# Patient Record
Sex: MALE | Race: WHITE | Employment: OTHER | URBAN - NONMETROPOLITAN AREA
[De-identification: names, ages, dates, MRNs, and addresses within clinical notes are randomized per-mention and may not be internally consistent; named-entity substitution may affect disease eponyms.]

---

## 2017-01-04 ENCOUNTER — OFFICE VISIT (OUTPATIENT)
Dept: PHYSICAL MEDICINE AND REHAB | Age: 82
End: 2017-01-04

## 2017-01-04 VITALS
SYSTOLIC BLOOD PRESSURE: 126 MMHG | DIASTOLIC BLOOD PRESSURE: 67 MMHG | WEIGHT: 158.8 LBS | HEIGHT: 63 IN | HEART RATE: 72 BPM | BODY MASS INDEX: 28.14 KG/M2

## 2017-01-04 DIAGNOSIS — M51.26 LUMBAR DISC HERNIATION: ICD-10-CM

## 2017-01-04 DIAGNOSIS — M15.9 PRIMARY OSTEOARTHRITIS INVOLVING MULTIPLE JOINTS: ICD-10-CM

## 2017-01-04 DIAGNOSIS — M54.16 LUMBAR RADICULITIS: Primary | ICD-10-CM

## 2017-01-04 PROCEDURE — 99213 OFFICE O/P EST LOW 20 MIN: CPT | Performed by: NURSE PRACTITIONER

## 2017-01-04 RX ORDER — OXYCODONE HYDROCHLORIDE AND ACETAMINOPHEN 5; 325 MG/1; MG/1
1 TABLET ORAL 2 TIMES DAILY PRN
Qty: 60 TABLET | Refills: 0 | Status: SHIPPED | OUTPATIENT
Start: 2017-01-04 | End: 2017-02-03

## 2017-01-24 ENCOUNTER — OFFICE VISIT (OUTPATIENT)
Dept: OTOLARYNGOLOGY | Age: 82
End: 2017-01-24

## 2017-01-24 VITALS
HEART RATE: 60 BPM | RESPIRATION RATE: 16 BRPM | TEMPERATURE: 97.6 F | DIASTOLIC BLOOD PRESSURE: 86 MMHG | WEIGHT: 161.1 LBS | SYSTOLIC BLOOD PRESSURE: 132 MMHG | HEIGHT: 63 IN | BODY MASS INDEX: 28.54 KG/M2

## 2017-01-24 DIAGNOSIS — Z45.89 TYMPANOSTOMY TUBE CHECK: ICD-10-CM

## 2017-01-24 DIAGNOSIS — H90.0 HEARING LOSS, CONDUCTIVE, BILATERAL: Primary | ICD-10-CM

## 2017-01-24 DIAGNOSIS — H90.3 BILATERAL SENSORINEURAL HEARING LOSS: ICD-10-CM

## 2017-01-24 DIAGNOSIS — H65.22 CHRONIC SEROUS OTITIS MEDIA OF LEFT EAR: ICD-10-CM

## 2017-01-24 PROCEDURE — 99024 POSTOP FOLLOW-UP VISIT: CPT | Performed by: OTOLARYNGOLOGY

## 2017-01-24 ASSESSMENT — ENCOUNTER SYMPTOMS
SINUS PRESSURE: 0
VOMITING: 0
CHEST TIGHTNESS: 0
WHEEZING: 0
SHORTNESS OF BREATH: 0
COLOR CHANGE: 0
VOICE CHANGE: 0
ABDOMINAL PAIN: 0
CHOKING: 0
TROUBLE SWALLOWING: 0
SORE THROAT: 0
STRIDOR: 0
DIARRHEA: 0
RHINORRHEA: 0
NAUSEA: 0
FACIAL SWELLING: 0
APNEA: 0
COUGH: 0

## 2017-02-16 DIAGNOSIS — K59.03 DRUG-INDUCED CONSTIPATION: ICD-10-CM

## 2017-02-16 DIAGNOSIS — F41.3 OTHER MIXED ANXIETY DISORDERS: ICD-10-CM

## 2017-02-17 RX ORDER — AMOXICILLIN 250 MG
1 CAPSULE ORAL NIGHTLY
Status: CANCELLED | OUTPATIENT
Start: 2017-02-17

## 2017-02-20 RX ORDER — DOCUSATE SODIUM AND SENNOSIDES 8.6; 5 MG/1; MG/1
TABLET, FILM COATED ORAL
Qty: 60 TABLET | Refills: 0 | Status: SHIPPED | OUTPATIENT
Start: 2017-02-20 | End: 2017-03-08 | Stop reason: ALTCHOICE

## 2017-03-08 ENCOUNTER — OFFICE VISIT (OUTPATIENT)
Dept: PHYSICAL MEDICINE AND REHAB | Age: 82
End: 2017-03-08

## 2017-03-08 VITALS
SYSTOLIC BLOOD PRESSURE: 148 MMHG | BODY MASS INDEX: 26.92 KG/M2 | DIASTOLIC BLOOD PRESSURE: 74 MMHG | HEART RATE: 64 BPM | WEIGHT: 161.6 LBS | HEIGHT: 65 IN

## 2017-03-08 DIAGNOSIS — M54.16 LUMBAR RADICULITIS: Primary | ICD-10-CM

## 2017-03-08 DIAGNOSIS — M51.26 LUMBAR DISC HERNIATION: ICD-10-CM

## 2017-03-08 DIAGNOSIS — M15.9 PRIMARY OSTEOARTHRITIS INVOLVING MULTIPLE JOINTS: ICD-10-CM

## 2017-03-08 PROCEDURE — 1036F TOBACCO NON-USER: CPT | Performed by: NURSE PRACTITIONER

## 2017-03-08 PROCEDURE — G8598 ASA/ANTIPLAT THER USED: HCPCS | Performed by: NURSE PRACTITIONER

## 2017-03-08 PROCEDURE — 4040F PNEUMOC VAC/ADMIN/RCVD: CPT | Performed by: NURSE PRACTITIONER

## 2017-03-08 PROCEDURE — 99213 OFFICE O/P EST LOW 20 MIN: CPT | Performed by: NURSE PRACTITIONER

## 2017-03-08 PROCEDURE — G8427 DOCREV CUR MEDS BY ELIG CLIN: HCPCS | Performed by: NURSE PRACTITIONER

## 2017-03-08 PROCEDURE — 1123F ACP DISCUSS/DSCN MKR DOCD: CPT | Performed by: NURSE PRACTITIONER

## 2017-03-08 PROCEDURE — G8420 CALC BMI NORM PARAMETERS: HCPCS | Performed by: NURSE PRACTITIONER

## 2017-03-08 PROCEDURE — G8484 FLU IMMUNIZE NO ADMIN: HCPCS | Performed by: NURSE PRACTITIONER

## 2017-03-08 RX ORDER — OXYCODONE HYDROCHLORIDE AND ACETAMINOPHEN 5; 325 MG/1; MG/1
1 TABLET ORAL 2 TIMES DAILY PRN
COMMUNITY
End: 2017-03-08 | Stop reason: SDUPTHER

## 2017-03-08 RX ORDER — ESZOPICLONE 2 MG/1
2 TABLET, FILM COATED ORAL NIGHTLY
COMMUNITY
End: 2017-03-29

## 2017-03-08 RX ORDER — TRAZODONE HYDROCHLORIDE 150 MG/1
150 TABLET ORAL NIGHTLY
COMMUNITY
End: 2017-03-29

## 2017-03-08 RX ORDER — OXYCODONE HYDROCHLORIDE AND ACETAMINOPHEN 5; 325 MG/1; MG/1
1 TABLET ORAL 2 TIMES DAILY PRN
Qty: 60 TABLET | Refills: 0 | Status: SHIPPED | OUTPATIENT
Start: 2017-03-08 | End: 2017-04-07

## 2017-03-08 ASSESSMENT — ENCOUNTER SYMPTOMS
GASTROINTESTINAL NEGATIVE: 1
RESPIRATORY NEGATIVE: 1

## 2017-03-09 ENCOUNTER — OFFICE VISIT (OUTPATIENT)
Dept: FAMILY MEDICINE CLINIC | Age: 82
End: 2017-03-09

## 2017-03-09 VITALS
BODY MASS INDEX: 27.01 KG/M2 | HEIGHT: 64 IN | HEART RATE: 59 BPM | WEIGHT: 158.2 LBS | SYSTOLIC BLOOD PRESSURE: 142 MMHG | DIASTOLIC BLOOD PRESSURE: 73 MMHG | RESPIRATION RATE: 14 BRPM | TEMPERATURE: 97.4 F

## 2017-03-09 DIAGNOSIS — D50.9 IRON DEFICIENCY ANEMIA, UNSPECIFIED IRON DEFICIENCY ANEMIA TYPE: ICD-10-CM

## 2017-03-09 DIAGNOSIS — M51.9 DISC DISORDER OF LUMBAR REGION: ICD-10-CM

## 2017-03-09 DIAGNOSIS — N18.30 CKD (CHRONIC KIDNEY DISEASE) STAGE 3, GFR 30-59 ML/MIN (HCC): Primary | ICD-10-CM

## 2017-03-09 DIAGNOSIS — I10 ESSENTIAL HYPERTENSION: ICD-10-CM

## 2017-03-09 DIAGNOSIS — M25.50 ARTHRALGIA OF MULTIPLE JOINTS: ICD-10-CM

## 2017-03-09 DIAGNOSIS — F41.3 OTHER MIXED ANXIETY DISORDERS: ICD-10-CM

## 2017-03-09 LAB
ANION GAP SERPL CALCULATED.3IONS-SCNC: 13 MEQ/L (ref 8–16)
BASOPHILS # BLD: 1.2 %
BASOPHILS ABSOLUTE: 0.1 THOU/MM3 (ref 0–0.1)
BUN BLDV-MCNC: 27 MG/DL (ref 7–22)
CALCIUM SERPL-MCNC: 9.8 MG/DL (ref 8.5–10.5)
CHLORIDE BLD-SCNC: 97 MEQ/L (ref 98–111)
CO2: 25 MEQ/L (ref 23–33)
CREAT SERPL-MCNC: 1.6 MG/DL (ref 0.4–1.2)
EOSINOPHIL # BLD: 2.8 %
EOSINOPHILS ABSOLUTE: 0.2 THOU/MM3 (ref 0–0.4)
GFR SERPL CREATININE-BSD FRML MDRD: 41 ML/MIN/1.73M2
GLUCOSE BLD-MCNC: 109 MG/DL (ref 70–108)
HCT VFR BLD CALC: 34.9 % (ref 42–52)
HEMOGLOBIN: 11.6 GM/DL (ref 14–18)
LYMPHOCYTES # BLD: 17 %
LYMPHOCYTES ABSOLUTE: 1.2 THOU/MM3 (ref 1–4.8)
MCH RBC QN AUTO: 29.6 PG (ref 27–31)
MCHC RBC AUTO-ENTMCNC: 33.3 GM/DL (ref 33–37)
MCV RBC AUTO: 88.8 FL (ref 80–94)
MONOCYTES # BLD: 9.3 %
MONOCYTES ABSOLUTE: 0.6 THOU/MM3 (ref 0.4–1.3)
NUCLEATED RED BLOOD CELLS: 0 /100 WBC
PDW BLD-RTO: 14.1 % (ref 11.5–14.5)
PLATELET # BLD: 256 THOU/MM3 (ref 130–400)
PMV BLD AUTO: 7.9 MCM (ref 7.4–10.4)
POTASSIUM SERPL-SCNC: 4.7 MEQ/L (ref 3.5–5.2)
RBC # BLD: 3.93 MILL/MM3 (ref 4.7–6.1)
RBC # BLD: NORMAL 10*6/UL
RHEUMATOID FACTOR: 41 IU/ML (ref 0–13)
SEDIMENTATION RATE, ERYTHROCYTE: 50 MM/HR (ref 0–10)
SEG NEUTROPHILS: 69.7 %
SEGMENTED NEUTROPHILS ABSOLUTE COUNT: 4.8 THOU/MM3 (ref 1.8–7.7)
SODIUM BLD-SCNC: 135 MEQ/L (ref 135–145)
TSH SERPL DL<=0.05 MIU/L-ACNC: 1.53 MCIU/ML (ref 0.4–4.2)
WBC # BLD: 6.9 THOU/MM3 (ref 4.8–10.8)

## 2017-03-09 PROCEDURE — 99213 OFFICE O/P EST LOW 20 MIN: CPT | Performed by: FAMILY MEDICINE

## 2017-03-09 PROCEDURE — G8598 ASA/ANTIPLAT THER USED: HCPCS | Performed by: FAMILY MEDICINE

## 2017-03-09 PROCEDURE — 1123F ACP DISCUSS/DSCN MKR DOCD: CPT | Performed by: FAMILY MEDICINE

## 2017-03-09 PROCEDURE — 36415 COLL VENOUS BLD VENIPUNCTURE: CPT | Performed by: FAMILY MEDICINE

## 2017-03-09 PROCEDURE — G8484 FLU IMMUNIZE NO ADMIN: HCPCS | Performed by: FAMILY MEDICINE

## 2017-03-09 PROCEDURE — G8427 DOCREV CUR MEDS BY ELIG CLIN: HCPCS | Performed by: FAMILY MEDICINE

## 2017-03-09 PROCEDURE — 1036F TOBACCO NON-USER: CPT | Performed by: FAMILY MEDICINE

## 2017-03-09 PROCEDURE — 4040F PNEUMOC VAC/ADMIN/RCVD: CPT | Performed by: FAMILY MEDICINE

## 2017-03-09 PROCEDURE — G8420 CALC BMI NORM PARAMETERS: HCPCS | Performed by: FAMILY MEDICINE

## 2017-03-09 RX ORDER — BUSPIRONE HYDROCHLORIDE 5 MG/1
5 TABLET ORAL 3 TIMES DAILY PRN
Qty: 90 TABLET | Refills: 1 | Status: SHIPPED | OUTPATIENT
Start: 2017-03-09 | End: 2017-06-08

## 2017-03-09 RX ORDER — OMEGA-3S/DHA/EPA/FISH OIL/D3 300MG-1000
2 CAPSULE ORAL
Qty: 180 CAPSULE | Refills: 5 | Status: ON HOLD | OUTPATIENT
Start: 2017-03-09 | End: 2017-09-15

## 2017-03-10 LAB — THYROXINE (T4): 6.8 UG/DL (ref 4.5–12)

## 2017-03-12 LAB — ANA SCREEN: NORMAL

## 2017-03-13 ENCOUNTER — TELEPHONE (OUTPATIENT)
Dept: FAMILY MEDICINE CLINIC | Age: 82
End: 2017-03-13

## 2017-03-13 DIAGNOSIS — M19.90 ARTHRITIS: Primary | ICD-10-CM

## 2017-03-13 DIAGNOSIS — R76.8 RHEUMATOID FACTOR POSITIVE: ICD-10-CM

## 2017-03-28 PROBLEM — K42.9 UMBILICAL HERNIA WITHOUT OBSTRUCTION AND WITHOUT GANGRENE: Status: ACTIVE | Noted: 2017-03-28

## 2017-03-29 ENCOUNTER — OFFICE VISIT (OUTPATIENT)
Age: 82
End: 2017-03-29

## 2017-03-29 ENCOUNTER — TELEPHONE (OUTPATIENT)
Age: 82
End: 2017-03-29

## 2017-03-29 ENCOUNTER — TELEPHONE (OUTPATIENT)
Dept: FAMILY MEDICINE CLINIC | Age: 82
End: 2017-03-29

## 2017-03-29 VITALS
RESPIRATION RATE: 18 BRPM | OXYGEN SATURATION: 94 % | TEMPERATURE: 97.4 F | SYSTOLIC BLOOD PRESSURE: 124 MMHG | DIASTOLIC BLOOD PRESSURE: 70 MMHG | HEART RATE: 66 BPM | BODY MASS INDEX: 26.66 KG/M2 | WEIGHT: 160 LBS | HEIGHT: 65 IN

## 2017-03-29 DIAGNOSIS — I25.10 CORONARY ARTERY DISEASE INVOLVING NATIVE CORONARY ARTERY OF NATIVE HEART WITHOUT ANGINA PECTORIS: ICD-10-CM

## 2017-03-29 DIAGNOSIS — N18.30 CKD (CHRONIC KIDNEY DISEASE) STAGE 3, GFR 30-59 ML/MIN (HCC): Primary | ICD-10-CM

## 2017-03-29 DIAGNOSIS — Z95.0 CARDIAC PACEMAKER IN SITU: ICD-10-CM

## 2017-03-29 DIAGNOSIS — K43.2 INCISIONAL HERNIA, WITHOUT OBSTRUCTION OR GANGRENE: ICD-10-CM

## 2017-03-29 PROCEDURE — G8598 ASA/ANTIPLAT THER USED: HCPCS | Performed by: SURGERY

## 2017-03-29 PROCEDURE — 1123F ACP DISCUSS/DSCN MKR DOCD: CPT | Performed by: SURGERY

## 2017-03-29 PROCEDURE — G8427 DOCREV CUR MEDS BY ELIG CLIN: HCPCS | Performed by: SURGERY

## 2017-03-29 PROCEDURE — G8484 FLU IMMUNIZE NO ADMIN: HCPCS | Performed by: SURGERY

## 2017-03-29 PROCEDURE — 1036F TOBACCO NON-USER: CPT | Performed by: SURGERY

## 2017-03-29 PROCEDURE — 99203 OFFICE O/P NEW LOW 30 MIN: CPT | Performed by: SURGERY

## 2017-03-29 PROCEDURE — 4040F PNEUMOC VAC/ADMIN/RCVD: CPT | Performed by: SURGERY

## 2017-03-29 PROCEDURE — G8420 CALC BMI NORM PARAMETERS: HCPCS | Performed by: SURGERY

## 2017-03-29 ASSESSMENT — ENCOUNTER SYMPTOMS
RECTAL PAIN: 0
VOICE CHANGE: 0
EYE ITCHING: 0
RHINORRHEA: 0
EYE DISCHARGE: 0
PHOTOPHOBIA: 0
STRIDOR: 0
FACIAL SWELLING: 0
EYE PAIN: 0
BLOOD IN STOOL: 0
SHORTNESS OF BREATH: 0
EYE REDNESS: 0
NAUSEA: 0
ABDOMINAL DISTENTION: 0
VOMITING: 0
APNEA: 0
SINUS PRESSURE: 0
WHEEZING: 0
CONSTIPATION: 0
COLOR CHANGE: 0
ABDOMINAL PAIN: 0
SORE THROAT: 0
DIARRHEA: 0
BACK PAIN: 0
CHOKING: 0
CHEST TIGHTNESS: 0
ANAL BLEEDING: 0
TROUBLE SWALLOWING: 0
COUGH: 0

## 2017-03-30 ENCOUNTER — TELEPHONE (OUTPATIENT)
Age: 82
End: 2017-03-30

## 2017-04-10 ENCOUNTER — TELEPHONE (OUTPATIENT)
Age: 82
End: 2017-04-10

## 2017-04-17 ENCOUNTER — TELEPHONE (OUTPATIENT)
Age: 82
End: 2017-04-17

## 2017-04-21 ENCOUNTER — TELEPHONE (OUTPATIENT)
Dept: OTOLARYNGOLOGY | Age: 82
End: 2017-04-21

## 2017-04-28 ENCOUNTER — TELEPHONE (OUTPATIENT)
Age: 82
End: 2017-04-28

## 2017-05-03 DIAGNOSIS — K59.03 DRUG-INDUCED CONSTIPATION: ICD-10-CM

## 2017-05-04 RX ORDER — DOCUSATE SODIUM AND SENNOSIDES 8.6; 5 MG/1; MG/1
TABLET, FILM COATED ORAL
Qty: 60 TABLET | Refills: 0 | Status: SHIPPED | OUTPATIENT
Start: 2017-05-04 | End: 2017-06-28 | Stop reason: SDUPTHER

## 2017-05-09 PROBLEM — Z98.890 S/P VENTRAL HERNIORRHAPHY: Status: ACTIVE | Noted: 2017-05-09

## 2017-05-09 PROBLEM — Z87.19 S/P VENTRAL HERNIORRHAPHY: Status: ACTIVE | Noted: 2017-05-09

## 2017-05-10 ENCOUNTER — OFFICE VISIT (OUTPATIENT)
Age: 82
End: 2017-05-10

## 2017-05-10 VITALS
BODY MASS INDEX: 26.13 KG/M2 | HEART RATE: 68 BPM | WEIGHT: 157 LBS | RESPIRATION RATE: 18 BRPM | TEMPERATURE: 98.6 F | SYSTOLIC BLOOD PRESSURE: 140 MMHG | DIASTOLIC BLOOD PRESSURE: 68 MMHG

## 2017-05-10 DIAGNOSIS — Z98.890 S/P VENTRAL HERNIORRHAPHY: ICD-10-CM

## 2017-05-10 DIAGNOSIS — Z87.19 S/P VENTRAL HERNIORRHAPHY: ICD-10-CM

## 2017-05-10 PROCEDURE — 99024 POSTOP FOLLOW-UP VISIT: CPT | Performed by: SURGERY

## 2017-06-08 ENCOUNTER — OFFICE VISIT (OUTPATIENT)
Dept: FAMILY MEDICINE CLINIC | Age: 82
End: 2017-06-08

## 2017-06-08 VITALS
WEIGHT: 159.6 LBS | RESPIRATION RATE: 16 BRPM | HEIGHT: 63 IN | TEMPERATURE: 97.5 F | HEART RATE: 60 BPM | DIASTOLIC BLOOD PRESSURE: 72 MMHG | BODY MASS INDEX: 28.28 KG/M2 | SYSTOLIC BLOOD PRESSURE: 126 MMHG

## 2017-06-08 DIAGNOSIS — F41.3 OTHER MIXED ANXIETY DISORDERS: ICD-10-CM

## 2017-06-08 DIAGNOSIS — R06.09 DYSPNEA ON EXERTION: ICD-10-CM

## 2017-06-08 DIAGNOSIS — S60.459A FOREIGN BODY FINGER: ICD-10-CM

## 2017-06-08 DIAGNOSIS — R94.4 DECREASED GFR: ICD-10-CM

## 2017-06-08 DIAGNOSIS — N18.30 CKD (CHRONIC KIDNEY DISEASE) STAGE 3, GFR 30-59 ML/MIN (HCC): ICD-10-CM

## 2017-06-08 DIAGNOSIS — D50.8 OTHER IRON DEFICIENCY ANEMIA: Primary | ICD-10-CM

## 2017-06-08 PROCEDURE — 4040F PNEUMOC VAC/ADMIN/RCVD: CPT | Performed by: FAMILY MEDICINE

## 2017-06-08 PROCEDURE — G8598 ASA/ANTIPLAT THER USED: HCPCS | Performed by: FAMILY MEDICINE

## 2017-06-08 PROCEDURE — 1036F TOBACCO NON-USER: CPT | Performed by: FAMILY MEDICINE

## 2017-06-08 PROCEDURE — 99214 OFFICE O/P EST MOD 30 MIN: CPT | Performed by: FAMILY MEDICINE

## 2017-06-08 PROCEDURE — G8420 CALC BMI NORM PARAMETERS: HCPCS | Performed by: FAMILY MEDICINE

## 2017-06-08 PROCEDURE — G8427 DOCREV CUR MEDS BY ELIG CLIN: HCPCS | Performed by: FAMILY MEDICINE

## 2017-06-08 PROCEDURE — 1123F ACP DISCUSS/DSCN MKR DOCD: CPT | Performed by: FAMILY MEDICINE

## 2017-06-08 RX ORDER — BUSPIRONE HYDROCHLORIDE 5 MG/1
5 TABLET ORAL 3 TIMES DAILY PRN
Qty: 90 TABLET | Refills: 1 | Status: CANCELLED | OUTPATIENT
Start: 2017-06-08

## 2017-06-08 ASSESSMENT — ENCOUNTER SYMPTOMS
CONSTIPATION: 0
EYE PAIN: 0
BLOOD IN STOOL: 0
COUGH: 0
NAUSEA: 0
DIARRHEA: 0
SHORTNESS OF BREATH: 0
VOMITING: 0
ABDOMINAL PAIN: 0
TROUBLE SWALLOWING: 0

## 2017-06-13 ENCOUNTER — TELEPHONE (OUTPATIENT)
Dept: FAMILY MEDICINE CLINIC | Age: 82
End: 2017-06-13

## 2017-06-15 ENCOUNTER — TELEPHONE (OUTPATIENT)
Dept: FAMILY MEDICINE CLINIC | Age: 82
End: 2017-06-15

## 2017-06-16 ENCOUNTER — TELEPHONE (OUTPATIENT)
Dept: FAMILY MEDICINE CLINIC | Age: 82
End: 2017-06-16

## 2017-06-16 ENCOUNTER — OFFICE VISIT (OUTPATIENT)
Dept: FAMILY MEDICINE CLINIC | Age: 82
End: 2017-06-16

## 2017-06-16 VITALS
HEIGHT: 63 IN | BODY MASS INDEX: 28.07 KG/M2 | SYSTOLIC BLOOD PRESSURE: 138 MMHG | TEMPERATURE: 97.7 F | WEIGHT: 158.4 LBS | DIASTOLIC BLOOD PRESSURE: 64 MMHG | OXYGEN SATURATION: 97 % | HEART RATE: 55 BPM

## 2017-06-16 DIAGNOSIS — S60.459A FOREIGN BODY FINGER: ICD-10-CM

## 2017-06-16 DIAGNOSIS — M79.89 FINGER SWELLING: Primary | ICD-10-CM

## 2017-06-16 PROCEDURE — G8427 DOCREV CUR MEDS BY ELIG CLIN: HCPCS | Performed by: NURSE PRACTITIONER

## 2017-06-16 PROCEDURE — 1123F ACP DISCUSS/DSCN MKR DOCD: CPT | Performed by: NURSE PRACTITIONER

## 2017-06-16 PROCEDURE — 1036F TOBACCO NON-USER: CPT | Performed by: NURSE PRACTITIONER

## 2017-06-16 PROCEDURE — G8598 ASA/ANTIPLAT THER USED: HCPCS | Performed by: NURSE PRACTITIONER

## 2017-06-16 PROCEDURE — G8419 CALC BMI OUT NRM PARAM NOF/U: HCPCS | Performed by: NURSE PRACTITIONER

## 2017-06-16 PROCEDURE — 99214 OFFICE O/P EST MOD 30 MIN: CPT | Performed by: NURSE PRACTITIONER

## 2017-06-16 PROCEDURE — 90715 TDAP VACCINE 7 YRS/> IM: CPT | Performed by: NURSE PRACTITIONER

## 2017-06-16 PROCEDURE — 4040F PNEUMOC VAC/ADMIN/RCVD: CPT | Performed by: NURSE PRACTITIONER

## 2017-06-16 RX ORDER — ATORVASTATIN CALCIUM 20 MG/1
20 TABLET, FILM COATED ORAL DAILY
COMMUNITY
End: 2018-02-20 | Stop reason: SDUPTHER

## 2017-06-16 RX ORDER — TRAZODONE HYDROCHLORIDE 150 MG/1
150 TABLET ORAL NIGHTLY
COMMUNITY
End: 2017-07-20

## 2017-06-16 RX ORDER — CEPHALEXIN 250 MG/1
250 CAPSULE ORAL 4 TIMES DAILY
Qty: 28 CAPSULE | Refills: 0 | Status: SHIPPED | OUTPATIENT
Start: 2017-06-16 | End: 2017-06-23

## 2017-06-16 RX ORDER — ESZOPICLONE 2 MG/1
2 TABLET, FILM COATED ORAL NIGHTLY
COMMUNITY
End: 2017-07-20

## 2017-06-16 ASSESSMENT — ENCOUNTER SYMPTOMS
COLOR CHANGE: 0
SHORTNESS OF BREATH: 0
EYE REDNESS: 0
RHINORRHEA: 0
BLOOD IN STOOL: 0
COUGH: 0
ABDOMINAL PAIN: 0
DIARRHEA: 0
ANAL BLEEDING: 0
CONSTIPATION: 0
ABDOMINAL DISTENTION: 0
NAUSEA: 0
SORE THROAT: 0
EYE DISCHARGE: 0

## 2017-06-28 DIAGNOSIS — K59.03 DRUG-INDUCED CONSTIPATION: ICD-10-CM

## 2017-06-29 RX ORDER — DOCUSATE SODIUM AND SENNOSIDES 8.6; 5 MG/1; MG/1
TABLET, FILM COATED ORAL
Qty: 60 TABLET | Refills: 0 | Status: ON HOLD | OUTPATIENT
Start: 2017-06-29 | End: 2017-09-15

## 2017-07-13 ENCOUNTER — TELEPHONE (OUTPATIENT)
Dept: FAMILY MEDICINE CLINIC | Age: 82
End: 2017-07-13

## 2017-07-20 ENCOUNTER — OFFICE VISIT (OUTPATIENT)
Dept: FAMILY MEDICINE CLINIC | Age: 82
End: 2017-07-20
Payer: MEDICARE

## 2017-07-20 VITALS
DIASTOLIC BLOOD PRESSURE: 54 MMHG | WEIGHT: 156.2 LBS | TEMPERATURE: 97.5 F | SYSTOLIC BLOOD PRESSURE: 122 MMHG | HEART RATE: 61 BPM | BODY MASS INDEX: 27.68 KG/M2 | HEIGHT: 63 IN | RESPIRATION RATE: 10 BRPM

## 2017-07-20 DIAGNOSIS — N18.30 CKD (CHRONIC KIDNEY DISEASE) STAGE 3, GFR 30-59 ML/MIN (HCC): ICD-10-CM

## 2017-07-20 DIAGNOSIS — I10 ESSENTIAL HYPERTENSION: ICD-10-CM

## 2017-07-20 DIAGNOSIS — F51.01 PRIMARY INSOMNIA: ICD-10-CM

## 2017-07-20 DIAGNOSIS — D50.9 IRON DEFICIENCY ANEMIA, UNSPECIFIED IRON DEFICIENCY ANEMIA TYPE: Primary | ICD-10-CM

## 2017-07-20 LAB
BILIRUBIN, POC: NORMAL
BLOOD URINE, POC: NORMAL
CLARITY, POC: NORMAL
COLOR, POC: NORMAL
GLUCOSE URINE, POC: NORMAL
KETONES, POC: NORMAL
LEUKOCYTE EST, POC: NORMAL
NITRITE, POC: NORMAL
PH, POC: NORMAL
PROTEIN, POC: NORMAL
SPECIFIC GRAVITY, POC: NORMAL
UROBILINOGEN, POC: NORMAL

## 2017-07-20 PROCEDURE — 4040F PNEUMOC VAC/ADMIN/RCVD: CPT | Performed by: FAMILY MEDICINE

## 2017-07-20 PROCEDURE — 99214 OFFICE O/P EST MOD 30 MIN: CPT | Performed by: FAMILY MEDICINE

## 2017-07-20 PROCEDURE — G8598 ASA/ANTIPLAT THER USED: HCPCS | Performed by: FAMILY MEDICINE

## 2017-07-20 PROCEDURE — 81003 URINALYSIS AUTO W/O SCOPE: CPT | Performed by: FAMILY MEDICINE

## 2017-07-20 PROCEDURE — G8419 CALC BMI OUT NRM PARAM NOF/U: HCPCS | Performed by: FAMILY MEDICINE

## 2017-07-20 PROCEDURE — 1036F TOBACCO NON-USER: CPT | Performed by: FAMILY MEDICINE

## 2017-07-20 PROCEDURE — G8427 DOCREV CUR MEDS BY ELIG CLIN: HCPCS | Performed by: FAMILY MEDICINE

## 2017-07-20 PROCEDURE — 1123F ACP DISCUSS/DSCN MKR DOCD: CPT | Performed by: FAMILY MEDICINE

## 2017-07-20 RX ORDER — LOSARTAN POTASSIUM 50 MG/1
50 TABLET ORAL DAILY
Qty: 30 TABLET | Refills: 3 | Status: ON HOLD | OUTPATIENT
Start: 2017-07-20 | End: 2017-09-15

## 2017-07-20 ASSESSMENT — ENCOUNTER SYMPTOMS
DIARRHEA: 0
TROUBLE SWALLOWING: 0
COUGH: 0
CONSTIPATION: 0
VOMITING: 0
NAUSEA: 0
SHORTNESS OF BREATH: 0
EYE PAIN: 0
BLOOD IN STOOL: 0
ABDOMINAL PAIN: 0

## 2017-07-25 ENCOUNTER — OFFICE VISIT (OUTPATIENT)
Dept: ENT CLINIC | Age: 82
End: 2017-07-25
Payer: MEDICARE

## 2017-07-25 VITALS
TEMPERATURE: 97.7 F | WEIGHT: 156.6 LBS | SYSTOLIC BLOOD PRESSURE: 120 MMHG | HEART RATE: 68 BPM | BODY MASS INDEX: 27.75 KG/M2 | RESPIRATION RATE: 16 BRPM | DIASTOLIC BLOOD PRESSURE: 70 MMHG | HEIGHT: 63 IN

## 2017-07-25 DIAGNOSIS — H90.3 BILATERAL SENSORINEURAL HEARING LOSS: ICD-10-CM

## 2017-07-25 DIAGNOSIS — Z45.89 TYMPANOSTOMY TUBE CHECK: ICD-10-CM

## 2017-07-25 DIAGNOSIS — H90.0 CONDUCTIVE HEARING LOSS, BILATERAL: Primary | ICD-10-CM

## 2017-07-25 DIAGNOSIS — H74.02: ICD-10-CM

## 2017-07-25 PROCEDURE — 1123F ACP DISCUSS/DSCN MKR DOCD: CPT | Performed by: OTOLARYNGOLOGY

## 2017-07-25 PROCEDURE — 4040F PNEUMOC VAC/ADMIN/RCVD: CPT | Performed by: OTOLARYNGOLOGY

## 2017-07-25 PROCEDURE — G8419 CALC BMI OUT NRM PARAM NOF/U: HCPCS | Performed by: OTOLARYNGOLOGY

## 2017-07-25 PROCEDURE — G8598 ASA/ANTIPLAT THER USED: HCPCS | Performed by: OTOLARYNGOLOGY

## 2017-07-25 PROCEDURE — 1036F TOBACCO NON-USER: CPT | Performed by: OTOLARYNGOLOGY

## 2017-07-25 PROCEDURE — 99213 OFFICE O/P EST LOW 20 MIN: CPT | Performed by: OTOLARYNGOLOGY

## 2017-07-25 PROCEDURE — G8427 DOCREV CUR MEDS BY ELIG CLIN: HCPCS | Performed by: OTOLARYNGOLOGY

## 2017-07-25 ASSESSMENT — ENCOUNTER SYMPTOMS
SHORTNESS OF BREATH: 0
VOMITING: 0
DIARRHEA: 0
SORE THROAT: 0
FACIAL SWELLING: 0
VOICE CHANGE: 0
COLOR CHANGE: 0
RHINORRHEA: 0
NAUSEA: 0
CHOKING: 0
ABDOMINAL PAIN: 0
SINUS PRESSURE: 0
WHEEZING: 0
TROUBLE SWALLOWING: 0
STRIDOR: 0
APNEA: 0
COUGH: 0
CHEST TIGHTNESS: 0

## 2017-08-30 ENCOUNTER — APPOINTMENT (OUTPATIENT)
Dept: GENERAL RADIOLOGY | Age: 82
DRG: 481 | End: 2017-08-30
Payer: MEDICARE

## 2017-08-30 ENCOUNTER — APPOINTMENT (OUTPATIENT)
Dept: CT IMAGING | Age: 82
DRG: 481 | End: 2017-08-30
Payer: MEDICARE

## 2017-08-30 ENCOUNTER — HOSPITAL ENCOUNTER (INPATIENT)
Age: 82
LOS: 5 days | Discharge: NURSING, REHAB OR LONG-TERM CARE | DRG: 481 | End: 2017-09-04
Attending: FAMILY MEDICINE | Admitting: ORTHOPAEDIC SURGERY
Payer: MEDICARE

## 2017-08-30 DIAGNOSIS — S72.302A CLOSED FRACTURE OF SHAFT OF LEFT FEMUR, UNSPECIFIED FRACTURE MORPHOLOGY, INITIAL ENCOUNTER (HCC): ICD-10-CM

## 2017-08-30 DIAGNOSIS — S09.90XA HEAD INJURY, CLOSED, WITHOUT LOC, INITIAL ENCOUNTER: Primary | ICD-10-CM

## 2017-08-30 DIAGNOSIS — R55 SYNCOPE AND COLLAPSE: ICD-10-CM

## 2017-08-30 DIAGNOSIS — M25.552 LEFT HIP PAIN: ICD-10-CM

## 2017-08-30 LAB
ALBUMIN SERPL-MCNC: 3.8 G/DL (ref 3.5–5.1)
ALP BLD-CCNC: 89 U/L (ref 38–126)
ALT SERPL-CCNC: 9 U/L (ref 11–66)
ANION GAP SERPL CALCULATED.3IONS-SCNC: 17 MEQ/L (ref 8–16)
ANISOCYTOSIS: ABNORMAL
AST SERPL-CCNC: 19 U/L (ref 5–40)
BASOPHILS # BLD: 0.7 %
BASOPHILS ABSOLUTE: 0.1 THOU/MM3 (ref 0–0.1)
BILIRUB SERPL-MCNC: 0.3 MG/DL (ref 0.3–1.2)
BUN BLDV-MCNC: 26 MG/DL (ref 7–22)
CALCIUM SERPL-MCNC: 9.6 MG/DL (ref 8.5–10.5)
CHLORIDE BLD-SCNC: 101 MEQ/L (ref 98–111)
CO2: 21 MEQ/L (ref 23–33)
CREAT SERPL-MCNC: 1.4 MG/DL (ref 0.4–1.2)
EKG ATRIAL RATE: 83 BPM
EKG P AXIS: 43 DEGREES
EKG P-R INTERVAL: 126 MS
EKG Q-T INTERVAL: 376 MS
EKG QRS DURATION: 90 MS
EKG QTC CALCULATION (BAZETT): 441 MS
EKG R AXIS: 52 DEGREES
EKG T AXIS: 1 DEGREES
EKG VENTRICULAR RATE: 83 BPM
EOSINOPHIL # BLD: 0.7 %
EOSINOPHILS ABSOLUTE: 0.1 THOU/MM3 (ref 0–0.4)
GFR SERPL CREATININE-BSD FRML MDRD: 48 ML/MIN/1.73M2
GLUCOSE BLD-MCNC: 113 MG/DL (ref 70–108)
GLUCOSE BLD-MCNC: 115 MG/DL (ref 70–108)
HCT VFR BLD CALC: 29.5 % (ref 42–52)
HEMOGLOBIN: 10 GM/DL (ref 14–18)
LYMPHOCYTES # BLD: 12.8 %
LYMPHOCYTES ABSOLUTE: 1 THOU/MM3 (ref 1–4.8)
MCH RBC QN AUTO: 29 PG (ref 27–31)
MCHC RBC AUTO-ENTMCNC: 33.9 GM/DL (ref 33–37)
MCV RBC AUTO: 85.5 FL (ref 80–94)
MONOCYTES # BLD: 5.9 %
MONOCYTES ABSOLUTE: 0.5 THOU/MM3 (ref 0.4–1.3)
NUCLEATED RED BLOOD CELLS: 0 /100 WBC
OSMOLALITY CALCULATION: 283.1 MOSMOL/KG (ref 275–300)
PDW BLD-RTO: 15.4 % (ref 11.5–14.5)
PLATELET # BLD: 286 THOU/MM3 (ref 130–400)
PMV BLD AUTO: 7.1 MCM (ref 7.4–10.4)
POTASSIUM SERPL-SCNC: 4.5 MEQ/L (ref 3.5–5.2)
RBC # BLD: 3.45 MILL/MM3 (ref 4.7–6.1)
RBC # BLD: NORMAL 10*6/UL
SEG NEUTROPHILS: 79.9 %
SEGMENTED NEUTROPHILS ABSOLUTE COUNT: 6.4 THOU/MM3 (ref 1.8–7.7)
SODIUM BLD-SCNC: 139 MEQ/L (ref 135–145)
TOTAL CK: 94 U/L (ref 55–170)
TOTAL PROTEIN: 7.5 G/DL (ref 6.1–8)
TROPONIN T: < 0.01 NG/ML
WBC # BLD: 8 THOU/MM3 (ref 4.8–10.8)

## 2017-08-30 PROCEDURE — 93005 ELECTROCARDIOGRAM TRACING: CPT

## 2017-08-30 PROCEDURE — 84484 ASSAY OF TROPONIN QUANT: CPT

## 2017-08-30 PROCEDURE — 6360000002 HC RX W HCPCS

## 2017-08-30 PROCEDURE — 1200000000 HC SEMI PRIVATE

## 2017-08-30 PROCEDURE — 6370000000 HC RX 637 (ALT 250 FOR IP): Performed by: PHYSICIAN ASSISTANT

## 2017-08-30 PROCEDURE — 2580000003 HC RX 258: Performed by: PHYSICIAN ASSISTANT

## 2017-08-30 PROCEDURE — 36415 COLL VENOUS BLD VENIPUNCTURE: CPT

## 2017-08-30 PROCEDURE — 96375 TX/PRO/DX INJ NEW DRUG ADDON: CPT

## 2017-08-30 PROCEDURE — 99285 EMERGENCY DEPT VISIT HI MDM: CPT

## 2017-08-30 PROCEDURE — 85025 COMPLETE CBC W/AUTO DIFF WBC: CPT

## 2017-08-30 PROCEDURE — 96374 THER/PROPH/DIAG INJ IV PUSH: CPT

## 2017-08-30 PROCEDURE — 72125 CT NECK SPINE W/O DYE: CPT

## 2017-08-30 PROCEDURE — 82550 ASSAY OF CK (CPK): CPT

## 2017-08-30 PROCEDURE — 70450 CT HEAD/BRAIN W/O DYE: CPT

## 2017-08-30 PROCEDURE — 80053 COMPREHEN METABOLIC PANEL: CPT

## 2017-08-30 PROCEDURE — 73502 X-RAY EXAM HIP UNI 2-3 VIEWS: CPT

## 2017-08-30 PROCEDURE — 82948 REAGENT STRIP/BLOOD GLUCOSE: CPT

## 2017-08-30 PROCEDURE — 6360000002 HC RX W HCPCS: Performed by: FAMILY MEDICINE

## 2017-08-30 RX ORDER — SODIUM CHLORIDE 9 MG/ML
INJECTION, SOLUTION INTRAVENOUS CONTINUOUS
Status: DISCONTINUED | OUTPATIENT
Start: 2017-08-30 | End: 2017-09-04 | Stop reason: HOSPADM

## 2017-08-30 RX ORDER — HYDROCODONE BITARTRATE AND ACETAMINOPHEN 5; 325 MG/1; MG/1
1 TABLET ORAL EVERY 4 HOURS PRN
Status: DISCONTINUED | OUTPATIENT
Start: 2017-08-30 | End: 2017-09-04 | Stop reason: HOSPADM

## 2017-08-30 RX ORDER — FENTANYL CITRATE 50 UG/ML
INJECTION, SOLUTION INTRAMUSCULAR; INTRAVENOUS
Status: COMPLETED
Start: 2017-08-30 | End: 2017-08-30

## 2017-08-30 RX ORDER — HYDROCODONE BITARTRATE AND ACETAMINOPHEN 5; 325 MG/1; MG/1
2 TABLET ORAL EVERY 4 HOURS PRN
Status: DISCONTINUED | OUTPATIENT
Start: 2017-08-30 | End: 2017-09-04 | Stop reason: HOSPADM

## 2017-08-30 RX ORDER — FENTANYL CITRATE 50 UG/ML
100 INJECTION, SOLUTION INTRAMUSCULAR; INTRAVENOUS ONCE
Status: COMPLETED | OUTPATIENT
Start: 2017-08-30 | End: 2017-08-30

## 2017-08-30 RX ORDER — CYCLOBENZAPRINE HCL 10 MG
10 TABLET ORAL 3 TIMES DAILY PRN
Status: DISCONTINUED | OUTPATIENT
Start: 2017-08-30 | End: 2017-09-04 | Stop reason: HOSPADM

## 2017-08-30 RX ORDER — MORPHINE SULFATE 2 MG/ML
2 INJECTION, SOLUTION INTRAMUSCULAR; INTRAVENOUS ONCE
Status: COMPLETED | OUTPATIENT
Start: 2017-08-30 | End: 2017-08-30

## 2017-08-30 RX ORDER — ONDANSETRON 2 MG/ML
4 INJECTION INTRAMUSCULAR; INTRAVENOUS EVERY 6 HOURS PRN
Status: DISCONTINUED | OUTPATIENT
Start: 2017-08-30 | End: 2017-09-04 | Stop reason: HOSPADM

## 2017-08-30 RX ADMIN — SODIUM CHLORIDE: 9 INJECTION, SOLUTION INTRAVENOUS at 21:23

## 2017-08-30 RX ADMIN — HYDROCODONE BITARTRATE AND ACETAMINOPHEN 2 TABLET: 5; 325 TABLET ORAL at 21:23

## 2017-08-30 RX ADMIN — FENTANYL CITRATE 100 MCG: 50 INJECTION INTRAMUSCULAR; INTRAVENOUS at 15:05

## 2017-08-30 RX ADMIN — FENTANYL CITRATE 100 MCG: 50 INJECTION, SOLUTION INTRAMUSCULAR; INTRAVENOUS at 15:05

## 2017-08-30 RX ADMIN — HYDROCODONE BITARTRATE AND ACETAMINOPHEN 2 TABLET: 5; 325 TABLET ORAL at 17:04

## 2017-08-30 RX ADMIN — MORPHINE SULFATE 2 MG: 2 INJECTION, SOLUTION INTRAMUSCULAR; INTRAVENOUS at 13:24

## 2017-08-30 ASSESSMENT — PAIN SCALES - GENERAL
PAINLEVEL_OUTOF10: 10
PAINLEVEL_OUTOF10: 7

## 2017-08-30 ASSESSMENT — ENCOUNTER SYMPTOMS
SHORTNESS OF BREATH: 0
DIARRHEA: 0
ABDOMINAL PAIN: 0
CONSTIPATION: 0
VOMITING: 0
COLOR CHANGE: 0
NAUSEA: 0

## 2017-08-30 ASSESSMENT — PAIN DESCRIPTION - FREQUENCY
FREQUENCY: INTERMITTENT
FREQUENCY: CONTINUOUS

## 2017-08-30 ASSESSMENT — PAIN DESCRIPTION - DESCRIPTORS
DESCRIPTORS: SHARP
DESCRIPTORS: ACHING

## 2017-08-30 ASSESSMENT — PAIN DESCRIPTION - PAIN TYPE
TYPE: ACUTE PAIN
TYPE: ACUTE PAIN

## 2017-08-30 ASSESSMENT — PAIN DESCRIPTION - LOCATION
LOCATION: HIP
LOCATION: HIP

## 2017-08-30 ASSESSMENT — PAIN DESCRIPTION - ORIENTATION
ORIENTATION: LEFT
ORIENTATION: LEFT

## 2017-08-31 ENCOUNTER — APPOINTMENT (OUTPATIENT)
Dept: GENERAL RADIOLOGY | Age: 82
DRG: 481 | End: 2017-08-31
Payer: MEDICARE

## 2017-08-31 ENCOUNTER — ANESTHESIA EVENT (OUTPATIENT)
Dept: OPERATING ROOM | Age: 82
DRG: 481 | End: 2017-08-31
Payer: MEDICARE

## 2017-08-31 ENCOUNTER — ANESTHESIA (OUTPATIENT)
Dept: OPERATING ROOM | Age: 82
DRG: 481 | End: 2017-08-31
Payer: MEDICARE

## 2017-08-31 VITALS
SYSTOLIC BLOOD PRESSURE: 213 MMHG | TEMPERATURE: 98.6 F | RESPIRATION RATE: 1 BRPM | DIASTOLIC BLOOD PRESSURE: 106 MMHG | OXYGEN SATURATION: 100 %

## 2017-08-31 PROBLEM — Z91.89 AT HIGH RISK FOR PAIN FROM PROCEDURE: Status: ACTIVE | Noted: 2017-08-31

## 2017-08-31 PROBLEM — S72.142A CLOSED INTERTROCHANTERIC FRACTURE OF LEFT FEMUR (HCC): Status: ACTIVE | Noted: 2017-08-31

## 2017-08-31 LAB
ANION GAP SERPL CALCULATED.3IONS-SCNC: 13 MEQ/L (ref 8–16)
ANISOCYTOSIS: ABNORMAL
BASOPHILS # BLD: 0.8 %
BASOPHILS ABSOLUTE: 0.1 THOU/MM3 (ref 0–0.1)
BUN BLDV-MCNC: 25 MG/DL (ref 7–22)
CALCIUM SERPL-MCNC: 9 MG/DL (ref 8.5–10.5)
CHLORIDE BLD-SCNC: 100 MEQ/L (ref 98–111)
CO2: 25 MEQ/L (ref 23–33)
CREAT SERPL-MCNC: 1.2 MG/DL (ref 0.4–1.2)
EOSINOPHIL # BLD: 1.5 %
EOSINOPHILS ABSOLUTE: 0.1 THOU/MM3 (ref 0–0.4)
GFR SERPL CREATININE-BSD FRML MDRD: 57 ML/MIN/1.73M2
GLUCOSE BLD-MCNC: 96 MG/DL (ref 70–108)
HCT VFR BLD CALC: 27.6 % (ref 42–52)
HEMOGLOBIN: 9.2 GM/DL (ref 14–18)
LYMPHOCYTES # BLD: 14.7 %
LYMPHOCYTES ABSOLUTE: 1.2 THOU/MM3 (ref 1–4.8)
MCH RBC QN AUTO: 28.6 PG (ref 27–31)
MCHC RBC AUTO-ENTMCNC: 33.3 GM/DL (ref 33–37)
MCV RBC AUTO: 86 FL (ref 80–94)
MONOCYTES # BLD: 13.3 %
MONOCYTES ABSOLUTE: 1.1 THOU/MM3 (ref 0.4–1.3)
NUCLEATED RED BLOOD CELLS: 0 /100 WBC
PDW BLD-RTO: 15 % (ref 11.5–14.5)
PLATELET # BLD: 249 THOU/MM3 (ref 130–400)
PMV BLD AUTO: 7.5 MCM (ref 7.4–10.4)
POTASSIUM SERPL-SCNC: 3.9 MEQ/L (ref 3.5–5.2)
RBC # BLD: 3.21 MILL/MM3 (ref 4.7–6.1)
RBC # BLD: NORMAL 10*6/UL
SEG NEUTROPHILS: 69.7 %
SEGMENTED NEUTROPHILS ABSOLUTE COUNT: 5.5 THOU/MM3 (ref 1.8–7.7)
SODIUM BLD-SCNC: 138 MEQ/L (ref 135–145)
WBC # BLD: 7.9 THOU/MM3 (ref 4.8–10.8)

## 2017-08-31 PROCEDURE — A6257 TRANSPARENT FILM <= 16 SQ IN: HCPCS | Performed by: ORTHOPAEDIC SURGERY

## 2017-08-31 PROCEDURE — 6360000002 HC RX W HCPCS: Performed by: NURSE PRACTITIONER

## 2017-08-31 PROCEDURE — 0QS704Z REPOSITION LEFT UPPER FEMUR WITH INTERNAL FIXATION DEVICE, OPEN APPROACH: ICD-10-PCS | Performed by: ORTHOPAEDIC SURGERY

## 2017-08-31 PROCEDURE — 3600000014 HC SURGERY LEVEL 4 ADDTL 15MIN: Performed by: ORTHOPAEDIC SURGERY

## 2017-08-31 PROCEDURE — 3700000000 HC ANESTHESIA ATTENDED CARE: Performed by: ORTHOPAEDIC SURGERY

## 2017-08-31 PROCEDURE — 2720000010 HC SURG SUPPLY STERILE: Performed by: ORTHOPAEDIC SURGERY

## 2017-08-31 PROCEDURE — 36415 COLL VENOUS BLD VENIPUNCTURE: CPT

## 2017-08-31 PROCEDURE — 6370000000 HC RX 637 (ALT 250 FOR IP): Performed by: PHYSICIAN ASSISTANT

## 2017-08-31 PROCEDURE — 6370000000 HC RX 637 (ALT 250 FOR IP): Performed by: NURSE PRACTITIONER

## 2017-08-31 PROCEDURE — 2500000003 HC RX 250 WO HCPCS: Performed by: NURSE ANESTHETIST, CERTIFIED REGISTERED

## 2017-08-31 PROCEDURE — 3600000004 HC SURGERY LEVEL 4 BASE: Performed by: ORTHOPAEDIC SURGERY

## 2017-08-31 PROCEDURE — 73030 X-RAY EXAM OF SHOULDER: CPT

## 2017-08-31 PROCEDURE — 85025 COMPLETE CBC W/AUTO DIFF WBC: CPT

## 2017-08-31 PROCEDURE — 7100000001 HC PACU RECOVERY - ADDTL 15 MIN: Performed by: ORTHOPAEDIC SURGERY

## 2017-08-31 PROCEDURE — 80048 BASIC METABOLIC PNL TOTAL CA: CPT

## 2017-08-31 PROCEDURE — 6360000002 HC RX W HCPCS: Performed by: ANESTHESIOLOGY

## 2017-08-31 PROCEDURE — 7100000000 HC PACU RECOVERY - FIRST 15 MIN: Performed by: ORTHOPAEDIC SURGERY

## 2017-08-31 PROCEDURE — 73552 X-RAY EXAM OF FEMUR 2/>: CPT

## 2017-08-31 PROCEDURE — 6360000002 HC RX W HCPCS: Performed by: NURSE ANESTHETIST, CERTIFIED REGISTERED

## 2017-08-31 PROCEDURE — 2580000003 HC RX 258: Performed by: NURSE ANESTHETIST, CERTIFIED REGISTERED

## 2017-08-31 PROCEDURE — 71010 XR CHEST PORTABLE: CPT

## 2017-08-31 PROCEDURE — A6258 TRANSPARENT FILM >16<=48 IN: HCPCS | Performed by: ORTHOPAEDIC SURGERY

## 2017-08-31 PROCEDURE — 1200000000 HC SEMI PRIVATE

## 2017-08-31 PROCEDURE — A6454 SELF-ADHER BAND W>=3" <5"/YD: HCPCS | Performed by: ORTHOPAEDIC SURGERY

## 2017-08-31 PROCEDURE — 99222 1ST HOSP IP/OBS MODERATE 55: CPT | Performed by: NURSE PRACTITIONER

## 2017-08-31 PROCEDURE — 2580000003 HC RX 258: Performed by: PHYSICIAN ASSISTANT

## 2017-08-31 PROCEDURE — 3209999900 FLUORO FOR SURGICAL PROCEDURES

## 2017-08-31 PROCEDURE — 3700000001 HC ADD 15 MINUTES (ANESTHESIA): Performed by: ORTHOPAEDIC SURGERY

## 2017-08-31 PROCEDURE — C1713 ANCHOR/SCREW BN/BN,TIS/BN: HCPCS | Performed by: ORTHOPAEDIC SURGERY

## 2017-08-31 DEVICE — TRIGEN INTERTAN 1.5 13MM X 38CM                                    125DEGREE LEFT
Type: IMPLANTABLE DEVICE | Site: HIP | Status: FUNCTIONAL
Brand: TRIGEN

## 2017-08-31 DEVICE — INTERTAN LAG/COMPRESSION SCREW KIT                                    90MM / 85MM
Type: IMPLANTABLE DEVICE | Site: HIP | Status: FUNCTIONAL
Brand: TRIGEN

## 2017-08-31 DEVICE — TRIGEN LOW PROFILE SCREW 5.0MM X 42.5MM
Type: IMPLANTABLE DEVICE | Site: HIP | Status: FUNCTIONAL
Brand: TRIGEN

## 2017-08-31 RX ORDER — FENTANYL CITRATE 50 UG/ML
50 INJECTION, SOLUTION INTRAMUSCULAR; INTRAVENOUS EVERY 5 MIN PRN
Status: DISCONTINUED | OUTPATIENT
Start: 2017-08-31 | End: 2017-08-31 | Stop reason: HOSPADM

## 2017-08-31 RX ORDER — LIDOCAINE HYDROCHLORIDE 20 MG/ML
INJECTION, SOLUTION EPIDURAL; INFILTRATION; INTRACAUDAL; PERINEURAL PRN
Status: DISCONTINUED | OUTPATIENT
Start: 2017-08-31 | End: 2017-08-31 | Stop reason: SDUPTHER

## 2017-08-31 RX ORDER — MORPHINE SULFATE 2 MG/ML
2 INJECTION, SOLUTION INTRAMUSCULAR; INTRAVENOUS EVERY 5 MIN PRN
Status: DISCONTINUED | OUTPATIENT
Start: 2017-08-31 | End: 2017-08-31 | Stop reason: HOSPADM

## 2017-08-31 RX ORDER — CLOPIDOGREL BISULFATE 75 MG/1
75 TABLET ORAL EVERY OTHER DAY
Status: DISCONTINUED | OUTPATIENT
Start: 2017-08-31 | End: 2017-09-04 | Stop reason: HOSPADM

## 2017-08-31 RX ORDER — ROCURONIUM BROMIDE 10 MG/ML
INJECTION, SOLUTION INTRAVENOUS PRN
Status: DISCONTINUED | OUTPATIENT
Start: 2017-08-31 | End: 2017-08-31 | Stop reason: SDUPTHER

## 2017-08-31 RX ORDER — ONDANSETRON 2 MG/ML
INJECTION INTRAMUSCULAR; INTRAVENOUS PRN
Status: DISCONTINUED | OUTPATIENT
Start: 2017-08-31 | End: 2017-08-31 | Stop reason: SDUPTHER

## 2017-08-31 RX ORDER — LABETALOL HYDROCHLORIDE 5 MG/ML
10 INJECTION, SOLUTION INTRAVENOUS EVERY 10 MIN PRN
Status: DISCONTINUED | OUTPATIENT
Start: 2017-08-31 | End: 2017-08-31 | Stop reason: HOSPADM

## 2017-08-31 RX ORDER — MORPHINE SULFATE 2 MG/ML
2 INJECTION, SOLUTION INTRAMUSCULAR; INTRAVENOUS
Status: DISCONTINUED | OUTPATIENT
Start: 2017-08-31 | End: 2017-09-04 | Stop reason: HOSPADM

## 2017-08-31 RX ORDER — PROPOFOL 10 MG/ML
INJECTION, EMULSION INTRAVENOUS PRN
Status: DISCONTINUED | OUTPATIENT
Start: 2017-08-31 | End: 2017-08-31 | Stop reason: SDUPTHER

## 2017-08-31 RX ORDER — HYDROCODONE BITARTRATE AND ACETAMINOPHEN 5; 325 MG/1; MG/1
1-2 TABLET ORAL EVERY 6 HOURS PRN
Qty: 42 TABLET | Refills: 0 | Status: ON HOLD | OUTPATIENT
Start: 2017-08-31 | End: 2017-09-15

## 2017-08-31 RX ORDER — FENTANYL CITRATE 50 UG/ML
INJECTION, SOLUTION INTRAMUSCULAR; INTRAVENOUS PRN
Status: DISCONTINUED | OUTPATIENT
Start: 2017-08-31 | End: 2017-08-31 | Stop reason: SDUPTHER

## 2017-08-31 RX ORDER — SODIUM CHLORIDE 9 MG/ML
INJECTION, SOLUTION INTRAVENOUS CONTINUOUS PRN
Status: DISCONTINUED | OUTPATIENT
Start: 2017-08-31 | End: 2017-08-31 | Stop reason: SDUPTHER

## 2017-08-31 RX ADMIN — ROCURONIUM BROMIDE 30 MG: 10 INJECTION INTRAVENOUS at 09:59

## 2017-08-31 RX ADMIN — FENTANYL CITRATE 25 MCG: 50 INJECTION INTRAMUSCULAR; INTRAVENOUS at 09:59

## 2017-08-31 RX ADMIN — FENTANYL CITRATE 50 MCG: 50 INJECTION INTRAMUSCULAR; INTRAVENOUS at 11:00

## 2017-08-31 RX ADMIN — MORPHINE SULFATE 2 MG: 2 INJECTION, SOLUTION INTRAMUSCULAR; INTRAVENOUS at 17:56

## 2017-08-31 RX ADMIN — SODIUM CHLORIDE: 9 INJECTION, SOLUTION INTRAVENOUS at 09:55

## 2017-08-31 RX ADMIN — HYDROCODONE BITARTRATE AND ACETAMINOPHEN 1 TABLET: 5; 325 TABLET ORAL at 19:40

## 2017-08-31 RX ADMIN — HYDROCODONE BITARTRATE AND ACETAMINOPHEN 1 TABLET: 5; 325 TABLET ORAL at 23:48

## 2017-08-31 RX ADMIN — PROPOFOL 80 MG: 10 INJECTION, EMULSION INTRAVENOUS at 09:59

## 2017-08-31 RX ADMIN — HYDROCODONE BITARTRATE AND ACETAMINOPHEN 2 TABLET: 5; 325 TABLET ORAL at 01:26

## 2017-08-31 RX ADMIN — FENTANYL CITRATE 50 MCG: 50 INJECTION INTRAMUSCULAR; INTRAVENOUS at 11:15

## 2017-08-31 RX ADMIN — SODIUM CHLORIDE: 9 INJECTION, SOLUTION INTRAVENOUS at 13:00

## 2017-08-31 RX ADMIN — CLOPIDOGREL 75 MG: 75 TABLET, FILM COATED ORAL at 18:00

## 2017-08-31 RX ADMIN — SUGAMMADEX 200 MG: 100 INJECTION, SOLUTION INTRAVENOUS at 10:32

## 2017-08-31 RX ADMIN — CEFAZOLIN SODIUM 1 G: 1 INJECTION, SOLUTION INTRAVENOUS at 10:15

## 2017-08-31 RX ADMIN — FENTANYL CITRATE 50 MCG: 50 INJECTION INTRAMUSCULAR; INTRAVENOUS at 10:46

## 2017-08-31 RX ADMIN — ONDANSETRON 4 MG: 2 INJECTION INTRAMUSCULAR; INTRAVENOUS at 10:31

## 2017-08-31 RX ADMIN — HYDROCODONE BITARTRATE AND ACETAMINOPHEN 2 TABLET: 5; 325 TABLET ORAL at 15:12

## 2017-08-31 RX ADMIN — SODIUM CHLORIDE: 9 INJECTION, SOLUTION INTRAVENOUS at 08:03

## 2017-08-31 RX ADMIN — CEFAZOLIN SODIUM 2 G: 2 SOLUTION INTRAVENOUS at 17:57

## 2017-08-31 RX ADMIN — LIDOCAINE HYDROCHLORIDE 100 MG: 20 INJECTION, SOLUTION EPIDURAL; INFILTRATION; INTRACAUDAL; PERINEURAL at 09:59

## 2017-08-31 ASSESSMENT — PULMONARY FUNCTION TESTS
PIF_VALUE: 5
PIF_VALUE: 21
PIF_VALUE: 0
PIF_VALUE: 22
PIF_VALUE: 19
PIF_VALUE: 1
PIF_VALUE: 21
PIF_VALUE: 0
PIF_VALUE: 18
PIF_VALUE: 1
PIF_VALUE: 1
PIF_VALUE: 35
PIF_VALUE: 2
PIF_VALUE: 1
PIF_VALUE: 19
PIF_VALUE: 19
PIF_VALUE: 20
PIF_VALUE: 18
PIF_VALUE: 19
PIF_VALUE: 20
PIF_VALUE: 0
PIF_VALUE: 20
PIF_VALUE: 19
PIF_VALUE: 1
PIF_VALUE: 0
PIF_VALUE: 19
PIF_VALUE: 34
PIF_VALUE: 18
PIF_VALUE: 4
PIF_VALUE: 19
PIF_VALUE: 19
PIF_VALUE: 25
PIF_VALUE: 19
PIF_VALUE: 0
PIF_VALUE: 19
PIF_VALUE: 1
PIF_VALUE: 19
PIF_VALUE: 19
PIF_VALUE: 1
PIF_VALUE: 19
PIF_VALUE: 0
PIF_VALUE: 20
PIF_VALUE: 21
PIF_VALUE: 1
PIF_VALUE: 19
PIF_VALUE: 19
PIF_VALUE: 20
PIF_VALUE: 19
PIF_VALUE: 12
PIF_VALUE: 1
PIF_VALUE: 19
PIF_VALUE: 27
PIF_VALUE: 0

## 2017-08-31 ASSESSMENT — PAIN DESCRIPTION - PAIN TYPE
TYPE: SURGICAL PAIN

## 2017-08-31 ASSESSMENT — PAIN SCALES - GENERAL
PAINLEVEL_OUTOF10: 4
PAINLEVEL_OUTOF10: 0
PAINLEVEL_OUTOF10: 5
PAINLEVEL_OUTOF10: 4
PAINLEVEL_OUTOF10: 0
PAINLEVEL_OUTOF10: 0
PAINLEVEL_OUTOF10: 4
PAINLEVEL_OUTOF10: 7

## 2017-08-31 ASSESSMENT — PAIN DESCRIPTION - ONSET: ONSET: ON-GOING

## 2017-08-31 ASSESSMENT — PAIN DESCRIPTION - LOCATION
LOCATION: HIP
LOCATION: HIP
LOCATION: LEG

## 2017-08-31 ASSESSMENT — PAIN DESCRIPTION - DESCRIPTORS
DESCRIPTORS: ACHING
DESCRIPTORS: ACHING

## 2017-08-31 ASSESSMENT — PAIN DESCRIPTION - PROGRESSION
CLINICAL_PROGRESSION: GRADUALLY WORSENING
CLINICAL_PROGRESSION: GRADUALLY WORSENING

## 2017-08-31 ASSESSMENT — PAIN DESCRIPTION - ORIENTATION
ORIENTATION: LEFT

## 2017-09-01 LAB
ANION GAP SERPL CALCULATED.3IONS-SCNC: 14 MEQ/L (ref 8–16)
ANISOCYTOSIS: ABNORMAL
BASOPHILS # BLD: 0.2 %
BASOPHILS ABSOLUTE: 0 THOU/MM3 (ref 0–0.1)
BUN BLDV-MCNC: 22 MG/DL (ref 7–22)
CALCIUM SERPL-MCNC: 8.3 MG/DL (ref 8.5–10.5)
CHLORIDE BLD-SCNC: 101 MEQ/L (ref 98–111)
CO2: 23 MEQ/L (ref 23–33)
CREAT SERPL-MCNC: 1.3 MG/DL (ref 0.4–1.2)
EOSINOPHIL # BLD: 0.4 %
EOSINOPHILS ABSOLUTE: 0 THOU/MM3 (ref 0–0.4)
GFR SERPL CREATININE-BSD FRML MDRD: 52 ML/MIN/1.73M2
GLUCOSE BLD-MCNC: 138 MG/DL (ref 70–108)
HCT VFR BLD CALC: 25.7 % (ref 42–52)
HEMOGLOBIN: 8.4 GM/DL (ref 14–18)
LYMPHOCYTES # BLD: 6.9 %
LYMPHOCYTES ABSOLUTE: 0.6 THOU/MM3 (ref 1–4.8)
MCH RBC QN AUTO: 28.5 PG (ref 27–31)
MCHC RBC AUTO-ENTMCNC: 32.8 GM/DL (ref 33–37)
MCV RBC AUTO: 86.8 FL (ref 80–94)
MONOCYTES # BLD: 11.2 %
MONOCYTES ABSOLUTE: 1 THOU/MM3 (ref 0.4–1.3)
NUCLEATED RED BLOOD CELLS: 0 /100 WBC
PDW BLD-RTO: 15 % (ref 11.5–14.5)
PLATELET # BLD: 227 THOU/MM3 (ref 130–400)
PMV BLD AUTO: 7.4 MCM (ref 7.4–10.4)
POTASSIUM SERPL-SCNC: 4.3 MEQ/L (ref 3.5–5.2)
RBC # BLD: 2.95 MILL/MM3 (ref 4.7–6.1)
RBC # BLD: NORMAL 10*6/UL
SEG NEUTROPHILS: 81.3 %
SEGMENTED NEUTROPHILS ABSOLUTE COUNT: 7.4 THOU/MM3 (ref 1.8–7.7)
SODIUM BLD-SCNC: 138 MEQ/L (ref 135–145)
WBC # BLD: 9.1 THOU/MM3 (ref 4.8–10.8)

## 2017-09-01 PROCEDURE — 36415 COLL VENOUS BLD VENIPUNCTURE: CPT

## 2017-09-01 PROCEDURE — 97110 THERAPEUTIC EXERCISES: CPT

## 2017-09-01 PROCEDURE — 6370000000 HC RX 637 (ALT 250 FOR IP): Performed by: NURSE PRACTITIONER

## 2017-09-01 PROCEDURE — 97530 THERAPEUTIC ACTIVITIES: CPT

## 2017-09-01 PROCEDURE — 6370000000 HC RX 637 (ALT 250 FOR IP): Performed by: PHYSICIAN ASSISTANT

## 2017-09-01 PROCEDURE — 80048 BASIC METABOLIC PNL TOTAL CA: CPT

## 2017-09-01 PROCEDURE — G8988 SELF CARE GOAL STATUS: HCPCS

## 2017-09-01 PROCEDURE — 1200000000 HC SEMI PRIVATE

## 2017-09-01 PROCEDURE — G8987 SELF CARE CURRENT STATUS: HCPCS

## 2017-09-01 PROCEDURE — 2580000003 HC RX 258: Performed by: PHYSICIAN ASSISTANT

## 2017-09-01 PROCEDURE — 97166 OT EVAL MOD COMPLEX 45 MIN: CPT

## 2017-09-01 PROCEDURE — 97535 SELF CARE MNGMENT TRAINING: CPT

## 2017-09-01 PROCEDURE — 99232 SBSQ HOSP IP/OBS MODERATE 35: CPT | Performed by: NURSE PRACTITIONER

## 2017-09-01 PROCEDURE — 6360000002 HC RX W HCPCS: Performed by: NURSE PRACTITIONER

## 2017-09-01 PROCEDURE — 85025 COMPLETE CBC W/AUTO DIFF WBC: CPT

## 2017-09-01 PROCEDURE — 6360000002 HC RX W HCPCS: Performed by: PHYSICIAN ASSISTANT

## 2017-09-01 PROCEDURE — 51798 US URINE CAPACITY MEASURE: CPT

## 2017-09-01 PROCEDURE — 97162 PT EVAL MOD COMPLEX 30 MIN: CPT

## 2017-09-01 RX ORDER — M-VIT,TX,IRON,MINS/CALC/FOLIC 27MG-0.4MG
1 TABLET ORAL DAILY
Status: DISCONTINUED | OUTPATIENT
Start: 2017-09-01 | End: 2017-09-04 | Stop reason: HOSPADM

## 2017-09-01 RX ORDER — ATORVASTATIN CALCIUM 20 MG/1
20 TABLET, FILM COATED ORAL DAILY
Status: DISCONTINUED | OUTPATIENT
Start: 2017-09-01 | End: 2017-09-04 | Stop reason: HOSPADM

## 2017-09-01 RX ORDER — ISOSORBIDE MONONITRATE 30 MG/1
30 TABLET, EXTENDED RELEASE ORAL DAILY
Status: DISCONTINUED | OUTPATIENT
Start: 2017-09-01 | End: 2017-09-04 | Stop reason: HOSPADM

## 2017-09-01 RX ORDER — ATENOLOL 25 MG/1
12.5 TABLET ORAL DAILY
Status: DISCONTINUED | OUTPATIENT
Start: 2017-09-01 | End: 2017-09-04 | Stop reason: HOSPADM

## 2017-09-01 RX ORDER — TAMSULOSIN HYDROCHLORIDE 0.4 MG/1
0.4 CAPSULE ORAL NIGHTLY
Status: DISCONTINUED | OUTPATIENT
Start: 2017-09-01 | End: 2017-09-04 | Stop reason: HOSPADM

## 2017-09-01 RX ORDER — KRILL/OM-3/DHA/EPA/PHOSPHO/AST 500MG-86MG
1000 CAPSULE ORAL DAILY
Status: DISCONTINUED | OUTPATIENT
Start: 2017-09-01 | End: 2017-09-01 | Stop reason: RX

## 2017-09-01 RX ORDER — LOSARTAN POTASSIUM 50 MG/1
50 TABLET ORAL DAILY
Status: DISCONTINUED | OUTPATIENT
Start: 2017-09-01 | End: 2017-09-04 | Stop reason: HOSPADM

## 2017-09-01 RX ORDER — SENNA AND DOCUSATE SODIUM 50; 8.6 MG/1; MG/1
1 TABLET, FILM COATED ORAL NIGHTLY
Status: DISCONTINUED | OUTPATIENT
Start: 2017-09-01 | End: 2017-09-04 | Stop reason: HOSPADM

## 2017-09-01 RX ORDER — ASPIRIN 81 MG/1
81 TABLET, CHEWABLE ORAL DAILY
Status: DISCONTINUED | OUTPATIENT
Start: 2017-09-01 | End: 2017-09-04 | Stop reason: HOSPADM

## 2017-09-01 RX ADMIN — DOCUSATE SODIUM AND SENNOSIDES 1 TABLET: 8.6; 5 TABLET, FILM COATED ORAL at 21:17

## 2017-09-01 RX ADMIN — ISOSORBIDE MONONITRATE 30 MG: 30 TABLET ORAL at 21:17

## 2017-09-01 RX ADMIN — VITAMIN D, TAB 1000IU (100/BT) 2000 UNITS: 25 TAB at 21:17

## 2017-09-01 RX ADMIN — CEFAZOLIN SODIUM 2 G: 2 SOLUTION INTRAVENOUS at 01:32

## 2017-09-01 RX ADMIN — SERTRALINE 50 MG: 50 TABLET, FILM COATED ORAL at 21:17

## 2017-09-01 RX ADMIN — HYDROCODONE BITARTRATE AND ACETAMINOPHEN 2 TABLET: 5; 325 TABLET ORAL at 09:18

## 2017-09-01 RX ADMIN — HYDROCODONE BITARTRATE AND ACETAMINOPHEN 1 TABLET: 5; 325 TABLET ORAL at 21:18

## 2017-09-01 RX ADMIN — TAMSULOSIN HYDROCHLORIDE 0.4 MG: 0.4 CAPSULE ORAL at 21:17

## 2017-09-01 RX ADMIN — ASPIRIN 81 MG 81 MG: 81 TABLET ORAL at 21:19

## 2017-09-01 RX ADMIN — ATORVASTATIN CALCIUM 20 MG: 20 TABLET, FILM COATED ORAL at 21:18

## 2017-09-01 RX ADMIN — HYDROCODONE BITARTRATE AND ACETAMINOPHEN 2 TABLET: 5; 325 TABLET ORAL at 04:56

## 2017-09-01 RX ADMIN — LOSARTAN POTASSIUM 50 MG: 50 TABLET, FILM COATED ORAL at 21:17

## 2017-09-01 RX ADMIN — MULTIPLE VITAMINS W/ MINERALS TAB 1 TABLET: TAB at 21:17

## 2017-09-01 RX ADMIN — ONDANSETRON 4 MG: 2 INJECTION INTRAMUSCULAR; INTRAVENOUS at 00:08

## 2017-09-01 RX ADMIN — HYDROCODONE BITARTRATE AND ACETAMINOPHEN 2 TABLET: 5; 325 TABLET ORAL at 17:07

## 2017-09-01 RX ADMIN — ATENOLOL 12.5 MG: 25 TABLET ORAL at 21:18

## 2017-09-01 RX ADMIN — SODIUM CHLORIDE: 9 INJECTION, SOLUTION INTRAVENOUS at 01:34

## 2017-09-01 RX ADMIN — ENOXAPARIN SODIUM 30 MG: 30 INJECTION SUBCUTANEOUS at 04:53

## 2017-09-01 ASSESSMENT — PAIN DESCRIPTION - LOCATION
LOCATION: HIP

## 2017-09-01 ASSESSMENT — PAIN DESCRIPTION - PAIN TYPE
TYPE: SURGICAL PAIN
TYPE: SURGICAL PAIN

## 2017-09-01 ASSESSMENT — PAIN DESCRIPTION - PROGRESSION
CLINICAL_PROGRESSION: GRADUALLY WORSENING
CLINICAL_PROGRESSION: GRADUALLY WORSENING

## 2017-09-01 ASSESSMENT — PAIN SCALES - GENERAL
PAINLEVEL_OUTOF10: 7
PAINLEVEL_OUTOF10: 7
PAINLEVEL_OUTOF10: 4
PAINLEVEL_OUTOF10: 6
PAINLEVEL_OUTOF10: 2
PAINLEVEL_OUTOF10: 7

## 2017-09-01 ASSESSMENT — PAIN DESCRIPTION - DESCRIPTORS
DESCRIPTORS: ACHING
DESCRIPTORS: ACHING

## 2017-09-01 ASSESSMENT — PAIN DESCRIPTION - FREQUENCY: FREQUENCY: INTERMITTENT

## 2017-09-01 ASSESSMENT — PAIN DESCRIPTION - ORIENTATION
ORIENTATION: LEFT

## 2017-09-02 PROCEDURE — 6360000002 HC RX W HCPCS: Performed by: NURSE PRACTITIONER

## 2017-09-02 PROCEDURE — 6370000000 HC RX 637 (ALT 250 FOR IP): Performed by: NURSE PRACTITIONER

## 2017-09-02 PROCEDURE — 6370000000 HC RX 637 (ALT 250 FOR IP): Performed by: PHYSICIAN ASSISTANT

## 2017-09-02 PROCEDURE — 97110 THERAPEUTIC EXERCISES: CPT

## 2017-09-02 PROCEDURE — 51702 INSERT TEMP BLADDER CATH: CPT

## 2017-09-02 PROCEDURE — 6360000002 HC RX W HCPCS: Performed by: PHYSICIAN ASSISTANT

## 2017-09-02 PROCEDURE — 1200000000 HC SEMI PRIVATE

## 2017-09-02 RX ADMIN — DOCUSATE SODIUM AND SENNOSIDES 1 TABLET: 8.6; 5 TABLET, FILM COATED ORAL at 21:18

## 2017-09-02 RX ADMIN — ATENOLOL 12.5 MG: 25 TABLET ORAL at 07:43

## 2017-09-02 RX ADMIN — HYDROCODONE BITARTRATE AND ACETAMINOPHEN 2 TABLET: 5; 325 TABLET ORAL at 05:27

## 2017-09-02 RX ADMIN — ENOXAPARIN SODIUM 30 MG: 30 INJECTION SUBCUTANEOUS at 05:27

## 2017-09-02 RX ADMIN — LOSARTAN POTASSIUM 50 MG: 50 TABLET, FILM COATED ORAL at 07:43

## 2017-09-02 RX ADMIN — CLOPIDOGREL 75 MG: 75 TABLET, FILM COATED ORAL at 07:43

## 2017-09-02 RX ADMIN — HYDROCODONE BITARTRATE AND ACETAMINOPHEN 2 TABLET: 5; 325 TABLET ORAL at 01:16

## 2017-09-02 RX ADMIN — HYDROCODONE BITARTRATE AND ACETAMINOPHEN 1 TABLET: 5; 325 TABLET ORAL at 15:46

## 2017-09-02 RX ADMIN — MULTIPLE VITAMINS W/ MINERALS TAB 1 TABLET: TAB at 07:43

## 2017-09-02 RX ADMIN — ASPIRIN 81 MG 81 MG: 81 TABLET ORAL at 07:43

## 2017-09-02 RX ADMIN — ATORVASTATIN CALCIUM 20 MG: 20 TABLET, FILM COATED ORAL at 07:43

## 2017-09-02 RX ADMIN — VITAMIN D, TAB 1000IU (100/BT) 2000 UNITS: 25 TAB at 07:43

## 2017-09-02 RX ADMIN — TAMSULOSIN HYDROCHLORIDE 0.4 MG: 0.4 CAPSULE ORAL at 21:18

## 2017-09-02 RX ADMIN — ONDANSETRON 4 MG: 2 INJECTION INTRAMUSCULAR; INTRAVENOUS at 08:04

## 2017-09-02 RX ADMIN — ISOSORBIDE MONONITRATE 30 MG: 30 TABLET ORAL at 07:43

## 2017-09-02 RX ADMIN — SERTRALINE 50 MG: 50 TABLET, FILM COATED ORAL at 07:43

## 2017-09-02 ASSESSMENT — PAIN SCALES - GENERAL
PAINLEVEL_OUTOF10: 0
PAINLEVEL_OUTOF10: 6
PAINLEVEL_OUTOF10: 0
PAINLEVEL_OUTOF10: 5
PAINLEVEL_OUTOF10: 0
PAINLEVEL_OUTOF10: 7

## 2017-09-03 PROCEDURE — 97110 THERAPEUTIC EXERCISES: CPT

## 2017-09-03 PROCEDURE — 6370000000 HC RX 637 (ALT 250 FOR IP): Performed by: PHYSICIAN ASSISTANT

## 2017-09-03 PROCEDURE — 6370000000 HC RX 637 (ALT 250 FOR IP): Performed by: NURSE PRACTITIONER

## 2017-09-03 PROCEDURE — 97116 GAIT TRAINING THERAPY: CPT

## 2017-09-03 PROCEDURE — 6360000002 HC RX W HCPCS: Performed by: NURSE PRACTITIONER

## 2017-09-03 PROCEDURE — 97535 SELF CARE MNGMENT TRAINING: CPT

## 2017-09-03 PROCEDURE — 1200000000 HC SEMI PRIVATE

## 2017-09-03 RX ADMIN — ATORVASTATIN CALCIUM 20 MG: 20 TABLET, FILM COATED ORAL at 09:29

## 2017-09-03 RX ADMIN — LOSARTAN POTASSIUM 50 MG: 50 TABLET, FILM COATED ORAL at 09:29

## 2017-09-03 RX ADMIN — ENOXAPARIN SODIUM 30 MG: 30 INJECTION SUBCUTANEOUS at 04:38

## 2017-09-03 RX ADMIN — ATENOLOL 12.5 MG: 25 TABLET ORAL at 09:29

## 2017-09-03 RX ADMIN — ISOSORBIDE MONONITRATE 30 MG: 30 TABLET ORAL at 09:29

## 2017-09-03 RX ADMIN — HYDROCODONE BITARTRATE AND ACETAMINOPHEN 1 TABLET: 5; 325 TABLET ORAL at 23:42

## 2017-09-03 RX ADMIN — ASPIRIN 81 MG 81 MG: 81 TABLET ORAL at 09:29

## 2017-09-03 RX ADMIN — SERTRALINE 50 MG: 50 TABLET, FILM COATED ORAL at 09:29

## 2017-09-03 RX ADMIN — HYDROCODONE BITARTRATE AND ACETAMINOPHEN 1 TABLET: 5; 325 TABLET ORAL at 00:30

## 2017-09-03 RX ADMIN — DOCUSATE SODIUM AND SENNOSIDES 1 TABLET: 8.6; 5 TABLET, FILM COATED ORAL at 20:19

## 2017-09-03 RX ADMIN — TAMSULOSIN HYDROCHLORIDE 0.4 MG: 0.4 CAPSULE ORAL at 20:19

## 2017-09-03 RX ADMIN — HYDROCODONE BITARTRATE AND ACETAMINOPHEN 1 TABLET: 5; 325 TABLET ORAL at 09:29

## 2017-09-03 RX ADMIN — MULTIPLE VITAMINS W/ MINERALS TAB 1 TABLET: TAB at 09:29

## 2017-09-03 RX ADMIN — VITAMIN D, TAB 1000IU (100/BT) 2000 UNITS: 25 TAB at 09:28

## 2017-09-03 RX ADMIN — HYDROCODONE BITARTRATE AND ACETAMINOPHEN 1 TABLET: 5; 325 TABLET ORAL at 14:41

## 2017-09-03 ASSESSMENT — PAIN DESCRIPTION - PAIN TYPE
TYPE: SURGICAL PAIN

## 2017-09-03 ASSESSMENT — PAIN SCALES - GENERAL
PAINLEVEL_OUTOF10: 3
PAINLEVEL_OUTOF10: 2
PAINLEVEL_OUTOF10: 3
PAINLEVEL_OUTOF10: 6
PAINLEVEL_OUTOF10: 3
PAINLEVEL_OUTOF10: 6
PAINLEVEL_OUTOF10: 6

## 2017-09-03 ASSESSMENT — PAIN DESCRIPTION - ORIENTATION
ORIENTATION: LEFT

## 2017-09-03 ASSESSMENT — PAIN DESCRIPTION - ONSET: ONSET: ON-GOING

## 2017-09-03 ASSESSMENT — PAIN DESCRIPTION - LOCATION
LOCATION: HIP

## 2017-09-03 ASSESSMENT — PAIN DESCRIPTION - DESCRIPTORS: DESCRIPTORS: ACHING

## 2017-09-03 ASSESSMENT — PAIN DESCRIPTION - FREQUENCY: FREQUENCY: INTERMITTENT

## 2017-09-04 ENCOUNTER — HOSPITAL ENCOUNTER (INPATIENT)
Age: 82
LOS: 11 days | Discharge: HOME HEALTH | DRG: 560 | End: 2017-09-15
Attending: PHYSICAL MEDICINE & REHABILITATION | Admitting: PHYSICAL MEDICINE & REHABILITATION
Payer: MEDICARE

## 2017-09-04 VITALS
SYSTOLIC BLOOD PRESSURE: 125 MMHG | BODY MASS INDEX: 24.17 KG/M2 | DIASTOLIC BLOOD PRESSURE: 63 MMHG | TEMPERATURE: 97.9 F | WEIGHT: 141.6 LBS | RESPIRATION RATE: 16 BRPM | OXYGEN SATURATION: 95 % | HEIGHT: 64 IN | HEART RATE: 71 BPM

## 2017-09-04 DIAGNOSIS — I10 ESSENTIAL HYPERTENSION: ICD-10-CM

## 2017-09-04 DIAGNOSIS — N40.0 BPH (BENIGN PROSTATIC HYPERTROPHY): ICD-10-CM

## 2017-09-04 DIAGNOSIS — M25.50 ARTHRALGIA OF MULTIPLE JOINTS: ICD-10-CM

## 2017-09-04 DIAGNOSIS — K59.03 DRUG-INDUCED CONSTIPATION: ICD-10-CM

## 2017-09-04 DIAGNOSIS — S72.142D FRACTURE, INTERTROCHANTERIC, LEFT FEMUR, CLOSED, WITH ROUTINE HEALING, SUBSEQUENT ENCOUNTER: Primary | ICD-10-CM

## 2017-09-04 PROCEDURE — 6370000000 HC RX 637 (ALT 250 FOR IP): Performed by: NURSE PRACTITIONER

## 2017-09-04 PROCEDURE — 1180000000 HC REHAB R&B

## 2017-09-04 PROCEDURE — 6370000000 HC RX 637 (ALT 250 FOR IP): Performed by: PHYSICAL MEDICINE & REHABILITATION

## 2017-09-04 PROCEDURE — 6370000000 HC RX 637 (ALT 250 FOR IP): Performed by: PHYSICIAN ASSISTANT

## 2017-09-04 PROCEDURE — 6370000000 HC RX 637 (ALT 250 FOR IP): Performed by: ORTHOPAEDIC SURGERY

## 2017-09-04 PROCEDURE — 6360000002 HC RX W HCPCS: Performed by: NURSE PRACTITIONER

## 2017-09-04 RX ORDER — ASPIRIN 81 MG/1
81 TABLET, CHEWABLE ORAL DAILY
Status: DISCONTINUED | OUTPATIENT
Start: 2017-09-05 | End: 2017-09-15 | Stop reason: HOSPADM

## 2017-09-04 RX ORDER — ASPIRIN 81 MG/1
81 TABLET, CHEWABLE ORAL DAILY
Status: CANCELLED | OUTPATIENT
Start: 2017-09-05

## 2017-09-04 RX ORDER — M-VIT,TX,IRON,MINS/CALC/FOLIC 27MG-0.4MG
1 TABLET ORAL DAILY
Status: CANCELLED | OUTPATIENT
Start: 2017-09-05

## 2017-09-04 RX ORDER — DOCUSATE SODIUM 100 MG/1
100 CAPSULE, LIQUID FILLED ORAL 2 TIMES DAILY
Status: DISCONTINUED | OUTPATIENT
Start: 2017-09-04 | End: 2017-09-12

## 2017-09-04 RX ORDER — ATORVASTATIN CALCIUM 20 MG/1
20 TABLET, FILM COATED ORAL DAILY
Status: DISCONTINUED | OUTPATIENT
Start: 2017-09-05 | End: 2017-09-15 | Stop reason: HOSPADM

## 2017-09-04 RX ORDER — SENNA AND DOCUSATE SODIUM 50; 8.6 MG/1; MG/1
1 TABLET, FILM COATED ORAL NIGHTLY
Status: CANCELLED | OUTPATIENT
Start: 2017-09-04

## 2017-09-04 RX ORDER — PANTOPRAZOLE SODIUM 40 MG/1
40 TABLET, DELAYED RELEASE ORAL
Status: DISCONTINUED | OUTPATIENT
Start: 2017-09-05 | End: 2017-09-15 | Stop reason: HOSPADM

## 2017-09-04 RX ORDER — M-VIT,TX,IRON,MINS/CALC/FOLIC 27MG-0.4MG
1 TABLET ORAL DAILY
Status: DISCONTINUED | OUTPATIENT
Start: 2017-09-05 | End: 2017-09-15 | Stop reason: HOSPADM

## 2017-09-04 RX ORDER — PANTOPRAZOLE SODIUM 40 MG/1
40 TABLET, DELAYED RELEASE ORAL
Status: DISCONTINUED | OUTPATIENT
Start: 2017-09-05 | End: 2017-09-04 | Stop reason: HOSPADM

## 2017-09-04 RX ORDER — ISOSORBIDE MONONITRATE 30 MG/1
30 TABLET, EXTENDED RELEASE ORAL DAILY
Status: DISCONTINUED | OUTPATIENT
Start: 2017-09-05 | End: 2017-09-15 | Stop reason: HOSPADM

## 2017-09-04 RX ORDER — TAMSULOSIN HYDROCHLORIDE 0.4 MG/1
0.4 CAPSULE ORAL NIGHTLY
Status: CANCELLED | OUTPATIENT
Start: 2017-09-04

## 2017-09-04 RX ORDER — HYDROCODONE BITARTRATE AND ACETAMINOPHEN 5; 325 MG/1; MG/1
1 TABLET ORAL EVERY 4 HOURS PRN
Status: DISCONTINUED | OUTPATIENT
Start: 2017-09-04 | End: 2017-09-08

## 2017-09-04 RX ORDER — HYDROCODONE BITARTRATE AND ACETAMINOPHEN 5; 325 MG/1; MG/1
2 TABLET ORAL EVERY 4 HOURS PRN
Status: CANCELLED | OUTPATIENT
Start: 2017-09-04

## 2017-09-04 RX ORDER — HYDROCODONE BITARTRATE AND ACETAMINOPHEN 5; 325 MG/1; MG/1
2 TABLET ORAL EVERY 4 HOURS PRN
Status: DISCONTINUED | OUTPATIENT
Start: 2017-09-04 | End: 2017-09-08

## 2017-09-04 RX ORDER — ATENOLOL 25 MG/1
12.5 TABLET ORAL DAILY
Status: DISCONTINUED | OUTPATIENT
Start: 2017-09-05 | End: 2017-09-15 | Stop reason: HOSPADM

## 2017-09-04 RX ORDER — ATENOLOL 25 MG/1
12.5 TABLET ORAL DAILY
Status: CANCELLED | OUTPATIENT
Start: 2017-09-05

## 2017-09-04 RX ORDER — HYDROCODONE BITARTRATE AND ACETAMINOPHEN 5; 325 MG/1; MG/1
1 TABLET ORAL EVERY 4 HOURS PRN
Status: CANCELLED | OUTPATIENT
Start: 2017-09-04

## 2017-09-04 RX ORDER — CLOPIDOGREL BISULFATE 75 MG/1
75 TABLET ORAL EVERY OTHER DAY
Status: CANCELLED | OUTPATIENT
Start: 2017-09-06

## 2017-09-04 RX ORDER — ATORVASTATIN CALCIUM 20 MG/1
20 TABLET, FILM COATED ORAL DAILY
Status: CANCELLED | OUTPATIENT
Start: 2017-09-05

## 2017-09-04 RX ORDER — ONDANSETRON 2 MG/ML
4 INJECTION INTRAMUSCULAR; INTRAVENOUS EVERY 6 HOURS PRN
Status: CANCELLED | OUTPATIENT
Start: 2017-09-04

## 2017-09-04 RX ORDER — LOSARTAN POTASSIUM 50 MG/1
50 TABLET ORAL DAILY
Status: CANCELLED | OUTPATIENT
Start: 2017-09-05

## 2017-09-04 RX ORDER — ONDANSETRON 2 MG/ML
4 INJECTION INTRAMUSCULAR; INTRAVENOUS EVERY 6 HOURS PRN
Status: DISCONTINUED | OUTPATIENT
Start: 2017-09-04 | End: 2017-09-05

## 2017-09-04 RX ORDER — LOSARTAN POTASSIUM 50 MG/1
50 TABLET ORAL DAILY
Status: DISCONTINUED | OUTPATIENT
Start: 2017-09-05 | End: 2017-09-15 | Stop reason: HOSPADM

## 2017-09-04 RX ORDER — PANTOPRAZOLE SODIUM 40 MG/1
40 TABLET, DELAYED RELEASE ORAL
Status: CANCELLED | OUTPATIENT
Start: 2017-09-05

## 2017-09-04 RX ORDER — TAMSULOSIN HYDROCHLORIDE 0.4 MG/1
0.4 CAPSULE ORAL NIGHTLY
Status: DISCONTINUED | OUTPATIENT
Start: 2017-09-04 | End: 2017-09-14

## 2017-09-04 RX ORDER — CLOPIDOGREL BISULFATE 75 MG/1
75 TABLET ORAL EVERY OTHER DAY
Status: DISCONTINUED | OUTPATIENT
Start: 2017-09-06 | End: 2017-09-15 | Stop reason: HOSPADM

## 2017-09-04 RX ORDER — CYCLOBENZAPRINE HCL 10 MG
10 TABLET ORAL 3 TIMES DAILY PRN
Status: DISCONTINUED | OUTPATIENT
Start: 2017-09-04 | End: 2017-09-15 | Stop reason: HOSPADM

## 2017-09-04 RX ORDER — CYCLOBENZAPRINE HCL 10 MG
10 TABLET ORAL 3 TIMES DAILY PRN
Status: CANCELLED | OUTPATIENT
Start: 2017-09-04

## 2017-09-04 RX ORDER — ISOSORBIDE MONONITRATE 30 MG/1
30 TABLET, EXTENDED RELEASE ORAL DAILY
Status: CANCELLED | OUTPATIENT
Start: 2017-09-05

## 2017-09-04 RX ORDER — SENNA PLUS 8.6 MG/1
2 TABLET ORAL NIGHTLY
Status: DISCONTINUED | OUTPATIENT
Start: 2017-09-04 | End: 2017-09-12

## 2017-09-04 RX ORDER — SENNA AND DOCUSATE SODIUM 50; 8.6 MG/1; MG/1
1 TABLET, FILM COATED ORAL NIGHTLY
Status: DISCONTINUED | OUTPATIENT
Start: 2017-09-04 | End: 2017-09-04

## 2017-09-04 RX ADMIN — SENNA 17.2 MG: 8.6 TABLET, COATED ORAL at 19:53

## 2017-09-04 RX ADMIN — HYDROCODONE BITARTRATE AND ACETAMINOPHEN 1 TABLET: 5; 325 TABLET ORAL at 14:18

## 2017-09-04 RX ADMIN — MULTIPLE VITAMINS W/ MINERALS TAB 1 TABLET: TAB at 08:45

## 2017-09-04 RX ADMIN — DOCUSATE SODIUM 100 MG: 100 CAPSULE, LIQUID FILLED ORAL at 19:53

## 2017-09-04 RX ADMIN — ATORVASTATIN CALCIUM 20 MG: 20 TABLET, FILM COATED ORAL at 08:46

## 2017-09-04 RX ADMIN — LOSARTAN POTASSIUM 50 MG: 50 TABLET, FILM COATED ORAL at 08:46

## 2017-09-04 RX ADMIN — ATENOLOL 12.5 MG: 25 TABLET ORAL at 08:46

## 2017-09-04 RX ADMIN — SERTRALINE 50 MG: 50 TABLET, FILM COATED ORAL at 08:46

## 2017-09-04 RX ADMIN — HYDROCODONE BITARTRATE AND ACETAMINOPHEN 1 TABLET: 5; 325 TABLET ORAL at 03:54

## 2017-09-04 RX ADMIN — ENOXAPARIN SODIUM 30 MG: 30 INJECTION SUBCUTANEOUS at 05:40

## 2017-09-04 RX ADMIN — VITAMIN D, TAB 1000IU (100/BT) 2000 UNITS: 25 TAB at 08:45

## 2017-09-04 RX ADMIN — TAMSULOSIN HYDROCHLORIDE 0.4 MG: 0.4 CAPSULE ORAL at 19:53

## 2017-09-04 RX ADMIN — HYDROCODONE BITARTRATE AND ACETAMINOPHEN 1 TABLET: 5; 325 TABLET ORAL at 18:15

## 2017-09-04 RX ADMIN — ISOSORBIDE MONONITRATE 30 MG: 30 TABLET ORAL at 08:45

## 2017-09-04 RX ADMIN — ASPIRIN 81 MG 81 MG: 81 TABLET ORAL at 08:45

## 2017-09-04 RX ADMIN — CLOPIDOGREL 75 MG: 75 TABLET, FILM COATED ORAL at 08:45

## 2017-09-04 ASSESSMENT — PAIN DESCRIPTION - ONSET
ONSET: ON-GOING
ONSET: ON-GOING

## 2017-09-04 ASSESSMENT — PAIN DESCRIPTION - ORIENTATION
ORIENTATION: LEFT

## 2017-09-04 ASSESSMENT — PAIN SCALES - GENERAL
PAINLEVEL_OUTOF10: 0
PAINLEVEL_OUTOF10: 3
PAINLEVEL_OUTOF10: 6
PAINLEVEL_OUTOF10: 3
PAINLEVEL_OUTOF10: 4
PAINLEVEL_OUTOF10: 3
PAINLEVEL_OUTOF10: 5
PAINLEVEL_OUTOF10: 0

## 2017-09-04 ASSESSMENT — PAIN DESCRIPTION - LOCATION
LOCATION: HIP

## 2017-09-04 ASSESSMENT — PAIN DESCRIPTION - FREQUENCY
FREQUENCY: INTERMITTENT
FREQUENCY: INTERMITTENT

## 2017-09-04 ASSESSMENT — PAIN DESCRIPTION - PAIN TYPE
TYPE: SURGICAL PAIN

## 2017-09-04 ASSESSMENT — PAIN DESCRIPTION - DESCRIPTORS
DESCRIPTORS: ACHING
DESCRIPTORS: ACHING

## 2017-09-05 LAB
ABO: NORMAL
ALBUMIN SERPL-MCNC: 3 G/DL (ref 3.5–5.1)
ALP BLD-CCNC: 66 U/L (ref 38–126)
ALT SERPL-CCNC: 38 U/L (ref 11–66)
ANION GAP SERPL CALCULATED.3IONS-SCNC: 12 MEQ/L (ref 8–16)
ANTIBODY SCREEN: NORMAL
AST SERPL-CCNC: 60 U/L (ref 5–40)
BILIRUB SERPL-MCNC: 0.6 MG/DL (ref 0.3–1.2)
BUN BLDV-MCNC: 26 MG/DL (ref 7–22)
CALCIUM SERPL-MCNC: 9.1 MG/DL (ref 8.5–10.5)
CHLORIDE BLD-SCNC: 98 MEQ/L (ref 98–111)
CO2: 29 MEQ/L (ref 23–33)
CREAT SERPL-MCNC: 1.2 MG/DL (ref 0.4–1.2)
GFR SERPL CREATININE-BSD FRML MDRD: 57 ML/MIN/1.73M2
GLUCOSE BLD-MCNC: 109 MG/DL (ref 70–108)
HCT VFR BLD CALC: 21.4 % (ref 42–52)
HCT VFR BLD CALC: 22.2 % (ref 42–52)
HCT VFR BLD CALC: 24.1 % (ref 42–52)
HEMOGLOBIN: 7.3 GM/DL (ref 14–18)
HEMOGLOBIN: 7.3 GM/DL (ref 14–18)
HEMOGLOBIN: 8.2 GM/DL (ref 14–18)
POTASSIUM SERPL-SCNC: 4.2 MEQ/L (ref 3.5–5.2)
RH FACTOR: NORMAL
SODIUM BLD-SCNC: 139 MEQ/L (ref 135–145)
TOTAL PROTEIN: 6.3 G/DL (ref 6.1–8)
VITAMIN D 25-HYDROXY: 43 NG/ML (ref 30–100)

## 2017-09-05 PROCEDURE — 36415 COLL VENOUS BLD VENIPUNCTURE: CPT

## 2017-09-05 PROCEDURE — 1180000000 HC REHAB R&B

## 2017-09-05 PROCEDURE — 86901 BLOOD TYPING SEROLOGIC RH(D): CPT

## 2017-09-05 PROCEDURE — 97163 PT EVAL HIGH COMPLEX 45 MIN: CPT

## 2017-09-05 PROCEDURE — 6360000002 HC RX W HCPCS: Performed by: PHYSICAL MEDICINE & REHABILITATION

## 2017-09-05 PROCEDURE — 86900 BLOOD TYPING SEROLOGIC ABO: CPT

## 2017-09-05 PROCEDURE — 86923 COMPATIBILITY TEST ELECTRIC: CPT

## 2017-09-05 PROCEDURE — 82306 VITAMIN D 25 HYDROXY: CPT

## 2017-09-05 PROCEDURE — 2580000003 HC RX 258: Performed by: PHYSICAL MEDICINE & REHABILITATION

## 2017-09-05 PROCEDURE — 97535 SELF CARE MNGMENT TRAINING: CPT

## 2017-09-05 PROCEDURE — 97110 THERAPEUTIC EXERCISES: CPT

## 2017-09-05 PROCEDURE — 97530 THERAPEUTIC ACTIVITIES: CPT

## 2017-09-05 PROCEDURE — 6370000000 HC RX 637 (ALT 250 FOR IP): Performed by: PHYSICAL MEDICINE & REHABILITATION

## 2017-09-05 PROCEDURE — 80053 COMPREHEN METABOLIC PANEL: CPT

## 2017-09-05 PROCEDURE — 85014 HEMATOCRIT: CPT

## 2017-09-05 PROCEDURE — 6370000000 HC RX 637 (ALT 250 FOR IP): Performed by: ORTHOPAEDIC SURGERY

## 2017-09-05 PROCEDURE — 97166 OT EVAL MOD COMPLEX 45 MIN: CPT

## 2017-09-05 PROCEDURE — 36430 TRANSFUSION BLD/BLD COMPNT: CPT

## 2017-09-05 PROCEDURE — P9016 RBC LEUKOCYTES REDUCED: HCPCS

## 2017-09-05 PROCEDURE — 97116 GAIT TRAINING THERAPY: CPT

## 2017-09-05 PROCEDURE — 86850 RBC ANTIBODY SCREEN: CPT

## 2017-09-05 PROCEDURE — 85018 HEMOGLOBIN: CPT

## 2017-09-05 RX ORDER — 0.9 % SODIUM CHLORIDE 0.9 %
250 INTRAVENOUS SOLUTION INTRAVENOUS ONCE
Status: COMPLETED | OUTPATIENT
Start: 2017-09-05 | End: 2017-09-06

## 2017-09-05 RX ORDER — SODIUM CHLORIDE 0.9 % (FLUSH) 0.9 %
10 SYRINGE (ML) INJECTION EVERY 12 HOURS
Status: DISCONTINUED | OUTPATIENT
Start: 2017-09-05 | End: 2017-09-15 | Stop reason: HOSPADM

## 2017-09-05 RX ORDER — ONDANSETRON 4 MG/1
4 TABLET, ORALLY DISINTEGRATING ORAL EVERY 6 HOURS PRN
Status: DISCONTINUED | OUTPATIENT
Start: 2017-09-05 | End: 2017-09-15 | Stop reason: HOSPADM

## 2017-09-05 RX ORDER — SODIUM CHLORIDE 0.9 % (FLUSH) 0.9 %
10 SYRINGE (ML) INJECTION PRN
Status: DISCONTINUED | OUTPATIENT
Start: 2017-09-05 | End: 2017-09-15 | Stop reason: HOSPADM

## 2017-09-05 RX ADMIN — TAMSULOSIN HYDROCHLORIDE 0.4 MG: 0.4 CAPSULE ORAL at 19:46

## 2017-09-05 RX ADMIN — ENOXAPARIN SODIUM 30 MG: 30 INJECTION SUBCUTANEOUS at 05:01

## 2017-09-05 RX ADMIN — HYDROCODONE BITARTRATE AND ACETAMINOPHEN 2 TABLET: 5; 325 TABLET ORAL at 00:12

## 2017-09-05 RX ADMIN — VITAMIN D, TAB 1000IU (100/BT) 2000 UNITS: 25 TAB at 08:24

## 2017-09-05 RX ADMIN — PANTOPRAZOLE SODIUM 40 MG: 40 TABLET, DELAYED RELEASE ORAL at 05:01

## 2017-09-05 RX ADMIN — ATENOLOL 12.5 MG: 25 TABLET ORAL at 08:25

## 2017-09-05 RX ADMIN — SODIUM CHLORIDE 250 ML: 9 INJECTION, SOLUTION INTRAVENOUS at 16:20

## 2017-09-05 RX ADMIN — SENNA 17.2 MG: 8.6 TABLET, COATED ORAL at 19:46

## 2017-09-05 RX ADMIN — MULTIPLE VITAMINS W/ MINERALS TAB 1 TABLET: TAB at 08:24

## 2017-09-05 RX ADMIN — HYDROCODONE BITARTRATE AND ACETAMINOPHEN 1 TABLET: 5; 325 TABLET ORAL at 08:30

## 2017-09-05 RX ADMIN — ISOSORBIDE MONONITRATE 30 MG: 30 TABLET ORAL at 08:24

## 2017-09-05 RX ADMIN — HYDROCODONE BITARTRATE AND ACETAMINOPHEN 2 TABLET: 5; 325 TABLET ORAL at 21:16

## 2017-09-05 RX ADMIN — DOCUSATE SODIUM 100 MG: 100 CAPSULE, LIQUID FILLED ORAL at 19:47

## 2017-09-05 RX ADMIN — Medication 10 ML: at 22:00

## 2017-09-05 RX ADMIN — SERTRALINE 50 MG: 50 TABLET, FILM COATED ORAL at 08:24

## 2017-09-05 RX ADMIN — LOSARTAN POTASSIUM 50 MG: 50 TABLET, FILM COATED ORAL at 08:24

## 2017-09-05 RX ADMIN — DOCUSATE SODIUM 100 MG: 100 CAPSULE, LIQUID FILLED ORAL at 08:25

## 2017-09-05 RX ADMIN — ATORVASTATIN CALCIUM 20 MG: 20 TABLET, FILM COATED ORAL at 08:25

## 2017-09-05 RX ADMIN — ASPIRIN 81 MG: 81 TABLET, CHEWABLE ORAL at 08:25

## 2017-09-05 ASSESSMENT — PAIN SCALES - GENERAL
PAINLEVEL_OUTOF10: 7
PAINLEVEL_OUTOF10: 0
PAINLEVEL_OUTOF10: 2
PAINLEVEL_OUTOF10: 0
PAINLEVEL_OUTOF10: 0
PAINLEVEL_OUTOF10: 2
PAINLEVEL_OUTOF10: 7

## 2017-09-05 ASSESSMENT — PAIN DESCRIPTION - ORIENTATION
ORIENTATION: LEFT
ORIENTATION: LEFT

## 2017-09-05 ASSESSMENT — PAIN DESCRIPTION - LOCATION
LOCATION: HIP
LOCATION: HIP

## 2017-09-05 ASSESSMENT — PAIN DESCRIPTION - DESCRIPTORS: DESCRIPTORS: ACHING

## 2017-09-05 ASSESSMENT — PAIN DESCRIPTION - PAIN TYPE: TYPE: SURGICAL PAIN

## 2017-09-06 PROCEDURE — 2580000003 HC RX 258: Performed by: PHYSICAL MEDICINE & REHABILITATION

## 2017-09-06 PROCEDURE — 97530 THERAPEUTIC ACTIVITIES: CPT

## 2017-09-06 PROCEDURE — 6370000000 HC RX 637 (ALT 250 FOR IP): Performed by: PHYSICAL MEDICINE & REHABILITATION

## 2017-09-06 PROCEDURE — 97110 THERAPEUTIC EXERCISES: CPT

## 2017-09-06 PROCEDURE — 97535 SELF CARE MNGMENT TRAINING: CPT

## 2017-09-06 PROCEDURE — 6360000002 HC RX W HCPCS: Performed by: PHYSICAL MEDICINE & REHABILITATION

## 2017-09-06 PROCEDURE — 51798 US URINE CAPACITY MEASURE: CPT

## 2017-09-06 PROCEDURE — 6370000000 HC RX 637 (ALT 250 FOR IP): Performed by: ORTHOPAEDIC SURGERY

## 2017-09-06 PROCEDURE — 97116 GAIT TRAINING THERAPY: CPT

## 2017-09-06 PROCEDURE — 1180000000 HC REHAB R&B

## 2017-09-06 PROCEDURE — A6198 ALGINATE DRESSING > 48 SQ IN: HCPCS

## 2017-09-06 PROCEDURE — 51701 INSERT BLADDER CATHETER: CPT

## 2017-09-06 RX ADMIN — VITAMIN D, TAB 1000IU (100/BT) 2000 UNITS: 25 TAB at 09:14

## 2017-09-06 RX ADMIN — ENOXAPARIN SODIUM 30 MG: 30 INJECTION SUBCUTANEOUS at 05:34

## 2017-09-06 RX ADMIN — ATENOLOL 12.5 MG: 25 TABLET ORAL at 09:13

## 2017-09-06 RX ADMIN — SERTRALINE 50 MG: 50 TABLET, FILM COATED ORAL at 09:14

## 2017-09-06 RX ADMIN — HYDROCODONE BITARTRATE AND ACETAMINOPHEN 2 TABLET: 5; 325 TABLET ORAL at 23:09

## 2017-09-06 RX ADMIN — ATORVASTATIN CALCIUM 20 MG: 20 TABLET, FILM COATED ORAL at 09:18

## 2017-09-06 RX ADMIN — ASPIRIN 81 MG: 81 TABLET, CHEWABLE ORAL at 09:13

## 2017-09-06 RX ADMIN — HYDROCODONE BITARTRATE AND ACETAMINOPHEN 1 TABLET: 5; 325 TABLET ORAL at 16:36

## 2017-09-06 RX ADMIN — DOCUSATE SODIUM 100 MG: 100 CAPSULE, LIQUID FILLED ORAL at 09:14

## 2017-09-06 RX ADMIN — HYDROCODONE BITARTRATE AND ACETAMINOPHEN 1 TABLET: 5; 325 TABLET ORAL at 05:33

## 2017-09-06 RX ADMIN — LOSARTAN POTASSIUM 50 MG: 50 TABLET, FILM COATED ORAL at 09:14

## 2017-09-06 RX ADMIN — HYDROCODONE BITARTRATE AND ACETAMINOPHEN 1 TABLET: 5; 325 TABLET ORAL at 01:40

## 2017-09-06 RX ADMIN — Medication 10 ML: at 20:05

## 2017-09-06 RX ADMIN — TAMSULOSIN HYDROCHLORIDE 0.4 MG: 0.4 CAPSULE ORAL at 20:05

## 2017-09-06 RX ADMIN — ISOSORBIDE MONONITRATE 30 MG: 30 TABLET ORAL at 09:14

## 2017-09-06 RX ADMIN — SENNA 17.2 MG: 8.6 TABLET, COATED ORAL at 20:05

## 2017-09-06 RX ADMIN — DOCUSATE SODIUM 100 MG: 100 CAPSULE, LIQUID FILLED ORAL at 20:05

## 2017-09-06 RX ADMIN — CLOPIDOGREL BISULFATE 75 MG: 75 TABLET, FILM COATED ORAL at 09:14

## 2017-09-06 RX ADMIN — PANTOPRAZOLE SODIUM 40 MG: 40 TABLET, DELAYED RELEASE ORAL at 05:33

## 2017-09-06 RX ADMIN — Medication 10 ML: at 09:18

## 2017-09-06 RX ADMIN — MULTIPLE VITAMINS W/ MINERALS TAB 1 TABLET: TAB at 09:14

## 2017-09-06 ASSESSMENT — PAIN SCALES - GENERAL
PAINLEVEL_OUTOF10: 0
PAINLEVEL_OUTOF10: 0
PAINLEVEL_OUTOF10: 7
PAINLEVEL_OUTOF10: 4
PAINLEVEL_OUTOF10: 0
PAINLEVEL_OUTOF10: 2
PAINLEVEL_OUTOF10: 0
PAINLEVEL_OUTOF10: 1
PAINLEVEL_OUTOF10: 0
PAINLEVEL_OUTOF10: 0
PAINLEVEL_OUTOF10: 4
PAINLEVEL_OUTOF10: 5

## 2017-09-06 ASSESSMENT — PAIN DESCRIPTION - LOCATION
LOCATION: HIP
LOCATION: HIP

## 2017-09-06 ASSESSMENT — PAIN DESCRIPTION - PAIN TYPE
TYPE: SURGICAL PAIN
TYPE: SURGICAL PAIN

## 2017-09-06 ASSESSMENT — PAIN DESCRIPTION - ORIENTATION
ORIENTATION: LEFT
ORIENTATION: LEFT

## 2017-09-06 ASSESSMENT — PAIN DESCRIPTION - DESCRIPTORS
DESCRIPTORS: ACHING
DESCRIPTORS: ACHING

## 2017-09-07 PROCEDURE — 51701 INSERT BLADDER CATHETER: CPT

## 2017-09-07 PROCEDURE — 97535 SELF CARE MNGMENT TRAINING: CPT

## 2017-09-07 PROCEDURE — 97530 THERAPEUTIC ACTIVITIES: CPT

## 2017-09-07 PROCEDURE — 97110 THERAPEUTIC EXERCISES: CPT

## 2017-09-07 PROCEDURE — 6370000000 HC RX 637 (ALT 250 FOR IP): Performed by: PHYSICAL MEDICINE & REHABILITATION

## 2017-09-07 PROCEDURE — 51798 US URINE CAPACITY MEASURE: CPT

## 2017-09-07 PROCEDURE — 2580000003 HC RX 258: Performed by: PHYSICAL MEDICINE & REHABILITATION

## 2017-09-07 PROCEDURE — 6370000000 HC RX 637 (ALT 250 FOR IP): Performed by: ORTHOPAEDIC SURGERY

## 2017-09-07 PROCEDURE — 6360000002 HC RX W HCPCS: Performed by: PHYSICAL MEDICINE & REHABILITATION

## 2017-09-07 PROCEDURE — 97116 GAIT TRAINING THERAPY: CPT

## 2017-09-07 PROCEDURE — 1180000000 HC REHAB R&B

## 2017-09-07 RX ADMIN — ASPIRIN 81 MG: 81 TABLET, CHEWABLE ORAL at 08:58

## 2017-09-07 RX ADMIN — Medication 10 ML: at 19:54

## 2017-09-07 RX ADMIN — VITAMIN D, TAB 1000IU (100/BT) 2000 UNITS: 25 TAB at 08:58

## 2017-09-07 RX ADMIN — PANTOPRAZOLE SODIUM 40 MG: 40 TABLET, DELAYED RELEASE ORAL at 05:26

## 2017-09-07 RX ADMIN — SERTRALINE 50 MG: 50 TABLET, FILM COATED ORAL at 08:58

## 2017-09-07 RX ADMIN — DOCUSATE SODIUM 100 MG: 100 CAPSULE, LIQUID FILLED ORAL at 19:54

## 2017-09-07 RX ADMIN — ENOXAPARIN SODIUM 30 MG: 30 INJECTION SUBCUTANEOUS at 08:57

## 2017-09-07 RX ADMIN — HYDROCODONE BITARTRATE AND ACETAMINOPHEN 2 TABLET: 5; 325 TABLET ORAL at 19:35

## 2017-09-07 RX ADMIN — HYDROCODONE BITARTRATE AND ACETAMINOPHEN 2 TABLET: 5; 325 TABLET ORAL at 06:56

## 2017-09-07 RX ADMIN — Medication 10 ML: at 09:09

## 2017-09-07 RX ADMIN — ATORVASTATIN CALCIUM 20 MG: 20 TABLET, FILM COATED ORAL at 08:58

## 2017-09-07 RX ADMIN — DOCUSATE SODIUM 100 MG: 100 CAPSULE, LIQUID FILLED ORAL at 08:58

## 2017-09-07 RX ADMIN — ATENOLOL 12.5 MG: 25 TABLET ORAL at 08:58

## 2017-09-07 RX ADMIN — TAMSULOSIN HYDROCHLORIDE 0.4 MG: 0.4 CAPSULE ORAL at 19:54

## 2017-09-07 RX ADMIN — ISOSORBIDE MONONITRATE 30 MG: 30 TABLET ORAL at 08:58

## 2017-09-07 RX ADMIN — LOSARTAN POTASSIUM 50 MG: 50 TABLET, FILM COATED ORAL at 08:58

## 2017-09-07 RX ADMIN — SENNA 17.2 MG: 8.6 TABLET, COATED ORAL at 19:54

## 2017-09-07 RX ADMIN — HYDROCODONE BITARTRATE AND ACETAMINOPHEN 1 TABLET: 5; 325 TABLET ORAL at 13:46

## 2017-09-07 RX ADMIN — MULTIPLE VITAMINS W/ MINERALS TAB 1 TABLET: TAB at 08:58

## 2017-09-07 ASSESSMENT — PAIN SCALES - GENERAL
PAINLEVEL_OUTOF10: 5
PAINLEVEL_OUTOF10: 5
PAINLEVEL_OUTOF10: 0
PAINLEVEL_OUTOF10: 0
PAINLEVEL_OUTOF10: 5
PAINLEVEL_OUTOF10: 8
PAINLEVEL_OUTOF10: 0
PAINLEVEL_OUTOF10: 0

## 2017-09-07 ASSESSMENT — PAIN DESCRIPTION - ORIENTATION
ORIENTATION: LEFT
ORIENTATION: LEFT

## 2017-09-07 ASSESSMENT — PAIN DESCRIPTION - DESCRIPTORS: DESCRIPTORS: ACHING

## 2017-09-07 ASSESSMENT — PAIN DESCRIPTION - LOCATION
LOCATION: HIP
LOCATION: HIP

## 2017-09-07 ASSESSMENT — PAIN DESCRIPTION - FREQUENCY: FREQUENCY: CONTINUOUS

## 2017-09-08 LAB
ALBUMIN SERPL-MCNC: 3.4 G/DL (ref 3.5–5.1)
ALP BLD-CCNC: 89 U/L (ref 38–126)
ALT SERPL-CCNC: 30 U/L (ref 11–66)
ANION GAP SERPL CALCULATED.3IONS-SCNC: 9 MEQ/L (ref 8–16)
ANISOCYTOSIS: ABNORMAL
AST SERPL-CCNC: 36 U/L (ref 5–40)
BACTERIA: ABNORMAL /HPF
BASOPHILS # BLD: 0 %
BASOPHILS ABSOLUTE: 0 THOU/MM3 (ref 0–0.1)
BILIRUB SERPL-MCNC: 0.6 MG/DL (ref 0.3–1.2)
BILIRUBIN URINE: NEGATIVE
BLOOD, URINE: ABNORMAL
BUN BLDV-MCNC: 34 MG/DL (ref 7–22)
CALCIUM SERPL-MCNC: 9.3 MG/DL (ref 8.5–10.5)
CASTS 2: ABNORMAL /LPF
CASTS UA: ABNORMAL /LPF
CHARACTER, URINE: ABNORMAL
CHLORIDE BLD-SCNC: 96 MEQ/L (ref 98–111)
CO2: 29 MEQ/L (ref 23–33)
COLOR: YELLOW
CREAT SERPL-MCNC: 1.2 MG/DL (ref 0.4–1.2)
CRYSTALS, UA: ABNORMAL
EOSINOPHIL # BLD: 0.2 %
EOSINOPHILS ABSOLUTE: 0 THOU/MM3 (ref 0–0.4)
EPITHELIAL CELLS, UA: ABNORMAL /HPF
GFR SERPL CREATININE-BSD FRML MDRD: 57 ML/MIN/1.73M2
GLUCOSE BLD-MCNC: 94 MG/DL (ref 70–108)
GLUCOSE URINE: NEGATIVE MG/DL
HCT VFR BLD CALC: 25.8 % (ref 42–52)
HCT VFR BLD CALC: 26.7 % (ref 42–52)
HEMOGLOBIN: 8.7 GM/DL (ref 14–18)
HEMOGLOBIN: 8.9 GM/DL (ref 14–18)
KETONES, URINE: NEGATIVE
LEUKOCYTE ESTERASE, URINE: ABNORMAL
LYMPHOCYTES # BLD: 3.9 %
LYMPHOCYTES ABSOLUTE: 0.7 THOU/MM3 (ref 1–4.8)
MCH RBC QN AUTO: 29 PG (ref 27–31)
MCHC RBC AUTO-ENTMCNC: 33.5 GM/DL (ref 33–37)
MCV RBC AUTO: 86.7 FL (ref 80–94)
MISCELLANEOUS 2: ABNORMAL
MONOCYTES # BLD: 7.4 %
MONOCYTES ABSOLUTE: 1.3 THOU/MM3 (ref 0.4–1.3)
NITRITE, URINE: POSITIVE
NUCLEATED RED BLOOD CELLS: 0 /100 WBC
PDW BLD-RTO: 15.7 % (ref 11.5–14.5)
PH UA: 7.5
PLATELET # BLD: 428 THOU/MM3 (ref 130–400)
PMV BLD AUTO: 7.1 MCM (ref 7.4–10.4)
POTASSIUM SERPL-SCNC: 4.4 MEQ/L (ref 3.5–5.2)
PROTEIN UA: 30
RBC # BLD: 3.08 MILL/MM3 (ref 4.7–6.1)
RBC # BLD: NORMAL 10*6/UL
RBC URINE: ABNORMAL /HPF
RENAL EPITHELIAL, UA: ABNORMAL
SEG NEUTROPHILS: 88.5 %
SEGMENTED NEUTROPHILS ABSOLUTE COUNT: 15 THOU/MM3 (ref 1.8–7.7)
SODIUM BLD-SCNC: 134 MEQ/L (ref 135–145)
SPECIFIC GRAVITY, URINE: 1.02 (ref 1–1.03)
TOTAL PROTEIN: 6.4 G/DL (ref 6.1–8)
UROBILINOGEN, URINE: 2 EU/DL
WBC # BLD: 17 THOU/MM3 (ref 4.8–10.8)
WBC UA: > 100 /HPF
YEAST: ABNORMAL

## 2017-09-08 PROCEDURE — 85018 HEMOGLOBIN: CPT

## 2017-09-08 PROCEDURE — 97530 THERAPEUTIC ACTIVITIES: CPT

## 2017-09-08 PROCEDURE — 87184 SC STD DISK METHOD PER PLATE: CPT

## 2017-09-08 PROCEDURE — 51702 INSERT TEMP BLADDER CATH: CPT

## 2017-09-08 PROCEDURE — 97535 SELF CARE MNGMENT TRAINING: CPT

## 2017-09-08 PROCEDURE — 80053 COMPREHEN METABOLIC PANEL: CPT

## 2017-09-08 PROCEDURE — 2580000003 HC RX 258: Performed by: PHYSICAL MEDICINE & REHABILITATION

## 2017-09-08 PROCEDURE — 97116 GAIT TRAINING THERAPY: CPT

## 2017-09-08 PROCEDURE — 6360000002 HC RX W HCPCS: Performed by: PHYSICAL MEDICINE & REHABILITATION

## 2017-09-08 PROCEDURE — 95992 CANALITH REPOSITIONING PROC: CPT

## 2017-09-08 PROCEDURE — 97110 THERAPEUTIC EXERCISES: CPT

## 2017-09-08 PROCEDURE — 51798 US URINE CAPACITY MEASURE: CPT

## 2017-09-08 PROCEDURE — 87186 SC STD MICRODIL/AGAR DIL: CPT

## 2017-09-08 PROCEDURE — 81001 URINALYSIS AUTO W/SCOPE: CPT

## 2017-09-08 PROCEDURE — 6370000000 HC RX 637 (ALT 250 FOR IP): Performed by: PHYSICAL MEDICINE & REHABILITATION

## 2017-09-08 PROCEDURE — 6370000000 HC RX 637 (ALT 250 FOR IP): Performed by: ORTHOPAEDIC SURGERY

## 2017-09-08 PROCEDURE — 36415 COLL VENOUS BLD VENIPUNCTURE: CPT

## 2017-09-08 PROCEDURE — 1180000000 HC REHAB R&B

## 2017-09-08 PROCEDURE — 87077 CULTURE AEROBIC IDENTIFY: CPT

## 2017-09-08 PROCEDURE — 51701 INSERT BLADDER CATHETER: CPT

## 2017-09-08 PROCEDURE — 85014 HEMATOCRIT: CPT

## 2017-09-08 PROCEDURE — 87086 URINE CULTURE/COLONY COUNT: CPT

## 2017-09-08 PROCEDURE — 85025 COMPLETE CBC W/AUTO DIFF WBC: CPT

## 2017-09-08 RX ORDER — GRANULES FOR ORAL 3 G/1
3 POWDER ORAL
Status: DISCONTINUED | OUTPATIENT
Start: 2017-09-08 | End: 2017-09-08

## 2017-09-08 RX ORDER — HYDROCODONE BITARTRATE AND ACETAMINOPHEN 7.5; 325 MG/1; MG/1
2 TABLET ORAL EVERY 6 HOURS PRN
Status: DISCONTINUED | OUTPATIENT
Start: 2017-09-08 | End: 2017-09-15 | Stop reason: HOSPADM

## 2017-09-08 RX ORDER — HYDROCODONE BITARTRATE AND ACETAMINOPHEN 7.5; 325 MG/1; MG/1
1 TABLET ORAL EVERY 6 HOURS PRN
Status: DISCONTINUED | OUTPATIENT
Start: 2017-09-08 | End: 2017-09-15 | Stop reason: HOSPADM

## 2017-09-08 RX ADMIN — CLOPIDOGREL BISULFATE 75 MG: 75 TABLET, FILM COATED ORAL at 10:00

## 2017-09-08 RX ADMIN — HYDROCODONE BITARTRATE AND ACETAMINOPHEN 1 TABLET: 5; 325 TABLET ORAL at 01:21

## 2017-09-08 RX ADMIN — ISOSORBIDE MONONITRATE 30 MG: 30 TABLET ORAL at 10:00

## 2017-09-08 RX ADMIN — DOCUSATE SODIUM 100 MG: 100 CAPSULE, LIQUID FILLED ORAL at 21:58

## 2017-09-08 RX ADMIN — SERTRALINE 50 MG: 50 TABLET, FILM COATED ORAL at 10:00

## 2017-09-08 RX ADMIN — VITAMIN D, TAB 1000IU (100/BT) 2000 UNITS: 25 TAB at 10:00

## 2017-09-08 RX ADMIN — SENNA 17.2 MG: 8.6 TABLET, COATED ORAL at 21:58

## 2017-09-08 RX ADMIN — HYDROCODONE BITARTRATE AND ACETAMINOPHEN 2 TABLET: 7.5; 325 TABLET ORAL at 16:36

## 2017-09-08 RX ADMIN — MULTIPLE VITAMINS W/ MINERALS TAB 1 TABLET: TAB at 10:00

## 2017-09-08 RX ADMIN — ENOXAPARIN SODIUM 30 MG: 30 INJECTION SUBCUTANEOUS at 11:20

## 2017-09-08 RX ADMIN — Medication 10 ML: at 21:58

## 2017-09-08 RX ADMIN — FOSFOMYCIN TROMETHAMINE 1 PACKET: 3 POWDER ORAL at 17:54

## 2017-09-08 RX ADMIN — CEFTRIAXONE 1 G: 1 INJECTION, POWDER, FOR SOLUTION INTRAMUSCULAR; INTRAVENOUS at 21:57

## 2017-09-08 RX ADMIN — ATENOLOL 12.5 MG: 25 TABLET ORAL at 10:00

## 2017-09-08 RX ADMIN — ATORVASTATIN CALCIUM 20 MG: 20 TABLET, FILM COATED ORAL at 10:00

## 2017-09-08 RX ADMIN — PANTOPRAZOLE SODIUM 40 MG: 40 TABLET, DELAYED RELEASE ORAL at 05:46

## 2017-09-08 RX ADMIN — CYCLOBENZAPRINE 10 MG: 10 TABLET, FILM COATED ORAL at 13:31

## 2017-09-08 RX ADMIN — DOCUSATE SODIUM 100 MG: 100 CAPSULE, LIQUID FILLED ORAL at 10:00

## 2017-09-08 RX ADMIN — ASPIRIN 81 MG: 81 TABLET, CHEWABLE ORAL at 10:00

## 2017-09-08 RX ADMIN — TAMSULOSIN HYDROCHLORIDE 0.4 MG: 0.4 CAPSULE ORAL at 21:58

## 2017-09-08 RX ADMIN — HYDROCODONE BITARTRATE AND ACETAMINOPHEN 2 TABLET: 5; 325 TABLET ORAL at 11:20

## 2017-09-08 RX ADMIN — LOSARTAN POTASSIUM 50 MG: 50 TABLET, FILM COATED ORAL at 10:00

## 2017-09-08 RX ADMIN — HYDROCODONE BITARTRATE AND ACETAMINOPHEN 2 TABLET: 5; 325 TABLET ORAL at 05:46

## 2017-09-08 ASSESSMENT — PAIN SCALES - GENERAL
PAINLEVEL_OUTOF10: 7
PAINLEVEL_OUTOF10: 7
PAINLEVEL_OUTOF10: 0
PAINLEVEL_OUTOF10: 5
PAINLEVEL_OUTOF10: 8
PAINLEVEL_OUTOF10: 0
PAINLEVEL_OUTOF10: 0
PAINLEVEL_OUTOF10: 3
PAINLEVEL_OUTOF10: 5

## 2017-09-08 ASSESSMENT — PAIN DESCRIPTION - ORIENTATION
ORIENTATION: LEFT
ORIENTATION: LEFT

## 2017-09-08 ASSESSMENT — PAIN DESCRIPTION - PAIN TYPE
TYPE: SURGICAL PAIN
TYPE: SURGICAL PAIN

## 2017-09-08 ASSESSMENT — PAIN DESCRIPTION - LOCATION
LOCATION: HIP
LOCATION: HIP

## 2017-09-09 PROCEDURE — 2580000003 HC RX 258: Performed by: PHYSICAL MEDICINE & REHABILITATION

## 2017-09-09 PROCEDURE — 6370000000 HC RX 637 (ALT 250 FOR IP): Performed by: PHYSICAL MEDICINE & REHABILITATION

## 2017-09-09 PROCEDURE — 6360000002 HC RX W HCPCS: Performed by: PHYSICAL MEDICINE & REHABILITATION

## 2017-09-09 PROCEDURE — 97110 THERAPEUTIC EXERCISES: CPT

## 2017-09-09 PROCEDURE — 6370000000 HC RX 637 (ALT 250 FOR IP): Performed by: ORTHOPAEDIC SURGERY

## 2017-09-09 PROCEDURE — 97116 GAIT TRAINING THERAPY: CPT

## 2017-09-09 PROCEDURE — 97535 SELF CARE MNGMENT TRAINING: CPT

## 2017-09-09 PROCEDURE — 97530 THERAPEUTIC ACTIVITIES: CPT

## 2017-09-09 PROCEDURE — 1180000000 HC REHAB R&B

## 2017-09-09 RX ADMIN — CEFTRIAXONE 1 G: 1 INJECTION, POWDER, FOR SOLUTION INTRAMUSCULAR; INTRAVENOUS at 21:05

## 2017-09-09 RX ADMIN — Medication 10 ML: at 21:09

## 2017-09-09 RX ADMIN — HYDROCODONE BITARTRATE AND ACETAMINOPHEN 1 TABLET: 7.5; 325 TABLET ORAL at 05:59

## 2017-09-09 RX ADMIN — ATORVASTATIN CALCIUM 20 MG: 20 TABLET, FILM COATED ORAL at 09:03

## 2017-09-09 RX ADMIN — SERTRALINE 50 MG: 50 TABLET, FILM COATED ORAL at 09:03

## 2017-09-09 RX ADMIN — ISOSORBIDE MONONITRATE 30 MG: 30 TABLET ORAL at 09:03

## 2017-09-09 RX ADMIN — VITAMIN D, TAB 1000IU (100/BT) 2000 UNITS: 25 TAB at 09:03

## 2017-09-09 RX ADMIN — HYDROCODONE BITARTRATE AND ACETAMINOPHEN 1 TABLET: 7.5; 325 TABLET ORAL at 21:01

## 2017-09-09 RX ADMIN — ASPIRIN 81 MG: 81 TABLET, CHEWABLE ORAL at 09:04

## 2017-09-09 RX ADMIN — MULTIPLE VITAMINS W/ MINERALS TAB 1 TABLET: TAB at 09:03

## 2017-09-09 RX ADMIN — Medication 10 ML: at 09:05

## 2017-09-09 RX ADMIN — PANTOPRAZOLE SODIUM 40 MG: 40 TABLET, DELAYED RELEASE ORAL at 05:59

## 2017-09-09 RX ADMIN — ENOXAPARIN SODIUM 30 MG: 30 INJECTION SUBCUTANEOUS at 09:07

## 2017-09-09 RX ADMIN — HYDROCODONE BITARTRATE AND ACETAMINOPHEN 2 TABLET: 7.5; 325 TABLET ORAL at 14:31

## 2017-09-09 RX ADMIN — DOCUSATE SODIUM 100 MG: 100 CAPSULE, LIQUID FILLED ORAL at 09:03

## 2017-09-09 RX ADMIN — TAMSULOSIN HYDROCHLORIDE 0.4 MG: 0.4 CAPSULE ORAL at 20:15

## 2017-09-09 ASSESSMENT — PAIN DESCRIPTION - PROGRESSION: CLINICAL_PROGRESSION: GRADUALLY IMPROVING

## 2017-09-09 ASSESSMENT — PAIN SCALES - GENERAL
PAINLEVEL_OUTOF10: 0
PAINLEVEL_OUTOF10: 4
PAINLEVEL_OUTOF10: 3
PAINLEVEL_OUTOF10: 0
PAINLEVEL_OUTOF10: 8
PAINLEVEL_OUTOF10: 10
PAINLEVEL_OUTOF10: 4
PAINLEVEL_OUTOF10: 5

## 2017-09-09 ASSESSMENT — PAIN DESCRIPTION - PAIN TYPE: TYPE: SURGICAL PAIN

## 2017-09-09 ASSESSMENT — PAIN DESCRIPTION - ONSET: ONSET: ON-GOING

## 2017-09-09 ASSESSMENT — PAIN DESCRIPTION - LOCATION: LOCATION: HIP

## 2017-09-09 ASSESSMENT — PAIN DESCRIPTION - ORIENTATION: ORIENTATION: LEFT

## 2017-09-09 ASSESSMENT — PAIN DESCRIPTION - FREQUENCY: FREQUENCY: CONTINUOUS

## 2017-09-09 ASSESSMENT — PAIN DESCRIPTION - DESCRIPTORS: DESCRIPTORS: ACHING

## 2017-09-10 LAB
ANISOCYTOSIS: ABNORMAL
BASOPHILS # BLD: 0.4 %
BASOPHILS ABSOLUTE: 0.1 THOU/MM3 (ref 0–0.1)
EOSINOPHIL # BLD: 0.7 %
EOSINOPHILS ABSOLUTE: 0.1 THOU/MM3 (ref 0–0.4)
HCT VFR BLD CALC: 28.1 % (ref 42–52)
HEMOGLOBIN: 9.3 GM/DL (ref 14–18)
LYMPHOCYTES # BLD: 6.2 %
LYMPHOCYTES ABSOLUTE: 0.9 THOU/MM3 (ref 1–4.8)
MCH RBC QN AUTO: 28.9 PG (ref 27–31)
MCHC RBC AUTO-ENTMCNC: 33 GM/DL (ref 33–37)
MCV RBC AUTO: 87.5 FL (ref 80–94)
MONOCYTES # BLD: 6.2 %
MONOCYTES ABSOLUTE: 0.9 THOU/MM3 (ref 0.4–1.3)
NUCLEATED RED BLOOD CELLS: 0 /100 WBC
ORGANISM: ABNORMAL
PDW BLD-RTO: 15.8 % (ref 11.5–14.5)
PLATELET # BLD: 412 THOU/MM3 (ref 130–400)
PMV BLD AUTO: 7.1 MCM (ref 7.4–10.4)
RBC # BLD: 3.22 MILL/MM3 (ref 4.7–6.1)
RBC # BLD: NORMAL 10*6/UL
SEG NEUTROPHILS: 86.5 %
SEGMENTED NEUTROPHILS ABSOLUTE COUNT: 12.3 THOU/MM3 (ref 1.8–7.7)
URINE CULTURE REFLEX: ABNORMAL
WBC # BLD: 14.2 THOU/MM3 (ref 4.8–10.8)

## 2017-09-10 PROCEDURE — 6360000002 HC RX W HCPCS: Performed by: PHYSICAL MEDICINE & REHABILITATION

## 2017-09-10 PROCEDURE — 6370000000 HC RX 637 (ALT 250 FOR IP): Performed by: ORTHOPAEDIC SURGERY

## 2017-09-10 PROCEDURE — A6210 FOAM DRG >16<=48 SQ IN W/O B: HCPCS

## 2017-09-10 PROCEDURE — 85025 COMPLETE CBC W/AUTO DIFF WBC: CPT

## 2017-09-10 PROCEDURE — 1180000000 HC REHAB R&B

## 2017-09-10 PROCEDURE — 2580000003 HC RX 258: Performed by: PHYSICAL MEDICINE & REHABILITATION

## 2017-09-10 PROCEDURE — 36415 COLL VENOUS BLD VENIPUNCTURE: CPT

## 2017-09-10 PROCEDURE — 6370000000 HC RX 637 (ALT 250 FOR IP): Performed by: PHYSICAL MEDICINE & REHABILITATION

## 2017-09-10 RX ORDER — LACTOBACILLUS RHAMNOSUS GG 10B CELL
1 CAPSULE ORAL
Status: DISCONTINUED | OUTPATIENT
Start: 2017-09-11 | End: 2017-09-15 | Stop reason: HOSPADM

## 2017-09-10 RX ORDER — LACTOBACILLUS RHAMNOSUS GG 10B CELL
1 CAPSULE ORAL
Status: DISCONTINUED | OUTPATIENT
Start: 2017-09-18 | End: 2017-09-10

## 2017-09-10 RX ADMIN — CLOPIDOGREL BISULFATE 75 MG: 75 TABLET, FILM COATED ORAL at 08:33

## 2017-09-10 RX ADMIN — HYDROCODONE BITARTRATE AND ACETAMINOPHEN 2 TABLET: 7.5; 325 TABLET ORAL at 12:10

## 2017-09-10 RX ADMIN — CYCLOBENZAPRINE 10 MG: 10 TABLET, FILM COATED ORAL at 16:17

## 2017-09-10 RX ADMIN — ATENOLOL 12.5 MG: 25 TABLET ORAL at 08:33

## 2017-09-10 RX ADMIN — ISOSORBIDE MONONITRATE 30 MG: 30 TABLET ORAL at 08:33

## 2017-09-10 RX ADMIN — SENNA 17.2 MG: 8.6 TABLET, COATED ORAL at 20:00

## 2017-09-10 RX ADMIN — ASPIRIN 81 MG: 81 TABLET, CHEWABLE ORAL at 08:33

## 2017-09-10 RX ADMIN — VITAMIN D, TAB 1000IU (100/BT) 2000 UNITS: 25 TAB at 08:33

## 2017-09-10 RX ADMIN — PANTOPRAZOLE SODIUM 40 MG: 40 TABLET, DELAYED RELEASE ORAL at 05:14

## 2017-09-10 RX ADMIN — LOSARTAN POTASSIUM 50 MG: 50 TABLET, FILM COATED ORAL at 08:33

## 2017-09-10 RX ADMIN — SERTRALINE 50 MG: 50 TABLET, FILM COATED ORAL at 08:33

## 2017-09-10 RX ADMIN — Medication 10 ML: at 08:32

## 2017-09-10 RX ADMIN — CEFEPIME HYDROCHLORIDE 2 G: 2 INJECTION, POWDER, FOR SOLUTION INTRAVENOUS at 21:30

## 2017-09-10 RX ADMIN — HYDROCODONE BITARTRATE AND ACETAMINOPHEN 1 TABLET: 7.5; 325 TABLET ORAL at 20:00

## 2017-09-10 RX ADMIN — ATORVASTATIN CALCIUM 20 MG: 20 TABLET, FILM COATED ORAL at 08:33

## 2017-09-10 RX ADMIN — DOCUSATE SODIUM 100 MG: 100 CAPSULE, LIQUID FILLED ORAL at 20:00

## 2017-09-10 RX ADMIN — ENOXAPARIN SODIUM 30 MG: 30 INJECTION SUBCUTANEOUS at 08:32

## 2017-09-10 RX ADMIN — HYDROCODONE BITARTRATE AND ACETAMINOPHEN 1 TABLET: 7.5; 325 TABLET ORAL at 05:14

## 2017-09-10 RX ADMIN — MULTIPLE VITAMINS W/ MINERALS TAB 1 TABLET: TAB at 08:33

## 2017-09-10 RX ADMIN — Medication 10 ML: at 20:00

## 2017-09-10 RX ADMIN — TAMSULOSIN HYDROCHLORIDE 0.4 MG: 0.4 CAPSULE ORAL at 20:00

## 2017-09-10 ASSESSMENT — PAIN SCALES - GENERAL
PAINLEVEL_OUTOF10: 6
PAINLEVEL_OUTOF10: 5
PAINLEVEL_OUTOF10: 4
PAINLEVEL_OUTOF10: 3
PAINLEVEL_OUTOF10: 0

## 2017-09-11 ENCOUNTER — APPOINTMENT (OUTPATIENT)
Dept: CT IMAGING | Age: 82
DRG: 560 | End: 2017-09-11
Attending: PHYSICAL MEDICINE & REHABILITATION
Payer: MEDICARE

## 2017-09-11 PROCEDURE — 97530 THERAPEUTIC ACTIVITIES: CPT

## 2017-09-11 PROCEDURE — 2580000003 HC RX 258: Performed by: PHYSICAL MEDICINE & REHABILITATION

## 2017-09-11 PROCEDURE — 97110 THERAPEUTIC EXERCISES: CPT

## 2017-09-11 PROCEDURE — 73700 CT LOWER EXTREMITY W/O DYE: CPT

## 2017-09-11 PROCEDURE — 6370000000 HC RX 637 (ALT 250 FOR IP): Performed by: PHYSICAL MEDICINE & REHABILITATION

## 2017-09-11 PROCEDURE — 97535 SELF CARE MNGMENT TRAINING: CPT

## 2017-09-11 PROCEDURE — 6360000002 HC RX W HCPCS: Performed by: PHYSICAL MEDICINE & REHABILITATION

## 2017-09-11 PROCEDURE — 1180000000 HC REHAB R&B

## 2017-09-11 PROCEDURE — 6370000000 HC RX 637 (ALT 250 FOR IP): Performed by: ORTHOPAEDIC SURGERY

## 2017-09-11 PROCEDURE — 97116 GAIT TRAINING THERAPY: CPT

## 2017-09-11 RX ADMIN — MULTIPLE VITAMINS W/ MINERALS TAB 1 TABLET: TAB at 08:15

## 2017-09-11 RX ADMIN — HYDROCODONE BITARTRATE AND ACETAMINOPHEN 2 TABLET: 7.5; 325 TABLET ORAL at 10:50

## 2017-09-11 RX ADMIN — Medication 1 CAPSULE: at 17:17

## 2017-09-11 RX ADMIN — Medication 10 ML: at 10:08

## 2017-09-11 RX ADMIN — ASPIRIN 81 MG: 81 TABLET, CHEWABLE ORAL at 08:15

## 2017-09-11 RX ADMIN — LOSARTAN POTASSIUM 50 MG: 50 TABLET, FILM COATED ORAL at 08:15

## 2017-09-11 RX ADMIN — ATORVASTATIN CALCIUM 20 MG: 20 TABLET, FILM COATED ORAL at 08:15

## 2017-09-11 RX ADMIN — TAMSULOSIN HYDROCHLORIDE 0.4 MG: 0.4 CAPSULE ORAL at 20:06

## 2017-09-11 RX ADMIN — SERTRALINE 50 MG: 50 TABLET, FILM COATED ORAL at 08:15

## 2017-09-11 RX ADMIN — Medication 10 ML: at 20:37

## 2017-09-11 RX ADMIN — ENOXAPARIN SODIUM 30 MG: 30 INJECTION SUBCUTANEOUS at 10:08

## 2017-09-11 RX ADMIN — ATENOLOL 12.5 MG: 25 TABLET ORAL at 08:15

## 2017-09-11 RX ADMIN — CEFEPIME HYDROCHLORIDE 2 G: 2 INJECTION, POWDER, FOR SOLUTION INTRAVENOUS at 20:37

## 2017-09-11 RX ADMIN — ISOSORBIDE MONONITRATE 30 MG: 30 TABLET ORAL at 08:15

## 2017-09-11 RX ADMIN — HYDROCODONE BITARTRATE AND ACETAMINOPHEN 1 TABLET: 7.5; 325 TABLET ORAL at 04:39

## 2017-09-11 RX ADMIN — DOCUSATE SODIUM 100 MG: 100 CAPSULE, LIQUID FILLED ORAL at 08:15

## 2017-09-11 RX ADMIN — HYDROCODONE BITARTRATE AND ACETAMINOPHEN 1 TABLET: 7.5; 325 TABLET ORAL at 17:18

## 2017-09-11 RX ADMIN — HYDROCODONE BITARTRATE AND ACETAMINOPHEN 1 TABLET: 7.5; 325 TABLET ORAL at 23:38

## 2017-09-11 RX ADMIN — VITAMIN D, TAB 1000IU (100/BT) 2000 UNITS: 25 TAB at 08:15

## 2017-09-11 RX ADMIN — Medication 1 CAPSULE: at 08:15

## 2017-09-11 RX ADMIN — PANTOPRAZOLE SODIUM 40 MG: 40 TABLET, DELAYED RELEASE ORAL at 04:39

## 2017-09-11 RX ADMIN — Medication 1 CAPSULE: at 13:22

## 2017-09-11 ASSESSMENT — PAIN DESCRIPTION - LOCATION
LOCATION: HIP

## 2017-09-11 ASSESSMENT — PAIN DESCRIPTION - ORIENTATION
ORIENTATION: LEFT
ORIENTATION: LEFT

## 2017-09-11 ASSESSMENT — PAIN SCALES - GENERAL
PAINLEVEL_OUTOF10: 0
PAINLEVEL_OUTOF10: 4
PAINLEVEL_OUTOF10: 0
PAINLEVEL_OUTOF10: 0
PAINLEVEL_OUTOF10: 8
PAINLEVEL_OUTOF10: 5
PAINLEVEL_OUTOF10: 2
PAINLEVEL_OUTOF10: 6
PAINLEVEL_OUTOF10: 0
PAINLEVEL_OUTOF10: 4
PAINLEVEL_OUTOF10: 2

## 2017-09-11 ASSESSMENT — PAIN DESCRIPTION - PAIN TYPE
TYPE: SURGICAL PAIN
TYPE: SURGICAL PAIN

## 2017-09-11 ASSESSMENT — PAIN DESCRIPTION - DESCRIPTORS: DESCRIPTORS: ACHING

## 2017-09-11 ASSESSMENT — PAIN DESCRIPTION - PROGRESSION: CLINICAL_PROGRESSION: GRADUALLY IMPROVING

## 2017-09-11 ASSESSMENT — PAIN DESCRIPTION - ONSET: ONSET: ON-GOING

## 2017-09-11 ASSESSMENT — PAIN DESCRIPTION - FREQUENCY: FREQUENCY: CONTINUOUS

## 2017-09-12 LAB
HCT VFR BLD CALC: 26.3 % (ref 42–52)
HEMOGLOBIN: 8.8 GM/DL (ref 14–18)
MCH RBC QN AUTO: 28.9 PG (ref 27–31)
MCHC RBC AUTO-ENTMCNC: 33.3 GM/DL (ref 33–37)
MCV RBC AUTO: 86.7 FL (ref 80–94)
PDW BLD-RTO: 16.3 % (ref 11.5–14.5)
PLATELET # BLD: 423 THOU/MM3 (ref 130–400)
PMV BLD AUTO: 7 MCM (ref 7.4–10.4)
RBC # BLD: 3.03 MILL/MM3 (ref 4.7–6.1)
WBC # BLD: 5.7 THOU/MM3 (ref 4.8–10.8)

## 2017-09-12 PROCEDURE — 97110 THERAPEUTIC EXERCISES: CPT

## 2017-09-12 PROCEDURE — 97530 THERAPEUTIC ACTIVITIES: CPT

## 2017-09-12 PROCEDURE — 36415 COLL VENOUS BLD VENIPUNCTURE: CPT

## 2017-09-12 PROCEDURE — 97535 SELF CARE MNGMENT TRAINING: CPT

## 2017-09-12 PROCEDURE — 6370000000 HC RX 637 (ALT 250 FOR IP): Performed by: ORTHOPAEDIC SURGERY

## 2017-09-12 PROCEDURE — 6370000000 HC RX 637 (ALT 250 FOR IP): Performed by: PHYSICAL MEDICINE & REHABILITATION

## 2017-09-12 PROCEDURE — 85027 COMPLETE CBC AUTOMATED: CPT

## 2017-09-12 PROCEDURE — 1180000000 HC REHAB R&B

## 2017-09-12 PROCEDURE — 2580000003 HC RX 258: Performed by: PHYSICAL MEDICINE & REHABILITATION

## 2017-09-12 PROCEDURE — 97116 GAIT TRAINING THERAPY: CPT

## 2017-09-12 PROCEDURE — 6360000002 HC RX W HCPCS: Performed by: PHYSICAL MEDICINE & REHABILITATION

## 2017-09-12 RX ORDER — SENNA PLUS 8.6 MG/1
2 TABLET ORAL NIGHTLY PRN
Status: DISCONTINUED | OUTPATIENT
Start: 2017-09-13 | End: 2017-09-15 | Stop reason: HOSPADM

## 2017-09-12 RX ORDER — DOCUSATE SODIUM 100 MG/1
100 CAPSULE, LIQUID FILLED ORAL 2 TIMES DAILY PRN
Status: DISCONTINUED | OUTPATIENT
Start: 2017-09-12 | End: 2017-09-15 | Stop reason: HOSPADM

## 2017-09-12 RX ADMIN — ISOSORBIDE MONONITRATE 30 MG: 30 TABLET ORAL at 09:36

## 2017-09-12 RX ADMIN — Medication 1 CAPSULE: at 12:41

## 2017-09-12 RX ADMIN — ENOXAPARIN SODIUM 30 MG: 30 INJECTION SUBCUTANEOUS at 09:38

## 2017-09-12 RX ADMIN — Medication 1 CAPSULE: at 17:40

## 2017-09-12 RX ADMIN — MULTIPLE VITAMINS W/ MINERALS TAB 1 TABLET: TAB at 09:36

## 2017-09-12 RX ADMIN — PANTOPRAZOLE SODIUM 40 MG: 40 TABLET, DELAYED RELEASE ORAL at 06:30

## 2017-09-12 RX ADMIN — HYDROCODONE BITARTRATE AND ACETAMINOPHEN 2 TABLET: 7.5; 325 TABLET ORAL at 13:14

## 2017-09-12 RX ADMIN — ASPIRIN 81 MG: 81 TABLET, CHEWABLE ORAL at 10:42

## 2017-09-12 RX ADMIN — TAMSULOSIN HYDROCHLORIDE 0.4 MG: 0.4 CAPSULE ORAL at 20:55

## 2017-09-12 RX ADMIN — Medication 10 ML: at 20:55

## 2017-09-12 RX ADMIN — HYDROCODONE BITARTRATE AND ACETAMINOPHEN 2 TABLET: 7.5; 325 TABLET ORAL at 23:13

## 2017-09-12 RX ADMIN — Medication 1 CAPSULE: at 09:35

## 2017-09-12 RX ADMIN — ATENOLOL 12.5 MG: 25 TABLET ORAL at 09:35

## 2017-09-12 RX ADMIN — HYDROCODONE BITARTRATE AND ACETAMINOPHEN 1 TABLET: 7.5; 325 TABLET ORAL at 06:32

## 2017-09-12 RX ADMIN — DOCUSATE SODIUM 100 MG: 100 CAPSULE, LIQUID FILLED ORAL at 09:36

## 2017-09-12 RX ADMIN — ATORVASTATIN CALCIUM 20 MG: 20 TABLET, FILM COATED ORAL at 09:36

## 2017-09-12 RX ADMIN — CEFEPIME HYDROCHLORIDE 2 G: 2 INJECTION, POWDER, FOR SOLUTION INTRAVENOUS at 20:54

## 2017-09-12 RX ADMIN — SERTRALINE 50 MG: 50 TABLET, FILM COATED ORAL at 09:36

## 2017-09-12 RX ADMIN — CLOPIDOGREL BISULFATE 75 MG: 75 TABLET, FILM COATED ORAL at 09:36

## 2017-09-12 RX ADMIN — LOSARTAN POTASSIUM 50 MG: 50 TABLET, FILM COATED ORAL at 09:36

## 2017-09-12 RX ADMIN — Medication 10 ML: at 10:08

## 2017-09-12 RX ADMIN — VITAMIN D, TAB 1000IU (100/BT) 2000 UNITS: 25 TAB at 09:36

## 2017-09-12 ASSESSMENT — PAIN DESCRIPTION - LOCATION
LOCATION: HIP

## 2017-09-12 ASSESSMENT — PAIN SCALES - GENERAL
PAINLEVEL_OUTOF10: 4
PAINLEVEL_OUTOF10: 0
PAINLEVEL_OUTOF10: 4
PAINLEVEL_OUTOF10: 4
PAINLEVEL_OUTOF10: 0
PAINLEVEL_OUTOF10: 0
PAINLEVEL_OUTOF10: 7
PAINLEVEL_OUTOF10: 5
PAINLEVEL_OUTOF10: 3
PAINLEVEL_OUTOF10: 3

## 2017-09-12 ASSESSMENT — PAIN DESCRIPTION - PAIN TYPE
TYPE: SURGICAL PAIN

## 2017-09-12 ASSESSMENT — PAIN DESCRIPTION - DESCRIPTORS
DESCRIPTORS: ACHING
DESCRIPTORS: ACHING

## 2017-09-12 ASSESSMENT — PAIN DESCRIPTION - ORIENTATION
ORIENTATION: LEFT

## 2017-09-13 PROCEDURE — 6370000000 HC RX 637 (ALT 250 FOR IP): Performed by: ORTHOPAEDIC SURGERY

## 2017-09-13 PROCEDURE — 51798 US URINE CAPACITY MEASURE: CPT

## 2017-09-13 PROCEDURE — 2580000003 HC RX 258: Performed by: PHYSICAL MEDICINE & REHABILITATION

## 2017-09-13 PROCEDURE — 97535 SELF CARE MNGMENT TRAINING: CPT

## 2017-09-13 PROCEDURE — 6370000000 HC RX 637 (ALT 250 FOR IP): Performed by: PHYSICAL MEDICINE & REHABILITATION

## 2017-09-13 PROCEDURE — 97530 THERAPEUTIC ACTIVITIES: CPT

## 2017-09-13 PROCEDURE — 6360000002 HC RX W HCPCS: Performed by: PHYSICAL MEDICINE & REHABILITATION

## 2017-09-13 PROCEDURE — 51701 INSERT BLADDER CATHETER: CPT

## 2017-09-13 PROCEDURE — 1180000000 HC REHAB R&B

## 2017-09-13 PROCEDURE — 97110 THERAPEUTIC EXERCISES: CPT

## 2017-09-13 PROCEDURE — 97116 GAIT TRAINING THERAPY: CPT

## 2017-09-13 PROCEDURE — A6198 ALGINATE DRESSING > 48 SQ IN: HCPCS

## 2017-09-13 RX ORDER — LIDOCAINE 50 MG/G
2 PATCH TOPICAL DAILY
Status: DISCONTINUED | OUTPATIENT
Start: 2017-09-13 | End: 2017-09-15 | Stop reason: HOSPADM

## 2017-09-13 RX ORDER — TRAZODONE HYDROCHLORIDE 50 MG/1
50 TABLET ORAL NIGHTLY
Status: DISCONTINUED | OUTPATIENT
Start: 2017-09-13 | End: 2017-09-15 | Stop reason: HOSPADM

## 2017-09-13 RX ADMIN — LOSARTAN POTASSIUM 50 MG: 50 TABLET, FILM COATED ORAL at 07:50

## 2017-09-13 RX ADMIN — ENOXAPARIN SODIUM 30 MG: 30 INJECTION SUBCUTANEOUS at 07:50

## 2017-09-13 RX ADMIN — ISOSORBIDE MONONITRATE 30 MG: 30 TABLET ORAL at 07:50

## 2017-09-13 RX ADMIN — VITAMIN D, TAB 1000IU (100/BT) 2000 UNITS: 25 TAB at 07:50

## 2017-09-13 RX ADMIN — CEFEPIME HYDROCHLORIDE 2 G: 2 INJECTION, POWDER, FOR SOLUTION INTRAVENOUS at 21:40

## 2017-09-13 RX ADMIN — Medication 10 ML: at 09:28

## 2017-09-13 RX ADMIN — PANTOPRAZOLE SODIUM 40 MG: 40 TABLET, DELAYED RELEASE ORAL at 05:52

## 2017-09-13 RX ADMIN — Medication 1 CAPSULE: at 13:10

## 2017-09-13 RX ADMIN — ASPIRIN 81 MG: 81 TABLET, CHEWABLE ORAL at 07:50

## 2017-09-13 RX ADMIN — Medication 1 CAPSULE: at 18:01

## 2017-09-13 RX ADMIN — Medication 1 CAPSULE: at 07:50

## 2017-09-13 RX ADMIN — TAMSULOSIN HYDROCHLORIDE 0.4 MG: 0.4 CAPSULE ORAL at 21:37

## 2017-09-13 RX ADMIN — ATENOLOL 12.5 MG: 25 TABLET ORAL at 07:50

## 2017-09-13 RX ADMIN — TRAZODONE HYDROCHLORIDE 50 MG: 50 TABLET ORAL at 23:48

## 2017-09-13 RX ADMIN — HYDROCODONE BITARTRATE AND ACETAMINOPHEN 2 TABLET: 7.5; 325 TABLET ORAL at 18:02

## 2017-09-13 RX ADMIN — Medication 10 ML: at 21:37

## 2017-09-13 RX ADMIN — SERTRALINE 50 MG: 50 TABLET, FILM COATED ORAL at 07:50

## 2017-09-13 RX ADMIN — HYDROCODONE BITARTRATE AND ACETAMINOPHEN 1 TABLET: 7.5; 325 TABLET ORAL at 05:53

## 2017-09-13 RX ADMIN — CYCLOBENZAPRINE 10 MG: 10 TABLET, FILM COATED ORAL at 10:19

## 2017-09-13 RX ADMIN — HYDROCODONE BITARTRATE AND ACETAMINOPHEN 2 TABLET: 7.5; 325 TABLET ORAL at 12:06

## 2017-09-13 RX ADMIN — ONDANSETRON 4 MG: 4 TABLET, ORALLY DISINTEGRATING ORAL at 09:27

## 2017-09-13 RX ADMIN — ATORVASTATIN CALCIUM 20 MG: 20 TABLET, FILM COATED ORAL at 07:50

## 2017-09-13 RX ADMIN — MULTIPLE VITAMINS W/ MINERALS TAB 1 TABLET: TAB at 07:50

## 2017-09-13 ASSESSMENT — PAIN SCALES - GENERAL
PAINLEVEL_OUTOF10: 9
PAINLEVEL_OUTOF10: 9
PAINLEVEL_OUTOF10: 5
PAINLEVEL_OUTOF10: 4
PAINLEVEL_OUTOF10: 5
PAINLEVEL_OUTOF10: 10
PAINLEVEL_OUTOF10: 0
PAINLEVEL_OUTOF10: 6
PAINLEVEL_OUTOF10: 0

## 2017-09-13 ASSESSMENT — PAIN DESCRIPTION - LOCATION
LOCATION: BACK
LOCATION: LEG
LOCATION: HIP
LOCATION: LEG

## 2017-09-13 ASSESSMENT — PAIN DESCRIPTION - FREQUENCY
FREQUENCY: CONTINUOUS
FREQUENCY: CONTINUOUS

## 2017-09-13 ASSESSMENT — PAIN DESCRIPTION - PAIN TYPE
TYPE: SURGICAL PAIN

## 2017-09-13 ASSESSMENT — PAIN DESCRIPTION - ORIENTATION
ORIENTATION: LEFT

## 2017-09-13 ASSESSMENT — PAIN DESCRIPTION - DESCRIPTORS
DESCRIPTORS: ACHING
DESCRIPTORS: ACHING

## 2017-09-14 ENCOUNTER — TELEPHONE (OUTPATIENT)
Dept: FAMILY MEDICINE CLINIC | Age: 82
End: 2017-09-14

## 2017-09-14 PROCEDURE — 2580000003 HC RX 258: Performed by: PHYSICAL MEDICINE & REHABILITATION

## 2017-09-14 PROCEDURE — 1180000000 HC REHAB R&B

## 2017-09-14 PROCEDURE — 97530 THERAPEUTIC ACTIVITIES: CPT

## 2017-09-14 PROCEDURE — 6370000000 HC RX 637 (ALT 250 FOR IP): Performed by: PHYSICAL MEDICINE & REHABILITATION

## 2017-09-14 PROCEDURE — 97110 THERAPEUTIC EXERCISES: CPT

## 2017-09-14 PROCEDURE — 97116 GAIT TRAINING THERAPY: CPT

## 2017-09-14 PROCEDURE — 51701 INSERT BLADDER CATHETER: CPT

## 2017-09-14 PROCEDURE — 97535 SELF CARE MNGMENT TRAINING: CPT

## 2017-09-14 PROCEDURE — 6360000002 HC RX W HCPCS: Performed by: PHYSICAL MEDICINE & REHABILITATION

## 2017-09-14 PROCEDURE — 51798 US URINE CAPACITY MEASURE: CPT

## 2017-09-14 PROCEDURE — 6370000000 HC RX 637 (ALT 250 FOR IP): Performed by: ORTHOPAEDIC SURGERY

## 2017-09-14 RX ORDER — TAMSULOSIN HYDROCHLORIDE 0.4 MG/1
0.4 CAPSULE ORAL DAILY
Status: DISCONTINUED | OUTPATIENT
Start: 2017-09-14 | End: 2017-09-14

## 2017-09-14 RX ORDER — TAMSULOSIN HYDROCHLORIDE 0.4 MG/1
0.8 CAPSULE ORAL NIGHTLY
Status: DISCONTINUED | OUTPATIENT
Start: 2017-09-14 | End: 2017-09-15 | Stop reason: HOSPADM

## 2017-09-14 RX ADMIN — LOSARTAN POTASSIUM 50 MG: 50 TABLET, FILM COATED ORAL at 08:58

## 2017-09-14 RX ADMIN — TAMSULOSIN HYDROCHLORIDE 0.8 MG: 0.4 CAPSULE ORAL at 19:53

## 2017-09-14 RX ADMIN — TAMSULOSIN HYDROCHLORIDE 0.4 MG: 0.4 CAPSULE ORAL at 08:56

## 2017-09-14 RX ADMIN — ENOXAPARIN SODIUM 30 MG: 30 INJECTION SUBCUTANEOUS at 08:59

## 2017-09-14 RX ADMIN — HYDROCODONE BITARTRATE AND ACETAMINOPHEN 1 TABLET: 7.5; 325 TABLET ORAL at 15:28

## 2017-09-14 RX ADMIN — MULTIPLE VITAMINS W/ MINERALS TAB 1 TABLET: TAB at 08:57

## 2017-09-14 RX ADMIN — Medication 1 CAPSULE: at 14:21

## 2017-09-14 RX ADMIN — VITAMIN D, TAB 1000IU (100/BT) 2000 UNITS: 25 TAB at 08:57

## 2017-09-14 RX ADMIN — ASPIRIN 81 MG: 81 TABLET, CHEWABLE ORAL at 08:58

## 2017-09-14 RX ADMIN — Medication 1 CAPSULE: at 19:54

## 2017-09-14 RX ADMIN — HYDROCODONE BITARTRATE AND ACETAMINOPHEN 2 TABLET: 7.5; 325 TABLET ORAL at 08:57

## 2017-09-14 RX ADMIN — TRAZODONE HYDROCHLORIDE 50 MG: 50 TABLET ORAL at 19:53

## 2017-09-14 RX ADMIN — Medication 1 CAPSULE: at 08:58

## 2017-09-14 RX ADMIN — ATENOLOL 12.5 MG: 25 TABLET ORAL at 08:58

## 2017-09-14 RX ADMIN — CLOPIDOGREL BISULFATE 75 MG: 75 TABLET, FILM COATED ORAL at 08:58

## 2017-09-14 RX ADMIN — Medication 10 ML: at 09:00

## 2017-09-14 RX ADMIN — CEFEPIME HYDROCHLORIDE 2 G: 2 INJECTION, POWDER, FOR SOLUTION INTRAVENOUS at 23:56

## 2017-09-14 RX ADMIN — PANTOPRAZOLE SODIUM 40 MG: 40 TABLET, DELAYED RELEASE ORAL at 05:32

## 2017-09-14 RX ADMIN — SERTRALINE 50 MG: 50 TABLET, FILM COATED ORAL at 08:57

## 2017-09-14 RX ADMIN — ATORVASTATIN CALCIUM 20 MG: 20 TABLET, FILM COATED ORAL at 08:58

## 2017-09-14 RX ADMIN — SENNA 17.2 MG: 8.6 TABLET, COATED ORAL at 19:53

## 2017-09-14 RX ADMIN — HYDROCODONE BITARTRATE AND ACETAMINOPHEN 2 TABLET: 7.5; 325 TABLET ORAL at 01:04

## 2017-09-14 RX ADMIN — Medication 10 ML: at 23:56

## 2017-09-14 RX ADMIN — ISOSORBIDE MONONITRATE 30 MG: 30 TABLET ORAL at 08:58

## 2017-09-14 ASSESSMENT — PAIN SCALES - GENERAL
PAINLEVEL_OUTOF10: 7
PAINLEVEL_OUTOF10: 8
PAINLEVEL_OUTOF10: 0
PAINLEVEL_OUTOF10: 5
PAINLEVEL_OUTOF10: 0
PAINLEVEL_OUTOF10: 6
PAINLEVEL_OUTOF10: 6
PAINLEVEL_OUTOF10: 0

## 2017-09-14 ASSESSMENT — PAIN DESCRIPTION - PAIN TYPE: TYPE: SURGICAL PAIN

## 2017-09-15 VITALS
DIASTOLIC BLOOD PRESSURE: 66 MMHG | TEMPERATURE: 97.4 F | OXYGEN SATURATION: 99 % | HEIGHT: 64 IN | BODY MASS INDEX: 25.64 KG/M2 | WEIGHT: 150.2 LBS | SYSTOLIC BLOOD PRESSURE: 144 MMHG | RESPIRATION RATE: 18 BRPM | HEART RATE: 76 BPM

## 2017-09-15 PROCEDURE — 6360000002 HC RX W HCPCS: Performed by: PHYSICAL MEDICINE & REHABILITATION

## 2017-09-15 PROCEDURE — 6370000000 HC RX 637 (ALT 250 FOR IP): Performed by: ORTHOPAEDIC SURGERY

## 2017-09-15 PROCEDURE — 97110 THERAPEUTIC EXERCISES: CPT

## 2017-09-15 PROCEDURE — 97535 SELF CARE MNGMENT TRAINING: CPT

## 2017-09-15 PROCEDURE — 6370000000 HC RX 637 (ALT 250 FOR IP): Performed by: PHYSICAL MEDICINE & REHABILITATION

## 2017-09-15 PROCEDURE — 51798 US URINE CAPACITY MEASURE: CPT

## 2017-09-15 PROCEDURE — 97116 GAIT TRAINING THERAPY: CPT

## 2017-09-15 PROCEDURE — 51701 INSERT BLADDER CATHETER: CPT

## 2017-09-15 RX ORDER — CYCLOBENZAPRINE HCL 10 MG
10 TABLET ORAL 3 TIMES DAILY PRN
Qty: 10 TABLET | Refills: 0 | Status: SHIPPED | OUTPATIENT
Start: 2017-09-15 | End: 2017-09-25

## 2017-09-15 RX ORDER — AMOXICILLIN 250 MG
CAPSULE ORAL
Qty: 60 TABLET | Refills: 0 | Status: CANCELLED | OUTPATIENT
Start: 2017-09-15

## 2017-09-15 RX ORDER — CIPROFLOXACIN 500 MG/1
500 TABLET, FILM COATED ORAL 2 TIMES DAILY
Qty: 6 TABLET | Refills: 0 | Status: SHIPPED | OUTPATIENT
Start: 2017-09-15 | End: 2017-09-18

## 2017-09-15 RX ORDER — LOSARTAN POTASSIUM 50 MG/1
50 TABLET ORAL DAILY
Qty: 30 TABLET | Refills: 3 | Status: SHIPPED | OUTPATIENT
Start: 2017-09-15 | End: 2018-02-20 | Stop reason: SDUPTHER

## 2017-09-15 RX ORDER — OMEGA-3S/DHA/EPA/FISH OIL/D3 300MG-1000
1 CAPSULE ORAL DAILY
Qty: 30 CAPSULE | Refills: 3 | Status: SHIPPED | OUTPATIENT
Start: 2017-09-15 | End: 2018-02-20 | Stop reason: SDUPTHER

## 2017-09-15 RX ORDER — ATENOLOL 25 MG/1
12.5 TABLET ORAL DAILY
Qty: 30 TABLET | Refills: 3 | Status: CANCELLED | OUTPATIENT
Start: 2017-09-15

## 2017-09-15 RX ORDER — LOSARTAN POTASSIUM 50 MG/1
50 TABLET ORAL DAILY
Qty: 30 TABLET | Refills: 3 | Status: CANCELLED | OUTPATIENT
Start: 2017-09-15

## 2017-09-15 RX ORDER — PANTOPRAZOLE SODIUM 40 MG/1
40 TABLET, DELAYED RELEASE ORAL
Qty: 30 TABLET | Refills: 3 | Status: ON HOLD | OUTPATIENT
Start: 2017-09-16 | End: 2017-12-29 | Stop reason: ALTCHOICE

## 2017-09-15 RX ORDER — TAMSULOSIN HYDROCHLORIDE 0.4 MG/1
0.8 CAPSULE ORAL NIGHTLY
Qty: 60 CAPSULE | Refills: 3 | Status: SHIPPED | OUTPATIENT
Start: 2017-09-15 | End: 2017-09-15 | Stop reason: HOSPADM

## 2017-09-15 RX ORDER — LACTOBACILLUS RHAMNOSUS GG 10B CELL
1 CAPSULE ORAL
Qty: 18 CAPSULE | Refills: 0 | Status: ON HOLD | OUTPATIENT
Start: 2017-09-15 | End: 2017-12-29 | Stop reason: ALTCHOICE

## 2017-09-15 RX ORDER — ATENOLOL 25 MG/1
12.5 TABLET ORAL DAILY
Qty: 30 TABLET | Refills: 3 | Status: SHIPPED | OUTPATIENT
Start: 2017-09-15 | End: 2017-09-15 | Stop reason: HOSPADM

## 2017-09-15 RX ORDER — HYDROCODONE BITARTRATE AND ACETAMINOPHEN 5; 325 MG/1; MG/1
1 TABLET ORAL EVERY 4 HOURS PRN
Qty: 42 TABLET | Refills: 0 | Status: CANCELLED | OUTPATIENT
Start: 2017-09-15 | End: 2017-09-22

## 2017-09-15 RX ORDER — AMOXICILLIN 250 MG
CAPSULE ORAL
Qty: 60 TABLET | Refills: 0 | Status: ON HOLD | OUTPATIENT
Start: 2017-09-15 | End: 2018-08-15

## 2017-09-15 RX ORDER — TAMSULOSIN HYDROCHLORIDE 0.4 MG/1
0.8 CAPSULE ORAL NIGHTLY
Qty: 60 CAPSULE | Refills: 3 | Status: CANCELLED | OUTPATIENT
Start: 2017-09-15

## 2017-09-15 RX ORDER — TAMSULOSIN HYDROCHLORIDE 0.4 MG/1
0.4 CAPSULE ORAL 2 TIMES DAILY
Qty: 180 CAPSULE | Refills: 3 | Status: SHIPPED | OUTPATIENT
Start: 2017-09-15 | End: 2018-02-20 | Stop reason: SDUPTHER

## 2017-09-15 RX ORDER — PANTOPRAZOLE SODIUM 40 MG/1
40 TABLET, DELAYED RELEASE ORAL
Qty: 6 TABLET | Refills: 0 | Status: CANCELLED | OUTPATIENT
Start: 2017-09-15

## 2017-09-15 RX ORDER — HYDROCODONE BITARTRATE AND ACETAMINOPHEN 5; 325 MG/1; MG/1
1 TABLET ORAL EVERY 4 HOURS PRN
Qty: 42 TABLET | Refills: 0 | Status: SHIPPED | OUTPATIENT
Start: 2017-09-15 | End: 2017-09-22

## 2017-09-15 RX ORDER — CYCLOBENZAPRINE HCL 10 MG
10 TABLET ORAL 3 TIMES DAILY PRN
Qty: 15 TABLET | Refills: 0 | Status: CANCELLED | OUTPATIENT
Start: 2017-09-15 | End: 2017-09-25

## 2017-09-15 RX ADMIN — VITAMIN D, TAB 1000IU (100/BT) 2000 UNITS: 25 TAB at 08:35

## 2017-09-15 RX ADMIN — HYDROCODONE BITARTRATE AND ACETAMINOPHEN 1 TABLET: 7.5; 325 TABLET ORAL at 08:34

## 2017-09-15 RX ADMIN — ATORVASTATIN CALCIUM 20 MG: 20 TABLET, FILM COATED ORAL at 08:32

## 2017-09-15 RX ADMIN — MULTIPLE VITAMINS W/ MINERALS TAB 1 TABLET: TAB at 08:33

## 2017-09-15 RX ADMIN — HYDROCODONE BITARTRATE AND ACETAMINOPHEN 2 TABLET: 7.5; 325 TABLET ORAL at 00:11

## 2017-09-15 RX ADMIN — ASPIRIN 81 MG: 81 TABLET, CHEWABLE ORAL at 08:35

## 2017-09-15 RX ADMIN — ISOSORBIDE MONONITRATE 30 MG: 30 TABLET ORAL at 08:32

## 2017-09-15 RX ADMIN — ENOXAPARIN SODIUM 30 MG: 30 INJECTION SUBCUTANEOUS at 08:34

## 2017-09-15 RX ADMIN — LOSARTAN POTASSIUM 50 MG: 50 TABLET, FILM COATED ORAL at 08:33

## 2017-09-15 RX ADMIN — PANTOPRAZOLE SODIUM 40 MG: 40 TABLET, DELAYED RELEASE ORAL at 05:40

## 2017-09-15 RX ADMIN — Medication 1 CAPSULE: at 08:33

## 2017-09-15 RX ADMIN — ATENOLOL 12.5 MG: 25 TABLET ORAL at 08:33

## 2017-09-15 RX ADMIN — SERTRALINE 50 MG: 50 TABLET, FILM COATED ORAL at 08:32

## 2017-09-15 ASSESSMENT — PAIN SCALES - GENERAL
PAINLEVEL_OUTOF10: 5
PAINLEVEL_OUTOF10: 5
PAINLEVEL_OUTOF10: 0
PAINLEVEL_OUTOF10: 7
PAINLEVEL_OUTOF10: 0

## 2017-09-15 ASSESSMENT — PAIN DESCRIPTION - FREQUENCY: FREQUENCY: CONTINUOUS

## 2017-09-15 ASSESSMENT — PAIN DESCRIPTION - ORIENTATION: ORIENTATION: LEFT

## 2017-09-15 ASSESSMENT — PAIN DESCRIPTION - LOCATION: LOCATION: HIP

## 2017-09-15 ASSESSMENT — PAIN DESCRIPTION - DESCRIPTORS: DESCRIPTORS: ACHING

## 2017-09-19 ENCOUNTER — OFFICE VISIT (OUTPATIENT)
Dept: NEPHROLOGY | Age: 82
End: 2017-09-19
Payer: MEDICARE

## 2017-09-19 VITALS
WEIGHT: 153 LBS | DIASTOLIC BLOOD PRESSURE: 80 MMHG | SYSTOLIC BLOOD PRESSURE: 144 MMHG | BODY MASS INDEX: 26.26 KG/M2 | OXYGEN SATURATION: 99 % | HEART RATE: 96 BPM

## 2017-09-19 DIAGNOSIS — N18.30 CKD (CHRONIC KIDNEY DISEASE) STAGE 3, GFR 30-59 ML/MIN (HCC): Primary | ICD-10-CM

## 2017-09-19 PROCEDURE — G8598 ASA/ANTIPLAT THER USED: HCPCS | Performed by: INTERNAL MEDICINE

## 2017-09-19 PROCEDURE — 1111F DSCHRG MED/CURRENT MED MERGE: CPT | Performed by: INTERNAL MEDICINE

## 2017-09-19 PROCEDURE — G8427 DOCREV CUR MEDS BY ELIG CLIN: HCPCS | Performed by: INTERNAL MEDICINE

## 2017-09-19 PROCEDURE — 1036F TOBACCO NON-USER: CPT | Performed by: INTERNAL MEDICINE

## 2017-09-19 PROCEDURE — G8417 CALC BMI ABV UP PARAM F/U: HCPCS | Performed by: INTERNAL MEDICINE

## 2017-09-19 PROCEDURE — 1123F ACP DISCUSS/DSCN MKR DOCD: CPT | Performed by: INTERNAL MEDICINE

## 2017-09-19 PROCEDURE — 99213 OFFICE O/P EST LOW 20 MIN: CPT | Performed by: INTERNAL MEDICINE

## 2017-09-19 PROCEDURE — 4040F PNEUMOC VAC/ADMIN/RCVD: CPT | Performed by: INTERNAL MEDICINE

## 2017-09-19 ASSESSMENT — ENCOUNTER SYMPTOMS
CHEST TIGHTNESS: 0
VOMITING: 0
RESPIRATORY NEGATIVE: 1
DIARRHEA: 0
COUGH: 0
BACK PAIN: 0
ABDOMINAL PAIN: 0
ABDOMINAL DISTENTION: 0
GASTROINTESTINAL NEGATIVE: 1
BLOOD IN STOOL: 0
SHORTNESS OF BREATH: 0
NAUSEA: 0
FACIAL SWELLING: 0
WHEEZING: 0
CONSTIPATION: 0

## 2017-09-26 ENCOUNTER — TELEPHONE (OUTPATIENT)
Dept: FAMILY MEDICINE CLINIC | Age: 82
End: 2017-09-26

## 2017-09-26 ENCOUNTER — OFFICE VISIT (OUTPATIENT)
Dept: FAMILY MEDICINE CLINIC | Age: 82
End: 2017-09-26
Payer: MEDICARE

## 2017-09-26 VITALS
DIASTOLIC BLOOD PRESSURE: 72 MMHG | HEIGHT: 63 IN | OXYGEN SATURATION: 96 % | WEIGHT: 152.4 LBS | HEART RATE: 65 BPM | SYSTOLIC BLOOD PRESSURE: 130 MMHG | BODY MASS INDEX: 27 KG/M2 | TEMPERATURE: 97.5 F

## 2017-09-26 DIAGNOSIS — I25.10 CORONARY ARTERY DISEASE INVOLVING NATIVE CORONARY ARTERY OF NATIVE HEART WITHOUT ANGINA PECTORIS: ICD-10-CM

## 2017-09-26 DIAGNOSIS — M51.9 DISC DISORDER OF LUMBAR REGION: ICD-10-CM

## 2017-09-26 DIAGNOSIS — D50.9 IRON DEFICIENCY ANEMIA, UNSPECIFIED IRON DEFICIENCY ANEMIA TYPE: Primary | ICD-10-CM

## 2017-09-26 DIAGNOSIS — N18.30 CKD (CHRONIC KIDNEY DISEASE) STAGE 3, GFR 30-59 ML/MIN (HCC): ICD-10-CM

## 2017-09-26 DIAGNOSIS — S72.142S CLOSED INTERTROCHANTERIC FRACTURE OF LEFT FEMUR, SEQUELA: ICD-10-CM

## 2017-09-26 DIAGNOSIS — Z23 NEED FOR INFLUENZA VACCINATION: ICD-10-CM

## 2017-09-26 PROCEDURE — 90662 IIV NO PRSV INCREASED AG IM: CPT | Performed by: FAMILY MEDICINE

## 2017-09-26 PROCEDURE — 99213 OFFICE O/P EST LOW 20 MIN: CPT | Performed by: FAMILY MEDICINE

## 2017-09-26 PROCEDURE — G0008 ADMIN INFLUENZA VIRUS VAC: HCPCS | Performed by: FAMILY MEDICINE

## 2017-09-26 ASSESSMENT — ENCOUNTER SYMPTOMS
EYE PAIN: 0
SHORTNESS OF BREATH: 0
NAUSEA: 0
BLOOD IN STOOL: 0
DIARRHEA: 0
CONSTIPATION: 0
ABDOMINAL PAIN: 0
TROUBLE SWALLOWING: 0
COUGH: 0
VOMITING: 0

## 2017-10-03 ENCOUNTER — TELEPHONE (OUTPATIENT)
Dept: FAMILY MEDICINE CLINIC | Age: 82
End: 2017-10-03
Payer: MEDICARE

## 2017-10-03 DIAGNOSIS — D50.9 IRON DEFICIENCY ANEMIA, UNSPECIFIED: Primary | ICD-10-CM

## 2017-10-03 LAB
CONTROL: NORMAL
HEMOCCULT STL QL: NORMAL

## 2017-10-03 PROCEDURE — 82274 ASSAY TEST FOR BLOOD FECAL: CPT | Performed by: FAMILY MEDICINE

## 2017-10-03 NOTE — TELEPHONE ENCOUNTER
Faxed over referral and positive FIT test result. Their office should contact patient for appt. Scanned and attached.

## 2017-10-03 NOTE — TELEPHONE ENCOUNTER
Called pt and spoke to wife, on hipaa. Advised of FIT test coming back positive. He would like to do a colonoscopy. Please advise.

## 2017-10-06 ENCOUNTER — HOSPITAL ENCOUNTER (EMERGENCY)
Age: 82
Discharge: HOME | End: 2017-10-06
Payer: MEDICARE

## 2017-10-06 VITALS
OXYGEN SATURATION: 97 % | TEMPERATURE: 98.5 F | SYSTOLIC BLOOD PRESSURE: 166 MMHG | BODY MASS INDEX: 25.61 KG/M2 | WEIGHT: 150 LBS | HEART RATE: 61 BPM | HEIGHT: 64 IN | DIASTOLIC BLOOD PRESSURE: 83 MMHG

## 2017-10-06 DIAGNOSIS — S05.01XA CORNEAL ABRASION, RIGHT, INITIAL ENCOUNTER: Primary | ICD-10-CM

## 2017-10-06 PROCEDURE — 99282 EMERGENCY DEPT VISIT SF MDM: CPT

## 2017-10-06 RX ORDER — PROPARACAINE HYDROCHLORIDE 5 MG/ML
2 SOLUTION/ DROPS OPHTHALMIC ONCE
Status: DISCONTINUED | OUTPATIENT
Start: 2017-10-06 | End: 2017-10-06 | Stop reason: HOSPADM

## 2017-10-06 ASSESSMENT — ENCOUNTER SYMPTOMS
COUGH: 0
EYE PAIN: 1
ABDOMINAL PAIN: 0
VOMITING: 0
EYE REDNESS: 0
SORE THROAT: 0
SHORTNESS OF BREATH: 0
BACK PAIN: 0
NAUSEA: 0
WHEEZING: 0
RHINORRHEA: 0
DIARRHEA: 0
EYE DISCHARGE: 0

## 2017-10-06 ASSESSMENT — PAIN DESCRIPTION - FREQUENCY: FREQUENCY: INTERMITTENT

## 2017-10-06 NOTE — ED PROVIDER NOTES
retained foreign bodies on visual inspection. Fluorescein stain exam was negative for retained foreign bodies but did show a corneal abrasions of the right eye without ulcerations. Neck: Normal range of motion. Neck supple. Cardiovascular: Normal rate, regular rhythm and normal heart sounds. Exam reveals no gallop and no friction rub. No murmur heard. Pulmonary/Chest: Effort normal and breath sounds normal. No respiratory distress. He has no wheezes. He has no rales. He exhibits no tenderness. Abdominal: Soft. He exhibits no distension. Musculoskeletal: Normal range of motion. He exhibits no edema. Lymphadenopathy:     He has no cervical adenopathy. Neurological: He is alert and oriented to person, place, and time. He exhibits normal muscle tone. Coordination normal. GCS eye subscore is 4. GCS verbal subscore is 5. GCS motor subscore is 6. Skin: Skin is warm and dry. No rash noted. He is not diaphoretic. No erythema. No pallor. Psychiatric: He has a normal mood and affect. His behavior is normal. Thought content normal.   Nursing note and vitals reviewed. DIFFERENTIAL DIAGNOSIS:   Includes but not limited to: Foreign body, corneal abrasion, corneal ulceration, bacterial vs viral conjuctivitis    DIAGNOSTIC RESULTS     EKG: All EKG's are interpreted by the Emergency Department Physician who either signs or Co-signs this chart in the absence of a cardiologist.  None    RADIOLOGY: non-plain film images(s) such as CT, Ultrasound and MRI are read by the radiologist.  None    LABS:     Labs Reviewed - No data to display    EMERGENCY DEPARTMENT COURSE:   Vitals:    Vitals:    10/06/17 1021   BP: (!) 166/83   Pulse: 61   Temp: 98.5 °F (36.9 °C)   TempSrc: Oral   SpO2: 97%   Weight: 150 lb (68 kg)   Height: 5' 4\" (1.626 m)       10:43 AM: The patient was seen and evaluated in a timely fashion.     The patient was seen and evaluated within the ED today with irritation and a possible foreign body in discharging home to follow-up with his primary doctor. We also discussed returning to the Emergency Department immediately if new or worsening symptoms occur. We have discussed the symptoms which are most concerning (e.g., loss or vision, severe eye pain, vomiting, fever) that necessitate immediate return. CRITICAL CARE:   None     CONSULTS:   None    PROCEDURES:  None    FINAL IMPRESSION      1. Corneal abrasion, right, initial encounter          DISPOSITION/PLAN    I have given the patient strict written and verbal instructions about care at home, follow-up, and signs and symptoms of worsening of condition, and the patient did verbalize understanding of these instructions. Patient was discharged in stable condition. Will return if symptoms change or worsen, or for any sign or symptom deemed emergent by the patient or family members. Follow up as an outpatient or sooner if symptoms warrant. PATIENT REFERRED TO:  Vernell Pagan, [address] [address] [address]   Aguilar Echevarria [address] [facility]  [phone]    Call in 2 days  As needed, If symptoms worsen, For re-check of right eye    Reg Davis MD  [address] [facility]    Call in 2 days  As needed, If symptoms worsen, For re-check of right eye      DISCHARGE MEDICATIONS:  Discharge Medication List as of 10/6/2017 11:36 AM      START taking these medications    Details   tobramycin-dexamethasone (TOBRADEX) 0.3-0.1 % ophthalmic ointment 3 times daily. , Disp-3.5 g, R-0, Print             (Please note that portions of this note were completed with a voice recognition program.  Efforts were made to edit the dictations but occasionally words are mis-transcribed.)    Scribe: This document serves as a record of the services and decisions personally performed and made by Kathi Roberson PA-C. It was created on their behalf by Adolfo Rodriguez, a trained medical scribe.   The creation of this document is based the provider's statements to the medical

## 2017-10-06 NOTE — ED AVS SNAPSHOT
After Visit Summary  (Discharge Instructions)    Medication List for Home    Based on the information you provided to us as well as any changes during this visit, the following is your updated medication list.  Compare this with your prescription bottles at home. If you have any questions or concerns, contact your primary care physician's office. Daily Medication List (This medication list can be shared with any Healthcare provider who is helping you manage your medications)      There are NEW medications for you. START taking them after you leave the hospital     tobramycin-dexamethasone 0.3-0.1 % ophthalmic ointment   Commonly known as:  TOBRADEX   3 times daily. ASK your doctor about these medications if you have questions     aspirin 81 MG tablet   Take 81 mg by mouth daily       atorvastatin 20 MG tablet   Commonly known as:  LIPITOR   Take 20 mg by mouth daily       clopidogrel 75 MG tablet   Commonly known as:  PLAVIX   Take 75 mg by mouth every other day       COQ-10 PO   Take 10 mg by mouth daily       FISH OIL BURP-LESS 1200 MG Caps   Take 1 tablet by mouth daily Krill oil       isosorbide mononitrate 30 MG extended release tablet   Commonly known as:  IMDUR   Take 30 mg by mouth daily. KRILL OIL PO   Take 1,000 mg by mouth daily       lactobacillus capsule   Take 1 capsule by mouth 3 times daily (with meals) Take while on the Ciprofloxacin. losartan 50 MG tablet   Commonly known as:  COZAAR   Take 1 tablet by mouth daily       metoprolol tartrate 25 MG tablet   Commonly known as:  LOPRESSOR   Take 0.5 tablets by mouth daily Substitute for atenolol. NONFORMULARY   Take 2 capsules by mouth daily ARthro -7 for joint pain       pantoprazole 40 MG tablet   Commonly known as:  PROTONIX   Take 1 tablet by mouth every morning (before breakfast) for 6 days Take while on Lovenox.        senna-docusate 8.6-50 MG per tablet   Commonly known as:  DOK PLUS TAKE ONE TABLET BY MOUTH ONCE DAILY       sertraline 50 MG tablet   Commonly known as:  ZOLOFT   TAKE ONE TABLET BY MOUTH ONCE DAILY       tamsulosin 0.4 MG capsule   Commonly known as:  FLOMAX   Take 1 capsule by mouth 2 times daily       therapeutic multivitamin-minerals tablet   Take 1 tablet by mouth daily       TURMERIC PO   Take 1,000 mg by mouth daily       VITAMIN D PO   Take 2,000 Units by mouth daily            Where to Get Your Medications      You can get these medications from any pharmacy     Bring a paper prescription for each of these medications     tobramycin-dexamethasone 0.3-0.1 % ophthalmic ointment               Allergies as of 10/6/2017        Reactions    Iodine Swelling, Rash      Immunizations as of 10/6/2017     Name Date Dose VIS Date Route    Influenza Virus Vaccine 9/1/2012 -- -- --    Influenza, High Dose 9/26/2017 0.5 mL 8/7/2015 Intramuscular    Influenza, High Dose 10/27/2016 0.5 mL 8/7/2015 Intramuscular    Pneumococcal Polysaccharide (Upylifyye11) 10/27/2016 0.5 mL 4/24/2015 Intramuscular    Tdap (Boostrix, Adacel) 6/16/2017 0.5 mL 2/24/2015 Intramuscular         After Visit Summary    This summary was created for you. Thank you for entrusting your care to us. The following information includes details about your hospital/visit stay along with steps you should take to help with your recovery once you leave the hospital.  In this packet, you will find information about the topics listed below:    · Instructions about your medications including a list of your home medications  · A summary of your hospital visit  · Follow-up appointments once you have left the hospital  · Your care plan at home      You may receive a survey regarding the care you received during your stay. Your input is valuable to us. We encourage you to complete and return your survey in the envelope provided. We hope you will choose us in the future for your healthcare needs.           Patient Information Patient Name CINTHYA Escobar 1931      Care Provided at:     Name Address Phone       [address] [phone]            Your Visit    Here you will find information about your visit, including the reason for your visit. Please take this sheet with you when you visit your doctor or other health care provider in the future. It will help determine the best possible medical care for you at that time. If you have any questions once you leave the hospital, please call the department phone number listed below. Diagnoses this visit     Your diagnosis was CORNEAL ABRASION, RIGHT, INITIAL ENCOUNTER. Visit Information     Date of Visit Department Dept Phone    10/6/2017 [address] [address] EMERGENCY DEPT [phone]      You were seen by     You were seen by Jl Samano PA-C. Follow-up Appointments    Below is a list of your follow-up and future appointments. This may not be a complete list as you may have made appointments directly with providers that we are not aware of or your providers may have made some for you. Please call your providers to confirm appointments. It is important to keep your appointments. Please bring your current insurance card, photo ID, co-pay, and all medication bottles to your appointment. If self-pay, payment is expected at the time of service. Follow-up Information     Follow up with Lázaro Martinez DO. Call in 2 days. Specialty:  Family Medicine    Why:  As needed, If symptoms worsen, For re-check of right eye    Contact information:    [address] [facility]  [address] [facility]  [phone]          Follow up with Hola Schroeder MD. Call in 2 days.     Specialty:  Ophthalmology    Why:  As needed, If symptoms worsen, For re-check of right eye    Contact information:    [address] [place]        Future Appointments     2017 10:30 AM Appointment with Aftab Campbell CNP at [address] [place] [phone] [address] [facility] [address]  [address] [place] [address]       1/30/2018 1:00 PM     Appointment with FARHANA White at [facility] ([phone])   Please arrive 15 minutes prior to appointment, bring photo ID and insurance card. Please arrive 15 minutes prior to appointment, bring photo ID and insurance card. [address]       9/18/2018 1:00 PM     Appointment with Jac Arroyo DO at [address] ([phone])   Please arrive 15 minutes prior to appointment time, bring insurance card and photo ID. Please arrive 15 minutes prior to appointment time, bring insurance card and photo ID.   [address] [place] [id number]         Preventive Care        Date Due    Zoster Vaccine 11/24/1991    Pneumococcal Vaccines (two) for all adults aged 72 and over (2 of 2 - PCV13) 10/27/2017    Tetanus Combination Vaccine (2 - Td) 6/16/2027                 Care Plan Once You Return Home    This section includes instructions you will need to follow once you leave the hospital.  Your care team will discuss these with you, so you and those caring for you know how to best care for your health needs at home. This section may also include educational information about certain health topics that may be of help to you. Important Information if you smoke or are exposed to smoking       SMOKING: QUIT SMOKING. THIS IS THE MOST IMPORTANT ACTION YOU CAN TAKE TO IMPROVE YOUR CURRENT AND FUTURE HEALTH. Call the [facility] NOW ([phone])    Smoking harms nonsmokers. When nonsmokers are around people who smoke, they absorb nicotine, carbon monoxide, and other ingredients of tobacco smoke.      DO NOT SMOKE AROUND CHILDREN     Children exposed to secondhand smoke are at an increased risk of:  Sudden problems. It's also a good idea to know your test results and keep a list of the medicines you take. How can you care for yourself at home? · The doctor probably used a medicine during your exam to numb your eye. When it wears off in 30 to 60 minutes, your eye pain may come back. Take pain medicines exactly as directed. ¨ If the doctor gave you a prescription medicine for pain, take it as prescribed. ¨ If you are not taking a prescription pain medicine, ask your doctor if you can take an over-the-counter medicine. ¨ Do not take two or more pain medicines at the same time unless the doctor told you to. Many pain medicines have acetaminophen, which is Tylenol. Too much acetaminophen (Tylenol) can be harmful. · Do not rub your injured eye. Rubbing can make it worse. · Use the prescribed eyedrops or ointment as directed. Be sure the dropper or bottle tip is clean. To put in eyedrops or ointment:  ¨ Tilt your head back, and pull your lower eyelid down with one finger. ¨ Drop or squirt the medicine inside the lower lid. ¨ Close your eye for 30 to 60 seconds to let the drops or ointment move around. ¨ Do not touch the ointment or dropper tip to your eyelashes or any other surface. · Do not use your contact lens in your hurt eye until your doctor says you can. Also, do not wear eye makeup until your eye has healed. · Do not drive if you have blurred vision. · Bright light may hurt. Sunglasses can help. · To prevent eye injuries in the future, wear safety glasses or goggles when you work with machines or tools, mow the lawn, or ride a bike or motorcycle. When should you call for help? Call your doctor now or seek immediate medical care if:  · You have signs of an eye infection, such as:  ¨ Pus or thick discharge coming from the eye. ¨ Redness or swelling around the eye. ¨ A fever. · You have new or worse eye pain. · You have vision changes. · It feels like there is something in your eye.

## 2017-10-08 ENCOUNTER — ANESTHESIA (OUTPATIENT)
Dept: OPERATING ROOM | Age: 82
DRG: 481 | End: 2017-10-08
Payer: MEDICARE

## 2017-10-08 ENCOUNTER — APPOINTMENT (OUTPATIENT)
Dept: CT IMAGING | Age: 82
DRG: 481 | End: 2017-10-08
Payer: MEDICARE

## 2017-10-08 ENCOUNTER — HOSPITAL ENCOUNTER (INPATIENT)
Age: 82
LOS: 3 days | Discharge: NURSING, REHAB OR LONG-TERM CARE | DRG: 481 | End: 2017-10-11
Attending: ORTHOPAEDIC SURGERY | Admitting: ORTHOPAEDIC SURGERY
Payer: MEDICARE

## 2017-10-08 ENCOUNTER — APPOINTMENT (OUTPATIENT)
Dept: GENERAL RADIOLOGY | Age: 82
DRG: 481 | End: 2017-10-08
Payer: MEDICARE

## 2017-10-08 ENCOUNTER — ANESTHESIA EVENT (OUTPATIENT)
Dept: OPERATING ROOM | Age: 82
DRG: 481 | End: 2017-10-08
Payer: MEDICARE

## 2017-10-08 VITALS — OXYGEN SATURATION: 96 % | SYSTOLIC BLOOD PRESSURE: 117 MMHG | DIASTOLIC BLOOD PRESSURE: 69 MMHG

## 2017-10-08 DIAGNOSIS — S72.001A CLOSED RIGHT HIP FRACTURE, INITIAL ENCOUNTER (HCC): Primary | ICD-10-CM

## 2017-10-08 DIAGNOSIS — D62 ACUTE BLOOD LOSS ANEMIA: ICD-10-CM

## 2017-10-08 PROBLEM — S72.141D CLOSED COMMINUTED INTERTROCHANTERIC FRACTURE OF RIGHT FEMUR WITH ROUTINE HEALING: Status: ACTIVE | Noted: 2017-10-08

## 2017-10-08 PROBLEM — G47.33 OSA (OBSTRUCTIVE SLEEP APNEA): Status: ACTIVE | Noted: 2017-10-08

## 2017-10-08 LAB
ABO: NORMAL
ANION GAP SERPL CALCULATED.3IONS-SCNC: 15 MEQ/L (ref 8–16)
ANISOCYTOSIS: ABNORMAL
ANTIBODY SCREEN: NORMAL
APTT: 27.6 SECONDS (ref 22–38)
BASOPHILS # BLD: 1 %
BASOPHILS ABSOLUTE: 0.1 THOU/MM3 (ref 0–0.1)
BUN BLDV-MCNC: 29 MG/DL (ref 7–22)
CALCIUM SERPL-MCNC: 9.2 MG/DL (ref 8.5–10.5)
CHLORIDE BLD-SCNC: 100 MEQ/L (ref 98–111)
CO2: 23 MEQ/L (ref 23–33)
CREAT SERPL-MCNC: 1.3 MG/DL (ref 0.4–1.2)
EKG ATRIAL RATE: 62 BPM
EKG ATRIAL RATE: 79 BPM
EKG P AXIS: 53 DEGREES
EKG P AXIS: 61 DEGREES
EKG P-R INTERVAL: 140 MS
EKG P-R INTERVAL: 140 MS
EKG Q-T INTERVAL: 380 MS
EKG Q-T INTERVAL: 418 MS
EKG QRS DURATION: 98 MS
EKG QRS DURATION: 98 MS
EKG QTC CALCULATION (BAZETT): 424 MS
EKG QTC CALCULATION (BAZETT): 435 MS
EKG R AXIS: 59 DEGREES
EKG R AXIS: 78 DEGREES
EKG T AXIS: 40 DEGREES
EKG T AXIS: 6 DEGREES
EKG VENTRICULAR RATE: 62 BPM
EKG VENTRICULAR RATE: 79 BPM
EOSINOPHIL # BLD: 5 %
EOSINOPHILS ABSOLUTE: 0.3 THOU/MM3 (ref 0–0.4)
GFR SERPL CREATININE-BSD FRML MDRD: 52 ML/MIN/1.73M2
GLUCOSE BLD-MCNC: 95 MG/DL (ref 70–108)
HCT VFR BLD CALC: 30.2 % (ref 42–52)
HEMOGLOBIN: 10.2 GM/DL (ref 14–18)
INR BLD: 1.03 (ref 0.85–1.13)
LYMPHOCYTES # BLD: 25 %
LYMPHOCYTES ABSOLUTE: 1.5 THOU/MM3 (ref 1–4.8)
MCH RBC QN AUTO: 30.2 PG (ref 27–31)
MCHC RBC AUTO-ENTMCNC: 33.8 GM/DL (ref 33–37)
MCV RBC AUTO: 89.2 FL (ref 80–94)
MONOCYTES # BLD: 10.7 %
MONOCYTES ABSOLUTE: 0.7 THOU/MM3 (ref 0.4–1.3)
NUCLEATED RED BLOOD CELLS: 0 /100 WBC
OSMOLALITY CALCULATION: 281.3 MOSMOL/KG (ref 275–300)
PDW BLD-RTO: 18 % (ref 11.5–14.5)
PLATELET # BLD: 248 THOU/MM3 (ref 130–400)
PMV BLD AUTO: 7.1 MCM (ref 7.4–10.4)
POTASSIUM SERPL-SCNC: 4.2 MEQ/L (ref 3.5–5.2)
RBC # BLD: 3.38 MILL/MM3 (ref 4.7–6.1)
RBC # BLD: NORMAL 10*6/UL
RH FACTOR: NORMAL
SEG NEUTROPHILS: 58.3 %
SEGMENTED NEUTROPHILS ABSOLUTE COUNT: 3.6 THOU/MM3 (ref 1.8–7.7)
SODIUM BLD-SCNC: 138 MEQ/L (ref 135–145)
TROPONIN T: < 0.01 NG/ML
WBC # BLD: 6.1 THOU/MM3 (ref 4.8–10.8)

## 2017-10-08 PROCEDURE — 6360000002 HC RX W HCPCS: Performed by: ANESTHESIOLOGY

## 2017-10-08 PROCEDURE — 7100000000 HC PACU RECOVERY - FIRST 15 MIN: Performed by: ORTHOPAEDIC SURGERY

## 2017-10-08 PROCEDURE — C1713 ANCHOR/SCREW BN/BN,TIS/BN: HCPCS | Performed by: ORTHOPAEDIC SURGERY

## 2017-10-08 PROCEDURE — 0QS604Z REPOSITION RIGHT UPPER FEMUR WITH INTERNAL FIXATION DEVICE, OPEN APPROACH: ICD-10-PCS | Performed by: ORTHOPAEDIC SURGERY

## 2017-10-08 PROCEDURE — 70450 CT HEAD/BRAIN W/O DYE: CPT

## 2017-10-08 PROCEDURE — 86850 RBC ANTIBODY SCREEN: CPT

## 2017-10-08 PROCEDURE — 6360000002 HC RX W HCPCS: Performed by: ORTHOPAEDIC SURGERY

## 2017-10-08 PROCEDURE — 2580000003 HC RX 258: Performed by: ORTHOPAEDIC SURGERY

## 2017-10-08 PROCEDURE — 94640 AIRWAY INHALATION TREATMENT: CPT

## 2017-10-08 PROCEDURE — 3700000000 HC ANESTHESIA ATTENDED CARE: Performed by: ORTHOPAEDIC SURGERY

## 2017-10-08 PROCEDURE — 6360000002 HC RX W HCPCS: Performed by: PHYSICIAN ASSISTANT

## 2017-10-08 PROCEDURE — 85025 COMPLETE CBC W/AUTO DIFF WBC: CPT

## 2017-10-08 PROCEDURE — 86900 BLOOD TYPING SEROLOGIC ABO: CPT

## 2017-10-08 PROCEDURE — 3700000001 HC ADD 15 MINUTES (ANESTHESIA): Performed by: ORTHOPAEDIC SURGERY

## 2017-10-08 PROCEDURE — 99285 EMERGENCY DEPT VISIT HI MDM: CPT

## 2017-10-08 PROCEDURE — 93005 ELECTROCARDIOGRAM TRACING: CPT | Performed by: PHYSICIAN ASSISTANT

## 2017-10-08 PROCEDURE — 6370000000 HC RX 637 (ALT 250 FOR IP): Performed by: ORTHOPAEDIC SURGERY

## 2017-10-08 PROCEDURE — 71010 XR CHEST PORTABLE: CPT

## 2017-10-08 PROCEDURE — A6223 GAUZE >16<=48 NO W/SAL W/O B: HCPCS | Performed by: ORTHOPAEDIC SURGERY

## 2017-10-08 PROCEDURE — 6370000000 HC RX 637 (ALT 250 FOR IP): Performed by: PHYSICIAN ASSISTANT

## 2017-10-08 PROCEDURE — 72125 CT NECK SPINE W/O DYE: CPT

## 2017-10-08 PROCEDURE — 85610 PROTHROMBIN TIME: CPT

## 2017-10-08 PROCEDURE — 51702 INSERT TEMP BLADDER CATH: CPT

## 2017-10-08 PROCEDURE — 3209999900 FLUORO FOR SURGICAL PROCEDURES

## 2017-10-08 PROCEDURE — 1200000000 HC SEMI PRIVATE

## 2017-10-08 PROCEDURE — 2500000003 HC RX 250 WO HCPCS: Performed by: ANESTHESIOLOGY

## 2017-10-08 PROCEDURE — 3600000014 HC SURGERY LEVEL 4 ADDTL 15MIN: Performed by: ORTHOPAEDIC SURGERY

## 2017-10-08 PROCEDURE — 99222 1ST HOSP IP/OBS MODERATE 55: CPT | Performed by: OPHTHALMOLOGY

## 2017-10-08 PROCEDURE — 7100000001 HC PACU RECOVERY - ADDTL 15 MIN: Performed by: ORTHOPAEDIC SURGERY

## 2017-10-08 PROCEDURE — 36415 COLL VENOUS BLD VENIPUNCTURE: CPT

## 2017-10-08 PROCEDURE — 80048 BASIC METABOLIC PNL TOTAL CA: CPT

## 2017-10-08 PROCEDURE — 85730 THROMBOPLASTIN TIME PARTIAL: CPT

## 2017-10-08 PROCEDURE — 73552 X-RAY EXAM OF FEMUR 2/>: CPT

## 2017-10-08 PROCEDURE — P9016 RBC LEUKOCYTES REDUCED: HCPCS

## 2017-10-08 PROCEDURE — 3600000004 HC SURGERY LEVEL 4 BASE: Performed by: ORTHOPAEDIC SURGERY

## 2017-10-08 PROCEDURE — 84484 ASSAY OF TROPONIN QUANT: CPT

## 2017-10-08 PROCEDURE — 86923 COMPATIBILITY TEST ELECTRIC: CPT

## 2017-10-08 PROCEDURE — A6454 SELF-ADHER BAND W>=3" <5"/YD: HCPCS | Performed by: ORTHOPAEDIC SURGERY

## 2017-10-08 PROCEDURE — 2580000003 HC RX 258: Performed by: PHYSICIAN ASSISTANT

## 2017-10-08 PROCEDURE — 73502 X-RAY EXAM HIP UNI 2-3 VIEWS: CPT

## 2017-10-08 PROCEDURE — 96375 TX/PRO/DX INJ NEW DRUG ADDON: CPT

## 2017-10-08 PROCEDURE — 2720000010 HC SURG SUPPLY STERILE: Performed by: ORTHOPAEDIC SURGERY

## 2017-10-08 PROCEDURE — 96374 THER/PROPH/DIAG INJ IV PUSH: CPT

## 2017-10-08 PROCEDURE — 86901 BLOOD TYPING SEROLOGIC RH(D): CPT

## 2017-10-08 DEVICE — TRIGEN LOW PROFILE SCREW 5.0MM X 42.5MM
Type: IMPLANTABLE DEVICE | Status: FUNCTIONAL
Brand: TRIGEN

## 2017-10-08 DEVICE — TRIGEN LOW PROFILE SCREW 5.0MM X 45MM
Type: IMPLANTABLE DEVICE | Status: FUNCTIONAL
Brand: TRIGEN

## 2017-10-08 DEVICE — INTERTAN LAG/COMPRESSION SCREW KIT                                    100MM / 95MM
Type: IMPLANTABLE DEVICE | Status: FUNCTIONAL
Brand: TRIGEN

## 2017-10-08 DEVICE — TRIGEN INTERTAN 1.5 13MM X 38CM                                    125DEGREE RIGHT
Type: IMPLANTABLE DEVICE | Status: FUNCTIONAL
Brand: TRIGEN

## 2017-10-08 RX ORDER — ONDANSETRON 2 MG/ML
4 INJECTION INTRAMUSCULAR; INTRAVENOUS EVERY 6 HOURS PRN
Status: DISCONTINUED | OUTPATIENT
Start: 2017-10-08 | End: 2017-10-08 | Stop reason: SDUPTHER

## 2017-10-08 RX ORDER — SUCCINYLCHOLINE CHLORIDE 20 MG/ML
INJECTION INTRAMUSCULAR; INTRAVENOUS PRN
Status: DISCONTINUED | OUTPATIENT
Start: 2017-10-08 | End: 2017-10-08 | Stop reason: SDUPTHER

## 2017-10-08 RX ORDER — ACETAMINOPHEN 325 MG/1
650 TABLET ORAL EVERY 4 HOURS PRN
Status: DISCONTINUED | OUTPATIENT
Start: 2017-10-08 | End: 2017-10-08 | Stop reason: SDUPTHER

## 2017-10-08 RX ORDER — SODIUM CHLORIDE 9 MG/ML
INJECTION, SOLUTION INTRAVENOUS CONTINUOUS
Status: DISCONTINUED | OUTPATIENT
Start: 2017-10-08 | End: 2017-10-09

## 2017-10-08 RX ORDER — SODIUM CHLORIDE 0.9 % (FLUSH) 0.9 %
10 SYRINGE (ML) INJECTION PRN
Status: DISCONTINUED | OUTPATIENT
Start: 2017-10-08 | End: 2017-10-08 | Stop reason: SDUPTHER

## 2017-10-08 RX ORDER — LIDOCAINE HYDROCHLORIDE 20 MG/ML
INJECTION, SOLUTION INFILTRATION; PERINEURAL PRN
Status: DISCONTINUED | OUTPATIENT
Start: 2017-10-08 | End: 2017-10-08

## 2017-10-08 RX ORDER — FENTANYL CITRATE 50 UG/ML
25 INJECTION, SOLUTION INTRAMUSCULAR; INTRAVENOUS EVERY 5 MIN PRN
Status: DISCONTINUED | OUTPATIENT
Start: 2017-10-08 | End: 2017-10-08

## 2017-10-08 RX ORDER — SENNA AND DOCUSATE SODIUM 50; 8.6 MG/1; MG/1
1 TABLET, FILM COATED ORAL DAILY
Status: DISCONTINUED | OUTPATIENT
Start: 2017-10-08 | End: 2017-10-11 | Stop reason: HOSPADM

## 2017-10-08 RX ORDER — SODIUM CHLORIDE 0.9 % (FLUSH) 0.9 %
10 SYRINGE (ML) INJECTION EVERY 12 HOURS SCHEDULED
Status: DISCONTINUED | OUTPATIENT
Start: 2017-10-08 | End: 2017-10-11 | Stop reason: HOSPADM

## 2017-10-08 RX ORDER — SUCCINYLCHOLINE CHLORIDE 20 MG/ML
INJECTION INTRAMUSCULAR; INTRAVENOUS PRN
Status: DISCONTINUED | OUTPATIENT
Start: 2017-10-08 | End: 2017-10-08

## 2017-10-08 RX ORDER — M-VIT,TX,IRON,MINS/CALC/FOLIC 27MG-0.4MG
1 TABLET ORAL DAILY
Status: DISCONTINUED | OUTPATIENT
Start: 2017-10-08 | End: 2017-10-11 | Stop reason: HOSPADM

## 2017-10-08 RX ORDER — MORPHINE SULFATE 4 MG/ML
4 INJECTION, SOLUTION INTRAMUSCULAR; INTRAVENOUS
Status: DISCONTINUED | OUTPATIENT
Start: 2017-10-08 | End: 2017-10-08 | Stop reason: SDUPTHER

## 2017-10-08 RX ORDER — DEXAMETHASONE SODIUM PHOSPHATE 4 MG/ML
INJECTION, SOLUTION INTRA-ARTICULAR; INTRALESIONAL; INTRAMUSCULAR; INTRAVENOUS; SOFT TISSUE PRN
Status: DISCONTINUED | OUTPATIENT
Start: 2017-10-08 | End: 2017-10-08 | Stop reason: SDUPTHER

## 2017-10-08 RX ORDER — DOCUSATE SODIUM 100 MG/1
100 CAPSULE, LIQUID FILLED ORAL 2 TIMES DAILY
Status: DISCONTINUED | OUTPATIENT
Start: 2017-10-08 | End: 2017-10-11 | Stop reason: HOSPADM

## 2017-10-08 RX ORDER — MORPHINE SULFATE 2 MG/ML
2 INJECTION, SOLUTION INTRAMUSCULAR; INTRAVENOUS
Status: DISCONTINUED | OUTPATIENT
Start: 2017-10-08 | End: 2017-10-08 | Stop reason: SDUPTHER

## 2017-10-08 RX ORDER — ONDANSETRON 2 MG/ML
4 INJECTION INTRAMUSCULAR; INTRAVENOUS ONCE
Status: COMPLETED | OUTPATIENT
Start: 2017-10-08 | End: 2017-10-08

## 2017-10-08 RX ORDER — HYDROCODONE BITARTRATE AND ACETAMINOPHEN 5; 325 MG/1; MG/1
2 TABLET ORAL EVERY 4 HOURS PRN
Status: DISCONTINUED | OUTPATIENT
Start: 2017-10-08 | End: 2017-10-08 | Stop reason: SDUPTHER

## 2017-10-08 RX ORDER — SODIUM CHLORIDE 0.9 % (FLUSH) 0.9 %
10 SYRINGE (ML) INJECTION PRN
Status: DISCONTINUED | OUTPATIENT
Start: 2017-10-08 | End: 2017-10-11 | Stop reason: HOSPADM

## 2017-10-08 RX ORDER — HYDROCODONE BITARTRATE AND ACETAMINOPHEN 5; 325 MG/1; MG/1
2 TABLET ORAL EVERY 4 HOURS PRN
Status: DISCONTINUED | OUTPATIENT
Start: 2017-10-08 | End: 2017-10-11 | Stop reason: HOSPADM

## 2017-10-08 RX ORDER — MORPHINE SULFATE 2 MG/ML
4 INJECTION, SOLUTION INTRAMUSCULAR; INTRAVENOUS
Status: DISCONTINUED | OUTPATIENT
Start: 2017-10-08 | End: 2017-10-11 | Stop reason: HOSPADM

## 2017-10-08 RX ORDER — PROPOFOL 10 MG/ML
INJECTION, EMULSION INTRAVENOUS PRN
Status: DISCONTINUED | OUTPATIENT
Start: 2017-10-08 | End: 2017-10-08

## 2017-10-08 RX ORDER — FENTANYL CITRATE 50 UG/ML
INJECTION, SOLUTION INTRAMUSCULAR; INTRAVENOUS PRN
Status: DISCONTINUED | OUTPATIENT
Start: 2017-10-08 | End: 2017-10-08 | Stop reason: SDUPTHER

## 2017-10-08 RX ORDER — OMEGA-3S/DHA/EPA/FISH OIL/D3 300MG-1000
1 CAPSULE ORAL DAILY
Status: DISCONTINUED | OUTPATIENT
Start: 2017-10-08 | End: 2017-10-08 | Stop reason: CLARIF

## 2017-10-08 RX ORDER — FENTANYL CITRATE 50 UG/ML
INJECTION, SOLUTION INTRAMUSCULAR; INTRAVENOUS PRN
Status: DISCONTINUED | OUTPATIENT
Start: 2017-10-08 | End: 2017-10-08

## 2017-10-08 RX ORDER — IPRATROPIUM BROMIDE AND ALBUTEROL SULFATE 2.5; .5 MG/3ML; MG/3ML
1 SOLUTION RESPIRATORY (INHALATION)
Status: DISCONTINUED | OUTPATIENT
Start: 2017-10-08 | End: 2017-10-11 | Stop reason: HOSPADM

## 2017-10-08 RX ORDER — ACETAMINOPHEN 325 MG/1
650 TABLET ORAL EVERY 4 HOURS PRN
Status: DISCONTINUED | OUTPATIENT
Start: 2017-10-08 | End: 2017-10-11 | Stop reason: HOSPADM

## 2017-10-08 RX ORDER — LOSARTAN POTASSIUM 50 MG/1
50 TABLET ORAL DAILY
Status: DISCONTINUED | OUTPATIENT
Start: 2017-10-08 | End: 2017-10-11 | Stop reason: HOSPADM

## 2017-10-08 RX ORDER — FENTANYL CITRATE 50 UG/ML
50 INJECTION, SOLUTION INTRAMUSCULAR; INTRAVENOUS EVERY 5 MIN PRN
Status: DISCONTINUED | OUTPATIENT
Start: 2017-10-08 | End: 2017-10-08

## 2017-10-08 RX ORDER — MORPHINE SULFATE 2 MG/ML
2 INJECTION, SOLUTION INTRAMUSCULAR; INTRAVENOUS
Status: DISCONTINUED | OUTPATIENT
Start: 2017-10-08 | End: 2017-10-11 | Stop reason: HOSPADM

## 2017-10-08 RX ORDER — PROMETHAZINE HYDROCHLORIDE 25 MG/ML
12.5 INJECTION, SOLUTION INTRAMUSCULAR; INTRAVENOUS
Status: DISCONTINUED | OUTPATIENT
Start: 2017-10-08 | End: 2017-10-08

## 2017-10-08 RX ORDER — ONDANSETRON 2 MG/ML
INJECTION INTRAMUSCULAR; INTRAVENOUS PRN
Status: DISCONTINUED | OUTPATIENT
Start: 2017-10-08 | End: 2017-10-08 | Stop reason: SDUPTHER

## 2017-10-08 RX ORDER — LABETALOL HYDROCHLORIDE 5 MG/ML
10 INJECTION, SOLUTION INTRAVENOUS EVERY 10 MIN PRN
Status: DISCONTINUED | OUTPATIENT
Start: 2017-10-08 | End: 2017-10-08

## 2017-10-08 RX ORDER — ONDANSETRON 2 MG/ML
4 INJECTION INTRAMUSCULAR; INTRAVENOUS EVERY 6 HOURS PRN
Status: DISCONTINUED | OUTPATIENT
Start: 2017-10-08 | End: 2017-10-11 | Stop reason: HOSPADM

## 2017-10-08 RX ORDER — ISOSORBIDE MONONITRATE 30 MG/1
30 TABLET, EXTENDED RELEASE ORAL DAILY
Status: DISCONTINUED | OUTPATIENT
Start: 2017-10-08 | End: 2017-10-11 | Stop reason: HOSPADM

## 2017-10-08 RX ORDER — HYDROCODONE BITARTRATE AND ACETAMINOPHEN 5; 325 MG/1; MG/1
1 TABLET ORAL EVERY 4 HOURS PRN
Status: DISCONTINUED | OUTPATIENT
Start: 2017-10-08 | End: 2017-10-11 | Stop reason: HOSPADM

## 2017-10-08 RX ORDER — SODIUM CHLORIDE 0.9 % (FLUSH) 0.9 %
10 SYRINGE (ML) INJECTION EVERY 12 HOURS SCHEDULED
Status: DISCONTINUED | OUTPATIENT
Start: 2017-10-08 | End: 2017-10-08 | Stop reason: SDUPTHER

## 2017-10-08 RX ORDER — POLYVINYL ALCOHOL 14 MG/ML
1 SOLUTION/ DROPS OPHTHALMIC EVERY 4 HOURS PRN
Status: DISCONTINUED | OUTPATIENT
Start: 2017-10-08 | End: 2017-10-11 | Stop reason: HOSPADM

## 2017-10-08 RX ORDER — LACTOBACILLUS RHAMNOSUS GG 10B CELL
1 CAPSULE ORAL
Status: DISCONTINUED | OUTPATIENT
Start: 2017-10-08 | End: 2017-10-11 | Stop reason: HOSPADM

## 2017-10-08 RX ORDER — TAMSULOSIN HYDROCHLORIDE 0.4 MG/1
0.4 CAPSULE ORAL 2 TIMES DAILY
Status: DISCONTINUED | OUTPATIENT
Start: 2017-10-08 | End: 2017-10-11 | Stop reason: HOSPADM

## 2017-10-08 RX ORDER — MORPHINE SULFATE 2 MG/ML
2 INJECTION, SOLUTION INTRAMUSCULAR; INTRAVENOUS ONCE
Status: COMPLETED | OUTPATIENT
Start: 2017-10-08 | End: 2017-10-08

## 2017-10-08 RX ORDER — PANTOPRAZOLE SODIUM 40 MG/1
40 TABLET, DELAYED RELEASE ORAL
Status: DISCONTINUED | OUTPATIENT
Start: 2017-10-08 | End: 2017-10-11 | Stop reason: HOSPADM

## 2017-10-08 RX ORDER — HYDROCODONE BITARTRATE AND ACETAMINOPHEN 5; 325 MG/1; MG/1
1 TABLET ORAL EVERY 4 HOURS PRN
Status: DISCONTINUED | OUTPATIENT
Start: 2017-10-08 | End: 2017-10-08 | Stop reason: SDUPTHER

## 2017-10-08 RX ORDER — PROPOFOL 10 MG/ML
INJECTION, EMULSION INTRAVENOUS PRN
Status: DISCONTINUED | OUTPATIENT
Start: 2017-10-08 | End: 2017-10-08 | Stop reason: SDUPTHER

## 2017-10-08 RX ORDER — ATORVASTATIN CALCIUM 20 MG/1
20 TABLET, FILM COATED ORAL DAILY
Status: DISCONTINUED | OUTPATIENT
Start: 2017-10-08 | End: 2017-10-11 | Stop reason: HOSPADM

## 2017-10-08 RX ORDER — LIDOCAINE HYDROCHLORIDE 20 MG/ML
INJECTION, SOLUTION INFILTRATION; PERINEURAL PRN
Status: DISCONTINUED | OUTPATIENT
Start: 2017-10-08 | End: 2017-10-08 | Stop reason: SDUPTHER

## 2017-10-08 RX ADMIN — DEXAMETHASONE SODIUM PHOSPHATE 8 MG: 4 INJECTION, SOLUTION INTRAMUSCULAR; INTRAVENOUS at 13:51

## 2017-10-08 RX ADMIN — MORPHINE SULFATE 2 MG: 2 INJECTION, SOLUTION INTRAMUSCULAR; INTRAVENOUS at 07:04

## 2017-10-08 RX ADMIN — LIDOCAINE HYDROCHLORIDE 40 MG: 20 INJECTION, SOLUTION INFILTRATION; PERINEURAL at 13:22

## 2017-10-08 RX ADMIN — PROPOFOL 120 MG: 10 INJECTION, EMULSION INTRAVENOUS at 13:22

## 2017-10-08 RX ADMIN — HYDROCODONE BITARTRATE AND ACETAMINOPHEN 2 TABLET: 5; 325 TABLET ORAL at 16:37

## 2017-10-08 RX ADMIN — CEFAZOLIN SODIUM 2 G: 2 SOLUTION INTRAVENOUS at 13:36

## 2017-10-08 RX ADMIN — CEFAZOLIN SODIUM 2 G: 2 SOLUTION INTRAVENOUS at 21:03

## 2017-10-08 RX ADMIN — SODIUM CHLORIDE: 9 INJECTION, SOLUTION INTRAVENOUS at 09:30

## 2017-10-08 RX ADMIN — ONDANSETRON 4 MG: 2 INJECTION INTRAMUSCULAR; INTRAVENOUS at 14:15

## 2017-10-08 RX ADMIN — ATORVASTATIN CALCIUM 20 MG: 20 TABLET, FILM COATED ORAL at 20:47

## 2017-10-08 RX ADMIN — TAMSULOSIN HYDROCHLORIDE 0.4 MG: 0.4 CAPSULE ORAL at 20:47

## 2017-10-08 RX ADMIN — FENTANYL CITRATE 50 MCG: 50 INJECTION INTRAMUSCULAR; INTRAVENOUS at 13:22

## 2017-10-08 RX ADMIN — MORPHINE SULFATE 4 MG: 4 INJECTION INTRAVENOUS at 09:11

## 2017-10-08 RX ADMIN — SUCCINYLCHOLINE CHLORIDE 100 MG: 20 INJECTION, SOLUTION INTRAMUSCULAR; INTRAVENOUS at 13:22

## 2017-10-08 RX ADMIN — SODIUM CHLORIDE: 9 INJECTION, SOLUTION INTRAVENOUS at 16:39

## 2017-10-08 RX ADMIN — DOCUSATE SODIUM 100 MG: 100 CAPSULE ORAL at 20:47

## 2017-10-08 RX ADMIN — HYDROCODONE BITARTRATE AND ACETAMINOPHEN 2 TABLET: 5; 325 TABLET ORAL at 10:15

## 2017-10-08 RX ADMIN — FENTANYL CITRATE 50 MCG: 50 INJECTION INTRAMUSCULAR; INTRAVENOUS at 13:38

## 2017-10-08 RX ADMIN — ONDANSETRON 4 MG: 2 INJECTION INTRAMUSCULAR; INTRAVENOUS at 07:04

## 2017-10-08 ASSESSMENT — PULMONARY FUNCTION TESTS
PIF_VALUE: 1
PIF_VALUE: 12
PIF_VALUE: 13
PIF_VALUE: 13
PIF_VALUE: 14
PIF_VALUE: 16
PIF_VALUE: 16
PIF_VALUE: 2
PIF_VALUE: 13
PIF_VALUE: 16
PIF_VALUE: 17
PIF_VALUE: 0
PIF_VALUE: 2
PIF_VALUE: 12
PIF_VALUE: 16
PIF_VALUE: 13
PIF_VALUE: 1
PIF_VALUE: 17
PIF_VALUE: 16
PIF_VALUE: 18
PIF_VALUE: 1
PIF_VALUE: 12
PIF_VALUE: 13
PIF_VALUE: 11
PIF_VALUE: 14
PIF_VALUE: 16
PIF_VALUE: 12
PIF_VALUE: 0
PIF_VALUE: 17
PIF_VALUE: 2
PIF_VALUE: 1
PIF_VALUE: 23
PIF_VALUE: 16
PIF_VALUE: 0
PIF_VALUE: 0
PIF_VALUE: 16
PIF_VALUE: 17
PIF_VALUE: 2
PIF_VALUE: 6
PIF_VALUE: 17
PIF_VALUE: 14
PIF_VALUE: 12
PIF_VALUE: 13
PIF_VALUE: 17
PIF_VALUE: 2
PIF_VALUE: 0
PIF_VALUE: 12
PIF_VALUE: 14
PIF_VALUE: 17
PIF_VALUE: 2
PIF_VALUE: 16
PIF_VALUE: 17
PIF_VALUE: 16
PIF_VALUE: 1
PIF_VALUE: 0
PIF_VALUE: 14
PIF_VALUE: 12
PIF_VALUE: 0
PIF_VALUE: 12
PIF_VALUE: 0
PIF_VALUE: 2
PIF_VALUE: 3
PIF_VALUE: 13
PIF_VALUE: 2
PIF_VALUE: 16
PIF_VALUE: 0
PIF_VALUE: 0
PIF_VALUE: 12
PIF_VALUE: 19
PIF_VALUE: 18

## 2017-10-08 ASSESSMENT — PAIN SCALES - GENERAL
PAINLEVEL_OUTOF10: 7
PAINLEVEL_OUTOF10: 3
PAINLEVEL_OUTOF10: 10
PAINLEVEL_OUTOF10: 7
PAINLEVEL_OUTOF10: 0
PAINLEVEL_OUTOF10: 4
PAINLEVEL_OUTOF10: 7
PAINLEVEL_OUTOF10: 10
PAINLEVEL_OUTOF10: 10
PAINLEVEL_OUTOF10: 6
PAINLEVEL_OUTOF10: 0
PAINLEVEL_OUTOF10: 6

## 2017-10-08 ASSESSMENT — ENCOUNTER SYMPTOMS
EYE DISCHARGE: 0
ABDOMINAL PAIN: 0
SORE THROAT: 0
SHORTNESS OF BREATH: 1
RHINORRHEA: 0
BACK PAIN: 0
WHEEZING: 0
COUGH: 0
EYE REDNESS: 0
NAUSEA: 0
DIARRHEA: 0
VOMITING: 0
SHORTNESS OF BREATH: 0

## 2017-10-08 ASSESSMENT — PAIN DESCRIPTION - LOCATION
LOCATION: HIP

## 2017-10-08 ASSESSMENT — PAIN DESCRIPTION - ORIENTATION
ORIENTATION: RIGHT

## 2017-10-08 ASSESSMENT — PAIN DESCRIPTION - DESCRIPTORS
DESCRIPTORS: ACHING
DESCRIPTORS: SHARP;ACHING

## 2017-10-08 ASSESSMENT — PAIN DESCRIPTION - FREQUENCY: FREQUENCY: CONTINUOUS

## 2017-10-08 ASSESSMENT — PAIN DESCRIPTION - PAIN TYPE
TYPE: SURGICAL PAIN
TYPE: SURGICAL PAIN

## 2017-10-08 NOTE — H&P
History and Physical        CHIEF COMPLAINT:  Right Hip Pain    HISTORY OF PRESENT ILLNESS:      The patient is a 80 y.o. male  who presents with acute onset of right hip pain secondary to mechanical type fall. Reported that the patient fell around 4am this morning in the kitchen of his residence. Patient unfortunately is still healing a left intertrochanteric hip fracture that was fixed approximately 6 weeks ago by Dr Kena Veras therefore he has been a bit unsteady but ambulating with a cane. After the fall he had pain in the right hip and was unable to weight-bear. Patient brought to [facility] ED where further workup and radiologic imaging revealed a minimally displaced right intertrochanteric hip fracture. Patient denies any head trauma, LOC, or other injuries at this time. Patient admitted for definitive care.     Past Medical History:    Past Medical History:   Diagnosis Date    Acute sinusitis     Angina pectoris ([address]     Anxiety disorder 10/27/2016    Arthritis     osteoporosis    BPH with urinary obstruction     CAD (coronary artery disease)     Chronic insomnia     CKD (chronic kidney disease) stage 3, GFR 30-59 ml/min     Gastroenteritis     HCAP (healthcare-associated pneumonia) 4/29/2015    Heart disease     HLD (hyperlipidemia)     [place] (hard of hearing)     wear hearing aids    Hypertension     Kidney disease     40% kdiney function    Kidney stone     years ago 15-20    NICOLAS on CPAP     Osteopenia determined by x-ray     Pacemaker 2011    Pinched nerve     slipped disc in back    Visual problems     Vitamin D deficiency        Past Surgical History:    Past Surgical History:   Procedure Laterality Date    ABDOMINAL HERNIA REPAIR  04/27/2017    incisional hernia repair--Dr. Lidia Mcgowan    CATARACT REMOVAL WITH IMPLANT      bilateral    COLONOSCOPY  2010    CORONARY ANGIOPLASTY WITH STENT PLACEMENT     Luis Bates DENTAL SURGERY  1960's    EKG 12-LEAD  4/29/2015         EYE SURGERY      cataracts  HERNIA REPAIR      several    HIP PINNING Left 8/31/2017    LEFT HIP INTERTAN performed by Domonique Campbell MD at [address]  12/3/12    left     MYRINGOTOMY AND TYMPANOSTOMY TUBE PLACEMENT  7/23/13    left     NASAL SINUS SURGERY      OTHER SURGICAL HISTORY  04/27/2015    transurethral Incision bladder neck    PACEMAKER INSERTION      PACEMAKER PLACEMENT  2011    TURP  4/17/2013    W/CYSTO WITH DIRECT VISION URETHROTOMY & RESECTION BLADDER NECK CONTRACTURE    TURP  4/27/15    re-do TURP    TYMPANOSTOMY TUBE PLACEMENT      multiple surgeries       Medications Prior to Admission:   Prior to Admission medications    Medication Sig Start Date End Date Taking? Authorizing Provider   tobramycin-dexamethasone (TOBRADEX) 0.3-0.1 % ophthalmic ointment 3 times daily. 10/6/17 10/13/17  Bessie Harris PA-C   sertraline (ZOLOFT) 50 MG tablet TAKE ONE TABLET BY MOUTH ONCE DAILY 9/15/17   Jhonny De Oliveira MD   lactobacillus (CULTURELLE) capsule Take 1 capsule by mouth 3 times daily (with meals) Take while on the Ciprofloxacin. 9/15/17   Jhonny De Oliveira MD   losartan (COZAAR) 50 MG tablet Take 1 tablet by mouth daily 9/15/17   Jhonny De Oliveira MD   senna-docusate (DOK PLUS) 8.6-50 MG per tablet TAKE ONE TABLET BY MOUTH ONCE DAILY 9/15/17   Jhonny De Oliveira MD   Omega-3 Fatty Acids (FISH OIL BURP-LESS) 1200 MG CAPS Take 1 tablet by mouth daily Krill oil 9/15/17   Jhonny De Oliveira MD   pantoprazole (PROTONIX) 40 MG tablet Take 1 tablet by mouth every morning (before breakfast) for 6 days Take while on Lovenox. 9/16/17 9/22/17  Jhonny De Oliveira MD   tamsulosin [facility]) 0.4 MG capsule Take 1 capsule by mouth 2 times daily 9/15/17 9/15/18  Jhonny De Oliveira MD   metoprolol tartrate (LOPRESSOR) 25 MG tablet Take 0.5 tablets by mouth daily Substitute for atenolol.  9/15/17   Jhonny De Oliveira MD   atorvastatin (LIPITOR) 20 MG tablet Take 20 mg by mouth daily    Historical Provider,

## 2017-10-08 NOTE — ED PROVIDER NOTES
[facility]  eMERGENCY dEPARTMENT eNCOUnter          [address] [facility]       Chief Complaint   Patient presents with    Fall    Hip Pain     right       Nurses Notes reviewed and I agree except as noted in the HPI. HISTORY OF PRESENT ILLNESS    Eric Correia is a 80 y.o. male who presents to the Emergency Department for the evaluation of right hip pain. The patient states that he was walking in his kitchen earlier this morning using his walker when he fell down. He denies tripping on any object and states that he \"just fell. \" He states he currently has right hip pain. He denies neck pain, loss of consciousness, headache, chest pain, shortness of breath, or any other symptom      The HPI was provided by the patient. REVIEW OF SYSTEMS     Review of Systems   Constitutional: Negative for appetite change, chills, fatigue and fever. HENT: Negative for congestion, ear pain, rhinorrhea and sore throat. Eyes: Negative for discharge, redness and visual disturbance. Respiratory: Negative for cough, shortness of breath and wheezing. Cardiovascular: Negative for chest pain, palpitations and leg swelling. Gastrointestinal: Negative for abdominal pain, diarrhea, nausea and vomiting. Genitourinary: Negative for decreased urine volume, difficulty urinating and dysuria. Musculoskeletal: Positive for arthralgias (right hip pain). Negative for back pain, joint swelling and neck pain. Allergic/Immunologic: Negative for environmental allergies. Neurological: Negative for dizziness, syncope, weakness, light-headedness and headaches. Hematological: Negative for adenopathy. Psychiatric/Behavioral: Negative for agitation, confusion, dysphoric mood and suicidal ideas. The patient is not nervous/anxious. PAST MEDICAL HISTORY    has a past medical history of Acute sinusitis; Angina pectoris ([address] Anxiety disorder;  Arthritis; BPH with urinary obstruction; CAD (coronary artery disease); Take 1 tablet by mouth every morning (before breakfast) for 6 days Take while on Lovenox. Qty: 30 tablet, Refills: 3      tamsulosin (FLOMAX) 0.4 MG capsule Take 1 capsule by mouth 2 times daily  Qty: 180 capsule, Refills: 3    Associated Diagnoses: BPH (benign prostatic hypertrophy)      metoprolol tartrate (LOPRESSOR) 25 MG tablet Take 0.5 tablets by mouth daily Substitute for atenolol. Qty: 30 tablet, Refills: 3      atorvastatin (LIPITOR) 20 MG tablet Take 20 mg by mouth daily      KRILL OIL PO Take 1,000 mg by mouth daily      Cholecalciferol (VITAMIN D PO) Take 2,000 Units by mouth daily      Coenzyme Q10 (COQ-10 PO) Take 10 mg by mouth daily      TURMERIC PO Take 1,000 mg by mouth daily      NONFORMULARY Take 2 capsules by mouth daily ARthro -7 for joint pain      aspirin 81 MG tablet Take 81 mg by mouth daily      clopidogrel (PLAVIX) 75 MG tablet Take 75 mg by mouth every other day      Multiple Vitamins-Minerals (THERAPEUTIC MULTIVITAMIN-MINERALS) tablet Take 1 tablet by mouth daily      isosorbide mononitrate (IMDUR) 30 MG CR tablet Take 30 mg by mouth daily. ALLERGIES     is allergic to iodine. FAMILY HISTORY     indicated that his mother is . He indicated that his father is . He indicated that the status of his sister is unknown. He indicated that two of his three brothers are . He indicated that both of his jenna are alive. family history includes Arthritis in his sister; Cancer (age of onset: 72) in his brother; Diabetes in his brother and brother; Early Death (age of onset: 72) in his brother; Heart Disease in his sister; High Blood Pressure in his sister; High Cholesterol in his sister; Kidney Disease in his child, child, and father; Stroke in his father. SOCIAL HISTORY      reports that he quit smoking about 51 years ago. His smoking use included Cigarettes. He has a 20.00 pack-year smoking history.  He has never used smokeless tobacco. He reports that he drinks alcohol. He reports that he does not use drugs. PHYSICAL EXAM     INITIAL VITALS:  oral temperature is 97.6 °F (36.4 °C) (pended). His blood pressure is 161/76 (abnormal, pended) and his pulse is 76 (pended). His respiration is 16 (pended) and oxygen saturation is 94%. Physical Exam   Constitutional: He is oriented to person, place, and time. He appears well-developed and well-nourished. No distress. HENT:   Head: Normocephalic and atraumatic. Right Ear: External ear normal.   Left Ear: External ear normal.   Nose: Nose normal.   Mouth/Throat: Oropharynx is clear and moist. No oropharyngeal exudate. Eyes: Conjunctivae and EOM are normal. Pupils are equal, round, and reactive to light. Right eye exhibits no discharge. Left eye exhibits no discharge. No scleral icterus. Neck: Normal range of motion. Neck supple. No JVD present. No tracheal deviation present. Cardiovascular: Normal rate, regular rhythm, normal heart sounds and intact distal pulses. Exam reveals no gallop and no friction rub. No murmur heard. Pulmonary/Chest: Effort normal and breath sounds normal. He has no wheezes. He has no rales. Abdominal: Soft. Bowel sounds are normal. He exhibits no mass. There is no tenderness. There is no rebound and no guarding. Musculoskeletal: He exhibits no edema or tenderness. Right hip: He exhibits decreased range of motion (secondary to pain). Left hip: Normal.        Right knee: Normal.        Left knee: Normal.   Patient had good sensation in his lower extremities    Lymphadenopathy:     He has no cervical adenopathy. Neurological: He is alert and oriented to person, place, and time. He has normal reflexes. No cranial nerve deficit. He exhibits normal muscle tone. Coordination normal.   Skin: Skin is warm and dry. No rash noted. He is not diaphoretic. Psychiatric: He has a normal mood and affect.  His behavior is normal. Judgment and thought content normal.   Nursing understanding that I was a Mid-Level Provider and was in agreement with being seen independently by myself. Scribe:  Tereza Abhishek Hickman10/8/17 6:39 AM Scribing for and in the presence of Brianna Tellez PA-C    Signed by: Romana Suggs, 10/08/17 9:25 AM    Provider:  I personally performed the services described in the documentation, reviewed and edited the documentation which was dictated to the scribe in my presence, and it accurately records my words and actions.     Brianna Tellez PA-C 10/8/17 9:25 AM        FARHANA Spencer  10/08/17 [id number]

## 2017-10-08 NOTE — OP NOTE
[address] [place] [address]                               OPERATIVE REPORT    PATIENT NAME: Eduin Kaur                                  :       1931  MED REC NO:   [id number]                                      ROOM:      0012  ACCOUNT NO:   [de-identified]                                      ADMISSION  DATE:  10/08/2017  PROVIDER:     Teresa Dess. Candance Blonder, M.D.    [facility]:  10/08/2017        PRINCIPAL DIAGNOSIS:  Displaced comminuted right intertrochanteric hip  fracture. POSTOPERATIVE DIAGNOSIS:  Displaced comminuted right intertrochanteric  hip fracture. PROCEDURE:  Right InterTan nailing with proximal and distal locking  screws. This is a 13 mm x 380 cm margot with proximal and distal locking  screws. SURGEON:  Teresa Dess. Candance Blonder, M.D. First Assistant:  Simona Bacon PA-C    COMPLICATIONS:  None. ANESTHESIA:  General.    CONDITION TO RECOVERY:  Good. SPONGE:  Correct. The patient was given 2 gm of Ancef prior to surgery. INDICATION FOR PROCEDURE:  The patient is an 51-year-old white male  who approximately 6 weeks ago was status post fall and had a left  intertrochanteric hip fracture treated with a left InterTan nailing  with proximal and distal locking screws. Unfortunately, last evening,  he went to the kitchen, fell. He was evaluated. In the emergency  room, noted to have a displaced right intertrochanteric hip fracture. Preoperative cleared. I recommended a right InterTan nailing with  proximal and distal locking screws. The patient agreed to have the  above-mentioned procedure and understood the risks, benefits, desired  to have the above-mentioned procedure, and understood the pre and  postop protocols. Patient consented for the above-mentioned procedure, risks and  benefits for the above-noted procedure.   Family agreed and the plan is  to proceed with a right InterTan nailing with

## 2017-10-08 NOTE — ANESTHESIA PRE PROCEDURE
URETHROTOMY & RESECTION BLADDER NECK CONTRACTURE    TURP  4/27/15    re-do TURP    TYMPANOSTOMY TUBE PLACEMENT      multiple surgeries       Social History:    Social History   Substance Use Topics    Smoking status: Former Smoker     Packs/day: 1.00     Years: 20.00     Types: Cigarettes     Quit date: 11/17/1965    Smokeless tobacco: Never Used      Comment: pt use to smoke cigars and pipe    Alcohol use 0.0 oz/week      Comment: 2-3 beers once a week                                Counseling given: Not Answered      Vital Signs (Current):   Vitals:    10/08/17 0654 10/08/17 0850 10/08/17 0945 10/08/17 1200   BP: (!) 149/82 (!) 161/76  (!) 165/74   Pulse: 71 76  80   Resp: 17 16  16   Temp: 98.2 °F (36.8 °C) 97.6 °F (36.4 °C)  98.2 °F (36.8 °C)   TempSrc: Oral Oral  Oral   SpO2: 94%   96%   Weight:   145 lb (65.8 kg)    Height:   5' 6\" (1.676 m)                                               BP Readings from Last 3 Encounters:   10/08/17 (!) 165/74   10/06/17 (!) 166/83   09/26/17 130/72       NPO Status:                                                                                 BMI:   Wt Readings from Last 3 Encounters:   10/08/17 145 lb (65.8 kg)   10/06/17 150 lb (68 kg)   09/26/17 152 lb 6.4 oz (69.1 kg)     Body mass index is 23.4 kg/m².     CBC:   Lab Results   Component Value Date    WBC 6.1 10/08/2017    RBC 3.38 10/08/2017    HGB 10.2 10/08/2017    HCT 30.2 10/08/2017    MCV 89.2 10/08/2017    RDW 18.0 10/08/2017     10/08/2017       CMP:   Lab Results   Component Value Date     10/08/2017    K 4.2 10/08/2017     10/08/2017    CO2 23 10/08/2017    BUN 29 10/08/2017    CREATININE 1.3 10/08/2017    LABGLOM 52 10/08/2017    GLUCOSE 95 10/08/2017    GLUCOSE 101 03/02/2016    PROT 6.4 09/08/2017    CALCIUM 9.2 10/08/2017    BILITOT 0.6 09/08/2017    ALKPHOS 89 09/08/2017    AST 36 09/08/2017    ALT 30 09/08/2017       POC Tests: No results for input(s): POCGLU, POCNA, POCK, POCCL,

## 2017-10-08 NOTE — BRIEF OP NOTE
Brief Postoperative Note  ______________________________________________________________    Patient: Dewayne Llamas  YOB: 1931  MRN: [id number]  Date of Procedure: 10/8/2017    Pre-Op Diagnosis: right hip fracture    Post-Op Diagnosis: Same       Procedure(s):  Right hip intertan    Anesthesia: General    Surgeon(s):  Sondra Grey MD    Staff:  Lorenzo Driscoll Person First: Chino Cruz  Physician Assistant: FARHANA Apple     Estimated Blood Loss: * No values recorded between 10/8/2017  1:16 PM and 10/8/2017  9:16 PM *    Complications: None    Specimens:   * No specimens in log *    Implants:    Implant Name Type Inv.  Item Serial No.  Lot No. LRB No. Used   SABINE INTER 1.5MM BOW [id number] 125D RT Leg/Ankle/Foot/Toe SABINE INTER 1.5MM BOW [id number] 125D RT  MIN  [id number] Right 1         Drains:   Urethral Catheter Double-lumen 18 fr (Active)   Catheter Indications Other (specify) 10/8/2017  7:52 AM   Urine Color Yellow 10/8/2017 10:20 AM   Urine Appearance Clear 10/8/2017 10:20 AM   Provider Notified to Remove Yes 10/8/2017  7:52 AM       [REMOVED] Urethral Catheter Latex (Removed)   Removed 09/06/17 1330   Catheter Indications Acute urinary retention/obstruction 9/6/2017  5:37 AM   Site Assessment Pink 9/6/2017  5:37 AM   Urine Color Yellow 9/6/2017  9:12 AM   Urine Appearance Clear 9/6/2017  9:12 AM   Output (mL) 300 mL 9/6/2017  2:14 PM       [REMOVED] Urethral Catheter Coude 14 fr (Removed)   Removed 09/13/17 1206   $ Urethral catheter insertion $ Not inserted for procedure 9/8/2017  2:12 PM   Catheter Indications Acute urinary retention/obstruction 9/11/2017 10:08 AM   Site Assessment Pink 9/13/2017 12:06 PM   Urine Color Yellow 9/13/2017 12:06 PM   Urine Appearance Clear 9/13/2017 12:06 PM   Urine Odor Malodorous 9/10/2017  5:11 AM   Output (mL) 1000 mL 9/13/2017 12:06 PM       Findings: right hip fx    Sondra Grey MD  Date: 10/8/2017  Time: 2:12 PM

## 2017-10-08 NOTE — CONSULTS
History & Physical        Patient:  Prince Bajwa  YOB: 1931    MRN: [id number]   Acct:  [de-identified]   Primary Care Physician: Shital Taylor DO       Chief Complaint:  fall    History of Present Illness:   History obtained from chart review, the patient and family. The patient is a 80 y.o. male who presented to [address] [place] [address] with fall. Patient with history of CAD S/P PTCA more than 10 years ago. No chest pain. No SOB. He had Left hip fx few weeks ago S/P repair with no problems. He was using his walker today at home when he fell. And landed on the floor. No LOC, no head trauma. He does not use his CPAP.  No home O2    Past Medical History:        Diagnosis Date    Acute sinusitis     Angina pectoris (HCC)     Anxiety disorder 10/27/2016    Arthritis     osteoporosis    BPH with urinary obstruction     CAD (coronary artery disease)     Chronic insomnia     CKD (chronic kidney disease) stage 3, GFR 30-59 ml/min     Gastroenteritis     HCAP (healthcare-associated pneumonia) 4/29/2015    Heart disease     HLD (hyperlipidemia)     [place] (hard of hearing)     wear hearing aids    Hypertension     Kidney disease     40% kdiney function    Kidney stone     years ago 15-20    NICOLAS on CPAP     Osteopenia determined by x-ray     Pacemaker 2011    Pinched nerve     slipped disc in back    Visual problems     Vitamin D deficiency        Past Surgical History:        Procedure Laterality Date    ABDOMINAL HERNIA REPAIR  04/27/2017    incisional hernia repair--Dr. Edgar Cedeno    CATARACT REMOVAL WITH IMPLANT      bilateral    COLONOSCOPY  2010    CORONARY ANGIOPLASTY WITH STENT PLACEMENT     Noralyn Oms DENTAL SURGERY  1960's    EKG 12-LEAD  4/29/2015         EYE SURGERY      cataracts    HERNIA REPAIR      several    HIP PINNING Left 8/31/2017    LEFT HIP INTERTAN performed by Vangie Guaman MD at [address]  12/3/12    left     MYRINGOTOMY AND TYMPANOSTOMY TUBE PLACEMENT  7/23/13    left     NASAL SINUS SURGERY      OTHER SURGICAL HISTORY  04/27/2015    transurethral Incision bladder neck    PACEMAKER INSERTION      PACEMAKER PLACEMENT  2011    TURP  4/17/2013    W/CYSTO WITH DIRECT VISION URETHROTOMY & RESECTION BLADDER NECK CONTRACTURE    TURP  4/27/15    re-do TURP    TYMPANOSTOMY TUBE PLACEMENT      multiple surgeries       Home Medications:    Prior to Admission medications    Medication Sig Start Date End Date Taking? Authorizing Provider   tobramycin-dexamethasone (TOBRADEX) 0.3-0.1 % ophthalmic ointment 3 times daily. 10/6/17 10/13/17  Dl Chapman PA-C   sertraline (ZOLOFT) 50 MG tablet TAKE ONE TABLET BY MOUTH ONCE DAILY 9/15/17   Milana Peguero MD   lactobacillus (CULTURELLE) capsule Take 1 capsule by mouth 3 times daily (with meals) Take while on the Ciprofloxacin. 9/15/17   Milana Peguero MD   losartan (COZAAR) 50 MG tablet Take 1 tablet by mouth daily 9/15/17   Milana Peguero MD   senna-docusate (DOK PLUS) 8.6-50 MG per tablet TAKE ONE TABLET BY MOUTH ONCE DAILY 9/15/17   Milana Peguero MD   Omega-3 Fatty Acids (FISH OIL BURP-LESS) 1200 MG CAPS Take 1 tablet by mouth daily Krill oil 9/15/17   Milana Peguero MD   pantoprazole (PROTONIX) 40 MG tablet Take 1 tablet by mouth every morning (before breakfast) for 6 days Take while on Lovenox. 9/16/17 9/22/17  Milana Peguero MD   tamsulosin [facility]) 0.4 MG capsule Take 1 capsule by mouth 2 times daily 9/15/17 9/15/18  Milana Peguero MD   metoprolol tartrate (LOPRESSOR) 25 MG tablet Take 0.5 tablets by mouth daily Substitute for atenolol.  9/15/17   Milana Peguero MD   atorvastatin (LIPITOR) 20 MG tablet Take 20 mg by mouth daily    Historical Provider, MD   KRILL OIL PO Take 1,000 mg by mouth daily    Historical Provider, MD   Cholecalciferol (VITAMIN D PO) Take 2,000 Units by mouth daily    Historical Provider, MD   Coenzyme Q10 (COQ-10 PO) Take 10 mg by

## 2017-10-08 NOTE — ANESTHESIA POSTPROCEDURE EVALUATION
Temp:98.4 °F (36.9 °C), Temp src:Temporal, Pulse:90, Resp:12, SpO2:99 %    Level of Consciousness:  Awake    Respiratory:  Stable    Oxygen Saturation:  Stable    Cardiovascular:  Stable    Hydration:  Adequate    PONV:  Stable    Post-op Pain:  Adequate analgesia    Post-op Assessment:  No apparent anesthetic complications    Additional Follow-Up / Treatment / Comment:  None    Tatum Willard DO  October 8, 2017   4:13 PM

## 2017-10-09 LAB
ANION GAP SERPL CALCULATED.3IONS-SCNC: 11 MEQ/L (ref 8–16)
ANISOCYTOSIS: ABNORMAL
BASOPHILS # BLD: 0.3 %
BASOPHILS ABSOLUTE: 0 THOU/MM3 (ref 0–0.1)
BUN BLDV-MCNC: 25 MG/DL (ref 7–22)
CALCIUM SERPL-MCNC: 8.6 MG/DL (ref 8.5–10.5)
CHLORIDE BLD-SCNC: 102 MEQ/L (ref 98–111)
CO2: 25 MEQ/L (ref 23–33)
CREAT SERPL-MCNC: 1.1 MG/DL (ref 0.4–1.2)
EOSINOPHIL # BLD: 0.1 %
EOSINOPHILS ABSOLUTE: 0 THOU/MM3 (ref 0–0.4)
GFR SERPL CREATININE-BSD FRML MDRD: 63 ML/MIN/1.73M2
GLUCOSE BLD-MCNC: 126 MG/DL (ref 70–108)
HCT VFR BLD CALC: 24.1 % (ref 42–52)
HEMOGLOBIN: 8.1 GM/DL (ref 14–18)
LYMPHOCYTES # BLD: 11.2 %
LYMPHOCYTES ABSOLUTE: 0.9 THOU/MM3 (ref 1–4.8)
MCH RBC QN AUTO: 29.9 PG (ref 27–31)
MCHC RBC AUTO-ENTMCNC: 33.6 GM/DL (ref 33–37)
MCV RBC AUTO: 89 FL (ref 80–94)
MONOCYTES # BLD: 13 %
MONOCYTES ABSOLUTE: 1 THOU/MM3 (ref 0.4–1.3)
NUCLEATED RED BLOOD CELLS: 0 /100 WBC
PDW BLD-RTO: 17.3 % (ref 11.5–14.5)
PLATELET # BLD: 202 THOU/MM3 (ref 130–400)
PMV BLD AUTO: 7.2 MCM (ref 7.4–10.4)
POTASSIUM SERPL-SCNC: 4.5 MEQ/L (ref 3.5–5.2)
RBC # BLD: 2.71 MILL/MM3 (ref 4.7–6.1)
RBC # BLD: NORMAL 10*6/UL
SEG NEUTROPHILS: 75.4 %
SEGMENTED NEUTROPHILS ABSOLUTE COUNT: 5.7 THOU/MM3 (ref 1.8–7.7)
SODIUM BLD-SCNC: 138 MEQ/L (ref 135–145)
WBC # BLD: 7.6 THOU/MM3 (ref 4.8–10.8)

## 2017-10-09 PROCEDURE — 97110 THERAPEUTIC EXERCISES: CPT

## 2017-10-09 PROCEDURE — 97162 PT EVAL MOD COMPLEX 30 MIN: CPT

## 2017-10-09 PROCEDURE — 94640 AIRWAY INHALATION TREATMENT: CPT

## 2017-10-09 PROCEDURE — 1200000000 HC SEMI PRIVATE

## 2017-10-09 PROCEDURE — 85025 COMPLETE CBC W/AUTO DIFF WBC: CPT

## 2017-10-09 PROCEDURE — 97530 THERAPEUTIC ACTIVITIES: CPT

## 2017-10-09 PROCEDURE — 6370000000 HC RX 637 (ALT 250 FOR IP): Performed by: OPHTHALMOLOGY

## 2017-10-09 PROCEDURE — 2700000000 HC OXYGEN THERAPY PER DAY

## 2017-10-09 PROCEDURE — G8978 MOBILITY CURRENT STATUS: HCPCS

## 2017-10-09 PROCEDURE — 6360000002 HC RX W HCPCS: Performed by: ORTHOPAEDIC SURGERY

## 2017-10-09 PROCEDURE — 36415 COLL VENOUS BLD VENIPUNCTURE: CPT

## 2017-10-09 PROCEDURE — 6370000000 HC RX 637 (ALT 250 FOR IP): Performed by: HOSPITALIST

## 2017-10-09 PROCEDURE — 97166 OT EVAL MOD COMPLEX 45 MIN: CPT

## 2017-10-09 PROCEDURE — 80048 BASIC METABOLIC PNL TOTAL CA: CPT

## 2017-10-09 PROCEDURE — 99233 SBSQ HOSP IP/OBS HIGH 50: CPT | Performed by: HOSPITALIST

## 2017-10-09 PROCEDURE — 2580000003 HC RX 258: Performed by: ORTHOPAEDIC SURGERY

## 2017-10-09 PROCEDURE — 6370000000 HC RX 637 (ALT 250 FOR IP): Performed by: ORTHOPAEDIC SURGERY

## 2017-10-09 PROCEDURE — 6370000000 HC RX 637 (ALT 250 FOR IP): Performed by: PHYSICIAN ASSISTANT

## 2017-10-09 PROCEDURE — G8979 MOBILITY GOAL STATUS: HCPCS

## 2017-10-09 RX ORDER — SULFACETAMIDE SODIUM 100 MG/ML
1 SOLUTION/ DROPS OPHTHALMIC
Status: DISCONTINUED | OUTPATIENT
Start: 2017-10-09 | End: 2017-10-11 | Stop reason: HOSPADM

## 2017-10-09 RX ORDER — ASPIRIN 81 MG/1
81 TABLET ORAL DAILY
Status: DISCONTINUED | OUTPATIENT
Start: 2017-10-09 | End: 2017-10-11 | Stop reason: HOSPADM

## 2017-10-09 RX ORDER — CLOPIDOGREL BISULFATE 75 MG/1
75 TABLET ORAL EVERY OTHER DAY
Status: DISCONTINUED | OUTPATIENT
Start: 2017-10-09 | End: 2017-10-11 | Stop reason: HOSPADM

## 2017-10-09 RX ADMIN — Medication 1 CAPSULE: at 08:46

## 2017-10-09 RX ADMIN — IPRATROPIUM BROMIDE AND ALBUTEROL SULFATE 1 AMPULE: .5; 3 SOLUTION RESPIRATORY (INHALATION) at 20:00

## 2017-10-09 RX ADMIN — METOPROLOL TARTRATE 12.5 MG: 25 TABLET ORAL at 08:47

## 2017-10-09 RX ADMIN — ATORVASTATIN CALCIUM 20 MG: 20 TABLET, FILM COATED ORAL at 20:39

## 2017-10-09 RX ADMIN — HYDROCODONE BITARTRATE AND ACETAMINOPHEN 2 TABLET: 5; 325 TABLET ORAL at 00:01

## 2017-10-09 RX ADMIN — SERTRALINE 50 MG: 50 TABLET, FILM COATED ORAL at 08:47

## 2017-10-09 RX ADMIN — IPRATROPIUM BROMIDE AND ALBUTEROL SULFATE 1 AMPULE: .5; 3 SOLUTION RESPIRATORY (INHALATION) at 17:56

## 2017-10-09 RX ADMIN — CEFAZOLIN SODIUM 2 G: 2 SOLUTION INTRAVENOUS at 05:49

## 2017-10-09 RX ADMIN — MULTIPLE VITAMINS W/ MINERALS TAB 1 TABLET: TAB at 08:46

## 2017-10-09 RX ADMIN — LOSARTAN POTASSIUM 50 MG: 50 TABLET, FILM COATED ORAL at 08:47

## 2017-10-09 RX ADMIN — Medication 10 ML: at 20:38

## 2017-10-09 RX ADMIN — TAMSULOSIN HYDROCHLORIDE 0.4 MG: 0.4 CAPSULE ORAL at 20:38

## 2017-10-09 RX ADMIN — HYDROCODONE BITARTRATE AND ACETAMINOPHEN 2 TABLET: 5; 325 TABLET ORAL at 11:36

## 2017-10-09 RX ADMIN — ISOSORBIDE MONONITRATE 30 MG: 30 TABLET ORAL at 20:39

## 2017-10-09 RX ADMIN — DOCUSATE SODIUM AND SENNOSIDES 1 TABLET: 8.6; 5 TABLET, FILM COATED ORAL at 08:47

## 2017-10-09 RX ADMIN — PANTOPRAZOLE SODIUM 40 MG: 40 TABLET, DELAYED RELEASE ORAL at 08:47

## 2017-10-09 RX ADMIN — HYDROCODONE BITARTRATE AND ACETAMINOPHEN 2 TABLET: 5; 325 TABLET ORAL at 20:37

## 2017-10-09 RX ADMIN — IPRATROPIUM BROMIDE AND ALBUTEROL SULFATE 1 AMPULE: .5; 3 SOLUTION RESPIRATORY (INHALATION) at 13:42

## 2017-10-09 RX ADMIN — CLOPIDOGREL 75 MG: 75 TABLET, FILM COATED ORAL at 20:37

## 2017-10-09 RX ADMIN — HYDROCODONE BITARTRATE AND ACETAMINOPHEN 2 TABLET: 5; 325 TABLET ORAL at 05:51

## 2017-10-09 RX ADMIN — ASPIRIN 81 MG: 81 TABLET ORAL at 20:37

## 2017-10-09 RX ADMIN — IPRATROPIUM BROMIDE AND ALBUTEROL SULFATE 1 AMPULE: .5; 3 SOLUTION RESPIRATORY (INHALATION) at 10:10

## 2017-10-09 RX ADMIN — ENOXAPARIN SODIUM 40 MG: 40 INJECTION SUBCUTANEOUS at 08:54

## 2017-10-09 RX ADMIN — DOCUSATE SODIUM 100 MG: 100 CAPSULE ORAL at 20:39

## 2017-10-09 RX ADMIN — HYDROCODONE BITARTRATE AND ACETAMINOPHEN 2 TABLET: 5; 325 TABLET ORAL at 15:41

## 2017-10-09 RX ADMIN — DOCUSATE SODIUM 100 MG: 100 CAPSULE ORAL at 08:46

## 2017-10-09 RX ADMIN — TAMSULOSIN HYDROCHLORIDE 0.4 MG: 0.4 CAPSULE ORAL at 08:47

## 2017-10-09 RX ADMIN — VITAMIN D, TAB 1000IU (100/BT) 2000 UNITS: 25 TAB at 08:46

## 2017-10-09 ASSESSMENT — PAIN DESCRIPTION - DESCRIPTORS
DESCRIPTORS: ACHING
DESCRIPTORS: ACHING

## 2017-10-09 ASSESSMENT — PAIN DESCRIPTION - PAIN TYPE
TYPE: SURGICAL PAIN

## 2017-10-09 ASSESSMENT — ENCOUNTER SYMPTOMS
PHOTOPHOBIA: 0
EYE ITCHING: 0
COLOR CHANGE: 0

## 2017-10-09 ASSESSMENT — PAIN SCALES - GENERAL
PAINLEVEL_OUTOF10: 7
PAINLEVEL_OUTOF10: 0
PAINLEVEL_OUTOF10: 7
PAINLEVEL_OUTOF10: 0
PAINLEVEL_OUTOF10: 7

## 2017-10-09 ASSESSMENT — PAIN DESCRIPTION - ORIENTATION
ORIENTATION: RIGHT

## 2017-10-09 ASSESSMENT — PAIN DESCRIPTION - ONSET: ONSET: ON-GOING

## 2017-10-09 ASSESSMENT — PAIN DESCRIPTION - FREQUENCY: FREQUENCY: CONTINUOUS

## 2017-10-09 ASSESSMENT — PAIN DESCRIPTION - LOCATION
LOCATION: HIP

## 2017-10-09 ASSESSMENT — PAIN DESCRIPTION - PROGRESSION: CLINICAL_PROGRESSION: GRADUALLY IMPROVING

## 2017-10-09 NOTE — PROGRESS NOTES
Orthopaedic Progress Note      SUBJECTIVE   Mr. Dianna Judd is post op day # 1     Patient s/p IM nailing right hip. Patient seen resting in bed. Pain improving and adequately controlled. No new problems. OBJECTIVE      Physical    VITALS:  /65   Pulse 81   Temp 97.7 °F (36.5 °C) (Oral)   Resp 16   Ht 5' 6\" (1.676 m)   Wt 145 lb (65.8 kg)   SpO2 93%   BMI 23.40 kg/m²   I/O last 3 completed shifts: In: 2994.6 [P.O.:720; I.V.:2274.6]  Out: 550 [Urine:550]      RLE:  Dressing dry and intact without drainage noted. ROM 0-30 degrees with no discomfort. Calf soft nontender. ROM ankle/toes intact without pain. NVI, no numbness or tingling. Intact distal pulses.       Data  CBC:   Lab Results   Component Value Date    WBC 7.6 10/09/2017    HGB 8.1 10/09/2017     10/09/2017     BMP:    Lab Results   Component Value Date     10/09/2017    K 4.5 10/09/2017     10/09/2017    CO2 25 10/09/2017    BUN 25 10/09/2017    CREATININE 1.1 10/09/2017    CALCIUM 8.6 10/09/2017    GLUCOSE 126 10/09/2017    GLUCOSE 101 03/02/2016     Uric Acid:  No components found for: URIC  PT/INR:    Lab Results   Component Value Date    INR 1.03 10/08/2017     Troponin:  No results found for: TROPONINI  Urine Culture:  No components found for: CURINE      Current Inpatient Medications    Current Facility-Administered Medications: sulfacetamide (BLEPH-10) 10 % ophthalmic solution 1 drop, 1 drop, Right Eye, 6 times per day  atorvastatin (LIPITOR) tablet 20 mg, 20 mg, Oral, Daily  vitamin D (CHOLECALCIFEROL) tablet 2,000 Units, 2,000 Units, Oral, Daily  isosorbide mononitrate (IMDUR) extended release tablet 30 mg, 30 mg, Oral, Daily  lactobacillus (CULTURELLE) capsule 1 capsule, 1 capsule, Oral, TID WC  losartan (COZAAR) tablet 50 mg, 50 mg, Oral, Daily  metoprolol tartrate (LOPRESSOR) tablet 12.5 mg, 12.5 mg, Oral, Daily  therapeutic multivitamin-minerals 1 tablet, 1 tablet, Oral, Daily  pantoprazole (PROTONIX) tablet 40 mg, 40 mg, Oral, QAM AC  sertraline (ZOLOFT) tablet 50 mg, 50 mg, Oral, Daily  sennosides-docusate sodium (SENOKOT-S) 8.6-50 MG tablet 1 tablet, 1 tablet, Oral, Daily  tamsulosin (FLOMAX) capsule 0.4 mg, 0.4 mg, Oral, BID  0.9 % sodium chloride infusion, , Intravenous, Continuous  polyvinyl alcohol (LIQUIFILM TEARS) 1.4 % ophthalmic solution 1 drop, 1 drop, Both Eyes, Q4H PRN  ipratropium-albuterol (DUONEB) nebulizer solution 1 ampule, 1 ampule, Inhalation, Q4H WA  0.9 % sodium chloride infusion, , Intravenous, Continuous  sodium chloride flush 0.9 % injection 10 mL, 10 mL, Intravenous, 2 times per day  sodium chloride flush 0.9 % injection 10 mL, 10 mL, Intravenous, PRN  acetaminophen (TYLENOL) tablet 650 mg, 650 mg, Oral, Q4H PRN  HYDROcodone-acetaminophen (NORCO) 5-325 MG per tablet 1 tablet, 1 tablet, Oral, Q4H PRN **OR** HYDROcodone-acetaminophen (NORCO) 5-325 MG per tablet 2 tablet, 2 tablet, Oral, Q4H PRN  morphine injection 2 mg, 2 mg, Intravenous, Q2H PRN **OR** morphine injection 4 mg, 4 mg, Intravenous, Q2H PRN  docusate sodium (COLACE) capsule 100 mg, 100 mg, Oral, BID  ondansetron (ZOFRAN) injection 4 mg, 4 mg, Intravenous, Q6H PRN  enoxaparin (LOVENOX) injection 40 mg, 40 mg, Subcutaneous, Q24H        PLAN    1)  Cont with current mangement  2)  Patient to need ECF placement for rehab  3)  Dry dressing changes PRN  4)  NWB RLE, activity as tolerated;   Therapy to eval and treat    Chip ALEXEI Albright

## 2017-10-09 NOTE — PROGRESS NOTES
maxA to move leg over edge of bed, ankle pump WFL    Strength LLE: WFL  Comment: >/=3/5      Sensation  Overall Sensation Status: WFL (Pt denies. )      Balance  Sitting - Static: Good  Sitting - Dynamic: Good  Standing - Static: Fair, -  Standing - Dynamic: Poor  Comments: pt difficulty NWB RLE for amb and transfers    Sit to Supine: Moderate assistance (x1, assist at legs, HOB flat and no BR)  Scooting: Minimal assistance (x1, in sitting fwd to edge of chair)    Transfers  Sit to Stand: Moderate Assistance (bedside chair, cues for hand placement and to maintain NWB RLE)  Stand to sit: Contact guard assistance (edge of bed, cues to maintain NWB LLE)    WB Status: NWB RLE  Ambulation 1  Surface: level tile  Device: Rolling Walker  Assistance: Minimal assistance  Quality of Gait: pt difficulty maintaining NWB RLE with max cues so limited amb, fatigued quickly  Distance: 3'x1        Exercises:  Comments: sup BLE ankle pumps, quad set, glut set x10 reps to cont throughout the day to increase strength for mobility          Activity Tolerance:  Activity Tolerance: Patient limited by pain; Patient limited by fatigue;Patient limited by endurance    Treatment Initiated: see therex    Assessment:   Body structures, Functions, Activity limitations: Decreased functional mobility , Decreased balance, Decreased ROM, Decreased strength, Decreased endurance  Assessment: tolerated fair, difficulty maintaining NWB RLE limiting amb, pain and generalized weakness following surgery with increase assist needed for mobility, recommend cont PT to improve pt functional mobility  Prognosis: Good    Clinical Presentation: Moderate - Evolving with Changing Characteristics: pt increased pain RLE, NWB RLE, increase need for AD and assist for safe mobility with cont PT    Decision Making: High Complexitybased on patient assessment and decision making process of determining plan of care and establishing reasonable expectations for measurable

## 2017-10-09 NOTE — PROGRESS NOTES
[facility]  INPATIENT OCCUPATIONAL THERAPY  [facility] ORTHOPEDICS 7K  EVALUATION    Time:  Time In: 4645  Time Out: 0825  Timed Code Treatment Minutes: 26 Minutes  Minutes: 41    Date: 10/9/2017  Patient Name: Stuart Morrow,   Gender: male      MRN: [id number]  : 1931  (80 y.o.)  Referring Practitioner: Eduin Martin MD     Additional Pertinent Hx: Per ED Note: Stuart Morrow is a 80 y.o. male who presents to the Emergency Department for the evaluation of right hip pain. The patient states that he was walking in his kitchen earlier this morning using his walker when he fell down. He denies tripping on any object and states that he \"just fell. \" He states he currently has right hip pain. He denies neck pain, loss of consciousness, headache, chest pain, shortness of breath, or any other symptom. Pt is s/p R Hip Intertan on 10/8/17.       Restrictions/Precautions:  Restrictions/Precautions: Weight Bearing, Fall Risk, General Precautions    Right Lower Extremity Weight Bearing: Non Weight Bearing      Past Medical History:   Diagnosis Date    Acute sinusitis     Angina pectoris ([address]     Anxiety disorder 10/27/2016    Arthritis     osteoporosis    BPH with urinary obstruction     CAD (coronary artery disease)     Chronic insomnia     CKD (chronic kidney disease) stage 3, GFR 30-59 ml/min     Gastroenteritis     HCAP (healthcare-associated pneumonia) 2015    Heart disease     HLD (hyperlipidemia)     [place] (hard of hearing)     wear hearing aids    Hypertension     Kidney disease     40% kdiney function    Kidney stone     years ago 15-20    NICOLAS on CPAP     Osteopenia determined by x-ray     Pacemaker     Pinched nerve     slipped disc in back    Visual problems     Vitamin D deficiency      Past Surgical History:   Procedure Laterality Date    ABDOMINAL HERNIA REPAIR  2017    incisional hernia repair--Dr. Gillian Escamilla    CATARACT REMOVAL WITH IMPLANT      bilateral    COLONOSCOPY  2010    CORONARY ANGIOPLASTY WITH STENT PLACEMENT     [place] DENT[facility] SURGERY  1960's    EKG 12-LEAD  4/29/2015         EYE SURGERY      cataracts    HERNIA REPAIR      several    HIP PINNING Left 8/31/2017    LEFT HIP INTERTAN performed by Jono Corley MD at [address]  12/3/12    left     MYRINGOTOMY AND TYMPANOSTOMY TUBE PLACEMENT  7/23/13    left     NASAL SINUS SURGERY      OTHER SURGICAL HISTORY  04/27/2015    transurethral Incision bladder neck    PACEMAKER INSERTION      PACEMAKER PLACEMENT  2011    TIBIA FRACTURE SURGERY Right 10/8/2017    Right hip intertan performed by Ray Patel MD at [address] [facility] TURP  4/17/2013    W/CYSTO WITH DIRECT VISION URETHROTOMY & RESECTION BLADDER NECK CONTRACTURE    TURP  4/27/15    re-do TURP    TYMPANOSTOMY TUBE PLACEMENT      multiple surgeries           Subjective  Chart Reviewed: Yes  Patient assessed for rehabilitation services?: Yes  Family / Caregiver Present: No    Subjective: RN okayed OT session. Upon arrival patient was sitting up in bed. Pt was agreeable to OT session. General:  Overall Orientation Status: Impaired  Orientation Level: Oriented to person, Disoriented to place, Oriented to situation, Disoriented to time    Vision: Impaired    Hearing: Exceptions to [facility]  Hearing Exceptions: Right hearing aid, Hard of hearing/hearing concerns         Pain: Pt reports 7/10 surgical pain in R Hip. RN Notified and pt agreeable to continue with therapy.      Social/Functional:  Lives With: Spouse  Type of Home: House  Home Layout: One level  Home Access: Stairs to enter without rails  Entrance Stairs - Number of Steps: 1-2 MORAGN  Home Equipment: Rolling walker, Standard walker, Cane     Bathroom Shower/Tub: Walk-in shower, Shower chair with back  [employer]: Handicap height  Bathroom Equipment: Grab bars in shower  Bathroom Accessibility: Accessible  IADL Comments: Daughter completes grocery shopping tasks. ADL Assistance: Independent  Homemaking Assistance: Needs assistance (Children bring meals. Pt reports he was indep with laundry tasks. )  Homemaking Responsibilities: Yes    Ambulation Assistance: Independent (With Leocadia Breech)  Transfer Assistance: Independent    Active : No  Occupation: Retired  Additional Comments: Pt reports spouse is unable to assist patient. Objective  Overall Cognitive Status: Exceptions  Arousal/Alertness: Inconsistent responses to stimuli  Following Commands: Follows one step commands with increased time, Follows one step commands with repetition  Attention Span: Attends with cues to redirect  Safety Judgement: Decreased awareness of need for safety, Decreased awareness of need for assistance  Problem Solving: Assistance required to identify errors made, Assistance required to correct errors made, Assistance required to implement solutions, Assistance required to generate solutions  Insights: Decreased awareness of deficits  Initiation: Requires cues for all  Sequencing: Requires cues for all    Sensation  Overall Sensation Status: WFL (Pt denies. )        LUE AROM (degrees)  LUE AROM : WFL  LUE General AROM: Slow movements. RUE AROM (degrees)  RUE AROM : WFL     LUE Strength  Gross LUE Strength: WFL  L Hand Grasp: 3+/5  L Hand Release: 3+/5    RUE Strength  Gross RUE Strength: WFL  R Hand Grasp: 3+/5  R Hand Release: 3+/5    ADL  LE Dressing: Maximum assistance (To don B socks while sitting EOB. )     Bed mobility  Supine to Sit: Moderate assistance (To Move B LE. )  Scooting: Minimal assistance (To scoot to EOB. )    Transfers  Stand Pivot Transfers: Maximum assistance (From EOB to bedside chair. mod vc for technique and to maintain R LE NWB. )  Sit to stand:  Moderate assistance (From EOB with min vc for hand placement. )  Stand to sit: Minimal assistance (Onto EOB and bedside chair. )    Balance  Sitting Balance: Stand by assistance (Sitting EOB with BUE supported on bed. )  Standing Balance: Moderate assistance (x 1 and CGA x 1 mod vc to maintain R LE NWB. )     Time: 45 seconds  Activity: prep for transfer     Functional Mobility  Functional Mobility Comments: BROOK at this time d/t decreased ability to maintain R LE. Activity Tolerance:  Activity Tolerance: Patient limited by fatigue, Patient limited by pain    Treatment Initiated:  OT Evaluation completed. See above for details. Assessment:  Assessment: Pt is s/p R Hip ORIF and is NWB to R LE. Pt requires skilled OT intervention to increase indep with all self cares, IADLs, transfers, and functional mobility while maintaing R LE NWB to decrease fall risk and return to PLOF. Pt reports he lives with his wife at home and his wife is unanle to assist him at discharge. Performance deficits / Impairments: Decreased functional mobility , Decreased ADL status, Decreased strength, Decreased endurance, Decreased high-level IADLs, Decreased safe awareness  Prognosis: Fair  Discharge Recommendations: ECF with OT    Clinical Decision Making: Clinical Decision making was of Moderate Complexity as the result of analysis of data from a detailed assessment, a consideration of several treatment options, the presence of comorbidities affecting the plan of care and the need for minimal to moderate modifications or assistance required to complete the evaluation. Patient Education:  Patient Education: OT POC, OT Role, Transfer safety, R LE NWB. Equipment Recommendations:  Equipment Needed: No  Other: Monitor needs      Safety:  Safety Devices in place: Yes  Type of devices:  All fall risk precautions in place, Call light within reach, Gait belt, Left in chair, Patient at risk for falls, Chair alarm in place, Nurse notified    Plan:  Times per week: 6x  Current Treatment Recommendations: Strengthening, Functional Mobility Training, Endurance Training, Patient/Caregiver Education & Training, Safety Education & Training, Self-Care / ADL    Goals:  Patient goals : Decrease pain and move better     Short term goals  Time Frame for Short term goals: 1 week   Short term goal 1: Pt will complete BUE light ressitive exercises with min vc for technique to increase indep with transfers and self cares. Short term goal 2: Pt will complete standing tolerance x 2 minutes with min A and min vc for safety to increase indep with grooming tasks. Short term goal 3: Pt will complete stand pivots to/from various surfaces with Mod A x 1 and min vc for safety to increase indep with toileting routine. Short term goal 4: Pt will complete LB dressing with LHAE and mod A to increase indep within home environment. Long term goals  Time Frame for Long term goals : No LTGs d/t short estimated length of stay. Evaluation Complexity: Based on the findings of patient history, examination, clinical presentation, and decision making during this evaluation, this patient is of medium complexity.

## 2017-10-09 NOTE — PLAN OF CARE
Problem: DISCHARGE BARRIERS  Goal: Patient's continuum of care needs are met  Outcome: Ongoing  Patient discharge to rehab. See SW notes.

## 2017-10-09 NOTE — PLAN OF CARE
Problem: Pain:  Goal: Control of acute pain  Control of acute pain   Outcome: Ongoing  Patient reports pain with activity only, satisfied with pain control, continue to assess pain level, medicate PRN    Problem: Risk for Impaired Skin Integrity  Goal: Tissue integrity - skin and mucous membranes  Structural intactness and normal physiological function of skin and  mucous membranes. Outcome: Ongoing  No new skin breakdown, continue to encourage frequent repositioning. Comments: Care plan reviewed with patient. Patient verbalizes understanding of the plan of care and contributes to goal setting.

## 2017-10-09 NOTE — PROGRESS NOTES
Hospitalist Progress Note    Patient:  Stuart Morrow      Unit/Bed:7K-12/012-A    YOB: 1931    MRN: [id number]       Acct: [de-identified]     PCP: Tello Roque DO    Date of Admission: 10/8/2017  --------------------------    Chief Complaint:     Medical management     Hospital Course:      59-year-old male patient was admitted with right hip pain after fall, found to have right hip fracture, he underwent nailing with proximal and distal locked screws 10/8/2017, hospitalist consulted for medical management    Assessment:       1.  right hip fracture status post repair 10/8/2017  2. 3.      comorbidities:    · Coronary artery disease status post CABG, on ASA, statin,   · Sleep apnea, no wearing CPAP  · CKD stage III  ·   Anemia, normocytic, multifactorial  · Ess HTN, on losartan  · Prostatic hyper plasia, non flomax  · GERD on PPI    Plan:  1. Pain control, DVT prophylaxis, diet advancement and other skip measures per primary team.  2. Resume asa, Plavix,  , asa should nevere been stopped  3.  discontinue IV fluid, Cr improved  4. Increase mobility per ortho , PT/OT  5. Monitor hb  6. Follow up with sleep medicine     Code Status: Full Code         DVT prophylaxis: lovenox          I discussed my though processes at length with patient/family and he understood. Question and concerns  Addressed      Discussed with RN     ----------------  Subjective:     Patient seen and examined  Overnight events noted  RN and ancillary staff note reviewed        Incisional pain with movement. No other complaint.            Diet: Dietary Nutrition Supplements: Standard High Calorie Oral Supplement  DIET GENERAL;      Medications:  Reviewed    Infusion Medications    Scheduled Medications    sulfacetamide  1 drop Right Eye 6 times per day    atorvastatin  20 mg Oral Daily    vitamin D  2,000 Units Oral Daily    isosorbide mononitrate  30 mg Oral Daily    lactobacillus  1 capsule Oral TID WC    losartan  50 mg Oral Daily    metoprolol tartrate  12.5 mg Oral Daily    therapeutic multivitamin-minerals  1 tablet Oral Daily    pantoprazole  40 mg Oral QAM AC    sertraline  50 mg Oral Daily    sennosides-docusate sodium  1 tablet Oral Daily    tamsulosin  0.4 mg Oral BID    ipratropium-albuterol  1 ampule Inhalation Q4H WA    sodium chloride flush  10 mL Intravenous 2 times per day    docusate sodium  100 mg Oral BID    enoxaparin  40 mg Subcutaneous Q24H     PRN Meds: polyvinyl alcohol, sodium chloride flush, acetaminophen, HYDROcodone 5 mg - acetaminophen **OR** HYDROcodone 5 mg - acetaminophen, morphine **OR** morphine, ondansetron      Intake/Output Summary (Last 24 hours) at 10/09/17 1538  Last data filed at 10/09/17 1412   Gross per 24 hour   Intake          4125.56 ml   Output              750 ml   Net          3375.56 ml       Diet:  Dietary Nutrition Supplements: Standard High Calorie Oral Supplement  DIET GENERAL;    Exam:  /70   Pulse 106   Temp 98.4 °F (36.9 °C) (Oral)   Resp 20   Ht 5' 6\" (1.676 m)   Wt 145 lb (65.8 kg)   SpO2 96%   BMI 23.40 kg/m²     Gen: Not in distress. Alert. Head: Normocephalic. Atraumatic. Eyes: Conjunctivae/corneas clear. ENT: Oral mucosa moist  Neck: No JVD. No obvious thyromegaly. CVS: Nml H0Y3, faint systolic murmur, RRR  Pulmomary: Clear bilaterally. No crackles. No wheezes. Gastrointestinal: Soft, non tender, non distend,  Positive bowel sounds. Musculoskeletal: right though dressing  Neuro: No focal deficit. Moves extremity spontaneously. Psychiatry: Appropriate affect. Not agitated.           Labs:   Recent Labs      10/08/17   0619  10/09/17   0630   WBC  6.1  7.6   HGB  10.2*  8.1*   HCT  30.2*  24.1*   PLT  248  202     Recent Labs      10/08/17   0619  10/09/17   0630   NA  138  138   K  4.2  4.5   CL  100  102   CO2  23  25   BUN  29*  25*   CREATININE  1.3*  1.1   CALCIUM  9.2  8.6     No results for input(s): AST, ALT, BILIDIR, BILITOT, ALKPHOS in the last 72 hours. Recent Labs      10/08/17   0858   INR  1.03     No results for input(s): Jessica Cao in the last 72 hours. Urinalysis:    Lab Results   Component Value Date    NITRU POSITIVE 09/08/2017    WBCUA > 100 09/08/2017    BACTERIA MANY 09/08/2017    RBCUA 0-2 09/08/2017    BLOODU SMALL 09/08/2017    GLUCOSEU NEGATIVE 09/08/2017       Radiology:  XR FEMUR RIGHT (MIN 2 VIEWS)   Final Result   1. Status post open reduction and internal fixation of the right femoral neck fracture. **This report has been created using voice recognition software. It may contain minor errors which are inherent in voice recognition technology. **      Final report electronically signed by Dr. Cynthia Ng on 10/8/2017 2:50 PM      FLUORO FOR SURGICAL PROCEDURES   Final Result      XR HIP 2-3 VW W PELVIS RIGHT   Final Result    Acute right intertrochanteric femur fracture. **This report has been created using voice recognition software. It may contain minor errors which are inherent in voice recognition technology. **      Final report electronically signed by Dr. Cynthia Ng on 10/8/2017 7:29 AM      CT CERVICAL SPINE WO CONTRAST   Final Result    No evidence of an acute fracture. **This report has been created using voice recognition software. It may contain minor errors which are inherent in voice recognition technology. **      Final report electronically signed by Dr. Cynthia Ng on 10/8/2017 7:26 AM      XR Chest Portable   Final Result   1. Stable background fibrosis. 2. Possible upper right lateral rib fracture. This is difficult to confirm on the prior film. Correlate with any rib tenderness. **This report has been created using voice recognition software. It may contain minor errors which are inherent in voice recognition technology. **      Final report electronically signed by Dr. Cynthia Ng on 10/8/2017 7:22 AM      CT HEAD WO CONTRAST   Final

## 2017-10-10 PROBLEM — S72.001A CLOSED RIGHT HIP FRACTURE (HCC): Status: ACTIVE | Noted: 2017-08-31

## 2017-10-10 LAB
ANION GAP SERPL CALCULATED.3IONS-SCNC: 14 MEQ/L (ref 8–16)
BUN BLDV-MCNC: 25 MG/DL (ref 7–22)
CALCIUM SERPL-MCNC: 8.3 MG/DL (ref 8.5–10.5)
CHLORIDE BLD-SCNC: 104 MEQ/L (ref 98–111)
CO2: 24 MEQ/L (ref 23–33)
CREAT SERPL-MCNC: 1.1 MG/DL (ref 0.4–1.2)
GFR SERPL CREATININE-BSD FRML MDRD: 63 ML/MIN/1.73M2
GLUCOSE BLD-MCNC: 113 MG/DL (ref 70–108)
HCT VFR BLD CALC: 20 % (ref 42–52)
HCT VFR BLD CALC: 21.2 % (ref 42–52)
HEMOGLOBIN: 6.7 GM/DL (ref 14–18)
HEMOGLOBIN: 7.1 GM/DL (ref 14–18)
MCH RBC QN AUTO: 30 PG (ref 27–31)
MCHC RBC AUTO-ENTMCNC: 33.7 GM/DL (ref 33–37)
MCV RBC AUTO: 89.2 FL (ref 80–94)
MRSA SCREEN RT-PCR: NEGATIVE
PDW BLD-RTO: 17.7 % (ref 11.5–14.5)
PLATELET # BLD: 195 THOU/MM3 (ref 130–400)
PMV BLD AUTO: 7.4 MCM (ref 7.4–10.4)
POTASSIUM SERPL-SCNC: 4.4 MEQ/L (ref 3.5–5.2)
RBC # BLD: 2.37 MILL/MM3 (ref 4.7–6.1)
SODIUM BLD-SCNC: 142 MEQ/L (ref 135–145)
WBC # BLD: 6.9 THOU/MM3 (ref 4.8–10.8)

## 2017-10-10 PROCEDURE — 6370000000 HC RX 637 (ALT 250 FOR IP): Performed by: OPHTHALMOLOGY

## 2017-10-10 PROCEDURE — 97110 THERAPEUTIC EXERCISES: CPT

## 2017-10-10 PROCEDURE — 85027 COMPLETE CBC AUTOMATED: CPT

## 2017-10-10 PROCEDURE — 97530 THERAPEUTIC ACTIVITIES: CPT

## 2017-10-10 PROCEDURE — 6370000000 HC RX 637 (ALT 250 FOR IP): Performed by: HOSPITALIST

## 2017-10-10 PROCEDURE — 36430 TRANSFUSION BLD/BLD COMPNT: CPT

## 2017-10-10 PROCEDURE — 99232 SBSQ HOSP IP/OBS MODERATE 35: CPT | Performed by: NURSE PRACTITIONER

## 2017-10-10 PROCEDURE — 87641 MR-STAPH DNA AMP PROBE: CPT

## 2017-10-10 PROCEDURE — P9016 RBC LEUKOCYTES REDUCED: HCPCS

## 2017-10-10 PROCEDURE — 85018 HEMOGLOBIN: CPT

## 2017-10-10 PROCEDURE — 36415 COLL VENOUS BLD VENIPUNCTURE: CPT

## 2017-10-10 PROCEDURE — 6370000000 HC RX 637 (ALT 250 FOR IP): Performed by: PHYSICIAN ASSISTANT

## 2017-10-10 PROCEDURE — 1200000000 HC SEMI PRIVATE

## 2017-10-10 PROCEDURE — 2580000003 HC RX 258: Performed by: NURSE PRACTITIONER

## 2017-10-10 PROCEDURE — 6370000000 HC RX 637 (ALT 250 FOR IP): Performed by: ORTHOPAEDIC SURGERY

## 2017-10-10 PROCEDURE — 94640 AIRWAY INHALATION TREATMENT: CPT

## 2017-10-10 PROCEDURE — 6360000002 HC RX W HCPCS: Performed by: ORTHOPAEDIC SURGERY

## 2017-10-10 PROCEDURE — 85014 HEMATOCRIT: CPT

## 2017-10-10 PROCEDURE — 80048 BASIC METABOLIC PNL TOTAL CA: CPT

## 2017-10-10 RX ORDER — CEPHALEXIN 500 MG/1
500 CAPSULE ORAL 2 TIMES DAILY
Qty: 14 CAPSULE | Refills: 0 | Status: SHIPPED | OUTPATIENT
Start: 2017-10-10 | End: 2018-03-12 | Stop reason: ALTCHOICE

## 2017-10-10 RX ORDER — 0.9 % SODIUM CHLORIDE 0.9 %
250 INTRAVENOUS SOLUTION INTRAVENOUS ONCE
Status: COMPLETED | OUTPATIENT
Start: 2017-10-10 | End: 2017-10-10

## 2017-10-10 RX ORDER — SODIUM PHOSPHATE,MONO-DIBASIC 19G-7G/118
1 ENEMA (ML) RECTAL ONCE
Status: COMPLETED | OUTPATIENT
Start: 2017-10-10 | End: 2017-10-11

## 2017-10-10 RX ORDER — CLOPIDOGREL BISULFATE 75 MG/1
75 TABLET ORAL EVERY OTHER DAY
Qty: 30 TABLET | Refills: 3 | Status: ON HOLD | OUTPATIENT
Start: 2017-10-23 | End: 2017-12-31

## 2017-10-10 RX ORDER — HYDROCODONE BITARTRATE AND ACETAMINOPHEN 5; 325 MG/1; MG/1
1 TABLET ORAL EVERY 6 HOURS PRN
Qty: 40 TABLET | Refills: 0 | Status: SHIPPED | OUTPATIENT
Start: 2017-10-10 | End: 2017-10-17

## 2017-10-10 RX ADMIN — Medication 1 CAPSULE: at 09:04

## 2017-10-10 RX ADMIN — DOCUSATE SODIUM 100 MG: 100 CAPSULE ORAL at 22:15

## 2017-10-10 RX ADMIN — HYDROCODONE BITARTRATE AND ACETAMINOPHEN 2 TABLET: 5; 325 TABLET ORAL at 17:55

## 2017-10-10 RX ADMIN — LOSARTAN POTASSIUM 50 MG: 50 TABLET, FILM COATED ORAL at 09:04

## 2017-10-10 RX ADMIN — TAMSULOSIN HYDROCHLORIDE 0.4 MG: 0.4 CAPSULE ORAL at 22:16

## 2017-10-10 RX ADMIN — METOPROLOL TARTRATE 12.5 MG: 25 TABLET ORAL at 09:05

## 2017-10-10 RX ADMIN — HYDROCODONE BITARTRATE AND ACETAMINOPHEN 2 TABLET: 5; 325 TABLET ORAL at 00:47

## 2017-10-10 RX ADMIN — HYDROCODONE BITARTRATE AND ACETAMINOPHEN 2 TABLET: 5; 325 TABLET ORAL at 22:23

## 2017-10-10 RX ADMIN — IPRATROPIUM BROMIDE AND ALBUTEROL SULFATE 1 AMPULE: .5; 3 SOLUTION RESPIRATORY (INHALATION) at 12:09

## 2017-10-10 RX ADMIN — HYDROCODONE BITARTRATE AND ACETAMINOPHEN 2 TABLET: 5; 325 TABLET ORAL at 04:57

## 2017-10-10 RX ADMIN — IPRATROPIUM BROMIDE AND ALBUTEROL SULFATE 1 AMPULE: .5; 3 SOLUTION RESPIRATORY (INHALATION) at 15:41

## 2017-10-10 RX ADMIN — Medication 1 CAPSULE: at 17:55

## 2017-10-10 RX ADMIN — TAMSULOSIN HYDROCHLORIDE 0.4 MG: 0.4 CAPSULE ORAL at 09:04

## 2017-10-10 RX ADMIN — IPRATROPIUM BROMIDE AND ALBUTEROL SULFATE 1 AMPULE: .5; 3 SOLUTION RESPIRATORY (INHALATION) at 19:33

## 2017-10-10 RX ADMIN — PANTOPRAZOLE SODIUM 40 MG: 40 TABLET, DELAYED RELEASE ORAL at 09:04

## 2017-10-10 RX ADMIN — ENOXAPARIN SODIUM 40 MG: 40 INJECTION SUBCUTANEOUS at 09:07

## 2017-10-10 RX ADMIN — ISOSORBIDE MONONITRATE 30 MG: 30 TABLET ORAL at 22:16

## 2017-10-10 RX ADMIN — ATORVASTATIN CALCIUM 20 MG: 20 TABLET, FILM COATED ORAL at 22:16

## 2017-10-10 RX ADMIN — VITAMIN D, TAB 1000IU (100/BT) 2000 UNITS: 25 TAB at 09:04

## 2017-10-10 RX ADMIN — ASPIRIN 81 MG: 81 TABLET ORAL at 09:04

## 2017-10-10 RX ADMIN — SERTRALINE 50 MG: 50 TABLET, FILM COATED ORAL at 09:04

## 2017-10-10 RX ADMIN — SODIUM CHLORIDE 250 ML: 9 INJECTION, SOLUTION INTRAVENOUS at 17:32

## 2017-10-10 RX ADMIN — SULFACETAMIDE SODIUM 1 DROP: 100 SOLUTION OPHTHALMIC at 22:16

## 2017-10-10 RX ADMIN — MULTIPLE VITAMINS W/ MINERALS TAB 1 TABLET: TAB at 09:04

## 2017-10-10 RX ADMIN — DOCUSATE SODIUM 100 MG: 100 CAPSULE ORAL at 09:04

## 2017-10-10 RX ADMIN — SULFACETAMIDE SODIUM 1 DROP: 100 SOLUTION OPHTHALMIC at 09:04

## 2017-10-10 RX ADMIN — IPRATROPIUM BROMIDE AND ALBUTEROL SULFATE 1 AMPULE: .5; 3 SOLUTION RESPIRATORY (INHALATION) at 08:29

## 2017-10-10 RX ADMIN — HYDROCODONE BITARTRATE AND ACETAMINOPHEN 2 TABLET: 5; 325 TABLET ORAL at 13:12

## 2017-10-10 RX ADMIN — DOCUSATE SODIUM AND SENNOSIDES 1 TABLET: 8.6; 5 TABLET, FILM COATED ORAL at 09:04

## 2017-10-10 ASSESSMENT — PAIN DESCRIPTION - DESCRIPTORS
DESCRIPTORS: ACHING
DESCRIPTORS: ACHING

## 2017-10-10 ASSESSMENT — PAIN DESCRIPTION - LOCATION
LOCATION: HIP

## 2017-10-10 ASSESSMENT — PAIN DESCRIPTION - FREQUENCY
FREQUENCY: CONTINUOUS
FREQUENCY: CONTINUOUS

## 2017-10-10 ASSESSMENT — PAIN DESCRIPTION - PAIN TYPE
TYPE: SURGICAL PAIN

## 2017-10-10 ASSESSMENT — PAIN DESCRIPTION - PROGRESSION
CLINICAL_PROGRESSION: GRADUALLY IMPROVING
CLINICAL_PROGRESSION: GRADUALLY IMPROVING

## 2017-10-10 ASSESSMENT — PAIN DESCRIPTION - ONSET
ONSET: ON-GOING
ONSET: ON-GOING

## 2017-10-10 ASSESSMENT — PAIN SCALES - GENERAL
PAINLEVEL_OUTOF10: 5
PAINLEVEL_OUTOF10: 10
PAINLEVEL_OUTOF10: 7
PAINLEVEL_OUTOF10: 5
PAINLEVEL_OUTOF10: 7
PAINLEVEL_OUTOF10: 7
PAINLEVEL_OUTOF10: 10
PAINLEVEL_OUTOF10: 7
PAINLEVEL_OUTOF10: 7

## 2017-10-10 ASSESSMENT — PAIN DESCRIPTION - ORIENTATION
ORIENTATION: RIGHT

## 2017-10-10 NOTE — PLAN OF CARE
Problem: Impaired respiratory status  Goal: Clear lung sounds  Outcome: Ongoing  Pt continues on aerosols to clear lung sounds/improve aeration.

## 2017-10-10 NOTE — PLAN OF CARE
Problem: Pain:  Goal: Control of acute pain  Control of acute pain   Outcome: Ongoing  Patient rating surgical hip pain 7/10. Controlled to 3/10 with norco and ice pack. Meeting pain goal of 3/10    Problem: Risk for Impaired Skin Integrity  Goal: Tissue integrity - skin and mucous membranes  Structural intactness and normal physiological function of skin and  mucous membranes. Outcome: Ongoing  Skin intact this shift. Pillow support to keep patient off coccyx    Problem: Respiratory  Goal: O2 Sat > 90%  Outcome: Ongoing  02 98% on 1.5 L/min nasal cannula, weaned to 0.5 L/min  Goal: Supplemental O2 requirements decreased  Outcome: Ongoing  02 decreased by 1 L/min nasal cannula    Problem: GI  Goal: No bowel complications  Outcome: Ongoing  Abdomen soft, bowel sounds active. Patient is passing gas, no bowel movement this shift. Patient denies nausea and vomiting    Problem:   Goal: Adequate urinary output  Outcome: Ongoing  Zamudio in place with at least 30 ml/hr outputs. Urine is clear/yellow. Zamudio care provided    Problem: Musculor/Skeletal Functional Status  Goal: Highest potential functional level  Outcome: Ongoing  Pedal push and pull/hand grasp moderate bilaterally. Pt/ot seeing patient. Problem: DISCHARGE BARRIERS  Goal: Patient's continuum of care needs are met  Outcome: Ongoing  Patient anticipates being discharged to an ecf for rehab before going home with wife. Comments: Care plan reviewed with patient. Patient verbalized understanding of the plan of care and contribute to goal setting.

## 2017-10-10 NOTE — PROGRESS NOTES
bilateral    COLONOSCOPY  2010    CORONARY ANGIOPLASTY WITH STENT PLACEMENT     [facility] DENTAL SURGERY  1960's    EKG 12-LEAD  4/29/2015         EYE SURGERY      cataracts    HERNIA REPAIR      several    HIP PINNING Left 8/31/2017    LEFT HIP INTERTAN performed by Zahida Drake MD at [address]  12/3/12    left     MYRINGOTOMY AND TYMPANOSTOMY TUBE PLACEMENT  7/23/13    left     NASAL SINUS SURGERY      OTHER SURGICAL HISTORY  04/27/2015    transurethral Incision bladder neck    PACEMAKER INSERTION      PACEMAKER PLACEMENT  2011    TIBIA FRACTURE SURGERY Right 10/8/2017    Right hip intertan performed by Rohit Mcleod MD at [address] TURP  4/17/2013    W/CYSTO WITH DIRECT VISION URETHROTOMY & RESECTION BLADDER NECK CONTRACTURE    TURP  4/27/15    re-do TURP    TYMPANOSTOMY TUBE PLACEMENT      multiple surgeries           Subjective  Subjective: RN ok'ed tx session. Pt seated in bedside chair, requesting to return to bed. Agreeable to OT. Pain:  Pain Assessment  Patient Currently in Pain: Yes  Pain Level: 10 (Pt reports 10/10 pain when ambulating/ preparing to t/f)  Pain Type: Surgical pain  Pain Location: Hip  Pain Orientation: Right       Objective  Overall Cognitive Status: WFL     ADL  LE Dressing:  (Pt supine in bed when MONROY demo'd use of sock aid to pt to increase independence with LB dressing.  Pt unable to practice d/t requesting to lay down upon arrival of MONROY today d/t fatigue and fear of sliding out of bedside chair)     Bed mobility  Sit to Supine: Minimal assistance (x2 assist with legs, trunk control )    Transfers  Sit to stand: Minimal assistance (Min Ax2 to complete stand pivot t/f from bedside chair )  Stand to sit: Contact guard assistance       Balance  Sitting Balance: Stand by assistance (while sitting EOB)  Standing Balance: Minimal assistance (Min Ax2 to complete stand pivot to return to bed while following WB'ing goals : No LTGs d/t short estimated length of stay.

## 2017-10-10 NOTE — PROGRESS NOTES
Hospitalist Progress Note    Patient:  Grace Olsen      Unit/Bed:7K-12/012-A    YOB: 1931    MRN: [id number]       Acct: [de-identified]     PCP: Jennifer Lawler DO    Date of Admission: 10/8/2017    Chief Complaint:  Consulted for CAD management post fractured hip repair    Hospital Course: admitted after fall and suffered right hip fracture. He was recently admitted and had repair of left hip fracture 8/3/17 and spent time on inpatient rehab unit prior to discharge. He had repair of right hip on 10/8/2017. Subjective:  Awake in bed. Reports right hip pain that increases with movement and ambulation. No CP, SOB, abd pain, N/V, or dizziness. Discussed current labs and possible need for transfusion. POC discussed and questions answered.       Medications:  Reviewed    Infusion Medications    Scheduled Medications    sodium phosphate  1 enema Rectal Once    sulfacetamide  1 drop Right Eye 6 times per day    aspirin  81 mg Oral Daily    clopidogrel  75 mg Oral Every Other Day    atorvastatin  20 mg Oral Daily    vitamin D  2,000 Units Oral Daily    isosorbide mononitrate  30 mg Oral Daily    lactobacillus  1 capsule Oral TID WC    losartan  50 mg Oral Daily    metoprolol tartrate  12.5 mg Oral Daily    therapeutic multivitamin-minerals  1 tablet Oral Daily    pantoprazole  40 mg Oral QAM AC    sertraline  50 mg Oral Daily    sennosides-docusate sodium  1 tablet Oral Daily    tamsulosin  0.4 mg Oral BID    ipratropium-albuterol  1 ampule Inhalation Q4H WA    sodium chloride flush  10 mL Intravenous 2 times per day    docusate sodium  100 mg Oral BID    enoxaparin  40 mg Subcutaneous Q24H     PRN Meds: polyvinyl alcohol, sodium chloride flush, acetaminophen, HYDROcodone 5 mg - acetaminophen **OR** HYDROcodone 5 mg - acetaminophen, morphine **OR** morphine, ondansetron      Intake/Output Summary (Last 24 hours) at 10/10/17 1152  Last data filed at 10/10/17 0634   Gross per 24 hour inherent in voice recognition technology. **      Final report electronically signed by Dr. Lydia Campbell on 10/8/2017 7:08 AM          Diet: Dietary Nutrition Supplements: Standard High Calorie Oral Supplement  DIET GENERAL;    DVT prophylaxis: [] Lovenox                                 [x] SCDs                                 [] SQ Heparin                                 [] Encourage ambulation           [] Already on Anticoagulation     Disposition:    [] Home       [] TCU       [] Rehab       [] Psych       [x] SNF       [] [place]       [] Other-    Code Status: Full Code    PT/OT Eval Status: current     Assessment/Plan:    Anticipated Discharge in : per primary    Active Hospital Problems    Diagnosis Date Noted    NICOLAS (obstructive sleep apnea) [G47.33] 10/08/2017    Closed comminuted intertrochanteric fracture of right femur with routine healing [S72.141D] 10/08/2017    CAD (coronary artery disease) [I25.10] 04/29/2015    CKD (chronic kidney disease) stage 3, GFR 30-59 ml/min [N18.3] 04/29/2015       1. Right intertrochanteric femur fracture--ORIF 10/8/17; management per ortho; PT/OT; pain control; IS  2. CAD of native coronary vessels--history or PCI ~ 10 years ago; echo 2015 normal with 60% EF; last ST 4/2015 was neg for ischemia; no CP; EKG w/o ischemic changes; trop x 1 neg; cont asa, Plavix, Imdur, ACEI, BB  3. CKD, stage 2--stable; monitor; IVF off; encouraged po fluid intake  4. Acute blood loss anemia--hgb preop 10.2 and today 7.1; repeat h/h today and transfuse if < 7; CBC am  5. Chronic normocytic anemia  6. NICOLAS--f/u with sleep medicine when able at discharge  7.  Vitamin D deficiency--cont supplementation    Dispo--h/h and transfuse if hgb < 7; h/h am        Electronically signed by Estela Valero NP on 10/10/2017 at 11:52 AM     Addendum: repeat hgb 6.7; transfuse with 2 units PRBCs

## 2017-10-10 NOTE — PROGRESS NOTES
Physical Therapy   [facility]    Time In: 0839  Time Out: 0902  Timed Code Treatment Minutes: 23 Minutes  Minutes: 23          Date: 10/10/2017  Patient Name: Sandie Patel,  Gender:  male        MRN: [id number]  : 1931  (80 y.o.)     Referring Practitioner: Dr. Angela Sanchez  Diagnosis: closed intertrochanteric fracture of right  Additional Pertinent Hx: admit with above diagnosis s/p fall, s/p rigth hip ORIF on 10-8-17     Past Medical History:   Diagnosis Date    Acute sinusitis     Angina pectoris ([address]     Anxiety disorder 10/27/2016    Arthritis     osteoporosis    BPH with urinary obstruction     CAD (coronary artery disease)     Chronic insomnia     CKD (chronic kidney disease) stage 3, GFR 30-59 ml/min     Gastroenteritis     HCAP (healthcare-associated pneumonia) 2015    Heart disease     HLD (hyperlipidemia)     [place] (hard of hearing[place]     wear hearing aids    Hypertension     Kidney disease     40% kdiney function    Kidney stone     years ago 15-20    NICOLAS on CPAP     Osteopenia determined by x-ray     Pacemaker     Pinched nerve     slipped disc in back    Visual problems     Vitamin D deficiency      Past Surgical History:   Procedure Laterality Date    ABDOMINAL HERNIA REPAIR  2017    incisional hernia repair--Dr. Ella Maurice    CATARACT REMOVAL WITH IMPLANT      bilateral    COLONOSCOPY      CORONARY ANGIOPLASTY WITH STENT PLACEMENT     [facility] DENTAL SURGERY  1960's    EKG 12-LEAD  2015         EYE SURGERY      cataracts    HERNIA REPAIR      several    HIP PINNING Left 2017    LEFT HIP INTERTAN performed by Bret Ro MD at [address]  12/3/12    left     MYRINGOTOMY AND TYMPANOSTOMY TUBE PLACEMENT  13    left     NASAL SINUS SURGERY      OTHER SURGICAL HISTORY  2015    transurethral Incision bladder neck    PACEMAKER INSERTION      PACEMAKER PLACEMENT  2011    TIBIA FRACTURE SURGERY Right 10/8/2017    Right hip intertan performed by Bienvenido Head MD at [address] TURP  4/17/2013    W/CYSTO WITH DIRECT VISION URETHROTOMY & RESECTION BLADDER NECK CONTRACTURE    TURP  4/27/15    re-do TURP    TYMPANOSTOMY TUBE PLACEMENT      multiple surgeries       Restrictions/Precautions:  Restrictions/Precautions: Weight Bearing, Fall Risk, General Precautions    Right Lower Extremity Weight Bearing: Non Weight Bearing                 Subjective:     Subjective: RN approved session. pt resting in bed at arrival, agreeable to get up chair and perfrom therex. RN became present to assist with trasfer to chair. Pain:   .  Pain Assessment  Pain Level: 7       Social/Functional:  Lives With: Spouse  Type of Home: House  Home Layout: One level  Home Access: Stairs to enter without rails  Entrance Stairs - Number of Steps: 1-2 MORGAN  Home Equipment: Rolling walker, Standard walker, Cane     Objective:  Supine to Sit: Moderate assistance (HOB elevated, assisted LE off EOB, pt able to bring trunk to upright)  Scooting: Contact guard assistance (to EOB and back in bedisde chair)    Transfers  Sit to Stand: Minimal Assistance (x2, from EOB to HHA, cueing for hand placement)  Stand to sit: Contact guard assistance (into bedside chair, cues to control descent)  Stand Pivot Transfers: Moderate Assistance (x2, pt had diffcult to pivot foot, pt also needing cueing to maintain [address] pt does not maintain well. )       Exercises:  Exercises  Comments: pt completed BLE seated ex x10;heel/toe raises, glut set, marches, hip abd/add, and long arch quads all to increase LE strength for improved functional mobility. Activity Tolerance:  Activity Tolerance: Patient limited by pain; Patient limited by fatigue;Patient limited by endurance    Assessment:   Body structures, Functions, Activity limitations: Decreased functional mobility , Moving Around Goal Status ():  At least 20 percent but less than 40 percent impaired, limited or restricted

## 2017-10-10 NOTE — CARE COORDINATION
10/10/17, 10:05 AM      Blima Gowers day: 2  Location: [facility] Reason for admit: Closed intertrochanteric fracture of hip, right, initial encounter Karsten Brunner   Procedure:10/8/17 surgery by Dr. Malagon Neither: Right hip intertan   Treatment Plan of Care: PT/OT. Non Weight bearing on operative leg. Pain management. N/V checks. I&O.  TCU admissions coordinator consulted recommended ECF  Core Measures: vte  PCP: Lázaro Martinez DO  Discharge Plan: SW consulted for ECF placement
10/10/17, 2:18 PM    DISCHARGE BARRIERS      Spoke with patient, his wife and daughter. The are agreeable to ecf and request [facility]. Referral made to [facility] for review.
DISCHARGE BARRIERS  10/9/17, 12:32 PM    Reason for Referral: ecf  Mental Status: Patient is alert and oriented  Decision Making: Patient is making his own decisions. Family/Social/Home Environment: Assessment completed with Patient. Patient resides in one story home with his spouse. Patient stated that his spouse doesn't do much anymore and that they really don't cook much either any more. Patient stated that they have a daughter who resides down the street and she assists them with appointment and getting them food when needed. Patient stated that the kids also bring them food that they had left over and things also. Patient stated that they have a raised toilet at home and grab bars in the shower. Patient is not supposed to be driving but does sometimes. Patient does have a walker at night and uses a cane during the day. Patient stated that his daughter does want for him to go somewhere at discharge and he would prefer to stay at [facility]. Current Services: none  Current Equipment:  Walker, cane  Payment Source: Medicare  Concerns or Barriers to Discharge: not at this time  Collabrative List of ECF/HH were provided: offered and declined. Patient prefers TCU/Rehab at [facility] and would consider [address] if necessary. Teach Back Method used with Patient regarding care plan and discharge planning. Patient and daughter verbalize understanding of the plan of care and contribute to goal setting. Anticipated Needs/Discharge Plan: Patient discharge to rehab for continued therapy. TCU/Rehab consult placed and SW discussed with Patient's daughter regarding second option for therapy. Patient's daughter will contact SW and notify us of second option if Patient is not able to go to [place].      Electronically signed by ARMIN Wolf, ROSS on 10/9/2017 at 12:32 PM
Prophylaxis: Lovenox with SCD's ordered  Smoking status:  reports that he quit smoking about 51 years ago. His smoking use included Cigarettes. He has a 20.00 pack-year smoking history. He has never used smokeless tobacco.   Influenza Vaccination Screening Completed: yes  Pneumonia Vaccination Screening Completed: yes  Core measures: vte  PCP: Soraya Rivera,   Readmission:   no  Risk Score: 19.5     Discharge Planning  Current Residence:  Private Residence  Living Arrangements:  Spouse/Significant Other   Support Systems:  Spouse/Significant Other  Current Services PTA:     Potential Assistance Needed:  Durable Medical Equipment  Potential Assistance Purchasing Medications:  No  Does patient want to participate in local refill/ meds to beds program?  No  Type of Home Care Services:  Skilled Therapy  Patient expects to be discharged to:  ecf or tcu  Expected Discharge date:  10/12/17  Follow Up Appointment: Best Day/ Time: Thursday PM    Discharge Plan: Helene Farnsworth said that he would like to go to TCU. Await input for TCU admissions coordinator.       Evaluation: yes

## 2017-10-11 VITALS
HEART RATE: 96 BPM | WEIGHT: 145 LBS | TEMPERATURE: 98.5 F | SYSTOLIC BLOOD PRESSURE: 142 MMHG | RESPIRATION RATE: 18 BRPM | OXYGEN SATURATION: 95 % | BODY MASS INDEX: 23.3 KG/M2 | HEIGHT: 66 IN | DIASTOLIC BLOOD PRESSURE: 76 MMHG

## 2017-10-11 LAB
HCT VFR BLD CALC: 25.1 % (ref 42–52)
HEMOGLOBIN: 8.6 GM/DL (ref 14–18)

## 2017-10-11 PROCEDURE — 85018 HEMOGLOBIN: CPT

## 2017-10-11 PROCEDURE — 6370000000 HC RX 637 (ALT 250 FOR IP): Performed by: ORTHOPAEDIC SURGERY

## 2017-10-11 PROCEDURE — 97535 SELF CARE MNGMENT TRAINING: CPT

## 2017-10-11 PROCEDURE — 6370000000 HC RX 637 (ALT 250 FOR IP): Performed by: HOSPITALIST

## 2017-10-11 PROCEDURE — 94640 AIRWAY INHALATION TREATMENT: CPT

## 2017-10-11 PROCEDURE — 6360000002 HC RX W HCPCS: Performed by: ORTHOPAEDIC SURGERY

## 2017-10-11 PROCEDURE — 85014 HEMATOCRIT: CPT

## 2017-10-11 PROCEDURE — 97110 THERAPEUTIC EXERCISES: CPT

## 2017-10-11 PROCEDURE — 99232 SBSQ HOSP IP/OBS MODERATE 35: CPT | Performed by: PHYSICIAN ASSISTANT

## 2017-10-11 PROCEDURE — 36415 COLL VENOUS BLD VENIPUNCTURE: CPT

## 2017-10-11 PROCEDURE — 6370000000 HC RX 637 (ALT 250 FOR IP): Performed by: OPHTHALMOLOGY

## 2017-10-11 PROCEDURE — 6370000000 HC RX 637 (ALT 250 FOR IP): Performed by: PHYSICIAN ASSISTANT

## 2017-10-11 PROCEDURE — 97530 THERAPEUTIC ACTIVITIES: CPT

## 2017-10-11 RX ADMIN — VITAMIN D, TAB 1000IU (100/BT) 2000 UNITS: 25 TAB at 08:49

## 2017-10-11 RX ADMIN — PANTOPRAZOLE SODIUM 40 MG: 40 TABLET, DELAYED RELEASE ORAL at 06:42

## 2017-10-11 RX ADMIN — SERTRALINE 50 MG: 50 TABLET, FILM COATED ORAL at 08:49

## 2017-10-11 RX ADMIN — SULFACETAMIDE SODIUM 1 DROP: 100 SOLUTION OPHTHALMIC at 04:14

## 2017-10-11 RX ADMIN — HYDROCODONE BITARTRATE AND ACETAMINOPHEN 2 TABLET: 5; 325 TABLET ORAL at 06:42

## 2017-10-11 RX ADMIN — SULFACETAMIDE SODIUM 1 DROP: 100 SOLUTION OPHTHALMIC at 08:50

## 2017-10-11 RX ADMIN — DOCUSATE SODIUM AND SENNOSIDES 1 TABLET: 8.6; 5 TABLET, FILM COATED ORAL at 08:49

## 2017-10-11 RX ADMIN — ENOXAPARIN SODIUM 40 MG: 40 INJECTION SUBCUTANEOUS at 12:22

## 2017-10-11 RX ADMIN — ASPIRIN 81 MG: 81 TABLET ORAL at 08:50

## 2017-10-11 RX ADMIN — Medication 1 CAPSULE: at 12:22

## 2017-10-11 RX ADMIN — IPRATROPIUM BROMIDE AND ALBUTEROL SULFATE 1 AMPULE: .5; 3 SOLUTION RESPIRATORY (INHALATION) at 13:03

## 2017-10-11 RX ADMIN — CLOPIDOGREL 75 MG: 75 TABLET, FILM COATED ORAL at 08:50

## 2017-10-11 RX ADMIN — IPRATROPIUM BROMIDE AND ALBUTEROL SULFATE 1 AMPULE: .5; 3 SOLUTION RESPIRATORY (INHALATION) at 16:28

## 2017-10-11 RX ADMIN — HYDROCODONE BITARTRATE AND ACETAMINOPHEN 2 TABLET: 5; 325 TABLET ORAL at 13:59

## 2017-10-11 RX ADMIN — DOCUSATE SODIUM 100 MG: 100 CAPSULE ORAL at 08:49

## 2017-10-11 RX ADMIN — MULTIPLE VITAMINS W/ MINERALS TAB 1 TABLET: TAB at 08:49

## 2017-10-11 RX ADMIN — Medication 1 CAPSULE: at 08:50

## 2017-10-11 RX ADMIN — SULFACETAMIDE SODIUM 1 DROP: 100 SOLUTION OPHTHALMIC at 12:22

## 2017-10-11 RX ADMIN — HYDROCODONE BITARTRATE AND ACETAMINOPHEN 2 TABLET: 5; 325 TABLET ORAL at 02:25

## 2017-10-11 RX ADMIN — TAMSULOSIN HYDROCHLORIDE 0.4 MG: 0.4 CAPSULE ORAL at 08:49

## 2017-10-11 RX ADMIN — METOPROLOL TARTRATE 12.5 MG: 25 TABLET ORAL at 08:49

## 2017-10-11 RX ADMIN — IPRATROPIUM BROMIDE AND ALBUTEROL SULFATE 1 AMPULE: .5; 3 SOLUTION RESPIRATORY (INHALATION) at 07:56

## 2017-10-11 RX ADMIN — SODIUM PHOSPHATE 1 ENEMA: 7; 19 ENEMA RECTAL at 04:27

## 2017-10-11 RX ADMIN — LOSARTAN POTASSIUM 50 MG: 50 TABLET, FILM COATED ORAL at 08:50

## 2017-10-11 ASSESSMENT — PAIN DESCRIPTION - ORIENTATION
ORIENTATION: RIGHT

## 2017-10-11 ASSESSMENT — PAIN DESCRIPTION - LOCATION
LOCATION: HIP

## 2017-10-11 ASSESSMENT — PAIN DESCRIPTION - PAIN TYPE
TYPE: SURGICAL PAIN

## 2017-10-11 ASSESSMENT — PAIN SCALES - GENERAL
PAINLEVEL_OUTOF10: 8
PAINLEVEL_OUTOF10: 3
PAINLEVEL_OUTOF10: 5
PAINLEVEL_OUTOF10: 3
PAINLEVEL_OUTOF10: 10
PAINLEVEL_OUTOF10: 10
PAINLEVEL_OUTOF10: 7

## 2017-10-11 NOTE — PROGRESS NOTES
PRN Meds: polyvinyl alcohol, sodium chloride flush, acetaminophen, HYDROcodone 5 mg - acetaminophen **OR** HYDROcodone 5 mg - acetaminophen, morphine **OR** morphine, ondansetron      Intake/Output Summary (Last 24 hours) at 10/11/17 1424  Last data filed at 10/11/17 0415   Gross per 24 hour   Intake          1432.55 ml   Output             1100 ml   Net           332.55 ml       Diet:  Dietary Nutrition Supplements: Standard High Calorie Oral Supplement  DIET GENERAL;    Exam:  /61   Pulse 98   Temp 98.1 °F (36.7 °C) (Oral)   Resp 20   Ht 5' 6\" (1.676 m)   Wt 145 lb (65.8 kg)   SpO2 93%   BMI 23.40 kg/m²     General appearance: No apparent distress, elderly, appears stated age and cooperative. HEENT: Pupils equal, round, and reactive to light. Conjunctivae/corneas clear. Oral mucosa is moist.   Neck: Supple, with full range of motion. No jugular venous distention. Trachea midline. Respiratory:  Normal respiratory effort. Clear to auscultation, bilaterally without Rales/Wheezes/Rhonchi. On room air. Cardiovascular: Regular rate and rhythm with normal S1/S2 without murmurs, rubs or gallops. Has pacemaker to left chest wall. Abdomen: Soft, non-tender, non-distended with normal bowel sounds. Musculoskeletal: staples with place to right hip incision, without dehiscence. Moderate surrounding ecchymosis. Distal sensation in tact, able to wiggle toes. Skin: Skin color, texture, turgor normal.  No rashes or lesions. Neurologic:  Neurovascularly intact without any focal sensory/motor deficits.    Psychiatric: Alert and oriented  Capillary Refill: Brisk,< 3 seconds   Peripheral Pulses: +2 palpable, equal bilaterally       Labs:   Recent Labs      10/09/17   0630  10/10/17   0616  10/10/17   1452  10/11/17   0609   WBC  7.6  6.9   --    --    HGB  8.1*  7.1*  6.7*  8.6*   HCT  24.1*  21.2*  20.0*  25.1*   PLT  202  195   --    --      Recent Labs      10/09/17   0630  10/10/17   0616   NA  138  142 [facility]   6. Asymptomatic Bacteruria--urine culture from ED positive for serratia marcescens. Pt afebrile, no leukocytosis and asymptomatic. Follows with Dr. Janet Almanzar, urologist, at [facility], discussed if symptoms or fever develop to follow up with Dr. Janet Almanzar or Dr. Juan Ortiz. He agreed to this plan. 7. COPD without acute exacerbation--continue Duoneb, discussed continued IS    8. NICOLAS--follow up with sleep medicine after discharge  9. Essential Hypertension, uncontrolled-- continue Imdur, Lopressor, losartan  10. CKD, stage 2--Cr 1.1, baseline appears to be 1.2-1.4. Stable  11. Vitamin D Deficiency--continue supplementation   12. History of Recent Left Hip Fracture requiring IM nailing 9/2017. Dispo: Patient is doing well, anticipated discharge to [facility] today per ortho surgery. Hospitalists will sign off. Please feel free to contact with questions or concerns. Discussed repeat CBC prior to PCP follow up.       I have discussed this patient's care and plan with Dr. Billie Nageotte   Electronically signed by Johana Guardado PA-C on 10/11/2017 at 2:24 PM

## 2017-10-11 NOTE — PLAN OF CARE
Problem: Pain:  Goal: Pain level will decrease  Pain level will decrease   Outcome: Ongoing  Patients pain goal 4/10. Patient resting comfortably after prn pain medication given. Goal: Control of acute pain  Control of acute pain   Outcome: Completed Date Met: 10/11/17    Goal: Control of chronic pain  Control of chronic pain   Outcome: Completed Date Met: 10/11/17      Problem: Risk for Impaired Skin Integrity  Goal: Tissue integrity - skin and mucous membranes  Structural intactness and normal physiological function of skin and  mucous membranes. Outcome: Ongoing  Frequent skin assessments. No skin breakdown, patient repositions self in bed adequately. Incision site clean dry and intact with edges well approximated. Problem: Respiratory  Goal: O2 Sat > 90%  Outcome: Ongoing  O2 Sat remain above 90% on room air. Goal: Supplemental O2 requirements decreased  Outcome: Completed Date Met: 10/11/17  Patient on room air    Problem: GI  Goal: No bowel complications  Outcome: Ongoing  Patient passing flatus, enema scheduled in AM if no bowel movement oocurs. Problem:   Goal: Adequate urinary output  Outcome: Ongoing  Zamudio catheter in place with adequate urine output. Problem: Musculor/Skeletal Functional Status  Goal: Highest potential functional level  Outcome: Ongoing  Patient working with PT/OT. Patient NWB to right LE. Up to bedside commode x2 assist with walker and gait belt. Goal: Highest potential functional level  Outcome: Completed Date Met: 77/27/27  duplicate    Problem: Intellectual/Education/Knowledge Deficit  Goal: Teaching initiated upon admission  Outcome: Ongoing  Patient verbalizes understanding of all education provided    Problem: DISCHARGE BARRIERS  Goal: Patient's continuum of care needs are met  Outcome: Ongoing  Patient plans discharge to [facility]    Comments: Care plan reviewed with patient.  Patient verbalizes an understanding of plan of care and contributes to goal

## 2017-10-11 NOTE — PROGRESS NOTES
Giov[address] Muniz 60  INPATIENT OCCUPATIONAL THERAPY  [facility] ORTHOPEDICS 7K  DAILY NOTE    Time:  Time In: 2456  Time Out: 0179  Timed Code Treatment Minutes: 28 Minutes  Minutes: 28     Date: 10/11/2017  Patient Name: Dewayne Llamas,   Gender: male      Room: [facility]12/012-A  MRN: [id number]  : 1931  (80 y.o.)  Referring Practitioner: Sondra Grey MD     Additional Pertinent Hx: Per ED Note: Dewayne Llamas is a 80 y.o. male who presents to the Emergency Department for the evaluation of right hip pain. The patient states that he was walking in his kitchen earlier this morning using his walker when he fell down. He denies tripping on any object and states that he \"just fell. \" He states he currently has right hip pain. He denies neck pain, loss of consciousness, headache, chest pain, shortness of breath, or any other symptom. Pt is s/p R Hip Intertan on 10/8/17.       Restrictions/Precautions:  Restrictions/Precautions: Weight Bearing, Fall Risk, General Precautions    Right Lower Extremity Weight Bearing: Non Weight Bearing       Past Medical History:   Diagnosis Date    Acute sinusitis     Angina pectoris ([address]     Anxiety disorder 10/27/2016    Arthritis     osteoporosis    BPH with urinary obstruction     CAD (coronary artery disease)     Chronic insomnia     CKD (chronic kidney disease) stage 3, GFR 30-59 ml/min     Gastroenteritis     HCAP (healthcare-associated pneumonia) 2015    Heart disease     HLD (hyperlipidemia)     [place] (hard of hearing)     wear hearing aids    Hypertension     Kidney disease     40% kdiney function    Kidney stone     years ago 15-20    NICOLAS on CPAP     Osteopenia determined by x-ray     Pacemaker     Pinched nerve     slipped disc in back    Visual problems     Vitamin D deficiency      Past Surgical History:   Procedure Laterality Date    ABDOMINAL HERNIA REPAIR  2017    incisional hernia repair--Dr. Linda Velázquez    CATARACT REMOVAL WITH IMPLANT bilateral    COLONOSCOPY  2010    CORONARY ANGIOPLASTY WITH STENT PLACEMENT     [facility] DENTAL SURGERY  1960's    EKG 12-LEAD  4/29/2015         EYE SURGERY      cataracts    HERNIA REPAIR      several    HIP PINNING Left 8/31/2017    LEFT HIP INTERTAN performed by Bret Ro MD at [address]  12/3/12    left     MYRINGOTOMY AND TYMPANOSTOMY TUBE PLACEMENT  7/23/13    left     NASAL SINUS SURGERY      OTHER SURGICAL HISTORY  04/27/2015    transurethral Incision bladder neck    PACEMAKER INSERTION      PACEMAKER PLACEMENT  2011    TIBIA FRACTURE SURGERY Right 10/8/2017    Right hip intertan performed by Elias Preciado MD at [facility] TURP  4/17/2013    W/CYSTO WITH DIRECT VISION URETHROTOMY & RESECTION BLADDER NECK CONTRACTURE    TURP  4/27/15    re-do TURP    TYMPANOSTOMY TUBE PLACEMENT      multiple surgeries           Subjective     Subjective: RN ok'ed tx session. Pt received supine in bed, pleasant and cooperative throughout OT. Pain:  Pain Assessment  Patient Currently in Pain: Yes (when ambulating/prep to ambulate)  Pain Type: Surgical pain  Pain Location: Hip  Pain Orientation: Right       Objective  Overall Cognitive Status: WNL      ADL  LE Dressing: Stand by assistance;Minimal assistance (Pt completed LB ADL of donning/doff sock with AE including sock aid, shoe horn, and reacher with Min A required on first attempt to stabilize R foot, SBA to complete task with L foot.)  Additional Comments: Pt demo'd minor shakiness throughout task but still able to complete with encouragement in reasonable amount of time. Bed mobility  Supine to Sit: Moderate assistance (Mod A with head elevated, A to bring RLE toward EOB with vc's for technique, increased time d/t pain)    Transfers  Stand Pivot Transfers:  Moderate assistance (Mod Ax1 to complete stand pivot t/f with vc's to maintain RLE NWB and encouragement throughout task with vc's required short estimated length of stay.       \

## 2017-10-11 NOTE — PROGRESS NOTES
Deanna Lubasamuel from [facility] home was notified of patient being able to be discharged today. Deanna Lubasamuel concerned about PASSR screen not being completed, will have to start this tomorrow morning. [address] from [facility] called and states she looked up Connor Umaña online and pt is able to be discharged to their facility today. AVS and scripts were faxed to [phone].

## 2017-10-11 NOTE — PROGRESS NOTES
 PACEMAKER PLACEMENT  2011    TIBIA FRACTURE SURGERY Right 10/8/2017    Right hip intertan performed by Epimenio Gilford, MD at [address] TURP  4/17/2013    W/CYSTO WITH DIRECT VISION URETHROTOMY & RESECTION BLADDER NECK CONTRACTURE    TURP  4/27/15    re-do TURP    TYMPANOSTOMY TUBE PLACEMENT      multiple surgeries       Restrictions/Precautions:  Restrictions/Precautions: Weight Bearing, Fall Risk, General Precautions    Right Lower Extremity Weight Bearing: Non Weight Bearing                 Subjective:     Subjective: RN approved session. RN getting pt back to bed upon arrival. Pt agreeable to supine exercises in bed. RN notes pt has been up most of the day. Pain:  Yes. Pain Assessment  Pain Assessment: 0-10 (no pain rating given)       Social/Functional:  Lives With: Spouse  Type of Home: House  Home Layout: One level  Home Access: Stairs to enter without rails  Entrance Stairs - Number of Steps: 1-2 MORGAN  Home Equipment: Rolling walker, Standard walker, Cane     Objective:  Sit to Supine: Minimal assistance (Min A to lower trunk (RN present to assist), Min A for BLE onto bed.)  Scooting: Stand by assistance (scooting in supine to middle of bed)         Exercises:  Exercises  Comments: Pt completed supine exercises bilaterally x10 reps: ankle pumps, glute sets, quad sets, hip ABD/ADD (Min A RLE), short arc quads (Min A RLE), straight leg raise (Min A RLE), heel slides (Min A RLE) to increase strength for functional mobility. Activity Tolerance:  Activity Tolerance: Patient limited by pain; Patient limited by endurance    Assessment: Body structures, Functions, Activity limitations: Decreased functional mobility , Decreased ROM, Decreased strength, Decreased endurance, Decreased balance  Assessment: Pt tolerated session fair. Pt limited by pain, fatigue and endurance. Completed supine exercises for strengthening.  Pt would benefit from continued skilled physical therapy to improve functional

## 2017-10-11 NOTE — PROGRESS NOTES
Orthopaedic Progress Note      SUBJECTIVE   Mr. Arlene Ibarra is post op day # 3     Patient s/p IM nailing right hip hip. Patient seen resting in chair. Pain well controlled. No new problems. OBJECTIVE      Physical    VITALS:  BP (!) 160/83   Pulse 96   Temp 98.1 °F (36.7 °C) (Oral)   Resp 18   Ht 5' 6\" (1.676 m)   Wt 145 lb (65.8 kg)   SpO2 95%   BMI 23.40 kg/m²   I/O last 3 completed shifts: In: 1672.6 [P.O.:720; I.V.:332.6; Blood:310; IV Piggyback:310]  Out: 0592 [Urine:1575]      RLE:  Dressing dry and intact without drainage. ROM 0-60 degrees with mild discomfort. Calf soft nontender. ROM ankle/toes intact without pain. NVI, no numbness or tingling. Intact distal pulses.       Data  CBC:   Lab Results   Component Value Date    WBC 6.9 10/10/2017    HGB 8.6 10/11/2017     10/10/2017     BMP:    Lab Results   Component Value Date     10/10/2017    K 4.4 10/10/2017     10/10/2017    CO2 24 10/10/2017    BUN 25 10/10/2017    CREATININE 1.1 10/10/2017    CALCIUM 8.3 10/10/2017    GLUCOSE 113 10/10/2017    GLUCOSE 101 03/02/2016     Uric Acid:  No components found for: URIC  PT/INR:    Lab Results   Component Value Date    INR 1.03 10/08/2017     Troponin:  No results found for: TROPONINI  Urine Culture:  No components found for: CURINE      Current Inpatient Medications    Current Facility-Administered Medications: sulfacetamide (BLEPH-10) 10 % ophthalmic solution 1 drop, 1 drop, Right Eye, 6 times per day  aspirin EC tablet 81 mg, 81 mg, Oral, Daily  clopidogrel (PLAVIX) tablet 75 mg, 75 mg, Oral, Every Other Day  atorvastatin (LIPITOR) tablet 20 mg, 20 mg, Oral, Daily  vitamin D (CHOLECALCIFEROL) tablet 2,000 Units, 2,000 Units, Oral, Daily  isosorbide mononitrate (IMDUR) extended release tablet 30 mg, 30 mg, Oral, Daily  lactobacillus (CULTURELLE) capsule 1 capsule, 1 capsule, Oral, TID WC  losartan (COZAAR) tablet 50 mg, 50 mg, Oral, Daily  metoprolol tartrate (LOPRESSOR) tablet 12.5 mg, 12.5 mg, Oral, Daily  therapeutic multivitamin-minerals 1 tablet, 1 tablet, Oral, Daily  pantoprazole (PROTONIX) tablet 40 mg, 40 mg, Oral, QAM AC  sertraline (ZOLOFT) tablet 50 mg, 50 mg, Oral, Daily  sennosides-docusate sodium (SENOKOT-S) 8.6-50 MG tablet 1 tablet, 1 tablet, Oral, Daily  tamsulosin (FLOMAX) capsule 0.4 mg, 0.4 mg, Oral, BID  polyvinyl alcohol (LIQUIFILM TEARS) 1.4 % ophthalmic solution 1 drop, 1 drop, Both Eyes, Q4H PRN  ipratropium-albuterol (DUONEB) nebulizer solution 1 ampule, 1 ampule, Inhalation, Q4H WA  sodium chloride flush 0.9 % injection 10 mL, 10 mL, Intravenous, 2 times per day  sodium chloride flush 0.9 % injection 10 mL, 10 mL, Intravenous, PRN  acetaminophen (TYLENOL) tablet 650 mg, 650 mg, Oral, Q4H PRN  HYDROcodone-acetaminophen (NORCO) 5-325 MG per tablet 1 tablet, 1 tablet, Oral, Q4H PRN **OR** HYDROcodone-acetaminophen (NORCO) 5-325 MG per tablet 2 tablet, 2 tablet, Oral, Q4H PRN  morphine injection 2 mg, 2 mg, Intravenous, Q2H PRN **OR** morphine injection 4 mg, 4 mg, Intravenous, Q2H PRN  docusate sodium (COLACE) capsule 100 mg, 100 mg, Oral, BID  ondansetron (ZOFRAN) injection 4 mg, 4 mg, Intravenous, Q6H PRN  enoxaparin (LOVENOX) injection 40 mg, 40 mg, Subcutaneous, Q24H        PLAN    1) cont with current management  2)  NWB RLE, activity as tolerated  3)  Dry dressing changes PRN  4)  Plan on D/C to ECF either today or tomorrow  5)  F/U in office in 2 weeks    Deysi Coronado PA-C

## 2017-10-12 ENCOUNTER — TELEPHONE (OUTPATIENT)
Dept: FAMILY MEDICINE CLINIC | Age: 82
End: 2017-10-12

## 2017-10-12 NOTE — TELEPHONE ENCOUNTER
Follow-Up from Hospital    Admission date 10/08/17 -  Discharge date 10/11/17. Due to : Closed intertrochanteric fracture of hip, right, initial encounter [S72.141A]    Called patient. No answer. UNABLE TO LEAVE VM. Need to completed hospital fallow up questions (Dot Pharse) and tcmintialcall (Smart Text).     Last visit- 9/26/2017  Next visit- 11/29/2017

## 2017-10-12 NOTE — LETTER
[address] [address]  Phone: [phone]  Fax: [phone] [address]rera,         October 13, 2017    [address] [facility] [address]      Dear Royer Robles:    Our office has been trying to contact. Please call us back Monday through Friday 8-4. If you have any questions or concerns, please don't hesitate to call.     Sincerely,        Alanda Buerger, DO

## 2017-10-13 NOTE — TELEPHONE ENCOUNTER
Called patient. No answer. UNABLE TO LEAVE VM.      Need to completed hospital fallow up questions (Dot [place]arse) and tcmintialcall (Smart Text).

## 2017-10-27 ENCOUNTER — TELEPHONE (OUTPATIENT)
Dept: FAMILY MEDICINE CLINIC | Age: 82
End: 2017-10-27

## 2017-10-27 NOTE — TELEPHONE ENCOUNTER
Genevieve from [facility] called to notify Dr Ingrid Zamudio that this pt no-showed his appointment with Dr Duane Countryman

## 2017-12-08 ENCOUNTER — CARE COORDINATION (OUTPATIENT)
Dept: CARE COORDINATION | Age: 82
End: 2017-12-08

## 2017-12-08 ENCOUNTER — PATIENT MESSAGE (OUTPATIENT)
Dept: CARE COORDINATION | Age: 82
End: 2017-12-08

## 2017-12-08 NOTE — CARE COORDINATION
Ambulatory Care Coordination Note  12/8/2017  CM Risk Score: 10  Lanie Mortality Risk Score: 14.59    ACC: Rupal Jo, BRIAN    Summary Note: Spoke with Darryle Rinne then wife. Enrolled in care coordination. Recently discharged home from SNF at [facility], after hip fracture. Fell due to dizziness. Would benefit from home health. Recently seen by [facility]. Requires face to face with PCP. Appointment scheduled for 12/12/17 at noon with wife Lou Mike. Using a walker to ambulate. Daughter will drive them. Son is going to check on them this weekend. Discussed home safety to avoid falls. Contact information provided. Ambulatory Care Coordination Assessment    Care Coordination Protocol  Program Enrollment:  Complex Care  Referral from Primary Care Provider:  Yes  Week 1 - Initial Assessment     Do you have all of your prescriptions and are they filled?:  Yes  Barriers to medication adherence:  None  Are you able to afford your medications?:  Yes  How often do you have trouble taking your medications the way you have been told to take them?:  I always take them as prescribed. Do you have Home O2 Therapy?:  No      Ability to seek help/take action for Emergent Urgent situations i.e. fire, crime, inclement weather or health crisis. :  Independent  Ability to ambulate to restroom:  Independent  Ability handle personal hygeine needs (bathing/dressing/grooming): Independent  Ability to manage Medications: Independent  Ability to prepare Food Preparation:  Independent  Ability to maintain home (clean home, laundry): Independent  Ability to drive and/or has transportation:  Independent  Ability to do shopping:  Needs Assistance  Is patient able to live independently?:  Yes     Current Housing:  Private Residence        Per the Fall Risk Screening, did the patient have 2 or more falls or 1 fall with injury in the past year?:  Yes  How often do you think you are about to fall and you do NOT fall?  For example, you grab something to stabilize yourself or hold onto a wall/furniture?:  Occasionally  Use of a Mobility Aid:  Yes  Difficulty walking/impaired gait:  Yes  Issues with feet or shoes like numbness, edema, shoes not fitting:  No  Changes in vision, poor vision or poor lighting in environment:  No  Dizziness:  Yes  Other Fall Risk:  No     Frequent urination at night?:  No     Are you experiencing loss of meaning?:  No  Are you experiencing loss of hope and peace?:  No     Thinking about your patient's physical health needs, are there any symptoms or problems (risk indicators) you are unsure about that require further investigation?:  No identified areas of uncertainly or problems already being investigated   Are the patients physical health problems impacting on their mental well-being?:  No identified areas of concern   Are there any problems with your patients lifestyle behaviors (alcohol, drugs, diet, exercise) that are impacting on physical or mental well-being?:  No identified areas of concern   Do you have any other concerns about your patients mental well-being?  How would you rate their severity and impact on the patient?:  No identified areas of concern   How would you rate their home environment in terms of safety and stability (including domestic violence, insecure housing, neighbor harassment)?:  Consistently safe, supportive, stable, no identified problems   How do daily activities impact on the patient's well-being? (include current or anticipated unemployment, work, caregiving, access to transportation or other):  No identified problems or perceived positive benefits   How would you rate their social network (family, work, friends)?:  Adequate participation with social networks   How would you rate their financial resources (including ability to afford all required medical care)?:  Financially secure, some resource challenges   How wells does the patient now understand their health and well-being (symptoms, signs or risk factors) and what they need to do to manage their health?:  Reasonable to good understanding and already engages in managing health or is willing to undertake better management   How well do you think your patient can engage in healthcare discussions? (Barriers include language, deafness, aphasia, alcohol or drug problems, learning difficulties, concentration):  Clear and open communication, no identified barriers   Do other services need to be involved to help this patient?:  Other care/services not in place and required   Are current services involved with this patient well-coordinated? (Include coordination with other services you are now recommendation):  Required care/services missing and/or fragmented   Suggested Interventions and [address]: In Process         Schedule an appointment with the patient's PCP, Set up/Review an Education Plan, Set up/Review Goals              Prior to Admission medications    Medication Sig Start Date End Date Taking? Authorizing Provider   clopidogrel (PLAVIX) 75 MG tablet Take 1 tablet by mouth every other day 10/23/17   FARHANA Leon   enoxaparin (LOVENOX) 40 MG/0.4ML injection Inject 0.4 mLs into the skin daily 10/10/17   FARHANA Leon   cephALEXin [facility]) 500 MG capsule Take 1 capsule by mouth 2 times daily 10/10/17   FARHANA Leon   sertraline (ZOLOFT) 50 MG tablet TAKE ONE TABLET BY MOUTH ONCE DAILY 9/15/17   Vini Rashid MD   lactobacillus (CULTURELLE) capsule Take 1 capsule by mouth 3 times daily (with meals) Take while on the Ciprofloxacin.  9/15/17   Vini Rashid MD   losartan (COZAAR) 50 MG tablet Take 1 tablet by mouth daily 9/15/17   Vini Rashid MD   senna-docusate (DOK PLUS) 8.6-50 MG per tablet TAKE ONE TABLET BY MOUTH ONCE DAILY 9/15/17   Vini Rashid MD   Omega-3 Fatty Acids (FISH OIL BURP-LESS) 1200 MG CAPS Take 1 tablet by mouth daily Krill oil 9/15/17   Aliyah Marquez

## 2017-12-12 ENCOUNTER — CARE COORDINATION (OUTPATIENT)
Dept: CARE COORDINATION | Age: 82
End: 2017-12-12

## 2017-12-14 ENCOUNTER — OFFICE VISIT (OUTPATIENT)
Dept: FAMILY MEDICINE CLINIC | Age: 82
End: 2017-12-14
Payer: MEDICARE

## 2017-12-14 VITALS
BODY MASS INDEX: 25.87 KG/M2 | DIASTOLIC BLOOD PRESSURE: 82 MMHG | HEIGHT: 63 IN | TEMPERATURE: 98 F | OXYGEN SATURATION: 97 % | WEIGHT: 146 LBS | HEART RATE: 81 BPM | SYSTOLIC BLOOD PRESSURE: 138 MMHG

## 2017-12-14 DIAGNOSIS — S72.141D CLOSED COMMINUTED INTERTROCHANTERIC FRACTURE OF RIGHT FEMUR WITH ROUTINE HEALING: Primary | ICD-10-CM

## 2017-12-14 DIAGNOSIS — I10 ESSENTIAL HYPERTENSION: ICD-10-CM

## 2017-12-14 DIAGNOSIS — Z91.89 AT HIGH RISK FOR PAIN FROM PROCEDURE: ICD-10-CM

## 2017-12-14 PROCEDURE — G8598 ASA/ANTIPLAT THER USED: HCPCS | Performed by: FAMILY MEDICINE

## 2017-12-14 PROCEDURE — 4040F PNEUMOC VAC/ADMIN/RCVD: CPT | Performed by: FAMILY MEDICINE

## 2017-12-14 PROCEDURE — G8427 DOCREV CUR MEDS BY ELIG CLIN: HCPCS | Performed by: FAMILY MEDICINE

## 2017-12-14 PROCEDURE — 1123F ACP DISCUSS/DSCN MKR DOCD: CPT | Performed by: FAMILY MEDICINE

## 2017-12-14 PROCEDURE — 99213 OFFICE O/P EST LOW 20 MIN: CPT | Performed by: FAMILY MEDICINE

## 2017-12-14 PROCEDURE — 1036F TOBACCO NON-USER: CPT | Performed by: FAMILY MEDICINE

## 2017-12-14 PROCEDURE — G8417 CALC BMI ABV UP PARAM F/U: HCPCS | Performed by: FAMILY MEDICINE

## 2017-12-14 PROCEDURE — G8484 FLU IMMUNIZE NO ADMIN: HCPCS | Performed by: FAMILY MEDICINE

## 2017-12-14 RX ORDER — HYDROCODONE BITARTRATE AND ACETAMINOPHEN 5; 325 MG/1; MG/1
1 TABLET ORAL EVERY 6 HOURS PRN
COMMUNITY
End: 2018-02-20

## 2017-12-14 ASSESSMENT — ENCOUNTER SYMPTOMS
BLOOD IN STOOL: 0
TROUBLE SWALLOWING: 0
NAUSEA: 0
DIARRHEA: 0
BACK PAIN: 1
COUGH: 0
EYE PAIN: 0
ABDOMINAL PAIN: 0
SHORTNESS OF BREATH: 0
CONSTIPATION: 0
VOMITING: 0

## 2017-12-14 ASSESSMENT — PATIENT HEALTH QUESTIONNAIRE - PHQ9
1. LITTLE INTEREST OR PLEASURE IN DOING THINGS: 0
SUM OF ALL RESPONSES TO PHQ9 QUESTIONS 1 & 2: 0
2. FEELING DOWN, DEPRESSED OR HOPELESS: 0
SUM OF ALL RESPONSES TO PHQ QUESTIONS 1-9: 0

## 2017-12-14 NOTE — PATIENT INSTRUCTIONS
may harm a nursing baby. Tell your doctor if you are breast-feeding a baby. Do not give this medicine to a child younger than 3years old without the advice of a doctor. How should I take acetaminophen? Use exactly as directed on the label, or as prescribed by your doctor. Do not use in larger or smaller amounts or for longer than recommended. Do not take more of this medication than is recommended. An overdose of acetaminophen can damage your liver or cause death. Measure liquid medicine  with the dosing syringe provided, or with a special dose-measuring spoon or medicine cup. If you do not have a dose-measuring device, ask your pharmacist for one. If you are treating a child, use a pediatric form of acetaminophen. Use only the special dose-measuring dropper or oral syringe that comes with the specific pediatric form you are using. Carefully follow the dosing directions on the medicine label. Acetaminophen made for infants is available in two different dose concentrations, and each concentration comes with its own medicine dropper or oral syringe. These dosing devices are not equal between the different concentrations. Using the wrong device may cause you to give your child an overdose of acetaminophen. Never mix and match dosing devices between infant formulations of acetaminophen. You may need to shake the liquid before each use. Follow the directions on the medicine label. The chewable tablet must be chewed thoroughly before you swallow it. Make sure your hands are dry when handling the acetaminophen disintegrating tablet. Place the tablet on your tongue. It will begin to dissolve right away. Do not swallow the tablet whole. Allow it to dissolve in your mouth without chewing. To use the acetaminophen effervescent granules, dissolve one packet of the granules in at least 4 ounces of water. Stir this mixture and drink all of it right away.  To make sure you get the entire dose, add a little more water to

## 2017-12-14 NOTE — PROGRESS NOTES
Tobin Nyhan is a 80 y.o. male who presents today for:  Chief Complaint   Patient presents with    Other     Needs home health, not taking medications like she should be.  Other     bilateral hip fractures.         Goals      Blood Pressure < 140/90      Reduce Falls             I will reduce my risk of falls by the following: Remove rugs or use non slip rugs  Install grab bars in bathroom  Use walking aids like cane or walker  Follow through on orders for PT    Barriers: none  Plan for overcoming my barriers: N/A  Confidence: 6/10  Anticipated Goal Completion Date: 6/8/18            HPI:     HPI   He is here to recheck after the second fall    He does take the norco for pain - would like to take it more often     Just home from the rehab home - would like home health    He is waking up all night - can get an hour at the most at a time - if he dreams he will wake up with night sweats and have to change his clothes    He got a shot in the knee of cortisone    Past Medical History:   Diagnosis Date    Acute sinusitis     Angina pectoris ([address]     Anxiety disorder 10/27/2016    Arthritis     osteoporosis    BPH with urinary obstruction     CAD (coronary artery disease)     Chronic insomnia     CKD (chronic kidney disease) stage 3, GFR 30-59 ml/min     Gastroenteritis     HCAP (healthcare-associated pneumonia) 4/29/2015    Heart disease     HLD (hyperlipidemia)     Native (hard of hearing)     wear hearing aids    Hypertension     Kidney disease     40% kdiney function    Kidney stone     years ago 15-20    NICOLAS on CPAP     Osteopenia determined by x-ray     Pacemaker 2011    Pinched nerve     slipped disc in back    Visual problems     Vitamin D deficiency       Past Surgical History:   Procedure Laterality Date    ABDOMINAL HERNIA REPAIR  04/27/2017    incisional hernia repair--Dr. Marc Render    CATARACT REMOVAL WITH IMPLANT      bilateral    COLONOSCOPY  2010    CORONARY ANGIOPLASTY WITH STENT PLACEMENT     [facility] DENTAL SURGERY  1960's    EKG 12-LEAD  4/29/2015         EYE SURGERY      cataracts    HERNIA REPAIR      several    HIP PINNING Left 8/31/2017    LEFT HIP INTERTAN performed by Crissy Watt MD at [address]  12/3/12    left     MYRINGOTOMY AND TYMPANOSTOMY TUBE PLACEMENT  7/23/13    left     NASAL SINUS SURGERY      OTHER SURGICAL HISTORY  04/27/2015    transurethral Incision bladder neck    PACEMAKER INSERTION      PACEMAKER PLACEMENT  2011    TIBIA FRACTURE SURGERY Right 10/8/2017    Right hip intertan performed by January Irizarry MD at [address] TURP  4/17/2013    W/CYSTO WITH DIRECT VISION URETHROTOMY & RESECTION BLADDER NECK CONTRACTURE    TURP  4/27/15    re-do TURP    TYMPANOSTOMY TUBE PLACEMENT      multiple surgeries     Family History   Problem Relation Age of Onset    Cancer Brother 72     lung    Diabetes Brother     Kidney Disease Father      stones    Stroke Father     Kidney Disease Child      stones    Kidney Disease Child      stones    Heart Disease Sister     Arthritis Sister     High Blood Pressure Sister     High Cholesterol Sister     Diabetes Brother      multiple siblings had dm    Early Death Brother 72     lung cancer     Social History   Substance Use Topics    Smoking status: Former Smoker     Packs/day: 1.00     Years: 20.00     Types: Cigarettes     Quit date: 11/17/1965    Smokeless tobacco: Never Used      Comment: pt use to smoke cigars and pipe    Alcohol use 0.0 oz/week      Comment: 2-3 beers once a week      Current Outpatient Prescriptions   Medication Sig Dispense Refill    CALCIUM PO Take by mouth      HYDROcodone-acetaminophen (NORCO) 5-325 MG per tablet Take 1 tablet by mouth every 6 hours as needed for Pain .       clopidogrel (PLAVIX) 75 MG tablet Take 1 tablet by mouth every other day 30 tablet 3    sertraline (ZOLOFT) 50 MG tablet TAKE ONE TABLET BY MOUTH ONCE DAILY 30 tablet 5    losartan (COZAAR) 50 MG tablet Take 1 tablet by mouth daily 30 tablet 3    senna-docusate (DOK PLUS) 8.6-50 MG per tablet TAKE ONE TABLET BY MOUTH ONCE DAILY 60 tablet 0    Omega-3 Fatty Acids (FISH OIL BURP-LESS) 1200 MG CAPS Take 1 tablet by mouth daily Krill oil 30 capsule 3    tamsulosin (FLOMAX) 0.4 MG capsule Take 1 capsule by mouth 2 times daily 180 capsule 3    metoprolol tartrate (LOPRESSOR) 25 MG tablet Take 0.5 tablets by mouth daily Substitute for atenolol. 30 tablet 3    atorvastatin (LIPITOR) 20 MG tablet Take 20 mg by mouth daily      Cholecalciferol (VITAMIN D PO) Take 2,000 Units by mouth daily      Coenzyme Q10 (COQ-10 PO) Take 10 mg by mouth daily      TURMERIC PO Take 1,000 mg by mouth daily      NONFORMULARY Take 2 capsules by mouth daily ARthro -7 for joint pain      aspirin 81 MG tablet Take 81 mg by mouth daily      Multiple Vitamins-Minerals (THERAPEUTIC MULTIVITAMIN-MINERALS) tablet Take 1 tablet by mouth daily      isosorbide mononitrate (IMDUR) 30 MG CR tablet Take 30 mg by mouth daily.  enoxaparin (LOVENOX) 40 MG/0.4ML injection Inject 0.4 mLs into the skin daily 12 Syringe 0    cephALEXin (KEFLEX) 500 MG capsule Take 1 capsule by mouth 2 times daily 14 capsule 0    lactobacillus (CULTURELLE) capsule Take 1 capsule by mouth 3 times daily (with meals) Take while on the Ciprofloxacin. 18 capsule 0    pantoprazole (PROTONIX) 40 MG tablet Take 1 tablet by mouth every morning (before breakfast) for 6 days Take while on Lovenox. 30 tablet 3    KRILL OIL PO Take 1,000 mg by mouth daily       No current facility-administered medications for this visit.       Allergies   Allergen Reactions    Iodine Swelling and Rash       Health Maintenance   Topic Date Due    Zostavax vaccine  11/24/1991    Pneumococcal low/med risk (2 of 2 - PCV13) 10/27/2017    DTaP/Tdap/Td vaccine (2 - Td) 06/16/2027    Flu vaccine  Completed       Subjective:      Review of Systems vitals reviewed. Lab Results   Component Value Date    WBC 6.9 10/10/2017    HGB 8.6 (L) 10/11/2017    HCT 25.1 (L) 10/11/2017     10/10/2017    CHOL 118 12/05/2016    TRIG 93 12/05/2016    HDL 56 12/05/2016    LDLCALC 43 12/05/2016    AST 36 09/08/2017     10/10/2017    K 4.4 10/10/2017     10/10/2017    CREATININE 1.1 10/10/2017    BUN 25 (H) 10/10/2017    CO2 24 10/10/2017    TSH 1.530 03/09/2017    INR 1.03 10/08/2017    LABMICR 3.18 09/14/2016    LABGLOM 63 (A) 10/10/2017    MG 2.0 03/23/2017    CALCIUM 8.3 (L) 10/10/2017    VITD25 43 09/05/2017       Assessment:      1. Closed comminuted intertrochanteric fracture of right femur with routine healing  [address]   2. At high risk for pain from procedure  [address]   3. Essential hypertension  [address]: Will order home health - and home PT    Can keep an eye on the insomnia    Will try not to take any of the norco - can make the sleep and the pain worse after 6 weeks    will try to stop the norco and not take it on ly in an emergency - can take 1 gram of tylenol 4 times a day    If the pain in the back gets worse - can see Dr Susana Parker again    No Follow-up on file. Orders Placed:  Orders Placed This Encounter   Procedures   [address]     Medications Prescribed:  No orders of the defined types were placed in this encounter. Patient given educational materials - see patient instructions. Discussed use, benefit, and side effects of prescribed medications. All patient questions answered. Pt voiced understanding. Reviewed health maintenance. Instructed to continue current medications, diet and exercise. Patient agreed with treatment plan. Follow up as directed.      Electronically signed by Lola Rodriguez DO on 12/14/2017 at 11:30 AM

## 2017-12-19 ENCOUNTER — CARE COORDINATION (OUTPATIENT)
Dept: CARE COORDINATION | Age: 82
End: 2017-12-19

## 2017-12-19 NOTE — CARE COORDINATION
Sharon Martinez MD   lactobacillus (CULTURELLE) capsule Take 1 capsule by mouth 3 times daily (with meals) Take while on the Ciprofloxacin. 9/15/17   Sharon Martinez MD   losartan (COZAAR) 50 MG tablet Take 1 tablet by mouth daily 9/15/17   Sharon Martinez MD   senna-docusate (DOK PLUS) 8.6-50 MG per tablet TAKE ONE TABLET BY MOUTH ONCE DAILY 9/15/17   Sharon Martinez MD   Omega-3 Fatty Acids (FISH OIL BURP-LESS) 1200 MG CAPS Take 1 tablet by mouth daily Krill oil 9/15/17   Sharon Martinez MD   pantoprazole (PROTONIX) 40 MG tablet Take 1 tablet by mouth every morning (before breakfast) for 6 days Take while on Lovenox. 9/16/17 9/22/17  Sharon Martinez MD   tamsulosin [facility]) 0.4 MG capsule Take 1 capsule by mouth 2 times daily 9/15/17 9/15/18  Sharon Martinez MD   metoprolol tartrate (LOPRESSOR) 25 MG tablet Take 0.5 tablets by mouth daily Substitute for atenolol. 9/15/17   Sharon Martinez MD   atorvastatin (LIPITOR) 20 MG tablet Take 20 mg by mouth daily    Historical Provider, MD   KRILL OIL PO Take 1,000 mg by mouth daily    Historical Provider, MD   Cholecalciferol (VITAMIN D PO) Take 2,000 Units by mouth daily    Historical Provider, MD   Coenzyme Q10 (COQ-10 PO) Take 10 mg by mouth daily    Historical Provider, MD   TURMERIC PO Take 1,000 mg by mouth daily    Historical Provider, MD   NONFORMULARY Take 2 capsules by mouth daily ARthro -7 for joint pain    Historical Provider, MD   aspirin 81 MG tablet Take 81 mg by mouth daily    Historical Provider, MD   Multiple Vitamins-Minerals (THERAPEUTIC MULTIVITAMIN-MINERALS) tablet Take 1 tablet by mouth daily    Historical Provider, MD   isosorbide mononitrate (IMDUR) 30 MG CR tablet Take 30 mg by mouth daily.     Historical Provider, MD       Future Appointments  Date Time Provider Miguel Romo   1/30/2018 1:00 PM Aaliyah Hammond [facility] - [place] [address]   2/20/2018 11:00 AM DO ALBERT DurantX [facility] - [place] [address]   9/18/2018 1:00 PM DO CHARLENE Bacon [facility] - [place] [address]

## 2017-12-26 ENCOUNTER — APPOINTMENT (OUTPATIENT)
Dept: GENERAL RADIOLOGY | Age: 82
DRG: 377 | End: 2017-12-26
Payer: MEDICARE

## 2017-12-26 ENCOUNTER — HOSPITAL ENCOUNTER (INPATIENT)
Age: 82
LOS: 4 days | Discharge: HOME | DRG: 377 | End: 2017-12-31
Attending: EMERGENCY MEDICINE | Admitting: INTERNAL MEDICINE
Payer: MEDICARE

## 2017-12-26 ENCOUNTER — TELEPHONE (OUTPATIENT)
Dept: FAMILY MEDICINE CLINIC | Age: 82
End: 2017-12-26

## 2017-12-26 DIAGNOSIS — E43 SEVERE MALNUTRITION (HCC): ICD-10-CM

## 2017-12-26 DIAGNOSIS — K92.2 LOWER GI BLEED: Primary | ICD-10-CM

## 2017-12-26 DIAGNOSIS — E53.8 B12 DEFICIENCY: ICD-10-CM

## 2017-12-26 LAB
EKG ATRIAL RATE: 92 BPM
EKG P AXIS: 57 DEGREES
EKG P-R INTERVAL: 134 MS
EKG Q-T INTERVAL: 370 MS
EKG QRS DURATION: 90 MS
EKG QTC CALCULATION (BAZETT): 457 MS
EKG R AXIS: 84 DEGREES
EKG T AXIS: 18 DEGREES
EKG VENTRICULAR RATE: 92 BPM

## 2017-12-26 PROCEDURE — 71010 XR CHEST PORTABLE: CPT

## 2017-12-26 PROCEDURE — 93005 ELECTROCARDIOGRAM TRACING: CPT

## 2017-12-26 PROCEDURE — 99284 EMERGENCY DEPT VISIT MOD MDM: CPT

## 2017-12-26 PROCEDURE — 36430 TRANSFUSION BLD/BLD COMPNT: CPT

## 2017-12-26 RX ORDER — SODIUM CHLORIDE 9 MG/ML
INJECTION, SOLUTION INTRAVENOUS CONTINUOUS
Status: DISCONTINUED | OUTPATIENT
Start: 2017-12-27 | End: 2017-12-27

## 2017-12-26 ASSESSMENT — ENCOUNTER SYMPTOMS
SHORTNESS OF BREATH: 0
VOMITING: 0
SORE THROAT: 0
DIARRHEA: 1
BACK PAIN: 0
COUGH: 0
WHEEZING: 0
BLOOD IN STOOL: 1
NAUSEA: 0
ABDOMINAL PAIN: 0

## 2017-12-26 NOTE — TELEPHONE ENCOUNTER
Spoke with Steff Ruiz and advised to call Dr Hernán Kelly since that is his GI. She stated that she would. She rechecked his O2 level later and it was 90% and stated that he does have COPD. I told her that he should be fine but if it gets lower to call us or go to ER.

## 2017-12-26 NOTE — TELEPHONE ENCOUNTER
12/26/17  Patient daughter Lily Monique hipaa), states patient home from [facility] x 2 weeks and noticed bright red blood in stool today. She said she thinks he had this in the past and possible workup was mentioned? Patient has home health coming and nurse also commented that his O2 was low (didn't know reading). Please advise.   David/adilene  Dolv: 12/14/17  Donv:  2/20/18

## 2017-12-27 PROBLEM — E43 SEVERE MALNUTRITION (HCC): Status: ACTIVE | Noted: 2017-12-27

## 2017-12-27 PROBLEM — K92.2 ACUTE LOWER GI BLEEDING: Status: ACTIVE | Noted: 2017-12-27

## 2017-12-27 PROBLEM — E53.8 B12 DEFICIENCY: Status: ACTIVE | Noted: 2017-12-27

## 2017-12-27 LAB
ABO: NORMAL
ALBUMIN SERPL-MCNC: 3.7 G/DL (ref 3.5–5.1)
ALLEN TEST: NEGATIVE
ALP BLD-CCNC: 92 U/L (ref 38–126)
ALT SERPL-CCNC: 9 U/L (ref 11–66)
ANION GAP SERPL CALCULATED.3IONS-SCNC: 10 MEQ/L (ref 8–16)
ANION GAP SERPL CALCULATED.3IONS-SCNC: 12 MEQ/L (ref 8–16)
ANISOCYTOSIS: ABNORMAL
ANTIBODY SCREEN: NORMAL
AST SERPL-CCNC: 14 U/L (ref 5–40)
BASE EXCESS (CALCULATED): -0.7 MMOL/L (ref -2.5–2.5)
BASOPHILS # BLD: 0.7 %
BASOPHILS ABSOLUTE: 0 THOU/MM3 (ref 0–0.1)
BILIRUB SERPL-MCNC: < 0.2 MG/DL (ref 0.3–1.2)
BILIRUBIN DIRECT: < 0.2 MG/DL (ref 0–0.3)
BILIRUBIN URINE: NEGATIVE
BLOOD, URINE: NEGATIVE
BUN BLDV-MCNC: 29 MG/DL (ref 7–22)
BUN BLDV-MCNC: 30 MG/DL (ref 7–22)
CALCIUM SERPL-MCNC: 8.2 MG/DL (ref 8.5–10.5)
CALCIUM SERPL-MCNC: 8.6 MG/DL (ref 8.5–10.5)
CHARACTER, URINE: CLEAR
CHLORIDE BLD-SCNC: 102 MEQ/L (ref 98–111)
CHLORIDE BLD-SCNC: 103 MEQ/L (ref 98–111)
CO2: 23 MEQ/L (ref 23–33)
CO2: 24 MEQ/L (ref 23–33)
COLLECTED BY:: ABNORMAL
COLOR: YELLOW
CREAT SERPL-MCNC: 1.3 MG/DL (ref 0.4–1.2)
CREAT SERPL-MCNC: 1.3 MG/DL (ref 0.4–1.2)
DEVICE: ABNORMAL
EOSINOPHIL # BLD: 1.7 %
EOSINOPHILS ABSOLUTE: 0.1 THOU/MM3 (ref 0–0.4)
FERRITIN: 43 NG/ML (ref 22–322)
FOLATE: > 20 NG/ML (ref 4.8–24.2)
GFR SERPL CREATININE-BSD FRML MDRD: 52 ML/MIN/1.73M2
GFR SERPL CREATININE-BSD FRML MDRD: 52 ML/MIN/1.73M2
GLUCOSE BLD-MCNC: 108 MG/DL (ref 70–108)
GLUCOSE BLD-MCNC: 112 MG/DL (ref 70–108)
GLUCOSE URINE: NEGATIVE MG/DL
HCO3: 23 MMOL/L (ref 23–28)
HCT VFR BLD CALC: 22.1 % (ref 42–52)
HCT VFR BLD CALC: 22.5 % (ref 42–52)
HCT VFR BLD CALC: 22.7 % (ref 42–52)
HEMOCCULT STL QL: POSITIVE
HEMOGLOBIN: 7.4 GM/DL (ref 14–18)
HEMOGLOBIN: 7.5 GM/DL (ref 14–18)
HEMOGLOBIN: 7.8 GM/DL (ref 14–18)
INR BLD: 1.03 (ref 0.85–1.13)
IRON: 141 UG/DL (ref 65–195)
KETONES, URINE: NEGATIVE
LACTIC ACID: 0.9 MMOL/L (ref 0.5–2.2)
LEUKOCYTE ESTERASE, URINE: NEGATIVE
LYMPHOCYTES # BLD: 13.5 %
LYMPHOCYTES ABSOLUTE: 0.9 THOU/MM3 (ref 1–4.8)
MCH RBC QN AUTO: 28.9 PG (ref 27–31)
MCHC RBC AUTO-ENTMCNC: 33.1 GM/DL (ref 33–37)
MCV RBC AUTO: 87.3 FL (ref 80–94)
MONOCYTES # BLD: 7.3 %
MONOCYTES ABSOLUTE: 0.5 THOU/MM3 (ref 0.4–1.3)
NITRITE, URINE: NEGATIVE
NUCLEATED RED BLOOD CELLS: 0 /100 WBC
O2 SATURATION: 98 %
OSMOLALITY CALCULATION: 280.8 MOSMOL/KG (ref 275–300)
PCO2: 31 MMHG (ref 35–45)
PDW BLD-RTO: 15 % (ref 11.5–14.5)
PH BLOOD GAS: 7.47 (ref 7.35–7.45)
PH UA: 5
PLATELET # BLD: 275 THOU/MM3 (ref 130–400)
PMV BLD AUTO: 7.1 MCM (ref 7.4–10.4)
PO2: 93 MMHG (ref 71–104)
POTASSIUM SERPL-SCNC: 4 MEQ/L (ref 3.5–5.2)
POTASSIUM SERPL-SCNC: 4.1 MEQ/L (ref 3.5–5.2)
PRO-BNP: 314.2 PG/ML (ref 0–1800)
PROTEIN UA: NEGATIVE
RBC # BLD: 2.6 MILL/MM3 (ref 4.7–6.1)
RETICULOCYTE ABSOLUTE COUNT: 1.4 % (ref 0.5–2)
RH FACTOR: NORMAL
SEG NEUTROPHILS: 76.8 %
SEGMENTED NEUTROPHILS ABSOLUTE COUNT: 5.3 THOU/MM3 (ref 1.8–7.7)
SODIUM BLD-SCNC: 137 MEQ/L (ref 135–145)
SODIUM BLD-SCNC: 137 MEQ/L (ref 135–145)
SOURCE, BLOOD GAS: ABNORMAL
SPECIFIC GRAVITY, URINE: 1.02 (ref 1–1.03)
TOTAL PROTEIN: 6.4 G/DL (ref 6.1–8)
TROPONIN T: 0.02 NG/ML
TSH SERPL DL<=0.05 MIU/L-ACNC: 1.62 UIU/ML (ref 0.4–4.2)
UROBILINOGEN, URINE: 0.2 EU/DL
VITAMIN B-12: 154 PG/ML (ref 211–911)
WBC # BLD: 6.9 THOU/MM3 (ref 4.8–10.8)

## 2017-12-27 PROCEDURE — 80048 BASIC METABOLIC PNL TOTAL CA: CPT

## 2017-12-27 PROCEDURE — 83605 ASSAY OF LACTIC ACID: CPT

## 2017-12-27 PROCEDURE — 81003 URINALYSIS AUTO W/O SCOPE: CPT

## 2017-12-27 PROCEDURE — 86900 BLOOD TYPING SEROLOGIC ABO: CPT

## 2017-12-27 PROCEDURE — 87040 BLOOD CULTURE FOR BACTERIA: CPT

## 2017-12-27 PROCEDURE — 84484 ASSAY OF TROPONIN QUANT: CPT

## 2017-12-27 PROCEDURE — 82803 BLOOD GASES ANY COMBINATION: CPT

## 2017-12-27 PROCEDURE — 80053 COMPREHEN METABOLIC PANEL: CPT

## 2017-12-27 PROCEDURE — 85025 COMPLETE CBC W/AUTO DIFF WBC: CPT

## 2017-12-27 PROCEDURE — 85014 HEMATOCRIT: CPT

## 2017-12-27 PROCEDURE — 82272 OCCULT BLD FECES 1-3 TESTS: CPT

## 2017-12-27 PROCEDURE — 83540 ASSAY OF IRON: CPT

## 2017-12-27 PROCEDURE — P9016 RBC LEUKOCYTES REDUCED: HCPCS

## 2017-12-27 PROCEDURE — 86850 RBC ANTIBODY SCREEN: CPT

## 2017-12-27 PROCEDURE — 6360000002 HC RX W HCPCS: Performed by: INTERNAL MEDICINE

## 2017-12-27 PROCEDURE — 85610 PROTHROMBIN TIME: CPT

## 2017-12-27 PROCEDURE — 36600 WITHDRAWAL OF ARTERIAL BLOOD: CPT

## 2017-12-27 PROCEDURE — 85018 HEMOGLOBIN: CPT

## 2017-12-27 PROCEDURE — 93307 TTE W/O DOPPLER COMPLETE: CPT

## 2017-12-27 PROCEDURE — C9113 INJ PANTOPRAZOLE SODIUM, VIA: HCPCS | Performed by: INTERNAL MEDICINE

## 2017-12-27 PROCEDURE — 82728 ASSAY OF FERRITIN: CPT

## 2017-12-27 PROCEDURE — 36430 TRANSFUSION BLD/BLD COMPNT: CPT

## 2017-12-27 PROCEDURE — 85045 AUTOMATED RETICULOCYTE COUNT: CPT

## 2017-12-27 PROCEDURE — 84466 ASSAY OF TRANSFERRIN: CPT

## 2017-12-27 PROCEDURE — 86923 COMPATIBILITY TEST ELECTRIC: CPT

## 2017-12-27 PROCEDURE — 82607 VITAMIN B-12: CPT

## 2017-12-27 PROCEDURE — 82746 ASSAY OF FOLIC ACID SERUM: CPT

## 2017-12-27 PROCEDURE — 86901 BLOOD TYPING SEROLOGIC RH(D): CPT

## 2017-12-27 PROCEDURE — 84238 ASSAY NONENDOCRINE RECEPTOR: CPT

## 2017-12-27 PROCEDURE — 84443 ASSAY THYROID STIM HORMONE: CPT

## 2017-12-27 PROCEDURE — 2580000003 HC RX 258: Performed by: INTERNAL MEDICINE

## 2017-12-27 PROCEDURE — 82248 BILIRUBIN DIRECT: CPT

## 2017-12-27 PROCEDURE — 36415 COLL VENOUS BLD VENIPUNCTURE: CPT

## 2017-12-27 PROCEDURE — 6370000000 HC RX 637 (ALT 250 FOR IP): Performed by: INTERNAL MEDICINE

## 2017-12-27 PROCEDURE — 83880 ASSAY OF NATRIURETIC PEPTIDE: CPT

## 2017-12-27 PROCEDURE — 2580000003 HC RX 258: Performed by: EMERGENCY MEDICINE

## 2017-12-27 PROCEDURE — 1200000003 HC TELEMETRY R&B

## 2017-12-27 RX ORDER — ATORVASTATIN CALCIUM 20 MG/1
20 TABLET, FILM COATED ORAL NIGHTLY
Status: DISCONTINUED | OUTPATIENT
Start: 2017-12-27 | End: 2017-12-31 | Stop reason: HOSPADM

## 2017-12-27 RX ORDER — HYDROCODONE BITARTRATE AND ACETAMINOPHEN 5; 325 MG/1; MG/1
1 TABLET ORAL EVERY 4 HOURS PRN
Status: DISCONTINUED | OUTPATIENT
Start: 2017-12-27 | End: 2017-12-27 | Stop reason: SDUPTHER

## 2017-12-27 RX ORDER — ACETAMINOPHEN 325 MG/1
650 TABLET ORAL EVERY 4 HOURS PRN
Status: DISCONTINUED | OUTPATIENT
Start: 2017-12-27 | End: 2017-12-27 | Stop reason: SDUPTHER

## 2017-12-27 RX ORDER — ONDANSETRON 2 MG/ML
4 INJECTION INTRAMUSCULAR; INTRAVENOUS EVERY 6 HOURS PRN
Status: DISCONTINUED | OUTPATIENT
Start: 2017-12-27 | End: 2017-12-31 | Stop reason: HOSPADM

## 2017-12-27 RX ORDER — HYDROCODONE BITARTRATE AND ACETAMINOPHEN 5; 325 MG/1; MG/1
1 TABLET ORAL EVERY 6 HOURS PRN
Status: DISCONTINUED | OUTPATIENT
Start: 2017-12-27 | End: 2017-12-31 | Stop reason: HOSPADM

## 2017-12-27 RX ORDER — SODIUM CHLORIDE 0.9 % (FLUSH) 0.9 %
10 SYRINGE (ML) INJECTION PRN
Status: DISCONTINUED | OUTPATIENT
Start: 2017-12-27 | End: 2017-12-27 | Stop reason: SDUPTHER

## 2017-12-27 RX ORDER — SODIUM CHLORIDE 0.9 % (FLUSH) 0.9 %
10 SYRINGE (ML) INJECTION EVERY 12 HOURS SCHEDULED
Status: DISCONTINUED | OUTPATIENT
Start: 2017-12-27 | End: 2017-12-27 | Stop reason: SDUPTHER

## 2017-12-27 RX ORDER — ISOSORBIDE MONONITRATE 30 MG/1
30 TABLET, EXTENDED RELEASE ORAL DAILY
Status: DISCONTINUED | OUTPATIENT
Start: 2017-12-27 | End: 2017-12-31 | Stop reason: HOSPADM

## 2017-12-27 RX ORDER — CYANOCOBALAMIN 1000 UG/ML
1000 INJECTION INTRAMUSCULAR; SUBCUTANEOUS ONCE
Status: COMPLETED | OUTPATIENT
Start: 2017-12-27 | End: 2017-12-27

## 2017-12-27 RX ORDER — LOSARTAN POTASSIUM 50 MG/1
50 TABLET ORAL DAILY
Status: DISCONTINUED | OUTPATIENT
Start: 2017-12-27 | End: 2017-12-31 | Stop reason: HOSPADM

## 2017-12-27 RX ORDER — SODIUM CHLORIDE 0.9 % (FLUSH) 0.9 %
10 SYRINGE (ML) INJECTION EVERY 12 HOURS SCHEDULED
Status: DISCONTINUED | OUTPATIENT
Start: 2017-12-27 | End: 2017-12-31 | Stop reason: HOSPADM

## 2017-12-27 RX ORDER — 0.9 % SODIUM CHLORIDE 0.9 %
250 INTRAVENOUS SOLUTION INTRAVENOUS ONCE
Status: DISCONTINUED | OUTPATIENT
Start: 2017-12-27 | End: 2017-12-31 | Stop reason: HOSPADM

## 2017-12-27 RX ORDER — TAMSULOSIN HYDROCHLORIDE 0.4 MG/1
0.4 CAPSULE ORAL 2 TIMES DAILY
Status: DISCONTINUED | OUTPATIENT
Start: 2017-12-27 | End: 2017-12-31 | Stop reason: HOSPADM

## 2017-12-27 RX ORDER — HYDROCODONE BITARTRATE AND ACETAMINOPHEN 5; 325 MG/1; MG/1
2 TABLET ORAL EVERY 4 HOURS PRN
Status: DISCONTINUED | OUTPATIENT
Start: 2017-12-27 | End: 2017-12-27 | Stop reason: SDUPTHER

## 2017-12-27 RX ORDER — ACETAMINOPHEN 325 MG/1
650 TABLET ORAL EVERY 4 HOURS PRN
Status: DISCONTINUED | OUTPATIENT
Start: 2017-12-27 | End: 2017-12-31 | Stop reason: HOSPADM

## 2017-12-27 RX ORDER — SODIUM CHLORIDE 9 MG/ML
INJECTION, SOLUTION INTRAVENOUS CONTINUOUS
Status: DISCONTINUED | OUTPATIENT
Start: 2017-12-27 | End: 2017-12-29

## 2017-12-27 RX ORDER — POLYETHYLENE GLYCOL 3350 17 G/17G
238 POWDER, FOR SOLUTION ORAL ONCE
Status: COMPLETED | OUTPATIENT
Start: 2017-12-28 | End: 2017-12-28

## 2017-12-27 RX ORDER — SENNA PLUS 8.6 MG/1
15 TABLET ORAL ONCE
Status: COMPLETED | OUTPATIENT
Start: 2017-12-28 | End: 2017-12-28

## 2017-12-27 RX ORDER — SODIUM CHLORIDE 0.9 % (FLUSH) 0.9 %
10 SYRINGE (ML) INJECTION PRN
Status: DISCONTINUED | OUTPATIENT
Start: 2017-12-27 | End: 2017-12-31 | Stop reason: HOSPADM

## 2017-12-27 RX ADMIN — Medication 8 MG/HR: at 16:00

## 2017-12-27 RX ADMIN — ISOSORBIDE MONONITRATE 30 MG: 30 TABLET ORAL at 11:05

## 2017-12-27 RX ADMIN — Medication 8 MG/HR: at 07:46

## 2017-12-27 RX ADMIN — Medication 10 ML: at 16:17

## 2017-12-27 RX ADMIN — HYDROCODONE BITARTRATE AND ACETAMINOPHEN 1 TABLET: 5; 325 TABLET ORAL at 22:25

## 2017-12-27 RX ADMIN — ATORVASTATIN CALCIUM 20 MG: 20 TABLET, FILM COATED ORAL at 22:25

## 2017-12-27 RX ADMIN — SODIUM CHLORIDE: 900 INJECTION, SOLUTION INTRAVENOUS at 00:11

## 2017-12-27 RX ADMIN — LOSARTAN POTASSIUM 50 MG: 50 TABLET, FILM COATED ORAL at 11:04

## 2017-12-27 RX ADMIN — CYANOCOBALAMIN 1000 MCG: 1000 INJECTION, SOLUTION INTRAMUSCULAR at 22:25

## 2017-12-27 RX ADMIN — SODIUM CHLORIDE 80 MG: 9 INJECTION, SOLUTION INTRAVENOUS at 07:13

## 2017-12-27 RX ADMIN — HYDROCODONE BITARTRATE AND ACETAMINOPHEN 1 TABLET: 5; 325 TABLET ORAL at 08:36

## 2017-12-27 RX ADMIN — Medication 10 ML: at 18:11

## 2017-12-27 RX ADMIN — SERTRALINE 50 MG: 50 TABLET, FILM COATED ORAL at 11:04

## 2017-12-27 RX ADMIN — SODIUM CHLORIDE: 900 INJECTION, SOLUTION INTRAVENOUS at 11:02

## 2017-12-27 RX ADMIN — Medication 10 ML: at 15:30

## 2017-12-27 RX ADMIN — TAMSULOSIN HYDROCHLORIDE 0.4 MG: 0.4 CAPSULE ORAL at 22:25

## 2017-12-27 RX ADMIN — SODIUM CHLORIDE: 900 INJECTION, SOLUTION INTRAVENOUS at 06:00

## 2017-12-27 RX ADMIN — TAMSULOSIN HYDROCHLORIDE 0.4 MG: 0.4 CAPSULE ORAL at 11:04

## 2017-12-27 RX ADMIN — METOPROLOL TARTRATE 12.5 MG: 25 TABLET ORAL at 11:04

## 2017-12-27 ASSESSMENT — PAIN DESCRIPTION - ONSET: ONSET: GRADUAL

## 2017-12-27 ASSESSMENT — PAIN DESCRIPTION - DESCRIPTORS: DESCRIPTORS: ACHING

## 2017-12-27 ASSESSMENT — PAIN DESCRIPTION - PAIN TYPE
TYPE: ACUTE PAIN
TYPE: CHRONIC PAIN

## 2017-12-27 ASSESSMENT — PAIN DESCRIPTION - LOCATION
LOCATION: HIP
LOCATION: HIP

## 2017-12-27 ASSESSMENT — PAIN DESCRIPTION - ORIENTATION
ORIENTATION: RIGHT
ORIENTATION: RIGHT

## 2017-12-27 ASSESSMENT — PAIN SCALES - GENERAL
PAINLEVEL_OUTOF10: 5

## 2017-12-27 ASSESSMENT — PAIN DESCRIPTION - DIRECTION: RADIATING_TOWARDS: RIGHT KNEE

## 2017-12-27 ASSESSMENT — PAIN DESCRIPTION - FREQUENCY: FREQUENCY: INTERMITTENT

## 2017-12-27 NOTE — H&P
daily   Yes Historical Provider, MD   Multiple Vitamins-Minerals (THERAPEUTIC MULTIVITAMIN-MINERALS) tablet Take 1 tablet by mouth daily   Yes Historical Provider, MD   isosorbide mononitrate (IMDUR) 30 MG CR tablet Take 30 mg by mouth daily. Yes Historical Provider, MD   CALCIUM PO Take by mouth 2 times daily     Historical Provider, MD   enoxaparin (LOVENOX) 40 MG/0.4ML injection Inject 0.4 mLs into the skin daily 10/10/17   Claudette Rhodes, PA   cephALEXin (KEFLEX) 500 MG capsule Take 1 capsule by mouth 2 times daily 10/10/17   Claudette Rhodes, PA   pantoprazole (PROTONIX) 40 MG tablet Take 1 tablet by mouth every morning (before breakfast) for 6 days Take while on Lovenox. 9/16/17 12/26/17  Franklin Lundberg MD   atorvastatin (LIPITOR) 20 MG tablet Take 20 mg by mouth daily    Historical Provider, MD   NONFORMULARY Take 2 capsules by mouth daily ARthro -7 for joint pain    Historical Provider, MD       Allergies:  Iodine    Social History:   TOBACCO:   reports that he quit smoking about 52 years ago. His smoking use included Cigarettes. He has a 20.00 pack-year smoking history. He has never used smokeless tobacco.  ETOH:   reports that he drinks alcohol.       Family History:       Problem Relation Age of Onset    Cancer Brother 72     lung    Diabetes Brother     Kidney Disease Father      stones    Stroke Father     Kidney Disease Child      stones    Kidney Disease Child      stones    Heart Disease Sister     Arthritis Sister     High Blood Pressure Sister     High Cholesterol Sister     Diabetes Brother      multiple siblings had dm    Early Death Brother 72     lung cancer       REVIEW OF SYSTEMS:  CONSTITUTIONAL:  positive for  fatigue, malaise and weight loss  negative for  fevers and chills  EYES:  negative for  visual disturbance, irritation, redness and icterus  HEENT:  negative for  nasal congestion, epistaxis, sore mouth and sore throat  RESPIRATORY:  negative for  dyspnea, wheezing and CREATININE  1.3*  1.3*   GLUCOSE  112*  108     Hepatic:   Recent Labs      12/27/17   0045   AST  14   ALT  9*   BILITOT  <0.2*   ALKPHOS  92     INR:   Recent Labs      12/27/17   0045   INR  1.03     OCCULT BLOOD FECAL Positive       -----------------------------------------------------------------  PA/lat CXR: Lungs/pleura:  No pneumonia, pulmonary edema, or obvious mass. There is stable pulmonary fibrosis. The lungs are hyperinflated consistent with COPD.  There is biapical pleural thickening and pleural thickening blunting the right lateral costophrenic angle. No pleural effusion. No pneumothorax. HEART: Unremarkable. No cardiomegaly. Mediastinum/jerod: The aorta is tortuous, appropriate for age. Hilar and mediastinal silhouettes are otherwise unremarkable. Skeleton: No change in the nonunion fracture deformity of the distal right clavicle. No change in the old upper right lateral rib fracture deformities. There are multiple old thoracic vertebral compression fracture deformities. There is mild lower   thoracic levoscoliosis. Lines/tubes: No change in the left subclavian dual-lead pacemaker. EKG: NSR 92 bpm, ERIN, NI    Assessment and Plan   1. Hematochezia / Lower GI bleed  2. Anemia associated with above  3. CKD stage 3  4. CAD with prior angioplasty  5. S/p PPM    NPO  Monitor H/H q 6; iron studies  Protonix. GI consult. Hold all antiplatelets / anticoags. Cautious hydration.     Patient Active Problem List   Diagnosis Code    Dyspnea R06.00    Bladder neck contracture N32.0    BPH (benign prostatic hyperplasia) N40.0    Suprapubic pain R10.2    S/P TURP (status post transurethral resection of prostate) Z90.79    HCAP (healthcare-associated pneumonia) J18.9    CKD (chronic kidney disease) stage 3, GFR 30-59 ml/min N18.3    CAD (coronary artery disease) I25.10    History of pacemaker Z95.0    Nausea and vomiting R11.2    Chronic serous otitis media of left ear H65.22    Hearing loss,

## 2017-12-27 NOTE — ED NOTES
Received report from [facility]. Vitals as noted. Patient resting in bed. No needs voiced at this time. Updated on plan of care. Side rails up x2. Call light in reach.       Lisseth Fuller RN  12/27/17 1077

## 2017-12-27 NOTE — CONSULTS
fatigue  HEENT: Denies sore throat, sinus problems, allergic rhinosinusitis  Card: Denies chest pain, palpitations, orthopnea/PND. Denies h/o murmurs. Hx of CAD, PCI, pacemaker  Pulm: Denies cough, shortness of breath, BAIG  GI:  per HPI and denies h/o jaundice  : Denies polyuria, dysuria, hematuria  (+) CKD, hx of TURP  Endo: Denies diabetes, thyroid disease. Heme: Denies  h/o bleeding or clotting problems. (+) anemia  Neuro: Denies h/o CVA, TIA  Skin: Denies rashes, ulcers  Musculoskeletal: Denies muscle, joint, back pain. Allergies: Iodine    Home Meds:   Prior to Admission medications    Medication Sig Start Date End Date Taking? Authorizing Provider   HYDROcodone-acetaminophen (NORCO) 5-325 MG per tablet Take 1 tablet by mouth every 6 hours as needed for Pain . Yes Historical Provider, MD   clopidogrel (PLAVIX) 75 MG tablet Take 1 tablet by mouth every other day 10/23/17  Yes FARHANA Zavala   sertraline (ZOLOFT) 50 MG tablet TAKE ONE TABLET BY MOUTH ONCE DAILY 9/15/17  Yes Caesar Connors MD   lactobacillus (CULTURELLE) capsule Take 1 capsule by mouth 3 times daily (with meals) Take while on the Ciprofloxacin. 9/15/17  Yes Caesar Connors MD   losartan (COZAAR) 50 MG tablet Take 1 tablet by mouth daily 9/15/17  Yes Caesar Connors MD   senna-docusate (DOK PLUS) 8.6-50 MG per tablet TAKE ONE TABLET BY MOUTH ONCE DAILY 9/15/17  Yes Caesar Connors MD   Omega-3 Fatty Acids (FISH OIL BURP-LESS) 1200 MG CAPS Take 1 tablet by mouth daily Krill oil 9/15/17  Yes Caesar Connors MD   tamsulosin [facility]) 0.4 MG capsule Take 1 capsule by mouth 2 times daily 9/15/17 9/15/18 Yes Caesar Connors MD   metoprolol tartrate (LOPRESSOR) 25 MG tablet Take 0.5 tablets by mouth daily Substitute for atenolol.  9/15/17  Yes Caesar Connors MD   KRILL OIL PO Take 1,000 mg by mouth daily   Yes Historical Provider, MD   Cholecalciferol (VITAMIN D PO) Take 2,000 Units by mouth daily   Yes Historical Provider, MD 0.4 mg, 0.4 mg, Oral, BID, Tracie Taylor MD, 0.4 mg at 12/27/17 1104    sodium chloride flush 0.9 % injection 10 mL, 10 mL, Intravenous, 2 times per day, Tracie Taylor MD    sodium chloride flush 0.9 % injection 10 mL, 10 mL, Intravenous, PRN, Tracie Taylor MD    acetaminophen (TYLENOL) tablet 650 mg, 650 mg, Oral, Q4H PRN, Tracie Taylor MD    ondansetron (ZOFRAN) injection 4 mg, 4 mg, Intravenous, Q6H PRN, Tracie Taylor MD    0.9 % sodium chloride infusion, , Intravenous, Continuous, Tracie Taylor MD, Last Rate: 100 mL/hr at 12/27/17 1102    pantoprazole (PROTONIX) 80 mg in sodium chloride 0.9 % 100 mL infusion, 8 mg/hr, Intravenous, Continuous, Tracie Taylor MD, Last Rate: 10 mL/hr at 12/27/17 0746, 8 mg/hr at 12/27/17 0746    [START ON 12/28/2017] pneumococcal 13-valent conjugate (PREVNAR) injection 0.5 mL, 0.5 mL, Intramuscular, Once, Hina Gallagher DO    Vital Signs:  Vitals:    12/27/17 1442   BP: (!) 81/51   Pulse: 78   Resp: 16   Temp: 97.3 °F (36.3 °C)   SpO2: 97%       Weight:  Wt Readings from Last 3 Encounters:   12/27/17 140 lb 6.9 oz (63.7 kg)   12/14/17 146 lb (66.2 kg)   10/08/17 145 lb (65.8 kg)       Physical Exam:  General Appearance:  awake, alert, oriented, in no acute distress and well developed, well nourished elderly  male. Pt is lying comfortably in bed , in no apparent distress. HEENT: Atraumatic, Pupils reactive, No icterus. Pale conjunctiva bilaterally. Oral mucosa moist/No thrush. (+) [place], hearing aid right ear. Neck: No thyroid enlargement, No cervical or supraclavicular lymphaedenopathy  CVS: Regular rate and rhythm, No murmurs. No rubs or gallops  RS: Good b/l air entry, Clear to auscultation b/l  Abd: soft, non-tender, non-distended, no visible veins, scars, No hepatosplenomegaly or palpable masses, bowel sounds active.   Ext: No clubbing, cyanosis, edema  CNS: alert, oriented, no gross focal motor deficits    Labs:   Lab Results   Component Value

## 2017-12-27 NOTE — ED PROVIDER NOTES
nervous/anxious. All other systems reviewed and are negative. PAST MEDICAL HISTORY    has a past medical history of Acute sinusitis; Angina pectoris ([address] Anxiety disorder; Arthritis; BPH with urinary obstruction; CAD (coronary artery disease); Chronic insomnia; CKD (chronic kidney disease) stage 3, GFR 30-59 ml/min; Gastroenteritis; HCAP (healthcare-associated pneumonia); Heart disease; HLD (hyperlipidemia); [place] (hard of hearing); Hypertension; Kidney disease; Kidney stone; NICOLAS on CPAP; Osteopenia determined by x-ray; Pacemaker; Pinched nerve; Visual problems; and Vitamin D deficiency. SURGICAL HISTORY      has a past surgical history that includes Coronary angioplasty with stent; Nasal sinus surgery; Pacemaker insertion; Tympanostomy tube placement; Cataract removal with implant; Myringotomy Tympanostomy Tube Placement (12/3/12); Dental surgery (2534'Y); pacemaker placement (2011); Colonoscopy (2010); hernia repair; TURP (4/17/2013); Myringotomy Tympanostomy Tube Placement (7/23/13); other surgical history (04/27/2015); eye surgery; EKG 12 Lead (4/29/2015); TURP (4/27/15); Abdominal hernia repair (04/27/2017); hip pinning (Left, 8/31/2017); and Tibia fracture surgery (Right, 10/8/2017). CURRENT MEDICATIONS       Previous Medications    ASPIRIN 81 MG TABLET    Take 81 mg by mouth daily    ATORVASTATIN (LIPITOR) 20 MG TABLET    Take 20 mg by mouth daily    CALCIUM PO    Take by mouth    CEPHALEXIN (KEFLEX) 500 MG CAPSULE    Take 1 capsule by mouth 2 times daily    CHOLECALCIFEROL (VITAMIN D PO)    Take 2,000 Units by mouth daily    CLOPIDOGREL (PLAVIX) 75 MG TABLET    Take 1 tablet by mouth every other day    COENZYME Q10 (COQ-10 PO)    Take 10 mg by mouth daily    ENOXAPARIN (LOVENOX) 40 MG/0.4ML INJECTION    Inject 0.4 mLs into the skin daily    HYDROCODONE-ACETAMINOPHEN (NORCO) 5-325 MG PER TABLET    Take 1 tablet by mouth every 6 hours as needed for Pain .     ISOSORBIDE MONONITRATE (IMDUR) 30 MG CR TABLET    Take 30 mg by mouth daily. KRILL OIL PO    Take 1,000 mg by mouth daily    LACTOBACILLUS (CULTURELLE) CAPSULE    Take 1 capsule by mouth 3 times daily (with meals) Take while on the Ciprofloxacin. LOSARTAN (COZAAR) 50 MG TABLET    Take 1 tablet by mouth daily    METOPROLOL TARTRATE (LOPRESSOR) 25 MG TABLET    Take 0.5 tablets by mouth daily Substitute for atenolol. MULTIPLE VITAMINS-MINERALS (THERAPEUTIC MULTIVITAMIN-MINERALS) TABLET    Take 1 tablet by mouth daily    NONFORMULARY    Take 2 capsules by mouth daily ARthro -7 for joint pain    OMEGA-3 FATTY ACIDS (FISH OIL BURP-LESS) 1200 MG CAPS    Take 1 tablet by mouth daily Krill oil    PANTOPRAZOLE (PROTONIX) 40 MG TABLET    Take 1 tablet by mouth every morning (before breakfast) for 6 days Take while on Lovenox. SENNA-DOCUSATE (DOK PLUS) 8.6-50 MG PER TABLET    TAKE ONE TABLET BY MOUTH ONCE DAILY    SERTRALINE (ZOLOFT) 50 MG TABLET    TAKE ONE TABLET BY MOUTH ONCE DAILY    TAMSULOSIN (FLOMAX) 0.4 MG CAPSULE    Take 1 capsule by mouth 2 times daily    TURMERIC PO    Take 1,000 mg by mouth daily       ALLERGIES     is allergic to iodine. FAMILY HISTORY     indicated that his mother is . He indicated that his father is . He indicated that the status of his sister is unknown. He indicated that two of his three brothers are . He indicated that both of his jenna are alive. family history includes Arthritis in his sister; Cancer (age of onset: 72) in his brother; Diabetes in his brother and brother; Early Death (age of onset: 72) in his brother; Heart Disease in his sister; High Blood Pressure in his sister; High Cholesterol in his sister; Kidney Disease in his child, child, and father; Stroke in his father. SOCIAL HISTORY      reports that he quit smoking about 52 years ago. His smoking use included Cigarettes. He has a 20.00 pack-year smoking history.  He has never used smokeless tobacco. He reports that he warm and dry. No rash (on exposed surfaced) noted. He is not diaphoretic. No cyanosis or erythema. There is pallor. Psychiatric: He has a normal mood and affect. His speech is normal and behavior is normal. Cognition and memory are impaired. Nursing note and vitals reviewed. DIFFERENTIAL DIAGNOSIS:   Lower GI bleeding likely. Complicated by antiplatelet therapy. Colitis or diverticulitis, less likely malignancy. SIRS criteria present. DIAGNOSTIC RESULTS     EKG: All EKG's are interpreted by the Emergency Department Physician who either signs or Co-signs this chart in the absence of a cardiologist.    ----------Electrocardiogram----------  Heart Rate: Normal  RATE: 92  RHYTHM: Normal Sinus Rhythm  AXIS: Normal  ECTOPY: No Ectopy  CONDUCTION: NC: Normal,QRS: Normal,QT: Normal  ST -T SEGMENT: Normal  CLINICAL IMPRESSION: No Acute Change  Date: 12/27/2017    RADIOLOGY: non-plain film images(s) such as CT, Ultrasound and MRI are read by the radiologist.    XR Chest Portable   Final Result      No acute cardiopulmonary disease. Stable appearance of the chest as detailed above. **This report has been created using voice recognition software. It may contain minor errors which are inherent in voice recognition technology. **      Final report electronically signed by Dr. Toscano Estimable on 12/27/2017 12:14 AM          [x] Visualized and interpreted by me   [x] Radiologist's Wet Read Report Reviewed   [] Discussed with Radiologist.    Blanca Hernandez:   Results for orders placed or performed during the hospital encounter of 12/26/17   Blood occult stool screen #1   Result Value Ref Range    OCCULT BLOOD FECAL Positive    CBC auto differential   Result Value Ref Range    WBC 6.9 4.8 - 10.8 thou/mm3    RBC 2.60 (L) 4.70 - 6.10 mill/mm3    Hemoglobin 7.5 (L) 14.0 - 18.0 gm/dl    Hematocrit 22.7 (L) 42.0 - 52.0 %    MCV 87.3 80.0 - 94.0 fL    MCH 28.9 27.0 - 31.0 pg    MCHC 33.1 33.0 - 37.0 gm/dl    RDW 15.0 (H) 11.5 - Rate 92 BPM    Atrial Rate 92 BPM    P-R Interval 134 ms    QRS Duration 90 ms    Q-T Interval 370 ms    QTc Calculation (Bazett) 457 ms    P Axis 57 degrees    R Axis 84 degrees    T Axis 18 degrees   TYPE AND SCREEN   Result Value Ref Range    ABO O     Rh Factor POS     Antibody Screen NEG        EMERGENCY DEPARTMENT COURSE:   Vitals:    Vitals:    12/26/17 2308 12/27/17 0009 12/27/17 0121 12/27/17 0230   BP: 132/80 137/76 (!) 133/110 126/66   Pulse: 100 91 74 88   Resp: 22 19 22 18   Temp: 98.1 °F (36.7 °C)      TempSrc: Oral      SpO2: 98% 98% 99% 99%   Weight: 146 lb (66.2 kg)      Height: 5' 3\" (1.6 m)          Orders Placed This Encounter   Medications    0.9 % sodium chloride infusion    0.9 % sodium chloride bolus       Patient was seen and evaluated in the emergency department. History and physical were completed. Diagnostic labs and imaging studies are reviewed. Workup suggest Anemia with hemoglobin of 7.5 and active macroscopic blood in the stool. The patient was treated with IV fluids and blood pressure was maintained in pulse rate decreased to normal.  I have ordered 2 units of red blood cells to be transfused and I contacted Dr. Gee Regalado, internal medicine, who graciously will admit and assume further care and orders for patient at this time. CRITICAL CARE:  There was a high probability of clinically significant/life threatening deterioration in this patient's condition which required my urgent intervention. Total critical care time was 30 minutes. This excludes any time for separately reportable procedures. CONSULTS:  Dr. Gee Regalado, internal medicine, admitting. PROCEDURES:  None. FINAL IMPRESSION      1.  Lower GI bleed          DISPOSITION/PLAN   DISPOSITION Admitted 12/27/2017 01:40:22 AM    PATIENT REFERRED TO:  Wayne Crook [facility] [place] [address]  [address] [place] [id number]  [phone]            Dr. Dano Lozoya M.D 12/26/17 3:00 AM              Rubin Pardo MD  12/27/17 9162

## 2017-12-27 NOTE — PROGRESS NOTES
Nutrition Assessment    Type and Reason for Visit: Initial, Positive Nutrition Screen    Nutrition Recommendations: Diet initiation when appropriate. Malnutrition Assessment:  · Malnutrition Status: Meets the criteria for severe malnutrition  · Context: Acute illness or injury  · Findings of the 6 clinical characteristics of malnutrition (Minimum of 2 out of 6 clinical characteristics is required to make the diagnosis of moderate or severe Protein Calorie Malnutrition based on AND/ASPEN Guidelines):  1. Energy Intake-Less than or equal to 50%, greater than or equal to 5 days    2. Weight Loss-2% loss or greater (3.8% loss),  (1.9 weeks)  3. Fat Loss-Moderate subcutaneous fat loss, Orbital  4. Muscle Loss-Moderate muscle mass loss, Temples (temporalis muscle), Clavicles (pectoralis and deltoids), Thigh (quadriceps), Calf (gastrocnemius)    Nutrition Diagnosis:   · Problem: Severe malnutrition  · Etiology: related to Insufficient energy/nutrient consumption, Alteration in GI function     Signs and symptoms:  as evidenced by Diet history of poor intake, Weight loss, Moderate loss of subcutaneous fat, Moderate muscle loss    Nutrition Assessment:  · Subjective Assessment: Pt. seen, reports he has dentures so has trouble chewing tough meats, when diet resumed he thought sending ground meats may be of benefit. States he recently came home from rehab at [facility], home~ 2 weeks, states \"I fell back into my old eating habits which hasn't been as good as eating 3 meals/day\". States very poor appetite in the last week, the last few days hasn't ate anything he reports. Note pt. has lost ~5.6# in the last 1.9wks per EMR. He states his usual weight ~ 155-165#. He agreed to ONS when diet resumed. GI has been consulted due to rectal bleeding.     · Wound Type: None  · Current Nutrition Therapies:  · Oral Diet Orders: NPO   · Anthropometric Measures:  · Ht: 5' 3\" (160 cm)   · Current Body Wt: 140 lb 6.9 oz (63.7 kg) (12/27/17 RLE edema)  · Admission Body Wt: 140 lb 6.9 oz (63.7 kg) (12/27/17 RLE edema)  · Usual Body Wt:  (155-165# per pt. Per EMR: 12/14/17: 146#, 10/28/17: 145#, 9/14/17: 150#3. 2oz, 6/16/17: 158#6. 4oz, 12/30/16:157#6. 4oz)  · Ideal Body Wt: 124 lb (56.2 kg),   · BMI Classification: BMI 18.5 - 24.9 Normal Weight  · Comparative Standards (Estimated Nutrition Needs):  · Estimated Daily Total Kcal: 1568-5112 kcals  · Estimated Daily Protein (g): 64-77 grams    Estimated Intake vs Estimated Needs: Intake Less Than Needs    Nutrition Risk Level: High    Nutrition Interventions:   Continue NPO (Pt. agreed to ONS when diet advanced.)  Continued Inpatient Monitoring, Education Initiated (Encouraged oral intake when diet resumed.)    Nutrition Evaluation:   · Evaluation: Goals set   · Goals: Pt. will receive adequate nutrition in 1-4 days. · Monitoring: NPO Status, Diet Progression, Ascites/Edema, Weight, Pertinent Labs, Diarrhea, Chewing/Swallowing    See Adult Nutrition Doc Flowsheet for more detail.      Electronically signed by Twila Merlin, RD, BENOIT on 12/27/17 at 11:12 AM    Contact Number: [phone]

## 2017-12-28 LAB
ANION GAP SERPL CALCULATED.3IONS-SCNC: 12 MEQ/L (ref 8–16)
BUN BLDV-MCNC: 23 MG/DL (ref 7–22)
CALCIUM SERPL-MCNC: 8.3 MG/DL (ref 8.5–10.5)
CHLORIDE BLD-SCNC: 106 MEQ/L (ref 98–111)
CO2: 21 MEQ/L (ref 23–33)
CREAT SERPL-MCNC: 1.2 MG/DL (ref 0.4–1.2)
GFR SERPL CREATININE-BSD FRML MDRD: 57 ML/MIN/1.73M2
GLUCOSE BLD-MCNC: 93 MG/DL (ref 70–108)
HCT VFR BLD CALC: 20 % (ref 42–52)
HCT VFR BLD CALC: 20.5 % (ref 42–52)
HCT VFR BLD CALC: 21.9 % (ref 42–52)
HCT VFR BLD CALC: 23.4 % (ref 42–52)
HEMOGLOBIN: 6.7 GM/DL (ref 14–18)
HEMOGLOBIN: 7 GM/DL (ref 14–18)
HEMOGLOBIN: 7.3 GM/DL (ref 14–18)
HEMOGLOBIN: 7.9 GM/DL (ref 14–18)
POTASSIUM SERPL-SCNC: 4.1 MEQ/L (ref 3.5–5.2)
SODIUM BLD-SCNC: 139 MEQ/L (ref 135–145)

## 2017-12-28 PROCEDURE — P9016 RBC LEUKOCYTES REDUCED: HCPCS

## 2017-12-28 PROCEDURE — 6370000000 HC RX 637 (ALT 250 FOR IP): Performed by: NURSE PRACTITIONER

## 2017-12-28 PROCEDURE — 6370000000 HC RX 637 (ALT 250 FOR IP): Performed by: INTERNAL MEDICINE

## 2017-12-28 PROCEDURE — 2580000003 HC RX 258: Performed by: NURSE PRACTITIONER

## 2017-12-28 PROCEDURE — C9113 INJ PANTOPRAZOLE SODIUM, VIA: HCPCS | Performed by: INTERNAL MEDICINE

## 2017-12-28 PROCEDURE — 1200000003 HC TELEMETRY R&B

## 2017-12-28 PROCEDURE — 85014 HEMATOCRIT: CPT

## 2017-12-28 PROCEDURE — 6360000002 HC RX W HCPCS: Performed by: INTERNAL MEDICINE

## 2017-12-28 PROCEDURE — 2580000003 HC RX 258: Performed by: INTERNAL MEDICINE

## 2017-12-28 PROCEDURE — 80048 BASIC METABOLIC PNL TOTAL CA: CPT

## 2017-12-28 PROCEDURE — 85018 HEMOGLOBIN: CPT

## 2017-12-28 PROCEDURE — 36430 TRANSFUSION BLD/BLD COMPNT: CPT

## 2017-12-28 PROCEDURE — 36415 COLL VENOUS BLD VENIPUNCTURE: CPT

## 2017-12-28 RX ORDER — 0.9 % SODIUM CHLORIDE 0.9 %
250 INTRAVENOUS SOLUTION INTRAVENOUS ONCE
Status: COMPLETED | OUTPATIENT
Start: 2017-12-28 | End: 2017-12-28

## 2017-12-28 RX ORDER — 0.9 % SODIUM CHLORIDE 0.9 %
250 INTRAVENOUS SOLUTION INTRAVENOUS ONCE
Status: COMPLETED | OUTPATIENT
Start: 2017-12-28 | End: 2017-12-29

## 2017-12-28 RX ORDER — CYANOCOBALAMIN 1000 UG/ML
1000 INJECTION INTRAMUSCULAR; SUBCUTANEOUS ONCE
Status: COMPLETED | OUTPATIENT
Start: 2017-12-28 | End: 2017-12-28

## 2017-12-28 RX ORDER — LANOLIN ALCOHOL/MO/W.PET/CERES
1000 CREAM (GRAM) TOPICAL DAILY
Status: DISCONTINUED | OUTPATIENT
Start: 2017-12-29 | End: 2017-12-31 | Stop reason: HOSPADM

## 2017-12-28 RX ADMIN — SODIUM CHLORIDE: 900 INJECTION, SOLUTION INTRAVENOUS at 12:54

## 2017-12-28 RX ADMIN — SODIUM CHLORIDE 250 ML: 9 INJECTION, SOLUTION INTRAVENOUS at 20:40

## 2017-12-28 RX ADMIN — SODIUM CHLORIDE: 900 INJECTION, SOLUTION INTRAVENOUS at 00:11

## 2017-12-28 RX ADMIN — METOPROLOL TARTRATE 12.5 MG: 25 TABLET ORAL at 11:54

## 2017-12-28 RX ADMIN — HYDROCODONE BITARTRATE AND ACETAMINOPHEN 1 TABLET: 5; 325 TABLET ORAL at 20:12

## 2017-12-28 RX ADMIN — TAMSULOSIN HYDROCHLORIDE 0.4 MG: 0.4 CAPSULE ORAL at 09:13

## 2017-12-28 RX ADMIN — POLYETHYLENE GLYCOL 3350 238 G: 17 POWDER, FOR SOLUTION ORAL at 13:49

## 2017-12-28 RX ADMIN — CYANOCOBALAMIN 1000 MCG: 1000 INJECTION, SOLUTION INTRAMUSCULAR at 17:21

## 2017-12-28 RX ADMIN — SENNA 129 MG: 8.6 TABLET, COATED ORAL at 12:48

## 2017-12-28 RX ADMIN — Medication 8 MG/HR: at 23:22

## 2017-12-28 RX ADMIN — Medication 8 MG/HR: at 12:53

## 2017-12-28 RX ADMIN — Medication 10 ML: at 20:12

## 2017-12-28 RX ADMIN — SODIUM CHLORIDE: 900 INJECTION, SOLUTION INTRAVENOUS at 23:22

## 2017-12-28 RX ADMIN — ATORVASTATIN CALCIUM 20 MG: 20 TABLET, FILM COATED ORAL at 20:12

## 2017-12-28 RX ADMIN — TAMSULOSIN HYDROCHLORIDE 0.4 MG: 0.4 CAPSULE ORAL at 20:12

## 2017-12-28 RX ADMIN — SODIUM CHLORIDE 250 ML: 9 INJECTION, SOLUTION INTRAVENOUS at 04:30

## 2017-12-28 RX ADMIN — HYDROCODONE BITARTRATE AND ACETAMINOPHEN 1 TABLET: 5; 325 TABLET ORAL at 04:29

## 2017-12-28 RX ADMIN — LOSARTAN POTASSIUM 50 MG: 50 TABLET, FILM COATED ORAL at 11:54

## 2017-12-28 RX ADMIN — SERTRALINE 50 MG: 50 TABLET, FILM COATED ORAL at 09:13

## 2017-12-28 RX ADMIN — HYDROCODONE BITARTRATE AND ACETAMINOPHEN 1 TABLET: 5; 325 TABLET ORAL at 11:54

## 2017-12-28 RX ADMIN — ISOSORBIDE MONONITRATE 30 MG: 30 TABLET ORAL at 11:54

## 2017-12-28 ASSESSMENT — PAIN SCALES - GENERAL
PAINLEVEL_OUTOF10: 6
PAINLEVEL_OUTOF10: 5
PAINLEVEL_OUTOF10: 3
PAINLEVEL_OUTOF10: 6

## 2017-12-28 ASSESSMENT — PAIN DESCRIPTION - PAIN TYPE: TYPE: CHRONIC PAIN

## 2017-12-28 ASSESSMENT — PAIN DESCRIPTION - ORIENTATION: ORIENTATION: RIGHT

## 2017-12-28 ASSESSMENT — PAIN DESCRIPTION - LOCATION: LOCATION: HIP

## 2017-12-28 NOTE — PLAN OF CARE
Problem: Bleeding:  Goal: Will show no signs and symptoms of excessive bleeding  Will show no signs and symptoms of excessive bleeding  Outcome: Met This Shift      Comments: Care plan reviewed with patient and family. Patient family verbalize understanding of the plan of care and contribute to goal setting.     Electronically signed by Anthony Cee RN on 12/27/2017 at 9:38 PM

## 2017-12-29 LAB
ANION GAP SERPL CALCULATED.3IONS-SCNC: 9 MEQ/L (ref 8–16)
BUN BLDV-MCNC: 15 MG/DL (ref 7–22)
CALCIUM SERPL-MCNC: 8.4 MG/DL (ref 8.5–10.5)
CHLORIDE BLD-SCNC: 108 MEQ/L (ref 98–111)
CO2: 23 MEQ/L (ref 23–33)
CREAT SERPL-MCNC: 1 MG/DL (ref 0.4–1.2)
GFR SERPL CREATININE-BSD FRML MDRD: 71 ML/MIN/1.73M2
GLUCOSE BLD-MCNC: 95 MG/DL (ref 70–108)
HCT VFR BLD CALC: 20.2 % (ref 42–52)
HCT VFR BLD CALC: 22 % (ref 42–52)
HCT VFR BLD CALC: 23.7 % (ref 42–52)
HCT VFR BLD CALC: 24 % (ref 42–52)
HEMOGLOBIN: 7 GM/DL (ref 14–18)
HEMOGLOBIN: 7.3 GM/DL (ref 14–18)
HEMOGLOBIN: 7.9 GM/DL (ref 14–18)
HEMOGLOBIN: 8.1 GM/DL (ref 14–18)
POTASSIUM SERPL-SCNC: 4.1 MEQ/L (ref 3.5–5.2)
SODIUM BLD-SCNC: 140 MEQ/L (ref 135–145)
SOLUBLE TRANSFERRIN RECEPT: 2.7 MG/L (ref 2.2–5)
TRANSFERRIN: 156 MG/DL (ref 200–400)

## 2017-12-29 PROCEDURE — 97535 SELF CARE MNGMENT TRAINING: CPT

## 2017-12-29 PROCEDURE — G8987 SELF CARE CURRENT STATUS: HCPCS

## 2017-12-29 PROCEDURE — 85018 HEMOGLOBIN: CPT

## 2017-12-29 PROCEDURE — P9016 RBC LEUKOCYTES REDUCED: HCPCS

## 2017-12-29 PROCEDURE — 88305 TISSUE EXAM BY PATHOLOGIST: CPT

## 2017-12-29 PROCEDURE — 99152 MOD SED SAME PHYS/QHP 5/>YRS: CPT | Performed by: INTERNAL MEDICINE

## 2017-12-29 PROCEDURE — 36430 TRANSFUSION BLD/BLD COMPNT: CPT

## 2017-12-29 PROCEDURE — 3609019800 HC COLONOSCOPY WITH SUBMUCOSAL INJECTION: Performed by: INTERNAL MEDICINE

## 2017-12-29 PROCEDURE — 3609009900 HC COLONOSCOPY W/CONTROL BLEEDING ANY METHOD: Performed by: INTERNAL MEDICINE

## 2017-12-29 PROCEDURE — G8988 SELF CARE GOAL STATUS: HCPCS

## 2017-12-29 PROCEDURE — 85014 HEMATOCRIT: CPT

## 2017-12-29 PROCEDURE — 80048 BASIC METABOLIC PNL TOTAL CA: CPT

## 2017-12-29 PROCEDURE — 0DBK8ZX EXCISION OF ASCENDING COLON, VIA NATURAL OR ARTIFICIAL OPENING ENDOSCOPIC, DIAGNOSTIC: ICD-10-PCS | Performed by: INTERNAL MEDICINE

## 2017-12-29 PROCEDURE — 99153 MOD SED SAME PHYS/QHP EA: CPT | Performed by: INTERNAL MEDICINE

## 2017-12-29 PROCEDURE — 6360000002 HC RX W HCPCS

## 2017-12-29 PROCEDURE — 6370000000 HC RX 637 (ALT 250 FOR IP): Performed by: INTERNAL MEDICINE

## 2017-12-29 PROCEDURE — 36415 COLL VENOUS BLD VENIPUNCTURE: CPT

## 2017-12-29 PROCEDURE — 1200000003 HC TELEMETRY R&B

## 2017-12-29 PROCEDURE — 6360000002 HC RX W HCPCS: Performed by: INTERNAL MEDICINE

## 2017-12-29 PROCEDURE — 2580000003 HC RX 258: Performed by: INTERNAL MEDICINE

## 2017-12-29 PROCEDURE — 97165 OT EVAL LOW COMPLEX 30 MIN: CPT

## 2017-12-29 RX ORDER — MIDAZOLAM HYDROCHLORIDE 1 MG/ML
INJECTION INTRAMUSCULAR; INTRAVENOUS PRN
Status: DISCONTINUED | OUTPATIENT
Start: 2017-12-29 | End: 2017-12-29 | Stop reason: HOSPADM

## 2017-12-29 RX ORDER — FENTANYL CITRATE 50 UG/ML
INJECTION, SOLUTION INTRAMUSCULAR; INTRAVENOUS PRN
Status: DISCONTINUED | OUTPATIENT
Start: 2017-12-29 | End: 2017-12-29 | Stop reason: HOSPADM

## 2017-12-29 RX ORDER — 0.9 % SODIUM CHLORIDE 0.9 %
250 INTRAVENOUS SOLUTION INTRAVENOUS ONCE
Status: COMPLETED | OUTPATIENT
Start: 2017-12-29 | End: 2017-12-29

## 2017-12-29 RX ORDER — PANTOPRAZOLE SODIUM 40 MG/1
40 TABLET, DELAYED RELEASE ORAL
Status: DISCONTINUED | OUTPATIENT
Start: 2017-12-30 | End: 2017-12-31 | Stop reason: HOSPADM

## 2017-12-29 RX ADMIN — SODIUM CHLORIDE 250 ML: 9 INJECTION, SOLUTION INTRAVENOUS at 16:15

## 2017-12-29 RX ADMIN — TAMSULOSIN HYDROCHLORIDE 0.4 MG: 0.4 CAPSULE ORAL at 20:25

## 2017-12-29 RX ADMIN — ACETAMINOPHEN 650 MG: 325 TABLET ORAL at 11:06

## 2017-12-29 RX ADMIN — SERTRALINE 50 MG: 50 TABLET, FILM COATED ORAL at 09:12

## 2017-12-29 RX ADMIN — Medication 1000 MCG: at 09:12

## 2017-12-29 RX ADMIN — ATORVASTATIN CALCIUM 20 MG: 20 TABLET, FILM COATED ORAL at 20:25

## 2017-12-29 RX ADMIN — ACETAMINOPHEN 650 MG: 325 TABLET ORAL at 20:28

## 2017-12-29 RX ADMIN — METOPROLOL TARTRATE 12.5 MG: 25 TABLET ORAL at 09:12

## 2017-12-29 RX ADMIN — HYDROCODONE BITARTRATE AND ACETAMINOPHEN 1 TABLET: 5; 325 TABLET ORAL at 03:49

## 2017-12-29 RX ADMIN — ISOSORBIDE MONONITRATE 30 MG: 30 TABLET ORAL at 09:12

## 2017-12-29 RX ADMIN — LOSARTAN POTASSIUM 50 MG: 50 TABLET, FILM COATED ORAL at 09:12

## 2017-12-29 RX ADMIN — TAMSULOSIN HYDROCHLORIDE 0.4 MG: 0.4 CAPSULE ORAL at 09:12

## 2017-12-29 RX ADMIN — Medication 10 ML: at 20:25

## 2017-12-29 ASSESSMENT — PAIN DESCRIPTION - PAIN TYPE: TYPE: CHRONIC PAIN

## 2017-12-29 ASSESSMENT — PAIN SCALES - GENERAL
PAINLEVEL_OUTOF10: 0
PAINLEVEL_OUTOF10: 0
PAINLEVEL_OUTOF10: 5
PAINLEVEL_OUTOF10: 5
PAINLEVEL_OUTOF10: 3
PAINLEVEL_OUTOF10: 0
PAINLEVEL_OUTOF10: 3
PAINLEVEL_OUTOF10: 0

## 2017-12-29 ASSESSMENT — PAIN DESCRIPTION - LOCATION: LOCATION: HIP

## 2017-12-29 ASSESSMENT — PAIN DESCRIPTION - ORIENTATION: ORIENTATION: RIGHT

## 2017-12-29 ASSESSMENT — PAIN - FUNCTIONAL ASSESSMENT: PAIN_FUNCTIONAL_ASSESSMENT: 0-10

## 2017-12-29 NOTE — OP NOTE
[address] [place] [address]                                 OPERATIVE REPORT    PATIENT NAME: Diogo Tripp                       :        1931  MED REC NO:   [id number]                           ROOM:       0008  ACCOUNT NO:   [de-identified]                           ADMIT DATE: 2017  PROVIDER:     LORENZA Thomas Nine OF PROCEDURE:  2017    INDICATION:  The patient has a history of GI bleed. Plan today for  colonoscopy to evaluate. ASA CLASSIFICATION:  III. DESCRIPTION OF PROCEDURE:  The patient was brought to the GI lab. Consent  was obtained. The risks involved with the procedure were explained to the  patient. Informed consent was obtained. The patient was monitored during  the procedure with pulse oximetry, blood pressure monitoring, and oxygen by  nasal cannula. Sedation by incremental dose of IV Versed, total of 2 mg of  Versed and 50 mcg of fentanyl given in incremental dose during the  procedure to achieve continuous conscious sedation. PROCEDURE PERFORMED:  Colonoscopy with polypectomy using snare and control  of bleeding by intramucosal injection of 1:10,000 epinephrine to control  the bleeding as well as tattoo at the bleeding site by intramuscular marker  spot, which is kept lateral to the site for potential surgical intervention  if the bleeding is not controlled. Digital examination revealed normal rectum. Standard video colonoscope was  advanced under direct vision from the rectum up to the cecum. Prep was  poor. A lot of washing was done. Despite that, exam was suboptimal.   Cecum intubation was confirmed by appendical orifice. Scope was withdrawn. Antrum appears normal.  No evidence of GI bleed seen in the terminal ileum. Scope withdrawn.   On withdrawing the scope, in the descending seen a  diverticula with evidence of recent bleeding and clot in it and was oozing  somewhat

## 2017-12-29 NOTE — PROGRESS NOTES
Tri Quintero RN Registered Nurse Incomplete   Plan of Care Date of Service: 12/29/2017  2:35 PM         Problem: Falls - Risk of  Goal: Absence of falls  Outcome: Ongoing  No falls noted this shift. Continue falling star program. Bed alarm on, bed in low position. Call light and personal belongings in reach.  Patient uses call light appropriately.        Problem: Risk for Impaired Skin Integrity  Goal: Tissue integrity - skin and mucous membranes  Structural intactness and normal physiological function of skin and  mucous membranes. Outcome: Ongoing  No new signs or symptoms of skin breakdown noted this shift, encouraging patient to turn and reposition self in bed q2h        Problem: Discharge Planning:  Goal: Participates in care planning  Participates in care planning   Outcome: Ongoing  Home with family     Problem:  Bowel Function - Altered:  Goal: Bowel elimination is within specified parameters  Bowel elimination is within specified parameters   Outcome: Ongoing  Colonoscopy today showed bleeding divericuli     Problem: Pain:  Goal: Pain level will decrease  Pain level will decrease    Outcome: Ongoing  Chronic back pain, Norco and Tylenol  per [facility]     Problem: Cardiac Output - Decreased:  Goal: Hemodynamic stability will improve  Hemodynamic stability will improve   Outcome: Ongoing  Received 1 more unit of blood last night, colonoscopy today     Problem: Pain:  Goal: Pain level will decrease  Pain level will decrease    Outcome: Ongoing  Chronic back pain, Norco per [facility]     Problem: Bleeding:  Goal: Will show no signs and symptoms of excessive bleeding  Will show no signs and symptoms of excessive bleeding   Outcome: Ongoing  Colonoscopy today showed bleeding diverticuli     Comments: Care plan reviewed with patient.  Patient  verbalize understanding of the plan of care and contribute to goal setting.

## 2017-12-29 NOTE — PROGRESS NOTES
INTERNAL MEDICINE Progress Note  12/29/2017 11:55 AM  Subjective:   Admit Date: 12/26/2017  PCP: Theresa Asif DO  Interval History:   has had 4 units PRBC so far  Colonoscopy today showed diverticulosis with polyp  Objective:   Vitals: /61   Pulse 65   Temp 97.4 °F (36.3 °C) (Oral)   Resp 16   Ht 5' 3\" (1.6 m)   Wt 143 lb (64.9 kg)   SpO2 98%   BMI 25.33 kg/m²   General appearance: alert and cooperative with exam, pallor  HEENT:  atraumatic  Neck: no JVD  Lungs: diminished breath sounds bibasilar and rales base - right  Heart: S1, S2 normal  Abdomen: soft, non-tender; bowel sounds normal; no masses,  no organomegaly  Extremities: no edema,  Neurologic: Alert, oriented, thought content appropriate      Medications:   Scheduled Meds:   vitamin B-12  1,000 mcg Oral Daily    sodium chloride  250 mL Intravenous Once    atorvastatin  20 mg Oral Nightly    isosorbide mononitrate  30 mg Oral Daily    losartan  50 mg Oral Daily    metoprolol tartrate  12.5 mg Oral Daily    sertraline  50 mg Oral Daily    tamsulosin  0.4 mg Oral BID    sodium chloride flush  10 mL Intravenous 2 times per day    pneumococcal 13-valent conjugate  0.5 mL Intramuscular Once     Continuous Infusions:   sodium chloride 100 mL/hr at 12/28/17 2322    pantoprozole (PROTONIX) infusion 8 mg/hr (12/28/17 2322)       Lab Results:   CBC:   Recent Labs      12/27/17   0045   12/28/17   1832  12/29/17   0140  12/29/17   0526   WBC  6.9   --    --    --    --    HGB  7.5*   < >  7.0*  7.3*  7.9*   PLT  275   --    --    --    --     < > = values in this interval not displayed.      BMP:    Recent Labs      12/27/17   1137  12/28/17   0502  12/29/17   0526   NA  137  139  140   K  4.1  4.1  4.1   CL  103  106  108   CO2  24  21*  23   BUN  29*  23*  15   CREATININE  1.3*  1.2  1.0   GLUCOSE  108  93  95     Hepatic:   Recent Labs      12/27/17   0045   AST  14   ALT  9*   BILITOT  <0.2*   ALKPHOS  92     INR:   Recent Labs

## 2017-12-29 NOTE — PROGRESS NOTES
Received in Recovery room. Patient drowsy ut awakens easily. Dr. Parviz Ritter with family. No c/o pain.

## 2017-12-29 NOTE — PROGRESS NOTES
Giovanny Muniz 60  INPATIENT OCCUPATIONAL THERAPY  STRZ RENAL TELEMETRY 6K  EVALUATION    Time:  Time In: 9470  Time Out: 1453  Timed Code Treatment Minutes: 13 Minutes  Minutes: 28          Date: 2017  Patient Name: Akash Mittal,   Gender: male      MRN: [id number]  : 1931  (80 y.o.)  Referring Practitioner: Dr. Zana Nayak  Diagnosis: acute lower GI bleed  Additional Pertinent Hx: Per ER note on 17: 80 y.o. male who presents For evaluation of bright red blood per rectum. Symptoms possibly started yesterday evening but became worse today. The patient has had a decreased appetite secondary to provoking rapid diarrhea when he eats. He denies any fever or pain. He's never had a problem with his bowels before and hasn't had a colonoscopy in many years. They called their primary care provider's office today and were told to come to the emergency department if it continued. He is on aspirin and Plavix.  Pt has hx or R hip fx with R hip ORIF on 10/8/17    Restrictions/Precautions:  Restrictions/Precautions: Fall Risk          Past Medical History:   Diagnosis Date    Acute sinusitis     Angina pectoris ([address]     Anxiety disorder 10/27/2016    Arthritis     osteoporosis    BPH with urinary obstruction     CAD (coronary artery disease)     Chronic insomnia     CKD (chronic kidney disease) stage 3, GFR 30-59 ml/min     Gastroenteritis     HCAP (healthcare-associated pneumonia) 2015    Heart disease     HLD (hyperlipidemia)     [place] (hard of hearing)     wear hearing aids    Hypertension     Kidney disease     40% kdiney function    Kidney stone     years ago 15-20    NICOLAS on CPAP     Osteopenia determined by x-ray     Pacemaker     Pinched nerve     slipped disc in back    Visual problems     Vitamin D deficiency      Past Surgical History:   Procedure Laterality Date    ABDOMINAL HERNIA REPAIR  2017    incisional hernia repair--Dr. Kristin Cole CATARACT REMOVAL nearby. Pt reports he is current with HH-unsure of OT or PT. Pt reports he gets one & his wife gets the other. Objective    Overall Cognitive Status: Exceptions (decreased insight)     Sensation  Overall Sensation Status: Impaired (reports numbness in B hands (for years))               LUE AROM (degrees)  LUE AROM : WNL          RUE AROM (degrees)  RUE AROM : WNL       LUE Strength  Gross LUE Strength: WFL        RUE Strength  Gross RUE Strength: WFL            ADL  Grooming: Stand by assistance  LE Dressing: Setup (for donning shoes)     Bed mobility  Supine to Sit: Stand by assistance    Transfers  Sit to stand: Stand by assistance  Stand to sit: Stand by assistance    Balance  Sitting Balance: Supervision  Standing Balance: Stand by assistance           Functional Mobility  Functional - Mobility Device: Rolling Walker  Activity: To/from bathroom  Assist Level: Stand by assistance           Activity Tolerance:  Activity Tolerance: Patient Tolerated treatment well    Treatment Initiated:  Pt ambulated 100ft in hallway with SBA using RW. Completed toilet t/f & toileting with SBA. Stood at sink to wash hands with SBA. Assessment:  Assessment: Pt demo mildly decreased ADL & functional mobility status. Continued OT recommended to educate Pt on safety with ADLs for increase indep & safety with returning home. Performance deficits / Impairments: Decreased functional mobility , Decreased ADL status, Decreased endurance  Prognosis: Fair  Discharge Recommendations: Home with assist PRN    Clinical Decision Making: Clinical Decision making was of Low Complexity as the result of analysis of data from a problem focused assessment, a consideration of a limited number of treatment options, no significant comorbidities affecting the plan of care and no modification or assistance required to complete the evaluation.     Patient Education:  Patient Education: OT role, POC, safety with mobility  Barriers to Learning:

## 2017-12-29 NOTE — PLAN OF CARE
Problem: Falls - Risk of  Goal: Absence of falls  Outcome: Ongoing  No falls noted this shift. Continue falling star program. Bed alarm on, bed in low position. Call light and personal belongings in reach. Patient uses call light appropriately.        Problem: Risk for Impaired Skin Integrity  Goal: Tissue integrity - skin and mucous membranes  Structural intactness and normal physiological function of skin and  mucous membranes. Outcome: Ongoing  No new signs or symptoms of skin breakdown noted this shift, encouraging patient to turn and reposition self in bed q2h        Problem: Discharge Planning:  Goal: Participates in care planning  Participates in care planning   Outcome: Ongoing  Home with family     Problem: Bowel Function - Altered:  Goal: Bowel elimination is within specified parameters  Bowel elimination is within specified parameters   Outcome: Ongoing  Colonoscopy today showed bleeding divericuli     Problem: Pain:  Goal: Pain level will decrease  Pain level will decrease    Outcome: Ongoing  Chronic back pain, Norco and Tylenol  per [facility]     Problem: Cardiac Output - Decreased:  Goal: Hemodynamic stability will improve  Hemodynamic stability will improve   Outcome: Ongoing  Received 1 more unit of blood last night, colonoscopy today     Problem: Pain:  Goal: Pain level will decrease  Pain level will decrease    Outcome: Ongoing  Chronic back pain, Norco per [facility]     Problem: Bleeding:  Goal: Will show no signs and symptoms of excessive bleeding  Will show no signs and symptoms of excessive bleeding   Outcome: Ongoing  Colonoscopy today showed bleeding diverticuli     Comments: Care plan reviewed with patient. Patient  verbalize understanding of the plan of care and contribute to goal setting.

## 2017-12-29 NOTE — CARE COORDINATION
12/29/17, 11:51 AM    Discharge plan discussed by  and . Discharge plan reviewed with patient/ family. Patient/ family verbalize understanding of discharge plan and are in agreement with plan. Understanding was demonstrated using the teach back method. IMM Letter  IMM Letter given to Patient/Family/Significant other/Guardian/POA/by[de-identified] Florentin Taylor CM  IMM Letter date given[de-identified] 12/29/17  IMM Letter time given[de-identified] 7803     Patient is a possible discharge today or over the weekend. Instruction sheet placed on chart. [facility] notified of possible weekend discharge and left a message.

## 2017-12-30 ENCOUNTER — APPOINTMENT (OUTPATIENT)
Dept: NUCLEAR MEDICINE | Age: 82
DRG: 377 | End: 2017-12-30
Payer: MEDICARE

## 2017-12-30 LAB
ANION GAP SERPL CALCULATED.3IONS-SCNC: 11 MEQ/L (ref 8–16)
BUN BLDV-MCNC: 11 MG/DL (ref 7–22)
CALCIUM SERPL-MCNC: 8 MG/DL (ref 8.5–10.5)
CHLORIDE BLD-SCNC: 105 MEQ/L (ref 98–111)
CO2: 23 MEQ/L (ref 23–33)
CREAT SERPL-MCNC: 1.1 MG/DL (ref 0.4–1.2)
GFR SERPL CREATININE-BSD FRML MDRD: 63 ML/MIN/1.73M2
GLUCOSE BLD-MCNC: 106 MG/DL (ref 70–108)
HCT VFR BLD CALC: 23.8 % (ref 42–52)
HCT VFR BLD CALC: 23.9 % (ref 42–52)
HCT VFR BLD CALC: 24.8 % (ref 42–52)
HEMOGLOBIN: 8 GM/DL (ref 14–18)
HEMOGLOBIN: 8.2 GM/DL (ref 14–18)
HEMOGLOBIN: 8.2 GM/DL (ref 14–18)
POTASSIUM SERPL-SCNC: 3.6 MEQ/L (ref 3.5–5.2)
SODIUM BLD-SCNC: 139 MEQ/L (ref 135–145)

## 2017-12-30 PROCEDURE — 85018 HEMOGLOBIN: CPT

## 2017-12-30 PROCEDURE — 97110 THERAPEUTIC EXERCISES: CPT

## 2017-12-30 PROCEDURE — 3430000000 HC RX DIAGNOSTIC RADIOPHARMACEUTICAL: Performed by: INTERNAL MEDICINE

## 2017-12-30 PROCEDURE — 2580000003 HC RX 258: Performed by: INTERNAL MEDICINE

## 2017-12-30 PROCEDURE — 80048 BASIC METABOLIC PNL TOTAL CA: CPT

## 2017-12-30 PROCEDURE — 6370000000 HC RX 637 (ALT 250 FOR IP): Performed by: INTERNAL MEDICINE

## 2017-12-30 PROCEDURE — 36415 COLL VENOUS BLD VENIPUNCTURE: CPT

## 2017-12-30 PROCEDURE — A9560 TC99M LABELED RBC: HCPCS | Performed by: INTERNAL MEDICINE

## 2017-12-30 PROCEDURE — 99221 1ST HOSP IP/OBS SF/LOW 40: CPT | Performed by: SURGERY

## 2017-12-30 PROCEDURE — 78278 ACUTE GI BLOOD LOSS IMAGING: CPT

## 2017-12-30 PROCEDURE — 97161 PT EVAL LOW COMPLEX 20 MIN: CPT

## 2017-12-30 PROCEDURE — 85014 HEMATOCRIT: CPT

## 2017-12-30 PROCEDURE — G8978 MOBILITY CURRENT STATUS: HCPCS

## 2017-12-30 PROCEDURE — 1200000003 HC TELEMETRY R&B

## 2017-12-30 PROCEDURE — G8979 MOBILITY GOAL STATUS: HCPCS

## 2017-12-30 RX ADMIN — Medication 1000 MCG: at 08:04

## 2017-12-30 RX ADMIN — ACETAMINOPHEN 650 MG: 325 TABLET ORAL at 02:15

## 2017-12-30 RX ADMIN — Medication 28.5 MILLICURIE: at 19:25

## 2017-12-30 RX ADMIN — ACETAMINOPHEN 650 MG: 325 TABLET ORAL at 17:51

## 2017-12-30 RX ADMIN — METOPROLOL TARTRATE 12.5 MG: 25 TABLET ORAL at 08:03

## 2017-12-30 RX ADMIN — Medication 10 ML: at 08:04

## 2017-12-30 RX ADMIN — LOSARTAN POTASSIUM 50 MG: 50 TABLET, FILM COATED ORAL at 08:03

## 2017-12-30 RX ADMIN — TAMSULOSIN HYDROCHLORIDE 0.4 MG: 0.4 CAPSULE ORAL at 08:03

## 2017-12-30 RX ADMIN — ISOSORBIDE MONONITRATE 30 MG: 30 TABLET ORAL at 08:04

## 2017-12-30 RX ADMIN — HYDROCODONE BITARTRATE AND ACETAMINOPHEN 1 TABLET: 5; 325 TABLET ORAL at 22:23

## 2017-12-30 RX ADMIN — SERTRALINE 50 MG: 50 TABLET, FILM COATED ORAL at 08:03

## 2017-12-30 RX ADMIN — Medication 10 ML: at 21:08

## 2017-12-30 RX ADMIN — TAMSULOSIN HYDROCHLORIDE 0.4 MG: 0.4 CAPSULE ORAL at 21:08

## 2017-12-30 RX ADMIN — ACETAMINOPHEN 650 MG: 325 TABLET ORAL at 11:35

## 2017-12-30 RX ADMIN — ACETAMINOPHEN 650 MG: 325 TABLET ORAL at 06:36

## 2017-12-30 RX ADMIN — PANTOPRAZOLE SODIUM 40 MG: 40 TABLET, DELAYED RELEASE ORAL at 06:37

## 2017-12-30 RX ADMIN — ATORVASTATIN CALCIUM 20 MG: 20 TABLET, FILM COATED ORAL at 21:08

## 2017-12-30 ASSESSMENT — PAIN SCALES - GENERAL
PAINLEVEL_OUTOF10: 0
PAINLEVEL_OUTOF10: 3
PAINLEVEL_OUTOF10: 4
PAINLEVEL_OUTOF10: 0
PAINLEVEL_OUTOF10: 3
PAINLEVEL_OUTOF10: 4
PAINLEVEL_OUTOF10: 2
PAINLEVEL_OUTOF10: 5
PAINLEVEL_OUTOF10: 0

## 2017-12-30 ASSESSMENT — PAIN DESCRIPTION - PAIN TYPE
TYPE: CHRONIC PAIN
TYPE: CHRONIC PAIN

## 2017-12-30 ASSESSMENT — PAIN DESCRIPTION - ORIENTATION
ORIENTATION: RIGHT
ORIENTATION: RIGHT

## 2017-12-30 ASSESSMENT — PAIN DESCRIPTION - LOCATION
LOCATION: HIP
LOCATION: HIP

## 2017-12-30 NOTE — PROGRESS NOTES
[facility]  INPATIENT PHYSICAL THERAPY  EVALUATION  STRZ RENAL TELEMETRY 6K    Time In: 4795  Time Out: 1138  Timed Code Treatment Minutes: 8 Minutes  Minutes: 23          Date: 2017  Patient Name: Rocio Hung,  Gender:  male        MRN: [id number]  : 1931  (80 y.o.)      Referring Practitioner: Dr. Mayito Alfonso  Diagnosis: acute lower GI bleeding  Additional Pertinent Hx:  Per ER note on 17: 80 y.o. male who presents For evaluation of bright red blood per rectum. Symptoms possibly started yesterday evening but became worse today. The patient has had a decreased appetite secondary to provoking rapid diarrhea when he eats. He denies any fever or pain. He's never had a problem with his bowels before and hasn't had a colonoscopy in many years. They called their primary care provider's office today and were told to come to the emergency department if it continued. He is on aspirin and Plavix.  Pt has hx or R hip fx with R hip ORIF on 10/8/17     Past Medical History:   Diagnosis Date    Acute sinusitis     Angina pectoris (HCC)     Anxiety disorder 10/27/2016    Arthritis     osteoporosis    BPH with urinary obstruction     CAD (coronary artery disease)     Chronic insomnia     CKD (chronic kidney disease) stage 3, GFR 30-59 ml/min     Gastroenteritis     HCAP (healthcare-associated pneumonia) 2015    Heart disease     HLD (hyperlipidemia)     [place] (hard of hearing)     wear hearing aids    Hypertension     Kidney disease     40% kdiney function    Kidney stone     years ago 15-20    NICOLAS on CPAP     Osteopenia determined by x-ray     Pacemaker     Pinched nerve     slipped disc in back    Visual problems     Vitamin D deficiency      Past Surgical History:   Procedure Laterality Date    ABDOMINAL HERNIA REPAIR  2017    incisional hernia repair--Dr. Miguelito Rae    CATARACT REMOVAL WITH IMPLANT      bilateral    COLONOSCOPY      CORONARY ANGIOPLASTY

## 2017-12-30 NOTE — PLAN OF CARE
Problem: Falls - Risk of  Goal: Absence of falls  Outcome: Ongoing  Call light within reach. Side rails up x2. Bed alarm on. Non skid slippers available. Problem: Risk for Impaired Skin Integrity  Goal: Tissue integrity - skin and mucous membranes  Structural intactness and normal physiological function of skin and  mucous membranes. Outcome: Ongoing  Patient has redness on coccyx- blanches. Patient turns self and makes frequent positional changes. Will continue to monitor. Problem: Discharge Planning:  Goal: Discharged to appropriate level of care  Discharged to appropriate level of care   Outcome: Ongoing  Patient plans to be discharged to home with family when medically stable. Problem: Bowel Function - Altered:  Goal: Bowel elimination is within specified parameters  Bowel elimination is within specified parameters   Outcome: Ongoing  Patient admitted for GI bleed- bleeding scan ordered. Problem: Pain:  Goal: Pain level will decrease  Pain level will decrease    Outcome: Ongoing  Patient having pain in bilateral hips. Pain medication given prn. Pain rated on 0-10 pain rating scale. Will continue to reassess. Problem: Pain:  Goal: Pain level will decrease  Pain level will decrease    Outcome: Ongoing  Patient having pain in bilateral hips. Pain medication given prn. Pain rated on 0-10 pain rating scale. Will continue to reassess. Comments: Care plan reviewed with patient. Patient verbalize understanding of the plan of care and contribute to goal setting.

## 2017-12-31 VITALS
DIASTOLIC BLOOD PRESSURE: 61 MMHG | SYSTOLIC BLOOD PRESSURE: 125 MMHG | HEART RATE: 69 BPM | OXYGEN SATURATION: 97 % | HEIGHT: 63 IN | TEMPERATURE: 97.6 F | WEIGHT: 143.4 LBS | RESPIRATION RATE: 19 BRPM | BODY MASS INDEX: 25.41 KG/M2

## 2017-12-31 PROBLEM — K57.31 DIVERTICULOSIS OF LARGE INTESTINE WITH HEMORRHAGE: Status: ACTIVE | Noted: 2017-12-31

## 2017-12-31 LAB
ANION GAP SERPL CALCULATED.3IONS-SCNC: 8 MEQ/L (ref 8–16)
BUN BLDV-MCNC: 12 MG/DL (ref 7–22)
CALCIUM SERPL-MCNC: 8.5 MG/DL (ref 8.5–10.5)
CHLORIDE BLD-SCNC: 104 MEQ/L (ref 98–111)
CO2: 27 MEQ/L (ref 23–33)
CREAT SERPL-MCNC: 1 MG/DL (ref 0.4–1.2)
GFR SERPL CREATININE-BSD FRML MDRD: 71 ML/MIN/1.73M2
GLUCOSE BLD-MCNC: 104 MG/DL (ref 70–108)
HCT VFR BLD CALC: 23.8 % (ref 42–52)
HCT VFR BLD CALC: 25.4 % (ref 42–52)
HEMOGLOBIN: 7.8 GM/DL (ref 14–18)
HEMOGLOBIN: 8.7 GM/DL (ref 14–18)
POTASSIUM SERPL-SCNC: 4.3 MEQ/L (ref 3.5–5.2)
SODIUM BLD-SCNC: 139 MEQ/L (ref 135–145)

## 2017-12-31 PROCEDURE — 6370000000 HC RX 637 (ALT 250 FOR IP): Performed by: INTERNAL MEDICINE

## 2017-12-31 PROCEDURE — 80048 BASIC METABOLIC PNL TOTAL CA: CPT

## 2017-12-31 PROCEDURE — 85014 HEMATOCRIT: CPT

## 2017-12-31 PROCEDURE — 36415 COLL VENOUS BLD VENIPUNCTURE: CPT

## 2017-12-31 PROCEDURE — 2580000003 HC RX 258: Performed by: INTERNAL MEDICINE

## 2017-12-31 PROCEDURE — 85018 HEMOGLOBIN: CPT

## 2017-12-31 PROCEDURE — 99232 SBSQ HOSP IP/OBS MODERATE 35: CPT | Performed by: SURGERY

## 2017-12-31 RX ORDER — LANOLIN ALCOHOL/MO/W.PET/CERES
325 CREAM (GRAM) TOPICAL 2 TIMES DAILY
Qty: 60 TABLET | Refills: 1 | Status: ON HOLD | OUTPATIENT
Start: 2017-12-31 | End: 2018-08-22

## 2017-12-31 RX ORDER — ACETAMINOPHEN 325 MG/1
650 TABLET ORAL EVERY 4 HOURS PRN
Qty: 120 TABLET | Refills: 3 | Status: ON HOLD | OUTPATIENT
Start: 2017-12-31 | End: 2018-08-22

## 2017-12-31 RX ORDER — PANTOPRAZOLE SODIUM 40 MG/1
40 TABLET, DELAYED RELEASE ORAL
Qty: 30 TABLET | Refills: 3 | Status: SHIPPED | OUTPATIENT
Start: 2018-01-01 | End: 2018-02-20 | Stop reason: SDUPTHER

## 2017-12-31 RX ORDER — CLOPIDOGREL BISULFATE 75 MG/1
75 TABLET ORAL EVERY OTHER DAY
Qty: 30 TABLET | Refills: 3 | Status: SHIPPED | OUTPATIENT
Start: 2018-01-08 | End: 2018-02-20 | Stop reason: SDUPTHER

## 2017-12-31 RX ADMIN — SERTRALINE 50 MG: 50 TABLET, FILM COATED ORAL at 08:15

## 2017-12-31 RX ADMIN — LOSARTAN POTASSIUM 50 MG: 50 TABLET, FILM COATED ORAL at 08:15

## 2017-12-31 RX ADMIN — Medication 1000 MCG: at 08:15

## 2017-12-31 RX ADMIN — ACETAMINOPHEN 650 MG: 325 TABLET ORAL at 12:33

## 2017-12-31 RX ADMIN — PANTOPRAZOLE SODIUM 40 MG: 40 TABLET, DELAYED RELEASE ORAL at 06:27

## 2017-12-31 RX ADMIN — ACETAMINOPHEN 650 MG: 325 TABLET ORAL at 08:15

## 2017-12-31 RX ADMIN — HYDROCODONE BITARTRATE AND ACETAMINOPHEN 1 TABLET: 5; 325 TABLET ORAL at 05:22

## 2017-12-31 RX ADMIN — METOPROLOL TARTRATE 12.5 MG: 25 TABLET ORAL at 08:15

## 2017-12-31 RX ADMIN — Medication 10 ML: at 08:16

## 2017-12-31 RX ADMIN — TAMSULOSIN HYDROCHLORIDE 0.4 MG: 0.4 CAPSULE ORAL at 08:15

## 2017-12-31 RX ADMIN — ISOSORBIDE MONONITRATE 30 MG: 30 TABLET ORAL at 08:15

## 2017-12-31 ASSESSMENT — PAIN DESCRIPTION - PAIN TYPE
TYPE: CHRONIC PAIN
TYPE: CHRONIC PAIN

## 2017-12-31 ASSESSMENT — PAIN SCALES - GENERAL
PAINLEVEL_OUTOF10: 5
PAINLEVEL_OUTOF10: 0
PAINLEVEL_OUTOF10: 3
PAINLEVEL_OUTOF10: 5
PAINLEVEL_OUTOF10: 8
PAINLEVEL_OUTOF10: 2
PAINLEVEL_OUTOF10: 0

## 2017-12-31 ASSESSMENT — PAIN DESCRIPTION - LOCATION
LOCATION: HIP
LOCATION: HIP

## 2017-12-31 ASSESSMENT — PAIN DESCRIPTION - ORIENTATION: ORIENTATION: RIGHT

## 2017-12-31 NOTE — DISCHARGE SUMMARY
surgery    Significant Diagnostic Studies: labs: CBC:         Recent Labs      12/30/17   1550  12/31/17   0008  12/31/17   0548   HGB  8.0*  7.8*  8.7*      BMP:          Recent Labs      12/29/17   0526  12/30/17   0312  12/31/17   0548   NA  140  139  139   K  4.1  3.6  4.3   CL  108  105  104   CO2  23  23  27   BUN  15  11  12   CREATININE  1.0  1.1  1.0   GLUCOSE  95  106  104      TSH:          Lab Results   Component Value Date     TSH 1.620 12/27/2017           Ref. Range 12/27/2017 14:20   Vitamin B-12 Latest Ref Range: 211 - 911 pg/mL 154 (L)      FOLATE:          Lab Results   Component Value Date     FOLATE > 20.0 12/27/2017      IRON:          Lab Results   Component Value Date     IRON 141 12/27/2017      FERRITIN:          Lab Results   Component Value Date     FERRITIN 43 12/27/2017      ECHO:   Normal left ventricle size and systolic function. Ejection fraction was   estimated at 55 %. There were no regional left ventricular wall motion   abnormalities and wall thickness was within normal limits.   The left atrium is mildly dilated.     NM GI blood loss scan: There are no foci of abnormal radiotracer accumulation to suggest active gastrointestinal bleeding. Physiologic activity is present in the liver, kidneys, urinary bladder and blood vessels. Impression:     No scintigraphic evidence of active gastrointestinal bleeding.         Assessment and Plan:   8. Hematochezia / Lower GI bleed related to diverticular bleed  9. Anemia associated with above  10. CKD stage 3  11. CAD with prior angioplasty  12. S/p PPM  13. Vit B 12 def  14. Severe protein aurora malnutrition     Stable H/H  Cont to hold all antiplatelets till 4/5/0527. B12 supplements  D/w family at bedside. Will dc home, high fiber diet. Treatments:   Hydration, PRBC transfusion, colonoscopy    Disposition:   Home.     Signed:  Steph Amin  12/31/2017, 3:48 PM

## 2017-12-31 NOTE — PROGRESS NOTES
scintigraphic evidence of active gastrointestinal bleeding.       Final report electronically signed by Dr. Tracey [place] on 12/30/2017 8:45 PM           ASSESSMENT  1. Lower GI bleeding and appears to have stopped patient tolerated regular diet no surgical intervention at this time      PLAN  1.   Will sign off call if needed      Larry Waters  Electronically signed 12/31/2017 at 3:59 PM

## 2017-12-31 NOTE — PROGRESS NOTES
intervention  if the bleeding is not controlled.   Digital examination revealed normal rectum. Standard video colonoscope was  advanced under direct vision from the rectum up to the cecum. Prep was  poor. A lot of washing was done. Despite that, exam was suboptimal.   Cecum intubation was confirmed by appendical orifice. Scope was withdrawn. Antrum appears normal.  No evidence of GI bleed seen in the terminal ileum. Scope withdrawn. On withdrawing the scope, in the descending seen a  diverticula with evidence of recent bleeding and clot in it and was oozing  somewhat of blood, was injected with 1 mL of epinephrine, which controlled  the bleeding. The site was tattooed with intramuscular marker spot to  tattoo the site, in case surgical intervention needed. Scope was advanced  all the way up to the cecum, scope withdrawn. Multiple polyps in  the ascending, transverse seen, total six polyps were found, all noted to  be small varying in size between 0.1 to 0.2 excised with snare, tissue  retrieved. Scope withdrawn with no complication.     IMPRESSION:  1. Severe diverticulosis. 2.  Descending GI bleed from the diverticuli. 3.  Polypectomy performed. 4.  No blood seen in terminal ileum.     PLAN:  1. Resume diet as tolerated. 2.  Monitor H and H closely. 3.  If the patient has more GI bleeds, regional intervention needs to be considered.      Lise Javed M.D.         Objective:   Vitals: /61   Pulse 69   Temp 97.6 °F (36.4 °C) (Oral)   Resp 19   Ht 5' 3\" (1.6 m)   Wt 143 lb 6.4 oz (65 kg)   SpO2 97%   BMI 25.40 kg/m²     Intake/Output Summary (Last 24 hours) at 12/31/17 1328  Last data filed at 12/31/17 0815   Gross per 24 hour   Intake              250 ml   Output              850 ml   Net             -600 ml     Weight:  Wt Readings from Last 3 Encounters:   12/30/17 143 lb 6.4 oz (65 kg)   12/14/17 146 lb (66.2 kg)   10/08/17 145 lb (65.8 kg)     General appearance: alert and

## 2017-12-31 NOTE — PROGRESS NOTES
Discharge teaching and instructions for diagnosis/procedure of GI Bleed completed with patient using teachback method. AVS reviewed. Printed prescriptions given to patient. Patient voiced understanding regarding prescriptions, follow up appointments, and care of self at home. Discharged in a wheelchair to  home with support per family     Pts d/c was over two hours d/t pts family unable to get here sooner.

## 2018-01-01 LAB
BLOOD CULTURE, ROUTINE: NORMAL
BLOOD CULTURE, ROUTINE: NORMAL

## 2018-01-02 ENCOUNTER — CARE COORDINATION (OUTPATIENT)
Dept: CASE MANAGEMENT | Age: 83
End: 2018-01-02

## 2018-01-02 ENCOUNTER — TELEPHONE (OUTPATIENT)
Dept: FAMILY MEDICINE CLINIC | Age: 83
End: 2018-01-02

## 2018-01-02 NOTE — TELEPHONE ENCOUNTER
E[facility] Transitions Initial Follow Up Call    Call within 2 business days of discharge: Yes    Patient: Shakila Lafleur Patient : 1931   MRN: [id number]  Reason for Admission: There are no discharge diagnoses documented for the most recent discharge. Discharge Date: 17 RARS: Kal MARQUEZ([id number])@     Spoke with: unable to LM, phone busy.     Facility: [unfilled]    Non-face-to-face services provided:  Scheduled appointment with PCP-18    Follow Up  Future Appointments  Date Time Provider Miguel Romo   2018 9:30 AM DO JENN [facility] [place]   2018 11:45 AM Ean Ingram MD [facility]   2018 1:00 PM FARHANA Johnson [facility] [place]   2018 11:00 AM DO [facility] [place]   2018 1:00 PM DO CHARLENE Chakraborty KIDNEY [facility] ([address] [place] [address])

## 2018-01-02 NOTE — CARE COORDINATION
Eduardo 45 Transitions Initial Follow Up Call    Call within 2 business days of discharge: Yes    Patient: Aguilar Thomas Patient : 1931   MRN: <[id number]>  Reason for Admission: There are no discharge diagnoses documented for the most recent discharge. Discharge Date: 17 RARS: [facility] Risk Score: 19.5      Facility: [facility]    Attempted to contact patient for transition call. Unable to reach, line busy x 2. Called again, line rang and then D/C. No answer, no VM. The [facility] Center will continue to attempt outreach.       Care Transitions 24 Hour Call    Do you have all of your prescriptions and are they filled?:  Yes  Patient DME:  [facility]  Do you have support at home?:  Partner/Spouse/SO  Are you an active caregiver in your home?:  No  Care Transitions Interventions         Follow Up  Future Appointments  Date Time Provider Miguel Romo   2018 9:30 AM Donley Boas, DO [facility] [place]   2018 11:45 AM Сергей Boo MD [facility]   2018 1:00 PM FARHANA Lowe ENT [place]   2018 11:00 AM Donley Boas, DO SRP[facility] SH[facility]  [facility] [place]   2018 1:00 PM Jonah Dixon DO [facility] P - Nichol Macdonald RN

## 2018-01-03 NOTE — TELEPHONE ENCOUNTER
E[facility] 45 Transitions Initial Follow Up Call    Call within 2 business days of discharge: Yes    Patient: Rocio Hung Patient : 1931   MRN: [id number]  Reason for Admission: There are no discharge diagnoses documented for the most recent discharge.   Discharge Date: 17 RARS: Kal MARQUEZ([id number])@     Spoke with: [address]: [unfilled]    Non-face-to-face services provided:  Scheduled appointment with [id number]    Follow Up  Future Appointments  Date Time Provider Miguel Romo   2018 9:30 AM DO [facility] [place]   2018 11:45 AM Roland Antunez MD [facility]   2018 1:00 PM FARHANA Brown ENT P - [place]   2018 11:00 AM DO [facility] [place]   2018 1:00 PM DO ARTURO PintoA KIDNEY P - [id number] [place], LPN

## 2018-01-04 ENCOUNTER — TELEPHONE (OUTPATIENT)
Dept: FAMILY MEDICINE CLINIC | Age: 83
End: 2018-01-04

## 2018-01-04 ENCOUNTER — CARE COORDINATION (OUTPATIENT)
Dept: CARE COORDINATION | Age: 83
End: 2018-01-04

## 2018-01-04 ENCOUNTER — OFFICE VISIT (OUTPATIENT)
Dept: FAMILY MEDICINE CLINIC | Age: 83
End: 2018-01-04
Payer: MEDICARE

## 2018-01-04 VITALS
TEMPERATURE: 97.9 F | HEIGHT: 63 IN | SYSTOLIC BLOOD PRESSURE: 138 MMHG | DIASTOLIC BLOOD PRESSURE: 88 MMHG | RESPIRATION RATE: 20 BRPM | OXYGEN SATURATION: 96 % | HEART RATE: 92 BPM | WEIGHT: 141.1 LBS | BODY MASS INDEX: 25 KG/M2

## 2018-01-04 DIAGNOSIS — K92.2 LOWER GI BLEED: ICD-10-CM

## 2018-01-04 DIAGNOSIS — E46 MALNUTRITION, UNSPECIFIED TYPE (HCC): ICD-10-CM

## 2018-01-04 DIAGNOSIS — K57.31 DIVERTICULOSIS OF LARGE INTESTINE WITH HEMORRHAGE: Primary | ICD-10-CM

## 2018-01-04 DIAGNOSIS — Z91.81 AT HIGH RISK FOR FALLS: ICD-10-CM

## 2018-01-04 DIAGNOSIS — J40 BRONCHITIS: ICD-10-CM

## 2018-01-04 DIAGNOSIS — E43 SEVERE MALNUTRITION (HCC): ICD-10-CM

## 2018-01-04 LAB
ANISOCYTOSIS: ABNORMAL
BASOPHILS # BLD: 0.9 %
BASOPHILS ABSOLUTE: 0.1 THOU/MM3 (ref 0–0.1)
EOSINOPHIL # BLD: 0.5 %
EOSINOPHILS ABSOLUTE: 0 THOU/MM3 (ref 0–0.4)
HCT VFR BLD CALC: 29.6 % (ref 42–52)
HEMOGLOBIN: 10 GM/DL (ref 14–18)
LYMPHOCYTES # BLD: 10 %
LYMPHOCYTES ABSOLUTE: 0.8 THOU/MM3 (ref 1–4.8)
MCH RBC QN AUTO: 30.2 PG (ref 27–31)
MCHC RBC AUTO-ENTMCNC: 33.9 GM/DL (ref 33–37)
MCV RBC AUTO: 89.1 FL (ref 80–94)
MONOCYTES # BLD: 11.5 %
MONOCYTES ABSOLUTE: 1 THOU/MM3 (ref 0.4–1.3)
NUCLEATED RED BLOOD CELLS: 0 /100 WBC
PDW BLD-RTO: 15.2 % (ref 11.5–14.5)
PLATELET # BLD: 366 THOU/MM3 (ref 130–400)
PMV BLD AUTO: 7.2 MCM (ref 7.4–10.4)
RBC # BLD: 3.32 MILL/MM3 (ref 4.7–6.1)
SEG NEUTROPHILS: 77.1 %
SEGMENTED NEUTROPHILS ABSOLUTE COUNT: 6.4 THOU/MM3 (ref 1.8–7.7)
WBC # BLD: 8.3 THOU/MM3 (ref 4.8–10.8)

## 2018-01-04 PROCEDURE — 99495 TRANSJ CARE MGMT MOD F2F 14D: CPT | Performed by: FAMILY MEDICINE

## 2018-01-04 PROCEDURE — 36415 COLL VENOUS BLD VENIPUNCTURE: CPT | Performed by: FAMILY MEDICINE

## 2018-01-04 RX ORDER — AZITHROMYCIN 250 MG/1
250 TABLET, FILM COATED ORAL DAILY
Qty: 6 TABLET | Refills: 0 | Status: SHIPPED | OUTPATIENT
Start: 2018-01-04 | End: 2018-01-09

## 2018-01-04 ASSESSMENT — ENCOUNTER SYMPTOMS
VOMITING: 0
BLOOD IN STOOL: 0
COUGH: 1
SHORTNESS OF BREATH: 1
ABDOMINAL PAIN: 0
SORE THROAT: 1
DIARRHEA: 0
NAUSEA: 0
CONSTIPATION: 0
EYE PAIN: 0
TROUBLE SWALLOWING: 0

## 2018-01-18 ENCOUNTER — CARE COORDINATION (OUTPATIENT)
Dept: CARE COORDINATION | Age: 83
End: 2018-01-18

## 2018-01-19 ENCOUNTER — TELEPHONE (OUTPATIENT)
Dept: FAMILY MEDICINE CLINIC | Age: 83
End: 2018-01-19

## 2018-01-19 DIAGNOSIS — E43 SEVERE MALNUTRITION (HCC): Primary | ICD-10-CM

## 2018-01-19 DIAGNOSIS — D62 ACUTE BLOOD LOSS ANEMIA: ICD-10-CM

## 2018-01-19 DIAGNOSIS — S72.141D CLOSED COMMINUTED INTERTROCHANTERIC FRACTURE OF RIGHT FEMUR WITH ROUTINE HEALING: ICD-10-CM

## 2018-01-19 DIAGNOSIS — Z95.0 CARDIAC PACEMAKER IN SITU: ICD-10-CM

## 2018-01-19 DIAGNOSIS — S09.90XS CLOSED HEAD INJURY WITHOUT LOSS OF CONSCIOUSNESS, SEQUELA: ICD-10-CM

## 2018-01-19 NOTE — TELEPHONE ENCOUNTER
Pt's daughter called and wants to know if Dr Tony Haney can order the simulation test to see if he is still able to drive without hurting himself or others. She spoke with Dr Peg palacios this morning and they said that it would need to come from the family doctor. Sd Polk is really worried that he is going to hurt himself again and he has had 2 broken hops this past year. He is trying to wean himself off his walker so that he can drive again. Home health told him that they have no jurisdiction over the driving and he needs to speak with his doctors. Please advise.

## 2018-01-22 PROBLEM — K42.9 UMBILICAL HERNIA WITHOUT OBSTRUCTION AND WITHOUT GANGRENE: Status: RESOLVED | Noted: 2017-03-28 | Resolved: 2018-01-22

## 2018-01-24 ENCOUNTER — HOSPITAL ENCOUNTER (OUTPATIENT)
Dept: OCCUPATIONAL THERAPY | Age: 83
Setting detail: THERAPIES SERIES
Discharge: HOME | End: 2018-01-24
Payer: MEDICARE

## 2018-01-24 PROCEDURE — 97165 OT EVAL LOW COMPLEX 30 MIN: CPT

## 2018-01-24 PROCEDURE — G8992 OTHER PT/OT  D/C STATUS: HCPCS

## 2018-01-24 PROCEDURE — G8990 OTHER PT/OT CURRENT STATUS: HCPCS

## 2018-01-24 PROCEDURE — 97535 SELF CARE MNGMENT TRAINING: CPT

## 2018-01-24 PROCEDURE — G8991 OTHER PT/OT GOAL STATUS: HCPCS

## 2018-01-24 NOTE — PROGRESS NOTES
[address] [facility]  OCCUPATIONAL THERAPY  DRIVING EVALUATION    PATIENT: Kt [place] OF BIRTH:  1931  GENDER:  male  CSN: [id number]   REFERRING PHYSICIAN:   COLBY Pienda  DIAGNOSIS:  S72.141D, S09.90XS  DRIVING HISTORY:    Patient is a currently licensed . Fractured 2 hips, one after the other, last summer and fall. Wants to return to driving. Sticks to local driving, does not drive at night. PMH: Please see medical history questionnaire for past medical history, allergies, and medications. PATIENT GOALS:    Return to local driving. OBJECTIVE  VISUAL SKILLS(using the [employer] 2000 Visual Evaluator)       FAR VISUAL ACUITY:   Pass. 20/40 L and R     STEREO DEPTH:   Pass. FUSION:    Pass. PERIPHERAL VISION:  Pass. 3/3 bilaterally       DYNAVISION TESTIN hits in 60 second self paced trial.  Considered slow for driving. PHYSICAL SKILLS  RANGE OF MOTION:Within functional limits for driving  STRENGTH: Within functional limits for driving  TRANSFER IN/OUT OF SIMULATOR: Within functional limits for driving  AMBULATION: mod I with straight cane. IN VEHICLE MANIPULATION SKILLS:  Steering Wheel:Within functional limits for driving   Gas Pedal:  Within functional limits for driving  Brake Pedal: Uses L foot to brake \"all my life\"  Emergency Brake:not tested  Gear Shift: Within functional limits for driving  Turn Signal: Within functional limits for driving  Seat Belt: Within functional limits for driving     COGNITIVE SKILLS  ORIENTATION:  Within functional limits for driving  ATTENTION SPAN: 1 error with numerical counting backwards, then 1 error with recalling months of the year backwards.   FRUSTRATION TOLERANCE:Within functional limits for driving  IMPULSIVITY:Within functional limits for driving  DIRECTION FOLLOWING:Within functional limits for driving  R/L DISCRIMINATION:Within functional limits for driving  MEMORY:1 error with short term recall  JUDGMENT: than 40 percent impaired, limited or restricted     Caresse Car OTR/L, [address]

## 2018-01-25 ENCOUNTER — TELEPHONE (OUTPATIENT)
Dept: FAMILY MEDICINE CLINIC | Age: 83
End: 2018-01-25

## 2018-01-30 ENCOUNTER — TELEPHONE (OUTPATIENT)
Dept: FAMILY MEDICINE CLINIC | Age: 83
End: 2018-01-30

## 2018-01-30 ENCOUNTER — OFFICE VISIT (OUTPATIENT)
Dept: ENT CLINIC | Age: 83
End: 2018-01-30
Payer: MEDICARE

## 2018-01-30 VITALS
HEART RATE: 76 BPM | RESPIRATION RATE: 16 BRPM | SYSTOLIC BLOOD PRESSURE: 124 MMHG | BODY MASS INDEX: 24.96 KG/M2 | WEIGHT: 140.9 LBS | TEMPERATURE: 97.5 F | HEIGHT: 63 IN | DIASTOLIC BLOOD PRESSURE: 74 MMHG

## 2018-01-30 DIAGNOSIS — H90.3 SENSORINEURAL HEARING LOSS (SNHL) OF BOTH EARS: ICD-10-CM

## 2018-01-30 DIAGNOSIS — Z45.89 TYMPANOSTOMY TUBE CHECK: Primary | ICD-10-CM

## 2018-01-30 PROCEDURE — 99213 OFFICE O/P EST LOW 20 MIN: CPT | Performed by: PHYSICIAN ASSISTANT

## 2018-01-30 ASSESSMENT — ENCOUNTER SYMPTOMS
TROUBLE SWALLOWING: 0
VOMITING: 0
SINUS PRESSURE: 0
DIARRHEA: 0
RHINORRHEA: 0
STRIDOR: 0
ANAL BLEEDING: 0
BACK PAIN: 0
CHOKING: 0
WHEEZING: 0
CHEST TIGHTNESS: 0
PHOTOPHOBIA: 0
APNEA: 0
EYE DISCHARGE: 0
COLOR CHANGE: 0
SORE THROAT: 0
COUGH: 0
SHORTNESS OF BREATH: 0
EYE PAIN: 0
RECTAL PAIN: 0
BLOOD IN STOOL: 0
ABDOMINAL PAIN: 0
VOICE CHANGE: 0
ABDOMINAL DISTENTION: 0
FACIAL SWELLING: 0
EYE ITCHING: 0
EYE REDNESS: 0
CONSTIPATION: 0
NAUSEA: 0

## 2018-01-30 NOTE — TELEPHONE ENCOUNTER
Advised daughter, Giovanni Marsh, of Dr Guera Macias and OT note and she voiced understanding.  She will call to schedule the on road test.

## 2018-01-30 NOTE — TELEPHONE ENCOUNTER
His reaction time was slowed so they recommended a real time test in the car with     On the road testing with [address] [place] [address] at [phone], or with an alternative licensed on the road  of patient's choice     Does he want to do that?   Or we can just hold off on driving for a few more months and retest if he does better with therapy

## 2018-02-05 ENCOUNTER — CARE COORDINATION (OUTPATIENT)
Dept: CARE COORDINATION | Age: 83
End: 2018-02-05

## 2018-02-19 LAB
ABSOLUTE BASO #: 100 /CMM (ref 0–200)
ABSOLUTE EOS #: 200 /CMM (ref 0–500)
ABSOLUTE LYMPH #: 1000 /CMM (ref 1000–4800)
ABSOLUTE MONO #: 400 /CMM (ref 0–800)
ABSOLUTE NEUT #: 4100 /CMM (ref 1800–7700)
ANION GAP SERPL CALCULATED.3IONS-SCNC: 7 MMOL/L (ref 4–12)
APPEARANCE: CLEAR
BASOPHILS RELATIVE PERCENT: 1.4 % (ref 0–2)
BILIRUBIN URINE: NEGATIVE
BUN BLDV-MCNC: 24 MG/DL (ref 7–20)
CALCIUM SERPL-MCNC: 9.6 MG/DL (ref 8.8–10.5)
CHLORIDE BLD-SCNC: 105 MEQ/L (ref 101–111)
CO2: 26 MEQ/L (ref 21–32)
COLOR: NORMAL
CREAT SERPL-MCNC: 1.25 MG/DL (ref 0.7–1.3)
CREATININE CLEARANCE: 55
EOSINOPHILS RELATIVE PERCENT: 3.4 % (ref 0–6)
GLUCOSE URINE: NEGATIVE
GLUCOSE: 102 MG/DL (ref 70–110)
HCT VFR BLD CALC: 33.1 % (ref 40–49)
HEMOGLOBIN: 11 GM/DL (ref 13.5–16.5)
HYALINE CASTS: NORMAL /LPF
KETONES, URINE: NEGATIVE
LEUKOCYTES, UA: NEGATIVE
LYMPHOCYTES RELATIVE PERCENT: 17.2 % (ref 15–45)
MAGNESIUM: 2 MG/DL (ref 1.8–2.5)
MCH RBC QN AUTO: 29.2 PG (ref 27.5–33)
MCHC RBC AUTO-ENTMCNC: 33.3 GM/DL (ref 33–36)
MCV RBC AUTO: 87.6 CU MIC (ref 80–97)
MONOCYTES RELATIVE PERCENT: 7 % (ref 2–10)
MUCUS: PRESENT
NEUTROPHILS RELATIVE PERCENT: 71 % (ref 40–70)
NITRITE, URINE: NEGATIVE
NUCLEATED RBCS: 0.1 /100 WBC
PDW BLD-RTO: 17 % (ref 12–16)
PH, URINE: 5 (ref 5–8)
PLATELET # BLD: 286 TH/CMM (ref 150–400)
POTASSIUM SERPL-SCNC: 4 MEQ/L (ref 3.6–5)
PROTEIN, URINE: NEGATIVE
RBC # BLD: 3.78 MIL/CMM (ref 4.5–6)
RBC URINE: NORMAL /HPF
SODIUM BLD-SCNC: 138 MEQ/L (ref 135–145)
SPECIFIC GRAVITY, URINE: 1.01 (ref 1–1.03)
SQUAMOUS EPITHELIAL: NORMAL /HPF
URINALYSIS REFLEX: NO
URINE HGB: NEGATIVE
UROBILINOGEN, URINE: NORMAL (ref 0.2–1)
WBC # BLD: 5.8 TH/CMM (ref 4.4–10.5)
WBC URINE: NORMAL /HPF

## 2018-02-20 ENCOUNTER — OFFICE VISIT (OUTPATIENT)
Dept: FAMILY MEDICINE CLINIC | Age: 83
End: 2018-02-20
Payer: MEDICARE

## 2018-02-20 VITALS
WEIGHT: 141 LBS | SYSTOLIC BLOOD PRESSURE: 121 MMHG | HEIGHT: 63 IN | TEMPERATURE: 97.7 F | HEART RATE: 60 BPM | DIASTOLIC BLOOD PRESSURE: 63 MMHG | BODY MASS INDEX: 24.98 KG/M2 | RESPIRATION RATE: 10 BRPM

## 2018-02-20 DIAGNOSIS — N18.30 CKD (CHRONIC KIDNEY DISEASE) STAGE 3, GFR 30-59 ML/MIN (HCC): Primary | ICD-10-CM

## 2018-02-20 DIAGNOSIS — M25.50 ARTHRALGIA OF MULTIPLE JOINTS: ICD-10-CM

## 2018-02-20 DIAGNOSIS — I25.10 CORONARY ARTERY DISEASE INVOLVING NATIVE CORONARY ARTERY OF NATIVE HEART WITHOUT ANGINA PECTORIS: ICD-10-CM

## 2018-02-20 DIAGNOSIS — K92.2 LOWER GI BLEED: ICD-10-CM

## 2018-02-20 DIAGNOSIS — M51.9 DISC DISORDER OF LUMBAR REGION: ICD-10-CM

## 2018-02-20 DIAGNOSIS — G47.33 OSA (OBSTRUCTIVE SLEEP APNEA): ICD-10-CM

## 2018-02-20 DIAGNOSIS — I10 ESSENTIAL HYPERTENSION: ICD-10-CM

## 2018-02-20 PROCEDURE — 99214 OFFICE O/P EST MOD 30 MIN: CPT | Performed by: FAMILY MEDICINE

## 2018-02-20 RX ORDER — CLOPIDOGREL BISULFATE 75 MG/1
75 TABLET ORAL EVERY OTHER DAY
Qty: 30 TABLET | Refills: 3 | Status: ON HOLD | OUTPATIENT
Start: 2018-02-20 | End: 2018-08-22

## 2018-02-20 RX ORDER — TAMSULOSIN HYDROCHLORIDE 0.4 MG/1
0.4 CAPSULE ORAL 2 TIMES DAILY
Qty: 180 CAPSULE | Refills: 3 | Status: SHIPPED | OUTPATIENT
Start: 2018-02-20 | End: 2018-09-27 | Stop reason: SDUPTHER

## 2018-02-20 RX ORDER — OMEGA-3S/DHA/EPA/FISH OIL/D3 300MG-1000
1 CAPSULE ORAL DAILY
Qty: 30 CAPSULE | Refills: 5 | Status: SHIPPED | OUTPATIENT
Start: 2018-02-20 | End: 2018-05-11 | Stop reason: ALTCHOICE

## 2018-02-20 RX ORDER — ATORVASTATIN CALCIUM 20 MG/1
20 TABLET, FILM COATED ORAL DAILY
Qty: 30 TABLET | Refills: 5 | Status: SHIPPED | OUTPATIENT
Start: 2018-02-20 | End: 2018-06-06 | Stop reason: ALTCHOICE

## 2018-02-20 RX ORDER — LOSARTAN POTASSIUM 50 MG/1
50 TABLET ORAL DAILY
Qty: 30 TABLET | Refills: 5 | Status: SHIPPED | OUTPATIENT
Start: 2018-02-20 | End: 2018-09-27 | Stop reason: SDUPTHER

## 2018-02-20 RX ORDER — PANTOPRAZOLE SODIUM 40 MG/1
40 TABLET, DELAYED RELEASE ORAL
Qty: 30 TABLET | Refills: 3 | Status: SHIPPED | OUTPATIENT
Start: 2018-02-20 | End: 2018-07-09 | Stop reason: ALTCHOICE

## 2018-02-20 ASSESSMENT — ENCOUNTER SYMPTOMS
PHOTOPHOBIA: 0
WHEEZING: 0
BLOOD IN STOOL: 0
CONSTIPATION: 0
ABDOMINAL PAIN: 0
NAUSEA: 0
DIARRHEA: 0
COUGH: 0
SHORTNESS OF BREATH: 0
EYE PAIN: 0
VOMITING: 0
SORE THROAT: 0
TROUBLE SWALLOWING: 0

## 2018-02-20 NOTE — PROGRESS NOTES
[address]  [facility]  Kevin Conklin[address] [place] [address] [address] [address]  Dept: [phone]  Dept Fax: [phone]: [phone]      Patient:  Soco Salinsa  Visit Date: 2/20/2018    ANTICIPATORY GUIDANCE : ADULT     WELL QUESTIONS  1. How many cups of caffeine do you drink per day? I do not consume any caffeine products daily   2. Do you exercise a minimum of 30 minutes per day, above normal activity? No, on average the patient performs 15 minutes of exercise per day    3. Do you eat a minimum of 8-10 servings of fruits and vegetables per day? No, on average the patient consumes 4 servings of fruits and vegetables per day  4. Do you drink alcohol? No  5.  How many oz of water do you drink per day?  (64 oz per day recommended) 24oz  EDUCATION PROVIDED  Discussed and/or Handout Given on the following items:            [x] Caffeine Use: Limit to 2 cups per day   (400mg of caffeine a day)     [x] Exercise: Ideal 30 minutes per day, above normal activity              [x] Eating Fruits and Vegetables: Studies show 8-10 servings per day decrease BP  [x] Alcohol: Ideal maximum of one cup per day for females and two cups per day for a male         Soco Salinas is a 80 y.o. male who presents today for:  Chief Complaint   Patient presents with    Diverticulitis     2 month f/u       Goals      Blood Pressure < 140/90      Reduce Falls             I will reduce my risk of falls by the following: Remove rugs or use non slip rugs  Install grab bars in bathroom  Use walking aids like cane or walker  Follow through on orders for PT    Barriers: none  Plan for overcoming my barriers: N/A  Confidence: 6/10  Anticipated Goal Completion Date: 6/8/18            HPI:     HPI   Here for a recheck    Arthritis all the time - no longer on the norco - as of yeasterday has been getting on the home bike twice daily    Still does not sleep at night - takes naps during the day after meals - the cpap does Assessment:      1. CKD (chronic kidney disease) stage 3, GFR 30-59 ml/min     2. Essential hypertension  losartan (COZAAR) 50 MG tablet    Basic Metabolic Panel, Without Calcium    Calcium   3. Arthralgia of multiple joints  Omega-3 Fatty Acids (FISH OIL BURP-LESS) 1200 MG CAPS   4. Coronary artery disease involving native coronary artery of native heart without angina pectoris     5. Disc disorder of lumbar region     6. NICOLAS (obstructive sleep apnea)     7. Lower GI bleed  CBC Auto Differential       Plan: Will refill the medications lopressor, flomax, lipitor and zoloft and cozaar    Patient still on the plavix and asa and protonix    Will contionue to take the tylenol    Will keep following the hemoglobin - recheck the cbc in 6 months    Will watch the higher BUN and may recheck th ekidneys in 3 to 4 months    Will try to drink enough water    Will see you back in 3 months    Will stay on the iron tablet for now    No Follow-up on file. Orders Placed:  Orders Placed This Encounter   Procedures    Basic Metabolic Panel, Without Calcium    Calcium    CBC Auto Differential     Medications Prescribed:  Orders Placed This Encounter   Medications    sertraline (ZOLOFT) 50 MG tablet     Sig: TAKE ONE TABLET BY MOUTH ONCE DAILY     Dispense:  30 tablet     Refill:  5    losartan (COZAAR) 50 MG tablet     Sig: Take 1 tablet by mouth daily     Dispense:  30 tablet     Refill:  5    Omega-3 Fatty Acids (FISH OIL BURP-LESS) 1200 MG CAPS     Sig: Take 1 tablet by mouth daily Krill oil     Dispense:  30 capsule     Refill:  5    tamsulosin (FLOMAX) 0.4 MG capsule     Sig: Take 1 capsule by mouth 2 times daily     Dispense:  180 capsule     Refill:  3    metoprolol tartrate (LOPRESSOR) 25 MG tablet     Sig: Take 0.5 tablets by mouth daily Substitute for atenolol.      Dispense:  30 tablet     Refill:  5    atorvastatin (LIPITOR) 20 MG tablet     Sig: Take 1 tablet by mouth daily     Dispense:  30 tablet Refill:  5    aspirin 81 MG tablet     Sig: Take 1 tablet by mouth daily     Dispense:  30 tablet     Refill:  3    clopidogrel (PLAVIX) 75 MG tablet     Sig: Take 1 tablet by mouth every other day     Dispense:  30 tablet     Refill:  3    pantoprazole (PROTONIX) 40 MG tablet     Sig: Take 1 tablet by mouth every morning (before breakfast)     Dispense:  30 tablet     Refill:  3        Patient given educational materials - see patient instructions. Discussed use, benefit, and side effects of prescribed medications. All patient questions answered. Pt voiced understanding. Reviewed health maintenance. Instructed to continue current medications, diet and exercise. Patient agreed with treatment plan. Follow up as directed.      Electronically signed by Christie Sheridan DO on 2/20/2018 at 12:05 PM

## 2018-02-20 NOTE — PROGRESS NOTES
This is a medical student note and is to be used for educational purposes only. This is NOT to be used for billing purposes or to guide patient care. This note has been reviewed and feedback has been provided to the student. HPI:     Neeraj Palmer is a 80 y.o. male who presents today with complaints of pain \"everywhere\". The pain is mostly in the joints, worse in the knees and feet. He has been exercising on the stationary bike 2x daily since yesterday. Pain is worse in the morning. He has been taking tylenol 325mg once to twice per day. He is no longer taking the Norco.      He says he doesn't sleep well at night, but he sleeps frequently throughout the day, mostly after meals. He recently restarted using his CPAP machine at night and he has noticed improvement in his sleep. He is also having redness in the medial half of the left eye since this morning. He denies trauma, vision changes, or tenderness. He had not had any more episodes of rectal bleeding. He denies fatigue, lightheadedness, dizziness, or syncope. He has been to the hospital for the driving simulator test, which he says he passed. He is waiting to have a live driving test before he starts driving again.       Past Medical History:   Diagnosis Date    Acute sinusitis     Angina pectoris ([address]     Anxiety disorder 10/27/2016    Arthritis     osteoporosis    BPH with urinary obstruction     CAD (coronary artery disease)     Chronic insomnia     CKD (chronic kidney disease) stage 3, GFR 30-59 ml/min     Gastroenteritis     HCAP (healthcare-associated pneumonia) 4/29/2015    Heart disease     HLD (hyperlipidemia)     [place] (hard of hearing)     wear hearing aids    Hypertension     Kidney disease     40% kdiney function    Kidney stone     years ago 15-20    NICOLAS on CPAP     Osteopenia determined by x-ray     Pacemaker 2011    Pinched nerve     slipped disc in back    Visual problems     Vitamin D deficiency       Past Surgical History:   Procedure Laterality Date    ABDOMINAL HERNIA REPAIR  04/27/2017    incisional hernia repair--Dr. Woodall Score    CATARACT REMOVAL WITH IMPLANT      bilateral    COLONOSCOPY  2006,2010    COLONOSCOPY  12/29/2017    COLONOSCOPY CONTROL HEMORRHAGE performed by Ean Ingram MD at [address] [address]'s    EKG 12-LEAD  4/29/2015         EYE SURGERY      cataracts    HERNIA REPAIR      several    HIP PINNING Left 8/31/2017    LEFT HIP INTERTAN performed by Vicky Nieves MD at [address]  12/3/12    left     MYRINGOTOMY AND TYMPANOSTOMY TUBE PLACEMENT  7/23/13    left     NASAL SINUS SURGERY      OTHER SURGICAL HISTORY  04/27/2015    transurethral Incision bladder neck    PACEMAKER INSERTION      PACEMAKER PLACEMENT  2011    TIBIA FRACTURE SURGERY Right 10/8/2017    Right hip intertan performed by Andrew Liang MD at [address] TURP  4/17/2013    W/CYSTO WITH DIRECT VISION URETHROTOMY & RESECTION BLADDER NECK CONTRACTURE    TURP  4/27/15    re-do TURP    TYMPANOSTOMY TUBE PLACEMENT      multiple surgeries    UPPER GASTROINTESTINAL ENDOSCOPY  12/29/2017    COLONOSCOPY SUBMUCOSAL/BOTOX INJECTION performed by Ean Ingram MD at [facility]     Family History   Problem Relation Age of Onset    Cancer Brother 72     lung    Diabetes Brother     Kidney Disease Father      stones    Stroke Father     Kidney Disease Child      stones    Kidney Disease Child      stones    Heart Disease Sister     Arthritis Sister     High Blood Pressure Sister     High Cholesterol Sister     Diabetes Brother      multiple siblings had dm    Early Death Brother 72     lung cancer     Social History   Substance Use Topics    Smoking status: Former Smoker     Packs/day: 1.00     Years: 20.00     Types: Cigarettes     Quit date: 11/17/1965    Smokeless tobacco: 2/19/2018      RTO in 4 months    Orders Placed:  No orders of the defined types were placed in this encounter. Medications Prescribed:  No orders of the defined types were placed in this encounter. **This is a medical student note and is to be used for educational purposes only. This is NOT to be used for billing purposes or to guide patient care. This note has been reviewed and feedback has been provided to the student.          Electronically signed by Phillip Hudson on 2/20/2018 at 10:55 AM

## 2018-03-08 ENCOUNTER — TELEPHONE (OUTPATIENT)
Dept: FAMILY MEDICINE CLINIC | Age: 83
End: 2018-03-08

## 2018-03-08 NOTE — TELEPHONE ENCOUNTER
I think he knows this but the driving test was ok - the recommmendation was for him to drive his normal patterns and within 10 miles from home

## 2018-03-12 ENCOUNTER — CARE COORDINATION (OUTPATIENT)
Dept: CARE COORDINATION | Age: 83
End: 2018-03-12

## 2018-03-12 NOTE — CARE COORDINATION
tartrate (LOPRESSOR) 25 MG tablet Take 0.5 tablets by mouth daily Substitute for atenolol. 2/20/18  Yes Johnny Madera, DO   atorvastatin (LIPITOR) 20 MG tablet Take 1 tablet by mouth daily 2/20/18  Yes Johnny Madera, DO   aspirin 81 MG tablet Take 1 tablet by mouth daily 2/20/18  Yes Johnny Madera, DO   clopidogrel (PLAVIX) 75 MG tablet Take 1 tablet by mouth every other day 2/20/18  Yes Johnny Madera, DO   pantoprazole (PROTONIX) 40 MG tablet Take 1 tablet by mouth every morning (before breakfast) 2/20/18  Yes Johnny Madera, DO   acetaminophen (TYLENOL) 325 MG tablet Take 2 tablets by mouth every 4 hours as needed for Pain 12/31/17  Yes Morgan Peng MD   vitamin B-12 1000 MCG tablet Take 1 tablet by mouth daily 1/1/18  Yes Morgan Peng MD   ferrous sulfate (FE TABS) 325 (65 Fe) MG EC tablet Take 1 tablet by mouth 2 times daily 12/31/17  Yes Morgan Peng MD   senna-docusate (DOK PLUS) 8.6-50 MG per tablet TAKE ONE TABLET BY MOUTH ONCE DAILY 9/15/17  Yes Marianna Kline MD   KRILL OIL PO Take 1,000 mg by mouth daily   Yes Historical Provider, MD   Cholecalciferol (VITAMIN D PO) Take 2,000 Units by mouth daily With calcium   Yes Historical Provider, MD   Coenzyme Q10 (COQ-10 PO) Take 10 mg by mouth daily   Yes Historical Provider, MD   TURMERIC PO Take 1,000 mg by mouth daily   Yes Historical Provider, MD   Multiple Vitamins-Minerals (THERAPEUTIC MULTIVITAMIN-MINERALS) tablet Take 1 tablet by mouth daily   Yes Historical Provider, MD   isosorbide mononitrate (IMDUR) 30 MG CR tablet Take 30 mg by mouth daily.    Yes Historical Provider, MD       Future Appointments  Date Time Provider Miguel Romo   5/22/2018 10:00 AM Johnny Madera, DO [facility] [place] [address]   7/31/2018 1:00 PM FAR[facility]NA [facility] [place]   9/18/2018 1:00 PM Moses De Leon DO [facility] AM [place] [address]

## 2018-04-11 ENCOUNTER — CARE COORDINATION (OUTPATIENT)
Dept: CARE COORDINATION | Age: 83
End: 2018-04-11

## 2018-04-17 ENCOUNTER — APPOINTMENT (OUTPATIENT)
Dept: CT IMAGING | Age: 83
End: 2018-04-17
Payer: MEDICARE

## 2018-04-17 ENCOUNTER — APPOINTMENT (OUTPATIENT)
Dept: GENERAL RADIOLOGY | Age: 83
End: 2018-04-17
Payer: MEDICARE

## 2018-04-17 ENCOUNTER — HOSPITAL ENCOUNTER (EMERGENCY)
Age: 83
Discharge: HOME | End: 2018-04-17
Attending: FAMILY MEDICINE
Payer: MEDICARE

## 2018-04-17 VITALS
TEMPERATURE: 98.4 F | WEIGHT: 130 LBS | SYSTOLIC BLOOD PRESSURE: 152 MMHG | BODY MASS INDEX: 22.2 KG/M2 | RESPIRATION RATE: 16 BRPM | HEART RATE: 59 BPM | DIASTOLIC BLOOD PRESSURE: 75 MMHG | OXYGEN SATURATION: 98 % | HEIGHT: 64 IN
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
## 2018-04-17 DIAGNOSIS — S09.90XA INJURY OF HEAD, INITIAL ENCOUNTER: ICD-10-CM

## 2018-04-17 PROCEDURE — 71101 X-RAY EXAM UNILAT RIBS/CHEST: CPT

## 2018-04-17 PROCEDURE — 6370000000 HC RX 637 (ALT 250 FOR IP): Performed by: INTERNAL MEDICINE

## 2018-04-17 PROCEDURE — 72125 CT NECK SPINE W/O DYE: CPT

## 2018-04-17 PROCEDURE — 70450 CT HEAD/BRAIN W/O DYE: CPT

## 2018-04-17 PROCEDURE — 72072 X-RAY EXAM THORAC SPINE 3VWS: CPT

## 2018-04-17 PROCEDURE — 99284 EMERGENCY DEPT VISIT MOD MDM: CPT

## 2018-04-17 PROCEDURE — 73010 X-RAY EXAM OF SHOULDER BLADE: CPT

## 2018-04-17 RX ORDER — LIDOCAINE 50 MG/G
1 PATCH TOPICAL DAILY
Qty: 30 PATCH | Refills: 0 | Status: SHIPPED | OUTPATIENT
Start: 2018-04-17 | End: 2018-05-11 | Stop reason: ALTCHOICE

## 2018-04-17 RX ORDER — LIDOCAINE 50 MG/G
1 PATCH TOPICAL DAILY
Status: DISCONTINUED | OUTPATIENT
Start: 2018-04-17 | End: 2018-04-17 | Stop reason: HOSPADM

## 2018-04-17 ASSESSMENT — PAIN DESCRIPTION - FREQUENCY
FREQUENCY: CONTINUOUS
FREQUENCY: CONTINUOUS

## 2018-04-17 ASSESSMENT — ENCOUNTER SYMPTOMS
COUGH: 0
EYE REDNESS: 0
NAUSEA: 0
ABDOMINAL PAIN: 0
DIARRHEA: 0
WHEEZING: 0
SHORTNESS OF BREATH: 0
VOMITING: 0
EYE DISCHARGE: 0
RHINORRHEA: 0
BACK PAIN: 1
SORE THROAT: 0

## 2018-04-17 ASSESSMENT — PAIN DESCRIPTION - ONSET: ONSET: SUDDEN

## 2018-04-17 ASSESSMENT — PAIN DESCRIPTION - ORIENTATION
ORIENTATION: LEFT
ORIENTATION: LEFT

## 2018-04-17 ASSESSMENT — PAIN DESCRIPTION - LOCATION
LOCATION: SHOULDER;RIB CAGE
LOCATION: SHOULDER;BACK

## 2018-04-17 ASSESSMENT — PAIN SCALES - GENERAL
PAINLEVEL_OUTOF10: 7
PAINLEVEL_OUTOF10: 5

## 2018-04-17 ASSESSMENT — PAIN DESCRIPTION - PROGRESSION
CLINICAL_PROGRESSION: NOT CHANGED
CLINICAL_PROGRESSION: GRADUALLY WORSENING

## 2018-04-17 ASSESSMENT — PAIN DESCRIPTION - DESCRIPTORS
DESCRIPTORS: SHARP
DESCRIPTORS: SHARP

## 2018-04-17 ASSESSMENT — PAIN DESCRIPTION - PAIN TYPE
TYPE: ACUTE PAIN
TYPE: ACUTE PAIN

## 2018-04-19 ENCOUNTER — CARE COORDINATION (OUTPATIENT)
Dept: CARE COORDINATION | Age: 83
End: 2018-04-19

## 2018-04-30 ENCOUNTER — OFFICE VISIT (OUTPATIENT)
Dept: FAMILY MEDICINE CLINIC | Age: 83
End: 2018-04-30
Payer: MEDICARE

## 2018-04-30 VITALS
WEIGHT: 143 LBS | HEART RATE: 91 BPM | TEMPERATURE: 98.2 F | SYSTOLIC BLOOD PRESSURE: 158 MMHG | DIASTOLIC BLOOD PRESSURE: 89 MMHG | RESPIRATION RATE: 10 BRPM | BODY MASS INDEX: 24.41 KG/M2 | OXYGEN SATURATION: 94 % | HEIGHT: 64 IN

## 2018-04-30 DIAGNOSIS — M25.50 ARTHRALGIA, UNSPECIFIED JOINT: ICD-10-CM

## 2018-04-30 DIAGNOSIS — G89.21 CHRONIC PAIN DUE TO TRAUMA: Primary | ICD-10-CM

## 2018-04-30 DIAGNOSIS — R29.6 FREQUENT FALLS: ICD-10-CM

## 2018-04-30 PROCEDURE — 99214 OFFICE O/P EST MOD 30 MIN: CPT | Performed by: FAMILY MEDICINE

## 2018-05-01 ENCOUNTER — TELEPHONE (OUTPATIENT)
Dept: FAMILY MEDICINE CLINIC | Age: 83
End: 2018-05-01

## 2018-05-01 DIAGNOSIS — M54.6 ACUTE BILATERAL THORACIC BACK PAIN: Primary | ICD-10-CM

## 2018-05-01 DIAGNOSIS — M54.6 ACUTE BILATERAL THORACIC BACK PAIN: ICD-10-CM
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
## 2018-05-01 ASSESSMENT — ENCOUNTER SYMPTOMS
NAUSEA: 0
CONSTIPATION: 0
COUGH: 0
DIARRHEA: 0
EYE PAIN: 0
VOMITING: 0
SHORTNESS OF BREATH: 0
BLOOD IN STOOL: 0
ABDOMINAL PAIN: 0
TROUBLE SWALLOWING: 0
BACK PAIN: 1

## 2018-05-09 ENCOUNTER — HOSPITAL ENCOUNTER (OUTPATIENT)
Dept: PHYSICAL THERAPY | Age: 83
Setting detail: THERAPIES SERIES
Discharge: HOME | End: 2018-05-09
Payer: MEDICARE

## 2018-05-10 ENCOUNTER — HOSPITAL ENCOUNTER (EMERGENCY)
Age: 83
Discharge: HOME | End: 2018-05-10
Attending: EMERGENCY MEDICINE
Payer: MEDICARE

## 2018-05-10 ENCOUNTER — TELEPHONE (OUTPATIENT)
Dept: FAMILY MEDICINE CLINIC | Age: 83
End: 2018-05-10

## 2018-05-10 ENCOUNTER — APPOINTMENT (OUTPATIENT)
Dept: GENERAL RADIOLOGY | Age: 83
End: 2018-05-10
Payer: MEDICARE

## 2018-05-10 VITALS
BODY MASS INDEX: 24.53 KG/M2 | WEIGHT: 143 LBS | OXYGEN SATURATION: 95 % | SYSTOLIC BLOOD PRESSURE: 148 MMHG | HEART RATE: 108 BPM | RESPIRATION RATE: 18 BRPM | TEMPERATURE: 98.3 F | DIASTOLIC BLOOD PRESSURE: 72 MMHG

## 2018-05-10 DIAGNOSIS — J44.1 COPD EXACERBATION (HCC): Primary | ICD-10-CM

## 2018-05-10 LAB
ALBUMIN SERPL-MCNC: 3.7 G/DL (ref 3.5–5.1)
ALP BLD-CCNC: 118 U/L (ref 38–126)
ALT SERPL-CCNC: 10 U/L (ref 11–66)
ANION GAP SERPL CALCULATED.3IONS-SCNC: 13 MEQ/L (ref 8–16)
ANISOCYTOSIS: ABNORMAL
AST SERPL-CCNC: 16 U/L (ref 5–40)
BASOPHILS # BLD: 0.6 %
BASOPHILS ABSOLUTE: 0 THOU/MM3 (ref 0–0.1)
BILIRUB SERPL-MCNC: 0.3 MG/DL (ref 0.3–1.2)
BILIRUBIN DIRECT: < 0.2 MG/DL (ref 0–0.3)
BUN BLDV-MCNC: 28 MG/DL (ref 7–22)
CALCIUM SERPL-MCNC: 9.3 MG/DL (ref 8.5–10.5)
CHLORIDE BLD-SCNC: 101 MEQ/L (ref 98–111)
CO2: 24 MEQ/L (ref 23–33)
CREAT SERPL-MCNC: 1.2 MG/DL (ref 0.4–1.2)
EKG ATRIAL RATE: 93 BPM
EKG P AXIS: 52 DEGREES
EKG P-R INTERVAL: 148 MS
EKG Q-T INTERVAL: 380 MS
EKG QRS DURATION: 122 MS
EKG QTC CALCULATION (BAZETT): 472 MS
EKG R AXIS: 93 DEGREES
EKG T AXIS: -12 DEGREES
EKG VENTRICULAR RATE: 93 BPM
EOSINOPHIL # BLD: 1.9 %
EOSINOPHILS ABSOLUTE: 0.1 THOU/MM3 (ref 0–0.4)
GFR SERPL CREATININE-BSD FRML MDRD: 57 ML/MIN/1.73M2
GLUCOSE BLD-MCNC: 112 MG/DL (ref 70–108)
HCT VFR BLD CALC: 32.5 % (ref 42–52)
HEMOGLOBIN: 10.8 GM/DL (ref 14–18)
LYMPHOCYTES # BLD: 20.1 %
LYMPHOCYTES ABSOLUTE: 1.4 THOU/MM3 (ref 1–4.8)
MCH RBC QN AUTO: 28.6 PG (ref 27–31)
MCHC RBC AUTO-ENTMCNC: 33.2 GM/DL (ref 33–37)
MCV RBC AUTO: 86.4 FL (ref 80–94)
MONOCYTES # BLD: 6.1 %
MONOCYTES ABSOLUTE: 0.4 THOU/MM3 (ref 0.4–1.3)
NUCLEATED RED BLOOD CELLS: 0 /100 WBC
OSMOLALITY CALCULATION: 281.9 MOSMOL/KG (ref 275–300)
PDW BLD-RTO: 15.4 % (ref 11.5–14.5)
PLATELET # BLD: 304 THOU/MM3 (ref 130–400)
PMV BLD AUTO: 6.9 FL (ref 7.4–10.4)
POTASSIUM SERPL-SCNC: 3.8 MEQ/L (ref 3.5–5.2)
PRO-BNP: 380.3 PG/ML (ref 0–1800)
RBC # BLD: 3.77 MILL/MM3 (ref 4.7–6.1)
SEG NEUTROPHILS: 71.3 %
SEGMENTED NEUTROPHILS ABSOLUTE COUNT: 4.8 THOU/MM3 (ref 1.8–7.7)
SODIUM BLD-SCNC: 138 MEQ/L (ref 135–145)
TOTAL PROTEIN: 7.6 G/DL (ref 6.1–8)
TROPONIN T: < 0.01 NG/ML
WBC # BLD: 6.8 THOU/MM3 (ref 4.8–10.8)

## 2018-05-10 PROCEDURE — 96374 THER/PROPH/DIAG INJ IV PUSH: CPT

## 2018-05-10 PROCEDURE — 93010 ELECTROCARDIOGRAM REPORT: CPT | Performed by: INTERNAL MEDICINE

## 2018-05-10 PROCEDURE — 82248 BILIRUBIN DIRECT: CPT

## 2018-05-10 PROCEDURE — 6370000000 HC RX 637 (ALT 250 FOR IP): Performed by: EMERGENCY MEDICINE

## 2018-05-10 PROCEDURE — 84484 ASSAY OF TROPONIN QUANT: CPT

## 2018-05-10 PROCEDURE — 71045 X-RAY EXAM CHEST 1 VIEW: CPT

## 2018-05-10 PROCEDURE — 93005 ELECTROCARDIOGRAM TRACING: CPT | Performed by: EMERGENCY MEDICINE

## 2018-05-10 PROCEDURE — 36415 COLL VENOUS BLD VENIPUNCTURE: CPT

## 2018-05-10 PROCEDURE — 99285 EMERGENCY DEPT VISIT HI MDM: CPT

## 2018-05-10 PROCEDURE — 94640 AIRWAY INHALATION TREATMENT: CPT

## 2018-05-10 PROCEDURE — 85025 COMPLETE CBC W/AUTO DIFF WBC: CPT

## 2018-05-10 PROCEDURE — 80053 COMPREHEN METABOLIC PANEL: CPT

## 2018-05-10 PROCEDURE — 83880 ASSAY OF NATRIURETIC PEPTIDE: CPT

## 2018-05-10 PROCEDURE — 6360000002 HC RX W HCPCS: Performed by: EMERGENCY MEDICINE

## 2018-05-10 RX ORDER — PREDNISONE 20 MG/1
20 TABLET ORAL 2 TIMES DAILY
Qty: 10 TABLET | Refills: 0 | Status: SHIPPED | OUTPATIENT
Start: 2018-05-10 | End: 2018-05-15

## 2018-05-10 RX ORDER — ALBUTEROL SULFATE 90 UG/1
2 AEROSOL, METERED RESPIRATORY (INHALATION) EVERY 4 HOURS PRN
Qty: 1 INHALER | Refills: 0 | Status: SHIPPED | OUTPATIENT
Start: 2018-05-10 | End: 2018-05-11 | Stop reason: SDUPTHER

## 2018-05-10 RX ORDER — IPRATROPIUM BROMIDE AND ALBUTEROL SULFATE 2.5; .5 MG/3ML; MG/3ML
1 SOLUTION RESPIRATORY (INHALATION)
Status: DISCONTINUED | OUTPATIENT
Start: 2018-05-10 | End: 2018-05-10 | Stop reason: HOSPADM

## 2018-05-10 RX ORDER — METHYLPREDNISOLONE SODIUM SUCCINATE 125 MG/2ML
125 INJECTION, POWDER, LYOPHILIZED, FOR SOLUTION INTRAMUSCULAR; INTRAVENOUS ONCE
Status: COMPLETED | OUTPATIENT
Start: 2018-05-10 | End: 2018-05-10

## 2018-05-10 RX ADMIN — IPRATROPIUM BROMIDE AND ALBUTEROL SULFATE 1 AMPULE: .5; 3 SOLUTION RESPIRATORY (INHALATION) at 10:05

## 2018-05-10 RX ADMIN — METHYLPREDNISOLONE SODIUM SUCCINATE 125 MG: 125 INJECTION, POWDER, FOR SOLUTION INTRAMUSCULAR; INTRAVENOUS at 10:56

## 2018-05-11 ENCOUNTER — TELEPHONE (OUTPATIENT)
Dept: FAMILY MEDICINE CLINIC | Age: 83
End: 2018-05-11

## 2018-05-11 ENCOUNTER — CARE COORDINATION (OUTPATIENT)
Dept: CARE COORDINATION | Age: 83
End: 2018-05-11

## 2018-05-11 ENCOUNTER — OFFICE VISIT (OUTPATIENT)
Dept: FAMILY MEDICINE CLINIC | Age: 83
End: 2018-05-11
Payer: MEDICARE

## 2018-05-11 VITALS
RESPIRATION RATE: 20 BRPM | TEMPERATURE: 98.3 F | BODY MASS INDEX: 24.11 KG/M2 | SYSTOLIC BLOOD PRESSURE: 146 MMHG | OXYGEN SATURATION: 95 % | WEIGHT: 141.2 LBS | DIASTOLIC BLOOD PRESSURE: 72 MMHG | HEART RATE: 71 BPM | HEIGHT: 64 IN

## 2018-05-11 DIAGNOSIS — R06.02 SHORTNESS OF BREATH: Primary | ICD-10-CM

## 2018-05-11 DIAGNOSIS — D64.9 ANEMIA, UNSPECIFIED TYPE: ICD-10-CM

## 2018-05-11 DIAGNOSIS — G89.29 CHRONIC BILATERAL THORACIC BACK PAIN: ICD-10-CM

## 2018-05-11 DIAGNOSIS — M54.6 CHRONIC BILATERAL THORACIC BACK PAIN: ICD-10-CM

## 2018-05-11 PROCEDURE — 99214 OFFICE O/P EST MOD 30 MIN: CPT | Performed by: NURSE PRACTITIONER

## 2018-05-11 RX ORDER — ALBUTEROL SULFATE 90 UG/1
2 AEROSOL, METERED RESPIRATORY (INHALATION) EVERY 6 HOURS PRN
Qty: 1 INHALER | Refills: 3 | Status: SHIPPED | OUTPATIENT
Start: 2018-05-11 | End: 2018-09-27 | Stop reason: SDUPTHER

## 2018-05-11 ASSESSMENT — ENCOUNTER SYMPTOMS
RHINORRHEA: 0
BLOOD IN STOOL: 0
DIARRHEA: 0
COUGH: 0
SHORTNESS OF BREATH: 1
NAUSEA: 0
EYE REDNESS: 0
ABDOMINAL PAIN: 0
ANAL BLEEDING: 0
ABDOMINAL DISTENTION: 0
COLOR CHANGE: 0
EYE DISCHARGE: 0
SORE THROAT: 0
CONSTIPATION: 0

## 2018-05-14 DIAGNOSIS — G89.29 CHRONIC BILATERAL THORACIC BACK PAIN: ICD-10-CM

## 2018-05-14 DIAGNOSIS — M54.6 CHRONIC BILATERAL THORACIC BACK PAIN: ICD-10-CM

## 2018-05-22 ENCOUNTER — OFFICE VISIT (OUTPATIENT)
Dept: FAMILY MEDICINE CLINIC | Age: 83
End: 2018-05-22
Payer: MEDICARE

## 2018-05-22 ENCOUNTER — CARE COORDINATION (OUTPATIENT)
Dept: CARE COORDINATION | Age: 83
End: 2018-05-22

## 2018-05-22 VITALS
TEMPERATURE: 98.4 F | RESPIRATION RATE: 16 BRPM | SYSTOLIC BLOOD PRESSURE: 130 MMHG | WEIGHT: 139.8 LBS | HEART RATE: 61 BPM | DIASTOLIC BLOOD PRESSURE: 72 MMHG | BODY MASS INDEX: 23.87 KG/M2 | OXYGEN SATURATION: 96 % | HEIGHT: 64 IN

## 2018-05-22 DIAGNOSIS — F41.3 OTHER MIXED ANXIETY DISORDERS: Primary | ICD-10-CM

## 2018-05-22 DIAGNOSIS — D50.0 IRON DEFICIENCY ANEMIA DUE TO CHRONIC BLOOD LOSS: ICD-10-CM

## 2018-05-22 DIAGNOSIS — J44.9 CHRONIC OBSTRUCTIVE PULMONARY DISEASE, UNSPECIFIED COPD TYPE (HCC): ICD-10-CM

## 2018-05-22 DIAGNOSIS — S72.141D CLOSED COMMINUTED INTERTROCHANTERIC FRACTURE OF RIGHT FEMUR WITH ROUTINE HEALING: ICD-10-CM

## 2018-05-22 DIAGNOSIS — M51.9 DISC DISORDER OF LUMBAR REGION: ICD-10-CM

## 2018-05-22 DIAGNOSIS — I10 ESSENTIAL HYPERTENSION: ICD-10-CM

## 2018-05-22 DIAGNOSIS — D50.8 OTHER IRON DEFICIENCY ANEMIA: ICD-10-CM

## 2018-05-22 DIAGNOSIS — K21.9 GASTROESOPHAGEAL REFLUX DISEASE WITHOUT ESOPHAGITIS: ICD-10-CM

## 2018-05-22 LAB
FERRITIN: 68 NG/ML (ref 22–322)
HCT VFR BLD CALC: 33.5 % (ref 42–52)
HEMOGLOBIN: 11.2 GM/DL (ref 14–18)
IRON: 46 UG/DL (ref 65–195)
MCH RBC QN AUTO: 29.5 PG (ref 27–31)
MCHC RBC AUTO-ENTMCNC: 33.5 GM/DL (ref 33–37)
MCV RBC AUTO: 87.9 FL (ref 80–94)
PDW BLD-RTO: 16.9 % (ref 11.5–14.5)
PLATELET # BLD: 251 THOU/MM3 (ref 130–400)
PMV BLD AUTO: 7.8 FL (ref 7.4–10.4)
RBC # BLD: 3.81 MILL/MM3 (ref 4.7–6.1)
WBC # BLD: 6.7 THOU/MM3 (ref 4.8–10.8)

## 2018-05-22 PROCEDURE — 36415 COLL VENOUS BLD VENIPUNCTURE: CPT | Performed by: FAMILY MEDICINE

## 2018-05-22 PROCEDURE — 99213 OFFICE O/P EST LOW 20 MIN: CPT | Performed by: FAMILY MEDICINE

## 2018-05-22 ASSESSMENT — ENCOUNTER SYMPTOMS
CONSTIPATION: 0
TROUBLE SWALLOWING: 0
NAUSEA: 0
ABDOMINAL PAIN: 0
BACK PAIN: 1
VOMITING: 0
DIARRHEA: 0
SHORTNESS OF BREATH: 0
BLOOD IN STOOL: 0
COUGH: 0
EYE PAIN: 0

## 2018-05-23 ENCOUNTER — HOSPITAL ENCOUNTER (OUTPATIENT)
Dept: CT IMAGING | Age: 83
Discharge: HOME | End: 2018-05-23
Payer: MEDICARE

## 2018-05-23 DIAGNOSIS — G89.29 CHRONIC BILATERAL THORACIC BACK PAIN: ICD-10-CM

## 2018-05-23 DIAGNOSIS — M54.6 CHRONIC BILATERAL THORACIC BACK PAIN: ICD-10-CM

## 2018-05-23 PROCEDURE — 72128 CT CHEST SPINE W/O DYE: CPT

## 2018-05-24 ENCOUNTER — TELEPHONE (OUTPATIENT)
Dept: FAMILY MEDICINE CLINIC | Age: 83
End: 2018-05-24

## 2018-06-06 ENCOUNTER — TELEPHONE (OUTPATIENT)
Dept: PHYSICAL MEDICINE AND REHAB | Age: 83
End: 2018-06-06

## 2018-06-06 ENCOUNTER — OFFICE VISIT (OUTPATIENT)
Dept: PHYSICAL MEDICINE AND REHAB | Age: 83
End: 2018-06-06
Payer: MEDICARE

## 2018-06-06 VITALS
WEIGHT: 139 LBS | HEART RATE: 66 BPM | HEIGHT: 64 IN | SYSTOLIC BLOOD PRESSURE: 157 MMHG | DIASTOLIC BLOOD PRESSURE: 76 MMHG | BODY MASS INDEX: 23.73 KG/M2

## 2018-06-06 DIAGNOSIS — M47.816 SPONDYLOSIS OF LUMBAR REGION WITHOUT MYELOPATHY OR RADICULOPATHY: ICD-10-CM

## 2018-06-06 DIAGNOSIS — M15.9 PRIMARY OSTEOARTHRITIS INVOLVING MULTIPLE JOINTS: ICD-10-CM

## 2018-06-06 DIAGNOSIS — M54.16 LUMBAR RADICULITIS: Primary | ICD-10-CM

## 2018-06-06 DIAGNOSIS — G89.4 CHRONIC PAIN SYNDROME: ICD-10-CM

## 2018-06-06 DIAGNOSIS — M51.26 LUMBAR DISC HERNIATION: ICD-10-CM

## 2018-06-06 PROCEDURE — 99214 OFFICE O/P EST MOD 30 MIN: CPT | Performed by: NURSE PRACTITIONER

## 2018-06-06 ASSESSMENT — ENCOUNTER SYMPTOMS: BACK PAIN: 1

## 2018-06-13 ENCOUNTER — TELEPHONE (OUTPATIENT)
Dept: PHYSICAL MEDICINE AND REHAB | Age: 83
End: 2018-06-13

## 2018-06-14 ENCOUNTER — HOSPITAL ENCOUNTER (OUTPATIENT)
Dept: PHYSICAL THERAPY | Age: 83
Setting detail: THERAPIES SERIES
Discharge: HOME | End: 2018-06-14
Payer: MEDICARE

## 2018-06-14 PROCEDURE — G8979 MOBILITY GOAL STATUS: HCPCS

## 2018-06-14 PROCEDURE — 97110 THERAPEUTIC EXERCISES: CPT

## 2018-06-14 PROCEDURE — 97161 PT EVAL LOW COMPLEX 20 MIN: CPT

## 2018-06-14 PROCEDURE — G8978 MOBILITY CURRENT STATUS: HCPCS

## 2018-06-19 ENCOUNTER — HOSPITAL ENCOUNTER (OUTPATIENT)
Dept: PHYSICAL THERAPY | Age: 83
Setting detail: THERAPIES SERIES
Discharge: HOME | End: 2018-06-19
Payer: MEDICARE

## 2018-06-19 PROCEDURE — 97110 THERAPEUTIC EXERCISES: CPT

## 2018-06-19 ASSESSMENT — PAIN SCALES - GENERAL: PAINLEVEL_OUTOF10: 2

## 2018-06-21 ENCOUNTER — HOSPITAL ENCOUNTER (OUTPATIENT)
Dept: PHYSICAL THERAPY | Age: 83
Setting detail: THERAPIES SERIES
Discharge: HOME | End: 2018-06-21
Payer: MEDICARE

## 2018-06-21 PROCEDURE — 97110 THERAPEUTIC EXERCISES: CPT

## 2018-06-21 ASSESSMENT — PAIN SCALES - GENERAL: PAINLEVEL_OUTOF10: 4

## 2018-06-21 ASSESSMENT — PAIN DESCRIPTION - LOCATION: LOCATION: BACK

## 2018-06-21 ASSESSMENT — PAIN DESCRIPTION - ORIENTATION: ORIENTATION: LOWER

## 2018-06-22 ENCOUNTER — TELEPHONE (OUTPATIENT)
Dept: FAMILY MEDICINE CLINIC | Age: 83
End: 2018-06-22

## 2018-06-25 ENCOUNTER — APPOINTMENT (OUTPATIENT)
Dept: GENERAL RADIOLOGY | Age: 83
End: 2018-06-25
Attending: PAIN MEDICINE
Payer: MEDICARE

## 2018-06-25 ENCOUNTER — ANESTHESIA (OUTPATIENT)
Dept: OPERATING ROOM | Age: 83
End: 2018-06-25
Payer: MEDICARE

## 2018-06-25 ENCOUNTER — HOSPITAL ENCOUNTER (OUTPATIENT)
Age: 83
Setting detail: OUTPATIENT SURGERY
Discharge: HOME | End: 2018-06-25
Attending: PAIN MEDICINE | Admitting: PAIN MEDICINE
Payer: MEDICARE

## 2018-06-25 ENCOUNTER — ANESTHESIA EVENT (OUTPATIENT)
Dept: OPERATING ROOM | Age: 83
End: 2018-06-25
Payer: MEDICARE

## 2018-06-25 VITALS
SYSTOLIC BLOOD PRESSURE: 105 MMHG | HEART RATE: 64 BPM | OXYGEN SATURATION: 99 % | WEIGHT: 139.6 LBS | DIASTOLIC BLOOD PRESSURE: 63 MMHG | TEMPERATURE: 97.6 F | HEIGHT: 64 IN | BODY MASS INDEX: 23.83 KG/M2 | RESPIRATION RATE: 16 BRPM

## 2018-06-25 VITALS
SYSTOLIC BLOOD PRESSURE: 119 MMHG | RESPIRATION RATE: 16 BRPM | DIASTOLIC BLOOD PRESSURE: 67 MMHG | OXYGEN SATURATION: 100 %

## 2018-06-25 PROCEDURE — 7100000011 HC PHASE II RECOVERY - ADDTL 15 MIN: Performed by: PAIN MEDICINE

## 2018-06-25 PROCEDURE — 6360000002 HC RX W HCPCS: Performed by: REGISTERED NURSE

## 2018-06-25 PROCEDURE — 62323 NJX INTERLAMINAR LMBR/SAC: CPT | Performed by: PAIN MEDICINE

## 2018-06-25 PROCEDURE — 2580000003 HC RX 258: Performed by: PAIN MEDICINE

## 2018-06-25 PROCEDURE — 2500000003 HC RX 250 WO HCPCS: Performed by: PAIN MEDICINE

## 2018-06-25 PROCEDURE — 3600000054 HC PAIN LEVEL 3 BASE: Performed by: PAIN MEDICINE

## 2018-06-25 PROCEDURE — 3700000000 HC ANESTHESIA ATTENDED CARE: Performed by: PAIN MEDICINE

## 2018-06-25 PROCEDURE — 3209999900 FLUORO FOR SURGICAL PROCEDURES

## 2018-06-25 PROCEDURE — 6360000004 HC RX CONTRAST MEDICATION: Performed by: PAIN MEDICINE

## 2018-06-25 PROCEDURE — 6360000002 HC RX W HCPCS: Performed by: PAIN MEDICINE

## 2018-06-25 PROCEDURE — 7100000010 HC PHASE II RECOVERY - FIRST 15 MIN: Performed by: PAIN MEDICINE

## 2018-06-25 RX ORDER — 0.9 % SODIUM CHLORIDE 0.9 %
VIAL (ML) INJECTION PRN
Status: DISCONTINUED | OUTPATIENT
Start: 2018-06-25 | End: 2018-06-25 | Stop reason: HOSPADM

## 2018-06-25 RX ORDER — LIDOCAINE HYDROCHLORIDE 10 MG/ML
INJECTION, SOLUTION INFILTRATION; PERINEURAL PRN
Status: DISCONTINUED | OUTPATIENT
Start: 2018-06-25 | End: 2018-06-25 | Stop reason: HOSPADM

## 2018-06-25 RX ORDER — DEXAMETHASONE SODIUM PHOSPHATE 4 MG/ML
INJECTION, SOLUTION INTRA-ARTICULAR; INTRALESIONAL; INTRAMUSCULAR; INTRAVENOUS; SOFT TISSUE PRN
Status: DISCONTINUED | OUTPATIENT
Start: 2018-06-25 | End: 2018-06-25 | Stop reason: HOSPADM

## 2018-06-25 RX ADMIN — PROPOFOL 40 MG: 10 INJECTION, EMULSION INTRAVENOUS at 13:48

## 2018-06-25 RX ADMIN — PROPOFOL 20 MG: 10 INJECTION, EMULSION INTRAVENOUS at 13:51

## 2018-06-25 ASSESSMENT — ENCOUNTER SYMPTOMS: SHORTNESS OF BREATH: 1

## 2018-06-25 ASSESSMENT — PAIN SCALES - GENERAL: PAINLEVEL_OUTOF10: 0

## 2018-06-25 ASSESSMENT — PAIN - FUNCTIONAL ASSESSMENT: PAIN_FUNCTIONAL_ASSESSMENT: 0-10

## 2018-06-26 ENCOUNTER — APPOINTMENT (OUTPATIENT)
Dept: PHYSICAL THERAPY | Age: 83
End: 2018-06-26
Payer: MEDICARE

## 2018-06-27 ENCOUNTER — CARE COORDINATION (OUTPATIENT)
Dept: CARE COORDINATION | Age: 83
End: 2018-06-27

## 2018-06-28 ENCOUNTER — HOSPITAL ENCOUNTER (OUTPATIENT)
Dept: PHYSICAL THERAPY | Age: 83
Setting detail: THERAPIES SERIES
Discharge: HOME | End: 2018-06-28
Payer: MEDICARE

## 2018-06-28 ENCOUNTER — APPOINTMENT (OUTPATIENT)
Dept: PHYSICAL THERAPY | Age: 83
End: 2018-06-28
Payer: MEDICARE

## 2018-06-28 PROCEDURE — 97110 THERAPEUTIC EXERCISES: CPT

## 2018-07-03 ENCOUNTER — HOSPITAL ENCOUNTER (OUTPATIENT)
Dept: PHYSICAL THERAPY | Age: 83
Setting detail: THERAPIES SERIES
Discharge: HOME | End: 2018-07-03
Payer: MEDICARE

## 2018-07-03 PROCEDURE — 97110 THERAPEUTIC EXERCISES: CPT

## 2018-07-03 NOTE — PROGRESS NOTES
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 Epidural ~2 years ago, ORIF B- August and 2017. General:  PT Visit Information  Onset Date: 18  PT Insurance Information: [employer]- no visits limited, aquatics and modalities covered except ionto, phono  Total # of Visits to Date: 5  Plan of Care/Certification Expiration Date: 18  Progress Note Counter: 5/10 for PN. Subjective:  Family / Caregiver Present: Yes  Comments: No follow up scheduled with CNP at this time. Subjective: Patient denies having any back pain today but does admit that both of his arms are bothering him. Pain:  Patient Currently in Pain:  (arm pain with certain positions R more then L)         Objective   Exercises  Exercise 4: NuStep level 3 x5 minutes . just using LE's. Exercise 5: Seated LAQ, hamstring curl and hip abduction with yellow t-band x 15 with bracing.  Abdominal bracing 10 x 5 seconds, hip abduction with ball 15 x 5 seconds  Exercise 8: standing heel/toe raises, 3 way hip x 10 with cues for posture   Exercise 9: hydro hip with bracing level 1 x 10, single leg x 10         Activity Tolerance:  Activity Tolerance: Patient Tolerated treatment well    Assessment:  Assessment: Patient reporting

## 2018-07-05 ENCOUNTER — HOSPITAL ENCOUNTER (OUTPATIENT)
Dept: PHYSICAL THERAPY | Age: 83
Setting detail: THERAPIES SERIES
Discharge: HOME | End: 2018-07-05
Payer: MEDICARE

## 2018-07-05 PROCEDURE — 97110 THERAPEUTIC EXERCISES: CPT

## 2018-07-05 NOTE — PROGRESS NOTES
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 Epidural ~2 years ago, ORIF B- August and 2017. General:  PT Visit Information  Onset Date: 18  PT Insurance Information: [facility]- no visits limited, aquatics and modalities covered except ionto, phono  Total # of Visits to Date: 6  Plan of Care/Certification Expiration Date: 18  Progress Note Counter: 6/10 for PN. Subjective:  Family / Caregiver Present: Yes  Comments: No follow up scheduled with CNP at this time. Subjective: Patient reporting that he does not have any back pain but that his right arm/shoulder is still bothering him. Denies any pain and soreness following last session. Pain:  Patient Currently in Pain: Yes (shoulder pain with lifting right arm up. )         Objective  Exercises  Exercise 4: NuStep level 3 x 5 minutes . just using LE's. Exercise 5: Seated LAQ, hamstring curl and hip abduction with yellow t-band x 15 with bracing. Abdominal bracing 10 x 5 seconds, hip abduction with ball 15 x 5 seconds  Exercise 8: standing heel/toe raises, 3 way hip x 10 with cues for posture.  Marching and mini squats x 10 - cues for squat technique  Exercise 10: seated scapular retraction x 10 with tactile cues  Exercise 11: // bars step forward then brining feet back together x 6 - cues to keep head up. Activity Tolerance:  Activity Tolerance: Patient Tolerated treatment well  Activity Tolerance: patient denies back pain. Assessment:  Assessment: Patient continues to report right shoulder bothering him at beginning of session but made no comments of right shoulder pain during session. Was able to make progressions by adding more standing exercises and added scapular retraction with patient tolerating well, but maintaining poor posture despite cues. Patient needing significant amount of cues for posture and bracing during session. Patient reporting no back pain at end of session and no complains of pain with right shoulder. Prognosis: Good       Patient Education:  Patient Education: Continue with HEP gave handout for seated hip adduction, hip abduction, LAQ, hamstring curls, standing at kitchen sink heel raises and hip abduction. Plan:  Times per week: 2x  Plan weeks: 8 weeks  Specific instructions for Next Treatment: core strengthening, Nustep, seated exercises  Current Treatment Recommendations: Aquatics, Modalities, Home Exercise Program, Patient/Caregiver Education & Training, Manual Therapy - Soft Tissue Mobilization, Strengthening, ROM  Plan Comment: Continue with current POC    Goals:  Patient goals : To decrease low back pain    Short term goals  Time Frame for Short term goals: 4 weeks  Short term goal 1: Pt will report 50% reduction in lumbar pain for functional mobility. Short term goal 2: Pt will demo good core engagement and postural awareness with <2 verbal cues during therapy session to carry over to household tasks, i.e. cooking, cleaning, etc.   Short term goal 3: Pt will improve B hip strength to 4+/5 for stabilization  when lifting/carrying objects, sitting, walking and household chores.      Long term goals  Time Frame for Long term goals : 8 weeks  Long term goal 1: Pt will be independent and compliant with HEP to achieve above goals. Long term goal 2: Pt will improve Oswestry score from 38% to 28%.           Shavonne Huffman [id number]

## 2018-07-09 ENCOUNTER — OFFICE VISIT (OUTPATIENT)
Dept: FAMILY MEDICINE CLINIC | Age: 83
End: 2018-07-09
Payer: MEDICARE

## 2018-07-09 VITALS
RESPIRATION RATE: 12 BRPM | HEART RATE: 59 BPM | BODY MASS INDEX: 24.01 KG/M2 | DIASTOLIC BLOOD PRESSURE: 75 MMHG | OXYGEN SATURATION: 98 % | WEIGHT: 140.6 LBS | SYSTOLIC BLOOD PRESSURE: 133 MMHG | HEIGHT: 64 IN

## 2018-07-09 DIAGNOSIS — E43 SEVERE MALNUTRITION (HCC): ICD-10-CM

## 2018-07-09 DIAGNOSIS — M51.9 DISC DISORDER OF LUMBAR REGION: Primary | ICD-10-CM

## 2018-07-09 DIAGNOSIS — D50.0 IRON DEFICIENCY ANEMIA DUE TO CHRONIC BLOOD LOSS: ICD-10-CM

## 2018-07-09 DIAGNOSIS — M79.601 PAIN OF RIGHT UPPER EXTREMITY: ICD-10-CM

## 2018-07-09 PROCEDURE — 99213 OFFICE O/P EST LOW 20 MIN: CPT | Performed by: FAMILY MEDICINE

## 2018-07-09 ASSESSMENT — ENCOUNTER SYMPTOMS
CONSTIPATION: 0
NAUSEA: 0
BLOOD IN STOOL: 0
DIARRHEA: 0
COUGH: 0
ABDOMINAL PAIN: 0
EYE PAIN: 0
VOMITING: 0
SHORTNESS OF BREATH: 0
BACK PAIN: 1
TROUBLE SWALLOWING: 0

## 2018-07-09 NOTE — PATIENT INSTRUCTIONS
doctor if you are having problems. It's also a good idea to know your test results and keep a list of the medicines you take. Where can you learn more? Go to [email or web address] News [email or web address].GlobeIn. org and sign in to your [email or web address] account. Enter M151 in the [facility] box to learn more about \"Back Stretches: Exercises. \"     If you do not have an account, please click on the \"Sign Up Now\" link. Current as of: November 29, 2017  Content Version: 11.6  © 0963-6458 [employer]. Care instructions adapted under license by [facility] ([facility]). If you have questions about a medical condition or this instruction, always ask your healthcare professional. [address] any warranty or liability for your use of this information. Patient Education        Low Back Pain: Exercises  Your Care Instructions  Here are some examples of typical rehabilitation exercises for your condition. Start each exercise slowly. Ease off the exercise if you start to have pain. Your doctor or physical therapist will tell you when you can start these exercises and which ones will work best for you. How to do the exercises  Press-up    5. Lie on your stomach, supporting your body with your forearms. 6. Press your elbows down into the floor to raise your upper back. As you do this, relax your stomach muscles and allow your back to arch without using your back muscles. As your press up, do not let your hips or pelvis come off the floor. 7. Hold for 15 to 30 seconds, then relax. 8. Repeat 2 to 4 times. Alternate arm and leg (bird dog) exercise    Do this exercise slowly. Try to keep your body straight at all times, and do not let one hip drop lower than the other. 5. Start on the floor, on your hands and knees. 6. Tighten your belly muscles. 7. Raise one leg off the floor, and hold it straight out behind you. Be careful not to let your hip drop down, because that will twist your trunk.   8. Hold for about 6 seconds, then lower your leg and switch to the other leg. 9. Repeat 8 to 12 times on each leg. 10. Over time, work up to holding for 10 to 30 seconds each time. 11. If you feel stable and secure with your leg raised, try raising the opposite arm straight out in front of you at the same time. Knee-to-chest exercise    5. Lie on your back with your knees bent and your feet flat on the floor. 6. Bring one knee to your chest, keeping the other foot flat on the floor (or keeping the other leg straight, whichever feels better on your lower back). 7. Keep your lower back pressed to the floor. Hold for at least 15 to 30 seconds. 8. Relax, and lower the knee to the starting position. 9. Repeat with the other leg. Repeat 2 to 4 times with each leg. 10. To get more stretch, put your other leg flat on the floor while pulling your knee to your chest.  Curl-ups    5. Lie on the floor on your back with your knees bent at a 90-degree angle. Your feet should be flat on the floor, about 12 inches from your buttocks. 6. Cross your arms over your chest. If this bothers your neck, try putting your hands behind your neck (not your head), with your elbows spread apart. 7. Slowly tighten your belly muscles and raise your shoulder blades off the floor. 8. Keep your head in line with your body, and do not press your chin to your chest.  9. Hold this position for 1 or 2 seconds, then slowly lower yourself back down to the floor. 10. Repeat 8 to 12 times. Pelvic tilt exercise    1. Lie on your back with your knees bent. 2. \"Brace\" your stomach. This means to tighten your muscles by pulling in and imagining your belly button moving toward your spine. You should feel like your back is pressing to the floor and your hips and pelvis are rocking back. 3. Hold for about 6 seconds while you breathe smoothly. 4. Repeat 8 to 12 times. Heel dig bridging    1.  Lie on your back with both knees bent and your ankles bent so that only your heels are digging into the floor. Your knees should be bent about 90 degrees. 2. Then push your heels into the floor, squeeze your buttocks, and lift your hips off the floor until your shoulders, hips, and knees are all in a straight line. 3. Hold for about 6 seconds as you continue to breathe normally, and then slowly lower your hips back down to the floor and rest for up to 10 seconds. 4. Do 8 to 12 repetitions. Hamstring stretch in doorway    1. Lie on your back in a doorway, with one leg through the open door. 2. Slide your leg up the wall to straighten your knee. You should feel a gentle stretch down the back of your leg. 3. Hold the stretch for at least 15 to 30 seconds. Do not arch your back, point your toes, or bend either knee. Keep one heel touching the floor and the other heel touching the wall. 4. Repeat with your other leg. 5. Do 2 to 4 times for each leg. Hip flexor stretch    1. Kneel on the floor with one knee bent and one leg behind you. Place your forward knee over your foot. Keep your other knee touching the floor. 2. Slowly push your hips forward until you feel a stretch in the upper thigh of your rear leg. 3. Hold the stretch for at least 15 to 30 seconds. Repeat with your other leg. 4. Do 2 to 4 times on each side. Wall sit    1. Stand with your back 10 to 12 inches away from a wall. 2. Lean into the wall until your back is flat against it. 3. Slowly slide down until your knees are slightly bent, pressing your lower back into the wall. 4. Hold for about 6 seconds, then slide back up the wall. 5. Repeat 8 to 12 times. Follow-up care is a key part of your treatment and safety. Be sure to make and go to all appointments, and call your doctor if you are having problems. It's also a good idea to know your test results and keep a list of the medicines you take. Where can you learn more? Go to https://ajith.Application Security. org and sign in to your LoiLo account. Enter M805 in the Mobile Patrol box to learn more about \"Low Back Pain: Exercises. \"     If you do not have an account, please click on the \"Sign Up Now\" link. Current as of: November 29, 2017  Content Version: 11.6  © 4414-0534 [employer]. Care instructions adapted under license by [facility] ([facility]). If you have questions about a medical condition or this instruction, always ask your healthcare professional. [address] any warranty or liability for your use of this information. Patient Education        Acute Low Back Pain: Exercises  Your Care Instructions  Here are some examples of typical rehabilitation exercises for your condition. Start each exercise slowly. Ease off the exercise if you start to have pain. Your doctor or physical therapist will tell you when you can start these exercises and which ones will work best for you. When you are not being active, find a comfortable position for rest. Some people are comfortable on the floor or a medium-firm bed with a small pillow under their head and another under their knees. Some people prefer to lie on their side with a pillow between their knees. Don't stay in one position for too long. Take short walks (10 to 20 minutes) every 2 to 3 hours. Avoid slopes, hills, and stairs until you feel better. Walk only distances you can manage without pain, especially leg pain. How to do the exercises  Back stretches    9. Get down on your hands and knees on the floor. 10. Relax your head and allow it to droop. Round your back up toward the ceiling until you feel a nice stretch in your upper, middle, and lower back. Hold this stretch for as long as it feels comfortable, or about 15 to 30 seconds. 11. Return to the starting position with a flat back while you are on your hands and knees. 12. Let your back sway by pressing your stomach toward the floor. Lift your buttocks toward the ceiling.   13. Hold this position for 15

## 2018-07-09 NOTE — PROGRESS NOTES
TURP    TYMPANOSTOMY TUBE PLACEMENT      multiple surgeries    UPPER GASTROINTESTINAL ENDOSCOPY  12/29/2017    COLONOSCOPY SUBMUCOSAL/BOTOX INJECTION performed by Tc Foster MD at [facility]     Family History   Problem Relation Age of Onset    Cancer Brother 72        lung    Diabetes Brother     Kidney Disease Father         stones    Stroke Father     Kidney Disease Child         stones    Kidney Disease Child         stones    Heart Disease Sister     Arthritis Sister     High Blood Pressure Sister     High Cholesterol Sister     Diabetes Brother         multiple siblings had dm    Early Death Brother 72        lung cancer     Social History   Substance Use Topics    Smoking status: Former Smoker     Packs/day: 1.00     Years: 20.00     Types: Cigarettes     Quit date: 11/17/1965    Smokeless tobacco: Never Used      Comment: pt use to smoke cigars and pipe    Alcohol use 4.2 oz/week     7 Cans of beer per week      Comment: occasional 1 beer       Current Outpatient Prescriptions   Medication Sig Dispense Refill    Nutritional Supplements (De [address] 96) LIQD Will drink 2 a day 60 Bottle 5    albuterol sulfate HFA (VENTOLIN HFA) 108 (90 Base) MCG/ACT inhaler Inhale 2 puffs into the lungs every 6 hours as needed for Wheezing 1 Inhaler 3    sertraline (ZOLOFT) 50 MG tablet TAKE ONE TABLET BY MOUTH ONCE DAILY 30 tablet 5    losartan (COZAAR) 50 MG tablet Take 1 tablet by mouth daily 30 tablet 5    tamsulosin (FLOMAX) 0.4 MG capsule Take 1 capsule by mouth 2 times daily 180 capsule 3    metoprolol tartrate (LOPRESSOR) 25 MG tablet Take 0.5 tablets by mouth daily Substitute for atenolol.  (Patient taking differently: Take 25 mg by mouth daily Substitute for atenolol.) 30 tablet 5    aspirin 81 MG tablet Take 1 tablet by mouth daily 30 tablet 3    clopidogrel (PLAVIX) 75 MG tablet Take 1 tablet by mouth every other day 30 tablet 3    acetaminophen (TYLENOL) 325 MG tablet

## 2018-07-10 ENCOUNTER — HOSPITAL ENCOUNTER (OUTPATIENT)
Dept: PHYSICAL THERAPY | Age: 83
Setting detail: THERAPIES SERIES
Discharge: HOME | End: 2018-07-10
Payer: MEDICARE

## 2018-07-10 PROCEDURE — 97110 THERAPEUTIC EXERCISES: CPT

## 2018-07-10 NOTE — PROGRESS NOTES
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 Epidural ~2 years ago, ORIF B- August and 2017. General:  PT Visit Information  Onset Date: 18  PT Insurance Information: [employer]- no visits limited, aquatics and modalities covered except ionto, phono  Total # of Visits to Date: 7  Plan of Care/Certification Expiration Date: 18  Progress Note Counter: 7/10 for PN. Subjective:  Family / Caregiver Present: Yes  Comments: No follow up scheduled with CNP at this time. Subjective: Patient reporting that he did have a little back pain last night but currently denies back pain. Patient still reporting that his right shoulder is bothering him when trying to lift arm up. Pain:  Patient Currently in Pain: Yes (shoulder pain with lifting arm up. R>L)         Objective   Exercises  Exercise 4: NuStep level 3 x 5 minutes . just using LE's. Exercise 5: Seated LAQ, hamstring curl and hip abduction with yellow t-band x 20 with bracing. Abdominal bracing 10 x 5 seconds, hip abduction with ball 20 x 5 seconds  Exercise 8: standing heel/toe raises, 3 way hip x 10 with cues for posture.  Marching and mini squats x 10 - cues for squat technique  Exercise 9: hydro hip with bracing level 1 x 15,

## 2018-07-12 ENCOUNTER — HOSPITAL ENCOUNTER (OUTPATIENT)
Dept: PHYSICAL THERAPY | Age: 83
Setting detail: THERAPIES SERIES
Discharge: HOME | End: 2018-07-12
Payer: MEDICARE

## 2018-07-12 PROCEDURE — G8979 MOBILITY GOAL STATUS: HCPCS

## 2018-07-12 PROCEDURE — G8980 MOBILITY D/C STATUS: HCPCS

## 2018-07-12 PROCEDURE — 97110 THERAPEUTIC EXERCISES: CPT

## 2018-07-12 NOTE — PROGRESS NOTES
G-Codes  Functional Assessment Tool Used: Oswestry   Score: 5/45=11%  Functional Limitation: Mobility: Walking and moving around  Mobility: Walking and Moving Around Goal Status (): At least 1 percent but less than 20 percent impaired, limited or restricted  Mobility: Walking and Moving Around Discharge Status (): At least 1 percent but less than 20 percent impaired, limited or restricted  Oswestry Low Back Pain Disability Questionnaire    Instructions: This questionnaire has been designed to give us information as to how your back or leg pain is affecting your ability to manage in everday life. Please answer by checking ONE box in each section for the statement that best applies to you. We realize you may consider that two or more statements in any one section apply but please just check the spot that indicates the statement which most clearly describes your problem. Section 1 - Pain Intensity I have no pain at the moment = 0   Section 2 - Personal Care (Washing, dressing, etc) I can look after myself normally without causing extra pain = 0   Section 3 - Lifting I can lift heavy weights but it gives extra pain = 1   Section 4 - Walking Pain does not prevent me walking any distance = 0     Section 5 - Sitting   I can sit in any chair as long as I like = 0   Section 6 - Standing I can stand as long as I want but it gives me extra pain = 1   Section 7 - Sleeping My sleep is never disturbed = 0   Section 8 - Sex Life (if applicable) Not applicable   Section 9 - Social Life Pain has restricted my social life and I do not go out as often = 3     Section 10 - Travelling I can travel anywhere without pain = 0   Total Score  5/45     Total score/total possible score   X 100  11%       Score Interpretation:  0%-20%: minimal disability The patient can cope with most living activities. Usually no treatment is indicated apart from advice on lifting sitting and exercise.    21%-40%: moderate disability The

## 2018-07-31 ENCOUNTER — OFFICE VISIT (OUTPATIENT)
Dept: ENT CLINIC | Age: 83
End: 2018-07-31
Payer: MEDICARE

## 2018-07-31 VITALS
HEART RATE: 68 BPM | SYSTOLIC BLOOD PRESSURE: 110 MMHG | DIASTOLIC BLOOD PRESSURE: 74 MMHG | WEIGHT: 140.6 LBS | TEMPERATURE: 97.5 F | RESPIRATION RATE: 14 BRPM | BODY MASS INDEX: 24.91 KG/M2 | HEIGHT: 63 IN

## 2018-07-31 DIAGNOSIS — T85.698A EXTRUSION OF LEFT TYMPANIC VENTILATION TUBE, INITIAL ENCOUNTER: ICD-10-CM

## 2018-07-31 DIAGNOSIS — Z97.4 WEARS HEARING AID: ICD-10-CM

## 2018-07-31 DIAGNOSIS — Z96.22 S/P TYMPANOSTOMY TUBE PLACEMENT: Primary | ICD-10-CM

## 2018-07-31 DIAGNOSIS — H65.22 CHRONIC SEROUS OTITIS MEDIA OF LEFT EAR: ICD-10-CM

## 2018-07-31 DIAGNOSIS — H90.0 CONDUCTIVE HEARING LOSS, BILATERAL: ICD-10-CM

## 2018-07-31 PROCEDURE — 99213 OFFICE O/P EST LOW 20 MIN: CPT | Performed by: NURSE PRACTITIONER

## 2018-07-31 ASSESSMENT — ENCOUNTER SYMPTOMS
EYE DISCHARGE: 0
NAUSEA: 0
COUGH: 0
RHINORRHEA: 0
BLOOD IN STOOL: 0
SHORTNESS OF BREATH: 0
SINUS PRESSURE: 0
ANAL BLEEDING: 0
BACK PAIN: 0
APNEA: 0
EYE REDNESS: 0
VOMITING: 0
RECTAL PAIN: 0
WHEEZING: 0
TROUBLE SWALLOWING: 0
VOICE CHANGE: 0
CHEST TIGHTNESS: 0
DIARRHEA: 0
FACIAL SWELLING: 0
PHOTOPHOBIA: 0
EYE ITCHING: 0
ABDOMINAL DISTENTION: 0
CONSTIPATION: 0
CHOKING: 0
SORE THROAT: 0
COLOR CHANGE: 0
STRIDOR: 0
ABDOMINAL PAIN: 0
EYE PAIN: 0

## 2018-08-01 ENCOUNTER — CARE COORDINATION (OUTPATIENT)
Dept: CARE COORDINATION | Age: 83
End: 2018-08-01

## 2018-08-01 NOTE — CARE COORDINATION
Ambulatory Care Coordination Note  8/1/2018  CM Risk Score: 14  Lanie Mortality Risk Score: 29.58    ACC: Alyssa Robledo, RN    Summary Note: Spoke with son, Bret Ruiz. Reports feeling well. Denies changes in breathing. Discussed reporting early symptoms of changes in breathing. States ongoing issues with arm and back pain. Daughter in law is a nurse and wraps arms for relief. Discussed at length benefits of assisted living. Son states family has been discussing this seriously. Patient not ready to leave home yet. Family provides much support. Encouraged to call with any problems, questions, concerns.     COPD Assessment    Does the patient understand envrionmental exposure?:  Yes  Is the patient able to verbalize Rescue vs. Long Acting medications?:  Yes  Does the patient have a nebulizer?:  No     No patient-reported symptoms         Symptoms:   None:  Yes      Symptom course:  stable  Breathlessness:  none  Increase use of rapid acting/rescue inhaled medications?:  No  Change in chronic cough?:  No/At Baseline  Change in sputum?:  No/At Baseline  Have you had a recent diagnosis of pneumonia either by PCP or at a hospital?:  No         Care Coordination Interventions    Program Enrollment:  Complex Care  Referral from Primary Care Provider:  Yes  Suggested Interventions and [address] [place] [address]:  Completed (Comment: [facility])  Occupational Therapy:  Completed  Physical Therapy:  Completed  Other Services or Interventions:  [employer] 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 Authorizing Provider   Nutritional Supplements ([address] 96) LIQD Will drink 2 a day 7/9/18   Marilee Ripper, DO   albuterol sulfate HFA (VENTOLIN HFA) 108 (90 Base) MCG/ACT inhaler Inhale 2 puffs into the lungs every 6 hours as needed for Wheezing 5/11/18   CHRISTIN Mooney - CNP   sertraline (ZOLOFT) 50 MG tablet TAKE ONE TABLET BY MOUTH ONCE DAILY 2/20/18 Rennie Ripper, DO   losartan (COZAAR) 50 MG tablet Take 1 tablet by mouth daily 2/20/18   Marilee Ripper, DO   tamsulosin [facility]) 0.4 MG capsule Take 1 capsule by mouth 2 times daily 2/20/18 2/20/19  Marilee Ripper, DO   metoprolol tartrate (LOPRESSOR) 25 MG tablet Take 0.5 tablets by mouth daily Substitute for atenolol. Patient taking differently: Take 25 mg by mouth daily Substitute for atenolol.  2/20/18   Marilee Ripper, DO   aspirin 81 MG tablet Take 1 tablet by mouth daily 2/20/18   Marileee Ripper, DO   clopidogrel (PLAVIX) 75 MG tablet Take 1 tablet by mouth every other day 2/20/18 Rennie Ripper, DO   acetaminophen (TYLENOL) 325 MG tablet Take 2 tablets by mouth every 4 hours as needed for Pain 12/31/17   Edith Mccain MD   vitamin B-12 1000 MCG tablet Take 1 tablet by mouth daily 1/1/18   Edith Mccain MD   ferrous sulfate (FE TABS) 325 (65 Fe) MG EC tablet Take 1 tablet by mouth 2 times daily  Patient taking differently: Take 325 mg by mouth every other day  12/31/17   Edith Mccain MD   senna-docusate (DOK PLUS) 8.6-50 MG per tablet TAKE ONE TABLET BY MOUTH ONCE DAILY  Patient taking differently: as needed TAKE ONE TABLET BY MOUTH ONCE DAILY 9/15/17   Khadar Hopkins MD   KRILL OIL PO Take 1,000 mg by mouth daily    Historical Provider, MD   Coenzyme Q10 (COQ-10 PO) Take 10 mg by mouth daily    Historical Provider, MD   TURMERIC PO Take 1,000 mg by mouth daily    Historical Provider, MD   Multiple Vitamins-Minerals (THERAPEUTIC MULTIVITAMIN-MINERALS) tablet Take 0.5 tablets by mouth 2 times daily     Historical Provider, MD   isosorbide mononitrate (IMDUR) 30 MG CR tablet Take 30 mg by mouth daily.     Historical Provider, MD       Future Appointments  Date Time Provider Miguel Romo   8/22/2018 10:50 AM Aliyah Ortiz APRN - CNP [facility] [address]   9/17/2018 2:40 PM Adan Dickerson MD [facility] [address]   2/1/2019 11:30 AM Jamey Dave 222, APRN - CNP ENT [facility] [address]

## 2018-08-15 ENCOUNTER — APPOINTMENT (OUTPATIENT)
Dept: GENERAL RADIOLOGY | Age: 83
DRG: 907 | End: 2018-08-15
Payer: MEDICARE

## 2018-08-15 ENCOUNTER — HOSPITAL ENCOUNTER (INPATIENT)
Age: 83
LOS: 9 days | Discharge: HOME HEALTH | DRG: 907 | End: 2018-08-24
Attending: INTERNAL MEDICINE | Admitting: INTERNAL MEDICINE
Payer: MEDICARE

## 2018-08-15 DIAGNOSIS — K92.2 GASTROINTESTINAL HEMORRHAGE, UNSPECIFIED GASTROINTESTINAL HEMORRHAGE TYPE: Primary | ICD-10-CM

## 2018-08-15 LAB
ABO: NORMAL
ALBUMIN SERPL-MCNC: 3.7 G/DL (ref 3.5–5.1)
ALP BLD-CCNC: 90 U/L (ref 38–126)
ALT SERPL-CCNC: 9 U/L (ref 11–66)
ANION GAP SERPL CALCULATED.3IONS-SCNC: 11 MEQ/L (ref 8–16)
ANTIBODY SCREEN: NORMAL
AST SERPL-CCNC: 15 U/L (ref 5–40)
BASOPHILS # BLD: 1.1 %
BASOPHILS ABSOLUTE: 0.1 THOU/MM3 (ref 0–0.1)
BILIRUB SERPL-MCNC: 0.3 MG/DL (ref 0.3–1.2)
BILIRUBIN DIRECT: < 0.2 MG/DL (ref 0–0.3)
BILIRUBIN URINE: NEGATIVE
BLOOD, URINE: NEGATIVE
BUN BLDV-MCNC: 26 MG/DL (ref 7–22)
CALCIUM SERPL-MCNC: 8.9 MG/DL (ref 8.5–10.5)
CHARACTER, URINE: CLEAR
CHLORIDE BLD-SCNC: 101 MEQ/L (ref 98–111)
CO2: 25 MEQ/L (ref 23–33)
COLOR: YELLOW
CREAT SERPL-MCNC: 1.3 MG/DL (ref 0.4–1.2)
EOSINOPHIL # BLD: 6 %
EOSINOPHILS ABSOLUTE: 0.4 THOU/MM3 (ref 0–0.4)
ERYTHROCYTE [DISTWIDTH] IN BLOOD BY AUTOMATED COUNT: 14.6 % (ref 11.5–14.5)
ERYTHROCYTE [DISTWIDTH] IN BLOOD BY AUTOMATED COUNT: 48.1 FL (ref 35–45)
GFR SERPL CREATININE-BSD FRML MDRD: 52 ML/MIN/1.73M2
GLUCOSE BLD-MCNC: 110 MG/DL (ref 70–108)
GLUCOSE URINE: NEGATIVE MG/DL
HCT VFR BLD CALC: 30.5 % (ref 42–52)
HEMOCCULT STL QL: POSITIVE
HEMOGLOBIN: 10.1 GM/DL (ref 14–18)
IMMATURE GRANS (ABS): 0.02 THOU/MM3 (ref 0–0.07)
IMMATURE GRANULOCYTES: 0.3 %
INR BLD: 1.08 (ref 0.85–1.13)
KETONES, URINE: NEGATIVE
LEUKOCYTE ESTERASE, URINE: NEGATIVE
LIPASE: 19.7 U/L (ref 5.6–51.3)
LYMPHOCYTES # BLD: 13 %
LYMPHOCYTES ABSOLUTE: 0.8 THOU/MM3 (ref 1–4.8)
MCH RBC QN AUTO: 29.9 PG (ref 26–33)
MCHC RBC AUTO-ENTMCNC: 33.1 GM/DL (ref 32.2–35.5)
MCV RBC AUTO: 90.2 FL (ref 80–94)
MONOCYTES # BLD: 11.8 %
MONOCYTES ABSOLUTE: 0.8 THOU/MM3 (ref 0.4–1.3)
NITRITE, URINE: NEGATIVE
NUCLEATED RED BLOOD CELLS: 0 /100 WBC
OSMOLALITY CALCULATION: 279.2 MOSMOL/KG (ref 275–300)
PH UA: 6.5
PLATELET # BLD: 240 THOU/MM3 (ref 130–400)
PMV BLD AUTO: 9 FL (ref 9.4–12.4)
POTASSIUM SERPL-SCNC: 4.7 MEQ/L (ref 3.5–5.2)
PROTEIN UA: NEGATIVE
RBC # BLD: 3.38 MILL/MM3 (ref 4.7–6.1)
RH FACTOR: NORMAL
SEG NEUTROPHILS: 67.8 %
SEGMENTED NEUTROPHILS ABSOLUTE COUNT: 4.4 THOU/MM3 (ref 1.8–7.7)
SODIUM BLD-SCNC: 137 MEQ/L (ref 135–145)
SPECIFIC GRAVITY, URINE: 1.01 (ref 1–1.03)
TOTAL PROTEIN: 6.4 G/DL (ref 6.1–8)
TROPONIN T: < 0.01 NG/ML
UROBILINOGEN, URINE: 1 EU/DL
WBC # BLD: 6.5 THOU/MM3 (ref 4.8–10.8)

## 2018-08-15 PROCEDURE — 1200000000 HC SEMI PRIVATE

## 2018-08-15 PROCEDURE — 2580000003 HC RX 258: Performed by: INTERNAL MEDICINE

## 2018-08-15 PROCEDURE — 6370000000 HC RX 637 (ALT 250 FOR IP): Performed by: INTERNAL MEDICINE

## 2018-08-15 PROCEDURE — 86923 COMPATIBILITY TEST ELECTRIC: CPT

## 2018-08-15 PROCEDURE — 84484 ASSAY OF TROPONIN QUANT: CPT

## 2018-08-15 PROCEDURE — 86850 RBC ANTIBODY SCREEN: CPT

## 2018-08-15 PROCEDURE — 36415 COLL VENOUS BLD VENIPUNCTURE: CPT

## 2018-08-15 PROCEDURE — 85025 COMPLETE CBC W/AUTO DIFF WBC: CPT

## 2018-08-15 PROCEDURE — 99221 1ST HOSP IP/OBS SF/LOW 40: CPT | Performed by: INTERNAL MEDICINE

## 2018-08-15 PROCEDURE — 85610 PROTHROMBIN TIME: CPT

## 2018-08-15 PROCEDURE — C9113 INJ PANTOPRAZOLE SODIUM, VIA: HCPCS | Performed by: INTERNAL MEDICINE

## 2018-08-15 PROCEDURE — 2709999900 HC NON-CHARGEABLE SUPPLY

## 2018-08-15 PROCEDURE — 86900 BLOOD TYPING SEROLOGIC ABO: CPT

## 2018-08-15 PROCEDURE — 86901 BLOOD TYPING SEROLOGIC RH(D): CPT

## 2018-08-15 PROCEDURE — 2580000003 HC RX 258: Performed by: NURSE PRACTITIONER

## 2018-08-15 PROCEDURE — 82248 BILIRUBIN DIRECT: CPT

## 2018-08-15 PROCEDURE — 74022 RADEX COMPL AQT ABD SERIES: CPT

## 2018-08-15 PROCEDURE — 99284 EMERGENCY DEPT VISIT MOD MDM: CPT

## 2018-08-15 PROCEDURE — 81003 URINALYSIS AUTO W/O SCOPE: CPT

## 2018-08-15 PROCEDURE — P9016 RBC LEUKOCYTES REDUCED: HCPCS

## 2018-08-15 PROCEDURE — 83690 ASSAY OF LIPASE: CPT

## 2018-08-15 PROCEDURE — 82272 OCCULT BLD FECES 1-3 TESTS: CPT

## 2018-08-15 PROCEDURE — 6360000002 HC RX W HCPCS: Performed by: INTERNAL MEDICINE

## 2018-08-15 PROCEDURE — 80053 COMPREHEN METABOLIC PANEL: CPT

## 2018-08-15 RX ORDER — 0.9 % SODIUM CHLORIDE 0.9 %
10 VIAL (ML) INJECTION EVERY 12 HOURS
Status: DISCONTINUED | OUTPATIENT
Start: 2018-08-15 | End: 2018-08-15

## 2018-08-15 RX ORDER — TAMSULOSIN HYDROCHLORIDE 0.4 MG/1
0.4 CAPSULE ORAL 2 TIMES DAILY
Status: DISCONTINUED | OUTPATIENT
Start: 2018-08-15 | End: 2018-08-24 | Stop reason: HOSPADM

## 2018-08-15 RX ORDER — LANOLIN ALCOHOL/MO/W.PET/CERES
1000 CREAM (GRAM) TOPICAL DAILY
Status: DISCONTINUED | OUTPATIENT
Start: 2018-08-15 | End: 2018-08-24 | Stop reason: HOSPADM

## 2018-08-15 RX ORDER — ISOSORBIDE MONONITRATE 30 MG/1
30 TABLET, EXTENDED RELEASE ORAL DAILY
Status: DISCONTINUED | OUTPATIENT
Start: 2018-08-15 | End: 2018-08-17

## 2018-08-15 RX ORDER — DOCUSATE SODIUM 100 MG/1
100 CAPSULE, LIQUID FILLED ORAL 2 TIMES DAILY PRN
Status: ON HOLD | COMMUNITY
End: 2020-01-01 | Stop reason: HOSPADM

## 2018-08-15 RX ORDER — SODIUM CHLORIDE 0.9 % (FLUSH) 0.9 %
10 SYRINGE (ML) INJECTION PRN
Status: DISCONTINUED | OUTPATIENT
Start: 2018-08-15 | End: 2018-08-24 | Stop reason: HOSPADM

## 2018-08-15 RX ORDER — SENNA PLUS 8.6 MG/1
15 TABLET ORAL ONCE
Status: COMPLETED | OUTPATIENT
Start: 2018-08-15 | End: 2018-08-15

## 2018-08-15 RX ORDER — SODIUM CHLORIDE 9 MG/ML
INJECTION, SOLUTION INTRAVENOUS CONTINUOUS
Status: DISCONTINUED | OUTPATIENT
Start: 2018-08-15 | End: 2018-08-17

## 2018-08-15 RX ORDER — ACETAMINOPHEN 325 MG/1
650 TABLET ORAL EVERY 4 HOURS PRN
Status: DISCONTINUED | OUTPATIENT
Start: 2018-08-15 | End: 2018-08-24 | Stop reason: HOSPADM

## 2018-08-15 RX ORDER — SODIUM CHLORIDE 0.9 % (FLUSH) 0.9 %
10 SYRINGE (ML) INJECTION EVERY 12 HOURS SCHEDULED
Status: DISCONTINUED | OUTPATIENT
Start: 2018-08-15 | End: 2018-08-24 | Stop reason: HOSPADM

## 2018-08-15 RX ORDER — POLYETHYLENE GLYCOL 3350 17 G/17G
238 POWDER, FOR SOLUTION ORAL ONCE
Status: COMPLETED | OUTPATIENT
Start: 2018-08-15 | End: 2018-08-15

## 2018-08-15 RX ORDER — PANTOPRAZOLE SODIUM 40 MG/10ML
40 INJECTION, POWDER, LYOPHILIZED, FOR SOLUTION INTRAVENOUS EVERY 12 HOURS
Status: DISCONTINUED | OUTPATIENT
Start: 2018-08-15 | End: 2018-08-16

## 2018-08-15 RX ORDER — M-VIT,TX,IRON,MINS/CALC/FOLIC 27MG-0.4MG
0.5 TABLET ORAL 2 TIMES DAILY
Status: DISCONTINUED | OUTPATIENT
Start: 2018-08-15 | End: 2018-08-24 | Stop reason: HOSPADM

## 2018-08-15 RX ORDER — ALBUTEROL SULFATE 90 UG/1
2 AEROSOL, METERED RESPIRATORY (INHALATION) EVERY 6 HOURS PRN
Status: DISCONTINUED | OUTPATIENT
Start: 2018-08-15 | End: 2018-08-24 | Stop reason: HOSPADM

## 2018-08-15 RX ORDER — LOSARTAN POTASSIUM 50 MG/1
50 TABLET ORAL DAILY
Status: DISCONTINUED | OUTPATIENT
Start: 2018-08-15 | End: 2018-08-17

## 2018-08-15 RX ORDER — ONDANSETRON 2 MG/ML
4 INJECTION INTRAMUSCULAR; INTRAVENOUS EVERY 6 HOURS PRN
Status: DISCONTINUED | OUTPATIENT
Start: 2018-08-15 | End: 2018-08-24 | Stop reason: HOSPADM

## 2018-08-15 RX ADMIN — POLYETHYLENE GLYCOL 3350 238 G: 17 POWDER, FOR SOLUTION ORAL at 20:32

## 2018-08-15 RX ADMIN — LOSARTAN POTASSIUM 50 MG: 50 TABLET, FILM COATED ORAL at 18:59

## 2018-08-15 RX ADMIN — Medication 1000 MCG: at 18:59

## 2018-08-15 RX ADMIN — Medication 10 ML: at 20:33

## 2018-08-15 RX ADMIN — TAMSULOSIN HYDROCHLORIDE 0.4 MG: 0.4 CAPSULE ORAL at 20:04

## 2018-08-15 RX ADMIN — SENNOSIDES 129 MG: 8.6 TABLET, FILM COATED ORAL at 18:59

## 2018-08-15 RX ADMIN — SODIUM CHLORIDE: 9 INJECTION, SOLUTION INTRAVENOUS at 23:08

## 2018-08-15 RX ADMIN — PANTOPRAZOLE SODIUM 40 MG: 40 INJECTION, POWDER, FOR SOLUTION INTRAVENOUS at 20:32

## 2018-08-15 RX ADMIN — ISOSORBIDE MONONITRATE 30 MG: 30 TABLET ORAL at 18:59

## 2018-08-15 RX ADMIN — METOPROLOL TARTRATE 25 MG: 25 TABLET ORAL at 18:59

## 2018-08-15 RX ADMIN — SERTRALINE 50 MG: 50 TABLET, FILM COATED ORAL at 18:59

## 2018-08-15 RX ADMIN — ACETAMINOPHEN 650 MG: 325 TABLET ORAL at 20:47

## 2018-08-15 RX ADMIN — MULTIPLE VITAMINS W/ MINERALS TAB 0.5 TABLET: TAB at 20:04

## 2018-08-15 ASSESSMENT — ENCOUNTER SYMPTOMS
NAUSEA: 0
RHINORRHEA: 0
VOMITING: 0
ABDOMINAL PAIN: 0
EYE REDNESS: 0
DIARRHEA: 0
BLOOD IN STOOL: 1
RECTAL PAIN: 0
SORE THROAT: 0
SHORTNESS OF BREATH: 0
CONSTIPATION: 0
EYE DISCHARGE: 0
BACK PAIN: 0
COUGH: 0
WHEEZING: 0

## 2018-08-15 ASSESSMENT — PAIN SCALES - GENERAL
PAINLEVEL_OUTOF10: 0
PAINLEVEL_OUTOF10: 0
PAINLEVEL_OUTOF10: 2

## 2018-08-15 NOTE — ED PROVIDER NOTES
will be admitting the patient. All of patient's lab the testing that reviewed. CRITICAL CARE:   none     CONSULTS:  Dr Miryam Beaver:  none     FINAL IMPRESSION      1. Gastrointestinal hemorrhage, unspecified gastrointestinal hemorrhage type          DISPOSITION/PLAN   Admit     PATIENT REFERRED TO:  No follow-up provider specified. DISCHARGE MEDICATIONS:  New Prescriptions    No medications on file       (Please note that portions of this note were completed with a voice recognition program.  Efforts were made to edit the dictations but occasionally words are mis-transcribed.)    Scribe:  Saeid Mcpherson 8/15/18 1:12 PM Scribing for and in the presence of Anna Ponce MD.    Signed by: Romana Kwan, 08/15/18 3:43 PM    Provider:  I personally performed the services described in the documentation, reviewed and edited the documentation which was dictated to the scribe in my presence, and it accurately records my words and actions.     Anna Ponce MD 8/15/18 3:43 PM        Anna Ponce MD  08/15/18 [id number]

## 2018-08-15 NOTE — H&P
NICOLAS on CPAP     Osteopenia determined by x-ray     Pacemaker 2011    Pinched nerve     slipped disc in back    Visual problems     Vitamin D deficiency        Past Surgical History:          Procedure Laterality Date    ABDOMINAL HERNIA REPAIR  04/27/2017    incisional hernia repair--Dr. Mark Davidson    CATARACT REMOVAL WITH IMPLANT      bilateral    COLONOSCOPY  [id number]    COLONOSCOPY  12/29/2017    COLONOSCOPY CONTROL HEMORRHAGE performed by Judith Puga MD at [address]  1960's    EKG 12-LEAD  4/29/2015         EYE SURGERY      cataracts    HERNIA REPAIR      several    HIP PINNING Left 8/31/2017    LEFT HIP INTERTAN performed by Raji Dixon MD at [address]  12/3/12    left     MYRINGOTOMY AND TYMPANOSTOMY TUBE PLACEMENT  7/23/13    left     NASAL SINUS SURGERY      OTHER SURGICAL HISTORY  04/27/2015    transurethral Incision bladder neck    PACEMAKER INSERTION      PACEMAKER PLACEMENT  2011    NC NJX DX/THER SBST INTRLMNR LMBR/SAC W/IMG GDN N/A 6/25/2018    LUMBAR INTER LAMINAR RUBEN LESI @ L3. performed by Zach Carrillo MD at [place] [address] Right 10/8/2017    Right hip intertan performed by Mecca Liu MD at [address] [place] [address] TURP  4/17/2013    W/CYSTO WITH DIRECT VISION URETHROTOMY & RESECTION BLADDER NECK CONTRACTURE    TURP  4/27/15    re-do TURP    TYMPANOSTOMY TUBE PLACEMENT      multiple surgeries    UPPER GASTROINTESTINAL ENDOSCOPY  12/29/2017    COLONOSCOPY SUBMUCOSAL/BOTOX INJECTION performed by Judith Puga MD at [facility]       Medications Prior to Admission:      Prior to Admission medications    Medication Sig Start Date End Date Taking?  Authorizing Provider   Nutritional Supplements ([address]) LIQD Will drink 2 a day 7/9/18   Rukhsana Dobbins, DO   albuterol sulfate HFA (VENTOLIN HFA) 108 (90 Base) Recent Labs      08/15/18   1330   WBC  6.5   HGB  10.1*   HCT  30.5*   PLT  240     Recent Labs      08/15/18   1330   NA  137   K  4.7   CL  101   CO2  25   BUN  26*   CREATININE  1.3*   CALCIUM  8.9     Recent Labs      08/15/18   1330   AST  15   ALT  9*   BILIDIR  <0.2   BILITOT  0.3   ALKPHOS  90     Recent Labs      08/15/18   1330   INR  1.08     No results for input(s): CKTOTAL, TROPONINI in the last 72 hours. Urinalysis:      Lab Results   Component Value Date    NITRU NEGATIVE 08/15/2018    WBCUA 0-5 02/19/2018    BACTERIA MANY 09/08/2017    RBCUA 0-2 02/19/2018    BLOODU NEGATIVE 08/15/2018    GLUCOSEU NEGATIVE 08/15/2018       Intake & Output:  No intake/output data recorded. No intake/output data recorded. Radiology:     XR Acute Abd Series Chest 1 VW   Final Result   1. Nonobstructive bowel gas pattern with no evidence of free intraperitoneal air. 2. Chronic fibrotic changes of the lungs. **This report has been created using voice recognition software. It may contain minor errors which are inherent in voice recognition technology. **      Final report electronically signed by Dr Can Glover on 8/15/2018 2:13 PM               DVT prophylaxis: [] Lovenox                                 [x] SCDs                                 [] SQ Heparin                                 [] Encourage ambulation           [] Already on Anticoagulation    Code Status: [facility]      PT/OT Eval Status: pending    Disposition:    [] Home       [] TCU       [] Rehab       [] Psych       [] SNF       [] [place]       [x] Other- pending PT/OT eval.  ASSESSMENT:    Active Hospital Problems    Diagnosis Date Noted    GIB (gastrointestinal bleeding) [K92.2] 08/15/2018       PLAN:  Mr. Wong Rios is a 80year old gentlemen presenting with BRBPR while on DAPT and a history of GIB in the past year due to bleeding polyps.      Acute Blood Loss Anemia due to GIB: BRBPR, on DAPT, history of polyps bleeding. Follows Dr. Michelle Seth (GI). hgb 10.1 down from 11.2 on most recent check 3 months ago. Currently hemodynamically stable. Suspect slow bleed. - clear liquid diet and NPO at midnight. - type and cross  - establish large bore IV access  - holding DAPT at this time. Last PCI >20 years ago. Consider stopping plavix permanently.   - Dr. Michelle Seth has been contacted and consulted for possible scope. - IV PPI  - q8HH  - transfuse hgb less than 7    Unexplained Weight Loss: Patient endorses 30 lbs, however, chart review closer to 20 over 17 months. Will follow up endoscopy. Consider other cancer screenings. Hx CAD: S/p PCI >20 years ago. Had normal stress 2015. Trop negative on admission. Holding DAPT for now given bleed. Consider stopping plavix permanently. Continue lopressor. Hold fishoil given bleed. HTN: Continue losartan/metoprolol. Hx PPM: inserted 2011-follows with Dr. Quirino Laurent at [facility]    COPD: uses PRN albuterol. 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 Thank you Hailee Duong DO for the opportunity to be involved in this patient's care.     Electronically signed by Nico Damian MD on 8/15/2018 at 4:05 PM

## 2018-08-16 PROBLEM — K92.2 GIB (GASTROINTESTINAL BLEEDING): Status: RESOLVED | Noted: 2018-08-15 | Resolved: 2018-08-16

## 2018-08-16 PROBLEM — E43 SEVERE MALNUTRITION (HCC): Status: ACTIVE | Noted: 2018-08-16

## 2018-08-16 LAB
ANION GAP SERPL CALCULATED.3IONS-SCNC: 14 MEQ/L (ref 8–16)
BUN BLDV-MCNC: 27 MG/DL (ref 7–22)
CALCIUM SERPL-MCNC: 8.4 MG/DL (ref 8.5–10.5)
CHLORIDE BLD-SCNC: 102 MEQ/L (ref 98–111)
CO2: 23 MEQ/L (ref 23–33)
CREAT SERPL-MCNC: 1.2 MG/DL (ref 0.4–1.2)
ERYTHROCYTE [DISTWIDTH] IN BLOOD BY AUTOMATED COUNT: 14.6 % (ref 11.5–14.5)
ERYTHROCYTE [DISTWIDTH] IN BLOOD BY AUTOMATED COUNT: 14.7 % (ref 11.5–14.5)
ERYTHROCYTE [DISTWIDTH] IN BLOOD BY AUTOMATED COUNT: 48.7 FL (ref 35–45)
ERYTHROCYTE [DISTWIDTH] IN BLOOD BY AUTOMATED COUNT: 49.1 FL (ref 35–45)
GFR SERPL CREATININE-BSD FRML MDRD: 57 ML/MIN/1.73M2
GLUCOSE BLD-MCNC: 109 MG/DL (ref 70–108)
HCT VFR BLD CALC: 23.1 % (ref 42–52)
HCT VFR BLD CALC: 24 % (ref 42–52)
HCT VFR BLD CALC: 24.6 % (ref 42–52)
HEMOGLOBIN: 7.7 GM/DL (ref 14–18)
HEMOGLOBIN: 8 GM/DL (ref 14–18)
HEMOGLOBIN: 8.2 GM/DL (ref 14–18)
MCH RBC QN AUTO: 30.3 PG (ref 26–33)
MCH RBC QN AUTO: 30.3 PG (ref 26–33)
MCHC RBC AUTO-ENTMCNC: 33.3 GM/DL (ref 32.2–35.5)
MCHC RBC AUTO-ENTMCNC: 33.3 GM/DL (ref 32.2–35.5)
MCV RBC AUTO: 90.9 FL (ref 80–94)
MCV RBC AUTO: 90.9 FL (ref 80–94)
PLATELET # BLD: 207 THOU/MM3 (ref 130–400)
PLATELET # BLD: 210 THOU/MM3 (ref 130–400)
PMV BLD AUTO: 8.9 FL (ref 9.4–12.4)
PMV BLD AUTO: 9 FL (ref 9.4–12.4)
POTASSIUM REFLEX MAGNESIUM: 3.7 MEQ/L (ref 3.5–5.2)
RBC # BLD: 2.54 MILL/MM3 (ref 4.7–6.1)
RBC # BLD: 2.64 MILL/MM3 (ref 4.7–6.1)
SODIUM BLD-SCNC: 139 MEQ/L (ref 135–145)
WBC # BLD: 10 THOU/MM3 (ref 4.8–10.8)
WBC # BLD: 6.5 THOU/MM3 (ref 4.8–10.8)

## 2018-08-16 PROCEDURE — 6360000002 HC RX W HCPCS: Performed by: INTERNAL MEDICINE

## 2018-08-16 PROCEDURE — 88305 TISSUE EXAM BY PATHOLOGIST: CPT

## 2018-08-16 PROCEDURE — 7100000001 HC PACU RECOVERY - ADDTL 15 MIN: Performed by: INTERNAL MEDICINE

## 2018-08-16 PROCEDURE — G8979 MOBILITY GOAL STATUS: HCPCS

## 2018-08-16 PROCEDURE — G8978 MOBILITY CURRENT STATUS: HCPCS

## 2018-08-16 PROCEDURE — 99233 SBSQ HOSP IP/OBS HIGH 50: CPT | Performed by: INTERNAL MEDICINE

## 2018-08-16 PROCEDURE — 2580000003 HC RX 258: Performed by: NURSE PRACTITIONER

## 2018-08-16 PROCEDURE — 0DJ08ZZ INSPECTION OF UPPER INTESTINAL TRACT, VIA NATURAL OR ARTIFICIAL OPENING ENDOSCOPIC: ICD-10-PCS | Performed by: INTERNAL MEDICINE

## 2018-08-16 PROCEDURE — 97530 THERAPEUTIC ACTIVITIES: CPT

## 2018-08-16 PROCEDURE — 2580000003 HC RX 258: Performed by: INTERNAL MEDICINE

## 2018-08-16 PROCEDURE — 80048 BASIC METABOLIC PNL TOTAL CA: CPT

## 2018-08-16 PROCEDURE — 3609012800 HC EGD DIAGNOSTIC ONLY: Performed by: INTERNAL MEDICINE

## 2018-08-16 PROCEDURE — 85018 HEMOGLOBIN: CPT

## 2018-08-16 PROCEDURE — C1773 RET DEV, INSERTABLE: HCPCS | Performed by: INTERNAL MEDICINE

## 2018-08-16 PROCEDURE — 97162 PT EVAL MOD COMPLEX 30 MIN: CPT

## 2018-08-16 PROCEDURE — 1200000000 HC SEMI PRIVATE

## 2018-08-16 PROCEDURE — 3609010600 HC COLONOSCOPY POLYPECTOMY SNARE/COLD BIOPSY: Performed by: INTERNAL MEDICINE

## 2018-08-16 PROCEDURE — 36415 COLL VENOUS BLD VENIPUNCTURE: CPT

## 2018-08-16 PROCEDURE — 85014 HEMATOCRIT: CPT

## 2018-08-16 PROCEDURE — 7100000000 HC PACU RECOVERY - FIRST 15 MIN: Performed by: INTERNAL MEDICINE

## 2018-08-16 PROCEDURE — 0DBK8ZZ EXCISION OF ASCENDING COLON, VIA NATURAL OR ARTIFICIAL OPENING ENDOSCOPIC: ICD-10-PCS | Performed by: INTERNAL MEDICINE

## 2018-08-16 PROCEDURE — 85027 COMPLETE CBC AUTOMATED: CPT

## 2018-08-16 PROCEDURE — 6370000000 HC RX 637 (ALT 250 FOR IP): Performed by: INTERNAL MEDICINE

## 2018-08-16 PROCEDURE — 97116 GAIT TRAINING THERAPY: CPT

## 2018-08-16 PROCEDURE — C9113 INJ PANTOPRAZOLE SODIUM, VIA: HCPCS | Performed by: INTERNAL MEDICINE

## 2018-08-16 PROCEDURE — 2709999900 HC NON-CHARGEABLE SUPPLY: Performed by: INTERNAL MEDICINE

## 2018-08-16 RX ORDER — MIDAZOLAM HYDROCHLORIDE 5 MG/ML
INJECTION INTRAMUSCULAR; INTRAVENOUS PRN
Status: DISCONTINUED | OUTPATIENT
Start: 2018-08-16 | End: 2018-08-16 | Stop reason: HOSPADM

## 2018-08-16 RX ORDER — FENTANYL CITRATE 50 UG/ML
INJECTION, SOLUTION INTRAMUSCULAR; INTRAVENOUS PRN
Status: DISCONTINUED | OUTPATIENT
Start: 2018-08-16 | End: 2018-08-16 | Stop reason: HOSPADM

## 2018-08-16 RX ADMIN — Medication 1000 MCG: at 08:12

## 2018-08-16 RX ADMIN — Medication 10 ML: at 05:15

## 2018-08-16 RX ADMIN — MULTIPLE VITAMINS W/ MINERALS TAB 0.5 TABLET: TAB at 08:12

## 2018-08-16 RX ADMIN — SODIUM CHLORIDE: 9 INJECTION, SOLUTION INTRAVENOUS at 08:01

## 2018-08-16 RX ADMIN — PANTOPRAZOLE SODIUM 40 MG: 40 INJECTION, POWDER, FOR SOLUTION INTRAVENOUS at 05:15

## 2018-08-16 RX ADMIN — TAMSULOSIN HYDROCHLORIDE 0.4 MG: 0.4 CAPSULE ORAL at 20:45

## 2018-08-16 RX ADMIN — SERTRALINE 50 MG: 50 TABLET, FILM COATED ORAL at 08:12

## 2018-08-16 RX ADMIN — LOSARTAN POTASSIUM 50 MG: 50 TABLET, FILM COATED ORAL at 08:12

## 2018-08-16 RX ADMIN — MULTIPLE VITAMINS W/ MINERALS TAB 0.5 TABLET: TAB at 20:45

## 2018-08-16 RX ADMIN — TAMSULOSIN HYDROCHLORIDE 0.4 MG: 0.4 CAPSULE ORAL at 08:12

## 2018-08-16 RX ADMIN — ACETAMINOPHEN 650 MG: 325 TABLET ORAL at 15:24

## 2018-08-16 RX ADMIN — METOPROLOL TARTRATE 25 MG: 25 TABLET ORAL at 08:12

## 2018-08-16 RX ADMIN — ISOSORBIDE MONONITRATE 30 MG: 30 TABLET ORAL at 08:12

## 2018-08-16 ASSESSMENT — PAIN SCALES - GENERAL
PAINLEVEL_OUTOF10: 0
PAINLEVEL_OUTOF10: 3
PAINLEVEL_OUTOF10: 0
PAINLEVEL_OUTOF10: 0

## 2018-08-16 ASSESSMENT — PAIN DESCRIPTION - LOCATION: LOCATION: HEAD

## 2018-08-16 NOTE — BRIEF OP NOTE
Brief Postoperative Note    Idris Maddox  YOB: 1931  [id number]    Pre-operative Diagnosis: GI bleeding     Post-operative Diagnosis: Diverticulosis , ascending polyp , gastritis     Procedure: EGD and colonoscopy     Anesthesia: Moderate Sedation    Surgeons/Assistants: Michaela    Estimated Blood Loss: none    Complications: none    Specimens: Was Obtained: polyps     Findings: polyp, diverticulosis , gastritis     Electronically signed by Prince Alona MD on 8/16/2018 at 6:58 AM

## 2018-08-16 NOTE — PROGRESS NOTES
Nutrition Assessment    Type and Reason for Visit: Initial, Positive Nutrition Screen (Weight Loss/Decreased Appetite/Intake/Blister)    Nutrition Recommendations: Modified diet texture to Dental Soft per pt preference. Started Ensure Enlive TID. Continue Multivitamin w/minerals daily. Malnutrition Assessment:  · Malnutrition Status: Meets the criteria for severe malnutrition  · Context: Acute illness or injury  · Findings of the 6 clinical characteristics of malnutrition (Minimum of 2 out of 6 clinical characteristics is required to make the diagnosis of moderate or severe Protein Calorie Malnutrition based on AND/ASPEN Guidelines):  1. Fat Loss-Moderate subcutaneous fat loss, Orbital  2. Muscle Loss-Moderate muscle mass loss, Temples (temporalis muscle), Clavicles (pectoralis and deltoids)    Nutrition Diagnosis:   · Problem: Severe malnutrition, in context of acute illness or injury  · Etiology: related to Insufficient energy/nutrient consumption     Signs and symptoms:  as evidenced by Moderate muscle loss, Moderate loss of subcutaneous fat    Nutrition Assessment:  · Subjective Assessment: Pt admitted d/t lower GI bleed with hx colon polyps in 2017. Pt seen- he reports decreased appetite and intake over the past several days PTA. Pt reports UBW ~155-160 lbs. Pt reports 30 lbs weight loss over the past year. Per chart review, his weight is down -5% in one year, which is not considered clinically significant but is of concern. Pt with evidence of muscle/fat loss on physical exam. Pt reports he has dentures and states they fit well but has difficulty chewing/swallowing tough meat. Pt amenable to try a Dental Soft diet. Pt reports he bought Ensure to drink at home recently but only consumed one PTA. Pt is amenable to consume Ensure Enlive TID. Labs: BUN 27, Glucose 109, Hg 8. Rx includes: MVI w/minerals, Vitamin B-12, Senna, Glycolax, & IVF.    · Wound Type: None  · Current Nutrition Therapies:  · Oral Diet

## 2018-08-16 NOTE — CARE COORDINATION
8/16/18, 11:47 AM    DISCHARGE BARRIERS    SW spoke with Patient and daughter regarding discharge planning. Patient stated that they did have comfort keepers in the past and they are expensive so that is why he discontinued them coming out. Patient is agreeable to home health again from [facility] at discharge and agreeable to the family assisting with setting up private duty care again as well.

## 2018-08-16 NOTE — PLAN OF CARE
Problem: GI  Goal: No bowel complications  Outcome: Ongoing  Patient with loose stools, but absent of any blood. Goal: Bowel movement at least every other day  Outcome: Ongoing  Bowels moving daily during this admission    Problem: Skin Integrity/Risk  Goal: No skin breakdown during hospitalization  Outcome: Ongoing  Skin intact  Goal: Wound healing  Outcome: Ongoing      Problem: Safety:  Goal: Free from accidental physical injury  Free from accidental physical injury   Outcome: Ongoing  Bed alarm on and bed in low position. Reminded patient to use call light as needed for staff assistance     Problem: Discharge Planning:  Goal: Patients continuum of care needs are met  Patients continuum of care needs are met   Outcome: Ongoing  Discharge planning continues.    Discussed possible need for ECF with [facility] per daughter request    Problem: Falls - Risk of:  Goal: Will remain free from falls  Will remain free from falls   Outcome: Ongoing  No falls this shift with bed alarm on and bed in low position  Goal: Absence of physical injury  Absence of physical injury   Outcome: Ongoing

## 2018-08-16 NOTE — CARE COORDINATION
8/16/18, 10:00 AM      Kanika Sanchez       Admitted from: ER, patient presented due to rectal bleeding. 8/15/2018/ [address]  day: 1   Location: -07/[facility]- Reason for admit: GIB (gastrointestinal bleeding) [K92.2] Status: Inpt. Admit order signed?: yes  PMH:  has a past medical history of Acute sinusitis; Angina pectoris ([address] Anxiety disorder; Arthritis; BPH with urinary obstruction; CAD (coronary artery disease); Chronic insomnia; CKD (chronic kidney disease) stage 3, GFR 30-59 ml/min; COPD (chronic obstructive pulmonary disease) ([address] Gastroenteritis; HCAP (healthcare-associated pneumonia); Heart disease; HLD (hyperlipidemia); [place] (hard of hearing); Hypertension; Kidney disease; Kidney stone; NICOLAS on CPAP; Osteopenia determined by x-ray; Pacemaker; Pinched nerve; Visual problems; and Vitamin D deficiency. Procedure: EGD and colonoscopy today. Pertinent abnormal Imaging:X-ray of abdomen. Medications:  Scheduled Meds:   isosorbide mononitrate  30 mg Oral Daily    losartan  50 mg Oral Daily    metoprolol tartrate  25 mg Oral Daily    therapeutic multivitamin-minerals  0.5 tablet Oral BID    sertraline  50 mg Oral Daily    tamsulosin  0.4 mg Oral BID    cyanocobalamin  1,000 mcg Oral Daily    sodium chloride flush  10 mL Intravenous 2 times per day     Continuous Infusions:   sodium chloride 100 mL/hr at 08/16/18 0801      Pertinent Info/Orders/Treatment Plan:  H/H 8.0/24.0, GI consult to Dr. Jonathon Lui, IV fluids,  CBC and BMP, stool monitoring, PT/OT, up with assistance. Diet: DIET CARDIAC; Low Sodium (2 GM)   DVT Prophylaxis: SCD's ordered  Smoking status:  reports that he quit smoking about 52 years ago. His smoking use included Cigarettes. He has a 20.00 pack-year smoking history.  He has never used smokeless tobacco.   Influenza Vaccination Screening Completed: no  Pneumonia Vaccination Screening Completed: yes  PCP: Vi Bradford DO  Readmission: Yes  Readmission Risk Score: 21%  Discharge

## 2018-08-16 NOTE — PROGRESS NOTES
Hospitalist Progress Note    Patient:  Dalia Walker      Unit/Bed:5K-07/007-A    YOB: 1931    MRN: [id number]       Acct: [de-identified]     PCP: Raven Villanueva DO    Date of Admission: 8/15/2018    Chief Complaint: [facility] Course: 80 y.o. male who presented to [address] [place] [address] following an episode of BRBPR. He went to defecate today and noticed BRB in the toilet. Patient has a history of GIB from ascending colon polyps in 2017. He is currently on DAPT for CAD. Chart review suggests that his last PCI was 20 years ago. He currently endorses fatigue/malaise and some dark stools as well. He denies any N/V/D. Denies any pre-syncope. He follows Dr. Michael Coulter for GI. In the ED he was hemodynamically stable and hgb was 10.1 (down from 11.4 3 months ago).    In our encounter he also mentioned that he has lost ~ 30 lbs since September 2017. Chart review suggests that he has lost approx 21 lbs since March 2017. He states that he eats two meals per day which is normal for him and includes meats, breads, peanut butter and usually fast food in the evening. He otherwise has no explanation for his weight loss. Denies any previous history of cancer.         Subjective: no acute events overnight. Denies anymore episodes of BRBPR. Denies any symptoms of dizziness, lightheadedness, chest pain, sob, abdominal pain, nausea, or vomiting.        Medications:  Reviewed    Infusion Medications    sodium chloride 100 mL/hr at 08/16/18 0801     Scheduled Medications    isosorbide mononitrate  30 mg Oral Daily    losartan  50 mg Oral Daily    metoprolol tartrate  25 mg Oral Daily    therapeutic multivitamin-minerals  0.5 tablet Oral BID    sertraline  50 mg Oral Daily    tamsulosin  0.4 mg Oral BID    cyanocobalamin  1,000 mcg Oral Daily    sodium chloride flush  10 mL Intravenous 2 times per day     PRN Meds: albuterol sulfate HFA, sodium chloride flush, acetaminophen, ondansetron      Intake/Output

## 2018-08-16 NOTE — PRE SEDATION
Gia, Maliha Kaufman, APRN - CNP, Last Rate: 100 mL/hr at 08/15/18 2308  Prior to Admission medications    Medication Sig Start Date End Date Taking? Authorizing Provider   docusate sodium (COLACE) 100 MG capsule Take 100 mg by mouth daily   Yes Historical Provider, MD   albuterol sulfate HFA (VENTOLIN HFA) 108 (90 Base) MCG/ACT inhaler Inhale 2 puffs into the lungs every 6 hours as needed for Wheezing 5/11/18  Yes Marilee Sheikh, APRN - CNP   sertraline (ZOLOFT) 50 MG tablet TAKE ONE TABLET BY MOUTH ONCE DAILY 2/20/18  Yes Kyree Aid, DO   losartan (COZAAR) 50 MG tablet Take 1 tablet by mouth daily 2/20/18  Yes Kyree Aid, DO   tamsulosin [facility]) 0.4 MG capsule Take 1 capsule by mouth 2 times daily 2/20/18 2/20/19 Yes Kyree Aid, DO   metoprolol tartrate (LOPRESSOR) 25 MG tablet Take 0.5 tablets by mouth daily Substitute for atenolol. Patient taking differently: Take 25 mg by mouth daily Substitute for atenolol.  2/20/18  Yes Kyree Aid, DO   aspirin 81 MG tablet Take 1 tablet by mouth daily 2/20/18  Yes Kyree Aid, DO   clopidogrel (PLAVIX) 75 MG tablet Take 1 tablet by mouth every other day 2/20/18  Yes Kyree Aid, DO   acetaminophen (TYLENOL) 325 MG tablet Take 2 tablets by mouth every 4 hours as needed for Pain 12/31/17  Yes Brenda Chang MD   vitamin B-12 1000 MCG tablet Take 1 tablet by mouth daily 1/1/18  Yes Brenda Chang MD   ferrous sulfate (FE TABS) 325 (65 Fe) MG EC tablet Take 1 tablet by mouth 2 times daily  Patient taking differently: Take 325 mg by mouth every other day  12/31/17  Yes Brenda Marrow, MD   KRILL OIL PO Take 1,000 mg by mouth daily   Yes Historical Provider, MD   Coenzyme Q10 (COQ-10 PO) Take 10 mg by mouth daily   Yes Historical Provider, MD   TURMERIC PO Take 1,000 mg by mouth daily   Yes Historical Provider, MD   Multiple Vitamins-Minerals (THERAPEUTIC MULTIVITAMIN-MINERALS) tablet Take 1 tablet by mouth daily    Yes Historical Provider, MD

## 2018-08-17 ENCOUNTER — APPOINTMENT (OUTPATIENT)
Dept: NUCLEAR MEDICINE | Age: 83
DRG: 907 | End: 2018-08-17
Payer: MEDICARE

## 2018-08-17 PROBLEM — K29.70 GASTRITIS: Status: ACTIVE | Noted: 2018-08-17

## 2018-08-17 LAB
ANION GAP SERPL CALCULATED.3IONS-SCNC: 9 MEQ/L (ref 8–16)
BUN BLDV-MCNC: 21 MG/DL (ref 7–22)
CALCIUM SERPL-MCNC: 8.6 MG/DL (ref 8.5–10.5)
CHLORIDE BLD-SCNC: 103 MEQ/L (ref 98–111)
CO2: 26 MEQ/L (ref 23–33)
CREAT SERPL-MCNC: 1.2 MG/DL (ref 0.4–1.2)
GFR SERPL CREATININE-BSD FRML MDRD: 57 ML/MIN/1.73M2
GLUCOSE BLD-MCNC: 113 MG/DL (ref 70–108)
HCT VFR BLD CALC: 20.3 % (ref 42–52)
HCT VFR BLD CALC: 20.3 % (ref 42–52)
HCT VFR BLD CALC: 21.4 % (ref 42–52)
HCT VFR BLD CALC: 24.3 % (ref 42–52)
HEMOGLOBIN: 6.6 GM/DL (ref 14–18)
HEMOGLOBIN: 6.7 GM/DL (ref 14–18)
HEMOGLOBIN: 7.1 GM/DL (ref 14–18)
HEMOGLOBIN: 8.3 GM/DL (ref 14–18)
POTASSIUM SERPL-SCNC: 4.6 MEQ/L (ref 3.5–5.2)
SODIUM BLD-SCNC: 138 MEQ/L (ref 135–145)

## 2018-08-17 PROCEDURE — 85014 HEMATOCRIT: CPT

## 2018-08-17 PROCEDURE — 2580000003 HC RX 258: Performed by: NURSE PRACTITIONER

## 2018-08-17 PROCEDURE — 3430000000 HC RX DIAGNOSTIC RADIOPHARMACEUTICAL: Performed by: NURSE PRACTITIONER

## 2018-08-17 PROCEDURE — 80048 BASIC METABOLIC PNL TOTAL CA: CPT

## 2018-08-17 PROCEDURE — 2709999900 HC NON-CHARGEABLE SUPPLY

## 2018-08-17 PROCEDURE — 6370000000 HC RX 637 (ALT 250 FOR IP): Performed by: INTERNAL MEDICINE

## 2018-08-17 PROCEDURE — 99233 SBSQ HOSP IP/OBS HIGH 50: CPT | Performed by: INTERNAL MEDICINE

## 2018-08-17 PROCEDURE — 36415 COLL VENOUS BLD VENIPUNCTURE: CPT

## 2018-08-17 PROCEDURE — 6360000002 HC RX W HCPCS: Performed by: INTERNAL MEDICINE

## 2018-08-17 PROCEDURE — P9016 RBC LEUKOCYTES REDUCED: HCPCS

## 2018-08-17 PROCEDURE — 78278 ACUTE GI BLOOD LOSS IMAGING: CPT

## 2018-08-17 PROCEDURE — A9560 TC99M LABELED RBC: HCPCS | Performed by: NURSE PRACTITIONER

## 2018-08-17 PROCEDURE — 1200000000 HC SEMI PRIVATE

## 2018-08-17 PROCEDURE — 85018 HEMOGLOBIN: CPT

## 2018-08-17 PROCEDURE — 36430 TRANSFUSION BLD/BLD COMPNT: CPT

## 2018-08-17 PROCEDURE — C9113 INJ PANTOPRAZOLE SODIUM, VIA: HCPCS | Performed by: INTERNAL MEDICINE

## 2018-08-17 RX ORDER — PANTOPRAZOLE SODIUM 40 MG/10ML
40 INJECTION, POWDER, LYOPHILIZED, FOR SOLUTION INTRAVENOUS DAILY
Status: DISCONTINUED | OUTPATIENT
Start: 2018-08-17 | End: 2018-08-23

## 2018-08-17 RX ORDER — 0.9 % SODIUM CHLORIDE 0.9 %
250 INTRAVENOUS SOLUTION INTRAVENOUS ONCE
Status: COMPLETED | OUTPATIENT
Start: 2018-08-17 | End: 2018-08-18

## 2018-08-17 RX ORDER — POLYETHYLENE GLYCOL 3350 17 G/17G
238 POWDER, FOR SOLUTION ORAL ONCE
Status: COMPLETED | OUTPATIENT
Start: 2018-08-17 | End: 2018-08-17

## 2018-08-17 RX ORDER — 0.9 % SODIUM CHLORIDE 0.9 %
250 INTRAVENOUS SOLUTION INTRAVENOUS ONCE
Status: DISCONTINUED | OUTPATIENT
Start: 2018-08-17 | End: 2018-08-18

## 2018-08-17 RX ADMIN — SODIUM CHLORIDE: 9 INJECTION, SOLUTION INTRAVENOUS at 09:49

## 2018-08-17 RX ADMIN — PANTOPRAZOLE SODIUM 40 MG: 40 INJECTION, POWDER, FOR SOLUTION INTRAVENOUS at 22:00

## 2018-08-17 RX ADMIN — ACETAMINOPHEN 650 MG: 325 TABLET ORAL at 04:26

## 2018-08-17 RX ADMIN — SODIUM CHLORIDE 250 ML: 9 INJECTION, SOLUTION INTRAVENOUS at 14:17

## 2018-08-17 RX ADMIN — ACETAMINOPHEN 650 MG: 325 TABLET ORAL at 22:00

## 2018-08-17 RX ADMIN — SERTRALINE 50 MG: 50 TABLET, FILM COATED ORAL at 09:48

## 2018-08-17 RX ADMIN — Medication 29.4 MILLICURIE: at 12:00

## 2018-08-17 RX ADMIN — TAMSULOSIN HYDROCHLORIDE 0.4 MG: 0.4 CAPSULE ORAL at 22:00

## 2018-08-17 RX ADMIN — ACETAMINOPHEN 650 MG: 325 TABLET ORAL at 14:17

## 2018-08-17 RX ADMIN — MULTIPLE VITAMINS W/ MINERALS TAB 0.5 TABLET: TAB at 22:00

## 2018-08-17 RX ADMIN — TAMSULOSIN HYDROCHLORIDE 0.4 MG: 0.4 CAPSULE ORAL at 09:48

## 2018-08-17 RX ADMIN — Medication 1000 MCG: at 09:48

## 2018-08-17 RX ADMIN — MULTIPLE VITAMINS W/ MINERALS TAB 0.5 TABLET: TAB at 09:48

## 2018-08-17 RX ADMIN — POLYETHYLENE GLYCOL 3350 238 G: 17 POWDER, FOR SOLUTION ORAL at 18:49

## 2018-08-17 ASSESSMENT — PAIN SCALES - GENERAL
PAINLEVEL_OUTOF10: 0
PAINLEVEL_OUTOF10: 3
PAINLEVEL_OUTOF10: 3
PAINLEVEL_OUTOF10: 0
PAINLEVEL_OUTOF10: 0

## 2018-08-17 NOTE — PROGRESS NOTES
[facility]  PHYSICAL THERAPY MISSED TREATMENT NOTE  ACUTE CARE  [facility] ONC MED 5K              Missed Treatment  RN reports pt hemoglobin is low and pt is dizzy. Asked pt if he would like to try therex in bed, pt politely declines stating he does not feel well and he will try next time. Will try back per POC.

## 2018-08-17 NOTE — PROGRESS NOTES
Consent printed, signed and placed in chart for colonoscopy tomorrow at 1200.  Electronically signed by Ant Herzog RN on 8/17/2018 at 5:52 PM

## 2018-08-17 NOTE — OP NOTE
[address] [place] [address]                                 OPERATIVE REPORT    PATIENT NAME: Bayron Cotton                       :        1931  MED REC NO:   [id number]                           ROOM:       0007  ACCOUNT NO:   [de-identified]                           ADMIT DATE: 08/15/2018  PROVIDER:     LORENZA Coppola:  2018    INDICATION:  The patient with GI bleed, history of lower GI bleed in the  past due to diverticulosis presented with anemia, acute blood loss anemia,  bright red blood per rectum, lately became blackish. Plan today for  endoscopy to evaluate. Plan for colonoscopy to evaluate as the patient has  lower GI bleed in the past; however, the colonoscopy was negative and the  plan is to proceed at the same time with upper endoscopy. So, consent  obtained regarding both procedures at the same time from the patient. SURGEON:  Luis M Valera M.D. ASA CLASSIFICATION:  III. DESCRIPTION OF PROCEDURE:  The patient was brought to the GI Lab. Consent  was obtained. The risks involved with the procedure were explained to the  patient. Informed consent was obtained. The patient was monitored during  the procedure with pulse oximetry, blood pressure monitoring, and oxygen by  nasal cannula. Sedation by incremental doses of IV Versed, total of 2 mg  of Versed and 50 mcg of fentanyl given in incremental dosage during the  procedure to achieve continuous conscious sedation. PROCEDURE #1:  Colonoscopy. Digital examination revealed normal rectum. Standard colonoscope was  advanced under direct vision from the rectum up to the cecum. Prep was  good and the patient tolerated the procedure well. Cecum intubation was  confirmed by appendiceal orifice seen. Severe diverticulosis.   At one time,  seen the area of old bleeding site with the tattoo is found, history of   \lower GI bleed from diverticulosis in the past, but at this time, the   diverticula was not bleeding. No reflux seen during the intubation   when the cecum was intubated. In the ascending, seen small polyp  measuring 0.4 x 0.4 cm excised with a snare, tissue retrieved. Scope   was withdrawn with no immediate complications. IMPRESSION:  1. Severe diverticulosis, left side. 2.  No active GI bleed seen at this time. 3.  Ascending polyp excised with the snare. PLAN:  1. Follow up with the biopsy results in GI Clinic for evaluation to review  results of the polypectomy. 2.  Proceed with upper endoscopy as mentioned and discussed before the  procedure as the finding with the colonoscopy cannot explain the acute GI  blood loss anemia. PROCEDURE #2:  EGD. Standard video 190 Olympus upper scope was advanced under direct vision  from the oral cavity up to the duodenum. Esophagus appeared tortuous. No  erosion or ulceration seen. The gastroesophageal junction was at 38 cm  from the incisors. Scope was advanced to the stomach. Retroflex  examination of the cardia revealed no evidence of any significant hiatus  hernia. Antrum showed patchy erythema and mild gastritis. Duodenum  appears normal.  No active GI bleed seen. Scope was withdrawn. No biopsy  obtained. IMPRESSION:  1. Gastric reflux with tortuous esophagus. 2.  Gastritis. 3.  No active GI bleed. PLAN:  1. Continue PPI. 2.  Advance diet to full liquid and follow up closely for recurrent GI bleeding . 3. CBC needs to be done to make sure anemia is not severe. If it is  severe, blood transfusion will be ordered. Bernard Watts M.D.    D: 08/16/2018 11:59:48       T: 08/16/2018 13:31:18     [id number]  Job#: [id number]     Doc#: [id number]    CC:  Cherelle Hodgson.  Rex Hedrick

## 2018-08-17 NOTE — CARE COORDINATION
Bleeding scan today, PT/OT. GI following, patient to be transfused with 1U PRBC's. Medicare Rights updated with daughter. Electronically signed by Nic Webber RN on 8/17/18 at 2:34 PM

## 2018-08-17 NOTE — PROGRESS NOTES
Continuous Infusions:   sodium chloride 100 mL/hr at 08/17/18 0949     CBC:   Recent Labs      08/15/18   1330   08/16/18   0801  08/16/18 2008 08/17/18   0841   WBC  6.5   --   6.5  10.0   --    HGB  10.1*   < >  8.0*  7.7*  7.1*   PLT  240   --   207  210   --     < > = values in this interval not displayed. BMP:  Recent Labs      08/15/18   1330  08/16/18   0801  08/17/18   0623   NA  137  139  138   K  4.7  3.7  4.6   CL  101  102  103   CO2  25  23  26   BUN  26*  27*  21   CREATININE  1.3*  1.2  1.2   GLUCOSE  110*  109*  113*     Hepatic: Recent Labs      08/15/18   1330   AST  15   ALT  9*   BILITOT  0.3   ALKPHOS  90     INR:   Recent Labs      08/15/18   1330   INR  1.08     Xray:   PROCEDURE: XR ACUTE ABD SERIES CHEST 1 VW  8/15/2018   CLINICAL INFORMATION: 80-year-old male with gastrointestinal bleed.    COMPARISON: Comparison is made to previous chest x-ray dated 5/10/2018.   TECHNIQUE: A PA upright view of the chest was obtained. AP and supine views of the abdomen were obtained.     FINDINGS:  The cardiac silhouette is at the upper limits of normal. There is a dual lead pacemaker projecting over the left hemithorax. Fibrotic changes are seen throughout the bilateral lungs and appear stable when compared to the prior study. There is no significant pleural effusion or pneumothorax. No consolidation. There is levoconvex scoliosis of the thoracolumbar spine.   On the AP upright view of the abdomen, there is no free air.    On the AP supine view of the abdomen, there are numerous gas-filled bowel loops. There are no displaced small bowel loops. The colon is within acceptable limits.   Postsurgical changes are seen involving the proximal femurs bilaterally. Surgical clips are seen throughout the lower abdomen likely on the basis of previous hernia repair.      Impression  1. Nonobstructive bowel gas pattern with no evidence of free intraperitoneal air.   2. Chronic fibrotic changes of the mando.   5. CAD - stable      Follow up in GI Clinic after discharge in week(s)      CHRISTIN Love - CNP  8/17/2018  9:51 AM     Patient is seen independently from the nurse practitioner and all  the pertinent data along with physical examination and assessment and plans are all obtained by my self and  Laboratory data, Radiology results, medications all are reviewed by my self and care is discussed extensively with the patient  and the patients nurse and all agree with plan and in addition see orders and plans    Electronically signed by Jerrod Wesley MD

## 2018-08-17 NOTE — PROGRESS NOTES
General appearance: No apparent distress, appears stated age and cooperative. HEENT: Pupils equal, round, and reactive to light. Conjunctivae/corneas clear. Neck: Supple, with full range of motion. No jugular venous distention. Trachea midline. Respiratory:  Normal respiratory effort. Clear to auscultation, bilaterally without Rales/Wheezes/Rhonchi. Cardiovascular: Regular rate and rhythm with normal S1/S2 without murmurs, rubs or gallops. Abdomen: Soft, non-tender, non-distended with normal bowel sounds. Musculoskeletal: passive and active ROM x 4 extremities. Skin: Skin color, texture, turgor normal.  No rashes or lesions. Neurologic:  Neurovascularly intact without any focal sensory/motor deficits. Cranial nerves: II-XII intact, grossly non-focal.  Psychiatric: Alert and oriented, thought content appropriate, normal insight  Capillary Refill: Brisk,< 3 seconds   Peripheral Pulses: +2 palpable, equal bilaterally       Labs:   Recent Labs      08/15/18   1330  08/16/18   0025  08/16/18   0801  08/16/18 2008   WBC  6.5   --   6.5  10.0   HGB  10.1*  8.2*  8.0*  7.7*   HCT  30.5*  24.6*  24.0*  23.1*   PLT  240   --   207  210     Recent Labs      08/15/18   1330  08/16/18   0801  08/17/18   0623   NA  137  139  138   K  4.7  3.7  4.6   CL  101  102  103   CO2  25  23  26   BUN  26*  27*  21   CREATININE  1.3*  1.2  1.2   CALCIUM  8.9  8.4*  8.6     Recent Labs      08/15/18   1330   AST  15   ALT  9*   BILIDIR  <0.2   BILITOT  0.3   ALKPHOS  90     Recent Labs      08/15/18   1330   INR  1.08     No results for input(s): CKTOTAL, TROPONINI in the last 72 hours. Urinalysis:    Lab Results   Component Value Date    NITRU NEGATIVE 08/15/2018    WBCUA 0-5 02/19/2018    BACTERIA MANY 09/08/2017    RBCUA 0-2 02/19/2018    BLOODU NEGATIVE 08/15/2018    GLUCOSEU NEGATIVE 08/15/2018       Radiology:  XR Acute Abd Series Chest 1 VW   Final Result   1.  Nonobstructive bowel gas pattern with no evidence of free

## 2018-08-18 LAB
ANION GAP SERPL CALCULATED.3IONS-SCNC: 11 MEQ/L (ref 8–16)
BUN BLDV-MCNC: 23 MG/DL (ref 7–22)
CALCIUM SERPL-MCNC: 8.9 MG/DL (ref 8.5–10.5)
CHLORIDE BLD-SCNC: 104 MEQ/L (ref 98–111)
CO2: 24 MEQ/L (ref 23–33)
CREAT SERPL-MCNC: 1.1 MG/DL (ref 0.4–1.2)
GFR SERPL CREATININE-BSD FRML MDRD: 63 ML/MIN/1.73M2
GLUCOSE BLD-MCNC: 124 MG/DL (ref 70–108)
HCT VFR BLD CALC: 22 % (ref 42–52)
HCT VFR BLD CALC: 25.3 % (ref 42–52)
HEMOGLOBIN: 7.7 GM/DL (ref 14–18)
HEMOGLOBIN: 8.6 GM/DL (ref 14–18)
MAGNESIUM: 1.8 MG/DL (ref 1.6–2.4)
PHOSPHORUS: 3.6 MG/DL (ref 2.4–4.7)
POTASSIUM SERPL-SCNC: 4.4 MEQ/L (ref 3.5–5.2)
SODIUM BLD-SCNC: 139 MEQ/L (ref 135–145)

## 2018-08-18 PROCEDURE — 99153 MOD SED SAME PHYS/QHP EA: CPT | Performed by: INTERNAL MEDICINE

## 2018-08-18 PROCEDURE — 36415 COLL VENOUS BLD VENIPUNCTURE: CPT

## 2018-08-18 PROCEDURE — 6360000002 HC RX W HCPCS: Performed by: INTERNAL MEDICINE

## 2018-08-18 PROCEDURE — 0DJD8ZZ INSPECTION OF LOWER INTESTINAL TRACT, VIA NATURAL OR ARTIFICIAL OPENING ENDOSCOPIC: ICD-10-PCS | Performed by: INTERNAL MEDICINE

## 2018-08-18 PROCEDURE — 2580000003 HC RX 258: Performed by: INTERNAL MEDICINE

## 2018-08-18 PROCEDURE — 1200000000 HC SEMI PRIVATE

## 2018-08-18 PROCEDURE — 6370000000 HC RX 637 (ALT 250 FOR IP): Performed by: INTERNAL MEDICINE

## 2018-08-18 PROCEDURE — 85014 HEMATOCRIT: CPT

## 2018-08-18 PROCEDURE — 99233 SBSQ HOSP IP/OBS HIGH 50: CPT | Performed by: INTERNAL MEDICINE

## 2018-08-18 PROCEDURE — C9113 INJ PANTOPRAZOLE SODIUM, VIA: HCPCS | Performed by: INTERNAL MEDICINE

## 2018-08-18 PROCEDURE — 84100 ASSAY OF PHOSPHORUS: CPT

## 2018-08-18 PROCEDURE — 3609027000 HC COLONOSCOPY: Performed by: INTERNAL MEDICINE

## 2018-08-18 PROCEDURE — 85018 HEMOGLOBIN: CPT

## 2018-08-18 PROCEDURE — 83735 ASSAY OF MAGNESIUM: CPT

## 2018-08-18 PROCEDURE — 80048 BASIC METABOLIC PNL TOTAL CA: CPT

## 2018-08-18 PROCEDURE — 99152 MOD SED SAME PHYS/QHP 5/>YRS: CPT | Performed by: INTERNAL MEDICINE

## 2018-08-18 RX ORDER — FENTANYL CITRATE 50 UG/ML
INJECTION, SOLUTION INTRAMUSCULAR; INTRAVENOUS PRN
Status: DISCONTINUED | OUTPATIENT
Start: 2018-08-18 | End: 2018-08-18 | Stop reason: HOSPADM

## 2018-08-18 RX ORDER — MIDAZOLAM HYDROCHLORIDE 5 MG/ML
INJECTION INTRAMUSCULAR; INTRAVENOUS PRN
Status: DISCONTINUED | OUTPATIENT
Start: 2018-08-18 | End: 2018-08-18 | Stop reason: HOSPADM

## 2018-08-18 RX ADMIN — MULTIPLE VITAMINS W/ MINERALS TAB 0.5 TABLET: TAB at 20:52

## 2018-08-18 RX ADMIN — TAMSULOSIN HYDROCHLORIDE 0.4 MG: 0.4 CAPSULE ORAL at 20:52

## 2018-08-18 RX ADMIN — TAMSULOSIN HYDROCHLORIDE 0.4 MG: 0.4 CAPSULE ORAL at 16:24

## 2018-08-18 RX ADMIN — SERTRALINE 50 MG: 50 TABLET, FILM COATED ORAL at 16:24

## 2018-08-18 RX ADMIN — Medication 10 ML: at 00:00

## 2018-08-18 RX ADMIN — Medication 10 ML: at 20:52

## 2018-08-18 RX ADMIN — PANTOPRAZOLE SODIUM 40 MG: 40 INJECTION, POWDER, FOR SOLUTION INTRAVENOUS at 05:36

## 2018-08-18 RX ADMIN — Medication 1000 MCG: at 16:25

## 2018-08-18 RX ADMIN — MULTIPLE VITAMINS W/ MINERALS TAB 0.5 TABLET: TAB at 16:24

## 2018-08-18 ASSESSMENT — PAIN SCALES - GENERAL
PAINLEVEL_OUTOF10: 0
PAINLEVEL_OUTOF10: 0

## 2018-08-18 NOTE — FLOWSHEET NOTE
Giov[address] Muniz 60  OCCUPATIONAL THERAPY MISSED TREATMENT NOTE  [facility] END[facility]  [facility] END[facility] POOL RM/NONE      Date: 2018  Patient Name: Teofilo Rodríguez        CSN: [id number]   : 1931  (80 y.o.)  Gender: male   Referring Practitioner: Dr. Darline Burton MD  Diagnosis: Gastrointestinal Bleeding         REASON FOR MISSED TREATMENT:  Missed Treat. Pt at endoscopy. Will check back at a later date.

## 2018-08-18 NOTE — PRE SEDATION
[address] [address]  Sedation/Analgesia History & Physical    Patient: Hans Puls: 11/24/1931  Med Rec#: [id number] Acc#: [id number]   Provider Performing Procedure: Irma Dupont  Primary Care Physician: Soraya 39, DO    PRE-PROCEDURE   Full CODE [x]Yes  DNR-CCA/DNR-CC []Yes   Brief History/Pre-Procedure Diagnosis:GI bleeding           MEDICAL HISTORY  []CAD/Valve  []Liver Disease  []Lung Disease []Diabetes  []Hypertension []Renal Disease  []Additional information:       has a past medical history of Acute sinusitis; Angina pectoris ([facility] [address] Anxiety disorder; Arthritis; BPH with urinary obstruction; CAD (coronary artery disease); Chronic insomnia; CKD (chronic kidney disease) stage 3, GFR 30-59 ml/min; COPD (chronic obstructive pulmonary disease) ([facility] [address] Gastroenteritis; HCAP (healthcare-associated pneumonia); Heart disease; HLD (hyperlipidemia); [place] (hard of hearing); Hypertension; Kidney disease; Kidney stone; NICOLAS on CPAP; Osteopenia determined by x-ray; Pacemaker; Pinched nerve; Visual problems; and Vitamin D deficiency. SURGICAL HISTORY   has a past surgical history that includes Coronary angioplasty with stent; Nasal sinus surgery; Pacemaker insertion; Tympanostomy tube placement; Cataract removal with implant; Myringotomy Tympanostomy Tube Placement (12/3/12); Dental surgery (3799'M); pacemaker placement (2011); Colonoscopy (8285,7896); hernia repair; TURP (4/17/2013); Myringotomy Tympanostomy Tube Placement (7/23/13); other surgical history (04/27/2015); eye surgery; EKG 12 Lead (4/29/2015); TURP (4/27/15); Abdominal hernia repair (04/27/2017); hip pinning (Left, 8/31/2017); Tibia fracture surgery (Right, 10/8/2017); Upper gastrointestinal endoscopy (12/29/2017); Colonoscopy (12/29/2017); pr njx dx/ther sbst intrlmnr lmbr/sac w/img gdn (N/A, 6/25/2018); Cardiac surgery; Colonoscopy (8/16/2018); and Upper gastrointestinal endoscopy (8/16/2018).   Additional (90 Base) MCG/ACT inhaler Inhale 2 puffs into the lungs every 6 hours as needed for Wheezing 5/11/18  Yes Lynn Huizar, APRN - CNP   sertraline (ZOLOFT) 50 MG tablet TAKE ONE TABLET BY MOUTH ONCE DAILY 2/20/18  Yes Sohail Mancia,    losartan (COZAAR) 50 MG tablet Take 1 tablet by mouth daily 2/20/18  Yes Sohail Mancia, DO   tamsulosin [facility]) 0.4 MG capsule Take 1 capsule by mouth 2 times daily 2/20/18 2/20/19 Yes Sohail Mancia,    metoprolol tartrate (LOPRESSOR) 25 MG tablet Take 0.5 tablets by mouth daily Substitute for atenolol. Patient taking differently: Take 25 mg by mouth daily Substitute for atenolol. 2/20/18  Yes Sohail Mancia,    aspirin 81 MG tablet Take 1 tablet by mouth daily 2/20/18  Yes Sohail Mancia,    clopidogrel (PLAVIX) 75 MG tablet Take 1 tablet by mouth every other day 2/20/18  Yes Sohail Mancia,    acetaminophen (TYLENOL) 325 MG tablet Take 2 tablets by mouth every 4 hours as needed for Pain 12/31/17  Yes Jg Arias MD   vitamin B-12 1000 MCG tablet Take 1 tablet by mouth daily 1/1/18  Yes Jg Arias MD   ferrous sulfate (FE TABS) 325 (65 Fe) MG EC tablet Take 1 tablet by mouth 2 times daily  Patient taking differently: Take 325 mg by mouth every other day  12/31/17  Yes Jg Arias MD   KRILL OIL PO Take 1,000 mg by mouth daily   Yes Historical Provider, MD   Coenzyme Q10 (COQ-10 PO) Take 10 mg by mouth daily   Yes Historical Provider, MD   TURMERIC PO Take 1,000 mg by mouth daily   Yes Historical Provider, MD   Multiple Vitamins-Minerals (THERAPEUTIC MULTIVITAMIN-MINERALS) tablet Take 1 tablet by mouth daily    Yes Historical Provider, MD   isosorbide mononitrate (IMDUR) 30 MG CR tablet Take 30 mg by mouth daily.    Yes Historical Provider, MD     Additional information:       PHYSICAL:   Heart:  [x]Regular rate and rhythm  []Other:    Lungs:  [x]Clear    []Other:    Abdomen: [x]Soft    []Other:    Mental Status: [x]Alert & Oriented  []Other:      VITAL SIGNS   Patient Vitals for the past 24 hrs:   BP Temp Temp src Pulse Resp SpO2   08/18/18 0809 114/69 97.3 °F (36.3 °C) Oral 102 17 93 %   08/18/18 0320 138/68 98.2 °F (36.8 °C) Oral 98 16 96 %   08/18/18 0215 (!) 151/67 98.1 °F (36.7 °C) Oral 119 20 94 %   08/17/18 2212 (!) 159/73 98.9 °F (37.2 °C) Oral 78 18 97 %   08/17/18 2013 (!) 142/70 98.6 °F (37 °C) Oral 82 18 97 %   08/17/18 1939 121/61 98.4 °F (36.9 °C) Oral 88 16 96 %   08/17/18 1523 (!) 144/65 99.5 °F (37.5 °C) Oral 99 18 95 %   08/17/18 1444 127/60 99.3 °F (37.4 °C) Oral 96 18 95 %       PLANNED PROCEDURE   []EGD  [x]Colonoscopy []Flex Sigmoid  []ERCP []EUS   []Cystoscopy  [] CATH [] BRONCH   Consent: I have discussed with the patient and/or the patient representative the indication, alternatives, and the possible risks and/or complications of the planned procedure and the anesthesia methods. The patient and/or patient representative appear to understand and agree to proceed. SEDATION  Planned agent:[x]Midazolam []Meperidine [x]Sublimaze []Morphine  []Diazepam  []Other:     ASA Classification: Class 3 - A patient with severe systemic disease that limits activity but is not incapacitating    Airway Assessment: Mallampati Class II - (soft palate, fauces & uvula are visible)    Monitoring and Safety: The patient will be placed on a cardiac monitor and vital signs, pulse oximetry and level of consciousness will be continuously evaluated throughout the procedure. The patient will be closely monitored until recovery from the medications is complete and the patient has returned to baseline status. Respiratory therapy will be on standby during the procedure. [x]Pre-procedure diagnostic studies complete and results available. Comment:    [x]Previous sedation/anesthesia experiences assessed. Comment:    [x]The patient is an appropriate candidate to undergo the planned procedure sedation and anesthesia.  (Refer to nursing sedation/analgesia documentation

## 2018-08-18 NOTE — FLOWSHEET NOTE
08/17/18 2868   Encounter Summary   Services provided to: Patient and family together   Referral/Consult From: [facility] Family members   Place of [facility] Completed   Continue Visiting Yes  (8/17/18 Continue support)   Complexity of Encounter Moderate   Length of Encounter 15 minutes   Spiritual Assessment Completed Yes   Spiritual/Roman Catholic   Type Spiritual support   Assessment Approachable; Anxious   Intervention Active listening;Nurtured hope;Prayer   Outcome Expressed gratitude;Engaged in conversation     S: At the time of visit patient was in his bed as his wife was sitting on the couch next to the window. Patient shared with this staff that he was not at his best. He stated that he has not had enough sleep for a while. Patient also shared with me that he had a colonoscopy which took lot of energy out of him. Patient's wife told this staff that he might be having a second colonoscopy to find out while he is loosing blood. She also shared with this staff during this encounter that patient is receiving blood now and might need more according to the medical staff. At the end of this conversation, patient's wife shared with this staff that they are members of [facility]. This staff encouraged patient and nurtured hope and provided emotional and spiritual support including prayer. SC will follow up with continue emotional and spiritual support. SC will also follow up to provide active listening and to determine appropriate spiritual care plan in this case. Meanwhile, [facility] services remain available at all times for this patient and his family.

## 2018-08-19 LAB
ABO: NORMAL
ANION GAP SERPL CALCULATED.3IONS-SCNC: 11 MEQ/L (ref 8–16)
ANTIBODY SCREEN: NORMAL
BUN BLDV-MCNC: 23 MG/DL (ref 7–22)
CALCIUM SERPL-MCNC: 8.5 MG/DL (ref 8.5–10.5)
CHLORIDE BLD-SCNC: 103 MEQ/L (ref 98–111)
CO2: 25 MEQ/L (ref 23–33)
CREAT SERPL-MCNC: 1.2 MG/DL (ref 0.4–1.2)
ERYTHROCYTE [DISTWIDTH] IN BLOOD BY AUTOMATED COUNT: 14.9 % (ref 11.5–14.5)
ERYTHROCYTE [DISTWIDTH] IN BLOOD BY AUTOMATED COUNT: 47.9 FL (ref 35–45)
GFR SERPL CREATININE-BSD FRML MDRD: 57 ML/MIN/1.73M2
GLUCOSE BLD-MCNC: 103 MG/DL (ref 70–108)
HCT VFR BLD CALC: 19.4 % (ref 42–52)
HCT VFR BLD CALC: 21.9 % (ref 42–52)
HCT VFR BLD CALC: 24 % (ref 42–52)
HCT VFR BLD CALC: 26.3 % (ref 42–52)
HEMOGLOBIN: 6.6 GM/DL (ref 14–18)
HEMOGLOBIN: 7.4 GM/DL (ref 14–18)
HEMOGLOBIN: 8.4 GM/DL (ref 14–18)
HEMOGLOBIN: 8.9 GM/DL (ref 14–18)
MAGNESIUM: 1.7 MG/DL (ref 1.6–2.4)
MCH RBC QN AUTO: 30 PG (ref 26–33)
MCHC RBC AUTO-ENTMCNC: 33.8 GM/DL (ref 32.2–35.5)
MCV RBC AUTO: 88.7 FL (ref 80–94)
PHOSPHORUS: 3.1 MG/DL (ref 2.4–4.7)
PLATELET # BLD: 197 THOU/MM3 (ref 130–400)
PMV BLD AUTO: 8.9 FL (ref 9.4–12.4)
POTASSIUM SERPL-SCNC: 4.5 MEQ/L (ref 3.5–5.2)
RBC # BLD: 2.47 MILL/MM3 (ref 4.7–6.1)
RH FACTOR: NORMAL
SODIUM BLD-SCNC: 139 MEQ/L (ref 135–145)
WBC # BLD: 8 THOU/MM3 (ref 4.8–10.8)

## 2018-08-19 PROCEDURE — P9016 RBC LEUKOCYTES REDUCED: HCPCS

## 2018-08-19 PROCEDURE — 3609009900 HC COLONOSCOPY W/CONTROL BLEEDING ANY METHOD: Performed by: INTERNAL MEDICINE

## 2018-08-19 PROCEDURE — 2580000003 HC RX 258: Performed by: INTERNAL MEDICINE

## 2018-08-19 PROCEDURE — 99152 MOD SED SAME PHYS/QHP 5/>YRS: CPT | Performed by: INTERNAL MEDICINE

## 2018-08-19 PROCEDURE — 6360000002 HC RX W HCPCS

## 2018-08-19 PROCEDURE — 2780000010 HC IMPLANT OTHER: Performed by: INTERNAL MEDICINE

## 2018-08-19 PROCEDURE — 83735 ASSAY OF MAGNESIUM: CPT

## 2018-08-19 PROCEDURE — 85014 HEMATOCRIT: CPT

## 2018-08-19 PROCEDURE — 6370000000 HC RX 637 (ALT 250 FOR IP): Performed by: INTERNAL MEDICINE

## 2018-08-19 PROCEDURE — 36415 COLL VENOUS BLD VENIPUNCTURE: CPT

## 2018-08-19 PROCEDURE — 0DJ08ZZ INSPECTION OF UPPER INTESTINAL TRACT, VIA NATURAL OR ARTIFICIAL OPENING ENDOSCOPIC: ICD-10-PCS | Performed by: INTERNAL MEDICINE

## 2018-08-19 PROCEDURE — 86850 RBC ANTIBODY SCREEN: CPT

## 2018-08-19 PROCEDURE — 0W3R8ZZ CONTROL BLEEDING IN GENITOURINARY TRACT, VIA NATURAL OR ARTIFICIAL OPENING ENDOSCOPIC: ICD-10-PCS | Performed by: INTERNAL MEDICINE

## 2018-08-19 PROCEDURE — 85018 HEMOGLOBIN: CPT

## 2018-08-19 PROCEDURE — 80048 BASIC METABOLIC PNL TOTAL CA: CPT

## 2018-08-19 PROCEDURE — 99233 SBSQ HOSP IP/OBS HIGH 50: CPT | Performed by: INTERNAL MEDICINE

## 2018-08-19 PROCEDURE — 99153 MOD SED SAME PHYS/QHP EA: CPT | Performed by: INTERNAL MEDICINE

## 2018-08-19 PROCEDURE — A4614 HAND-HELD PEFR METER: HCPCS

## 2018-08-19 PROCEDURE — 86923 COMPATIBILITY TEST ELECTRIC: CPT

## 2018-08-19 PROCEDURE — 36430 TRANSFUSION BLD/BLD COMPNT: CPT

## 2018-08-19 PROCEDURE — 3E0H8GC INTRODUCTION OF OTHER THERAPEUTIC SUBSTANCE INTO LOWER GI, VIA NATURAL OR ARTIFICIAL OPENING ENDOSCOPIC: ICD-10-PCS | Performed by: INTERNAL MEDICINE

## 2018-08-19 PROCEDURE — 2709999900 HC NON-CHARGEABLE SUPPLY

## 2018-08-19 PROCEDURE — 86900 BLOOD TYPING SEROLOGIC ABO: CPT

## 2018-08-19 PROCEDURE — 86901 BLOOD TYPING SEROLOGIC RH(D): CPT

## 2018-08-19 PROCEDURE — 6360000002 HC RX W HCPCS: Performed by: INTERNAL MEDICINE

## 2018-08-19 PROCEDURE — 85027 COMPLETE CBC AUTOMATED: CPT

## 2018-08-19 PROCEDURE — 1200000000 HC SEMI PRIVATE

## 2018-08-19 PROCEDURE — 84100 ASSAY OF PHOSPHORUS: CPT

## 2018-08-19 PROCEDURE — 3609012800 HC EGD DIAGNOSTIC ONLY: Performed by: INTERNAL MEDICINE

## 2018-08-19 DEVICE — CLIPPING DEVICE
Type: IMPLANTABLE DEVICE | Site: DUODENUM | Status: FUNCTIONAL
Brand: RESOLUTION CLIP

## 2018-08-19 RX ORDER — FENTANYL CITRATE 50 UG/ML
INJECTION, SOLUTION INTRAMUSCULAR; INTRAVENOUS PRN
Status: DISCONTINUED | OUTPATIENT
Start: 2018-08-19 | End: 2018-08-19 | Stop reason: HOSPADM

## 2018-08-19 RX ORDER — SODIUM CHLORIDE 450 MG/100ML
INJECTION, SOLUTION INTRAVENOUS CONTINUOUS
Status: DISCONTINUED | OUTPATIENT
Start: 2018-08-19 | End: 2018-08-19

## 2018-08-19 RX ORDER — MIDAZOLAM HYDROCHLORIDE 1 MG/ML
INJECTION INTRAMUSCULAR; INTRAVENOUS PRN
Status: DISCONTINUED | OUTPATIENT
Start: 2018-08-19 | End: 2018-08-19 | Stop reason: HOSPADM

## 2018-08-19 RX ORDER — 0.9 % SODIUM CHLORIDE 0.9 %
250 INTRAVENOUS SOLUTION INTRAVENOUS ONCE
Status: DISCONTINUED | OUTPATIENT
Start: 2018-08-19 | End: 2018-08-24 | Stop reason: HOSPADM

## 2018-08-19 RX ADMIN — Medication 1000 MCG: at 13:14

## 2018-08-19 RX ADMIN — SERTRALINE 50 MG: 50 TABLET, FILM COATED ORAL at 13:14

## 2018-08-19 RX ADMIN — MULTIPLE VITAMINS W/ MINERALS TAB 0.5 TABLET: TAB at 13:16

## 2018-08-19 RX ADMIN — SODIUM CHLORIDE: 4.5 INJECTION, SOLUTION INTRAVENOUS at 08:24

## 2018-08-19 RX ADMIN — TAMSULOSIN HYDROCHLORIDE 0.4 MG: 0.4 CAPSULE ORAL at 13:13

## 2018-08-19 RX ADMIN — TAMSULOSIN HYDROCHLORIDE 0.4 MG: 0.4 CAPSULE ORAL at 20:30

## 2018-08-19 RX ADMIN — MULTIPLE VITAMINS W/ MINERALS TAB 0.5 TABLET: TAB at 20:31

## 2018-08-19 RX ADMIN — Medication 10 ML: at 20:31

## 2018-08-19 ASSESSMENT — PAIN SCALES - GENERAL
PAINLEVEL_OUTOF10: 0

## 2018-08-19 ASSESSMENT — PAIN - FUNCTIONAL ASSESSMENT: PAIN_FUNCTIONAL_ASSESSMENT: 0-10

## 2018-08-19 NOTE — PROGRESS NOTES
Hospitalist Progress Note    Patient:  Natalie Tatum      Unit/Bed:5K-07/007-A    YOB: 1931    MRN: [id number]       Acct: [de-identified]     PCP: Prashant Perry DO    Date of Admission: 8/15/2018    Chief Complaint: [facility]: 80 y. o. male who presented to [facility] following an episode of BRBPR. He went to defecate today and noticed BRB in the toilet. Patient has a history of GIB from ascending colon polyps in 2017. He is currently on DAPT for CAD. Chart review suggests that his last PCI was 20 years ago. He currently endorses fatigue/malaise and some dark stools as well. He denies any N/V/D. Denies any pre-syncope. He follows Dr. Bernie Viera for GI. In the ED he was hemodynamically stable and hgb was 10.1 (down from 11.4 3 months ago).        Subjective: pt had multiple episodes of BRBPR, with symptoms of lightheadedness and dizziness. Hb dropped from 8.6 to 6.6. Dr. Bernie Viera called and made him aware of the situation. Plan on taking him to scope immediately.        Medications:  Reviewed    Infusion Medications    sodium chloride Stopped (08/19/18 1103)     Scheduled Medications    sodium chloride  250 mL Intravenous Once    sodium chloride  250 mL Intravenous Once    pantoprazole  40 mg Intravenous Daily    therapeutic multivitamin-minerals  0.5 tablet Oral BID    sertraline  50 mg Oral Daily    tamsulosin  0.4 mg Oral BID    cyanocobalamin  1,000 mcg Oral Daily    sodium chloride flush  10 mL Intravenous 2 times per day     PRN Meds: albuterol sulfate HFA, sodium chloride flush, acetaminophen, ondansetron      Intake/Output Summary (Last 24 hours) at 08/19/18 1121  Last data filed at 08/19/18 0745   Gross per 24 hour   Intake              920 ml   Output                0 ml   Net              920 ml       Diet:  Diet NPO Effective Now Exceptions are: Sips with Meds    Exam:  /64   Pulse 92   Temp 98.4 °F (36.9 °C) (Oral)   Resp 18   Ht 5' 3\" (1.6 m)   Wt 150 lb 11.2 oz (68.4 kg)   SpO2 100%   BMI 26.70 kg/m²     General appearance: No apparent distress, appears stated age and cooperative. HEENT: Pupils equal, round, and reactive to light. Conjunctivae/corneas clear. Neck: Supple, with full range of motion. No jugular venous distention. Trachea midline. Respiratory:  Normal respiratory effort. Clear to auscultation, bilaterally without Rales/Wheezes/Rhonchi. Cardiovascular: Regular rate and rhythm with normal S1/S2 without murmurs, rubs or gallops. Abdomen: Soft, non-tender, non-distended with normal bowel sounds. Musculoskeletal: passive and active ROM x 4 extremities. Skin: Skin color, texture, turgor normal.  No rashes or lesions. Neurologic:  Neurovascularly intact without any focal sensory/motor deficits. Cranial nerves: II-XII intact, grossly non-focal.  Psychiatric: Alert and oriented, thought content appropriate, normal insight  Capillary Refill: Brisk,< 3 seconds   Peripheral Pulses: +2 palpable, equal bilaterally       Labs:   Recent Labs      08/16/18 2008 08/18/18   1635  08/19/18   0022  08/19/18   0719   WBC  10.0   --    --    --   8.0   HGB  7.7*   < >  7.7*  6.6*  7.4*   HCT  23.1*   < >  22.0*  19.4*  21.9*   PLT  210   --    --    --   197    < > = values in this interval not displayed. Recent Labs      08/17/18   0623  08/18/18   0639  08/19/18   0719   NA  138  139  139   K  4.6  4.4  4.5   CL  103  104  103   CO2  26  24  25   BUN  21  23*  23*   CREATININE  1.2  1.1  1.2   CALCIUM  8.6  8.9  8.5   PHOS   --   3.6  3.1     No results for input(s): AST, ALT, BILIDIR, BILITOT, ALKPHOS in the last 72 hours. No results for input(s): INR in the last 72 hours. No results for input(s): [place] Reasoner in the last 72 hours.     Urinalysis:    Lab Results   Component Value Date    NITRU NEGATIVE 08/15/2018    WBCUA 0-5 02/19/2018    BACTERIA MANY 09/08/2017    RBCUA 0-2 02/19/2018    BLOODU NEGATIVE 08/15/2018    GLUCOSEU NEGATIVE 08/15/2018       Radiology:  NM GI BLOOD LOSS   Final Result      No scintigraphic evidence of active gastrointestinal bleeding. Final report electronically signed by Dr. Lazaro Clay on 8/17/2018 1:55 PM      XR Acute Abd Series Chest 1 VW   Final Result   1. Nonobstructive bowel gas pattern with no evidence of free intraperitoneal air. 2. Chronic fibrotic changes of the lungs. **This report has been created using voice recognition software. It may contain minor errors which are inherent in voice recognition technology. **      Final report electronically signed by Dr Angelina العلي on 8/15/2018 2:13 PM          Diet: Diet NPO Effective Now Exceptions are: Sips with Meds     Disposition:    [] Home       [] TCU       [] Rehab       [] Psych       [] SNF       [] [place]       [] Other-    Code Status: DNR-CCA    PT/OT Eval Status: pending     Assessment/Plan:    Active Hospital Problems    Diagnosis Date Noted    Lower GI bleed [K92.2] 12/27/2017     Priority: High    Gastritis [K29.70] 08/17/2018    Severe malnutrition ([address] [E43] 08/16/2018     Class: Acute    Essential hypertension [I10] 07/20/2017    CAD (coronary artery disease) [I25.10] 04/29/2015          Mr. Valdemar Cardona is a 80year old gentlemen presenting with BRBPR while on DAPT and a history of GIB in the past year due to bleeding polyps.          Lower GI Bleed likely Polyp/Diverticular:   -BRBPR, on DAPT, history of polyps bleeding. Follows Dr. Lian Ferreira (GI). hgb 10.1 down from 11.2 on most recent check 3 months ago. Currently hemodynamically stable. EGD and Colonoscopy on 8/16 showed polyps and many diverticula however no active bleeding.  -pt developed re-bleeding episode on 8/17 with drop in Hb. Tagged RBC scan was negative. Colonoscopy on 8/17 again showed no active bleeding. Pt received 2U PRBCs. -pt again had more episodes of BRBPR on 8/19 with symptoms of lightheadedness. Hb dropped from 8.6 to 6.6.  Colonoscopy again on 8/19 done, showed possible bleeding from polyp, clipped and epi injected. -monitor H&H q6, transfuse if Hb<8      Mild GERD with mild gastritis per EGD - continue PPI.  Standard anti-reflux measures, avoid NSAIDs. Severe diverticular disease on colonoscopy, no active bleeding or acute diverticulitis noted - high fiber diet, avoid nuts, seeds, corn, and popcorn.        Unexplained Weight Loss: Patient endorses 30 lbs, however, chart review closer to 20 over 17 months. Will follow up endoscopy. Consider other cancer screenings.         Hx CAD: S/p PCI >20 years ago. Had normal stress 2015. Trop negative on admission. Holding DAPT for now given bleed. Will recommend discontinuing plavix permanently. Continue lopressor. Hold fishoil given bleed.          HTN: hold home BP medications during active bleeding         Hx PPM: inserted 2011-[facility] Vidal [facility]     COPD: uses PRN albuterol.  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          Electronically signed by Amrita Levin MD on 8/19/2018 at 11:21 AM

## 2018-08-19 NOTE — OP NOTE
anticoagulation. Pamela Gordon M.D.    D: 08/18/2018 14:55:28       T: 08/18/2018 23:26:19     [id number]  Job#: [id number]     Doc#: [id number]    CC:  Phong Landers.  Lucas Schilling.

## 2018-08-19 NOTE — PROGRESS NOTES
Patient returned to floor via cart and transferred into bed with 1 assist and walker. Patient drowsy but rouses to name. No active bleeding noted per rectum at this time. See flow sheet for vitals. Family at bedside, call light in reach. Denies needs or pain at this time.

## 2018-08-19 NOTE — FLOWSHEET NOTE
Sent message via perfect serve to Boaz Ely regarding pt's Hgb of 6.6 and Hct of 19.4. Awaiting response. 0132: see new orders placed.

## 2018-08-19 NOTE — PRE SEDATION
information:       PHYSICAL:   Heart:  [x]Regular rate and rhythm  []Other:    Lungs:  [x]Clear    []Other:    Abdomen: [x]Soft    []Other:    Mental Status: [x]Alert & Oriented  []Other:      VITAL SIGNS   Patient Vitals for the past 24 hrs:   BP Temp Temp src Pulse Resp SpO2   08/19/18 0821 134/69 - - 98 19 97 %   08/19/18 0745 119/66 98.2 °F (36.8 °C) - 120 20 -   08/19/18 0535 (!) 148/70 98.9 °F (37.2 °C) Oral 81 16 95 %   08/19/18 0509 139/75 97.8 °F (36.6 °C) Oral 90 17 92 %   08/19/18 0507 139/75 97.8 °F (36.6 °C) Oral 102 16 92 %   08/18/18 2329 (!) 140/60 98.9 °F (37.2 °C) Oral 86 16 92 %   08/18/18 2010 130/60 98.3 °F (36.8 °C) Oral 92 16 97 %   08/18/18 1619 (!) 142/65 98.3 °F (36.8 °C) Oral 98 16 91 %   08/18/18 1444 (!) 107/56 - - 84 18 94 %   08/18/18 1440 (!) 103/53 - - 81 16 100 %   08/18/18 1437 - - - 81 15 100 %   08/18/18 1436 - - - 83 14 95 %   08/18/18 1435 - - - 79 17 93 %   08/18/18 1434 - - - 75 17 95 %   08/18/18 1433 - - - 81 17 95 %   08/18/18 1432 - - - 80 17 96 %   08/18/18 1431 - - - 80 17 97 %   08/18/18 1430 - - - 84 20 96 %   08/18/18 1429 - - - 81 18 93 %   08/18/18 1428 - - - 80 18 95 %   08/18/18 1427 - - - 82 18 96 %   08/18/18 1426 - - - 83 19 99 %   08/18/18 1417 - - - 83 18 100 %   08/18/18 1416 - - - 85 18 100 %   08/18/18 1415 - - - 84 16 99 %   08/18/18 1414 - - - 83 16 99 %   08/18/18 1413 - - - 83 15 100 %   08/18/18 1412 - - - 82 13 100 %   08/18/18 1411 - - - 80 17 100 %   08/18/18 1410 - - - 81 19 92 %   08/18/18 1409 - - - 89 23 (!) 89 %   08/18/18 1408 - - - 100 21 91 %   08/18/18 1407 - - - 96 20 94 %   08/18/18 1406 - - - 83 22 96 %   08/18/18 1405 - - - 84 17 98 %       PLANNED PROCEDURE   [x]EGD  [x]Colonoscopy []Flex Sigmoid  []ERCP []EUS   []Cystoscopy  [] CATH [] BRONCH   Consent: I have discussed with the patient and/or the patient representative the indication, alternatives, and the possible risks and/or complications of the planned procedure and the anesthesia methods. The patient and/or patient representative appear to understand and agree to proceed. SEDATION  Planned agent:[x]Midazolam []Meperidine [x]Sublimaze []Morphine  []Diazepam  []Other:     ASA Classification: Class 3 - A patient with severe systemic disease that limits activity but is not incapacitating    Airway Assessment: Mallampati Class II - (soft palate, fauces & uvula are visible)    Monitoring and Safety: The patient will be placed on a cardiac monitor and vital signs, pulse oximetry and level of consciousness will be continuously evaluated throughout the procedure. The patient will be closely monitored until recovery from the medications is complete and the patient has returned to baseline status. Respiratory therapy will be on standby during the procedure. [x]Pre-procedure diagnostic studies complete and results available. Comment:    [x]Previous sedation/anesthesia experiences assessed. Comment:    [x]The patient is an appropriate candidate to undergo the planned procedure sedation and anesthesia. (Refer to nursing sedation/analgesia documentation record)  [x]Formulation and discussion of sedation/procedure plan, risks, and expectations with patient and/or responsible adult completed. [x]Patient examined immediately prior to the procedure.  (Refer to nursing sedation/analgesia documentation record)    Angelica Avila MD   Electronically signed 8/19/2018 at 8:26 AM

## 2018-08-19 NOTE — PLAN OF CARE
Patient having active bright red blood per rectum with a drop in hemoglobin to 6.6 with symptoms of lightheadedness and dizziness. Giving 2U PRBCs, spoke with Dr. Lindsay Brunner (Gastroenterologist) to make him aware of the situation. Per Dr. Lindsay Brunner will do colonscopy today as soon as possible.      Neida Rojas

## 2018-08-20 ENCOUNTER — ANESTHESIA EVENT (OUTPATIENT)
Dept: ENDOSCOPY | Age: 83
DRG: 907 | End: 2018-08-20
Payer: MEDICARE

## 2018-08-20 ENCOUNTER — ANESTHESIA (OUTPATIENT)
Dept: ENDOSCOPY | Age: 83
DRG: 907 | End: 2018-08-20
Payer: MEDICARE

## 2018-08-20 ENCOUNTER — APPOINTMENT (OUTPATIENT)
Dept: NUCLEAR MEDICINE | Age: 83
DRG: 907 | End: 2018-08-20
Payer: MEDICARE

## 2018-08-20 VITALS
OXYGEN SATURATION: 86 % | DIASTOLIC BLOOD PRESSURE: 62 MMHG | SYSTOLIC BLOOD PRESSURE: 132 MMHG | RESPIRATION RATE: 26 BRPM

## 2018-08-20 LAB
ANGLE TEG: 74.5 DEG (ref 53–72)
ANION GAP SERPL CALCULATED.3IONS-SCNC: 12 MEQ/L (ref 8–16)
BUN BLDV-MCNC: 25 MG/DL (ref 7–22)
CALCIUM IONIZED: 1.14 MMOL/L (ref 1.12–1.32)
CALCIUM SERPL-MCNC: 8.3 MG/DL (ref 8.5–10.5)
CHLORIDE BLD-SCNC: 99 MEQ/L (ref 98–111)
CO2: 24 MEQ/L (ref 23–33)
CREAT SERPL-MCNC: 1.2 MG/DL (ref 0.4–1.2)
EPL-TEG: 0.3 % (ref 0–15)
GFR SERPL CREATININE-BSD FRML MDRD: 57 ML/MIN/1.73M2
GLUCOSE BLD-MCNC: 127 MG/DL (ref 70–108)
HCT VFR BLD CALC: 21.6 % (ref 42–52)
HCT VFR BLD CALC: 24.6 % (ref 42–52)
HCT VFR BLD CALC: 24.6 % (ref 42–52)
HCT VFR BLD CALC: 25.5 % (ref 42–52)
HCT VFR BLD CALC: 26.3 % (ref 42–52)
HEMOGLOBIN: 7.3 GM/DL (ref 14–18)
HEMOGLOBIN: 8.4 GM/DL (ref 14–18)
HEMOGLOBIN: 8.5 GM/DL (ref 14–18)
HEMOGLOBIN: 8.5 GM/DL (ref 14–18)
HEMOGLOBIN: 9.3 GM/DL (ref 14–18)
HEPARIN THERAPY: NO
KINETICS TEG: 1 MINUTES (ref 1–3)
LY30 (LYSIS) TEG: 0.3 % (ref 0–7.5)
MA (MAX CLOT) TEG: 65.9 MM (ref 50–70)
MAGNESIUM: 1.8 MG/DL (ref 1.6–2.4)
MRSA SCREEN RT-PCR: NEGATIVE
PHOSPHORUS: 3.5 MG/DL (ref 2.4–4.7)
POTASSIUM SERPL-SCNC: 3.9 MEQ/L (ref 3.5–5.2)
REACTION TIME TEG: 2.7 MINUTES (ref 5–10)
SODIUM BLD-SCNC: 135 MEQ/L (ref 135–145)

## 2018-08-20 PROCEDURE — 80048 BASIC METABOLIC PNL TOTAL CA: CPT

## 2018-08-20 PROCEDURE — 99233 SBSQ HOSP IP/OBS HIGH 50: CPT | Performed by: INTERNAL MEDICINE

## 2018-08-20 PROCEDURE — 85018 HEMOGLOBIN: CPT

## 2018-08-20 PROCEDURE — 78278 ACUTE GI BLOOD LOSS IMAGING: CPT

## 2018-08-20 PROCEDURE — 6370000000 HC RX 637 (ALT 250 FOR IP): Performed by: INTERNAL MEDICINE

## 2018-08-20 PROCEDURE — 36430 TRANSFUSION BLD/BLD COMPNT: CPT

## 2018-08-20 PROCEDURE — 85014 HEMATOCRIT: CPT

## 2018-08-20 PROCEDURE — 87641 MR-STAPH DNA AMP PROBE: CPT

## 2018-08-20 PROCEDURE — 2580000003 HC RX 258: Performed by: INTERNAL MEDICINE

## 2018-08-20 PROCEDURE — 6360000002 HC RX W HCPCS: Performed by: NURSE ANESTHETIST, CERTIFIED REGISTERED

## 2018-08-20 PROCEDURE — 82330 ASSAY OF CALCIUM: CPT

## 2018-08-20 PROCEDURE — 85347 COAGULATION TIME ACTIVATED: CPT

## 2018-08-20 PROCEDURE — 2709999900 HC NON-CHARGEABLE SUPPLY

## 2018-08-20 PROCEDURE — 2500000003 HC RX 250 WO HCPCS: Performed by: NURSE ANESTHETIST, CERTIFIED REGISTERED

## 2018-08-20 PROCEDURE — 2580000003 HC RX 258: Performed by: NURSE ANESTHETIST, CERTIFIED REGISTERED

## 2018-08-20 PROCEDURE — A9560 TC99M LABELED RBC: HCPCS | Performed by: INTERNAL MEDICINE

## 2018-08-20 PROCEDURE — 97530 THERAPEUTIC ACTIVITIES: CPT

## 2018-08-20 PROCEDURE — 2000000000 HC ICU R&B

## 2018-08-20 PROCEDURE — 99291 CRITICAL CARE FIRST HOUR: CPT | Performed by: NURSE PRACTITIONER

## 2018-08-20 PROCEDURE — 87081 CULTURE SCREEN ONLY: CPT

## 2018-08-20 PROCEDURE — C9113 INJ PANTOPRAZOLE SODIUM, VIA: HCPCS | Performed by: INTERNAL MEDICINE

## 2018-08-20 PROCEDURE — 83735 ASSAY OF MAGNESIUM: CPT

## 2018-08-20 PROCEDURE — 2709999900 HC NON-CHARGEABLE SUPPLY: Performed by: INTERNAL MEDICINE

## 2018-08-20 PROCEDURE — 2780000010 HC IMPLANT OTHER: Performed by: INTERNAL MEDICINE

## 2018-08-20 PROCEDURE — 6360000002 HC RX W HCPCS: Performed by: INTERNAL MEDICINE

## 2018-08-20 PROCEDURE — P9016 RBC LEUKOCYTES REDUCED: HCPCS

## 2018-08-20 PROCEDURE — 3430000000 HC RX DIAGNOSTIC RADIOPHARMACEUTICAL: Performed by: INTERNAL MEDICINE

## 2018-08-20 PROCEDURE — 0W3P8ZZ CONTROL BLEEDING IN GASTROINTESTINAL TRACT, VIA NATURAL OR ARTIFICIAL OPENING ENDOSCOPIC: ICD-10-PCS | Performed by: INTERNAL MEDICINE

## 2018-08-20 PROCEDURE — 3700000000 HC ANESTHESIA ATTENDED CARE: Performed by: INTERNAL MEDICINE

## 2018-08-20 PROCEDURE — 2580000003 HC RX 258: Performed by: NURSE PRACTITIONER

## 2018-08-20 PROCEDURE — 3609009900 HC COLONOSCOPY W/CONTROL BLEEDING ANY METHOD: Performed by: INTERNAL MEDICINE

## 2018-08-20 PROCEDURE — 3700000001 HC ADD 15 MINUTES (ANESTHESIA): Performed by: INTERNAL MEDICINE

## 2018-08-20 PROCEDURE — 84100 ASSAY OF PHOSPHORUS: CPT

## 2018-08-20 PROCEDURE — 36415 COLL VENOUS BLD VENIPUNCTURE: CPT

## 2018-08-20 DEVICE — CLIP INT L235CM WRK CHN DIA2.8MM OPN 11MM BRAID CATH ROT BX/10: Type: IMPLANTABLE DEVICE | Status: FUNCTIONAL

## 2018-08-20 RX ORDER — SODIUM CHLORIDE, SODIUM LACTATE, POTASSIUM CHLORIDE, CALCIUM CHLORIDE 600; 310; 30; 20 MG/100ML; MG/100ML; MG/100ML; MG/100ML
INJECTION, SOLUTION INTRAVENOUS CONTINUOUS PRN
Status: DISCONTINUED | OUTPATIENT
Start: 2018-08-20 | End: 2018-08-20

## 2018-08-20 RX ORDER — ETOMIDATE 2 MG/ML
INJECTION INTRAVENOUS PRN
Status: DISCONTINUED | OUTPATIENT
Start: 2018-08-20 | End: 2018-08-20 | Stop reason: SDUPTHER

## 2018-08-20 RX ORDER — SODIUM CHLORIDE 9 MG/ML
INJECTION, SOLUTION INTRAVENOUS CONTINUOUS PRN
Status: DISCONTINUED | OUTPATIENT
Start: 2018-08-20 | End: 2018-08-20 | Stop reason: SDUPTHER

## 2018-08-20 RX ORDER — 0.9 % SODIUM CHLORIDE 0.9 %
250 INTRAVENOUS SOLUTION INTRAVENOUS ONCE
Status: COMPLETED | OUTPATIENT
Start: 2018-08-20 | End: 2018-08-21

## 2018-08-20 RX ORDER — SODIUM CHLORIDE, SODIUM LACTATE, POTASSIUM CHLORIDE, CALCIUM CHLORIDE 600; 310; 30; 20 MG/100ML; MG/100ML; MG/100ML; MG/100ML
INJECTION, SOLUTION INTRAVENOUS CONTINUOUS PRN
Status: DISCONTINUED | OUTPATIENT
Start: 2018-08-20 | End: 2018-08-20 | Stop reason: SDUPTHER

## 2018-08-20 RX ORDER — 0.9 % SODIUM CHLORIDE 0.9 %
250 INTRAVENOUS SOLUTION INTRAVENOUS ONCE
Status: COMPLETED | OUTPATIENT
Start: 2018-08-20 | End: 2018-08-20

## 2018-08-20 RX ORDER — PROPOFOL 10 MG/ML
INJECTION, EMULSION INTRAVENOUS PRN
Status: DISCONTINUED | OUTPATIENT
Start: 2018-08-20 | End: 2018-08-20 | Stop reason: SDUPTHER

## 2018-08-20 RX ORDER — 0.9 % SODIUM CHLORIDE 0.9 %
250 INTRAVENOUS SOLUTION INTRAVENOUS ONCE
Status: DISCONTINUED | OUTPATIENT
Start: 2018-08-20 | End: 2018-08-24 | Stop reason: HOSPADM

## 2018-08-20 RX ADMIN — PHENYLEPHRINE HYDROCHLORIDE 200 MCG: 10 INJECTION INTRAVENOUS at 14:29

## 2018-08-20 RX ADMIN — PROPOFOL 10 MG: 10 INJECTION, EMULSION INTRAVENOUS at 14:20

## 2018-08-20 RX ADMIN — PHENYLEPHRINE HYDROCHLORIDE 100 MCG: 10 INJECTION INTRAVENOUS at 14:26

## 2018-08-20 RX ADMIN — ACETAMINOPHEN 650 MG: 325 TABLET ORAL at 20:08

## 2018-08-20 RX ADMIN — PHENYLEPHRINE HYDROCHLORIDE 200 MCG: 10 INJECTION INTRAVENOUS at 14:46

## 2018-08-20 RX ADMIN — Medication 31.3 MILLICURIE: at 16:29

## 2018-08-20 RX ADMIN — SODIUM CHLORIDE 250 ML: 9 INJECTION, SOLUTION INTRAVENOUS at 05:28

## 2018-08-20 RX ADMIN — ETOMIDATE 2 MG: 2 INJECTION INTRAVENOUS at 14:15

## 2018-08-20 RX ADMIN — MULTIPLE VITAMINS W/ MINERALS TAB 0.5 TABLET: TAB at 19:58

## 2018-08-20 RX ADMIN — SODIUM CHLORIDE: 9 INJECTION, SOLUTION INTRAVENOUS at 13:58

## 2018-08-20 RX ADMIN — PHENYLEPHRINE HYDROCHLORIDE 200 MCG: 10 INJECTION INTRAVENOUS at 15:00

## 2018-08-20 RX ADMIN — PROPOFOL 20 MG: 10 INJECTION, EMULSION INTRAVENOUS at 14:15

## 2018-08-20 RX ADMIN — PANTOPRAZOLE SODIUM 40 MG: 40 INJECTION, POWDER, FOR SOLUTION INTRAVENOUS at 08:41

## 2018-08-20 RX ADMIN — PHENYLEPHRINE HYDROCHLORIDE 200 MCG: 10 INJECTION INTRAVENOUS at 14:42

## 2018-08-20 RX ADMIN — ETOMIDATE 2 MG: 2 INJECTION INTRAVENOUS at 14:45

## 2018-08-20 RX ADMIN — PHENYLEPHRINE HYDROCHLORIDE 200 MCG: 10 INJECTION INTRAVENOUS at 14:56

## 2018-08-20 RX ADMIN — Medication 1000 MCG: at 08:41

## 2018-08-20 RX ADMIN — PHENYLEPHRINE HYDROCHLORIDE 200 MCG: 10 INJECTION INTRAVENOUS at 15:08

## 2018-08-20 RX ADMIN — PHENYLEPHRINE HYDROCHLORIDE 200 MCG: 10 INJECTION INTRAVENOUS at 14:52

## 2018-08-20 RX ADMIN — TAMSULOSIN HYDROCHLORIDE 0.4 MG: 0.4 CAPSULE ORAL at 19:58

## 2018-08-20 RX ADMIN — Medication 10 ML: at 19:59

## 2018-08-20 RX ADMIN — PHENYLEPHRINE HYDROCHLORIDE 100 MCG: 10 INJECTION INTRAVENOUS at 14:24

## 2018-08-20 RX ADMIN — PROPOFOL 20 MG: 10 INJECTION, EMULSION INTRAVENOUS at 14:45

## 2018-08-20 RX ADMIN — TAMSULOSIN HYDROCHLORIDE 0.4 MG: 0.4 CAPSULE ORAL at 08:41

## 2018-08-20 RX ADMIN — PHENYLEPHRINE HYDROCHLORIDE 200 MCG: 10 INJECTION INTRAVENOUS at 14:38

## 2018-08-20 RX ADMIN — PHENYLEPHRINE HYDROCHLORIDE 100 MCG: 10 INJECTION INTRAVENOUS at 14:21

## 2018-08-20 RX ADMIN — PHENYLEPHRINE HYDROCHLORIDE 200 MCG: 10 INJECTION INTRAVENOUS at 15:04

## 2018-08-20 RX ADMIN — SODIUM CHLORIDE, POTASSIUM CHLORIDE, SODIUM LACTATE AND CALCIUM CHLORIDE: 600; 310; 30; 20 INJECTION, SOLUTION INTRAVENOUS at 14:45

## 2018-08-20 RX ADMIN — MULTIPLE VITAMINS W/ MINERALS TAB 0.5 TABLET: TAB at 08:41

## 2018-08-20 RX ADMIN — SERTRALINE 50 MG: 50 TABLET, FILM COATED ORAL at 08:41

## 2018-08-20 RX ADMIN — PHENYLEPHRINE HYDROCHLORIDE 200 MCG: 10 INJECTION INTRAVENOUS at 14:34

## 2018-08-20 RX ADMIN — ETOMIDATE 2 MG: 2 INJECTION INTRAVENOUS at 14:20

## 2018-08-20 RX ADMIN — Medication 10 ML: at 08:45

## 2018-08-20 RX ADMIN — SODIUM CHLORIDE 250 ML: 9 INJECTION, SOLUTION INTRAVENOUS at 19:35

## 2018-08-20 ASSESSMENT — PAIN SCALES - GENERAL
PAINLEVEL_OUTOF10: 0
PAINLEVEL_OUTOF10: 3
PAINLEVEL_OUTOF10: 0

## 2018-08-20 ASSESSMENT — PAIN DESCRIPTION - LOCATION: LOCATION: BACK;NECK

## 2018-08-20 ASSESSMENT — ENCOUNTER SYMPTOMS: SHORTNESS OF BREATH: 1

## 2018-08-20 ASSESSMENT — PAIN DESCRIPTION - PAIN TYPE: TYPE: CHRONIC PAIN

## 2018-08-20 NOTE — CONSULTS
Consults       Critical Care Consult Note        Patient:  Sandra Roach  YOB: 1931    MRN: [id number]     Acct: [de-identified]    PCP: Zion Weiner DO    Date of Admission: 8/15/2018    Date of Service: Pt seen/examined on 8/20/2018    Chief Complaint:  Bright red blood per rectum following polypectomy. History Of Present Illness: Status post colonoscopy with polypectomy. 80 y.o. male who presented to [facility] with bright red blood per rectum. History of colonoscopy with polypectomy at the end of 2017. Follows with Dr. Kelvin Hoffmann. who was consulted. He was taken for colonoscopy/EGD 8/16, no active bleed was noted. He continued to have rectal bleeding. 8/18 colonoscopy was repeat, again no bleed was found. Hemoglobin dropped from 10.1 to 6.7. 2 units of PRBC was transfused. Bleeding scan was negative. Patient became symptomatic with dizziness. Repeat colonoscopy was completed 8/19 bleed was noted, epi was injected and clip was placed. Hbg improved to 8.6, but later dropped to 6.6. Again he was transfused. 4th colonoscopy was completed today. Clips placed x 2 with APC. He was transferred to the ICU for additional monitoring. Hbg 8.4 Post procedure he is weak and fatigue. Mild shortness of breath. Tachycardic in the low 100's, denies palpitations. No chest pain.      Past Medical History:          Diagnosis Date    Acute sinusitis     Angina pectoris ([address]     Anxiety disorder 10/27/2016    Arthritis     osteoporosis    BPH with urinary obstruction     CAD (coronary artery disease)     Chronic insomnia     CKD (chronic kidney disease) stage 3, GFR 30-59 ml/min     COPD (chronic obstructive pulmonary disease) (HCC)     Gastroenteritis     HCAP (healthcare-associated pneumonia) 4/29/2015    Heart disease     HLD (hyperlipidemia)     [place] (hard of hearing)     wear hearing aids    Hypertension     Kidney disease     40% kdiney function    Kidney stone     years ago 15-20    Rales/Wheezes/Rhonchi. Cardiovascular:  Regular rhythm, tachy rate with normal S1/S2 without murmurs, rubs or gallops. Abdomen: Soft, non-tender, non-distended with normal bowel sounds. Musculoskeletal:  No clubbing, cyanosis or edema bilaterally. Full range of motion without deformity. Skin: Skin color, texture, turgor normal.  No rashes or lesions. Neurologic:  Neurovascularly intact without any focal sensory/motor deficits. Cranial nerves: II-XII intact, grossly non-focal.  Psychiatric:  Alert and oriented, thought content appropriate, normal insight  Capillary Refill: Brisk,< 3 seconds   Peripheral Pulses: +2 palpable, equal bilaterally       Labs:     Recent Labs      08/19/18   0719   08/20/18   0108  08/20/18   1108  08/20/18   1808   WBC  8.0   --    --    --    --    HGB  7.4*   < >  7.3*  8.5*  8.5*  8.4*   HCT  21.9*   < >  21.6*  24.6*  25.5*  24.6*   PLT  197   --    --    --    --     < > = values in this interval not displayed. Recent Labs      08/18/18   0639  08/19/18   0719  08/20/18   1108   NA  139  139  135   K  4.4  4.5  3.9   CL  104  103  99   CO2  24  25  24   BUN  23*  23*  25*   CREATININE  1.1  1.2  1.2   CALCIUM  8.9  8.5  8.3*   PHOS  3.6  3.1  3.5     No results for input(s): AST, ALT, BILIDIR, BILITOT, ALKPHOS in the last 72 hours. No results for input(s): INR in the last 72 hours. No results for input(s): Brenda Caulk in the last 72 hours. Urinalysis:      Lab Results   Component Value Date    NITRU NEGATIVE 08/15/2018    WBCUA 0-5 02/19/2018    BACTERIA MANY 09/08/2017    RBCUA 0-2 02/19/2018    BLOODU NEGATIVE 08/15/2018    GLUCOSEU NEGATIVE 08/15/2018       Intake & Output:  I/O last 3 completed shifts: In: 1180.8 [P.O.:800; I.V.:10; Blood:370.8]  Out: 300 [Urine:300]  I/O this shift:  In: 1400 [I.V.:1400]  Out: -       Radiology:         NM GI BLOOD LOSS   Preliminary Result   1. No scintigraphic evidence for active gastrointestinal bleeding. **This report has been created using voice recognition software. It may contain minor errors which are inherent in voice recognition technology. **      NM GI BLOOD LOSS   Final Result      No scintigraphic evidence of active gastrointestinal bleeding. Final report electronically signed by Dr. Avinash Rosario on 8/17/2018 1:55 PM      XR Acute Abd Series Chest 1 VW   Final Result   1. Nonobstructive bowel gas pattern with no evidence of free intraperitoneal air. 2. Chronic fibrotic changes of the lungs. **This report has been created using voice recognition software. It may contain minor errors which are inherent in voice recognition technology. **      Final report electronically signed by Dr Laurent Aguillon on 8/15/2018 2:13 PM               DVT prophylaxis: [] Lovenox                                 [x] SCDs                                 [] SQ Heparin                                 [] Encourage ambulation           [] Already on Anticoagulation    Code Status: Limited      PT/OT Eval Status: completed    Disposition:    [x] Home       [] TCU       [] Rehab       [] Psych       [] SNF       [] [place]       [] Other-    ASSESSMENT:    C/Simi Pringle 1106 Problems    Diagnosis Date Noted    Gastritis [K29.70] 08/17/2018    Severe malnutrition ([facility] [address] [E43] 08/16/2018     Class: Acute    Lower GI bleed [K92.2] 12/27/2017    Essential hypertension [I10] 07/20/2017    CAD (coronary artery disease) [I25.10] 04/29/2015       PLAN:    1. Acute lower GI bleed following polypectomy. GI following. 4th colonoscopy today to stop bleeding. Clips placed x 2 with APC. ASA/Plavix held. IV Protonix. NPO.   2. Acute blood loss anemia secondary to #1. Status post 4 units PRBC transfusion. Trend HH every 6 hours. Check TEG. Transfuse an additional unit of PRBC. 3. Diverticulosis. 4. Azotemia, secondary to #1.   5. GERD with gastris per EGD. Protonix   6. CAD s/p PCI >20 years ago.   7. Unexplained

## 2018-08-20 NOTE — PROGRESS NOTES
Report given to [facility]. Patient's family took patient's belongings.

## 2018-08-20 NOTE — ANESTHESIA POSTPROCEDURE EVALUATION
Department of Anesthesiology  Postprocedure Note    Patient: Adithya Gutierrez  MRN: [id number]  YOB: 1931  Date of evaluation: 8/20/2018  Time:  3:22 PM     Procedure Summary     Date:  08/20/18 Room / Location:  [facility] / [facility]    Anesthesia Start:  4451 Anesthesia Stop:  0691    Procedure:  COLONOSCOPY CONTROL HEMORRHAGE (N/A ) Diagnosis:  (GI Bleed )    Surgeon:  Fiorella Mehta MD Responsible Provider:  Yovana Lovett MD    Anesthesia Type:  MAC ASA Status:  4          Anesthesia Type: No value filed. Victoriano Phase I: Victoriano Score: 8    Victoriano Phase II: Victoriano Score: 9    Last vitals: Reviewed and per EMR flowsheets.        Anesthesia Post Evaluation    Patient location during evaluation: ICU  Patient participation: complete - patient participated  Level of consciousness: awake and alert  Pain score: 0  Airway patency: patent  Nausea & Vomiting: no nausea and no vomiting  Complications: no  Cardiovascular status: hemodynamically stable  Respiratory status: spontaneous ventilation and nasal cannula  Hydration status: stable

## 2018-08-20 NOTE — BRIEF OP NOTE
Brief Postoperative Note    Maury Omer  YOB: 1931  [id number]    Pre-operative Diagnosis: GI bleeding     Post-operative Diagnosis: GI bleeding, arterial from polypectomy site treated with resolution  Clip and APC which controled the bleeding     Procedure: colonoscopy with control bleeding     Anesthesia: MAC    Surgeons/Assistants: Michaela    Estimated Blood Loss: 181     Complications: None    Specimens: Was not Obtained:     Findings: bleeding from the ascending, polypectomy site treated with APC     Electronically signed by Therese Mendoza MD on 8/20/2018 at 3:07 PM

## 2018-08-20 NOTE — PROGRESS NOTES
Sent message via perfect serve to Dr Eladia Brown regarding pt having small bright red BM with small clots and updated on hemoglobin and hematocrit. Awaiting response. 2140 Dr Eladia Brown returned message and at this time going to monitor and see what Hgb does at midnight. 36: Sent message via perfect serve regarding Pt's Hgb 7.3 and HCT 21.6. Awaiting response. 0430: Dr Eladia Brown returned message and updated. See new orders placed.

## 2018-08-20 NOTE — PROGRESS NOTES
Physical Therapy   [address] 5K - 5K-07/007-A    Time In: 3479  Time Out: 1052  Timed Code Treatment Minutes: 11 Minutes  Minutes: 11          Date: 2018  Patient Name: Lee Davey,  Gender:  male        MRN: [id number]  : 1931  (80 y.o.)  Referral Date : 18  Referring Practitioner: Nico Damian MD  Diagnosis: GIB  Additional Pertinent Hx: 80 y.o. male who presented to [facility] following an episode of BRBPR. He went to defecate today and noticed BRB in the toilet. Patient has a history of GIB from ascending colon polyps in 2017. He is currently on DAPT for CAD. Chart review suggests that his last PCI was 20 years ago. He currently endorses fatigue/malaise and some dark stools as well. He denies any N/V/D. Denies any pre-syncope. He follows Dr. Michelle Seth for GI. In the ED he was hemodynamically stable and hgb was 10.1 (down from 11.4 3 months ago).       Past Medical History:   Diagnosis Date    Acute sinusitis     Angina pectoris (HCC)     Anxiety disorder 10/27/2016    Arthritis     osteoporosis    BPH with urinary obstruction     CAD (coronary artery disease)     Chronic insomnia     CKD (chronic kidney disease) stage 3, GFR 30-59 ml/min     COPD (chronic obstructive pulmonary disease) (HCC)     Gastroenteritis     HCAP (healthcare-associated pneumonia) 2015    Heart disease     HLD (hyperlipidemia)     [place] (hard of hearing)     wear hearing aids    Hypertension     Kidney disease     40% kdiney function    Kidney stone     years ago 15-20    NICOLAS on CPAP     Osteopenia determined by x-ray     Pacemaker     Pinched nerve     slipped disc in back    Visual problems     Vitamin D deficiency      Past Surgical History:   Procedure Laterality Date    ABDOMINAL HERNIA REPAIR  2017    incisional hernia repair--Dr. Derek Lutz CARDIAC SURGERY      CATARACT REMOVAL WITH IMPLANT bilateral    COLONOSCOPY  [id number]    COLONOSCOPY  12/29/2017    COLONOSCOPY CONTROL HEMORRHAGE performed by Angelica Avila MD at [address]  8/16/2018    COLONOSCOPY POLYPECTOMY SNARE/COLD BIOPSY performed by Angelica Avila MD at [facility]    COLONOSCOPY  8/19/2018    COLONOSCOPY CONTROL HEMORRHAGE performed by Angelica Avila MD at [address]  1960's    EKG 12-LEAD  4/29/2015         EYE SURGERY      cataracts    HERNIA REPAIR      several    HIP PINNING Left 8/31/2017    LEFT HIP INTERTAN performed by Tom Rinne, MD at [address]  12/3/12    left     MYRINGOTOMY AND TYMPANOSTOMY TUBE PLACEMENT  7/23/13    left     NASAL SINUS SURGERY      OTHER SURGICAL HISTORY  04/27/2015    transurethral Incision bladder neck    PACEMAKER INSERTION      PACEMAKER PLACEMENT  2011    NH COLONOSCOPY FLX DX W/COLLJ SPEC WHEN PFRMD Left 8/18/2018    COLONOSCOPY performed by Angelica Avila MD at [facility]    NH [address] DX/THER SBST INTRLMNR LMBR/SAC W/IMG GDN N/A 6/25/2018    LUMBAR INTER LAMINAR RUBEN LESI @ L3. performed by Geoffrey Mota MD at [place] [address] Right 10/8/2017    Right hip intertan performed by Salome Lopez MD at [address] [place] [address] TURP  4/17/2013    W/CYSTO WITH DIRECT VISION URETHROTOMY & RESECTION BLADDER NECK CONTRACTURE    TURP  4/27/15    re-do TURP    TYMPANOSTOMY TUBE PLACEMENT      multiple surgeries    UPPER GASTROINTESTINAL ENDOSCOPY  12/29/2017    COLONOSCOPY SUBMUCOSAL/BOTOX INJECTION performed by Angelica Avila MD at [address] [place] [address]  8/16/2018    EGD DIAGNOSTIC ONLY performed by Angelica Avila MD at [address] [place] [address] Left 8/19/2018    EGD DIAGNOSTIC ONLY performed by Angelica Avila MD at [facility]       Restrictions/Precautions:  Fall stay            AM-PAC Inpatient Mobility without Stair Climbing Raw Score : 15  AM-PAC Inpatient without Stair Climbing T-Scale Score : 43.03  Mobility Inpatient CMS 0-100% Score: 47.43  Mobility Inpatient without Stair CMS G-Code Modifier : CK

## 2018-08-20 NOTE — PROGRESS NOTES
Hospitalist Pro silvio banerjee Note    Patient:  Ridge Carl      Unit/Bed:5K-07/007-A    YOB: 1931    MRN: [id number]       Acct: [de-identified]     PCP: Noelle Cole DO    Date of Admission: 8/15/2018    Chief Complaint: [facility] FACILITY Course: 80 y. o. male who presented to [address] [place] [address] following an episode of BRBPR. He went to defecate today and noticed BRB in the toilet. Patient has a history of GIB from ascending colon polyps in 2017. He is currently on DAPT for CAD. Chart review suggests that his last PCI was 20 years ago. He currently endorses fatigue/malaise and some dark stools as well. He denies any N/V/D. Denies any pre-syncope. He follows Dr. Daniel Aquino for GI. In the ED he was hemodynamically stable and hgb was 10.1 (down from 11.4 3 months ago).        Subjective: Still bleeding per rectum  Hemoglobin still dropping  Plans for repeat colonoscopy today this will be the fourth time         Medications:  Reviewed    Infusion Medications   Scheduled Medications    sodium chloride  250 mL Intravenous Once    sodium chloride  250 mL Intravenous Once    sodium chloride  250 mL Intravenous Once    pantoprazole  40 mg Intravenous Daily    therapeutic multivitamin-minerals  0.5 tablet Oral BID    sertraline  50 mg Oral Daily    tamsulosin  0.4 mg Oral BID    cyanocobalamin  1,000 mcg Oral Daily    sodium chloride flush  10 mL Intravenous 2 times per day     PRN Meds: albuterol sulfate HFA, sodium chloride flush, acetaminophen, ondansetron      Intake/Output Summary (Last 24 hours) at 08/20/18 1110  Last data filed at 08/20/18 1043   Gross per 24 hour   Intake          1180.83 ml   Output              300 ml   Net           880.83 ml       Diet:  DIET CLEAR LIQUID; Low Sodium (2 GM)  Dietary Nutrition Supplements: Clear Liquid Oral Supplement    Exam:  BP (!) 143/66   Pulse 100   Temp 97.6 °F (36.4 °C) (Oral)   Resp 16   Ht 5' 3\" (1.6 m)   Wt 150 lb 11.2 oz (68.4 kg)   SpO2 97% BMI 26.70 kg/m²     General appearance: No apparent distress, appears stated age and cooperative. HEENT: Pupils equal, round, and reactive to light. Conjunctivae/corneas clear. Neck: Supple, with full range of motion. No jugular venous distention. Trachea midline. Respiratory:  Normal respiratory effort. Clear to auscultation, bilaterally without Rales/Wheezes/Rhonchi. Cardiovascular: Regular rate and rhythm with normal S1/S2 without murmurs, rubs or gallops. Abdomen: Soft, non-tender, non-distended with normal bowel sounds. Musculoskeletal: passive and active ROM x 4 extremities. Skin: Skin color, texture, turgor normal.  No rashes or lesions. Neurologic:  Neurovascularly intact without any focal sensory/motor deficits. Cranial nerves: II-XII intact, grossly non-focal.  Psychiatric: Alert and oriented, thought content appropriate, normal insight  Capillary Refill: Brisk,< 3 seconds   Peripheral Pulses: +2 palpable, equal bilaterally       Labs:   Recent Labs      08/19/18   0719  08/19/18   1458  08/19/18   1803  08/20/18   0108   WBC  8.0   --    --    --    HGB  7.4*  8.4*  8.9*  7.3*   HCT  21.9*  24.0*  26.3*  21.6*   PLT  197   --    --    --      Recent Labs      08/18/18   0639  08/19/18   0719   NA  139  139   K  4.4  4.5   CL  104  103   CO2  24  25   BUN  23*  23*   CREATININE  1.1  1.2   CALCIUM  8.9  8.5   PHOS  3.6  3.1     No results for input(s): AST, ALT, BILIDIR, BILITOT, ALKPHOS in the last 72 hours. No results for input(s): INR in the last 72 hours. No results for input(s): Richardson Lopez in the last 72 hours. Urinalysis:      Lab Results   Component Value Date    NITRU NEGATIVE 08/15/2018    WBCUA 0-5 02/19/2018    BACTERIA MANY 09/08/2017    RBCUA 0-2 02/19/2018    BLOODU NEGATIVE 08/15/2018    GLUCOSEU NEGATIVE 08/15/2018       Radiology:  NM GI BLOOD LOSS   Final Result      No scintigraphic evidence of active gastrointestinal bleeding.       Final report electronically popcorn.        Unexplained Weight Loss: Patient endorses 30 lbs, however, chart review closer to 20 over 17 months. Will follow up endoscopy. Consider other cancer screenings.         Hx CAD: S/p PCI >20 years ago. Had normal stress 2015. Trop negative on admission. Holding DAPT for now given bleed. Will recommend discontinuing plavix permanently. Continue lopressor. Hold fishoil given bleed.          HTN: hold home BP medications during active bleeding         Hx PPM: inserted 2011-follows with Dr. Robe Jaquez at [address] [facility]     COPD: uses PRN albuterol.  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            Electronically signed by Kenna Couch MD on 8/20/2018 at 11:10 AM

## 2018-08-20 NOTE — PROGRESS NOTES
8/19/2018); and Colonoscopy (8/19/2018). Restrictions  Restrictions/Precautions  Restrictions/Precautions: Fall Risk  Required Braces or Orthoses?: No  Subjective   General  Chart Reviewed: Yes  Subjective  Subjective: RN approved treatment session with patient finishing up in bathroom with nurse tech upon arrival. Pt seated in chair for therapy this date and agreeable to attempt therapy. Pt was able to complete minimal therapy this date due to patient requiring use of bathroom and post bathroom use BRIAN Andre held treatment due to patient having third bloody stool in a row. Pain Assessment  Pain Level: 0    Orientation     Objective   Bed mobility  Sit to Supine: Stand by assistance (with HOB flat)  Transfers  Sit to Stand: Stand by assistance;Contact guard assistance (Pt required SBA from bedside chair and CGA from toliet seat  )  Stand to sit: Stand by assistance (Pt required SBA with transfer to bed and CGA with transfer to toliet seat )  Ambulation  Ambulation?: Yes  Ambulation 1  Surface: level tile  Device: Rolling Walker  Assistance: Contact guard assistance  Quality of Gait: Pt demonstrated decreased leoncio, increased forward flexed posture, and decreased BLE step length  Distance: 5'x2  Stairs/Curb  Stairs?: No     Balance  Comments: Pt completed standing balance training with no UE support at sink to wash hands post bathroom use with pt requiring SBA. Exercises  Comments: Pt completed 5 reps of seated ankle pumps prior to having to stop to use bathroom. Post bathroom use treatment was held per BRIAN Adams's request due to pt having third bloody stool. Assessment   Body structures, Functions, Activity limitations: Decreased functional mobility ; Decreased strength;Decreased balance;Decreased endurance  Assessment: Pt was able to complete minimal therapeutic exercises this date due to having to use the bathrrom and post bathroom use therapy was held per BRIAN Adams's request due to pt having his third bloody stool in a row. Pt required SBA with transfers from chair and bed but required CGA for transfers to and from [facility]. Activity Tolerance  Activity Tolerance: Pt was held during treatment session per RN Bryan's request due to pt's third bloody stool. G-Code     OutComes Score       AM-PAC Score   AM-PAC Inpatient Mobility without Stair Climbing Raw Score : 15  AM-PAC Inpatient without Stair Climbing T-Scale Score : 43.03  Mobility Inpatient CMS 0-100% Score: 47.43  Mobility Inpatient without Stair CMS G-Code Modifier : CK         Goals  Short term goals  Time Frame for Short term goals: 2 weeks  Short term goal 1: Patient will transfer supine <--> sit with mod I in order to get into/out of bed. Short term goal 2: Patient will transfer sit <--> stand with mod I in order to get up to ambulate. Short term goal 3: Patient will ambulate 76' with supervision and walker for household ambulation. Short term goal 4: Patient will negotiate 1 steps with 1 hand rail and SBA in order to get into/out of his home. Long term goals  Time Frame for Long term goals : No LTGs due to estimated length of stay  Patient Goals   Patient goals : Go home    Plan    Plan  Times per week: 5x GM  Times per day: Daily  Current Treatment Recommendations: Strengthening, Balance Training, Endurance Training, Stair training, Patient/Caregiver Education & Training, Safety Education & Training, Gait Training, Transfer Training, Equipment Evaluation, Education, & procurement, Functional Mobility Training  Safety Devices  Type of devices:  All fall risk precautions in place, Gait belt, Call light within reach, Left in bed, Bed alarm in place     Therapy Time   Individual Concurrent Group Co-treatment   Time In 1041         Time Out 1052         Minutes [address], PTA

## 2018-08-20 NOTE — PROGRESS NOTES
Colonoscopy complete. Patient sedated with propofol. Previous polyp site bleeding vessel visible. Clips X 2 deployed and APC used at right colon settings. Blood hung and patient going to ICU.

## 2018-08-20 NOTE — PRE SEDATION
when pfrmd (Left, 8/18/2018); Upper gastrointestinal endoscopy (Left, 8/19/2018); and Colonoscopy (8/19/2018).   Additional information:       ALLERGIES   Allergies as of 08/15/2018 - Review Complete 08/15/2018   Allergen Reaction Noted    Iodine Swelling and Rash 11/09/2011     Additional information:       MEDICATIONS   Coumadin Use Last 7 Days [x]No []Yes  Antiplatelet drug therapy use last 7 days  [x]No []Yes  Other anticoagulant use last 7 days  [x]No []Yes    Current Facility-Administered Medications:     0.9 % sodium chloride bolus, 250 mL, Intravenous, Once, Bhavesh Be MD, Last Rate: 20 mL/hr at 08/20/18 0528, 250 mL at 08/20/18 0528    0.9 % sodium chloride bolus, 250 mL, Intravenous, Once, Bhavesh Be MD    0.9 % sodium chloride bolus, 250 mL, Intravenous, Once, Maliha Ng APRN - CNP    0.9 % sodium chloride bolus, 250 mL, Intravenous, Once, Loida Patel MD    pantoprazole (PROTONIX) injection 40 mg, 40 mg, Intravenous, Daily, Loida Patel MD, 40 mg at 08/20/18 0841    albuterol sulfate  (90 Base) MCG/ACT inhaler 2 puff, 2 puff, Inhalation, Q6H PRN, Issac Izaguirre MD    therapeutic multivitamin-minerals 0.5 tablet, 0.5 tablet, Oral, BID, Issac Izaguirre MD, 0.5 tablet at 08/20/18 0841    sertraline (ZOLOFT) tablet 50 mg, 50 mg, Oral, Daily, Issac Izaguirre MD, 50 mg at 08/20/18 0841    tamsulosin (FLOMAX) capsule 0.4 mg, 0.4 mg, Oral, BID, Issac Izaguirre MD, 0.4 mg at 08/20/18 0841    vitamin B-12 (CYANOCOBALAMIN) tablet 1,000 mcg, 1,000 mcg, Oral, Daily, Issac Izaguirre MD, 1,000 mcg at 08/20/18 0841    sodium chloride flush 0.9 % injection 10 mL, 10 mL, Intravenous, 2 times per day, Issac Izaguirre MD, 10 mL at 08/20/18 0845    sodium chloride flush 0.9 % injection 10 mL, 10 mL, Intravenous, PRN, Issac Izaguirre MD, 10 mL at 08/16/18 0515    acetaminophen (TYLENOL) tablet 650 mg, 650 mg, Oral, Q4H PRN, Issac Izaguirre MD, 383 mg at 08/17/18 2200    ondansetron (ZOFRAN) injection 4 mg, 4 mg, Intravenous, Q6H PRN, Rennis Shone, MD  Prior to Admission medications    Medication Sig Start Date End Date Taking? Authorizing Provider   docusate sodium (COLACE) 100 MG capsule Take 100 mg by mouth daily   Yes Historical Provider, MD   albuterol sulfate HFA (VENTOLIN HFA) 108 (90 Base) MCG/ACT inhaler Inhale 2 puffs into the lungs every 6 hours as needed for Wheezing 5/11/18  Yes CHRISTIN Vinson - CNP   sertraline (ZOLOFT) 50 MG tablet TAKE ONE TABLET BY MOUTH ONCE DAILY 2/20/18  Yes Whelan Pro, DO   losartan (COZAAR) 50 MG tablet Take 1 tablet by mouth daily 2/20/18  Yes Whelan Pro, DO   tamsulosin [facility]) 0.4 MG capsule Take 1 capsule by mouth 2 times daily 2/20/18 2/20/19 Yes Whelan Pro, DO   metoprolol tartrate (LOPRESSOR) 25 MG tablet Take 0.5 tablets by mouth daily Substitute for atenolol. Patient taking differently: Take 25 mg by mouth daily Substitute for atenolol.  2/20/18  Yes Whelan Pro, DO   aspirin 81 MG tablet Take 1 tablet by mouth daily 2/20/18  Yes Whelan Pro, DO   clopidogrel (PLAVIX) 75 MG tablet Take 1 tablet by mouth every other day 2/20/18  Yes Whelan Pro, DO   acetaminophen (TYLENOL) 325 MG tablet Take 2 tablets by mouth every 4 hours as needed for Pain 12/31/17  Yes Lance Mata MD   vitamin B-12 1000 MCG tablet Take 1 tablet by mouth daily 1/1/18  Yes Lance Mata MD   ferrous sulfate (FE TABS) 325 (65 Fe) MG EC tablet Take 1 tablet by mouth 2 times daily  Patient taking differently: Take 325 mg by mouth every other day  12/31/17  Yes Lance Mata MD   KRILL OIL PO Take 1,000 mg by mouth daily   Yes Historical Provider, MD   Coenzyme Q10 (COQ-10 PO) Take 10 mg by mouth daily   Yes Historical Provider, MD   TURMERIC PO Take 1,000 mg by mouth daily   Yes Historical Provider, MD   Multiple Vitamins-Minerals (THERAPEUTIC MULTIVITAMIN-MINERALS) tablet Take 1 tablet by mouth daily    Yes Historical

## 2018-08-20 NOTE — PROGRESS NOTES
Pharmacy Inpatient Medication Counseling Note    Patient admitted to [facility]. [facility] for: lower GI bleed. Counseled patient on new medications started since admission utilizing teach-back method. Reviewed indication, directions and most common side effects.  pantoprazole (Protonix). Patient voiced understanding.

## 2018-08-20 NOTE — PROCEDURES
as well as two [employer] Resolution  clips. Standard video colonoscope was advanced under direct vision from the rectum  up to the cecum, was difficult due to good prep. Blood seen during the  evaluation. The site of the bleeding, could not see active; however, I  have seen a clot on the previous polypectomy site. The site was injected  with epinephrine and then, two clips were put which bridged the area of  bleeding. No active bleed seen after that. To ensure, a lot of washing  was done to evaluate other source of the GI bleed and to see any bleeding  from the diverticula, I was not able to see. Scope was withdrawn with no  immediate complication. IMPRESSION:  1. Severe diverticulosis. 2.  Poor prep. 3.  Possibility of bleeding from previous polypectomy site, treated with  epinephrine injection as well as two Resolution clips. PLAN:  1. Monitor H and H closely. 2.  The patient should be on clear liquid diet only. 3.  Transfuse as needed.         Sandra Sanchez M.D.    D: 08/19/2018 16:45:54       T: 08/19/2018 17:21:57     [email or web address]  Job#: [id number]     Doc#: [id number]    CC:

## 2018-08-21 LAB
ANION GAP SERPL CALCULATED.3IONS-SCNC: 11 MEQ/L (ref 8–16)
BUN BLDV-MCNC: 23 MG/DL (ref 7–22)
CALCIUM SERPL-MCNC: 8.2 MG/DL (ref 8.5–10.5)
CHLORIDE BLD-SCNC: 101 MEQ/L (ref 98–111)
CO2: 23 MEQ/L (ref 23–33)
CREAT SERPL-MCNC: 1.2 MG/DL (ref 0.4–1.2)
GFR SERPL CREATININE-BSD FRML MDRD: 57 ML/MIN/1.73M2
GLUCOSE BLD-MCNC: 117 MG/DL (ref 70–108)
HCT VFR BLD CALC: 26 % (ref 42–52)
HCT VFR BLD CALC: 26.1 % (ref 42–52)
HCT VFR BLD CALC: 27.5 % (ref 42–52)
HEMOGLOBIN: 9 GM/DL (ref 14–18)
HEMOGLOBIN: 9.1 GM/DL (ref 14–18)
HEMOGLOBIN: 9.2 GM/DL (ref 14–18)
MAGNESIUM: 1.6 MG/DL (ref 1.6–2.4)
POTASSIUM SERPL-SCNC: 3.8 MEQ/L (ref 3.5–5.2)
SODIUM BLD-SCNC: 135 MEQ/L (ref 135–145)

## 2018-08-21 PROCEDURE — 2580000003 HC RX 258: Performed by: INTERNAL MEDICINE

## 2018-08-21 PROCEDURE — 99222 1ST HOSP IP/OBS MODERATE 55: CPT | Performed by: NUCLEAR MEDICINE

## 2018-08-21 PROCEDURE — 85018 HEMOGLOBIN: CPT

## 2018-08-21 PROCEDURE — 6370000000 HC RX 637 (ALT 250 FOR IP): Performed by: INTERNAL MEDICINE

## 2018-08-21 PROCEDURE — 99232 SBSQ HOSP IP/OBS MODERATE 35: CPT | Performed by: INTERNAL MEDICINE

## 2018-08-21 PROCEDURE — G8988 SELF CARE GOAL STATUS: HCPCS

## 2018-08-21 PROCEDURE — 85014 HEMATOCRIT: CPT

## 2018-08-21 PROCEDURE — G8987 SELF CARE CURRENT STATUS: HCPCS

## 2018-08-21 PROCEDURE — 36415 COLL VENOUS BLD VENIPUNCTURE: CPT

## 2018-08-21 PROCEDURE — 97165 OT EVAL LOW COMPLEX 30 MIN: CPT

## 2018-08-21 PROCEDURE — 6360000002 HC RX W HCPCS: Performed by: INTERNAL MEDICINE

## 2018-08-21 PROCEDURE — 6360000002 HC RX W HCPCS: Performed by: HOSPITALIST

## 2018-08-21 PROCEDURE — 2709999900 HC NON-CHARGEABLE SUPPLY

## 2018-08-21 PROCEDURE — C9113 INJ PANTOPRAZOLE SODIUM, VIA: HCPCS | Performed by: INTERNAL MEDICINE

## 2018-08-21 PROCEDURE — 1200000003 HC TELEMETRY R&B

## 2018-08-21 PROCEDURE — 97535 SELF CARE MNGMENT TRAINING: CPT

## 2018-08-21 RX ORDER — MAGNESIUM SULFATE IN WATER 40 MG/ML
2 INJECTION, SOLUTION INTRAVENOUS ONCE
Status: COMPLETED | OUTPATIENT
Start: 2018-08-21 | End: 2018-08-21

## 2018-08-21 RX ADMIN — Medication 1000 MCG: at 09:08

## 2018-08-21 RX ADMIN — MAGNESIUM SULFATE HEPTAHYDRATE 2 G: 40 INJECTION, SOLUTION INTRAVENOUS at 05:21

## 2018-08-21 RX ADMIN — MULTIPLE VITAMINS W/ MINERALS TAB 0.5 TABLET: TAB at 21:30

## 2018-08-21 RX ADMIN — Medication 10 ML: at 21:31

## 2018-08-21 RX ADMIN — MULTIPLE VITAMINS W/ MINERALS TAB 0.5 TABLET: TAB at 11:47

## 2018-08-21 RX ADMIN — PANTOPRAZOLE SODIUM 40 MG: 40 INJECTION, POWDER, FOR SOLUTION INTRAVENOUS at 09:08

## 2018-08-21 RX ADMIN — SERTRALINE 50 MG: 50 TABLET, FILM COATED ORAL at 09:08

## 2018-08-21 RX ADMIN — TAMSULOSIN HYDROCHLORIDE 0.4 MG: 0.4 CAPSULE ORAL at 21:29

## 2018-08-21 RX ADMIN — TAMSULOSIN HYDROCHLORIDE 0.4 MG: 0.4 CAPSULE ORAL at 11:47

## 2018-08-21 RX ADMIN — Medication 10 ML: at 09:09

## 2018-08-21 ASSESSMENT — PAIN SCALES - GENERAL
PAINLEVEL_OUTOF10: 0

## 2018-08-21 NOTE — PROGRESS NOTES
Giovanny Muniz 60  INPATIENT OCCUPATIONAL THERAPY  [facility] ONC MED 5K  EVALUATION    Time:  Time In: 1320  Time Out: 1355  Timed Code Treatment Minutes: 20 Minutes  Minutes: 35          Date: 2018  Patient Name: Yessi Green,   Gender: male      MRN: [id number]  : 1931  (80 y.o.)  Referring Practitioner: Dr. Amanda Zamorano MD  Diagnosis: Gastrointestinal Bleeding  Additional Pertinent Hx: Pt presented to [facility] following an episode of BRBPR. He went to defecate the day of admission and noticed BRB in the toilet. Patient has a history of GIB from ascending colon polyps in 2017. He is currently on DAPT for CAD. Chart review suggests that his last PCI was 20 years ago. He currently endorses fatigue/malaise and some dark stools as well. He denies any N/V/D. Denies any pre-syncope. He follows Dr. Reginaldo Burciaga for GI. In the ED he was hemodynamically stable and hgb was 10.1 (down from 11.4 3 months ago).       Restrictions/Precautions:  Fall Risk           Past Medical History:   Diagnosis Date    Acute sinusitis     Angina pectoris (HCC)     Anxiety disorder 10/27/2016    Arthritis     osteoporosis    BPH with urinary obstruction     CAD (coronary artery disease)     Chronic insomnia     CKD (chronic kidney disease) stage 3, GFR 30-59 ml/min     COPD (chronic obstructive pulmonary disease) (HCC)     Gastroenteritis     HCAP (healthcare-associated pneumonia) 2015    Heart disease     HLD (hyperlipidemia)     [place] (hard of hearing)     wear hearing aids    Hypertension     Kidney disease     40% kdiney function    Kidney stone     years ago 15-20    NICOLAS on CPAP     Osteopenia determined by x-ray     Pacemaker     Pinched nerve     slipped disc in back    Visual problems     Vitamin D deficiency      Past Surgical History:   Procedure Laterality Date    ABDOMINAL HERNIA REPAIR  2017    incisional hernia repair--Dr. Adriel Mcarthur CARDIAC SURGERY      CATARACT REMOVAL WITH IMPLANT      bilateral    COLONOSCOPY  3738,1188    COLONOSCOPY  12/29/2017    COLONOSCOPY CONTROL HEMORRHAGE performed by [facility]    COLONOSCOPY  8/16/2018    COLONOSCOPY POLYPECTOMY SNARE/COLD BIOPSY performed by Debbie Sumner MD at [facility]    COLONOSCOPY  8/19/2018    COLONOSCOPY CONTROL HEMORRHAGE [facility]    COLONOSCOPY N/A 8/20/2018    COLONOSCOPY CONTROL HEMORRHAGE performed by Debbie Sumner MD at [address]  1960's    EKG 12-LEAD  4/29/2015         EYE SURGERY      cataracts    HERNIA REPAIR      several    HIP PINNING Left 8/31/2017    LEFT HIP INTERTAN performed by Juan Caldwell MD at [address]  12/3/12    left     MYRINGOTOMY AND TYMPANOSTOMY TUBE PLACEMENT  7/23/13    left     NASAL SINUS SURGERY      OTHER SURGICAL HISTORY  04/27/2015    transurethral Incision bladder neck    PACEMAKER INSERTION      PACEMAKER PLACEMENT  2011    ME COLONOSCOPY FLX DX W/COLLJ SPEC WHEN PFRMD Left 8/18/2018    COLONOSCOPY performed by Debbie [facility]ner MD at [facility]    ME [address] DX/THER SBST INTRLMNR LMBR/SAC W/IMG GDN N/A 6/25/2018    LUMBAR INTER LAMINAR RUBEN LESI @ L3. performed by Sarah Rios MD at [facility] Right 10/8/2017    Right hip intertan performed by Eunice Aguilar MD at [address] [facility] TURP  4/17/2013    W/CYSTO WITH DIRECT VISION URETHROTOMY & RESECTION BLADDER NECK CONTRACTURE    TURP  4/27/15    re-do TURP    TYMPANOSTOMY TUBE PLACEMENT      multiple surgeries    UPPER GASTROINTESTINAL ENDOSCOPY  12/29/2017    COLONOSCOPY SUBMUCOSAL/BOTOX INJECTION performed by Debbie Sumner MD at [facility]  8/16/2018    EGD DIAGNOSTIC ONLY performed by Debbie Sumner MD at [facility] assistance    Balance  Standing Balance: Stand by assistance     Comment: vcs for UE placement with t/fs     Functional Mobility  Functional - Mobility Device: Rolling Walker  Activity: Other (10ft in room)  Assist Level: Stand by assistance (close)                                                                    Activity Tolerance:  Activity Tolerance: Patient limited by fatigue (min SOB with mobility)    Treatment Initiated:  Pt ambulated around nursing unit with close SBA using RW; min SOB upon returning to room. Pt sat in recliner for RB. Pt then ambulated to bathroom with RW close SBA. Completed grooming at sink with S standing x 5 min for washing face & combing hair. Assessment:  Assessment: Pt demo mildly decreased ADL & functional mobility status s/p GI bleed with decreased balance & endurance. Continued OT recommended to increase indep & safety awareness with returning home. Performance deficits / Impairments: Decreased functional mobility , Decreased ADL status, Decreased endurance, Decreased safe awareness, Decreased balance  Prognosis: Good  Discharge Recommendations: Home with Home health OT    Clinical Decision Making: Clinical Decision making was of Low Complexity as the result of analysis of data from a problem focused assessment, a consideration of a limited number of treatment options, no significant comorbidities affecting the plan of care and no modification or assistance required to complete the evaluation. Patient Education:  Patient Education: OT role, POC, safety with mobility  Barriers to Learning: BUTCH [facility]    Equipment Recommendations: Other: Recommend RW use at home as well as shower chair. Safety:  Safety Devices in place: Yes  Type of devices:  All fall risk precautions in place, Call light within reach, Chair alarm in place    Plan:  Times per week: 5x  Current Treatment Recommendations: Balance Training, Functional Mobility Training, Endurance Training, Safety Education & Training, Self-Care / ADL    Goals:  Patient goals : go home    Short term goals  Time Frame for Short term goals: 2 weeks  Short term goal 1: Complete various t/fs including toilet with mod I & 0-1 vcs for UE placement  Short term goal 2: Complete 5-7 min dynamic standing task with S for increased ease of simple meal prep tasks  Short term goal 3: Complete BADL routine with S & 0-2 vcs for safety  Short term goal 4: Complete mobility to/from bathroom + around unit with S, RW, & 0 vcs for walker safety  Long term goals  Time Frame for Long term goals : No LTG set d/t short ELOS    Evaluation Complexity: Based on the findings of patient history, examination, clinical presentation, and decision making during this evaluation, this patient is of low complexity. OT G-codes  Functional Limitation: Self care  Self Care Current Status (): At least 20 percent but less than 40 percent impaired, limited or restricted  Self Care Goal Status ():  At least 20 percent but less than 40 percent impaired, limited or restricted  AM-[facility] Inpatient Daily Activity Raw Score: 20  AM-PAC Inpatient ADL T-Scale Score : 42.03  ADL Inpatient CMS 0-100% Score: 38.32  ADL Inpatient CMS G-Code Modifier : CJ

## 2018-08-21 NOTE — PROGRESS NOTES
multivitamin-minerals  0.5 tablet Oral BID    sertraline  50 mg Oral Daily    tamsulosin  0.4 mg Oral BID    cyanocobalamin  1,000 mcg Oral Daily    sodium chloride flush  10 mL Intravenous 2 times per day     Continuous Infusions:    CBC:   Recent Labs      08/19/18   0719   08/20/18   2346  08/21/18   0626  08/21/18   1200   WBC  8.0   --    --    --    --    HGB  7.4*   < >  9.3*  9.0*  9.2*   PLT  197   --    --    --    --     < > = values in this interval not displayed. BMP:    Recent Labs      08/19/18   0719  08/20/18   1108  08/20/18   2346   NA  139  135  135   K  4.5  3.9  3.8   CL  103  99  101   CO2  25  24  23   BUN  23*  25*  23*   CREATININE  1.2  1.2  1.2   GLUCOSE  103  127*  117*     Hepatic: No results for input(s): AST, ALT, ALB, BILITOT, ALKPHOS in the last 72 hours. INR:   No results for input(s): INR in the last 72 hours. Xray:   PROCEDURE: XR ACUTE ABD SERIES CHEST 1 VW  8/15/2018   CLINICAL INFORMATION: 17-year-old male with gastrointestinal bleed.    COMPARISON: Comparison is made to previous chest x-ray dated 5/10/2018.   TECHNIQUE: A PA upright view of the chest was obtained. AP and supine views of the abdomen were obtained.     FINDINGS:  The cardiac silhouette is at the upper limits of normal. There is a dual lead pacemaker projecting over the left hemithorax. Fibrotic changes are seen throughout the bilateral lungs and appear stable when compared to the prior study. There is no significant pleural effusion or pneumothorax. No consolidation. There is levoconvex scoliosis of the thoracolumbar spine.   On the AP upright view of the abdomen, there is no free air.    On the AP supine view of the abdomen, there are numerous gas-filled bowel loops. There are no displaced small bowel loops. The colon is within acceptable limits.   Postsurgical changes are seen involving the proximal femurs bilaterally.  Surgical clips are seen throughout the lower abdomen likely on the basis of colonoscopy cannot explain the acute GI  blood loss anemia.     PROCEDURE #2:  EGD.   Standard video 190 Olympus upper scope was advanced under direct vision  from the oral cavity up to the duodenum. Esophagus appeared tortuous. No  erosion or ulceration seen. The gastroesophageal junction was at 38 cm  from the incisors. Scope was advanced to the stomach. Retroflex  examination of the cardia revealed no evidence of any significant hiatus  hernia. Antrum showed patchy erythema and mild gastritis. Duodenum  appears normal.  No active GI bleed seen. Scope was withdrawn. No biopsy  obtained.     IMPRESSION:  1. Gastric reflux with tortuous esophagus. 2.  Gastritis. 3.  No active GI bleed.     PLAN:  1. Continue PPI. 2.  Advance diet to full liquid and follow up with H/H .  3.  CBC needs to be done to make sure anemia is not severe. If it is  severe, blood transfusion will be ordered.      Lincoln Dakins, M.D.   D: 08/16/2018 11:59:48       T: 08/16/2018 13:31:18     Objective:   Vitals: BP (!) 145/59   Pulse 74   Temp 98.2 °F (36.8 °C) (Oral)   Resp 16   Ht 5' 3\" (1.6 m)   Wt 135 lb 2.3 oz (61.3 kg)   SpO2 96%   BMI 23.94 kg/m²     Intake/Output Summary (Last 24 hours) at 08/21/18 1743  Last data filed at 08/21/18 1323   Gross per 24 hour   Intake           764.65 ml   Output             1225 ml   Net          -460.35 ml     Weight:  Wt Readings from Last 3 Encounters:   08/20/18 135 lb 2.3 oz (61.3 kg)   07/31/18 140 lb 9.6 oz (63.8 kg)   07/09/18 140 lb 9.6 oz (63.8 kg)     General appearance: alert and cooperative with exam.  Well groomed, well nourished pale elderly  male  Lungs: clear to auscultation bilaterally  Heart: regular rate and rhythm, S1, S2 normal, no murmur, click, rub or gallop  Abdomen: soft, non-tender; bowel sounds normal; no masses,  no organomegaly  Extremities: extremities normal, atraumatic, no cyanosis or edema    Assessment and Plan:   1.  Anemia with rectal bleeding - s/p colonoscopy x 4 with control of active bleeding from ascending colon polypectomy site - tx with epinephrine and APC. S/p PRBC transfusion x 5. Continue to monitor and transfuse prn. Continue clear liquid diet for now. 2. Mild GERD with mild gastritis per EGD - continue PPI. Standard anti-reflux measures, avoid NSAIDs. 3. Severe diverticular disease on colonoscopy - high fiber diet once diet advances; avoid nuts, seeds, corn, and popcorn. 4.  CAD - stable      Follow up in GI Clinic after discharge in 2 week(s)      CHRISTIN Long - CNP  8/21/2018  5:43 PM     Patient is seen independently from the nurse practitioner and all  the pertinent data along with physical examination and assessment and plans are all obtained by my self and  Laboratory data, Radiology results, medications all are reviewed by my self and care is discussed extensively with the patient  and the patients nurse and all agree with plan and in addition see orders and plans    Electronically signed by Romina Ng MD on 8/21/2018

## 2018-08-21 NOTE — FLOWSHEET NOTE
08/21/18 1251   Encounter Summary   Services provided to: Patient and family together   Referral/Consult From: [address] [facility] Family members   Continue Visiting Yes  (8/21/18)   Complexity of Encounter Moderate   Length of Encounter 15 minutes   Spiritual/Roman [employer]   Type Spiritual support   Assessment Calm; Approachable   Intervention Active listening;Nurtured hope   Outcome Comfort;Expressed gratitude;Engaged in conversation   Advance Directives (For Healthcare)   Healthcare Directive Yes, patient has an advance directive for healthcare treatment   S- During my contact with the patient I wanted to assess his spiritual and emotional        needs. And to see if the pt had a Advance Directive. O- The pt was in the chair surrounded by his children/family. The pt was transported from ICU and he stated         that he was feeling stronger. He also shared that he draws strength from his family. A-  The pt does have a Adv. Dir on file. I offered emotional and spiritual support to the pt and his family. P-  Continued support would be helpful in order to meet the future Spiritual needs of the         patient.

## 2018-08-21 NOTE — PROGRESS NOTES
2319- Results of TEG test received. Paged Dr. Elke Leyav to update on pt hospital course since admission, current assessment, vitals and TEG results. Pt also having short runs of SVT,Dr. Elke Leyva updated. 2322- Received call back from Dr. Elke Leyva. Orders received. 36- Pt's daughter Fatimah Berrios called in for an update. HIPPA code received and update given. 0413- Pt's pacer observed to be firing in QRS at times. Dr. Elke Leyva notified. Orders received for cardiology consult and pacer interrogation. 8043- Pt's con [facility] called in for update. HIPPA code received, update given.

## 2018-08-21 NOTE — CARE COORDINATION
8/21/18, 7:38 AM    DISCHARGE BARRIERS        Patient transferred to 4D 2. Report given to unit Lamar Ramos, regarding discharge plan for this patient.

## 2018-08-21 NOTE — CARE COORDINATION
UPDATE:  0730 - handoff report given/updated Christina CM re: patient tx to ICU post colonoscopy. 4th colonoscopy was completed 8/20. Clips placed x 2 with APC. He was transferred to the ICU for additional monitoring. Hbg 8.4 post procedure, he was weak and fatigued. Had mild shortness of breath. Tachycardic in the low 100's. He was transfused with an additional unit of PRBC.       1139- tx back to 5K02, monitor Hgb every 6 hours, Hgb 9.0 Hct 26, monitor stools, Protonix IV, Mag 1.6 with IV replacement, resume PT and OT, plans home with [facility].

## 2018-08-21 NOTE — PROGRESS NOTES
This RN called blood bank inquiring about unit of RBCs. Technician states that printer is not working and order must have gotten lost.  Technician will be sending blood up using downtime paperwork.

## 2018-08-21 NOTE — PROGRESS NOTES
Critical Care Progress Note    Patient:  Jyoti Atkins      Unit/Bed:4D-02/002-A    YOB: 1931    MRN: [id number]       Acct: [de-identified]     PCP: Soraya 39, DO    Date of Admission: 8/15/2018    Chief Complaint:  Rectal bleeding    Hospital Course:  80 y.o. male who presented to [facility] with bright red blood per rectum. History of colonoscopy with polypectomy at the end of 2017. Colonoscopy/EGD 8/16, no active bleed was noted. He continued to have rectal bleeding. 8/18 colonoscopy was repeat, again no bleed was found. Hemoglobin dropped from 10.1 to 6.7. 2 units of PRBC was transfused. Bleeding scan was negative. Patient became symptomatic with dizziness. Repeat colonoscopy was completed 8/19 bleed was noted, epi was injected and clip was placed. Hbg improved to 8.6, but later dropped to 6.6. Again he was transfused. 4th colonoscopy was completed 8/20. Clips placed x 2 with APC. He was transferred to the ICU for additional monitoring. Hbg 8.4 post procedure, he was weak and fatigued. Had mild shortness of breath. Tachycardic in the low 100's. He was transfused with an additional unit of PRBC. Subjective: Shortness of breath has resolved since procedure. No dizziness. Weakness greatly improved this AM. No BM since colonoscopy. + Flatus. No rectal bleeding noted since procedure.        Medications:  Reviewed    Infusion Medications   Scheduled Medications    magnesium replacement protocol   Other RX Placeholder    sodium chloride  250 mL Intravenous Once    sodium chloride  250 mL Intravenous Once    sodium chloride  250 mL Intravenous Once    sodium chloride  250 mL Intravenous Once    pantoprazole  40 mg Intravenous Daily    therapeutic multivitamin-minerals  0.5 tablet Oral BID    sertraline  50 mg Oral Daily    tamsulosin  0.4 mg Oral BID    cyanocobalamin  1,000 mcg Oral Daily    sodium chloride flush  10 mL Intravenous 2 times per day     PRN Meds: albuterol sulfate HFA, sodium chloride flush, acetaminophen, ondansetron      Intake/Output Summary (Last 24 hours) at 08/21/18 0713  Last data filed at 08/21/18 1540   Gross per 24 hour   Intake          1895.48 ml   Output             1450 ml   Net           445.48 ml       Diet:  Diet NPO Effective Now Exceptions are: Sips with Meds    Exam:  BP (!) 107/56   Pulse 84   Temp 98.7 °F (37.1 °C) (Oral)   Resp 22   Ht 5' 3\" (1.6 m)   Wt 135 lb 2.3 oz (61.3 kg)   SpO2 95%   BMI 23.94 kg/m²     General appearance: No apparent distress, appears stated age and cooperative. HEENT: Pupils equal, round, and reactive to light. Conjunctivae/corneas clear. Neck: Supple, with full range of motion. No jugular venous distention. Trachea midline. Respiratory:  Normal respiratory effort. Clear to auscultation, bilaterally without Rales/Wheezes/Rhonchi. Cardiovascular: Regular rate and irregular rhythm with normal S1/S2 without murmurs, rubs or gallops. Abdomen: Soft, non-tender, non-distended with normal bowel sounds. Musculoskeletal: passive and active ROM x 4 extremities. Skin: Skin color, texture, turgor normal.  No rashes or lesions. Neurologic:  Neurovascularly intact without any focal sensory/motor deficits. Cranial nerves: II-XII intact, grossly non-focal.  Psychiatric: Alert and oriented, thought content appropriate, normal insight  Capillary Refill: Brisk,< 3 seconds   Peripheral Pulses: +2 palpable, equal bilaterally       Labs:   Recent Labs      08/19/18   0719 08/20/18   1808  08/20/18   2346  08/21/18   0626   WBC  8.0   --    --    --    --    HGB  7.4*   < >  8.5*  8.4*  9.3*  9.0*   HCT  21.9*   < >  25.5*  24.6*  26.3*  26.0*   PLT  197   --    --    --    --     < > = values in this interval not displayed.      Recent Labs      08/19/18   0719  08/20/18   1108  08/20/18   2346   NA  139  135  135   K  4.5  3.9  3.8   CL  103  99  101   CO2  25  24  23   BUN  23*  25*  23*   CREATININE  1.2  1.2  1.2 CALCIUM  8.5  8.3*  8.2*   PHOS  3.1  3.5   --      No results for input(s): AST, ALT, BILIDIR, BILITOT, ALKPHOS in the last 72 hours. No results for input(s): INR in the last 72 hours. No results for input(s): Vipul Roberts in the last 72 hours. Urinalysis:    Lab Results   Component Value Date    NITRU NEGATIVE 08/15/2018    WBCUA 0-5 02/19/2018    BACTERIA MANY 09/08/2017    RBCUA 0-2 02/19/2018    BLOODU NEGATIVE 08/15/2018    GLUCOSEU NEGATIVE 08/15/2018       Radiology:  NM GI BLOOD LOSS   Final Result   1. No scintigraphic evidence for active gastrointestinal bleeding. **This report has been created using voice recognition software. It may contain minor errors which are inherent in voice recognition technology. **      Final report electronically signed by Dr. Angel Salgado on 8/20/2018 9:03 PM      NM GI BLOOD LOSS   Final Result      No scintigraphic evidence of active gastrointestinal bleeding. Final report electronically signed by Dr. Lisbet Tellez on 8/17/2018 1:55 PM      XR Acute Abd Series Chest 1 VW   Final Result   1. Nonobstructive bowel gas pattern with no evidence of free intraperitoneal air. 2. Chronic fibrotic changes of the lungs. **This report has been created using voice recognition software. It may contain minor errors which are inherent in voice recognition technology. **      Final report electronically signed by Dr Genia Riddle on 8/15/2018 2:13 PM          Diet: Diet NPO Effective Now Exceptions are: Sips with Meds    DVT prophylaxis: [] Lovenox                                 [x] SCDs                                 [] SQ Heparin                                 [] Encourage ambulation           [] Already on Anticoagulation     Disposition:    [] Home       [] TCU       [x] Rehab ?        [] Psych       [] SNF       [] [place]       [] Other-    Code Status: Limited    PT/OT Eval Status: completed prior to ICU admission    Assessment/Plan:    Anticipated Discharge in : 2 days    Active Hospital Problems    Diagnosis Date Noted    Tachycardia [R00.0]     Gastritis [K29.70] 08/17/2018    Severe malnutrition ([facility] [address] [E43] 08/16/2018     Class: Acute    Gastrointestinal hemorrhage [K92.2] 12/27/2017    Essential hypertension [I10] 07/20/2017    CAD (coronary artery disease) [I25.10] 04/29/2015       1. Acute lower GI bleed following polypectomy. GI following. EGD/Colon 8/16 no active bleeding, 8/18 no active bleeding. 8/19 epi injected and clip placed. 4th colonoscopy 8/20 clips placed x 2 and APC. ASA/Plavix held. IV Protonix. NPO.   2. Acute blood loss anemia secondary to #1. Status post 5 units PRBC transfusion. 1 unit transfused after arrival to the ICU. HH trend over night has been stable. Hbg 9. Trending HH every 6 hours. TEG results noted. 3. Tachycardia due to #1/2, resolved. 4. Diverticulosis. 5. Azotemia, secondary to #1. IVF. 6. GERD with gastris per EGD. Protonix   7. CAD s/p PCI >20 years ago. 8. Unexplained weight loss. Down 20 pounds in the last 17 months. 9. Hypertension, essential. Holding home medications. To be resumed when appropriate. 10. COPD, no acute exacerbation. PRN medications. 11. S/P PPM in 2011. Interrogation ordered. Dispo: Ok to transfer out of the unit to stepdown. Case discussed with Dr. Luis Lopez and Dr. Silvia Johnston. Electronically signed by CHRISTIN Luu CNP on 8/21/2018 at 7:13 AM     Patient personally evaluated by me. He is stable and without blood per rectum. No abdominal pain. Hemoglobin has stabilized. Still trending hemoglobin. Blood pressure stable. Cardiac rhythm paced.

## 2018-08-22 LAB
ERYTHROCYTE [DISTWIDTH] IN BLOOD BY AUTOMATED COUNT: 15.3 % (ref 11.5–14.5)
ERYTHROCYTE [DISTWIDTH] IN BLOOD BY AUTOMATED COUNT: 49.2 FL (ref 35–45)
HCT VFR BLD CALC: 24.5 % (ref 42–52)
HCT VFR BLD CALC: 24.5 % (ref 42–52)
HCT VFR BLD CALC: 24.7 % (ref 42–52)
HCT VFR BLD CALC: 24.9 % (ref 42–52)
HCT VFR BLD CALC: 25.2 % (ref 42–52)
HCT VFR BLD CALC: 26.3 % (ref 42–52)
HEMOGLOBIN: 8.3 GM/DL (ref 14–18)
HEMOGLOBIN: 8.3 GM/DL (ref 14–18)
HEMOGLOBIN: 8.4 GM/DL (ref 14–18)
HEMOGLOBIN: 8.5 GM/DL (ref 14–18)
HEMOGLOBIN: 8.6 GM/DL (ref 14–18)
HEMOGLOBIN: 8.9 GM/DL (ref 14–18)
MAGNESIUM: 2 MG/DL (ref 1.6–2.4)
MCH RBC QN AUTO: 30.7 PG (ref 26–33)
MCHC RBC AUTO-ENTMCNC: 33.9 GM/DL (ref 32.2–35.5)
MCV RBC AUTO: 90.7 FL (ref 80–94)
MRSA SCREEN: NORMAL
PLATELET # BLD: 223 THOU/MM3 (ref 130–400)
PMV BLD AUTO: 9.2 FL (ref 9.4–12.4)
RBC # BLD: 2.7 MILL/MM3 (ref 4.7–6.1)
VRE CULTURE: NORMAL
WBC # BLD: 7.1 THOU/MM3 (ref 4.8–10.8)

## 2018-08-22 PROCEDURE — 85027 COMPLETE CBC AUTOMATED: CPT

## 2018-08-22 PROCEDURE — 97535 SELF CARE MNGMENT TRAINING: CPT

## 2018-08-22 PROCEDURE — 85018 HEMOGLOBIN: CPT

## 2018-08-22 PROCEDURE — 83735 ASSAY OF MAGNESIUM: CPT

## 2018-08-22 PROCEDURE — 36415 COLL VENOUS BLD VENIPUNCTURE: CPT

## 2018-08-22 PROCEDURE — 99231 SBSQ HOSP IP/OBS SF/LOW 25: CPT | Performed by: PHYSICIAN ASSISTANT

## 2018-08-22 PROCEDURE — 6370000000 HC RX 637 (ALT 250 FOR IP): Performed by: INTERNAL MEDICINE

## 2018-08-22 PROCEDURE — 97116 GAIT TRAINING THERAPY: CPT

## 2018-08-22 PROCEDURE — 99233 SBSQ HOSP IP/OBS HIGH 50: CPT | Performed by: INTERNAL MEDICINE

## 2018-08-22 PROCEDURE — 2580000003 HC RX 258: Performed by: INTERNAL MEDICINE

## 2018-08-22 PROCEDURE — 2709999900 HC NON-CHARGEABLE SUPPLY

## 2018-08-22 PROCEDURE — C9113 INJ PANTOPRAZOLE SODIUM, VIA: HCPCS | Performed by: INTERNAL MEDICINE

## 2018-08-22 PROCEDURE — 6360000002 HC RX W HCPCS: Performed by: INTERNAL MEDICINE

## 2018-08-22 PROCEDURE — 97530 THERAPEUTIC ACTIVITIES: CPT

## 2018-08-22 PROCEDURE — 97110 THERAPEUTIC EXERCISES: CPT

## 2018-08-22 PROCEDURE — 1200000003 HC TELEMETRY R&B

## 2018-08-22 PROCEDURE — 85014 HEMATOCRIT: CPT

## 2018-08-22 RX ORDER — SENNOSIDES 8.6 MG
650 CAPSULE ORAL EVERY 8 HOURS PRN
COMMUNITY

## 2018-08-22 RX ORDER — FERROUS SULFATE 325(65) MG
325 TABLET ORAL
Status: ON HOLD | COMMUNITY
End: 2020-01-01 | Stop reason: HOSPADM

## 2018-08-22 RX ORDER — AMOXICILLIN 500 MG
CAPSULE ORAL DAILY
COMMUNITY
End: 2019-01-01

## 2018-08-22 RX ORDER — CLOPIDOGREL BISULFATE 75 MG/1
75 TABLET ORAL DAILY
Status: ON HOLD | COMMUNITY
End: 2018-08-24 | Stop reason: HOSPADM

## 2018-08-22 RX ADMIN — MULTIPLE VITAMINS W/ MINERALS TAB 0.5 TABLET: TAB at 21:35

## 2018-08-22 RX ADMIN — TAMSULOSIN HYDROCHLORIDE 0.4 MG: 0.4 CAPSULE ORAL at 21:35

## 2018-08-22 RX ADMIN — Medication 1000 MCG: at 08:27

## 2018-08-22 RX ADMIN — TAMSULOSIN HYDROCHLORIDE 0.4 MG: 0.4 CAPSULE ORAL at 08:27

## 2018-08-22 RX ADMIN — SERTRALINE 50 MG: 50 TABLET, FILM COATED ORAL at 08:27

## 2018-08-22 RX ADMIN — MULTIPLE VITAMINS W/ MINERALS TAB 0.5 TABLET: TAB at 08:27

## 2018-08-22 RX ADMIN — PANTOPRAZOLE SODIUM 40 MG: 40 INJECTION, POWDER, FOR SOLUTION INTRAVENOUS at 06:36

## 2018-08-22 RX ADMIN — Medication 10 ML: at 21:36

## 2018-08-22 RX ADMIN — Medication 10 ML: at 08:31

## 2018-08-22 ASSESSMENT — PAIN SCALES - GENERAL
PAINLEVEL_OUTOF10: 0

## 2018-08-22 NOTE — PROGRESS NOTES
Contacted Dr Cynthia Triplett concerning hgb of 8.3 at [place] [address]. Dr Cynthia Triplett ordered CBC for 0600.   Patient already h&h Q6.

## 2018-08-22 NOTE — PROGRESS NOTES
Shruthi Rogers [facility]  INPATIENT OCCUPATIONAL THERAPY  STRZ ONC MED 5K  DAILY NOTE    Time:  Time In: 9570  Time Out: 1033  Timed Code Treatment Minutes: 37 Minutes  Minutes: 43          Date: 2018  Patient Name: Brigido Bradford,   Gender: male      Room: [facility]002-A  MRN: [id number]  : 1931  (80 y.o.)  Referring Practitioner: Dr. Ludivina Tovar MD  Diagnosis: Gastrointestinal Bleeding  Additional Pertinent Hx: Pt presented to [facility] following an episode of BRBPR. He went to defecate the day of admission and noticed BRB in the toilet. Patient has a history of GIB from ascending colon polyps in 2017. He is currently on DAPT for CAD. Chart review suggests that his last PCI was 20 years ago. He currently endorses fatigue/malaise and some dark stools as well. He denies any N/V/D. Denies any pre-syncope. He follows Dr. Selwyn Primrose for GI. In the ED he was hemodynamically stable and hgb was 10.1 (down from 11.4 3 months ago).       Past Medical History:   Diagnosis Date    Acute sinusitis     Angina pectoris (HCC)     Anxiety disorder 10/27/2016    Arthritis     osteoporosis    BPH with urinary obstruction     CAD (coronary artery disease)     Chronic insomnia     CKD (chronic kidney disease) stage 3, GFR 30-59 ml/min     COPD (chronic obstructive pulmonary disease) (HCC)     Gastroenteritis     HCAP (healthcare-associated pneumonia) 2015    Heart disease     HLD (hyperlipidemia)     [place] (hard of hearing)     wear hearing aids    Hypertension     Kidney disease     40% kdiney function    Kidney stone     years ago 15-20    NICOLAS on CPAP     Osteopenia determined by x-ray     Pacemaker     Pinched nerve     slipped disc in back    Visual problems     Vitamin D deficiency      Past Surgical History:   Procedure Laterality Date    ABDOMINAL HERNIA REPAIR  2017    incisional hernia repair--Dr. Aron aMrley CARDIAC SURGERY      CATARACT REMOVAL WITH IMPLANT bilateral    COLONOSCOPY  [id number]    COLONOSCOPY  12/29/2017    COLONOSCOPY CONTROL HEMORRHAGE performed by Missy Baxter MD at [address]  8/16/2018    COLONOSCOPY POLYPECTOMY SNARE/COLD [facility]OPSY [facility] [place] [facility]    COLONOSCOPY  8/19/2018    COLONOSCOPY CONTROL HEMORRHAGE performed by [facility]y Baxter MD at [facility] [place] [facility]    COLONOSCOPY N/A 8/20/2018    COLONOSCOPY CONTROL HEMORRHAGE performed by Missy Baxter MD at [address]  1960's    EKG 12-LEAD  4/29/2015         EYE SURGERY      cataracts    HERNIA REPAIR      several    HIP PINNING Left 8/31/2017    LEFT HIP INTERTAN performed by Alida Poe MD at [address]  12/3/12    left     MYRINGOTOMY AND TYMPANOSTOMY TUBE PLACEMENT  7/23/13    left     NASAL SINUS SURGERY      OTHER SURGICAL HISTORY  04/27/2015    transurethral Incision bladder neck    PACEMAKER INSERTION      PACEMAKER PLACEMENT  2011    [place] COLONOSCOPY FLX DX W/COLLJ SPEC WHEN PFRMD Left 8/18/2018    [facility] [place] [facility]    [place] [address] DX/THER SBST INTRLMNR LMBR/SAC W/IMG GDN N/A 6/25/2018    LUMBAR INTER LAMINAR RUBEN LESI @ L3. performed by Lolly Kaminski MD at [place] [address] Right 10/8/2017    Right hip intertan performed by Jesus Posada MD at [address] [place] [address] TURP  4/17/2013    W/CYSTO WITH DIRECT VISION URETHROTOMY & RESECTION BLADDER NECK CONTRACTURE    TURP  4/27/15    re-do TURP    TYMPANOSTOMY TUBE PLACEMENT      multiple surgeries    UPPER GASTROINTESTINAL ENDOSCOPY  12/29/2017    COLONOSCOPY SUBMUCOSAL/BOTOX INJECTION performed by Missy Baxter MD at [address] [place] [address]  8/16/2018    EGD DIAGNOSTIC ONLY performed by Missy Baxter MD at [address] [place] [address] Left 8/19/2018 EGD DIAGNOSTIC ONLY performed by Missy Baxter MD at [facility] [place] [facility]       Restrictions/Precautions:  Fall Risk                            Prior Level of Function:  ADL Assistance: Independent  Homemaking Assistance: Needs assistance (family assist some )  Ambulation Assistance: Independent  Transfer Assistance: Independent  Additional Comments: Pt was using the RW at home. Subjective       Subjective: patient sitting up in chair sleeping when arrived. patient agreeable to OT              Pain:  Pain Assessment  Patient Currently in Pain: Denies       Objective         ADL  Grooming: Stand by assistance (standing at sink combing hair )  UE Bathing: Stand by assistance  LE Bathing: Contact guard assistance (sitting on shower seat )  UE Dressing: Minimal assistance (to tie gown )  LE Dressing: Supervision (don and doff slipper socks )  Toileting: Stand by assistance       Transfers  Sit to stand: Stand by assistance  Stand to sit: Contact guard assistance  Transfer Comments: recliner, STS and shower seat   Toilet Transfers  Toilet - Technique: Ambulating  Equipment Used: Grab bars  Toilet Transfer: Stand by assistance  Toilet Transfers Comments: onto STS   Shower Transfers  Shower - Transfer From: Walker  Shower - Transfer Type: To and From  Shower - Transfer To:  Shower seat with back (standing at times during shower )  Shower - Technique: Ambulating  Shower Transfers: Stand by assistance    Balance  Sitting Balance: Stand by assistance (sitting on STS and shower seat )  Standing Balance: Stand by assistance     Time: x5 min   Activity: ADLS   Comment: vcs for UE placement with t/fs     Functional Mobility  Functional - Mobility Device: Rolling Walker  Activity: To/from bathroom  Assist Level: Stand by assistance (close )                  Activity Tolerance:  Activity Tolerance: Patient limited by fatigue (min SOB with ADLS )    Assessment:     Performance deficits / Impairments: Decreased functional mobility ,

## 2018-08-22 NOTE — PROGRESS NOTES
DIAGNOSTIC ONLY performed by Fiorella Mehta MD at [address] [facility]       Restrictions/Precautions:  Fall Risk                            Prior Level of Function:  ADL Assistance: Independent  Homemaking Assistance: Needs assistance (family assist some )  Ambulation Assistance: Independent  Transfer Assistance: Independent  Additional Comments: Pt was using the RW at home. Subjective:     Subjective: RN approved for session. Patient in recliner upon arrival, agreed and cooperative for therapy. Pain:  Denies. Pain Assessment  Pain Assessment: 0-10  Pain Level: 0       Social/Functional:  Lives With: Spouse  Type of Home: House  Home Layout: One level  Home Access: Stairs to enter with rails  Entrance Stairs - Number of Steps: 1  Home Equipment: Cane, Rolling walker, Wheelchair-manual     Objective:     Transfers  Sit to Stand: Stand by assistance (from recliner chair,  cues for hand placement)  Stand to sit: Stand by assistance (to recliner chair, cues for reaching back before sitting)       Ambulation 1  Surface: level tile  Device: Rolling Walker  Assistance: Stand by assistance (Close SBA)  Quality of Gait: Decreased leoncio and step length on Fer LEs,  narrow AMARA,  forward flexed posture. Distance: ~300 ft. x1     Exercises:  Exercises  Comments: Patient completed seated Fer LE heel/toe raises, marches, long arc quads, hip ABD/ADD, glut sets x15 reps each; All for increased strength/endurance for improved functional mobility. Activity Tolerance:  Activity Tolerance: Patient Tolerated treatment well    Assessment: Body structures, Functions, Activity limitations: Decreased functional mobility , Decreased strength, Decreased balance, Decreased endurance  Assessment: Patient tolerated session well this date,  endurance improving with standing and gait training,  occasional cues for technique and safety with use of walker.   Would benefit from additional skilled PT to increase strenth, endurance,

## 2018-08-22 NOTE — PROGRESS NOTES
Progress Note    Patient:  Sada Swift      Unit/Bed:5K-02/002-A    YOB: 1931    MRN: [id number]       Acct: [de-identified]     PCP: Sary Frances DO    Date of Admission: 8/15/2018    Chief Complaint:  Rectal bleeding    Hospital Course:  80 y.o. male who presented to [address] [place] [address] with bright red blood per rectum. History of colonoscopy with polypectomy at the end of 2017. Colonoscopy/EGD 8/16, no active bleed was noted. He continued to have rectal bleeding. 8/18 colonoscopy was repeat, again no bleed was found. Hemoglobin dropped from 10.1 to 6.7. 2 units of PRBC was transfused. Bleeding scan was negative. Patient became symptomatic with dizziness. Repeat colonoscopy was completed 8/19 bleed was noted, epi was injected and clip was placed. Hbg improved to 8.6, but later dropped to 6.6. Again he was transfused. 4th colonoscopy was completed 8/20. Clips placed x 2 with APC. He was transferred to the ICU for additional monitoring. Hbg 8.4 post procedure, he was weak and fatigued. Had mild shortness of breath. Tachycardic in the low 100's. He was transfused with an additional unit of PRBC.     Subjective: Was briefly in the ICU post his fourth colonoscopy  Transferred out of ICU 08/21/2018    Received multiple units of blood transfusion during this admission  Hemoglobin last one was 8.5    Medications:  Reviewed    Infusion Medications   Scheduled Medications    magnesium replacement protocol   Other RX Placeholder    sodium chloride  250 mL Intravenous Once    sodium chloride  250 mL Intravenous Once    sodium chloride  250 mL Intravenous Once    sodium chloride  250 mL Intravenous Once    pantoprazole  40 mg Intravenous Daily    therapeutic multivitamin-minerals  0.5 tablet Oral BID    sertraline  50 mg Oral Daily    tamsulosin  0.4 mg Oral BID    cyanocobalamin  1,000 mcg Oral Daily    sodium chloride flush  10 mL Intravenous 2 times per day     PRN Meds: albuterol sulfate HFA, sodium chloride flush, acetaminophen, ondansetron      Intake/Output Summary (Last 24 hours) at 08/22/18 1255  Last data filed at 08/22/18 0815   Gross per 24 hour   Intake             1700 ml   Output                0 ml   Net             1700 ml       Diet:  DIET CLEAR LIQUID;    Exam:  /71   Pulse 86   Temp 98.8 °F (37.1 °C) (Oral)   Resp 20   Ht 5' 3\" (1.6 m)   Wt 135 lb 2.3 oz (61.3 kg)   SpO2 94%   BMI 23.94 kg/m²     General appearance: No apparent distress, appears stated age and cooperative. HEENT: Pupils equal, round, and reactive to light. Conjunctivae/corneas clear. Neck: Supple, with full range of motion. No jugular venous distention. Trachea midline. Respiratory:  Normal respiratory effort. Clear to auscultation, bilaterally without Rales/Wheezes/Rhonchi. Cardiovascular: Regular rate and irregular rhythm with normal S1/S2 without murmurs, rubs or gallops. Abdomen: Soft, non-tender, non-distended with normal bowel sounds. Musculoskeletal: passive and active ROM x 4 extremities. Skin: Skin color, texture, turgor normal.  No rashes or lesions. Neurologic:  Neurovascularly intact without any focal sensory/motor deficits. Cranial nerves: II-XII intact, grossly non-focal.  Psychiatric: Alert and oriented, thought content appropriate, normal insight  Capillary Refill: Brisk,< 3 seconds   Peripheral Pulses: +2 palpable, equal bilaterally       Labs:   Recent Labs      08/22/18   0045  08/22/18   0635  08/22/18   1208   WBC   --   7.1   --    HGB  8.3*  8.3*  8.5*  8.6*   HCT  24.7*  24.5*  24.5*  25.2*   PLT   --   223   --      Recent Labs      08/20/18   1108  08/20/18   2346   NA  135  135   K  3.9  3.8   CL  99  101   CO2  24  23   BUN  25*  23*   CREATININE  1.2  1.2   CALCIUM  8.3*  8.2*   PHOS  3.5   --      No results for input(s): AST, ALT, BILIDIR, BILITOT, ALKPHOS in the last 72 hours. No results for input(s): INR in the last 72 hours.   No results for input(s): CKTOTAL, TROPONINI in the last 72 hours. Urinalysis:      Lab Results   Component Value Date    NITRU NEGATIVE 08/15/2018    WBCUA 0-5 02/19/2018    BACTERIA MANY 09/08/2017    RBCUA 0-2 02/19/2018    BLOODU NEGATIVE 08/15/2018    GLUCOSEU NEGATIVE 08/15/2018       Radiology:  NM GI BLOOD LOSS   Final Result   1. No scintigraphic evidence for active gastrointestinal bleeding. **This report has been created using voice recognition software. It may contain minor errors which are inherent in voice recognition technology. **      Final report electronically signed by Dr. Moises Lowery on 8/20/2018 9:03 PM      NM GI BLOOD LOSS   Final Result      No scintigraphic evidence of active gastrointestinal bleeding. Final report electronically signed by Dr. Chary Mckeon on 8/17/2018 1:55 PM      XR Acute Abd Series Chest 1 VW   Final Result   1. Nonobstructive bowel gas pattern with no evidence of free intraperitoneal air. 2. Chronic fibrotic changes of the lungs. **This report has been created using voice recognition software. It may contain minor errors which are inherent in voice recognition technology. **      Final report electronically signed by Dr Odie Riedel on 8/15/2018 2:13 PM          Diet: DIET CLEAR LIQUID;    DVT prophylaxis: [] Lovenox                                 [x] SCDs                                 [] SQ Heparin                                 [] Encourage ambulation           [] Already on Anticoagulation     Disposition:    [] Home       [] TCU       [x] Rehab ?        [] Psych       [] SNF       [] [facility]       [] Other-    Code Status: Limited    PT/OT Eval Status: completed prior to ICU admission    Assessment/Plan:    Anticipated Discharge in : 2 days    Active Hospital Problems    Diagnosis Date Noted    Tachycardia [R00.0]     Gastritis [K29.70] 08/17/2018    Severe malnutrition ([facility] [address] [E43] 08/16/2018     Class: Acute    Gastrointestinal hemorrhage

## 2018-08-22 NOTE — PROGRESS NOTES
vision from the rectum up to the cecum. Prep was  good and the patient tolerated the procedure well. Cecum intubation was  confirmed by appendiceal orifice. Scope was withdrawn. Severe  diverticulosis seen in the left side. No diverticulosis, no colitis,  no  polyps, no masses seen. No active GI bleed seen. No black stool. Brown  bowel movement seen. Scope was withdrawn with no immediate complications.     IMPRESSION:  1. Severe diverticulitis. 2.  No active GI bleed.     PLAN:  1. Resume diet. 2.  Monitor H and H closely. 3.  Discharge home. 4.  Avoid NSAID, arthritis medication and anticoagulation.      Chapis Pena M.D.     ------------------------------------------------------------------------------------------------------------  PROCEDURE #1:  Colonoscopy.   Digital examination revealed normal rectum. Standard colonoscope was  advanced under direct vision from the rectum up to the cecum. Prep was  good and the patient tolerated the procedure well. Cecum intubation was  confirmed by appendiceal orifice seen. Severe diverticulosis. At one time,  seen the area of old bleeding with  tattoo is found, history of lower GI bleed from  diverticulosis in the past, but at this time, the diverticula was not  bleeding. No reflux seen during the intubation when the cecum was  intubated. In the ascending, seen small polyp measuring 0.4 x 0.4 cm  excised with a snare, tissue retrieved. Scope was withdrawn with no  immediate complications.     IMPRESSION:  1. Severe diverticulosis, left side. 2.  No active GI bleed seen at this time. 3.  Ascending polyp excised with the snare.     PLAN:  1. Follow up with the biopsy results in GI Clinic for evaluation to review  results of the pathology for the polypectomy.   2.  Proceed with upper endoscopy as mentioned and discussed before the  procedure as the finding with the colonoscopy cannot explain the acute GI  blood loss anemia.     PROCEDURE #2: polypectomy site - tx with epinephrine and APC. S/p PRBC transfusion x 5. Continue to monitor and transfuse prn. Continue clear liquid diet for now. 2. Mild GERD with mild gastritis per EGD - continue PPI. Standard anti-reflux measures, avoid NSAIDs. 3. Severe diverticular disease on colonoscopy - high fiber diet once diet advances; avoid nuts, seeds, corn, and popcorn. 4. CAD - stable  5.  Will advance diet to full liquid       Follow up in GI Clinic after discharge in 2 week(s)      CHRISTIN Scott - CNP  8/21/2018  5:43 PM

## 2018-08-22 NOTE — FLOWSHEET NOTE
MED Cuevas from [facility] WILL BE HERE AROUND 4 PM TO DO PACER  INTERROGATE

## 2018-08-22 NOTE — OP NOTE
from  arterial blood vessel. Attempt to put Resolution clip was not completely  successful as well as not able to place the clip across the ulcer to  compress the bleeding blood vessel. That failed despite multiple attempts  due to the location of the bleeding blood vessel. So the site later  treated with tip of argon plasma coagulator, which controlled the bleeding  successfully. Suction don on withdrawing the scope to suction the blood   as well as the blood clot. The patient tolerated the procedure well with no   immediate complications. IMPRESSION:  Active bleed from the ulcer at polypectomy site at proximal ascending,  with arterial bleeding treated with a Resolution clip as well as argon  plasma coagulator. PLAN:  1. Transfuse. 2.  Avoid NSAIDs. 3.  Transfer to the ICU. 4.  I told the radiologyService to do a stat bleeding scan. If positive,  the patient reports to Interventional Radiology for embolization at the  site of the arterial bleed if active . 5. Transfuse as needed. 6.  Monitor H and H closely. Pamela Gordon M.D.    D: 08/22/2018 3:32:16       T: 08/22/2018 6:15:04     [email or web address]  Job#: [id number]     Doc#: [id number]    CC:  Phong Landres.  Lucas Schilling.

## 2018-08-23 LAB
HCT VFR BLD CALC: 25.1 % (ref 42–52)
HCT VFR BLD CALC: 28.1 % (ref 42–52)
HEMOGLOBIN: 8.4 GM/DL (ref 14–18)
HEMOGLOBIN: 9.3 GM/DL (ref 14–18)

## 2018-08-23 PROCEDURE — 1200000003 HC TELEMETRY R&B

## 2018-08-23 PROCEDURE — 2709999900 HC NON-CHARGEABLE SUPPLY

## 2018-08-23 PROCEDURE — 97110 THERAPEUTIC EXERCISES: CPT

## 2018-08-23 PROCEDURE — 99232 SBSQ HOSP IP/OBS MODERATE 35: CPT | Performed by: INTERNAL MEDICINE

## 2018-08-23 PROCEDURE — 85018 HEMOGLOBIN: CPT

## 2018-08-23 PROCEDURE — 6370000000 HC RX 637 (ALT 250 FOR IP): Performed by: INTERNAL MEDICINE

## 2018-08-23 PROCEDURE — 97530 THERAPEUTIC ACTIVITIES: CPT

## 2018-08-23 PROCEDURE — 97116 GAIT TRAINING THERAPY: CPT

## 2018-08-23 PROCEDURE — 2580000003 HC RX 258: Performed by: INTERNAL MEDICINE

## 2018-08-23 PROCEDURE — 99231 SBSQ HOSP IP/OBS SF/LOW 25: CPT | Performed by: PHYSICIAN ASSISTANT

## 2018-08-23 PROCEDURE — 36415 COLL VENOUS BLD VENIPUNCTURE: CPT

## 2018-08-23 PROCEDURE — 6360000002 HC RX W HCPCS: Performed by: INTERNAL MEDICINE

## 2018-08-23 PROCEDURE — C9113 INJ PANTOPRAZOLE SODIUM, VIA: HCPCS | Performed by: INTERNAL MEDICINE

## 2018-08-23 PROCEDURE — 85014 HEMATOCRIT: CPT

## 2018-08-23 RX ORDER — PANTOPRAZOLE SODIUM 40 MG/1
40 TABLET, DELAYED RELEASE ORAL
Status: DISCONTINUED | OUTPATIENT
Start: 2018-08-24 | End: 2018-08-24 | Stop reason: HOSPADM

## 2018-08-23 RX ADMIN — MULTIPLE VITAMINS W/ MINERALS TAB 0.5 TABLET: TAB at 20:38

## 2018-08-23 RX ADMIN — TAMSULOSIN HYDROCHLORIDE 0.4 MG: 0.4 CAPSULE ORAL at 20:37

## 2018-08-23 RX ADMIN — TAMSULOSIN HYDROCHLORIDE 0.4 MG: 0.4 CAPSULE ORAL at 09:21

## 2018-08-23 RX ADMIN — SERTRALINE 50 MG: 50 TABLET, FILM COATED ORAL at 09:21

## 2018-08-23 RX ADMIN — Medication 1000 MCG: at 09:09

## 2018-08-23 RX ADMIN — MULTIPLE VITAMINS W/ MINERALS TAB 0.5 TABLET: TAB at 09:21

## 2018-08-23 RX ADMIN — PANTOPRAZOLE SODIUM 40 MG: 40 INJECTION, POWDER, FOR SOLUTION INTRAVENOUS at 05:40

## 2018-08-23 RX ADMIN — Medication 10 ML: at 20:38

## 2018-08-23 ASSESSMENT — PAIN SCALES - GENERAL
PAINLEVEL_OUTOF10: 0
PAINLEVEL_OUTOF10: 0

## 2018-08-23 NOTE — PROGRESS NOTES
Gastroenterology  Progress Note    8/21/2018 5:43 PM  Subjective:   Admit Date: 8/15/2018    Interval History: Labs reviewed, H&H improving without PRBC transfusion. No evidence of GI bleeding noted. Pt denies abdominal pain or N/V. Request diet advancement.     Diet: DIET CLEAR LIQUID;    Patient Active Problem List:      Dyspnea     Bladder neck contracture     BPH (benign prostatic hyperplasia)     Suprapubic pain     S/P TURP (status post transurethral resection of prostate)     CKD (chronic kidney disease) stage 3, GFR 30-59 ml/min     CAD (coronary artery disease)     Nausea and vomiting     Chronic serous otitis media of left ear     Hearing loss, sensorineural     Conductive hearing loss, bilateral     History of tympanoplasty of left ear     Tympanosclerosis involving combination of structures     Anxiety disorder     Disc disorder of lumbar region     Cardiac pacemaker in situ     Incisional hernia, without obstruction or gangrene     S/P ventral herniorrhaphy     Essential hypertension     Head injury, closed, without LOC     Closed right hip fracture (HCC)     At high risk for pain from procedure     NICOLAS (obstructive sleep apnea)     Closed comminuted intertrochanteric fracture of right femur with routine healing     Acute blood loss anemia     Lower GI bleed     Severe malnutrition (HCC)     B12 deficiency     Diverticulosis of large intestine with hemorrhage     Lumbar radiculitis     HNP (herniated nucleus pulposus), lumbar     Severe malnutrition (HCC)        Medications:   Scheduled Meds:   magnesium replacement protocol   Other RX Placeholder    sodium chloride  250 mL Intravenous Once    sodium chloride  250 mL Intravenous Once    sodium chloride  250 mL Intravenous Once    sodium chloride  250 mL Intravenous Once    pantoprazole  40 mg Intravenous Daily    therapeutic multivitamin-minerals  0.5 tablet Oral BID    sertraline  50 mg Oral Daily    tamsulosin  0.4 mg Oral BID    cyanocobalamin  1,000 mcg Oral Daily    sodium chloride flush  10 mL Intravenous 2 times per day     Continuous Infusions:    CBC:   Recent Labs      08/19/18   0719   08/20/18   2346  08/21/18   0626  08/21/18   1200   WBC  8.0   --    --    --    --    HGB  7.4*   < >  9.3*  9.0*  9.2*   PLT  197   --    --    --    --     < > = values in this interval not displayed. BMP:    Recent Labs      08/19/18   0719  08/20/18   1108  08/20/18   2346   NA  139  135  135   K  4.5  3.9  3.8   CL  103  99  101   CO2  25  24  23   BUN  23*  25*  23*   CREATININE  1.2  1.2  1.2   GLUCOSE  103  127*  117*     Hepatic: No results for input(s): AST, ALT, ALB, BILITOT, ALKPHOS in the last 72 hours. INR:   No results for input(s): INR in the last 72 hours. Xray:   PROCEDURE: XR ACUTE ABD SERIES CHEST 1 VW  8/15/2018   CLINICAL INFORMATION: 80-year-old male with gastrointestinal bleed.    COMPARISON: Comparison is made to previous chest x-ray dated 5/10/2018.   TECHNIQUE: A PA upright view of the chest was obtained. AP and supine views of the abdomen were obtained.     FINDINGS:  The cardiac silhouette is at the upper limits of normal. There is a dual lead pacemaker projecting over the left hemithorax. Fibrotic changes are seen throughout the bilateral lungs and appear stable when compared to the prior study. There is no significant pleural effusion or pneumothorax. No consolidation. There is levoconvex scoliosis of the thoracolumbar spine.   On the AP upright view of the abdomen, there is no free air.    On the AP supine view of the abdomen, there are numerous gas-filled bowel loops. There are no displaced small bowel loops. The colon is within acceptable limits.   Postsurgical changes are seen involving the proximal femurs bilaterally. Surgical clips are seen throughout the lower abdomen likely on the basis of previous hernia repair.      Impression  1.  Nonobstructive bowel gas pattern with no evidence of free intraperitoneal air.  2. Chronic fibrotic changes of the lungs.     **This report has been created using voice recognition software. It may contain minor errors which are inherent in voice recognition technology. **     Final report electronically signed by Dr Zach Roy on 8/15/2018 2:13 PM    Endoscopy Finding:    Brief Postoperative Note   Verner Ovens  YOB: 1931  [id number]     Pre-operative Diagnosis: GI bleeding    Post-operative Diagnosis: GI bleeding, arterial from polypectomy site treated with resolution  Clip and APC which controled the bleeding    Procedure: colonoscopy with control bleeding    Anesthesia: MAC   Surgeons/Assistants: Michaela   Estimated Blood Loss: 885    Complications: None   Specimens: Was not Obtained:    Findings: bleeding from the ascending, polypectomy site treated with APC      Electronically signed by Citlalli Guzman MD on 8/20/2018 at 3:07 PM  -------------------------------------------------------------------------------------------------------------  Brief Postoperative Note   Verner Ovens  YOB: 1931  [id number]   Pre-operative Diagnosis: GI bleeding    Post-operative Diagnosis:  Gastritis, diverticulosis , GI bleeding from ascending , polyps site treated with epinephrine injection and resolution clip    Procedure: EGD and colonoscopy    Anesthesia: Moderate Sedation   Surgeons/Assistants: Michaela   Estimated Blood Loss: less than 50    Complications: None   Specimens: Was Not Obtained     Findings: Gastritis, diverticulosis , GI bleeding from ascending , polyps site treated with epinephrine injection and resolution clip       Electronically signed by Citlalli Guzman MD on 8/19/2018 at 9:14 AM  -------------------------------------------------------------------------------------------------------------  PROCEDURE PERFORMED:  Colonoscopy.   Digital examination revealed normal rectum.   Standard colonoscope was  advanced under direct vision from the rectum up to the cecum.  Prep was  good and the patient tolerated the procedure well. Cecum intubation was  confirmed by appendiceal orifice. Scope was withdrawn. Severe  diverticulosis seen in the left side. No diverticulosis, no colitis,  no  polyps, no masses seen. No active GI bleed seen. No black stool. Brown  bowel movement seen. Scope was withdrawn with no immediate complications.     IMPRESSION:  1. Severe diverticulitis. 2.  No active GI bleed.     PLAN:  1. Resume diet. 2.  Monitor H and H closely. 3.  Discharge home. 4.  Avoid NSAID, arthritis medication and anticoagulation.      Aliec Meyer M.D.     ------------------------------------------------------------------------------------------------------------  PROCEDURE #1:  Colonoscopy.   Digital examination revealed normal rectum. Standard colonoscope was  advanced under direct vision from the rectum up to the cecum. Prep was  good and the patient tolerated the procedure well. Cecum intubation was  confirmed by appendiceal orifice seen. Severe diverticulosis. At one time,  seen the area of old bleeding with  tattoo is found, history of lower GI bleed from  diverticulosis in the past, but at this time, the diverticula was not  bleeding. No reflux seen during the intubation when the cecum was  intubated. In the ascending, seen small polyp measuring 0.4 x 0.4 cm  excised with a snare, tissue retrieved. Scope was withdrawn with no  immediate complications.     IMPRESSION:  1. Severe diverticulosis, left side. 2.  No active GI bleed seen at this time. 3.  Ascending polyp excised with the snare.     PLAN:  1. Follow up with the biopsy results in GI Clinic for evaluation to review  results of the pathology for the polypectomy.   2.  Proceed with upper endoscopy as mentioned and discussed before the  procedure as the finding with the colonoscopy cannot explain the acute GI  blood loss anemia.     PROCEDURE #2:  EGD.   Standard video 190 Olympus upper scope was advanced under direct vision  from the oral cavity up to the duodenum. Esophagus appeared tortuous. No  erosion or ulceration seen. The gastroesophageal junction was at 38 cm  from the incisors. Scope was advanced to the stomach. Retroflex  examination of the cardia revealed no evidence of any significant hiatus  hernia. Antrum showed patchy erythema and mild gastritis. Duodenum  appears normal.  No active GI bleed seen. Scope was withdrawn. No biopsy  obtained.     IMPRESSION:  1. Gastric reflux with tortuous esophagus. 2.  Gastritis. 3.  No active GI bleed.     PLAN:  1. Continue PPI. 2.  Advance diet to full liquid and follow up with H/H .  3.  CBC needs to be done to make sure anemia is not severe. If it is  severe, blood transfusion will be ordered.      Luis Onofre M.D.   D: 08/16/2018 11:59:48       T: 08/16/2018 13:31:18     Objective:   Vitals: BP (!) 145/59   Pulse 74   Temp 98.2 °F (36.8 °C) (Oral)   Resp 16   Ht 5' 3\" (1.6 m)   Wt 135 lb 2.3 oz (61.3 kg)   SpO2 96%   BMI 23.94 kg/m²     Intake/Output Summary (Last 24 hours) at 08/21/18 1743  Last data filed at 08/21/18 1323   Gross per 24 hour   Intake           764.65 ml   Output             1225 ml   Net          -460.35 ml     Weight:  Wt Readings from Last 3 Encounters:   08/20/18 135 lb 2.3 oz (61.3 kg)   07/31/18 140 lb 9.6 oz (63.8 kg)   07/09/18 140 lb 9.6 oz (63.8 kg)     General appearance: alert and cooperative with exam.  Well groomed, well nourished pale elderly  male  Lungs: clear to auscultation bilaterally  Heart: regular rate and rhythm, S1, S2 normal, no murmur, click, rub or gallop  Abdomen: soft, non-tender; bowel sounds normal; no masses,  no organomegaly  Extremities: extremities normal, atraumatic, no cyanosis or edema    Assessment and Plan:   1.  Anemia with rectal bleeding - s/p colonoscopy x 4 with control of active bleeding from ascending colon polypectomy site - tx with epinephrine and APC. S/p PRBC transfusion x 5. Continue to monitor and transfuse prn. Stop q 6hr H&H and repeat CBC in the am  2. Mild GERD with mild gastritis per EGD -change IV PPI to po daily. Standard anti-reflux measures, avoid NSAIDs. 3. Severe diverticular disease on colonoscopy - high fiber diet once diet advances; avoid nuts, seeds, corn, and popcorn. 4. CAD - stable  5. Advance diet to cardiac, high fiber diet. 6.  If pt tolerates diet, has no recurrent lower GI bleeding and Hgb remains stable, from a GI standpoint, pt could be discharged at Attending's discretion.        Follow up in GI Clinic after discharge in 2-3 week(s) with CBC      CHRISTIN Aguilera - CNP  8/21/2018  5:43 PM       Patient is seen independently from the nurse practitioner and all  the pertinent data along with physical examination and assessment and plans are all obtained by my self and  Laboratory data, Radiology results, medications all are reviewed by my self and care is discussed extensively with the patient  and the patients nurse and all agree with plan and in addition see orders and plans    Electronically signed by Elizabeth Chopra MD

## 2018-08-23 NOTE — PROGRESS NOTES
Intravenous 2 times per day     PRN Meds: albuterol sulfate HFA, sodium chloride flush, acetaminophen, ondansetron      Intake/Output Summary (Last 24 hours) at 08/23/18 1038  Last data filed at 08/22/18 2353   Gross per 24 hour   Intake             1050 ml   Output                0 ml   Net             1050 ml       Diet:  DIET FULL LIQUID;  Dietary Nutrition Supplements: Standard High Calorie Oral Supplement    Exam:  /60   Pulse 85   Temp 98.9 °F (37.2 °C) (Oral)   Resp 16   Ht 5' 3\" (1.6 m)   Wt 135 lb 2.3 oz (61.3 kg)   SpO2 93%   BMI 23.94 kg/m²     General appearance: No apparent distress, appears stated age and cooperative. HEENT: Pupils equal, round, and reactive to light. Conjunctivae/corneas clear. Neck: Supple, with full range of motion. No jugular venous distention. Trachea midline. Respiratory:  Normal respiratory effort. Clear to auscultation, bilaterally without Rales/Wheezes/Rhonchi. Cardiovascular: Regular rate and irregular rhythm with normal S1/S2 without murmurs, rubs or gallops. Abdomen: Soft, non-tender, non-distended with normal bowel sounds. Musculoskeletal: passive and active ROM x 4 extremities. Skin: Skin color, texture, turgor normal.  No rashes or lesions. Neurologic:  Neurovascularly intact without any focal sensory/motor deficits. Cranial nerves: II-XII intact, grossly non-focal.  Psychiatric: Alert and oriented, thought content appropriate, normal insight  Capillary Refill: Brisk,< 3 seconds   Peripheral Pulses: +2 palpable, equal bilaterally       Labs:   Recent Labs      08/22/18   0635   08/22/18   1826  08/22/18   2336  08/23/18   0711   WBC  7.1   --    --    --    --    HGB  8.3*  8.5*   < >  8.9*  8.4*  8.4*   HCT  24.5*  24.5*   < >  26.3*  24.9*  25.1*   PLT  223   --    --    --    --     < > = values in this interval not displayed.      Recent Labs      08/20/18   1108  08/20/18   2346   NA  135  135   K  3.9  3.8   CL  99  101   CO2  24  23   BUN 25*  23*   CREATININE  1.2  1.2   CALCIUM  8.3*  8.2*   PHOS  3.5   --      No results for input(s): AST, ALT, BILIDIR, BILITOT, ALKPHOS in the last 72 hours. No results for input(s): INR in the last 72 hours. No results for input(s): Demi Sneddon in the last 72 hours. Urinalysis:      Lab Results   Component Value Date    NITRU NEGATIVE 08/15/2018    WBCUA 0-5 02/19/2018    BACTERIA MANY 09/08/2017    RBCUA 0-2 02/19/2018    BLOODU NEGATIVE 08/15/2018    GLUCOSEU NEGATIVE 08/15/2018       Radiology:  NM GI BLOOD LOSS   Final Result   1. No scintigraphic evidence for active gastrointestinal bleeding. **This report has been created using voice recognition software. It may contain minor errors which are inherent in voice recognition technology. **      Final report electronically signed by Dr. Elma Rodriguez on 8/20/2018 9:03 PM      NM GI BLOOD LOSS   Final Result      No scintigraphic evidence of active gastrointestinal bleeding. Final report electronically signed by Dr. Sandy Pimentel on 8/17/2018 1:55 PM      XR Acute Abd Series Chest 1 VW   Final Result   1. Nonobstructive bowel gas pattern with no evidence of free intraperitoneal air. 2. Chronic fibrotic changes of the lungs. **This report has been created using voice recognition software. It may contain minor errors which are inherent in voice recognition technology. **      Final report electronically signed by Dr Jessy Galindo on 8/15/2018 2:13 PM          Diet: DIET FULL LIQUID;  Dietary Nutrition Supplements: Standard High Calorie Oral Supplement    DVT prophylaxis: [] Lovenox                                 [x] SCDs                                 [] SQ Heparin                                 [] Encourage ambulation           [] Already on Anticoagulation     Disposition:    [] Home       [] TCU       [x] Rehab ?        [] Psych       [] SNF       [] [place]       [] Other-    Code Status: Limited    PT/OT

## 2018-08-23 NOTE — PROGRESS NOTES
normal bowel sounds, no masses Extremities: no cyanosis, clubbing or edema, pulse   Skin: warm and dry, no rash or erythema  Head: normocephalic and atraumatic  Eyes: pupils equal, round, and reactive to light  Neck: supple and non-tender without mass, no thyromegaly   Musculoskeletal: normal range of motion, no joint swelling, deformity or tenderness  Neurological: alert, oriented, normal speech, no focal findings or movement disorder noted    Medications:    magnesium replacement protocol   Other RX Placeholder    sodium chloride  250 mL Intravenous Once    sodium chloride  250 mL Intravenous Once    sodium chloride  250 mL Intravenous Once    sodium chloride  250 mL Intravenous Once    pantoprazole  40 mg Intravenous Daily    therapeutic multivitamin-minerals  0.5 tablet Oral BID    sertraline  50 mg Oral Daily    tamsulosin  0.4 mg Oral BID    cyanocobalamin  1,000 mcg Oral Daily    sodium chloride flush  10 mL Intravenous 2 times per day         albuterol sulfate HFA 2 puff Q6H PRN   sodium chloride flush 10 mL PRN   acetaminophen 650 mg Q4H PRN   ondansetron 4 mg Q6H PRN       Lab Data:    Cardiac Enzymes:  No results for input(s): CKTOTAL, CKMB, CKMBINDEX, TROPONINI in the last 72 hours.     CBC:   Lab Results   Component Value Date    WBC 7.1 08/22/2018    RBC 2.70 08/22/2018    HGB 9.3 08/23/2018    HCT 28.1 08/23/2018     08/22/2018       CMP:    Lab Results   Component Value Date     08/20/2018    K 3.8 08/20/2018    K 3.7 08/16/2018     08/20/2018    CO2 23 08/20/2018    BUN 23 08/20/2018    CREATININE 1.2 08/20/2018    LABGLOM 57 08/20/2018    GLUCOSE 117 08/20/2018    GLUCOSE 102 02/19/2018    CALCIUM 8.2 08/20/2018       Hepatic Function Panel:    Lab Results   Component Value Date    ALKPHOS 90 08/15/2018    ALT 9 08/15/2018    AST 15 08/15/2018    PROT 6.4 08/15/2018    BILITOT 0.3 08/15/2018    BILIDIR <0.2 08/15/2018    LABALBU 3.7 08/15/2018       Magnesium:    Lab Results   Component Value Date    MG 2.0 08/22/2018       PT/INR:    Lab Results   Component Value Date    INR 1.08 08/15/2018       HgBA1c:  No results found for: LABA1C    FLP:    Lab Results   Component Value Date    TRIG 93 12/05/2016    HDL 56 12/05/2016    LDLCALC 43 12/05/2016       TSH:    Lab Results   Component Value Date    TSH 1.620 12/27/2017         Assessment:    ? PPM malfunction - pacer interrogated yesterday and working appropiately  Hx CAD with stenting long ago per patient  Ef 55 per echo 12/27/17  Hx PPM  HTN  Anemia with rectal bleeding - dr Kell Hagan following -  s/p colonoscopy x 4 with control of active bleeding from ascending colon polypectomy site - tx with epinephrine and APC.   S/p PRBC transfusion x 5    Plan:  · Restart asa once ok with GI  · Consider statin with hx CAD  · No BB at this time due to lower bp readings  · F/up dr Everardo Salguero 2 weeks  · Will see prn         Electronically signed by Dorothy Castro PA-C on 8/23/2018 at 1:23 PM

## 2018-08-23 NOTE — PROGRESS NOTES
[address] [place] [address]  INPATIENT PHYSICAL THERAPY  DAILY NOTE  [facility] MED 5K - 5K-02/002-A    Time In: 1030  Time Out: 1057  Timed Code Treatment Minutes: 27 Minutes  Minutes: 27          Date: 2018  Patient Name: Dalia Walker,  Gender:  male        MRN: [id number]  : 1931  (80 y.o.)  Referral Date : 18  Referring Practitioner: Roma Packer MD  Diagnosis: GIB  Additional Pertinent Hx: 80 y.o. male who presented to [address] [place] [address] following an episode of BRBPR. He went to defecate today and noticed BRB in the toilet. Patient has a history of GIB from ascending colon polyps in 2017. He is currently on DAPT for CAD. Chart review suggests that his last PCI was 20 years ago. He currently endorses fatigue/malaise and some dark stools as well. He denies any N/V/D. Denies any pre-syncope. He follows Dr. Michael Coulter for GI. In the ED he was hemodynamically stable and hgb was 10.1 (down from 11.4 3 months ago).       Past Medical History:   Diagnosis Date    Acute sinusitis     Angina pectoris (HCC)     Anxiety disorder 10/27/2016    Arthritis     osteoporosis    BPH with urinary obstruction     CAD (coronary artery disease)     Chronic insomnia     CKD (chronic kidney disease) stage 3, GFR 30-59 ml/min     COPD (chronic obstructive pulmonary disease) (HCC)     Gastroenteritis     HCAP (healthcare-associated pneumonia) 2015    Heart disease     HLD (hyperlipidemia)     [place] (hard of hearing)     wear hearing aids    Hypertension     Kidney disease     40% kdiney function    Kidney stone     years ago 15-20    NICOLAS on CPAP     Osteopenia determined by x-ray     Pacemaker     Pinched nerve     slipped disc in back    Visual problems     Vitamin D deficiency      Past Surgical History:   Procedure Laterality Date    ABDOMINAL HERNIA REPAIR  2017    incisional hernia repair--Dr. Jeanie Harrison CARDIAC SURGERY      CATARACT REMOVAL WITH IMPLANT      bilateral    goal 3: Patient will ambulate 76' with supervision and walker for household ambulation. Short term goal 4: Patient will negotiate 1 steps with 1 hand rail and SBA in order to get into/out of his home.     Long term goals  Time Frame for Long term goals : No LTGs due to estimated length of stay            AM-PAC Inpatient Mobility without Stair Climbing Raw Score : 15  AM-PAC Inpatient without Stair Climbing T-Scale Score : 43.03  Mobility Inpatient CMS 0-100% Score: 47.43  Mobility Inpatient without Stair CMS G-Code Modifier : CK

## 2018-08-23 NOTE — PROGRESS NOTES
bilateral    COLONOSCOPY  [id number]    COLONOSCOPY  12/29/2017    COLONOSCOPY CONTROL HEMORRHAGE performed by Prince Alona MD at [address] [facility] [address]  8/16/2018    COLONOSCOPY POLYPECTOMY SNARE/COLD BIOPSY performed by Prince Alona MD at [facility]    COLONOSCOPY  8/19/2018    COLONOSCOPY CONTROL HEMORRHAGE performed by Prince Alona MD at [facility]    COLONOSCOPY N/A 8/20/2018    COLONOSCOPY CONTROL HEMORRHAGE performed by Prince Alona MD at [address]  [address]'s    EKG 12-LEAD  4/29/2015         EYE SURGERY      cataracts    HERNIA REPAIR      several    HIP PINNING Left 8/31/2017    LEFT HIP INTERTAN performed by Elise Weiss MD at [facility] [address]  12/3/12    left     MYRINGOTOMY AND TYMPANOSTOMY TUBE PLACEMENT  7/23/13    left     NASAL SINUS SURGERY      OTHER SURGICAL HISTORY  04/27/2015    transurethral Incision bladder neck    PACEMAKER INSERTION      PACEMAKER PLACEMENT  2011    DC COLONOSCOPY FLX DX W/COLLJ SPEC WHEN PFRMD Left 8/18/2018    COLONOSCOPY performed by Prince Alona MD at [facility]    DC [address] DX/THER SBST INTRLMNR LMBR/SAC W/IMG GDN N/A 6/25/2018    LUMBAR INTER LAMINAR RUBEN LESI @ L3. performed by Elizabeth Hamilton MD at [place] [address] Right 10/8/2017    Right hip intertan performed by Flora Daniels MD at [address] TURP  4/17/2013    W/CYSTO WITH DIRECT VISION URETHROTOMY & RESECTION BLADDER NECK CONTRACTURE    TURP  4/27/15    re-do TURP    TYMPANOSTOMY TUBE PLACEMENT      multiple surgeries    UPPER GASTROINTESTINAL ENDOSCOPY  12/29/2017    COLONOSCOPY SUBMUCOSAL/BOTOX INJECTION performed by Prince Alona MD at [address] [facility]  8/16/2018    EGD DIAGNOSTIC ONLY performed by Prince Alona MD at [facility] Left 8/19/2018 EGD DIAGNOSTIC ONLY performed by Domitila Celaya MD at [address] [facility]       Restrictions/Precautions:  Fall Risk                            Prior Level of Function:  ADL Assistance: Independent  Homemaking Assistance: Needs assistance (family assist some )  Ambulation Assistance: Independent  Transfer Assistance: Independent  Additional Comments: Pt was using the RW at home. Subjective       Subjective: Pt reclined in chair upon arrival, agreeable to OT session. Comments: RN ok'd session. Overall Orientation Status: Within Functional Limits         Pain:  Pain Assessment  Patient Currently in Pain: Denies       Objective  Overall Cognitive Status: WFL    Perception  Overall Perceptual Status: WFL                                                              ADL  Grooming: Stand by assistance (washing/drying hands, combing hair while standing at sink)  Toileting: Supervision (voiding while seated)          Bed mobility  Scooting: Supervision (scooting forward in bedside chair)    Transfers  Sit to stand: Supervision (from bedside chair)  Stand to sit: Supervision (to bedside chair)  Toilet Transfers  Toilet - Technique: Ambulating  Equipment Used: Grab bars  Toilet Transfer: Supervision  Toilet Transfers Comments: Cues for use of grab bar to assist with slow descent onto toilet, good carryover    Balance  Sitting Balance: Supervision  Standing Balance: Stand by assistance     Time: X1 minute  Activity: grooming task  Comment: Bilateral release throughout ADL task, no LOB. Functional Mobility  Functional - Mobility Device: Rolling Walker  Activity: To/from bathroom; Other (and around nursing module)  Assist Level: Stand by assistance  Functional Mobility Comments: Steady pace, no LOB. Occasional cues for upright gaze for increased safety. Functional mobility completed to increase overall activity tolerance needed for ADL/IADL completion.                                     Type of ROM/Therapeutic

## 2018-08-23 NOTE — PROGRESS NOTES
Patient alert, oriented to person, place, time. States \"feeling real good today\" and denies any current pain. Mucus membranes pink, moist.  Upper extremities warm, dry, appropriate color for ethnicity. Hand grasp strong, equal bilaterally. Cap refill < 3sec. Denies numbness or tingling. Able to move upper extremities freely. Peripheral IV in right hand, clean dry intact and capped. Peripheral IV in left upper arm, clean dry intact and capped. Heart sounds have irregular rhythm, with S1 and S2 present. Lung sounds clear throughout, on room air, symmetrical chest expansion. Abdomen soft. Tenderness felt on LLQ, denies any pain, states passing gas, bowel sounds active x4. Lower extremities appropriate color, warm, dry. No edema present. Pedal push pull strong equal bilaterally.    Patient semi fowlers in chair with call light in reach stating he wants to rest.    Electronically signed by Margarito Johnson on 8/23/2018 at 12:56 PM [facility]

## 2018-08-24 ENCOUNTER — TELEPHONE (OUTPATIENT)
Dept: FAMILY MEDICINE CLINIC | Age: 83
End: 2018-08-24

## 2018-08-24 VITALS
HEART RATE: 80 BPM | OXYGEN SATURATION: 96 % | SYSTOLIC BLOOD PRESSURE: 130 MMHG | RESPIRATION RATE: 18 BRPM | DIASTOLIC BLOOD PRESSURE: 72 MMHG | HEIGHT: 63 IN | WEIGHT: 135.14 LBS | BODY MASS INDEX: 23.95 KG/M2 | TEMPERATURE: 98.9 F

## 2018-08-24 LAB
BASOPHILS # BLD: 0.8 %
BASOPHILS ABSOLUTE: 0.1 THOU/MM3 (ref 0–0.1)
EOSINOPHIL # BLD: 10.4 %
EOSINOPHILS ABSOLUTE: 0.8 THOU/MM3 (ref 0–0.4)
ERYTHROCYTE [DISTWIDTH] IN BLOOD BY AUTOMATED COUNT: 14.5 % (ref 11.5–14.5)
ERYTHROCYTE [DISTWIDTH] IN BLOOD BY AUTOMATED COUNT: 48.4 FL (ref 35–45)
HCT VFR BLD CALC: 25.5 % (ref 42–52)
HEMOGLOBIN: 8.7 GM/DL (ref 14–18)
IMMATURE GRANS (ABS): 0.04 THOU/MM3 (ref 0–0.07)
IMMATURE GRANULOCYTES: 0.5 %
LYMPHOCYTES # BLD: 13.9 %
LYMPHOCYTES ABSOLUTE: 1.1 THOU/MM3 (ref 1–4.8)
MCH RBC QN AUTO: 31.2 PG (ref 26–33)
MCHC RBC AUTO-ENTMCNC: 34.1 GM/DL (ref 32.2–35.5)
MCV RBC AUTO: 91.4 FL (ref 80–94)
MONOCYTES # BLD: 13.6 %
MONOCYTES ABSOLUTE: 1 THOU/MM3 (ref 0.4–1.3)
NUCLEATED RED BLOOD CELLS: 0 /100 WBC
PLATELET # BLD: 263 THOU/MM3 (ref 130–400)
PMV BLD AUTO: 8.7 FL (ref 9.4–12.4)
RBC # BLD: 2.79 MILL/MM3 (ref 4.7–6.1)
SEG NEUTROPHILS: 60.8 %
SEGMENTED NEUTROPHILS ABSOLUTE COUNT: 4.7 THOU/MM3 (ref 1.8–7.7)
WBC # BLD: 7.7 THOU/MM3 (ref 4.8–10.8)

## 2018-08-24 PROCEDURE — 97530 THERAPEUTIC ACTIVITIES: CPT

## 2018-08-24 PROCEDURE — 97535 SELF CARE MNGMENT TRAINING: CPT

## 2018-08-24 PROCEDURE — 36415 COLL VENOUS BLD VENIPUNCTURE: CPT

## 2018-08-24 PROCEDURE — 6370000000 HC RX 637 (ALT 250 FOR IP): Performed by: INTERNAL MEDICINE

## 2018-08-24 PROCEDURE — 85025 COMPLETE CBC W/AUTO DIFF WBC: CPT

## 2018-08-24 PROCEDURE — 6370000000 HC RX 637 (ALT 250 FOR IP): Performed by: NURSE PRACTITIONER

## 2018-08-24 PROCEDURE — 97110 THERAPEUTIC EXERCISES: CPT

## 2018-08-24 PROCEDURE — 97116 GAIT TRAINING THERAPY: CPT

## 2018-08-24 PROCEDURE — 2709999900 HC NON-CHARGEABLE SUPPLY

## 2018-08-24 PROCEDURE — 99239 HOSP IP/OBS DSCHRG MGMT >30: CPT | Performed by: INTERNAL MEDICINE

## 2018-08-24 PROCEDURE — 2580000003 HC RX 258: Performed by: INTERNAL MEDICINE

## 2018-08-24 RX ORDER — PANTOPRAZOLE SODIUM 40 MG/1
40 TABLET, DELAYED RELEASE ORAL
Qty: 30 TABLET | Refills: 3 | Status: SHIPPED | OUTPATIENT
Start: 2018-08-25 | End: 2019-05-11

## 2018-08-24 RX ADMIN — TAMSULOSIN HYDROCHLORIDE 0.4 MG: 0.4 CAPSULE ORAL at 08:47

## 2018-08-24 RX ADMIN — PANTOPRAZOLE SODIUM 40 MG: 40 TABLET, DELAYED RELEASE ORAL at 06:41

## 2018-08-24 RX ADMIN — SERTRALINE 50 MG: 50 TABLET, FILM COATED ORAL at 08:47

## 2018-08-24 RX ADMIN — Medication 10 ML: at 08:47

## 2018-08-24 RX ADMIN — MULTIPLE VITAMINS W/ MINERALS TAB 0.5 TABLET: TAB at 08:47

## 2018-08-24 RX ADMIN — Medication 1000 MCG: at 08:47

## 2018-08-24 ASSESSMENT — PAIN SCALES - GENERAL
PAINLEVEL_OUTOF10: 0
PAINLEVEL_OUTOF10: 0

## 2018-08-24 NOTE — PROGRESS NOTES
Nutrition Assessment    Type and Reason for Visit: Reassess (follow-up)    Nutrition Recommendations: Continue current diet and ONS    Malnutrition Assessment:  · Malnutrition Status: Meets the criteria for severe malnutrition  · Context: Acute illness or injury  · Findings of the 6 clinical characteristics of malnutrition (Minimum of 2 out of 6 clinical characteristics is required to make the diagnosis of moderate or severe Protein Calorie Malnutrition based on AND/ASPEN Guidelines):  1. Energy Intake-Less than or equal to 75%, greater than 7 days    2. Weight Loss-5% loss or greater, in 1 year  3. Fat Loss-Moderate subcutaneous fat loss, Orbital  4. Muscle Loss-Moderate muscle mass loss, Temples (temporalis muscle), Clavicles (pectoralis and deltoids)  5. Fluid Accumulation-No significant fluid accumulation, Extremities  6.  Strength-Not measured    Nutrition Diagnosis:   · Problem: Severe malnutrition, in context of acute illness or injury  · Etiology: related to Insufficient energy/nutrient consumption     Signs and symptoms:  as evidenced by Moderate muscle loss, Moderate loss of subcutaneous fat    Nutrition Assessment:  · Subjective Assessment: Patient initially admitted with GI bleed. Patient with severe diverticulosis, culterized area. Attempted to see patient, however he is in shower. Patient's wife available and reports that patient ate both a FL tray and then a tray of solids. Wife reports that patient was very happy with the increase in his diet and said that everything tasted really good. Patient has ensure at bedside and wife is not sure if he had any yesterday. Wife admits that patient has ensure at home but does not drink much of it.   No pertinent labs/meds noted  · Wound Type: None  · Current Nutrition Therapies:  · Oral Diet Orders: Cardiac (High fiber)   · Oral Diet intake: 51-75%  · Oral Nutrition Supplement (ONS) Orders: Standard High Calorie Oral Supplement (Ensure enlive TID)  · ONS intake: Unable to assess (not recorded, wife not sure patient has drank any yet)  · Anthropometric Measures:  · Ht: 5' 3\" (160 cm)   · Current Body Wt: 135 lb (61.2 kg) (8/20/18, actual, no edema)  · Admission Body Wt: 134 lb (60.8 kg) (8/15; no edema noted)  · Usual Body Wt:  (155-160 lbs; 140 lb 9.6 oz on 7/9/18; 145 lb 9.6 oz on 5/16/18; 140 lb 14.4 oz on 1/30/18; 141 lb 9.6 oz on 8/30/17)  · % Weight Change: +12% weight gain (16 lbs) in 4 days since admit; no edema noted; question weight accuracy?!,  -  · Ideal Body Wt: 124 lb (56.2 kg), % Ideal Body 109%  · BMI Classification:  (24)  · Comparative Standards (Estimated Nutrition Needs):  · Estimated Daily Total Kcal: 3937-9659 kcal/day  · Estimated Daily Protein (g): 72-85 g/day    Estimated Intake vs Estimated Needs: Intake Improving    Nutrition Risk Level: Moderate    Nutrition Interventions:   Continue current diet, Continue current ONS  Continued Inpatient Monitoring, Education Not Indicated    Nutrition Evaluation:   · Evaluation: Progressing toward goals   · Goals: Pt will consume 75% or more of meals during LOS    · Monitoring: Meal Intake, Supplement Intake, Diet Tolerance, Ascites/Edema, Weight, Comparative Standards, Pertinent Labs    See Adult Nutrition Doc Flowsheet for more detail.      Electronically signed by Tere Borges RD, BENOIT on 8/24/18 at 11:04 AM    Contact Number: [phone]

## 2018-08-24 NOTE — DISCHARGE SUMMARY
Progress Note    Patient:  Idris Maddox      Unit/Bed:5K-02/002-A    YOB: 1931    MRN: [id number]       Acct: [de-identified]     PCP: Yanet Kaur DO    Date of Admission: 8/15/2018    Chief Complaint:  Rectal bleeding    Hospital Course:  80 y.o. male who presented to [facility] with bright red blood per rectum. History of colonoscopy with polypectomy at the end of 2017. Colonoscopy/EGD 8/16, no active bleed was noted. He continued to have rectal bleeding. 8/18 colonoscopy was repeat, again no bleed was found. Hemoglobin dropped from 10.1 to 6.7. 2 units of PRBC was transfused. Bleeding scan was negative. Patient became symptomatic with dizziness. Repeat colonoscopy was completed 8/19 bleed was noted, epi was injected and clip was placed. Hbg improved to 8.6, but later dropped to 6.6. Again he was transfused. 4th colonoscopy was completed 8/20. Clips placed x 2 with APC. He was transferred to the ICU for additional monitoring. Hbg 8.4 post procedure, he was weak and fatigued. Had mild shortness of breath. Tachycardic in the low 100's. He was transfused with an additional unit of PRBC. Subjective: Was briefly in the ICU post his fourth colonoscopy  Transferred out of ICU 08/21/2018    Received multiple units of blood transfusion during this admission  Hemoglobin has been around 8.5 and has been stable    diet has been advanced and tolerating well   Working well with PT    Medications:  Reviewed       Medication List      START taking these medications    pantoprazole 40 MG tablet  Commonly known as:  PROTONIX  Take 1 tablet by mouth every morning (before breakfast)  Notes to patient:  For stomach        CHANGE how you take these medications    acetaminophen 650 MG extended release tablet  Commonly known as:  TYLENOL  What changed:  Another medication with the same name was removed. Continue taking this medication, and follow the directions you see here.      ferrous sulfate 325 (65 Fe) MG tablet  What changed:  Another medication with the same name was removed. Continue taking this medication, and follow the directions you see here. CONTINUE taking these medications    albuterol sulfate  (90 Base) MCG/ACT inhaler  Commonly known as:  VENTOLIN HFA  Inhale 2 puffs into the lungs every 6 hours as needed for Wheezing     COQ-10 PO     cyanocobalamin 1000 MCG tablet  Take 1 tablet by mouth daily     docusate sodium 100 MG capsule  Commonly known as:  COLACE     Fish Oil 1200 MG Caps     losartan 50 MG tablet  Commonly known as:  COZAAR  Take 1 tablet by mouth daily  Notes to patient:  Monitor blood pressure     metoprolol tartrate 25 MG tablet  Commonly known as:  LOPRESSOR  Take 0.5 tablets by mouth daily Substitute for atenolol.   Notes to patient:  Monitor blood pressure     sertraline 50 MG tablet  Commonly known as:  ZOLOFT  TAKE ONE TABLET BY MOUTH ONCE DAILY     tamsulosin 0.4 MG capsule  Commonly known as:  FLOMAX  Take 1 capsule by mouth 2 times daily     therapeutic multivitamin-minerals tablet        STOP taking these medications    aspirin 81 MG tablet  Notes to patient:  Follow-up with Dr Nury Vickers (GI) and Dr Hector Zuniga (cardiology)     clopidogrel 75 MG tablet  Commonly known as:  PLAVIX  Notes to patient:  Follow-up with Dr Nury Vickers (GI) and Dr Hector Zuniga (cardiology)     De Comert 96 Liqd     isosorbide mononitrate 30 MG extended release tablet  Commonly known as:  IMDUR     KRILL OIL PO     senna-docusate 8.6-50 MG per tablet  Commonly known as:  DOK PLUS     TURMERIC PO           Where to Get Your Medications      These medications were sent to [address]    Phone:  [phone]   · pantoprazole 40 MG tablet         Intake/Output Summary (Last 24 hours) at 08/24/18 1331  Last data filed at 08/24/18 0852   Gross per 24 hour   Intake              530 ml   Output              650 ml   Net             -120 ml       Diet:  Dietary Nutrition Supplements: Standard High Calorie Oral Supplement  DIET CARDIAC; High Fiber    Exam:  /72   Pulse 80   Temp 98.9 °F (37.2 °C) (Oral)   Resp 18   Ht 5' 3\" (1.6 m)   Wt 135 lb 2.3 oz (61.3 kg)   SpO2 96%   BMI 23.94 kg/m²     General appearance: No apparent distress, appears stated age and cooperative. HEENT: Pupils equal, round, and reactive to light. Conjunctivae/corneas clear. Neck: Supple, with full range of motion. No jugular venous distention. Trachea midline. Respiratory:  Normal respiratory effort. Clear to auscultation, bilaterally without Rales/Wheezes/Rhonchi. Cardiovascular: Regular rate and irregular rhythm with normal S1/S2 without murmurs, rubs or gallops. Abdomen: Soft, non-tender, non-distended with normal bowel sounds. Musculoskeletal: passive and active ROM x 4 extremities. Skin: Skin color, texture, turgor normal.  No rashes or lesions. Neurologic:  Neurovascularly intact without any focal sensory/motor deficits. Cranial nerves: II-XII intact, grossly non-focal.  Psychiatric: Alert and oriented, thought content appropriate, normal insight  Capillary Refill: Brisk,< 3 seconds   Peripheral Pulses: +2 palpable, equal bilaterally       Labs:   Recent Labs      08/22/18   0635   08/23/18   0711  08/23/18   1203  08/24/18   0646   WBC  7.1   --    --    --   7.7   HGB  8.3*  8.5*   < >  8.4*  9.3*  8.7*   HCT  24.5*  24.5*   < >  25.1*  28.1*  25.5*   PLT  223   --    --    --   263    < > = values in this interval not displayed. No results for input(s): NA, K, CL, CO2, BUN, CREATININE, CALCIUM, PHOS in the last 72 hours. Invalid input(s): MAGNES  No results for input(s): AST, ALT, BILIDIR, BILITOT, ALKPHOS in the last 72 hours. No results for input(s): INR in the last 72 hours. No results for input(s): [place] Pedlar in the last 72 hours.     Urinalysis:      Lab Results Component Value Date    NITRU NEGATIVE 08/15/2018    WBCUA 0-5 02/19/2018    BACTERIA MANY 09/08/2017    RBCUA 0-2 02/19/2018    BLOODU NEGATIVE 08/15/2018    GLUCOSEU NEGATIVE 08/15/2018       Radiology:  NM GI BLOOD LOSS   Final Result   1. No scintigraphic evidence for active gastrointestinal bleeding. **This report has been created using voice recognition software. It may contain minor errors which are inherent in voice recognition technology. **      Final report electronically signed by Dr. Gabriel Baum on 8/20/2018 9:03 PM      NM GI BLOOD LOSS   Final Result      No scintigraphic evidence of active gastrointestinal bleeding. Final report electronically signed by Dr. Citlali Smith on 8/17/2018 1:55 PM      XR Acute Abd Series Chest 1 VW   Final Result   1. Nonobstructive bowel gas pattern with no evidence of free intraperitoneal air. 2. Chronic fibrotic changes of the lungs. **This report has been created using voice recognition software. It may contain minor errors which are inherent in voice recognition technology. **      Final report electronically signed by Dr Noemí Gray on 8/15/2018 2:13 PM          Diet: Dietary Nutrition Supplements: Standard High Calorie Oral Supplement  DIET CARDIAC; High Fiber    DVT prophylaxis: [] Lovenox                                 [x] SCDs                                 [] SQ Heparin                                 [] Encourage ambulation           [] Already on Anticoagulation     Disposition:    [] Home       [] TCU       [x] Rehab ?        [] Psych       [] SNF       [] [facility]       [] Other-    Code Status: Limited    PT/OT Eval Status: completed prior to ICU admission    Assessment/Plan:    Anticipated Discharge in : 2 days    Active Hospital Problems    Diagnosis Date Noted    Tachycardia [R00.0]     Gastritis [K29.70] 08/17/2018    Severe malnutrition ([facility] [address] [E43] 08/16/2018     Class: Acute    Lower GI bleed

## 2018-08-24 NOTE — PLAN OF CARE
Problem: Skin Integrity/Risk  Goal: No skin breakdown during hospitalization  Outcome: Ongoing  Skin care provided, kept warm & dry. No new skin issues noted. Problem: Discharge Planning:  Goal: Patients continuum of care needs are met  Patients continuum of care needs are met   Outcome: Ongoing  Patient acknowledges and voices understanding of discharge plans. Problem: Risk for Impaired Skin Integrity  Goal: Tissue integrity - skin and mucous membranes  Structural intactness and normal physiological function of skin and  mucous membranes. Outcome: Ongoing  Skin care provided, kept warm & dry. No new skin issues noted. Problem: Falls - Risk of:  Goal: Will remain free from falls  Will remain free from falls   Outcome: Ongoing  Absent from falls. Bed/chair alarms utilized & functioning properly, fall sign posted, non-skid footwear on when out of bed, hourly rounding, call light within reach. Problem: Pain:  Goal: Pain level will decrease  Pain level will decrease   Outcome: Ongoing  Patient's stated pain goal is 3. Patient rates pain of 0. PRN analgesics and non-pharmacological interventions in place. Pain assessments ongoing. Comments: Care plan reviewed with patient. Patient verbalizes understanding of plan of care and contributes to goal setting.

## 2018-08-24 NOTE — TELEPHONE ENCOUNTER
8/28/18 (Tue) 3:00 PM 30 min Deandra Hutton DO SRPX  [address] [facility]     Copay: $20.00    Insurance: [employer] [facility] PPO Effective dates: 1/01/18 -     Notes: hospital f/u

## 2018-08-24 NOTE — PLAN OF CARE
Problem: Skin Integrity/Risk  Goal: No skin breakdown during hospitalization  Outcome: Ongoing  Absent of any new skin breakdown issues. Skin care provided. Problem: Safety:  Goal: Free from accidental physical injury  Free from accidental physical injury   Outcome: Ongoing  Patient absent of falls this shift, falling star program in place. Problem: Discharge Planning:  Goal: Patients continuum of care needs are met  Patients continuum of care needs are met   Outcome: Ongoing  Plans for home health at discharge. Problem: Nutrition  Goal: Optimal nutrition therapy  Outcome: Ongoing  Good appetite ate 100% breakfast.    Problem: Musculor/Skeletal Functional Status  Goal: Highest potential functional level  Outcome: Ongoing  1 assist stand by with walker for ambulation. Problem: Pain:  Goal: Pain level will decrease  Pain level will decrease   Outcome: Ongoing  Denies pain. Comments: Care plan reviewed with patient. Patient verbalize understanding of the plan of care and contribute to goal setting.

## 2018-08-24 NOTE — PROGRESS NOTES
EGD DIAGNOSTIC ONLY performed by Debbie Sumner MD at [facility]       Restrictions/Precautions:  Fall Risk                            Prior Level of Function:  ADL Assistance: Independent  Homemaking Assistance: Needs assistance (family assist some )  Ambulation Assistance: Independent  Transfer Assistance: Independent  Additional Comments: Pt was using the RW at home. Subjective       Subjective: Pt reclined in chair upon arrival, agreeable to OT session, agreeable to ADLs, requesting a shower  Comments: RN ok'd session. Pain:  Pain Assessment  Patient Currently in Pain: Denies       Objective  Overall Cognitive Status: WFL            ADL  Grooming: Stand by assistance  UE Bathing: Stand by assistance  LE Bathing: Stand by assistance  UE Dressing: Stand by assistance  LE Dressing: Stand by assistance  Toileting: Supervision               Transfers  Sit to stand: Supervision  Stand to sit: Supervision  Toilet Transfers  Toilet - Technique: Ambulating  Equipment Used: Grab bars  Toilet Transfer: Supervision  Shower Transfers  Shower - Transfer Type: To and From  Shower - Transfer To: Shower seat with back  Shower - Technique: Ambulating  Shower Transfers: Stand by assistance  Shower Transfers Comments: Pt. stood in shower for x5 minutes then sat to wash LEs    Balance  Sitting Balance: Supervision  Standing Balance: Stand by assistance     Time: X3 minute, x5 minutes  Activity: ADLs  Comment: Bilateral release throughout ADL task, no LOB. Functional Mobility  Functional - Mobility Device: Rolling Walker  Activity: To/from bathroom; Other  Assist Level: Stand by assistance  Functional Mobility Comments: Steady pace no LOB to and from BR with SBA        Activity Tolerance:  Activity Tolerance: Patient Tolerated treatment well    Assessment:     Performance deficits / Impairments: Decreased functional mobility , Decreased ADL status, Decreased endurance, Decreased safe awareness, Decreased

## 2018-08-24 NOTE — PROGRESS NOTES
COLONOSCOPY  [id number]    COLONOSCOPY  12/29/2017    COLONOSCOPY CONTROL HEMORRHAGE performed by Debbie Sumner MD at [address]  8/16/2018    COLONOSCOPY POLYPECTOMY SNARE/COLD BIOPSY [facility]    COLONOSCOPY  8/19/2018    COLONOSCOPY CONTROL HEMORRHAGE performed by Debbie Sumner MD at [facility] [place]    COLONOSCOPY N/A 8/20/2018    COLONOSCOPY CONTROL HEMORRHAGE performed by Debbie Sumner MD at [address]  1960's    EKG 12-LEAD  4/29/2015         EYE SURGERY      cataracts    HERNIA REPAIR      several    HIP PINNING Left 8/31/2017    LEFT HIP INTERTAN performed by Juan Caldwell MD at [address]  12/3/12    left     MYRINGOTOMY AND TYMPANOSTOMY TUBE PLACEMENT  7/23/13    left     NASAL SINUS SURGERY      OTHER SURGICAL HISTORY  04/27/2015    transurethral Incision bladder neck    PACEMAKER INSERTION      PACEMAKER PLACEMENT  2011    [place] COLONOSCOPY FLX DX W/COLLJ SPEC WHEN PFRMD Left 8/18/2018    COLONOSCOPY performed by Debbie Sumner MD at [facility] [place] [facility]    OH [address] DX/THER SBST INTRLMNR LMBR/SAC W/IMG GDN N/A 6/25/2018    LUMBAR INTER LAMINAR RUBEN LESI @ L3. performed by Sarah Rios MD at [place] [address] Right 10/8/2017    Right hip intertan performed by Eunice Aguilar MD at [facility] TURP  4/17/2013    W/CYSTO WITH DIRECT VISION URETHROTOMY & RESECTION BLADDER NECK CONTRACTURE    TURP  4/27/15    re-do TURP    TYMPANOSTOMY TUBE PLACEMENT      multiple surgeries    UPPER GASTROINTESTINAL ENDOSCOPY  12/29/2017    COLONOSCOPY SUBMUCOSAL/BOTOX INJECTION performed by De[facility] MD at [facility]  8/16/2018    EGD DIAGNOSTIC ONLY performed by Debbie Sumner MD at [address] [place] [address] Left 8/19/2018    EGD DIAGNOSTIC ONLY performed by Citlalli Guzman MD at [facility] [place] [facility]       Restrictions/Precautions:  Fall Risk                            Prior Level of Function:  ADL Assistance: Independent  Homemaking Assistance: Needs assistance (family assist some )  Ambulation Assistance: Independent  Transfer Assistance: Independent  Additional Comments: Pt was using the RW at home. Subjective:     Subjective: RN approved therapy session. Pt. seated in BS chair upon arrival and pleasantly agrees to therapy session. Pain:  Denies. Social/Functional:  Lives With: Spouse  Type of Home: House  Home Layout: One level  Home Access: Stairs to enter with rails  Entrance Stairs - Number of Steps: 1  Home Equipment: Cane, Rolling walker, Wheelchair-manual     Objective:       Transfers  Sit to Stand: Stand by assistance  Stand to sit: Stand by assistance       Ambulation 1  Surface: level tile  Device: Rolling Walker  Assistance: Stand by assistance  Quality of Gait: Decreased leoncio and velocity. Decreased step length and heel strike. Forward flexed posture with downward gaze. Pt. steady with no LOB. Pt. fatigued at end of gait training. Distance: 250' x 1         Exercises:  Exercises  Comments: Pt. performed seated B LE ther ex 10x each consisting of ankle pumps, glute/quad sets, heel slides, hip abd/add, long arc quads, and marches all to increase strength and improve functional mobility. Activity Tolerance:  Activity Tolerance: Patient Tolerated treatment well;Patient limited by endurance    Assessment: Body structures, Functions, Activity limitations: Decreased functional mobility , Decreased strength, Decreased balance, Decreased endurance  Assessment: Pt. tolerated session well. Pt. pleasant and cooperative. Pt. fatigued at end of session. Pt. would benefit from continued skilled PT to increase strength and endurance to enhance functional mobility.    Prognosis: Good     REQUIRES PT FOLLOW UP: Yes  Discharge Recommendations: Continue to assess pending progress, Home with Home health PT, Patient would benefit from continued therapy after discharge    Patient Education:  Patient Education: POC,  transfers, gait training, ther ex    Equipment Recommendations:  Equipment Needed: No    Safety:  Type of devices: All fall risk precautions in place, Call light within reach, Chair alarm in place, Left in chair, Patient at risk for falls, Gait belt, Nurse notified    Plan:  Times per week: 5x GM  Times per day: Daily  Current Treatment Recommendations: Strengthening, Balance Training, Endurance Training, Stair training, Patient/Caregiver Education & Training, Safety Education & Training, Gait Training, Transfer Training, Equipment Evaluation, Education, & procurement, Functional Mobility Training    Goals:  Patient goals : Go home    Short term goals  Time Frame for Short term goals: 2 weeks  Short term goal 1: Patient will transfer supine <--> sit with mod I in order to get into/out of bed. Short term goal 2: Patient will transfer sit <--> stand with mod I in order to get up to ambulate. Short term goal 3: Patient will ambulate 76' with supervision and walker for household ambulation. Short term goal 4: Patient will negotiate 1 steps with 1 hand rail and SBA in order to get into/out of his home.     Long term goals  Time Frame for Long term goals : No LTGs due to estimated length of stay            AM-PAC Inpatient Mobility without Stair Climbing Raw Score : 15  AM-PAC Inpatient without Stair Climbing T-Scale Score : 43.03  Mobility Inpatient CMS 0-100% Score: 47.43  Mobility Inpatient without Stair CMS G-Code Modifier : MARIA ELENA

## 2018-08-25 ENCOUNTER — CARE COORDINATION (OUTPATIENT)
Dept: CASE MANAGEMENT | Age: 83
End: 2018-08-25

## 2018-08-25 NOTE — CARE COORDINATION
the stopped meds. [facility] will  the Protonix today & add to the med box. CTC notified [facility] of discharge  [facility] will call the GI on Monday for the CBC lab order, pt needs before GI appt  Christina denied any other needs or concerns. CTC will refer to the [address].     Follow Up  Future Appointments  Date Time Provider Miguel Sandersi   8/28/2018 3:00 PM Jg Garcia DO SRPX FM [facility] [place]   9/14/2018 9:45 AM CHRISTIN Long - CNP AFLGASL [facility]astro[facility]   9/17/2018 2:40 PM MD CHARLENE Mcfadden KIDNEY P - [place]   2/1/2019 11:30 AM CHRISTIN Poole CNP ENT P - Elan Lowery RN  Care Transition Coordinator  [phone]

## 2018-08-27 NOTE — TELEPHONE ENCOUNTER
[facility] Transitions Initial Follow Up Call    Outreach made within 2 business days of discharge: Yes    Patient: Adithya Gutierrez Patient : 1931   MRN: [id number]  Reason for Admission: There are no discharge diagnoses documented for the most recent discharge. Discharge Date: 18       Spoke with: Sharyle Ruiz Phone: [phone], spoke with Christina     Discharge department/facility: HOME    TCM Interactive Patient Contact:  Was patient able to fill all prescriptions: Yes  Was patient instructed to bring all medications to the follow-up visit: Yes  Is patient taking all medications as directed in the discharge summary?  Yes  Does patient understand their discharge instructions: Yes  Does patient have questions or concerns that need addressed prior to 7-14 day follow up office visit: no    Scheduled appointment with PCP within 7-14 days    Follow Up  Future Appointments  Date Time Provider Miguel Romo   2018 3:00 PM Nina Montoya DO SRPX  RES [address] [place] [address]   2018 9:45 AM CHRISTIN Scott CNP AFLGASL [facility] Gastro[facility]   2018 2:40 PM MD CHARLENE Guzman KIDNEY [facility] - [place]   2019 11:30 AM CHRISTIN Poole CNP ENT MEGGANP - Candance Prudent Art, LPN

## 2018-08-28 ENCOUNTER — OFFICE VISIT (OUTPATIENT)
Dept: FAMILY MEDICINE CLINIC | Age: 83
End: 2018-08-28
Payer: MEDICARE

## 2018-08-28 VITALS
SYSTOLIC BLOOD PRESSURE: 98 MMHG | TEMPERATURE: 98.2 F | OXYGEN SATURATION: 97 % | WEIGHT: 133.8 LBS | HEART RATE: 71 BPM | BODY MASS INDEX: 23.71 KG/M2 | RESPIRATION RATE: 10 BRPM | HEIGHT: 63 IN | DIASTOLIC BLOOD PRESSURE: 64 MMHG

## 2018-08-28 DIAGNOSIS — D50.9 IRON DEFICIENCY ANEMIA, UNSPECIFIED IRON DEFICIENCY ANEMIA TYPE: ICD-10-CM

## 2018-08-28 DIAGNOSIS — N18.30 CKD (CHRONIC KIDNEY DISEASE) STAGE 3, GFR 30-59 ML/MIN (HCC): ICD-10-CM

## 2018-08-28 DIAGNOSIS — Z23 NEED FOR PROPHYLACTIC VACCINATION AND INOCULATION AGAINST VARICELLA: ICD-10-CM

## 2018-08-28 DIAGNOSIS — D62 ACUTE BLOOD LOSS ANEMIA: ICD-10-CM

## 2018-08-28 DIAGNOSIS — I25.10 CORONARY ARTERY DISEASE INVOLVING NATIVE HEART WITHOUT ANGINA PECTORIS, UNSPECIFIED VESSEL OR LESION TYPE: ICD-10-CM

## 2018-08-28 DIAGNOSIS — Z23 NEED FOR PROPHYLACTIC VACCINATION AGAINST STREPTOCOCCUS PNEUMONIAE (PNEUMOCOCCUS): ICD-10-CM

## 2018-08-28 DIAGNOSIS — D50.0 IRON DEFICIENCY ANEMIA DUE TO CHRONIC BLOOD LOSS: ICD-10-CM

## 2018-08-28 DIAGNOSIS — E43 SEVERE MALNUTRITION (HCC): Primary | ICD-10-CM

## 2018-08-28 DIAGNOSIS — K92.2 LOWER GI BLEED: ICD-10-CM

## 2018-08-28 DIAGNOSIS — I10 ESSENTIAL HYPERTENSION: ICD-10-CM

## 2018-08-28 LAB
ANION GAP SERPL CALCULATED.3IONS-SCNC: 12 MEQ/L (ref 8–16)
BASOPHILS # BLD: 1.3 %
BASOPHILS ABSOLUTE: 0.1 THOU/MM3 (ref 0–0.1)
BUN BLDV-MCNC: 27 MG/DL (ref 7–22)
CALCIUM SERPL-MCNC: 8.5 MG/DL (ref 8.5–10.5)
CHLORIDE BLD-SCNC: 101 MEQ/L (ref 98–111)
CO2: 25 MEQ/L (ref 23–33)
CREAT SERPL-MCNC: 1.4 MG/DL (ref 0.4–1.2)
EOSINOPHIL # BLD: 10.4 %
EOSINOPHILS ABSOLUTE: 0.7 THOU/MM3 (ref 0–0.4)
ERYTHROCYTE [DISTWIDTH] IN BLOOD BY AUTOMATED COUNT: 14.5 % (ref 11.5–14.5)
ERYTHROCYTE [DISTWIDTH] IN BLOOD BY AUTOMATED COUNT: 49.7 FL (ref 35–45)
GFR SERPL CREATININE-BSD FRML MDRD: 48 ML/MIN/1.73M2
GLUCOSE BLD-MCNC: 101 MG/DL (ref 70–108)
HCT VFR BLD CALC: 27.1 % (ref 42–52)
HEMOGLOBIN: 8.6 GM/DL (ref 14–18)
IMMATURE GRANS (ABS): 0.04 THOU/MM3 (ref 0–0.07)
IMMATURE GRANULOCYTES: 0.6 %
LYMPHOCYTES # BLD: 13.4 %
LYMPHOCYTES ABSOLUTE: 0.8 THOU/MM3 (ref 1–4.8)
MAGNESIUM: 2 MG/DL (ref 1.6–2.4)
MCH RBC QN AUTO: 30 PG (ref 26–33)
MCHC RBC AUTO-ENTMCNC: 31.7 GM/DL (ref 32.2–35.5)
MCV RBC AUTO: 94.4 FL (ref 80–94)
MONOCYTES # BLD: 13.2 %
MONOCYTES ABSOLUTE: 0.8 THOU/MM3 (ref 0.4–1.3)
NUCLEATED RED BLOOD CELLS: 0 /100 WBC
PLATELET # BLD: 350 THOU/MM3 (ref 130–400)
PMV BLD AUTO: 9.1 FL (ref 9.4–12.4)
POTASSIUM SERPL-SCNC: 4.6 MEQ/L (ref 3.5–5.2)
RBC # BLD: 2.87 MILL/MM3 (ref 4.7–6.1)
SEG NEUTROPHILS: 61.1 %
SEGMENTED NEUTROPHILS ABSOLUTE COUNT: 3.8 THOU/MM3 (ref 1.8–7.7)
SODIUM BLD-SCNC: 138 MEQ/L (ref 135–145)
WBC # BLD: 6.3 THOU/MM3 (ref 4.8–10.8)

## 2018-08-28 PROCEDURE — 1111F DSCHRG MED/CURRENT MED MERGE: CPT | Performed by: FAMILY MEDICINE

## 2018-08-28 PROCEDURE — G0009 ADMIN PNEUMOCOCCAL VACCINE: HCPCS | Performed by: FAMILY MEDICINE

## 2018-08-28 PROCEDURE — 36415 COLL VENOUS BLD VENIPUNCTURE: CPT | Performed by: FAMILY MEDICINE

## 2018-08-28 PROCEDURE — 90670 PCV13 VACCINE IM: CPT | Performed by: FAMILY MEDICINE

## 2018-08-28 PROCEDURE — 99495 TRANSJ CARE MGMT MOD F2F 14D: CPT | Performed by: FAMILY MEDICINE

## 2018-08-28 ASSESSMENT — ENCOUNTER SYMPTOMS
CONSTIPATION: 0
BLOOD IN STOOL: 0
NAUSEA: 0
ABDOMINAL PAIN: 0
COUGH: 0
EYE PAIN: 0
VOMITING: 0
DIARRHEA: 0
SHORTNESS OF BREATH: 1
TROUBLE SWALLOWING: 0

## 2018-08-28 NOTE — PROGRESS NOTES
Venkatesh Schmidt is a 80 y.o. male who presents today for:  Chief Complaint   Patient presents with    Follow-Up from [address] Blood Pressure < 140/90      Reduce Falls             I will reduce my risk of falls by the following: Remove rugs or use non slip rugs  Install grab bars in bathroom  Use walking aids like cane or walker  Follow through on orders for PT    Barriers: none  Plan for overcoming my barriers: N/A  Confidence: 6/10  Anticipated Goal Completion Date: 6/8/18            HPI:     HPI: Patient is experiencing shortness of breath. He has noticed it since getting out of the hospital. He was hospitalized for a GI bleed. They did get the gi bleed sorted out after a few colonoscopies - he had a lot of blood 8 units in the [facility]    No question data found.     Past Medical History:   Diagnosis Date    Acute sinusitis     Angina pectoris (HCC)     Anxiety disorder 10/27/2016    Arthritis     osteoporosis    BPH with urinary obstruction     CAD (coronary artery disease)     Chronic insomnia     CKD (chronic kidney disease) stage 3, GFR 30-59 ml/min     COPD (chronic obstructive pulmonary disease) (HCC)     Gastroenteritis     HCAP (healthcare-associated pneumonia) 4/29/2015    Heart disease     HLD (hyperlipidemia)     [place] (hard of hearing)     wear hearing aids    Hypertension     Kidney disease     40% kdiney function    Kidney stone     years ago 15-20    NICOLAS on CPAP     Osteopenia determined by x-ray     Pacemaker 2011    Pinched nerve     slipped disc in back    Visual problems     Vitamin D deficiency       Past Surgical History:   Procedure Laterality Date    ABDOMINAL HERNIA REPAIR  04/27/2017    incisional hernia repair--Dr. Alejo Curiel CARDIAC SURGERY      CATARACT REMOVAL WITH IMPLANT      bilateral    COLONOSCOPY  5076,7120    COLONOSCOPY  12/29/2017    COLONOSCOPY CONTROL HEMORRHAGE performed by Manuel Chaudhry MD at [address] [place] [address] MULTIVITAMIN-MINERALS) tablet Take 1 tablet by mouth daily        No current facility-administered medications for this visit. Allergies   Allergen Reactions    Iodine Swelling and Rash       Health Maintenance   Topic Date Due    Shingles Vaccine (1 of 2 - 2 Dose Series) 11/24/1981    Pneumococcal low/med risk (2 of 2 - PCV13) 10/27/2017    Flu vaccine (1) 09/01/2018    Potassium monitoring  08/20/2019    Creatinine monitoring  08/20/2019    DTaP/Tdap/Td vaccine (2 - Td) 06/16/2027       Subjective:      Review of Systems   Constitutional: Negative for chills, fatigue and fever. HENT: Negative for ear pain, postnasal drip and trouble swallowing. Eyes: Negative for pain and visual disturbance. Respiratory: Positive for shortness of breath. Negative for cough. Cardiovascular: Negative for chest pain and palpitations. Gastrointestinal: Negative for abdominal pain, blood in stool, constipation, diarrhea, nausea and vomiting. Skin: Negative for rash. Neurological: Negative for headaches. Cv: RRR, no murmurs, gallops, or rubs  Resp: decreased breath sounds    Objective:     Vitals:    08/28/18 1443   BP: 98/64   Site: Left Arm   Position: Sitting   Cuff Size: Medium Adult   Pulse: 71   Resp: 10   Temp: 98.2 °F (36.8 °C)   TempSrc: Oral   SpO2: 97%   Weight: 133 lb 12.8 oz (60.7 kg)   Height: 5' 2.99\" (1.6 m)       Body mass index is 23.71 kg/m². Wt Readings from Last 3 Encounters:   08/28/18 133 lb 12.8 oz (60.7 kg)   08/20/18 135 lb 2.3 oz (61.3 kg)   07/31/18 140 lb 9.6 oz (63.8 kg)     BP Readings from Last 3 Encounters:   08/28/18 98/64   08/24/18 130/72   08/20/18 132/62       Physical Exam   Constitutional: He is oriented to person, place, and time. He appears well-developed and well-nourished. No distress. HENT:   Head: Normocephalic and atraumatic.    Right Ear: External ear normal.   Left Ear: External ear normal.   Eyes: Conjunctivae and EOM are normal. Right eye exhibits no discharge. Left eye exhibits no discharge. No scleral icterus. Neck: Normal range of motion. Cardiovascular: Normal rate, regular rhythm and normal heart sounds. No murmur heard. Pulmonary/Chest: Effort normal and breath sounds normal.   Musculoskeletal: He exhibits no edema. Neurological: He is alert and oriented to person, place, and time. No cranial nerve deficit. Skin: Skin is warm and dry. No rash noted. He is not diaphoretic. No erythema. Psychiatric: He has a normal mood and affect. His behavior is normal. Judgment and thought content normal.   Nursing note and vitals reviewed. Lab Results   Component Value Date    WBC 7.7 08/24/2018    HGB 8.7 (L) 08/24/2018    HCT 25.5 (L) 08/24/2018     08/24/2018    CHOL 118 12/05/2016    TRIG 93 12/05/2016    HDL 56 12/05/2016    LDLCALC 43 12/05/2016    AST 15 08/15/2018     08/20/2018    K 3.8 08/20/2018     08/20/2018    CREATININE 1.2 08/20/2018    BUN 23 (H) 08/20/2018    CO2 23 08/20/2018    TSH 1.620 12/27/2017    INR 1.08 08/15/2018    LABMICR 3.18 09/14/2016    LABGLOM 57 (A) 08/20/2018    MG 2.0 08/22/2018    CALCIUM 8.2 (L) 08/20/2018    VITD25 43 09/05/2017       Imaging Results:    Xr Acute Abd Series Chest 1 Vw    Result Date: 8/15/2018  PROCEDURE: XR ACUTE ABD SERIES CHEST 1 VW CLINICAL INFORMATION: 60-year-old male with gastrointestinal bleed. COMPARISON: Comparison is made to previous chest x-ray dated 5/10/2018. TECHNIQUE: A PA upright view of the chest was obtained. AP and supine views of the abdomen were obtained. FINDINGS: The cardiac silhouette is at the upper limits of normal. There is a dual lead pacemaker projecting over the left hemithorax. Fibrotic changes are seen throughout the bilateral lungs and appear stable when compared to the prior study. There is no significant pleural effusion or pneumothorax. No consolidation. There is levoconvex scoliosis of the thoracolumbar spine.  On the AP upright view of the abdomen, there is no free air. On the AP supine view of the abdomen, there are numerous gas-filled bowel loops. There are no displaced small bowel loops. The colon is within acceptable limits. Postsurgical changes are seen involving the proximal femurs bilaterally. Surgical clips are seen throughout the lower abdomen likely on the basis of previous hernia repair. 1. Nonobstructive bowel gas pattern with no evidence of free intraperitoneal air. 2. Chronic fibrotic changes of the lungs. **This report has been created using voice recognition software. It may contain minor errors which are inherent in voice recognition technology. ** Final report electronically signed by Dr Sloan Soni on 8/15/2018 2:13 PM    Nm Gi Blood Loss    Result Date: 8/20/2018  PROCEDURE: NM GI BLOOD LOSS CLINICAL INFORMATION: GI BLEEDING, active GI bleeding ascending arterial bleeding . COMPARISON: No prior study. TECHNIQUE:  A sample of the patient's blood was removed and the red blood cells were labeled with 31.3 mCi technetium 99m pertechnetate using the UltraTag kit and reinjected back into the patient. Immediate images of the abdomen were obtained for 1 minute. Sequential 1 minute acquisitions over the abdomen were obtained for 60 minutes. FINDINGS: No definite foci of abnormal radiotracer accumulation to suggest active gastrointestinal bleeding. Physiologic activity is present in the livers, kidney, bladder and blood pool. 1. No scintigraphic evidence for active gastrointestinal bleeding. **This report has been created using voice recognition software. It may contain minor errors which are inherent in voice recognition technology. ** Final report electronically signed by Dr. Bere Mcnulty on 8/20/2018 9:03 PM    Nm Gi Blood Loss    Result Date: 8/17/2018  PROCEDURE: NM GI BLOOD LOSS CLINICAL INFORMATION: Gastrointestinal bleeding TECHNIQUE: A sample of the patient's blood was removed and the red blood cells were labeled Encounter   Procedures    Pneumococcal conjugate vaccine 13-valent PCV13    CBC Auto Differential    ND DISCHARGE MEDS RECONCILED W/ CURRENT OUTPATIENT MED LIST     Medications Prescribed:  No orders of the defined types were placed in this encounter. Future Appointments  Date Time Provider Miguel Romo   9/17/2018 12:30 PM CHRISTIN Burciaga - CNP AFL[facility]astro[facility]   9/17/2018 2:40 PM Luis Dangelo MD [place] [facility] [place]   2/1/2019 11:30 AM CHRISTIN Poole - CNP ENT [facility] [place]       Patient given educational materials - see patient instructions. Discussed use, benefit, and side effects of prescribed medications. All patient questions answered. Pt voiced understanding. Reviewed health maintenance. Instructed to continue current medications, diet and exercise. Patient agreed with treatment plan. Follow up as directed. Electronically signed by Tarsha Mathis DO on 8/28/2018 at 3:27 PM   Post-Discharge Transitional Care Management Services or Hospital Follow Up      Brigido Bradford   YOB: 1931    Date of Office Visit:  8/28/2018  Date of Hospital Admission: 8/15/18  Date of Hospital Discharge: 8/24/18  Risk of hospital readmission (high >=14%.  Medium >=10%) :Readmission Risk Score: 21    Care management risk score Rising risk (score 2-5) and Complex Care (Scores >=6): 14     Non face to face  following discharge, date last encounter closed (first attempt may have been earlier): 8/25/2018 10:06 AM    Call initiated 2 business days of discharge: Yes    Patient Active Problem List   Diagnosis    Dyspnea    Bladder neck contracture    BPH (benign prostatic hyperplasia)    Suprapubic pain    S/P TURP (status post transurethral resection of prostate)    CKD (chronic kidney disease) stage 3, GFR 30-59 ml/min    CAD (coronary artery disease)    Nausea and vomiting    Chronic serous otitis media of left ear    Hearing loss, sensorineural    Conductive hearing loss, bilateral    History of tympanoplasty of left ear    Tympanosclerosis involving combination of structures    Anxiety disorder    Disc disorder of lumbar region    Pacemaker    Incisional hernia, without obstruction or gangrene    S/P ventral herniorrhaphy    Essential hypertension    Head injury, closed, without LOC    Closed right hip fracture ([address]    At high risk for pain from procedure    NICOLAS (obstructive sleep apnea)    Closed comminuted intertrochanteric fracture of right femur with routine healing    Acute blood loss anemia    Lower GI bleed    Severe malnutrition (HCC)    B12 deficiency    Diverticulosis of large intestine with hemorrhage    Lumbar radiculitis    HNP (herniated nucleus pulposus), lumbar    Severe malnutrition (HCC)    Gastritis    Tachycardia       Allergies   Allergen Reactions    Iodine Swelling and Rash       Medications listed as ordered at the time of discharge from hospital   David Yancey. [address] Medication Instructions DIANNA:    Printed on:08/28/18 3755   Medication Information                      acetaminophen (TYLENOL) 650 MG extended release tablet  Take 650 mg by mouth every 8 hours as needed for Pain             albuterol sulfate HFA (VENTOLIN HFA) 108 (90 Base) MCG/ACT inhaler  Inhale 2 puffs into the lungs every 6 hours as needed for Wheezing             Coenzyme Q10 (COQ-10 PO)  Take 10 mg by mouth daily             docusate sodium (COLACE) 100 MG capsule  Take 100 mg by mouth daily             ferrous sulfate 325 (65 Fe) MG tablet  Take 325 mg by mouth Take 1 tablet every other day. losartan (COZAAR) 50 MG tablet  Take 1 tablet by mouth daily             metoprolol tartrate (LOPRESSOR) 25 MG tablet  Take 0.5 tablets by mouth daily Substitute for atenolol.              Multiple Vitamins-Minerals (THERAPEUTIC MULTIVITAMIN-MINERALS) tablet  Take 1 tablet by mouth daily              Omega-3 Fatty Acids (FISH OIL) 1200 MG CAPS  Take by mouth

## 2018-08-28 NOTE — PROGRESS NOTES
After obtaining consent, and per orders of Dr. Sharif Rodriguez, injection of Prevnar 13 given in Right deltoid by Junior Holguin. Patient instructed to remain in clinic for 20 minutes afterwards, and to report any adverse reaction to me immediately. Pt tolerated injection well.

## 2018-08-28 NOTE — PATIENT INSTRUCTIONS
lightheaded, or you feel like you may faint.     · Your stools are black and look like tar, or they have streaks of blood.     · You have belly pain.     · You vomit or have nausea.    Watch closely for changes in your health, and be sure to contact your doctor if you do not get better as expected. Where can you learn more? Go to https://chpepiceweb.[email or web address] org and sign in to your appsFreedom account. Enter S606 in the ArchiveSocial box to learn more about \"Lower Gastrointestinal Bleeding: Care Instructions. \"     If you do not have an account, please click on the \"Sign Up Now\" link. Current as of: November 20, 2017  Content Version: 11.7  © 2432-6638 [employer]. Care instructions adapted under license by [facility] ([facility]). If you have questions about a medical condition or this instruction, always ask your healthcare professional. [place] [address] any warranty or liability for your use of this information. Patient Education        Gastrointestinal Bleeding: Care Instructions  Your Care Instructions    The digestive or gastrointestinal tract goes from the mouth to the anus. It is often called the GI tract. Bleeding can happen anywhere in the GI tract. It may be caused by an ulcer, an infection, or cancer. It may also be caused by medicines such as aspirin or ibuprofen. Light bleeding may not cause any symptoms at first. But if you continue to bleed for a while, you may feel very weak or tired. Sudden, heavy bleeding means you need to see a doctor right away. This kind of bleeding can be very dangerous. But it can usually be cured or controlled. The doctor may do some tests to find the cause of your bleeding. Follow-up care is a key part of your treatment and safety. Be sure to make and go to all appointments, and call your doctor if you are having problems. It's also a good idea to know your test results and keep a list of the medicines you take.   How can you care for yourself at home? · Be safe with medicines. Take your medicines exactly as prescribed. Call your doctor if you think you are having a problem with your medicine. You will get more details on the specific medicines your doctor prescribes. · Do not take aspirin or other anti-inflammatory medicines, such as naproxen (Aleve) or ibuprofen (Advil, Motrin), without talking to your doctor first. Ask your doctor if it is okay to use acetaminophen (Tylenol). · Do not drink alcohol. · The bleeding may make you lose iron. So it's important to eat foods that have a lot of iron. These include red meat, shellfish, poultry, and eggs. They also include beans, raisins, whole-grain breads, and leafy green vegetables. If you want help planning meals, you can make an appointment with a dietitian. When should you call for help? Call 911 anytime you think you may need emergency care. For example, call if:    · You have sudden, severe belly pain.     · You vomit blood or what looks like coffee grounds.     · You passed out (lost consciousness).     · Your stools are maroon or very bloody.    Call your doctor now or seek immediate medical care if:    · You are dizzy or lightheaded, or you feel like you may faint.     · Your stools are black and look like tar, or they have streaks of blood.     · You have belly pain.     · You vomit or have nausea.     · You have trouble swallowing, or it hurts when you swallow.    Watch closely for changes in your health, and be sure to contact your doctor if:    · You do not get better as expected. Where can you learn more? Go to https://Siamab Therapeuticsambrosio.myRete. org and sign in to your [employer] account. Enter A611 in the INCIDE box to learn more about \"Gastrointestinal Bleeding: Care Instructions. \"     If you do not have an account, please click on the \"Sign Up Now\" link. Current as of: November 20, 2017  Content Version: 11.7  © 4303-1946 [employer].

## 2018-08-29 ENCOUNTER — TELEPHONE (OUTPATIENT)
Dept: FAMILY MEDICINE CLINIC | Age: 83
End: 2018-08-29

## 2018-08-29 NOTE — TELEPHONE ENCOUNTER
[facility], [phone], she is aware of results. Patient's daughter states no future questions or concerns at this time.

## 2018-08-30 ENCOUNTER — CARE COORDINATION (OUTPATIENT)
Dept: CARE COORDINATION | Age: 83
End: 2018-08-30

## 2018-08-30 NOTE — CARE COORDINATION
Ambulatory Care Coordination Note  8/30/2018  CM Risk Score: 14  Lanie Mortality Risk Score: 29.58    ACC: Maria Eugenia Hernandez, RN    Summary Note: Spoke with daughter, Fatimah Berrios. Reports patient is doing well. Eating and drinking well. Has [facility] for nursing, PT, OT. Working on stamina. Weaker since bleeding and anemia. Family been discussing assisted living. Patient not ready to leave home. Daughter and patient  involved for guidance. Discussed and encouraged benefits of assisted living. Encouraged daughter to schedule a tour at [facility] as this is her first choice, and bring parents along. Patient's daughter in law manages medications. Unable to complete med rec. Has contact information. Discussed early symptom recognition and reporting to prevent ED and hospitalization. Including signs of bleeding, changes in breathing. Reminded of same day appointments. Encouraged to call with any problems, questions, concerns. Care Coordination Interventions    Program Enrollment:  Complex Care  Referral from Primary Care Provider:  Yes  Suggested Interventions and [address]:  Completed (Comment: [facility])  Occupational Therapy:  Completed  Physical Therapy:  Completed  Other Services or Interventions:  [employer] 3 times a week for housekeeping and food preparation & [employer]         Goals Addressed             Most Recent     Reduce Falls    On track (8/30/2018)             I will reduce my risk of falls by the following: Remove rugs or use non slip rugs  Install grab bars in bathroom  Use walking aids like cane or walker  Follow through on orders for PT    Barriers: none  Plan for overcoming my barriers: N/A  Confidence: 6/10  Anticipated Goal Completion Date: 6/8/18            Prior to Admission medications    Medication Sig Start Date End Date Taking?  Authorizing Provider   pantoprazole (PROTONIX) 40 MG tablet Take 1 tablet by mouth every morning (before breakfast)

## 2018-08-31 ENCOUNTER — TELEPHONE (OUTPATIENT)
Dept: FAMILY MEDICINE CLINIC | Age: 83
End: 2018-08-31

## 2018-08-31 NOTE — TELEPHONE ENCOUNTER
Dr. Yimi Woods a care manger from [facility] called needing clarification on metoprolol tartrate (LOPRESSOR)    Juan's daughter, Pao Greenfield stated he takes it 25 mg daily. It is written as :    metoprolol tartrate (LOPRESSOR) 25 MG tablet [[id number]]   Order Details   Dose: 12.5 mg Route: Oral Frequency: DAILY   Dispense Quantity:  30 tablet Refills:  5 Fills remaining:  --           Sig: Take 0.5 tablets by mouth daily Substitute for atenolol. Please advise    Kori's call back number is [phone], pending new order for pharmacy just incase adjustments are needed.

## 2018-09-05 ENCOUNTER — TELEPHONE (OUTPATIENT)
Dept: FAMILY MEDICINE CLINIC | Age: 83
End: 2018-09-05

## 2018-09-05 NOTE — TELEPHONE ENCOUNTER
Patty Weiss from home health called stating that when she saw Carl Melvin today he has a 0.3x0.3 red spot that is unopened but does davis on his inner buttocks cheek. She applied a foam dressing and wanted to make sure that was ok with Dr Nereiad Zaragoza. They usually change those dressings every 3 days. Please advise.

## 2018-09-11 ENCOUNTER — HOSPITAL ENCOUNTER (OUTPATIENT)
Age: 83
Setting detail: SPECIMEN
Discharge: HOME | End: 2018-09-11
Payer: MEDICARE

## 2018-09-11 LAB
BASOPHILS # BLD: 1.1 %
BASOPHILS ABSOLUTE: 0.1 THOU/MM3 (ref 0–0.1)
EOSINOPHIL # BLD: 4.2 %
EOSINOPHILS ABSOLUTE: 0.3 THOU/MM3 (ref 0–0.4)
ERYTHROCYTE [DISTWIDTH] IN BLOOD BY AUTOMATED COUNT: 14.7 % (ref 11.5–14.5)
ERYTHROCYTE [DISTWIDTH] IN BLOOD BY AUTOMATED COUNT: 49.8 FL (ref 35–45)
HCT VFR BLD CALC: 29.1 % (ref 42–52)
HEMOGLOBIN: 9.1 GM/DL (ref 14–18)
IMMATURE GRANS (ABS): 0.01 THOU/MM3 (ref 0–0.07)
IMMATURE GRANULOCYTES: 0.2 %
LYMPHOCYTES # BLD: 16.8 %
LYMPHOCYTES ABSOLUTE: 1 THOU/MM3 (ref 1–4.8)
MCH RBC QN AUTO: 29.2 PG (ref 26–33)
MCHC RBC AUTO-ENTMCNC: 31.3 GM/DL (ref 32.2–35.5)
MCV RBC AUTO: 93.3 FL (ref 80–94)
MONOCYTES # BLD: 10 %
MONOCYTES ABSOLUTE: 0.6 THOU/MM3 (ref 0.4–1.3)
NUCLEATED RED BLOOD CELLS: 0 /100 WBC
PLATELET # BLD: 323 THOU/MM3 (ref 130–400)
PMV BLD AUTO: 9.1 FL (ref 9.4–12.4)
RBC # BLD: 3.12 MILL/MM3 (ref 4.7–6.1)
SEG NEUTROPHILS: 67.7 %
SEGMENTED NEUTROPHILS ABSOLUTE COUNT: 4.2 THOU/MM3 (ref 1.8–7.7)
WBC # BLD: 6.2 THOU/MM3 (ref 4.8–10.8)

## 2018-09-11 PROCEDURE — 85025 COMPLETE CBC W/AUTO DIFF WBC: CPT

## 2018-09-20 DIAGNOSIS — N18.30 CKD (CHRONIC KIDNEY DISEASE) STAGE 3, GFR 30-59 ML/MIN (HCC): Primary | ICD-10-CM

## 2018-09-25 ENCOUNTER — OFFICE VISIT (OUTPATIENT)
Dept: NEPHROLOGY | Age: 83
End: 2018-09-25
Payer: MEDICARE

## 2018-09-25 VITALS
WEIGHT: 139.6 LBS | HEART RATE: 80 BPM | BODY MASS INDEX: 23.96 KG/M2 | SYSTOLIC BLOOD PRESSURE: 127 MMHG | DIASTOLIC BLOOD PRESSURE: 69 MMHG | OXYGEN SATURATION: 96 %

## 2018-09-25 DIAGNOSIS — I10 ESSENTIAL HYPERTENSION: ICD-10-CM

## 2018-09-25 DIAGNOSIS — N18.30 CKD (CHRONIC KIDNEY DISEASE), STAGE III (HCC): Primary | ICD-10-CM

## 2018-09-25 PROCEDURE — 99213 OFFICE O/P EST LOW 20 MIN: CPT | Performed by: INTERNAL MEDICINE

## 2018-09-25 NOTE — PROGRESS NOTES
[facility]    [facility], [address]   [place]  [phone]  Progress Note  9/25/2018 3:56 PM      Pt Name:    Yessi Green  YOB: 1931  Primary Care Physician:  Ender Nicole DO     Chief Complaint:   Chief Complaint   Patient presents with    Follow-up     CKD III        Background Information/Interval History:   81 yo pleasant patient with hx CKD III, HTN, Chronic anemia who is here for annual follow-up. He used to see Dr. Dallin Myers and now seeing me. This is the first time I am seeing him. He is following closely with Dr. Reginaldo Burciaga for GI bleeding polyp. He has had issues with low hemoglobin and has had multiple endoscopies and colonoscopies. He was previously taking aspirin and plavix but not anymore. He says he was taking \"some arthritis pill\" for sometime but not anymore. He has coronary stents and pacemaker. No hx bypass. No hx CVA. No hx malignancy. He has had kidney stones in the past. No known prostate cancer but has seen urology for TURP at [facility] (Dr. Dl Proctor). He is on flomax. He used to smoke in the past but none recently. Quit about 20 years ago. No hx DM. He has HTN and takes lopressor and cozaar. He reports BP is usually controlled with these medications.       Past History:  Past Medical History:   Diagnosis Date    Acute sinusitis     Angina pectoris ([address]     Anxiety disorder 10/27/2016    Arthritis     osteoporosis    BPH with urinary obstruction     CAD (coronary artery disease)     Chronic insomnia     CKD (chronic kidney disease) stage 3, GFR 30-59 ml/min     COPD (chronic obstructive pulmonary disease) (HCC)     Gastroenteritis     HCAP (healthcare-associated pneumonia) 4/29/2015    Heart disease     HLD (hyperlipidemia)     [place] (hard of hearing)     wear hearing aids    Hypertension     Kidney disease     40% kdiney function    Kidney stone     years ago 15-20    NICOLAS on CPAP     Osteopenia determined by x-ray    [employer] Pacemaker 2011  Pinched nerve     slipped disc in back    Visual problems     Vitamin D deficiency      Past Surgical History:   Procedure Laterality Date    ABDOMINAL HERNIA REPAIR  04/27/2017    incisional hernia repair--Dr. La Perez CARDIAC SURGERY      CATARACT REMOVAL WITH IMPLANT      bilateral    COLONOSCOPY  [id number]    COLONOSCOPY  12/29/2017    COLONOSCOPY CONTROL HEMORRHAGE performed by Romina Ng MD at [address] Endoscopy    COLONOSCOPY  8/16/2018    COLONOSCOPY POLYPECTOMY SNARE/COLD BIOPSY performed by Romina Ng MD at [address] Endoscopy    COLONOSCOPY  8/19/2018    COLONOSCOPY CONTROL HEMORRHAGE performed by Romina Ng MD at [address] Endoscopy    COLONOSCOPY N/A 8/20/2018    COLONOSCOPY CONTROL HEMORRHAGE performed by Romina Ng MD at [address] [address]'s    EKG 12-LEAD  4/29/2015         EYE SURGERY      cataracts    HERNIA REPAIR      several    HIP PINNING Left 8/31/2017    LEFT HIP INTERTAN performed by Jillian Hutchison MD at [address]  12/3/12    left     MYRINGOTOMY AND TYMPANOSTOMY TUBE PLACEMENT  7/23/13    left     NASAL SINUS SURGERY      OTHER SURGICAL HISTORY  04/27/2015    transurethral Incision bladder neck    PACEMAKER INSERTION      PACEMAKER PLACEMENT  2011    OR COLONOSCOPY FLX DX W/COLLJ SPEC WHEN PFRMD Left 8/18/2018    COLONOSCOPY performed by Romina Ng MD at [address] Endoscopy    OR [address] DX/THER SBST INTRLMNR LMBR/SAC W/IMG GDN N/A 6/25/2018    LUMBAR INTER LAMINAR RUBEN LESI @ L3. performed by Amadna Foster MD at [place] [address] Right 10/8/2017    Right hip intertan performed by Laxmi Paige MD at [address] [place] [address] TURP  4/17/2013    W/CYSTO WITH DIRECT VISION URETHROTOMY & RESECTION BLADDER NECK CONTRACTURE    TURP  4/27/15    re-do TURP    TYMPANOSTOMY TUBE PLACEMENT      multiple surgeries    UPPER GASTROINTESTINAL ENDOSCOPY  12/29/2017    COLONOSCOPY SUBMUCOSAL/BOTOX INJECTION performed by Elizabeth Chopra MD at [address] [place] [address] Avenue  8/16/2018    EGD DIAGNOSTIC ONLY performed by Elizabeth Chopra MD at [address] [place] [address] Left 8/19/2018    EGD DIAGNOSTIC ONLY performed by Elizabeth Chopra MD at [facility] End[facility]        VITALS:  /69 (Site: Right Upper Arm, Position: Sitting, Cuff Size: Medium Adult)   Pulse 80   Wt 139 lb 9.6 oz (63.3 kg)   SpO2 96%   BMI 23.96 kg/m²   Wt Readings from Last 3 Encounters:   09/25/18 139 lb 9.6 oz (63.3 kg)   09/17/18 135 lb (61.2 kg)   08/28/18 133 lb 12.8 oz (60.7 kg)     Body mass index is 23.96 kg/m². General Appearance: alert and cooperative with exam, appears comfortable, no distress  Oral: moist oral mucus membranes  Neck: No jugular venous distention  Lungs: Air entry B/L, no crackles or rales, no use of accessory muscles  Heart: S1, S2 heard  GI: soft, non-tender, no guarding  Extremities: No sig LE edema     Medications:  Current Outpatient Prescriptions   Medication Sig Dispense Refill    pantoprazole (PROTONIX) 40 MG tablet Take 1 tablet by mouth every morning (before breakfast) 30 tablet 3    acetaminophen (TYLENOL) 650 MG extended release tablet Take 650 mg by mouth every 8 hours as needed for Pain      ferrous sulfate 325 (65 Fe) MG tablet Take 325 mg by mouth Take 1 tablet every other day.       Omega-3 Fatty Acids (FISH OIL) 1200 MG CAPS Take by mouth daily      docusate sodium (COLACE) 100 MG capsule Take 100 mg by mouth daily      albuterol sulfate HFA (VENTOLIN HFA) 108 (90 Base) MCG/ACT inhaler Inhale 2 puffs into the lungs every 6 hours as needed for Wheezing 1 Inhaler 3    sertraline (ZOLOFT) 50 MG tablet TAKE ONE TABLET BY MOUTH ONCE DAILY 30 tablet 5    losartan (COZAAR) 50 MG tablet Take 1 tablet by mouth daily 30 tablet 5    tamsulosin (FLOMAX) 0.4 MG capsule Take 1 capsule by mouth 2 times daily 180 capsule 3    metoprolol tartrate (LOPRESSOR) 25 MG tablet Take 0.5 tablets by mouth daily Substitute for atenolol. 30 tablet 5    vitamin B-12 1000 MCG tablet Take 1 tablet by mouth daily 30 tablet 3    Coenzyme Q10 (COQ-10 PO) Take 10 mg by mouth daily      Multiple Vitamins-Minerals (THERAPEUTIC MULTIVITAMIN-MINERALS) tablet Take 1 tablet by mouth daily        No current facility-administered medications for this visit. Laboratory & Diagnostics:  Old labs  US 2012: R 9.4, L 9.6 cm  Aug 2018: K 4.6, Creat 1.4     Impression/Plan:   1. CKd III: overall stable. Creat ranges between 1.2 and 1.4 mg/dL. D/W daughter and patient. 2. HTN: controlled on lopressor and cozaar. Advised low salt diet. 3. Anemia: secondary to GI bleeding. Following closely with GI. Degree of anemia is out of proportion to what one would expect in setting of this mild CKD. GFR is actually close to 50 and one would not see anemia of CKD at this level of renal disease. 4. BPH: on flomax, following with urology. He has been seeing Dr. Ty Lang at [facility] from Aug 2018 reviewed. Plan of care reviewed with patient and daughter. All questions answered. Orders Placed This Encounter   Procedures    Basic Metabolic Panel    Protein / creatinine ratio, urine    Hemoglobin and Hematocrit, Blood     Return in about 1 year (around 9/25/2019).     Adan Dickerson MD  [facility]

## 2018-09-27 ENCOUNTER — OFFICE VISIT (OUTPATIENT)
Dept: FAMILY MEDICINE CLINIC | Age: 83
End: 2018-09-27
Payer: MEDICARE

## 2018-09-27 VITALS
OXYGEN SATURATION: 98 % | WEIGHT: 137.2 LBS | DIASTOLIC BLOOD PRESSURE: 70 MMHG | HEIGHT: 61 IN | BODY MASS INDEX: 25.9 KG/M2 | TEMPERATURE: 98.4 F | SYSTOLIC BLOOD PRESSURE: 108 MMHG | HEART RATE: 69 BPM

## 2018-09-27 DIAGNOSIS — N40.1 BENIGN PROSTATIC HYPERPLASIA WITH URINARY HESITANCY: Primary | ICD-10-CM

## 2018-09-27 DIAGNOSIS — I25.10 CORONARY ARTERY DISEASE INVOLVING NATIVE HEART WITHOUT ANGINA PECTORIS, UNSPECIFIED VESSEL OR LESION TYPE: ICD-10-CM

## 2018-09-27 DIAGNOSIS — N18.30 CKD (CHRONIC KIDNEY DISEASE) STAGE 3, GFR 30-59 ML/MIN (HCC): ICD-10-CM

## 2018-09-27 DIAGNOSIS — I10 ESSENTIAL HYPERTENSION: ICD-10-CM

## 2018-09-27 DIAGNOSIS — R39.11 BENIGN PROSTATIC HYPERPLASIA WITH URINARY HESITANCY: Primary | ICD-10-CM

## 2018-09-27 DIAGNOSIS — K92.2 LOWER GI BLEED: ICD-10-CM

## 2018-09-27 DIAGNOSIS — R06.02 SHORTNESS OF BREATH: ICD-10-CM

## 2018-09-27 PROCEDURE — 90686 IIV4 VACC NO PRSV 0.5 ML IM: CPT | Performed by: FAMILY MEDICINE

## 2018-09-27 PROCEDURE — 99214 OFFICE O/P EST MOD 30 MIN: CPT | Performed by: FAMILY MEDICINE

## 2018-09-27 PROCEDURE — G0008 ADMIN INFLUENZA VIRUS VAC: HCPCS | Performed by: FAMILY MEDICINE

## 2018-09-27 RX ORDER — LOSARTAN POTASSIUM 50 MG/1
50 TABLET ORAL DAILY
Qty: 90 TABLET | Refills: 1 | Status: SHIPPED | OUTPATIENT
Start: 2018-09-27 | End: 2019-02-04 | Stop reason: SDUPTHER

## 2018-09-27 RX ORDER — TAMSULOSIN HYDROCHLORIDE 0.4 MG/1
0.4 CAPSULE ORAL 2 TIMES DAILY
Qty: 180 CAPSULE | Refills: 1 | Status: SHIPPED | OUTPATIENT
Start: 2018-09-27 | End: 2019-05-14 | Stop reason: SDUPTHER

## 2018-09-27 RX ORDER — ALBUTEROL SULFATE 90 UG/1
2 AEROSOL, METERED RESPIRATORY (INHALATION) EVERY 6 HOURS PRN
Qty: 1 INHALER | Refills: 5 | Status: SHIPPED | OUTPATIENT
Start: 2018-09-27 | End: 2020-01-01 | Stop reason: ALTCHOICE

## 2018-09-27 ASSESSMENT — ENCOUNTER SYMPTOMS
COUGH: 0
VOMITING: 0
NAUSEA: 0
EYE PAIN: 0
DIARRHEA: 0
CONSTIPATION: 0
ABDOMINAL PAIN: 0
SHORTNESS OF BREATH: 0
TROUBLE SWALLOWING: 0
BLOOD IN STOOL: 0

## 2018-09-27 ASSESSMENT — PATIENT HEALTH QUESTIONNAIRE - PHQ9
SUM OF ALL RESPONSES TO PHQ QUESTIONS 1-9: 0
SUM OF ALL RESPONSES TO PHQ9 QUESTIONS 1 & 2: 0
SUM OF ALL RESPONSES TO PHQ QUESTIONS 1-9: 0
1. LITTLE INTEREST OR PLEASURE IN DOING THINGS: 0
2. FEELING DOWN, DEPRESSED OR HOPELESS: 0

## 2018-09-27 NOTE — PROGRESS NOTES
(chronic kidney disease) stage 3, GFR 30-59 ml/min     COPD (chronic obstructive pulmonary disease) (HCC)     Gastroenteritis     HCAP (healthcare-associated pneumonia) 4/29/2015    Heart disease     HLD (hyperlipidemia)     [place] (hard of hearing)     wear hearing aids    Hypertension     Kidney disease     40% kdiney function    Kidney stone     years ago 15-20    NICOLAS on CPAP     Osteopenia determined by x-ray     Pacemaker 2011    Pinched nerve     slipped disc in back    Visual problems     Vitamin D deficiency       Past Surgical History:   Procedure Laterality Date    ABDOMINAL HERNIA REPAIR  04/27/2017    incisional hernia repair--Dr. Kemi Moses CARDIAC SURGERY      CATARACT REMOVAL WITH IMPLANT      bilateral    COLONOSCOPY  1315,1351    COLONOSCOPY  12/29/2017    COLONOSCOPY CONTROL HEMORRHAGE performed by Perez Myers MD at [address] Endoscopy    COLONOSCOPY  8/16/2018    COLONOSCOPY POLYPECTOMY SNARE/COLD BIOPSY performed by Perez Myers MD at [address] Endoscopy    COLONOSCOPY  8/19/2018    COLONOSCOPY CONTROL HEMORRHAGE performed by Perez Myers MD at [address] Endoscopy    COLONOSCOPY N/A 8/20/2018    COLONOSCOPY CONTROL HEMORRHAGE performed by Perez Myers MD at [address]  1960's    EKG 12-LEAD  4/29/2015         EYE SURGERY      cataracts    HERNIA REPAIR      several    HIP PINNING Left 8/31/2017    LEFT HIP INTERTAN performed by Anders Castillo MD at [address]  12/3/12    left     MYRINGOTOMY AND TYMPANOSTOMY TUBE PLACEMENT  7/23/13    left     NASAL SINUS SURGERY      OTHER SURGICAL HISTORY  04/27/2015    transurethral Incision bladder neck    PACEMAKER INSERTION      PACEMAKER PLACEMENT  2011    CO COLONOSCOPY FLX DX W/COLLJ SPEC WHEN PFRMD Left 8/18/2018    COLONOSCOPY performed by Perez Myers MD at [address] Endoscopy    CO [address] DX/THER SBST INTRLMNR LMBR/SAC tablets by mouth daily Substitute for atenolol. 45 tablet 1    sertraline (ZOLOFT) 50 MG tablet TAKE ONE TABLET BY MOUTH ONCE DAILY 90 tablet 1    tamsulosin (FLOMAX) 0.4 MG capsule Take 1 capsule by mouth 2 times daily 180 capsule 1    pantoprazole (PROTONIX) 40 MG tablet Take 1 tablet by mouth every morning (before breakfast) 30 tablet 3    acetaminophen (TYLENOL) 650 MG extended release tablet Take 650 mg by mouth every 8 hours as needed for Pain      ferrous sulfate 325 (65 Fe) MG tablet Take 325 mg by mouth Take 1 tablet every other day.  Omega-3 Fatty Acids (FISH OIL) 1200 MG CAPS Take by mouth daily      docusate sodium (COLACE) 100 MG capsule Take 100 mg by mouth daily      vitamin B-12 1000 MCG tablet Take 1 tablet by mouth daily 30 tablet 3    Coenzyme Q10 (COQ-10 PO) Take 10 mg by mouth daily      Multiple Vitamins-Minerals (THERAPEUTIC MULTIVITAMIN-MINERALS) tablet Take 1 tablet by mouth daily        No current facility-administered medications for this visit. Allergies   Allergen Reactions    Iodine Swelling and Rash       Health Maintenance   Topic Date Due    Shingles Vaccine (1 of 2 - 2 Dose Series) 11/24/1981    Potassium monitoring  08/28/2019    Creatinine monitoring  08/28/2019    DTaP/Tdap/Td vaccine (2 - Td) 06/16/2027    Flu vaccine  Completed    Pneumococcal low/med risk  Completed       Subjective:      Review of Systems   Constitutional: Negative for chills, fatigue and fever. HENT: Negative for ear pain, postnasal drip and trouble swallowing. Eyes: Negative for pain and visual disturbance. Respiratory: Negative for cough and shortness of breath. Cardiovascular: Negative for chest pain and palpitations. Gastrointestinal: Negative for abdominal pain, blood in stool, constipation, diarrhea, nausea and vomiting. Skin: Negative for rash. Neurological: Negative for headaches.          Objective:     Vitals:    09/27/18 1217   BP: 108/70   Site: Left Upper Arm   Position: Sitting   Cuff Size: Medium Adult   Pulse: 69   Temp: 98.4 °F (36.9 °C)   TempSrc: Oral   SpO2: 98%   Weight: 137 lb 3.2 oz (62.2 kg)   Height: 5' 1.42\" (1.56 m)       Body mass index is 25.57 kg/m². Wt Readings from Last 3 Encounters:   09/27/18 137 lb 3.2 oz (62.2 kg)   09/25/18 139 lb 9.6 oz (63.3 kg)   09/17/18 135 lb (61.2 kg)     BP Readings from Last 3 Encounters:   09/27/18 108/70   09/25/18 127/69   09/17/18 (!) 141/77       Physical Exam   Constitutional: He is oriented to person, place, and time. He appears well-developed and well-nourished. No distress. HENT:   Head: Normocephalic and atraumatic. Right Ear: External ear normal.   Left Ear: External ear normal.   Eyes: Conjunctivae and EOM are normal. Right eye exhibits no discharge. Left eye exhibits no discharge. No scleral icterus. Neck: Normal range of motion. Cardiovascular: Normal rate, regular rhythm and normal heart sounds. No murmur heard. Pulmonary/Chest: Effort normal and breath sounds normal.   Musculoskeletal: He exhibits no edema. Neurological: He is alert and oriented to person, place, and time. No cranial nerve deficit. Skin: Skin is warm and dry. No rash noted. He is not diaphoretic. No erythema. Psychiatric: He has a normal mood and affect. His behavior is normal. Judgment and thought content normal.   Nursing note and vitals reviewed.         Lab Results   Component Value Date    WBC 6.2 09/11/2018    HGB 9.1 (L) 09/11/2018    HCT 29.1 (L) 09/11/2018     09/11/2018    CHOL 118 12/05/2016    TRIG 93 12/05/2016    HDL 56 12/05/2016    LDLCALC 43 12/05/2016    AST 15 08/15/2018     08/28/2018    K 4.6 08/28/2018     08/28/2018    CREATININE 1.4 (H) 08/28/2018    BUN 27 (H) 08/28/2018    CO2 25 08/28/2018    TSH 1.620 12/27/2017    INR 1.08 08/15/2018    LABMICR 3.18 09/14/2016    LABGLOM 48 (A) 08/28/2018    MG 2.0 08/28/2018    CALCIUM 8.5 08/28/2018    VITD25 43 09/05/2017 Cheek-To-Nose Interpolation Flap Text: A decision was made to reconstruct the defect utilizing an interpolation axial flap and a staged reconstruction.  A telfa template was made of the defect.  This telfa template was then used to outline the Cheek-To-Nose Interpolation flap.  The donor area for the pedicle flap was then injected with anesthesia.  The flap was excised through the skin and subcutaneous tissue down to the layer of the underlying musculature.  The interpolation flap was carefully excised within this deep plane to maintain its blood supply.  The edges of the donor site were undermined.   The donor site was closed in a primary fashion.  The pedicle was then rotated into position and sutured.  Once the tube was sutured into place, adequate blood supply was confirmed with blanching and refill.  The pedicle was then wrapped with xeroform gauze and dressed appropriately with a telfa and gauze bandage to ensure continued blood supply and protect the attached pedicle.

## 2018-09-27 NOTE — PATIENT INSTRUCTIONS
1. You may receive a survey about your visit with us today. The feedback from our patients helps us identify what is working well and where the service to all patients can be enhanced. Thank you! 2. Please bring in ALL medications BOTTLES, including inhalers, herbal supplements, over the counter, prescribed & non-prescribed medicine. The office would like actual medication bottles and a list.  3. Please note our OFFICE POLICIES:  a. Prior to getting your labs drawn, please check with your insurance company for benefits and eligibility of lab services. Often, insurance companies cover certain tests for preventative visits only. It is patient's responsibility to see what is covered. b. We are unable to change a diagnosis after the test has been performed. c. Lab orders will not be re-printed. Please hold onto your original lab orders and take them to your lab to be completed. d. If you no show your schedule appointment three times, you be dismissed from this practice. e. Jason Asters must be completed 24 hours prior to your schedule appointment. 4. If the list below has been completed, PLEASE FAX RECORDS TO OUR OFFICE @ [phone]. Once the records have been received we will update your records at our office. Health Maintenance Due   Topic Date Due    Shingles Vaccine (1 of 2 - 2 Dose Series) 11/24/1981    Flu vaccine (1) 09/01/2018       Schedule your 2018 flu vaccine with Dr. David Griffin call [phone]!   [facility], for anyone 9 years or older   [address] [address]   Every Monday   8:00am-11:00am and 1:00pm-3:00pm          discussed the plavix

## 2018-10-01 ENCOUNTER — CARE COORDINATION (OUTPATIENT)
Dept: CARE COORDINATION | Age: 83
End: 2018-10-01

## 2018-10-01 ASSESSMENT — ENCOUNTER SYMPTOMS: DYSPNEA ASSOCIATED WITH: EXERTION

## 2018-10-01 NOTE — CARE COORDINATION
through on orders for PT    Barriers: none  Plan for overcoming my barriers: N/A  Confidence: 6/10  Anticipated Goal Completion Date: 6/8/18            Prior to Admission medications    Medication Sig Start Date End Date Taking? Authorizing Provider   albuterol sulfate HFA (VENTOLIN HFA) 108 (90 Base) MCG/ACT inhaler Inhale 2 puffs into the lungs every 6 hours as needed for Wheezing 9/27/18  Yes Alexandra Link DO   losartan (COZAAR) 50 MG tablet Take 1 tablet by mouth daily 9/27/18   Alexandra Link DO   metoprolol tartrate (LOPRESSOR) 25 MG tablet Take 0.5 tablets by mouth daily Substitute for atenolol. 9/27/18   Alexandra Link DO   sertraline (ZOLOFT) 50 MG tablet TAKE ONE TABLET BY MOUTH ONCE DAILY 9/27/18   Alexandra Link DO   tamsulosin [facility]) 0.4 MG capsule Take 1 capsule by mouth 2 times daily 9/27/18 9/27/19  Alexandra Link DO   pantoprazole (PROTONIX) 40 MG tablet Take 1 tablet by mouth every morning (before breakfast) 8/25/18   [address] [place] [address], MD   acetaminophen (TYLENOL) 650 MG extended release tablet Take 650 mg by mouth every 8 hours as needed for Pain    Historical Provider, MD   ferrous sulfate 325 (65 Fe) MG tablet Take 325 mg by mouth Take 1 tablet every other day.     Historical Provider, MD   Omega-3 Fatty Acids (FISH OIL) 1200 MG CAPS Take by mouth daily    Historical Provider, MD   docusate sodium (COLACE) 100 MG capsule Take 100 mg by mouth daily    Historical Provider, MD   vitamin B-12 1000 MCG tablet Take 1 tablet by mouth daily 1/1/18   Kayla Campbell MD   Coenzyme Q10 (COQ-10 PO) Take 10 mg by mouth daily    Historical Provider, MD   Multiple Vitamins-Minerals (THERAPEUTIC MULTIVITAMIN-MINERALS) tablet Take 1 tablet by mouth daily     Historical Provider, MD       Future Appointments  Date Time Provider Miguel Romo   10/15/2018 12:30 PM CHRISTIN Johansen - CNP [facility]   11/19/2018 2:00 PM Alexandra Link  [address]   2/1/2019 11:30

## 2018-10-17 ENCOUNTER — HOSPITAL ENCOUNTER (OUTPATIENT)
Age: 83
Discharge: HOME | End: 2018-10-17
Payer: MEDICARE

## 2018-10-17 DIAGNOSIS — Z87.19 HISTORY OF LOWER GI BLEEDING: ICD-10-CM

## 2018-10-17 DIAGNOSIS — D50.0 IRON DEFICIENCY ANEMIA DUE TO CHRONIC BLOOD LOSS: ICD-10-CM

## 2018-10-17 LAB
ERYTHROCYTE [DISTWIDTH] IN BLOOD BY AUTOMATED COUNT: 14.7 % (ref 11.5–14.5)
ERYTHROCYTE [DISTWIDTH] IN BLOOD BY AUTOMATED COUNT: 46.5 FL (ref 35–45)
FERRITIN: 22 NG/ML (ref 22–322)
HCT VFR BLD CALC: 31.6 % (ref 42–52)
HEMOGLOBIN: 10.2 GM/DL (ref 14–18)
IRON SATURATION: 10 % (ref 20–50)
IRON: 28 UG/DL (ref 65–195)
MCH RBC QN AUTO: 27.8 PG (ref 26–33)
MCHC RBC AUTO-ENTMCNC: 32.3 GM/DL (ref 32.2–35.5)
MCV RBC AUTO: 86.1 FL (ref 80–94)
PLATELET # BLD: 281 THOU/MM3 (ref 130–400)
PMV BLD AUTO: 9.5 FL (ref 9.4–12.4)
RBC # BLD: 3.67 MILL/MM3 (ref 4.7–6.1)
TOTAL IRON BINDING CAPACITY: 284 UG/DL (ref 171–450)
WBC # BLD: 6.3 THOU/MM3 (ref 4.8–10.8)

## 2018-10-17 PROCEDURE — 36415 COLL VENOUS BLD VENIPUNCTURE: CPT

## 2018-10-17 PROCEDURE — 83550 IRON BINDING TEST: CPT

## 2018-10-17 PROCEDURE — 85027 COMPLETE CBC AUTOMATED: CPT

## 2018-10-17 PROCEDURE — 82728 ASSAY OF FERRITIN: CPT

## 2018-10-17 PROCEDURE — 83540 ASSAY OF IRON: CPT

## 2018-11-06 ENCOUNTER — HOSPITAL ENCOUNTER (OUTPATIENT)
Dept: INFUSION THERAPY | Age: 83
Discharge: HOME | End: 2018-11-06
Payer: MEDICARE

## 2018-11-06 ENCOUNTER — OFFICE VISIT (OUTPATIENT)
Dept: ONCOLOGY | Age: 83
End: 2018-11-06
Payer: MEDICARE

## 2018-11-06 VITALS
HEIGHT: 64 IN | DIASTOLIC BLOOD PRESSURE: 95 MMHG | RESPIRATION RATE: 18 BRPM | HEART RATE: 67 BPM | SYSTOLIC BLOOD PRESSURE: 170 MMHG | WEIGHT: 143 LBS | TEMPERATURE: 97 F | BODY MASS INDEX: 24.41 KG/M2 | OXYGEN SATURATION: 94 %

## 2018-11-06 DIAGNOSIS — D50.0 IRON DEFICIENCY ANEMIA DUE TO CHRONIC BLOOD LOSS: ICD-10-CM

## 2018-11-06 DIAGNOSIS — D64.9 NORMOCYTIC ANEMIA: ICD-10-CM

## 2018-11-06 DIAGNOSIS — D64.9 NORMOCYTIC ANEMIA: Primary | ICD-10-CM

## 2018-11-06 LAB
BASINOPHIL, AUTOMATED: 0 % (ref 0–3)
EOSINOPHILS RELATIVE PERCENT: 3 % (ref 0–4)
FOLATE: > 20 NG/ML (ref 4.8–24.2)
HCT VFR BLD CALC: 33.7 % (ref 42–52)
HEMOGLOBIN: 11.3 GM/DL (ref 14–18)
LYMPHOCYTES # BLD: 19 % (ref 15–47)
MCH RBC QN AUTO: 27.2 PG (ref 27–31)
MCHC RBC AUTO-ENTMCNC: 33.6 GM/DL (ref 33–37)
MCV RBC AUTO: 81 FL (ref 80–94)
MONOCYTES: 8 % (ref 0–12)
PDW BLD-RTO: 12.4 % (ref 11.5–14.5)
PLATELET # BLD: 285 THOU/MM3 (ref 130–400)
PMV BLD AUTO: 6.7 FL (ref 7.4–10.4)
RBC # BLD: 4.16 MILL/MM3 (ref 4.7–6.1)
SEG NEUTROPHILS: 69 % (ref 43–75)
VITAMIN B-12: 616 PG/ML (ref 211–911)
WBC # BLD: 6.7 THOU/MM3 (ref 4.8–10.8)

## 2018-11-06 PROCEDURE — 82607 VITAMIN B-12: CPT

## 2018-11-06 PROCEDURE — 36415 COLL VENOUS BLD VENIPUNCTURE: CPT

## 2018-11-06 PROCEDURE — 99211 OFF/OP EST MAY X REQ PHY/QHP: CPT

## 2018-11-06 PROCEDURE — 82746 ASSAY OF FOLIC ACID SERUM: CPT

## 2018-11-06 PROCEDURE — 99204 OFFICE O/P NEW MOD 45 MIN: CPT | Performed by: PHYSICIAN ASSISTANT

## 2018-11-06 PROCEDURE — 85025 COMPLETE CBC W/AUTO DIFF WBC: CPT

## 2018-11-06 NOTE — PROGRESS NOTES
reports night sweats only occur while dreaming, and are enough to change closer shower due to drenching sweats. The patient states he has lost approximately 30 pounds in the last 6 months, per chart review he has gain 10 pounds since August. He denies any enlarged lymph nodes or abdominal bloating. His past medical history includes CAD with hx of stenting over 20 years ago, HTN, hx of PPM, COPD, NICOLAS, CKD, stage III. Does drink alcohol daily, with one beer in the evenings.     HPI   Past Medical History:   Diagnosis Date    Acute sinusitis     Angina pectoris (HCC)     Anxiety disorder 10/27/2016    Arthritis     osteoporosis    BPH with urinary obstruction     CAD (coronary artery disease)     Chronic insomnia     CKD (chronic kidney disease) stage 3, GFR 30-59 ml/min (HCC)     COPD (chronic obstructive pulmonary disease) (HCC)     Gastroenteritis     HCAP (healthcare-associated pneumonia) 4/29/2015    Heart disease     HLD (hyperlipidemia)     [place] (hard of hearing)     wear hearing aids    Hypertension     Kidney disease     40% kdiney function    Kidney stone     years ago 15-20    NICOLAS on CPAP     Osteopenia determined by x-ray     Pacemaker 2011    Pinched nerve     slipped disc in back    Visual problems     Vitamin D deficiency       Past Surgical History:   Procedure Laterality Date    ABDOMINAL HERNIA REPAIR  04/27/2017    incisional hernia repair--Dr. Morel Double CARDIAC SURGERY      CATARACT REMOVAL WITH IMPLANT      bilateral    COLONOSCOPY  [id number],3425    COLONOSCOPY  12/29/2017    COLONOSCOPY CONTROL HEMORRHAGE performed by Lindsey Huston MD at [facility]    COLONOSCOPY  8/16/2018    COLONOSCOPY POLYPECTOMY SNARE/COLD BIOPSY performed by Lindsey Huston MD at [facility]    COLONOSCOPY  8/19/2018    COLONOSCOPY CONTROL HEMORRHAGE performed by Lindsey Huston MD at [facility]    COLONOSCOPY N/A 8/20/2018    COLONOSCOPY CONTROL HEMORRHAGE performed by Lindsey Huston MD siblings had dm    Early Death Brother 72        lung cancer      Social History   Substance Use Topics    Smoking status: Former Smoker     Packs/day: 1.00     Years: 20.00     Types: Cigarettes     Quit date: 11/17/1965    Smokeless tobacco: Never Used      Comment: pt use to smoke cigars and pipe    Alcohol use 4.2 oz/week     7 Cans of beer per week      Comment: occasional 1 beer       Current Outpatient Prescriptions   Medication Sig Dispense Refill    albuterol sulfate HFA (VENTOLIN HFA) 108 (90 Base) MCG/ACT inhaler Inhale 2 puffs into the lungs every 6 hours as needed for Wheezing 1 Inhaler 5    losartan (COZAAR) 50 MG tablet Take 1 tablet by mouth daily 90 tablet 1    metoprolol tartrate (LOPRESSOR) 25 MG tablet Take 0.5 tablets by mouth daily Substitute for atenolol. 45 tablet 1    sertraline (ZOLOFT) 50 MG tablet TAKE ONE TABLET BY MOUTH ONCE DAILY 90 tablet 1    tamsulosin (FLOMAX) 0.4 MG capsule Take 1 capsule by mouth 2 times daily 180 capsule 1    pantoprazole (PROTONIX) 40 MG tablet Take 1 tablet by mouth every morning (before breakfast) 30 tablet 3    acetaminophen (TYLENOL) 650 MG extended release tablet Take 650 mg by mouth every 8 hours as needed for Pain      ferrous sulfate 325 (65 Fe) MG tablet Take 325 mg by mouth Take 1 tablet every other day.  Omega-3 Fatty Acids (FISH OIL) 1200 MG CAPS Take by mouth daily      docusate sodium (COLACE) 100 MG capsule Take 100 mg by mouth daily      vitamin B-12 1000 MCG tablet Take 1 tablet by mouth daily 30 tablet 3    Coenzyme Q10 (COQ-10 PO) Take 10 mg by mouth daily      Multiple Vitamins-Minerals (THERAPEUTIC MULTIVITAMIN-MINERALS) tablet Take 1 tablet by mouth daily        No current facility-administered medications for this visit.        Allergies   Allergen Reactions    Iodine Swelling and Rash      Health Maintenance   Topic Date Due    Shingles Vaccine (1 of 2 - 2 Dose Series) 11/24/1981    Potassium monitoring  10/09/2019  Creatinine monitoring  10/09/2019    DTaP/Tdap/Td vaccine (2 - Td) 06/16/2027    Flu vaccine  Completed    Pneumococcal low/med risk  Completed       Review of Systems:   Review of Systems   Constitutional: Positive for diaphoresis, fatigue and unexpected weight change (per patient's weight loss. Weight is up 10 pounds since August per chart review. ). Negative for chills and fever. HENT: Positive for hearing loss and postnasal drip. Negative for facial swelling, mouth sores, nosebleeds, sinus pain, sinus pressure, sneezing, sore throat and trouble swallowing. Eyes: Negative for visual disturbance. Respiratory: Positive for shortness of breath (BAIG ). Negative for choking, chest tightness and wheezing. Cardiovascular: Negative for chest pain, palpitations and leg swelling. Gastrointestinal: Negative for abdominal pain, anal bleeding, blood in stool, constipation, diarrhea, nausea and vomiting. Genitourinary: Negative for decreased urine volume, difficulty urinating, dysuria, frequency, hematuria and urgency. Musculoskeletal: Positive for arthralgias, joint swelling and myalgias. Negative for back pain and gait problem. Skin: Negative for rash. Neurological: Positive for dizziness. Negative for seizures, syncope, weakness, light-headedness, numbness and headaches. Psychiatric/Behavioral: The patient is nervous/anxious. Objective:   Physical Exam   BP (!) 170/95 (Site: Left Upper Arm, Position: Sitting, Cuff Size: Large Adult)   Pulse 67   Temp 97 °F (36.1 °C) (Oral)   Resp 18   Ht 5' 4\" (1.626 m)   Wt 143 lb (64.9 kg)   SpO2 94%   BMI 24.55 kg/m²    General appearance: No apparent distress, elderly, well developed, and cooperative. Patient seen in chair as unable to get on exam table. HEENT: Pupils equal, round, and reactive to light. Conjunctivae/corneas clear. Oral mucosa moist, no sores noted. Neck: Supple, with full range of motion. Trachea midline.  No palpable results. Obtain B12 and folate today. CBC shows:   Hgb 11.3, Hct 33.7, MCV 81.    -Do not recommend iron infusions at this time. The patient has responded well from oral iron supplementation and treatment of cause of GI bleeding. Reviewed with Dr. Nina Richardson, patient should be monitored routinely and if Hgb drops he would benefit from IV iron.    -Continue ferrous sulfate 325 mg daily. Patient instructed to take on an empty stomach with vitamin C. Return to clinic in four weeks. Labs on RTC:   - CBC Auto Differential; Future  - Ferritin; Future  - Iron; Future  - Iron Binding Capacity; Future      All patient questions answered. Pt voiced understanding. Patient agreed with treatment plan. Patient history, assessment, and plan discussed with Dr. Nina Richardson; plan made in collaboration with Dr. Nina Richardson. Follow up as directed. Instructed patient to continue to monitor for weight loss, any lymphadenopathy, continued night sweats or fever. Patient instructed to call for questions or concerns.       Electronically signed by   Missy Ambrose PA-C

## 2018-11-07 ASSESSMENT — ENCOUNTER SYMPTOMS
CHOKING: 0
WHEEZING: 0
DIARRHEA: 0
SHORTNESS OF BREATH: 1
BACK PAIN: 0
VOMITING: 0
BLOOD IN STOOL: 0
TROUBLE SWALLOWING: 0
SINUS PAIN: 0
CONSTIPATION: 0
SINUS PRESSURE: 0
SORE THROAT: 0
FACIAL SWELLING: 0
CHEST TIGHTNESS: 0
ABDOMINAL PAIN: 0
NAUSEA: 0
ANAL BLEEDING: 0

## 2018-11-08 ENCOUNTER — CARE COORDINATION (OUTPATIENT)
Dept: CARE COORDINATION | Age: 83
End: 2018-11-08

## 2018-11-19 ENCOUNTER — OFFICE VISIT (OUTPATIENT)
Dept: FAMILY MEDICINE CLINIC | Age: 83
End: 2018-11-19
Payer: MEDICARE

## 2018-11-19 VITALS
HEART RATE: 62 BPM | WEIGHT: 140 LBS | BODY MASS INDEX: 26.43 KG/M2 | OXYGEN SATURATION: 98 % | HEIGHT: 61 IN | SYSTOLIC BLOOD PRESSURE: 112 MMHG | TEMPERATURE: 97.5 F | DIASTOLIC BLOOD PRESSURE: 84 MMHG

## 2018-11-19 DIAGNOSIS — H65.04 RECURRENT ACUTE SEROUS OTITIS MEDIA OF RIGHT EAR: ICD-10-CM

## 2018-11-19 DIAGNOSIS — M54.2 NECK PAIN: Primary | ICD-10-CM

## 2018-11-19 DIAGNOSIS — Z00.00 ROUTINE GENERAL MEDICAL EXAMINATION AT A HEALTH CARE FACILITY: ICD-10-CM

## 2018-11-19 PROCEDURE — G0438 PPPS, INITIAL VISIT: HCPCS | Performed by: FAMILY MEDICINE

## 2018-11-19 ASSESSMENT — ENCOUNTER SYMPTOMS
EYE PAIN: 0
NAUSEA: 0
DIARRHEA: 0
VOMITING: 0
BLOOD IN STOOL: 0
TROUBLE SWALLOWING: 0
ABDOMINAL PAIN: 0
CONSTIPATION: 0
SHORTNESS OF BREATH: 0
COUGH: 0

## 2018-11-19 ASSESSMENT — LIFESTYLE VARIABLES
HOW OFTEN DURING THE LAST YEAR HAVE YOU NEEDED AN ALCOHOLIC DRINK FIRST THING IN THE MORNING TO GET YOURSELF GOING AFTER A NIGHT OF HEAVY DRINKING: 0
HOW OFTEN DURING THE LAST YEAR HAVE YOU BEEN UNABLE TO REMEMBER WHAT HAPPENED THE NIGHT BEFORE BECAUSE YOU HAD BEEN DRINKING: 0
HOW OFTEN DO YOU HAVE A DRINK CONTAINING ALCOHOL: 4
HOW OFTEN DURING THE LAST YEAR HAVE YOU HAD A FEELING OF GUILT OR REMORSE AFTER DRINKING: 0
HOW OFTEN DURING THE LAST YEAR HAVE YOU FOUND THAT YOU WERE NOT ABLE TO STOP DRINKING ONCE YOU HAD STARTED: 0
HOW OFTEN DO YOU HAVE SIX OR MORE DRINKS ON ONE OCCASION: 0
AUDIT TOTAL SCORE: 4
HAS A RELATIVE, FRIEND, DOCTOR, OR ANOTHER HEALTH PROFESSIONAL EXPRESSED CONCERN ABOUT YOUR DRINKING OR SUGGESTED YOU CUT DOWN: 0
HOW MANY STANDARD DRINKS CONTAINING ALCOHOL DO YOU HAVE ON A TYPICAL DAY: 0
AUDIT-C TOTAL SCORE: 4
HOW OFTEN DURING THE LAST YEAR HAVE YOU FAILED TO DO WHAT WAS NORMALLY EXPECTED FROM YOU BECAUSE OF DRINKING: 0
HAVE YOU OR SOMEONE ELSE BEEN INJURED AS A RESULT OF YOUR DRINKING: 0

## 2018-11-19 ASSESSMENT — ANXIETY QUESTIONNAIRES: GAD7 TOTAL SCORE: 0

## 2018-11-19 ASSESSMENT — PATIENT HEALTH QUESTIONNAIRE - PHQ9
SUM OF ALL RESPONSES TO PHQ QUESTIONS 1-9: 0
SUM OF ALL RESPONSES TO PHQ QUESTIONS 1-9: 0

## 2018-11-19 NOTE — PROGRESS NOTES
Mary Alice Desir is a 80 y.o. male who presents today for:  Chief Complaint   Patient presents with    Welcome To Medicare Visit       Goals      Blood Pressure < 140/90      Reduce Falls             I will reduce my risk of falls by the following: Remove rugs or use non slip rugs  Install grab bars in bathroom  Use walking aids like cane or walker  Follow through on orders for PT    Barriers: none  Plan for overcoming my barriers: N/A  Confidence: 6/10  Anticipated Goal Completion Date: 6/8/18            HPI:     HPI     Here for medicare wellness -    Also has a pain on the shoulder and goes up the neck - and in the side of the head also - even toughing the side of the head just - also the tonsils are swollen     doing the exercises    Will get the hearing aids next month    No question data found.     Past Medical History:   Diagnosis Date    Acute sinusitis     Angina pectoris (HCC)     Anxiety disorder 10/27/2016    Arthritis     osteoporosis    BPH with urinary obstruction     CAD (coronary artery disease)     Chronic insomnia     CKD (chronic kidney disease) stage 3, GFR 30-59 ml/min (HCC)     COPD (chronic obstructive pulmonary disease) (HCC)     Gastroenteritis     HCAP (healthcare-associated pneumonia) 4/29/2015    Heart disease     HLD (hyperlipidemia)     [place] (hard of hearing)     wear hearing aids    Hypertension     Kidney disease     40% kdiney function    Kidney stone     years ago 15-20    NICOLAS on CPAP     Osteopenia determined by x-ray     Pacemaker 2011    Pinched nerve     slipped disc in back    Visual problems     Vitamin D deficiency       Past Surgical History:   Procedure Laterality Date    ABDOMINAL HERNIA REPAIR  04/27/2017    incisional hernia repair--Dr. Norberto Camacho CARDIAC SURGERY      CATARACT REMOVAL WITH IMPLANT      bilateral    COLONOSCOPY  [id number]    COLONOSCOPY  12/29/2017    COLON[facility]RHAGE [facility] 72        [facility]    Diabetes Brother     Kidney Disease Father         stones    Stroke Father     Kidney Disease Child         stones    Kidney Disease Child         stones    Heart Disease Sister     Arthritis Sister     High Blood Pressure Sister     High Cholesterol Sister     Diabetes Brother         multiple siblings had dm    Early Death Brother 72        lung cancer     Social History   Substance Use Topics    Smoking status: Former Smoker     Packs/day: 1.00     Years: 20.00     Types: Cigarettes     Quit date: 11/17/1965    Smokeless tobacco: Never Used      Comment: pt use to smoke cigars and pipe    Alcohol use 4.2 oz/week     7 Cans of beer per week      Comment: occasional 1 beer       Current Outpatient Prescriptions   Medication Sig Dispense Refill    albuterol sulfate HFA (VENTOLIN HFA) 108 (90 Base) MCG/ACT inhaler Inhale 2 puffs into the lungs every 6 hours as needed for Wheezing 1 Inhaler 5    losartan (COZAAR) 50 MG tablet Take 1 tablet by mouth daily 90 tablet 1    metoprolol tartrate (LOPRESSOR) 25 MG tablet Take 0.5 tablets by mouth daily Substitute for atenolol. 45 tablet 1    sertraline (ZOLOFT) 50 MG tablet TAKE ONE TABLET BY MOUTH ONCE DAILY 90 tablet 1    tamsulosin (FLOMAX) 0.4 MG capsule Take 1 capsule by mouth 2 times daily 180 capsule 1    pantoprazole (PROTONIX) 40 MG tablet Take 1 tablet by mouth every morning (before breakfast) 30 tablet 3    acetaminophen (TYLENOL) 650 MG extended release tablet Take 650 mg by mouth every 8 hours as needed for Pain      ferrous sulfate 325 (65 Fe) MG tablet Take 325 mg by mouth Take 1 tablet every other day.       Omega-3 Fatty Acids (FISH OIL) 1200 MG CAPS Take by mouth daily      docusate sodium (COLACE) 100 MG capsule Take 100 mg by mouth daily      vitamin B-12 1000 MCG tablet Take 1 tablet by mouth daily 30 tablet 3    Coenzyme Q10 (COQ-10 PO) Take 10 mg by mouth daily      Multiple Vitamins-Minerals 2019 11:30 AM Heidi Rodriguez APRN - CNP ENT [facility] [address] [facility]   2019 1:00 PM Amarilis Reid MD [facility] [address] [facility]   2019 12:30 PM Shelby Espinal DO SRPX  RES [address] [facility]       Patient given educational materials - see patient instructions. Discussed use, benefit, and sideeffects of prescribed medications. All patient questions answered. Pt voiced understanding. Reviewed health maintenance. Instructed to continue current medications, diet and exercise. Patient agreed with treatment plan. Follow up as directed. Electronically signed by Yossi Grayson DO on 2018 at 2:22 PM  Medicare Annual Wellness Visit  Name: Wally Viera Date: 2018   MRN: [id number] Sex: Male   Age: 80 y.o. Ethnicity: Non-/Non    : 1931 Race: White      CareTeam (Including outside providers/suppliers regularly involved in providing care):   Patient Care Team:  Shelby Espinal DO as PCP - General (Family Medicine)  Shelby Espinal DO as PCP - S Attributed Provider  Dipika Burgess MD (Internal Medicine)  Gladis Baeza MD as Consulting Physician (Pulmonary Disease)  Trang Rolon RN as Care Coordinator  Mitchell Tavera MD as Consulting Physician (Gastroenterology)  Dipika Burgess MD (Cardiology)  CHAU Norton (Audiology)  Michaela Ly (Urology)  Stuart Isaac MD as Consulting Physician (Hematology and Oncology)      Patient's complete Health Risk Assessment and screening values have been reviewed and are found in [address]. The following problems were reviewed today and where indicated follow up appointments were made and/or referrals ordered.     Positive Risk Factor Screenings with Interventions:     General Health:  General  In general, how would you say your health is?: Fair  In the past 7 days, have you experienced any of the following?: (!) New or Increased Pain  Do you get the social and emotional support that you need?: Yes  Do you have a Living Will?: Yes  General Health Risk Interventions:  · will treat the ear erffusion with water    Hearing/Vision:  Hearing/Vision  Do you or your family notice any trouble with your hearing?: (!) Yes  Do you have difficulty driving, watching TV, or doing any of your daily activities because of your eyesight?: No  Have you had an eye exam within the past year?: (!) No  Hearing/Vision Interventions:  · seeing them regularly - may see Clemente Gibbs again sooner thatn feb if needed    ADL:  ADLs  In the past 7 days, did you need help from others to perform any of the following everyday activities?: None  In the past 7 days, did you need help from others to take care of any of the following?: (!) Banking/Finances, Taking Medications  ADL Interventions:  · Patient declines any further evaluation/treatment for this issue    Personalized Preventive Plan   Current Health Maintenance Status  Immunization History   Administered Date(s) Administered    Influenza Virus Vaccine 09/01/2012    Influenza, High Dose (Fluzone 65 yrs and older) 10/27/2016, 09/26/2017    Influenza, [place], 3 yrs and older, IM, PF (Fluzone 3 yrs and older or Afluria 5 yrs and older) 09/27/2018    Pneumococcal 13-valent Conjugate ([id number]) 08/28/2018    Pneumococcal Polysaccharide ([id number]) 10/27/2016    Tdap (Boostrix, Adacel) 06/16/2017        Health Maintenance   Topic Date Due    Shingles Vaccine (1 of 2 - 2 Dose Series) 11/24/1981    Potassium monitoring  10/09/2019    Creatinine monitoring  10/09/2019    DTaP/Tdap/Td vaccine (2 - Td) 06/16/2027    Flu vaccine  Completed    Pneumococcal low/med risk  Completed     Recommendations for Preventive Services Due: see orders and patient instructions/AVS.  .   Recommended screening schedule for the next 5-10 years is provided to the patient in written form: see Patient Instructions/AVS.

## 2018-11-19 NOTE — PROGRESS NOTES
Health Maintenance Due   Topic Date Due    Shingles Vaccine (1 of 2 - 2 Dose Series) 11/24/1981 pending

## 2018-12-04 ENCOUNTER — HOSPITAL ENCOUNTER (OUTPATIENT)
Dept: INFUSION THERAPY | Age: 83
Discharge: HOME | End: 2018-12-04
Payer: MEDICARE

## 2018-12-04 ENCOUNTER — OFFICE VISIT (OUTPATIENT)
Dept: ONCOLOGY | Age: 83
End: 2018-12-04
Payer: MEDICARE

## 2018-12-04 ENCOUNTER — TELEPHONE (OUTPATIENT)
Dept: ONCOLOGY | Age: 83
End: 2018-12-04

## 2018-12-04 VITALS
HEIGHT: 61 IN | OXYGEN SATURATION: 96 % | DIASTOLIC BLOOD PRESSURE: 71 MMHG | RESPIRATION RATE: 14 BRPM | HEART RATE: 63 BPM | TEMPERATURE: 97 F | SYSTOLIC BLOOD PRESSURE: 125 MMHG | BODY MASS INDEX: 26.96 KG/M2 | WEIGHT: 142.8 LBS

## 2018-12-04 DIAGNOSIS — D50.0 IRON DEFICIENCY ANEMIA DUE TO CHRONIC BLOOD LOSS: Primary | ICD-10-CM

## 2018-12-04 DIAGNOSIS — D64.9 NORMOCYTIC ANEMIA: ICD-10-CM

## 2018-12-04 DIAGNOSIS — D50.0 IRON DEFICIENCY ANEMIA DUE TO CHRONIC BLOOD LOSS: ICD-10-CM

## 2018-12-04 LAB
BASINOPHIL, AUTOMATED: 0 % (ref 0–3)
EOSINOPHILS RELATIVE PERCENT: 3 % (ref 0–4)
FERRITIN: 22 NG/ML (ref 22–322)
HCT VFR BLD CALC: 32.3 % (ref 42–52)
HEMOGLOBIN: 10.8 GM/DL (ref 14–18)
IRON: 47 UG/DL (ref 65–195)
LYMPHOCYTES # BLD: 16 % (ref 15–47)
MCH RBC QN AUTO: 26.6 PG (ref 27–31)
MCHC RBC AUTO-ENTMCNC: 33.6 GM/DL (ref 33–37)
MCV RBC AUTO: 79 FL (ref 80–94)
MONOCYTES: 9 % (ref 0–12)
PDW BLD-RTO: 13.7 % (ref 11.5–14.5)
PLATELET # BLD: 267 THOU/MM3 (ref 130–400)
PMV BLD AUTO: 6.7 FL (ref 7.4–10.4)
RBC # BLD: 4.08 MILL/MM3 (ref 4.7–6.1)
SEG NEUTROPHILS: 72 % (ref 43–75)
TOTAL IRON BINDING CAPACITY: 281 UG/DL (ref 171–450)
WBC # BLD: 7.4 THOU/MM3 (ref 4.8–10.8)

## 2018-12-04 PROCEDURE — 83550 IRON BINDING TEST: CPT

## 2018-12-04 PROCEDURE — 99211 OFF/OP EST MAY X REQ PHY/QHP: CPT

## 2018-12-04 PROCEDURE — 83540 ASSAY OF IRON: CPT

## 2018-12-04 PROCEDURE — 85025 COMPLETE CBC W/AUTO DIFF WBC: CPT

## 2018-12-04 PROCEDURE — 99213 OFFICE O/P EST LOW 20 MIN: CPT | Performed by: PHYSICIAN ASSISTANT

## 2018-12-04 PROCEDURE — 82728 ASSAY OF FERRITIN: CPT

## 2018-12-04 PROCEDURE — 36415 COLL VENOUS BLD VENIPUNCTURE: CPT

## 2018-12-11 ENCOUNTER — CARE COORDINATION (OUTPATIENT)
Dept: CARE COORDINATION | Age: 83
End: 2018-12-11

## 2018-12-27 ENCOUNTER — TELEPHONE (OUTPATIENT)
Dept: ENT CLINIC | Age: 83
End: 2018-12-27

## 2018-12-27 NOTE — TELEPHONE ENCOUNTER
Called radhika and left a message to call the office.      Heidi Tran called back and appointment was scheduled for tomorrow at [facility]

## 2018-12-28 ENCOUNTER — OFFICE VISIT (OUTPATIENT)
Dept: ENT CLINIC | Age: 83
End: 2018-12-28
Payer: MEDICARE

## 2018-12-28 VITALS
DIASTOLIC BLOOD PRESSURE: 84 MMHG | TEMPERATURE: 97.5 F | HEART RATE: 68 BPM | SYSTOLIC BLOOD PRESSURE: 120 MMHG | HEIGHT: 61 IN | BODY MASS INDEX: 26.3 KG/M2 | RESPIRATION RATE: 14 BRPM | WEIGHT: 139.3 LBS

## 2018-12-28 DIAGNOSIS — H65.22 CHRONIC SEROUS OTITIS MEDIA OF LEFT EAR: ICD-10-CM

## 2018-12-28 DIAGNOSIS — H66.41 TUBOTYMPANIC SUPPURATIVE OTITIS MEDIA, RIGHT: Primary | ICD-10-CM

## 2018-12-28 PROCEDURE — 99213 OFFICE O/P EST LOW 20 MIN: CPT | Performed by: NURSE PRACTITIONER

## 2018-12-28 RX ORDER — CIPROFLOXACIN HYDROCHLORIDE 3.5 MG/ML
SOLUTION/ DROPS TOPICAL
Qty: 1 BOTTLE | Refills: 1 | Status: SHIPPED | OUTPATIENT
Start: 2018-12-28 | End: 2019-02-04 | Stop reason: ALTCHOICE

## 2018-12-28 RX ORDER — CLOPIDOGREL BISULFATE 75 MG/1
75 TABLET ORAL DAILY
COMMUNITY
End: 2019-05-11

## 2018-12-28 RX ORDER — ISOSORBIDE MONONITRATE 30 MG/1
30 TABLET, EXTENDED RELEASE ORAL DAILY
COMMUNITY
End: 2019-05-10 | Stop reason: SDUPTHER

## 2018-12-28 ASSESSMENT — ENCOUNTER SYMPTOMS
BLOOD IN STOOL: 0
CHOKING: 0
NAUSEA: 0
ABDOMINAL DISTENTION: 0
COUGH: 0
COLOR CHANGE: 0
SHORTNESS OF BREATH: 0
RHINORRHEA: 0
EYE DISCHARGE: 0
FACIAL SWELLING: 0
BACK PAIN: 0
APNEA: 0
WHEEZING: 0
CONSTIPATION: 0
VOMITING: 0
ANAL BLEEDING: 0
EYE REDNESS: 0
TROUBLE SWALLOWING: 0
ABDOMINAL PAIN: 0
PHOTOPHOBIA: 0
RECTAL PAIN: 0
STRIDOR: 0
EYE PAIN: 0
VOICE CHANGE: 0
SINUS PRESSURE: 0
CHEST TIGHTNESS: 0
EYE ITCHING: 0
DIARRHEA: 0
SORE THROAT: 0

## 2018-12-30 LAB
AEROBIC CULTURE: NORMAL
GRAM STAIN RESULT: NORMAL

## 2019-01-01 ENCOUNTER — NURSE ONLY (OUTPATIENT)
Dept: LAB | Age: 84
End: 2019-01-01

## 2019-01-01 ENCOUNTER — OFFICE VISIT (OUTPATIENT)
Dept: FAMILY MEDICINE CLINIC | Age: 84
End: 2019-01-01
Payer: MEDICARE

## 2019-01-01 ENCOUNTER — OFFICE VISIT (OUTPATIENT)
Dept: ENT CLINIC | Age: 84
End: 2019-01-01
Payer: MEDICARE

## 2019-01-01 ENCOUNTER — NURSE ONLY (OUTPATIENT)
Dept: FAMILY MEDICINE CLINIC | Age: 84
End: 2019-01-01
Payer: MEDICARE

## 2019-01-01 ENCOUNTER — TELEPHONE (OUTPATIENT)
Dept: FAMILY MEDICINE CLINIC | Age: 84
End: 2019-01-01

## 2019-01-01 ENCOUNTER — OFFICE VISIT (OUTPATIENT)
Dept: NEPHROLOGY | Age: 84
End: 2019-01-01
Payer: MEDICARE

## 2019-01-01 VITALS
SYSTOLIC BLOOD PRESSURE: 156 MMHG | HEIGHT: 66 IN | RESPIRATION RATE: 16 BRPM | DIASTOLIC BLOOD PRESSURE: 82 MMHG | HEART RATE: 84 BPM | WEIGHT: 143 LBS | BODY MASS INDEX: 22.98 KG/M2

## 2019-01-01 VITALS
OXYGEN SATURATION: 95 % | HEART RATE: 70 BPM | DIASTOLIC BLOOD PRESSURE: 74 MMHG | BODY MASS INDEX: 23.63 KG/M2 | WEIGHT: 146.4 LBS | SYSTOLIC BLOOD PRESSURE: 124 MMHG

## 2019-01-01 VITALS
WEIGHT: 148.6 LBS | OXYGEN SATURATION: 98 % | TEMPERATURE: 97.8 F | HEART RATE: 65 BPM | BODY MASS INDEX: 23.88 KG/M2 | HEIGHT: 66 IN | DIASTOLIC BLOOD PRESSURE: 82 MMHG | RESPIRATION RATE: 12 BRPM | SYSTOLIC BLOOD PRESSURE: 138 MMHG

## 2019-01-01 VITALS
OXYGEN SATURATION: 94 % | WEIGHT: 142.2 LBS | DIASTOLIC BLOOD PRESSURE: 82 MMHG | HEART RATE: 60 BPM | SYSTOLIC BLOOD PRESSURE: 148 MMHG | TEMPERATURE: 98.4 F | BODY MASS INDEX: 22.96 KG/M2

## 2019-01-01 VITALS
DIASTOLIC BLOOD PRESSURE: 69 MMHG | BODY MASS INDEX: 22.87 KG/M2 | HEART RATE: 71 BPM | WEIGHT: 141.6 LBS | SYSTOLIC BLOOD PRESSURE: 116 MMHG

## 2019-01-01 DIAGNOSIS — N18.30 CKD (CHRONIC KIDNEY DISEASE) STAGE 3, GFR 30-59 ML/MIN (HCC): ICD-10-CM

## 2019-01-01 DIAGNOSIS — H69.83 ETD (EUSTACHIAN TUBE DYSFUNCTION), BILATERAL: Primary | ICD-10-CM

## 2019-01-01 DIAGNOSIS — I10 ESSENTIAL HYPERTENSION: ICD-10-CM

## 2019-01-01 DIAGNOSIS — R06.02 SHORTNESS OF BREATH: ICD-10-CM

## 2019-01-01 DIAGNOSIS — G47.33 OSA (OBSTRUCTIVE SLEEP APNEA): Primary | ICD-10-CM

## 2019-01-01 DIAGNOSIS — N18.30 CKD (CHRONIC KIDNEY DISEASE), STAGE III (HCC): Primary | ICD-10-CM

## 2019-01-01 DIAGNOSIS — I10 ESSENTIAL HYPERTENSION: Primary | ICD-10-CM

## 2019-01-01 DIAGNOSIS — I25.10 CORONARY ARTERY DISEASE INVOLVING NATIVE HEART WITHOUT ANGINA PECTORIS, UNSPECIFIED VESSEL OR LESION TYPE: Primary | ICD-10-CM

## 2019-01-01 DIAGNOSIS — J43.9 PULMONARY EMPHYSEMA, UNSPECIFIED EMPHYSEMA TYPE (HCC): ICD-10-CM

## 2019-01-01 DIAGNOSIS — N18.30 CKD (CHRONIC KIDNEY DISEASE), STAGE III (HCC): ICD-10-CM

## 2019-01-01 DIAGNOSIS — Z00.00 ROUTINE GENERAL MEDICAL EXAMINATION AT A HEALTH CARE FACILITY: ICD-10-CM

## 2019-01-01 DIAGNOSIS — Z96.22 S/P TYMPANOTOMY WITH INSERTION OF TUBE: ICD-10-CM

## 2019-01-01 LAB
ANION GAP SERPL CALCULATED.3IONS-SCNC: 14 MEQ/L (ref 8–16)
ANION GAP SERPL CALCULATED.3IONS-SCNC: 15 MEQ/L (ref 8–16)
BASOPHILS # BLD: 0.9 %
BASOPHILS ABSOLUTE: 0.1 THOU/MM3 (ref 0–0.1)
BUN BLDV-MCNC: 24 MG/DL (ref 7–22)
BUN BLDV-MCNC: 32 MG/DL (ref 7–22)
CALCIUM SERPL-MCNC: 9.5 MG/DL (ref 8.5–10.5)
CALCIUM SERPL-MCNC: 9.6 MG/DL (ref 8.5–10.5)
CHLORIDE BLD-SCNC: 102 MEQ/L (ref 98–111)
CHLORIDE BLD-SCNC: 105 MEQ/L (ref 98–111)
CO2: 24 MEQ/L (ref 23–33)
CO2: 24 MEQ/L (ref 23–33)
CREAT SERPL-MCNC: 1.3 MG/DL (ref 0.4–1.2)
CREAT SERPL-MCNC: 1.4 MG/DL (ref 0.4–1.2)
CREATININE URINE: 85.4 MG/DL
EOSINOPHIL # BLD: 2.9 %
EOSINOPHILS ABSOLUTE: 0.2 THOU/MM3 (ref 0–0.4)
ERYTHROCYTE [DISTWIDTH] IN BLOOD BY AUTOMATED COUNT: 14.3 % (ref 11.5–14.5)
ERYTHROCYTE [DISTWIDTH] IN BLOOD BY AUTOMATED COUNT: 48.8 FL (ref 35–45)
GFR SERPL CREATININE-BSD FRML MDRD: 48 ML/MIN/1.73M2
GFR SERPL CREATININE-BSD FRML MDRD: 52 ML/MIN/1.73M2
GLUCOSE BLD-MCNC: 105 MG/DL (ref 70–108)
GLUCOSE BLD-MCNC: 90 MG/DL (ref 70–108)
HCT VFR BLD CALC: 35.9 % (ref 42–52)
HCT VFR BLD CALC: 36.7 % (ref 42–52)
HEMOGLOBIN: 11.7 GM/DL (ref 14–18)
HEMOGLOBIN: 11.9 GM/DL (ref 14–18)
IMMATURE GRANS (ABS): 0.02 THOU/MM3 (ref 0–0.07)
IMMATURE GRANULOCYTES: 0.3 %
LYMPHOCYTES # BLD: 22.3 %
LYMPHOCYTES ABSOLUTE: 1.3 THOU/MM3 (ref 1–4.8)
MCH RBC QN AUTO: 30.1 PG (ref 26–33)
MCHC RBC AUTO-ENTMCNC: 32.4 GM/DL (ref 32.2–35.5)
MCV RBC AUTO: 92.9 FL (ref 80–94)
MONOCYTES # BLD: 9.6 %
MONOCYTES ABSOLUTE: 0.6 THOU/MM3 (ref 0.4–1.3)
NUCLEATED RED BLOOD CELLS: 0 /100 WBC
PLATELET # BLD: 199 THOU/MM3 (ref 130–400)
PMV BLD AUTO: 9.2 FL (ref 9.4–12.4)
POTASSIUM SERPL-SCNC: 4.5 MEQ/L (ref 3.5–5.2)
POTASSIUM SERPL-SCNC: 4.8 MEQ/L (ref 3.5–5.2)
PROT/CREAT RATIO, UR: 0.23
PROTEIN, URINE: 19.9 MG/DL
RBC # BLD: 3.95 MILL/MM3 (ref 4.7–6.1)
SEG NEUTROPHILS: 64 %
SEGMENTED NEUTROPHILS ABSOLUTE COUNT: 3.7 THOU/MM3 (ref 1.8–7.7)
SODIUM BLD-SCNC: 140 MEQ/L (ref 135–145)
SODIUM BLD-SCNC: 144 MEQ/L (ref 135–145)
WBC # BLD: 5.8 THOU/MM3 (ref 4.8–10.8)

## 2019-01-01 PROCEDURE — G0439 PPPS, SUBSEQ VISIT: HCPCS | Performed by: FAMILY MEDICINE

## 2019-01-01 PROCEDURE — 99213 OFFICE O/P EST LOW 20 MIN: CPT | Performed by: NURSE PRACTITIONER

## 2019-01-01 PROCEDURE — 99211 OFF/OP EST MAY X REQ PHY/QHP: CPT | Performed by: FAMILY MEDICINE

## 2019-01-01 PROCEDURE — 99213 OFFICE O/P EST LOW 20 MIN: CPT | Performed by: INTERNAL MEDICINE

## 2019-01-01 PROCEDURE — 99214 OFFICE O/P EST MOD 30 MIN: CPT | Performed by: STUDENT IN AN ORGANIZED HEALTH CARE EDUCATION/TRAINING PROGRAM

## 2019-01-01 RX ORDER — TAMSULOSIN HYDROCHLORIDE 0.4 MG/1
CAPSULE ORAL
Qty: 180 CAPSULE | Refills: 4 | Status: ON HOLD | OUTPATIENT
Start: 2019-01-01 | End: 2020-01-01 | Stop reason: HOSPADM

## 2019-01-01 RX ORDER — ISOSORBIDE MONONITRATE 30 MG/1
TABLET, EXTENDED RELEASE ORAL
Qty: 90 TABLET | Refills: 4 | Status: ON HOLD | OUTPATIENT
Start: 2019-01-01 | End: 2020-01-01 | Stop reason: HOSPADM

## 2019-01-01 RX ORDER — LOSARTAN POTASSIUM 100 MG/1
TABLET ORAL
Qty: 30 TABLET | Refills: 5 | Status: ON HOLD
Start: 2019-01-01 | End: 2020-01-01 | Stop reason: HOSPADM

## 2019-01-01 RX ORDER — ALBUTEROL SULFATE 90 UG/1
AEROSOL, METERED RESPIRATORY (INHALATION)
Qty: 1 INHALER | Refills: 3 | Status: SHIPPED | OUTPATIENT
Start: 2019-01-01 | End: 2020-01-01

## 2019-01-01 ASSESSMENT — ENCOUNTER SYMPTOMS
ABDOMINAL PAIN: 0
SINUS PRESSURE: 0
SHORTNESS OF BREATH: 0
NAUSEA: 0
NAUSEA: 0
EYE PAIN: 0
COLOR CHANGE: 0
VOICE CHANGE: 0
BLOOD IN STOOL: 0
VOMITING: 0
DIARRHEA: 0
CHEST TIGHTNESS: 0
WHEEZING: 0
COUGH: 0
FACIAL SWELLING: 0
SHORTNESS OF BREATH: 0
SORE THROAT: 0
WHEEZING: 0
COUGH: 1
RHINORRHEA: 0
NAUSEA: 0
STRIDOR: 0
DIARRHEA: 0
APNEA: 0
SHORTNESS OF BREATH: 0
COUGH: 0
CONSTIPATION: 0
TROUBLE SWALLOWING: 0
VOMITING: 0
DIARRHEA: 0
VOMITING: 0
TROUBLE SWALLOWING: 0
ABDOMINAL PAIN: 0
RHINORRHEA: 0
CHOKING: 0

## 2019-01-01 ASSESSMENT — LIFESTYLE VARIABLES
HAVE YOU OR SOMEONE ELSE BEEN INJURED AS A RESULT OF YOUR DRINKING: 0
HOW OFTEN DO YOU HAVE A DRINK CONTAINING ALCOHOL: 4
HOW OFTEN DURING THE LAST YEAR HAVE YOU BEEN UNABLE TO REMEMBER WHAT HAPPENED THE NIGHT BEFORE BECAUSE YOU HAD BEEN DRINKING: 0
HOW OFTEN DO YOU HAVE SIX OR MORE DRINKS ON ONE OCCASION: 0
HOW OFTEN DURING THE LAST YEAR HAVE YOU NEEDED AN ALCOHOLIC DRINK FIRST THING IN THE MORNING TO GET YOURSELF GOING AFTER A NIGHT OF HEAVY DRINKING: 0
HOW OFTEN DURING THE LAST YEAR HAVE YOU FOUND THAT YOU WERE NOT ABLE TO STOP DRINKING ONCE YOU HAD STARTED: 0
AUDIT-C TOTAL SCORE: 4
HOW OFTEN DURING THE LAST YEAR HAVE YOU FAILED TO DO WHAT WAS NORMALLY EXPECTED FROM YOU BECAUSE OF DRINKING: 0
AUDIT TOTAL SCORE: 4
HAS A RELATIVE, FRIEND, DOCTOR, OR ANOTHER HEALTH PROFESSIONAL EXPRESSED CONCERN ABOUT YOUR DRINKING OR SUGGESTED YOU CUT DOWN: 0
HOW MANY STANDARD DRINKS CONTAINING ALCOHOL DO YOU HAVE ON A TYPICAL DAY: 0
HOW OFTEN DURING THE LAST YEAR HAVE YOU HAD A FEELING OF GUILT OR REMORSE AFTER DRINKING: 0

## 2019-01-01 ASSESSMENT — PATIENT HEALTH QUESTIONNAIRE - PHQ9
SUM OF ALL RESPONSES TO PHQ QUESTIONS 1-9: 2
SUM OF ALL RESPONSES TO PHQ QUESTIONS 1-9: 2

## 2019-01-02 ENCOUNTER — HOSPITAL ENCOUNTER (OUTPATIENT)
Dept: INFUSION THERAPY | Age: 84
Discharge: HOME | End: 2019-01-02
Payer: MEDICARE

## 2019-01-02 ENCOUNTER — OFFICE VISIT (OUTPATIENT)
Dept: ONCOLOGY | Age: 84
End: 2019-01-02
Payer: MEDICARE

## 2019-01-02 VITALS
DIASTOLIC BLOOD PRESSURE: 78 MMHG | HEART RATE: 76 BPM | SYSTOLIC BLOOD PRESSURE: 148 MMHG | OXYGEN SATURATION: 93 % | TEMPERATURE: 97.8 F | RESPIRATION RATE: 24 BRPM | BODY MASS INDEX: 26.55 KG/M2 | WEIGHT: 140.6 LBS | HEIGHT: 61 IN

## 2019-01-02 DIAGNOSIS — D50.0 IRON DEFICIENCY ANEMIA DUE TO CHRONIC BLOOD LOSS: ICD-10-CM

## 2019-01-02 DIAGNOSIS — D50.0 IRON DEFICIENCY ANEMIA DUE TO CHRONIC BLOOD LOSS: Primary | ICD-10-CM

## 2019-01-02 LAB
BASINOPHIL, AUTOMATED: 0 % (ref 0–3)
EOSINOPHILS RELATIVE PERCENT: 2 % (ref 0–4)
FERRITIN: 42 NG/ML (ref 22–322)
HCT VFR BLD CALC: 33.1 % (ref 42–52)
HEMOGLOBIN: 11.2 GM/DL (ref 14–18)
IRON: 49 UG/DL (ref 65–195)
LYMPHOCYTES # BLD: 14 % (ref 15–47)
MCH RBC QN AUTO: 26.7 PG (ref 27–31)
MCHC RBC AUTO-ENTMCNC: 33.9 GM/DL (ref 33–37)
MCV RBC AUTO: 79 FL (ref 80–94)
MONOCYTES: 9 % (ref 0–12)
PDW BLD-RTO: 14.3 % (ref 11.5–14.5)
PLATELET # BLD: 292 THOU/MM3 (ref 130–400)
PMV BLD AUTO: 6.7 FL (ref 7.4–10.4)
RBC # BLD: 4.2 MILL/MM3 (ref 4.7–6.1)
SEG NEUTROPHILS: 75 % (ref 43–75)
TOTAL IRON BINDING CAPACITY: 248 UG/DL (ref 171–450)
WBC # BLD: 8.1 THOU/MM3 (ref 4.8–10.8)

## 2019-01-02 PROCEDURE — 82728 ASSAY OF FERRITIN: CPT

## 2019-01-02 PROCEDURE — 99211 OFF/OP EST MAY X REQ PHY/QHP: CPT

## 2019-01-02 PROCEDURE — 83550 IRON BINDING TEST: CPT

## 2019-01-02 PROCEDURE — 83540 ASSAY OF IRON: CPT

## 2019-01-02 PROCEDURE — 36415 COLL VENOUS BLD VENIPUNCTURE: CPT

## 2019-01-02 PROCEDURE — 99213 OFFICE O/P EST LOW 20 MIN: CPT | Performed by: PHYSICIAN ASSISTANT

## 2019-01-02 PROCEDURE — 85025 COMPLETE CBC W/AUTO DIFF WBC: CPT

## 2019-01-04 ENCOUNTER — OFFICE VISIT (OUTPATIENT)
Dept: ENT CLINIC | Age: 84
End: 2019-01-04
Payer: MEDICARE

## 2019-01-04 VITALS
TEMPERATURE: 97.3 F | RESPIRATION RATE: 20 BRPM | HEART RATE: 84 BPM | WEIGHT: 139.8 LBS | DIASTOLIC BLOOD PRESSURE: 80 MMHG | BODY MASS INDEX: 25.73 KG/M2 | SYSTOLIC BLOOD PRESSURE: 128 MMHG | HEIGHT: 62 IN

## 2019-01-04 DIAGNOSIS — T85.698A EXTRUSION OF LEFT TYMPANIC VENTILATION TUBE, INITIAL ENCOUNTER: ICD-10-CM

## 2019-01-04 DIAGNOSIS — H65.22 CHRONIC SEROUS OTITIS MEDIA OF LEFT EAR: Primary | ICD-10-CM

## 2019-01-04 DIAGNOSIS — Z96.22 S/P TYMPANOSTOMY TUBE PLACEMENT: ICD-10-CM

## 2019-01-04 DIAGNOSIS — H66.41 TUBOTYMPANIC SUPPURATIVE OTITIS MEDIA, RIGHT: ICD-10-CM

## 2019-01-04 PROCEDURE — 69433 CREATE EARDRUM OPENING: CPT | Performed by: OTOLARYNGOLOGY

## 2019-01-04 ASSESSMENT — ENCOUNTER SYMPTOMS
VOICE CHANGE: 0
RHINORRHEA: 0
NAUSEA: 0
TROUBLE SWALLOWING: 0
CHOKING: 0
ABDOMINAL PAIN: 0
CHEST TIGHTNESS: 0
DIARRHEA: 0
STRIDOR: 0
COUGH: 0
APNEA: 0
COLOR CHANGE: 0
SORE THROAT: 0
SHORTNESS OF BREATH: 0
VOMITING: 0
SINUS PRESSURE: 0
FACIAL SWELLING: 0
WHEEZING: 0

## 2019-01-14 ENCOUNTER — CARE COORDINATION (OUTPATIENT)
Dept: CARE COORDINATION | Age: 84
End: 2019-01-14

## 2019-01-14 ASSESSMENT — ENCOUNTER SYMPTOMS: DYSPNEA ASSOCIATED WITH: EXERTION

## 2019-01-28 LAB
FUNGUS IDENTIFIED: NORMAL
FUNGUS SMEAR: NORMAL

## 2019-02-04 ENCOUNTER — TELEPHONE (OUTPATIENT)
Dept: ENT CLINIC | Age: 84
End: 2019-02-04

## 2019-02-04 ENCOUNTER — OFFICE VISIT (OUTPATIENT)
Dept: FAMILY MEDICINE CLINIC | Age: 84
End: 2019-02-04
Payer: MEDICARE

## 2019-02-04 ENCOUNTER — OFFICE VISIT (OUTPATIENT)
Dept: ENT CLINIC | Age: 84
End: 2019-02-04
Payer: MEDICARE

## 2019-02-04 VITALS
SYSTOLIC BLOOD PRESSURE: 162 MMHG | RESPIRATION RATE: 24 BRPM | DIASTOLIC BLOOD PRESSURE: 82 MMHG | BODY MASS INDEX: 25.86 KG/M2 | HEART RATE: 68 BPM | WEIGHT: 141.4 LBS | TEMPERATURE: 97.5 F

## 2019-02-04 VITALS
TEMPERATURE: 97.4 F | HEART RATE: 59 BPM | HEIGHT: 62 IN | DIASTOLIC BLOOD PRESSURE: 78 MMHG | WEIGHT: 138.8 LBS | BODY MASS INDEX: 25.54 KG/M2 | SYSTOLIC BLOOD PRESSURE: 158 MMHG | OXYGEN SATURATION: 98 % | RESPIRATION RATE: 10 BRPM

## 2019-02-04 DIAGNOSIS — I10 ESSENTIAL HYPERTENSION: ICD-10-CM

## 2019-02-04 DIAGNOSIS — Z97.4 WEARS HEARING AID IN BOTH EARS: ICD-10-CM

## 2019-02-04 DIAGNOSIS — Z96.22 S/P TYMPANOTOMY WITH INSERTION OF TUBE: Primary | ICD-10-CM

## 2019-02-04 DIAGNOSIS — M25.551 PAIN OF RIGHT HIP JOINT: Primary | ICD-10-CM

## 2019-02-04 PROCEDURE — 99212 OFFICE O/P EST SF 10 MIN: CPT | Performed by: NURSE PRACTITIONER

## 2019-02-04 PROCEDURE — 99213 OFFICE O/P EST LOW 20 MIN: CPT | Performed by: FAMILY MEDICINE

## 2019-02-04 RX ORDER — LOSARTAN POTASSIUM 100 MG/1
100 TABLET ORAL DAILY
Qty: 30 TABLET | Refills: 5 | Status: SHIPPED | OUTPATIENT
Start: 2019-02-04 | End: 2019-01-01 | Stop reason: SDUPTHER

## 2019-02-04 ASSESSMENT — ENCOUNTER SYMPTOMS
CHEST TIGHTNESS: 0
PHOTOPHOBIA: 0
FACIAL SWELLING: 0
SORE THROAT: 0
SINUS PRESSURE: 0
ABDOMINAL PAIN: 0
EYE REDNESS: 0
VOICE CHANGE: 0
DIARRHEA: 0
RHINORRHEA: 0
VOMITING: 0
STRIDOR: 0
APNEA: 0
RECTAL PAIN: 0
EYE DISCHARGE: 0
ABDOMINAL DISTENTION: 0
BLOOD IN STOOL: 0
COLOR CHANGE: 0
EYE PAIN: 0
CONSTIPATION: 0
WHEEZING: 0
CHOKING: 0
ANAL BLEEDING: 0
EYE ITCHING: 0
COUGH: 0
BACK PAIN: 0
SHORTNESS OF BREATH: 0
NAUSEA: 0
TROUBLE SWALLOWING: 0

## 2019-02-07 ASSESSMENT — ENCOUNTER SYMPTOMS
ABDOMINAL PAIN: 0
COUGH: 0
DIARRHEA: 0
VOMITING: 0
EYE PAIN: 0
NAUSEA: 0
BLOOD IN STOOL: 0
TROUBLE SWALLOWING: 0
CONSTIPATION: 0
SHORTNESS OF BREATH: 0

## 2019-02-13 ENCOUNTER — CARE COORDINATION (OUTPATIENT)
Dept: CARE COORDINATION | Age: 84
End: 2019-02-13

## 2019-02-26 DIAGNOSIS — D50.0 IRON DEFICIENCY ANEMIA DUE TO CHRONIC BLOOD LOSS: Primary | ICD-10-CM

## 2019-02-27 ENCOUNTER — HOSPITAL ENCOUNTER (OUTPATIENT)
Dept: INFUSION THERAPY | Age: 84
Discharge: HOME | End: 2019-02-27
Payer: MEDICARE

## 2019-02-27 ENCOUNTER — OFFICE VISIT (OUTPATIENT)
Dept: ONCOLOGY | Age: 84
End: 2019-02-27
Payer: MEDICARE

## 2019-02-27 VITALS
TEMPERATURE: 97.8 F | WEIGHT: 142.4 LBS | SYSTOLIC BLOOD PRESSURE: 145 MMHG | HEIGHT: 62 IN | BODY MASS INDEX: 26.2 KG/M2 | RESPIRATION RATE: 14 BRPM | HEART RATE: 83 BPM | OXYGEN SATURATION: 98 % | DIASTOLIC BLOOD PRESSURE: 80 MMHG

## 2019-02-27 DIAGNOSIS — D50.0 IRON DEFICIENCY ANEMIA DUE TO CHRONIC BLOOD LOSS: Primary | ICD-10-CM

## 2019-02-27 DIAGNOSIS — D50.0 IRON DEFICIENCY ANEMIA DUE TO CHRONIC BLOOD LOSS: ICD-10-CM

## 2019-02-27 LAB
BASINOPHIL, AUTOMATED: 0 % (ref 0–3)
EOSINOPHILS RELATIVE PERCENT: 3 % (ref 0–4)
FERRITIN: 41 NG/ML (ref 22–322)
HCT VFR BLD CALC: 34.3 % (ref 42–52)
HEMOGLOBIN: 11.6 GM/DL (ref 14–18)
IRON: 57 UG/DL (ref 65–195)
LYMPHOCYTES # BLD: 19 % (ref 15–47)
MCH RBC QN AUTO: 28 PG (ref 27–31)
MCHC RBC AUTO-ENTMCNC: 33.7 GM/DL (ref 33–37)
MCV RBC AUTO: 83 FL (ref 80–94)
MONOCYTES: 8 % (ref 0–12)
PDW BLD-RTO: 15.7 % (ref 11.5–14.5)
PLATELET # BLD: 245 THOU/MM3 (ref 130–400)
PMV BLD AUTO: 6.7 FL (ref 7.4–10.4)
RBC # BLD: 4.12 MILL/MM3 (ref 4.7–6.1)
SEG NEUTROPHILS: 70 % (ref 43–75)
TOTAL IRON BINDING CAPACITY: 240 UG/DL (ref 171–450)
WBC # BLD: 5.9 THOU/MM3 (ref 4.8–10.8)

## 2019-02-27 PROCEDURE — 99213 OFFICE O/P EST LOW 20 MIN: CPT | Performed by: PHYSICIAN ASSISTANT

## 2019-02-27 PROCEDURE — 99211 OFF/OP EST MAY X REQ PHY/QHP: CPT

## 2019-02-27 PROCEDURE — 36415 COLL VENOUS BLD VENIPUNCTURE: CPT

## 2019-02-27 PROCEDURE — 83540 ASSAY OF IRON: CPT

## 2019-02-27 PROCEDURE — 83550 IRON BINDING TEST: CPT

## 2019-02-27 PROCEDURE — 82728 ASSAY OF FERRITIN: CPT

## 2019-02-27 PROCEDURE — 85025 COMPLETE CBC W/AUTO DIFF WBC: CPT

## 2019-03-04 ENCOUNTER — NURSE ONLY (OUTPATIENT)
Dept: FAMILY MEDICINE CLINIC | Age: 84
End: 2019-03-04
Payer: MEDICARE

## 2019-03-04 VITALS
SYSTOLIC BLOOD PRESSURE: 149 MMHG | BODY MASS INDEX: 25.93 KG/M2 | HEART RATE: 62 BPM | WEIGHT: 141.8 LBS | DIASTOLIC BLOOD PRESSURE: 82 MMHG

## 2019-03-04 DIAGNOSIS — I10 ESSENTIAL HYPERTENSION: Primary | ICD-10-CM

## 2019-03-04 PROCEDURE — 99211 OFF/OP EST MAY X REQ PHY/QHP: CPT | Performed by: FAMILY MEDICINE

## 2019-03-18 ENCOUNTER — TELEPHONE (OUTPATIENT)
Dept: FAMILY MEDICINE CLINIC | Age: 84
End: 2019-03-18

## 2019-03-25 ENCOUNTER — CARE COORDINATION (OUTPATIENT)
Dept: CARE COORDINATION | Age: 84
End: 2019-03-25

## 2019-04-18 NOTE — TELEPHONE ENCOUNTER
Last visit- 2/4/2019  Next visit- 4/25/2019    Requested Prescriptions     Pending Prescriptions Disp Refills    metoprolol tartrate (LOPRESSOR) 25 MG tablet [Pharmacy Med Name: METOPROLOL TARTRATE TABS 25MG] 45 tablet 1     Sig: TAKE ONE-HALF (1/2) TABLET DAILY SUBSTITUTE FOR ATENOLOL

## 2019-04-25 ENCOUNTER — OFFICE VISIT (OUTPATIENT)
Dept: FAMILY MEDICINE CLINIC | Age: 84
End: 2019-04-25
Payer: MEDICARE

## 2019-04-25 VITALS
HEART RATE: 66 BPM | RESPIRATION RATE: 12 BRPM | WEIGHT: 142.4 LBS | BODY MASS INDEX: 26.2 KG/M2 | DIASTOLIC BLOOD PRESSURE: 69 MMHG | HEIGHT: 62 IN | OXYGEN SATURATION: 96 % | SYSTOLIC BLOOD PRESSURE: 117 MMHG

## 2019-04-25 DIAGNOSIS — I25.10 CORONARY ARTERY DISEASE INVOLVING NATIVE HEART WITHOUT ANGINA PECTORIS, UNSPECIFIED VESSEL OR LESION TYPE: Primary | ICD-10-CM

## 2019-04-25 DIAGNOSIS — J43.9 PULMONARY EMPHYSEMA, UNSPECIFIED EMPHYSEMA TYPE (HCC): ICD-10-CM

## 2019-04-25 DIAGNOSIS — E43 SEVERE MALNUTRITION (HCC): ICD-10-CM

## 2019-04-25 DIAGNOSIS — I10 ESSENTIAL HYPERTENSION: ICD-10-CM

## 2019-04-25 PROCEDURE — 99214 OFFICE O/P EST MOD 30 MIN: CPT | Performed by: FAMILY MEDICINE

## 2019-04-25 ASSESSMENT — ENCOUNTER SYMPTOMS
TROUBLE SWALLOWING: 0
SHORTNESS OF BREATH: 0
COUGH: 0
ABDOMINAL PAIN: 0
BLOOD IN STOOL: 0
CONSTIPATION: 0
NAUSEA: 0
EYE PAIN: 0
DIARRHEA: 0
VOMITING: 0

## 2019-04-25 ASSESSMENT — PATIENT HEALTH QUESTIONNAIRE - PHQ9
SUM OF ALL RESPONSES TO PHQ QUESTIONS 1-9: 0
2. FEELING DOWN, DEPRESSED OR HOPELESS: 0
SUM OF ALL RESPONSES TO PHQ QUESTIONS 1-9: 0
SUM OF ALL RESPONSES TO PHQ9 QUESTIONS 1 & 2: 0
1. LITTLE INTEREST OR PLEASURE IN DOING THINGS: 0

## 2019-04-25 NOTE — PROGRESS NOTES
[address] [facility]. [address] [facility]  Adolfo Villasenor [address]  Dept: [phone]  Dept Fax: [phone]  Loc: [address] [place] [address] Maintenance Due   Topic Date Due    Shingles Vaccine (1 of 2) REFUSED           Patient:  Tosin Reza  Visit Date: 4/25/2019    ANTICIPATORY GUIDANCE : ADULT     WELL QUESTIONS  1. How many cups of caffeine do you drink per day?   4-5 cups of coffee   2. Do you exercise a minimum of 30 minutes per day, above normal activity? No, on average the patient performs 0 minutes of exercise per day    3. Do you eat a minimum of 8-10 servings of fruits and vegetables per day? No, on average the patient consumes 4 servings of fruits and vegetables per day  4. Do you drink alcohol? No  5.  How many oz of water do you drink per day?  (64 oz per day recommended)   [facility]  Discussed and/or Handout Given on the following items:            [x] Caffeine Use: Limit to 2 cups per day   (400mg of caffeine a day)     [x] Exercise: Ideal 30 minutes per day, above normal activity              [x] Eating Fruits and Vegetables: Studies show 8-10 servings per day decrease BP  [x] Alcohol: Ideal maximum of one cup per day for females and two cups per day for a male         Tosin Reza is a 80 y.o. male who presents today for:  Chief Complaint   Patient presents with    3 Month Follow-Up    Back Pain    Numbness     right foot       Goals      Blood Pressure < 140/90           HPI:     HPI   Had a headache yesterday and is still having one today - a pain on the temple above the eye - it is gone now    His foot feels like it is cold - for a long time    Has been coughing the last week    Still on the higher dose of the losartan    Wonders about the imdur - cardiology put him back on it    We did take him off in September - low blood pressure    Blood pressure is 135/70s    Not eating many fruits or vegetables  Not needing the inhaler - breathing is ok      No question data found.     Past Medical History:   Diagnosis Date    Acute sinusitis     Angina pectoris (HCC)     Anxiety disorder 10/27/2016    Arthritis     osteoporosis    BPH with urinary obstruction     CAD (coronary artery disease)     Chronic insomnia     CKD (chronic kidney disease) stage 3, GFR 30-59 ml/min (HCC)     COPD (chronic obstructive pulmonary disease) (HCC)     Gastroenteritis     HCAP (healthcare-associated pneumonia) 4/29/2015    Heart disease     HLD (hyperlipidemia)     [place] (hard of hearing)     wear hearing aids    Hypertension     Kidney disease     40% kdiney function    Kidney stone     years ago 15-20    NICOLAS on CPAP     Osteopenia determined by x-ray     Pacemaker 2011    Pinched nerve     slipped disc in back    Visual problems     Vitamin D deficiency       Past Surgical History:   Procedure Laterality Date    ABDOMINAL HERNIA REPAIR  04/27/2017    incisional hernia repair--Dr. Wilder Reich CARDIAC SURGERY      CATARACT REMOVAL WITH IMPLANT      bilateral    COLONOSCOPY  5490,9800    COLONOSCOPY  12/29/2017    COLONOSCOPY CONTROL HEMORRHAGE performed by Mireya Overton MD at [facility]    COLONOSCOPY  8/16/2018    COLONOSCOPY POLYPECTOMY SNARE/COLD BIOPSY performed by Mireya Overton MD at [facility]    COLONOSCOPY  8/19/2018    COLONOSCOPY CONTROL HEMORRHAGE performed by Mireya [facility]ton MD at [facility]    COLONOSCOPY N/A 8/20/2018    COLONOSCOPY CONTROL HEMORRHAGE performed by Mireya Overton MD at [address] [facility] [address] [facility] [address]  1960's    EKG 12-LEAD  4/29/2015         EYE SURGERY      cataracts    HERNIA REPAIR      several    HIP PINNING Left 8/31/2017    LEFT HIP INTERTAN performed by Curly Borden MD at [address]  12/3/12    left     MYRINGOTOMY AND TYMPANOSTOMY TUBE PLACEMENT  7/23/13    left     NASAL SINUS SURGERY      OTHER SURGICAL HISTORY  2015    transurethral Incision bladder neck    PACEMAKER INSERTION      PACEMAKER PLACEMENT      [place] COL[place]COPY FLX DX W/COLLJ SPEC WHEN PFRMD Left 2018    COLONOSCOPY performed by Hattie Britton MD at [address] Endoscopy    [place] [address] DX/THER SBST INTRLMNR LMBR/SAC W/IMG GDN N/A 2018    LUMBAR INTER LAMINAR RUBEN LESI @ L3. performed by Douglas Chang MD at [place] [address] Right 10/8/2017    Right hip intertan performed by Stephanie Prado MD at [address] [place] [address] TURP  2013    W/CYSTO WITH DIRECT VISION URETHROTOMY & RESECTION BLADDER NECK CONTRACTURE    TURP  4/27/15    re-do TURP    TYMPANOSTOMY TUBE PLACEMENT      multiple surgeries    UPPER GASTROINTESTINAL ENDOSCOPY  2017    COLONOSCOPY SUBMUCOSAL/BOTOX INJECTION performed by Hattie Britton MD at [address] [place] [address]  2018    EGD DIAGNOSTIC ONLY performed by Hattie Britton MD at [address] [place] [address] Left 2018    EGD DIAGNOSTIC ONLY performed by Hattie Britton MD at [address] Endoscopy     Family History   Problem Relation Age of Onset    Cancer Brother 72        lung    Diabetes Brother     Kidney Disease Father         stones    Stroke Father     Kidney Disease Child         stones    Kidney Disease Child         stones    Heart Disease Sister     Arthritis Sister     High Blood Pressure Sister     High Cholesterol Sister     Diabetes Brother         multiple siblings had dm    Early Death Brother 72        lung cancer     Social History     Tobacco Use    Smoking status: Former Smoker     Packs/day: 1.00     Years: 20.00     Pack years: 20.00     Types: Cigarettes     Last attempt to quit: 1965     Years since quittin.4    Smokeless tobacco: Never Used    Tobacco comment: pt use to smoke cigars and pipe   Substance Use Topics    Alcohol use:  Yes vaccine  Completed    Pneumococcal 65+ years Vaccine  Completed       Subjective:      Review of Systems   Constitutional: Negative for chills, fatigue and fever. HENT: Negative for ear pain, postnasal drip and trouble swallowing. Eyes: Negative for pain and visual disturbance. Respiratory: Negative for cough and shortness of breath. Cardiovascular: Negative for chest pain and palpitations. Gastrointestinal: Negative for abdominal pain, blood in stool, constipation, diarrhea, nausea and vomiting. Skin: Negative for rash. Neurological: Positive for headaches. Objective:     Vitals:    04/25/19 1205   BP: 117/69   Pulse: 66   Resp: 12   SpO2: 96%   Weight: 142 lb 6.4 oz (64.6 kg)   Height: 5' 2.21\" (1.58 m)       Body mass index is 25.87 kg/m². Wt Readings from Last 3 Encounters:   04/25/19 142 lb 6.4 oz (64.6 kg)   03/04/19 141 lb 12.8 oz (64.3 kg)   02/27/19 142 lb 6.4 oz (64.6 kg)     BP Readings from Last 3 Encounters:   04/25/19 117/69   03/04/19 (!) 149/82   02/27/19 (!) 145/80       Physical Exam   Constitutional: He is oriented to person, place, and time. He appears well-developed and well-nourished. No distress. HENT:   Head: Normocephalic and atraumatic. Right Ear: External ear normal.   Left Ear: External ear normal.   Eyes: Conjunctivae and EOM are normal. Right eye exhibits no discharge. Left eye exhibits no discharge. No scleral icterus. Neck: Normal range of motion. Cardiovascular: Normal rate, regular rhythm and normal heart sounds. No murmur heard. Pulmonary/Chest: Effort normal and breath sounds normal.   Musculoskeletal: He exhibits no edema. Neurological: He is alert and oriented to person, place, and time. No cranial nerve deficit. Skin: Skin is warm and dry. No rash noted. He is not diaphoretic. No erythema. Psychiatric: He has a normal mood and affect.  His behavior is normal. Judgment and thought content normal.   Nursing note and vitals reviewed. Lab Results   Component Value Date    WBC 5.9 02/27/2019    HGB 11.6 (L) 02/27/2019    HCT 34.3 (L) 02/27/2019     02/27/2019    CHOL 118 12/05/2016    TRIG 93 12/05/2016    HDL 56 12/05/2016    LDLCALC 43 12/05/2016    AST 15 08/15/2018     10/09/2018    K 4.2 10/09/2018     10/09/2018    CREATININE 1.30 10/09/2018    BUN 30 (H) 10/09/2018    CO2 27 10/09/2018    TSH 1.620 12/27/2017    INR 1.08 08/15/2018    LABMICR 3.18 09/14/2016    LABGLOM 48 (A) 08/28/2018    MG 2.0 08/28/2018    CALCIUM 9.0 10/09/2018    VITD25 43 09/05/2017       Imaging Results:    No results found. Assessment:      Diagnosis Orders   1. Coronary artery disease involving native heart without angina pectoris, unspecified vessel or lesion type  CBC Auto Differential    Basic Metabolic Panel   2. Essential hypertension  CBC Auto Differential    Basic Metabolic Panel       Plan: Will see what your blood pressures on the imdur and the increased dose of the losartan    If the blood pressure is below 110/70 will go back down on the losartan dose    Will stay on the will watch the headaches    Will use the bands    Will see you back in 6 months - but will see nephrology in september and you can get bloodwork right before that time    Talked with pharmacy - may be able to do the 15 instead of the 30mg a day if the headache continues    Will work on the vegetables in your diet and try to get mor enutrition    Will try to build up your muscles also    Will continue to use the inhaler as needed       No follow-ups on file. Orders Placed:  Orders Placed This Encounter   Procedures    CBC Auto Differential    Basic Metabolic Panel     Medications Prescribed:  No orders of the defined types were placed in this encounter.       Future Appointments   Date Time Provider Miguel Romo   8/7/2019 10:30 AM CHRISTIN Davis - CNP ENT P - Mariam Rodriguez   9/16/2019  1:00 PM MD CHARLENE Watson [facility]

## 2019-04-25 NOTE — PATIENT INSTRUCTIONS
1. You may receive a survey about your visit with us today. The feedback from our patients helps us identify what is working well and where the service to all patients can be enhanced. Thank you! 2. Please bring in ALL medications BOTTLES, including inhalers, herbal supplements, over the counter, prescribed & non-prescribed medicine. The office would like actual medication bottles and a list.   3. Please note our OFFICE POLICIES:   1. Prior to getting your labs drawn, please check with your insurance company for benefits and eligibility of lab services. Often, insurance companies cover certain tests for preventative visits only. It is patient's responsibility to see what is covered. 2. We are unable to change a diagnosis after the test has been performed. 3. Lab orders will not be re-printed. Please hold onto your original lab orders and take them to your lab to be completed. 4. If you no show your scheduled appointment three times, you will be dismissed from this practice. 5. Reschedules must be completed 24 hours prior to your schedule appointment. 4. If the list below has been completed, PLEASE FAX RECORDS TO OUR OFFICE @ [phone]. Once the records have been received we will update your records at our office:  Health Maintenance Due   Topic Date Due    Shingles Vaccine (1 of 2) 11/24/1981       Schedule your 2018/2019 flu vaccine with Dr. Quinones Leader call [phone]!   [facility], for anyone 9 years or older   [address] [address] [address]   Every Monday   8:00am-11:00am and 1:00pm-3:00pm        Will see what your blood pressures on the imdur and the increased dose of the losartan    If the blood pressure is below 110/70 will go back down on the losartan dose    Will stay on the will watch the headaches    Will use the bands    Will see ou back in 6 months - but will see nephrology in september and you can get bloodwork right before that time

## 2019-04-26 NOTE — TELEPHONE ENCOUNTER
Last visit- 4/25/2019  Next visit- 11/25/2019    Requested Prescriptions     Pending Prescriptions Disp Refills    sertraline (ZOLOFT) 50 MG tablet [Pharmacy Med Name: SERTRALINE HCL TABS 50MG] 90 tablet 1     Sig: TAKE 1 TABLET DAILY

## 2019-05-10 NOTE — TELEPHONE ENCOUNTER
Alexx Argueta called requesting a refill on the following medications:  Requested Prescriptions     Pending Prescriptions Disp Refills    sertraline (ZOLOFT) 50 MG tablet 90 tablet 1     Sig: TAKE 1 TABLET DAILY    isosorbide mononitrate (IMDUR) 30 MG extended release tablet 90 tablet 1     Sig: Take 1 tablet by mouth daily

## 2019-05-10 NOTE — TELEPHONE ENCOUNTER
Last visit- 4/25/2019  Next visit- 11/25/2019    Requested Prescriptions     Pending Prescriptions Disp Refills    sertraline (ZOLOFT) 50 MG tablet 90 tablet 1     Sig: TAKE 1 TABLET DAILY

## 2019-05-10 NOTE — TELEPHONE ENCOUNTER
Julien Abarca called requesting a refill on the following medications:  Requested Prescriptions     Pending Prescriptions Disp Refills    sertraline (ZOLOFT) 50 MG tablet 90 tablet 1     Sig: TAKE 1 TABLET DAILY     Pharmacy verified: [facility]  . pv      Date of last visit: 4/25/19  Date of next visit (if applicable): Visit date not found

## 2019-05-11 ENCOUNTER — APPOINTMENT (OUTPATIENT)
Dept: NUCLEAR MEDICINE | Age: 84
End: 2019-05-11
Payer: MEDICARE

## 2019-05-11 ENCOUNTER — APPOINTMENT (OUTPATIENT)
Dept: GENERAL RADIOLOGY | Age: 84
End: 2019-05-11
Payer: MEDICARE

## 2019-05-11 ENCOUNTER — HOSPITAL ENCOUNTER (EMERGENCY)
Age: 84
Discharge: HOME | End: 2019-05-11
Attending: EMERGENCY MEDICINE
Payer: MEDICARE

## 2019-05-11 VITALS
BODY MASS INDEX: 25.43 KG/M2 | WEIGHT: 158.25 LBS | OXYGEN SATURATION: 95 % | RESPIRATION RATE: 17 BRPM | SYSTOLIC BLOOD PRESSURE: 121 MMHG | HEART RATE: 70 BPM | DIASTOLIC BLOOD PRESSURE: 70 MMHG | TEMPERATURE: 98.3 F | HEIGHT: 66 IN

## 2019-05-11 DIAGNOSIS — J44.1 COPD WITH EXACERBATION (HCC): Primary | ICD-10-CM

## 2019-05-11 LAB
ALBUMIN SERPL-MCNC: 3.5 G/DL (ref 3.5–5.1)
ALP BLD-CCNC: 102 U/L (ref 38–126)
ALT SERPL-CCNC: 8 U/L (ref 11–66)
ANION GAP SERPL CALCULATED.3IONS-SCNC: 13 MEQ/L (ref 8–16)
AST SERPL-CCNC: 14 U/L (ref 5–40)
BACTERIA: ABNORMAL /HPF
BASOPHILS # BLD: 1.1 %
BASOPHILS ABSOLUTE: 0.1 THOU/MM3 (ref 0–0.1)
BILIRUB SERPL-MCNC: 0.3 MG/DL (ref 0.3–1.2)
BILIRUBIN DIRECT: < 0.2 MG/DL (ref 0–0.3)
BILIRUBIN URINE: NEGATIVE
BLOOD, URINE: NEGATIVE
BUN BLDV-MCNC: 35 MG/DL (ref 7–22)
CALCIUM SERPL-MCNC: 9.2 MG/DL (ref 8.5–10.5)
CASTS 2: ABNORMAL /LPF
CASTS UA: ABNORMAL /LPF
CHARACTER, URINE: CLEAR
CHLORIDE BLD-SCNC: 102 MEQ/L (ref 98–111)
CO2: 25 MEQ/L (ref 23–33)
COLOR: YELLOW
CREAT SERPL-MCNC: 1.2 MG/DL (ref 0.4–1.2)
CRYSTALS, UA: ABNORMAL
D-DIMER QUANTITATIVE: 764 NG/ML FEU (ref 0–500)
EKG ATRIAL RATE: 90 BPM
EKG P AXIS: 52 DEGREES
EKG P-R INTERVAL: 142 MS
EKG Q-T INTERVAL: 386 MS
EKG QRS DURATION: 120 MS
EKG QTC CALCULATION (BAZETT): 472 MS
EKG R AXIS: 90 DEGREES
EKG T AXIS: -2 DEGREES
EKG VENTRICULAR RATE: 90 BPM
EOSINOPHIL # BLD: 1.5 %
EOSINOPHILS ABSOLUTE: 0.1 THOU/MM3 (ref 0–0.4)
EPITHELIAL CELLS, UA: ABNORMAL /HPF
ERYTHROCYTE [DISTWIDTH] IN BLOOD BY AUTOMATED COUNT: 14.6 % (ref 11.5–14.5)
ERYTHROCYTE [DISTWIDTH] IN BLOOD BY AUTOMATED COUNT: 48.8 FL (ref 35–45)
GFR SERPL CREATININE-BSD FRML MDRD: 57 ML/MIN/1.73M2
GLUCOSE BLD-MCNC: 102 MG/DL (ref 70–108)
GLUCOSE URINE: NEGATIVE MG/DL
HCT VFR BLD CALC: 36.3 % (ref 42–52)
HEMOCCULT STL QL: NEGATIVE
HEMOGLOBIN: 11.8 GM/DL (ref 14–18)
IMMATURE GRANS (ABS): 0.03 THOU/MM3 (ref 0–0.07)
IMMATURE GRANULOCYTES: 0.5 %
KETONES, URINE: NEGATIVE
LACTIC ACID: 0.8 MMOL/L (ref 0.5–2.2)
LEUKOCYTE ESTERASE, URINE: NEGATIVE
LIPASE: 16.2 U/L (ref 5.6–51.3)
LYMPHOCYTES # BLD: 13.8 %
LYMPHOCYTES ABSOLUTE: 0.9 THOU/MM3 (ref 1–4.8)
MAGNESIUM: 1.9 MG/DL (ref 1.6–2.4)
MCH RBC QN AUTO: 29.4 PG (ref 26–33)
MCHC RBC AUTO-ENTMCNC: 32.5 GM/DL (ref 32.2–35.5)
MCV RBC AUTO: 90.3 FL (ref 80–94)
MISCELLANEOUS 2: ABNORMAL
MONOCYTES # BLD: 9 %
MONOCYTES ABSOLUTE: 0.6 THOU/MM3 (ref 0.4–1.3)
NITRITE, URINE: NEGATIVE
NUCLEATED RED BLOOD CELLS: 0 /100 WBC
OSMOLALITY CALCULATION: 287.6 MOSMOL/KG (ref 275–300)
PH UA: 6 (ref 5–9)
PLATELET # BLD: 220 THOU/MM3 (ref 130–400)
PMV BLD AUTO: 9.1 FL (ref 9.4–12.4)
POTASSIUM SERPL-SCNC: 4.3 MEQ/L (ref 3.5–5.2)
PRO-BNP: 662.9 PG/ML (ref 0–1800)
PROTEIN UA: ABNORMAL
RBC # BLD: 4.02 MILL/MM3 (ref 4.7–6.1)
RBC URINE: ABNORMAL /HPF
RENAL EPITHELIAL, UA: ABNORMAL
SEG NEUTROPHILS: 74.1 %
SEGMENTED NEUTROPHILS ABSOLUTE COUNT: 4.8 THOU/MM3 (ref 1.8–7.7)
SODIUM BLD-SCNC: 140 MEQ/L (ref 135–145)
SPECIFIC GRAVITY, URINE: 1.02 (ref 1–1.03)
TOTAL PROTEIN: 7.1 G/DL (ref 6.1–8)
TROPONIN T: < 0.01 NG/ML
TSH SERPL DL<=0.05 MIU/L-ACNC: 1.69 UIU/ML (ref 0.4–4.2)
UROBILINOGEN, URINE: 0.2 EU/DL (ref 0–1)
WBC # BLD: 6.5 THOU/MM3 (ref 4.8–10.8)
WBC UA: ABNORMAL /HPF
YEAST: ABNORMAL

## 2019-05-11 PROCEDURE — 93005 ELECTROCARDIOGRAM TRACING: CPT | Performed by: EMERGENCY MEDICINE

## 2019-05-11 PROCEDURE — 96365 THER/PROPH/DIAG IV INF INIT: CPT

## 2019-05-11 PROCEDURE — 85379 FIBRIN DEGRADATION QUANT: CPT

## 2019-05-11 PROCEDURE — 36415 COLL VENOUS BLD VENIPUNCTURE: CPT

## 2019-05-11 PROCEDURE — 85025 COMPLETE CBC W/AUTO DIFF WBC: CPT

## 2019-05-11 PROCEDURE — 84484 ASSAY OF TROPONIN QUANT: CPT

## 2019-05-11 PROCEDURE — 3430000000 HC RX DIAGNOSTIC RADIOPHARMACEUTICAL: Performed by: EMERGENCY MEDICINE

## 2019-05-11 PROCEDURE — 80053 COMPREHEN METABOLIC PANEL: CPT

## 2019-05-11 PROCEDURE — 83880 ASSAY OF NATRIURETIC PEPTIDE: CPT

## 2019-05-11 PROCEDURE — 71046 X-RAY EXAM CHEST 2 VIEWS: CPT

## 2019-05-11 PROCEDURE — 6370000000 HC RX 637 (ALT 250 FOR IP): Performed by: EMERGENCY MEDICINE

## 2019-05-11 PROCEDURE — 82248 BILIRUBIN DIRECT: CPT

## 2019-05-11 PROCEDURE — 81001 URINALYSIS AUTO W/SCOPE: CPT

## 2019-05-11 PROCEDURE — 83690 ASSAY OF LIPASE: CPT

## 2019-05-11 PROCEDURE — 83735 ASSAY OF MAGNESIUM: CPT

## 2019-05-11 PROCEDURE — 2709999900 HC NON-CHARGEABLE SUPPLY

## 2019-05-11 PROCEDURE — 84443 ASSAY THYROID STIM HORMONE: CPT

## 2019-05-11 PROCEDURE — 6360000002 HC RX W HCPCS: Performed by: EMERGENCY MEDICINE

## 2019-05-11 PROCEDURE — 99285 EMERGENCY DEPT VISIT HI MDM: CPT

## 2019-05-11 PROCEDURE — 82272 OCCULT BLD FECES 1-3 TESTS: CPT

## 2019-05-11 PROCEDURE — 83605 ASSAY OF LACTIC ACID: CPT

## 2019-05-11 PROCEDURE — 78582 LUNG VENTILAT&PERFUS IMAGING: CPT

## 2019-05-11 PROCEDURE — A9540 TC99M MAA: HCPCS | Performed by: EMERGENCY MEDICINE

## 2019-05-11 PROCEDURE — A9558 XE133 XENON 10MCI: HCPCS | Performed by: EMERGENCY MEDICINE

## 2019-05-11 PROCEDURE — 2580000003 HC RX 258: Performed by: EMERGENCY MEDICINE

## 2019-05-11 PROCEDURE — 94640 AIRWAY INHALATION TREATMENT: CPT

## 2019-05-11 RX ORDER — SODIUM CHLORIDE 9 MG/ML
INJECTION, SOLUTION INTRAVENOUS CONTINUOUS
Status: DISCONTINUED | OUTPATIENT
Start: 2019-05-11 | End: 2019-05-11 | Stop reason: HOSPADM

## 2019-05-11 RX ORDER — XENON XE-133 10 MCI/1
6.1 GAS RESPIRATORY (INHALATION)
Status: COMPLETED | OUTPATIENT
Start: 2019-05-11 | End: 2019-05-11

## 2019-05-11 RX ORDER — IPRATROPIUM BROMIDE AND ALBUTEROL SULFATE 2.5; .5 MG/3ML; MG/3ML
1 SOLUTION RESPIRATORY (INHALATION) ONCE
Status: COMPLETED | OUTPATIENT
Start: 2019-05-11 | End: 2019-05-11

## 2019-05-11 RX ORDER — AZITHROMYCIN 250 MG/1
TABLET, FILM COATED ORAL
Qty: 1 PACKET | Refills: 0 | Status: SHIPPED | OUTPATIENT
Start: 2019-05-11 | End: 2019-05-21

## 2019-05-11 RX ADMIN — IPRATROPIUM BROMIDE AND ALBUTEROL SULFATE 1 AMPULE: .5; 3 SOLUTION RESPIRATORY (INHALATION) at 13:50

## 2019-05-11 RX ADMIN — XENON XE-133 6.1 MILLICURIE: 10 GAS RESPIRATORY (INHALATION) at 17:45

## 2019-05-11 RX ADMIN — CEFTRIAXONE SODIUM 1 G: 1 INJECTION, POWDER, FOR SOLUTION INTRAMUSCULAR; INTRAVENOUS at 14:48

## 2019-05-11 RX ADMIN — SODIUM CHLORIDE: 9 INJECTION, SOLUTION INTRAVENOUS at 13:50

## 2019-05-11 RX ADMIN — Medication 2 PUFF: at 13:50

## 2019-05-11 RX ADMIN — Medication 3 MILLICURIE: at 17:50

## 2019-05-11 ASSESSMENT — ENCOUNTER SYMPTOMS
RHINORRHEA: 0
EYE DISCHARGE: 0
SHORTNESS OF BREATH: 1
ABDOMINAL PAIN: 0
BACK PAIN: 0
DIARRHEA: 0
COUGH: 1
SORE THROAT: 0
VOMITING: 0
EYE REDNESS: 0
NAUSEA: 0

## 2019-05-11 NOTE — ED PROVIDER NOTES
[id number] [address]   eMERGENCY dEPARTMENT eNCOUnter        [address] [facility]    Chief Complaint   Patient presents with    Shortness of Breath    Fatigue       Nurses Notes reviewed and I agree except as noted in the HPI. HPI    Billie Eli is a 80 y.o. male who presents for evaluation of multiple complaints. The patient presents here by EMS with son and daughter-in-law at bedside. Patient comes from home where he lives with his demented wife. Family checks up on patient and his wife on a regular basis. Patient has a history of CAD, hypertension, hyperlipidemia, COPD, and CKD. Patient comes in today with complaints of generalized weakness, fatigue, shortness of breath, and cough over the past week. Patient states he did not sleep well last night. He denies any pain, specifically chest pain. He does complain of generalized body aches. He denies fever, chills, nausea, or vomiting. He denies abdominal pain. He denies pain/burning with urination, hematuria, frequency, or urgency. Patient does report dark stool which he has a history of. He had an EGD and colonoscopy performed by Dr. Haylee Lee in August of 2018. Patient denies noticeable blood in the stool. He denies any other source of bleeding which he is aware of. Patient is alert and oriented, answering questions appropriately. There are no other complaints or symptoms at this time. REVIEW OF SYSTEMS    Review of Systems   Constitutional: Positive for fatigue. Negative for appetite change, chills and fever. HENT: Negative for congestion, ear pain, rhinorrhea and sore throat. Eyes: Negative for discharge, redness and visual disturbance. Respiratory: Positive for cough and shortness of breath. Cardiovascular: Negative for chest pain, palpitations and leg swelling. Gastrointestinal: Negative for abdominal pain, diarrhea, nausea and vomiting.         Dark stool   Genitourinary: Negative for decreased urine volume, difficulty urinating, dysuria and hematuria. Musculoskeletal: Positive for arthralgias and myalgias. Negative for back pain, joint swelling and neck pain. Skin: Negative for pallor and rash. Allergic/Immunologic: Negative for environmental allergies. Neurological: Positive for weakness. Negative for dizziness, syncope, light-headedness, numbness and headaches. Hematological: Negative for adenopathy. Psychiatric/Behavioral: Negative for confusion and suicidal ideas. The patient is not nervous/anxious. PAST MEDICAL HISTORY     has a past medical history of Acute sinusitis, Angina pectoris ([facility] [facility] Anxiety disorder, Arthritis, BPH with urinary obstruction, CAD (coronary artery disease), Chronic insomnia, CKD (chronic kidney disease) stage 3, GFR 30-59 ml/min ([facility]), COPD (chronic obstructive pulmonary disease) ([facility] [address] [facility] Gastroenteritis, HCAP (healthcare-associated pneumonia), Heart disease, HLD (hyperlipidemia), Hughes (hard of hearing), Hypertension, Kidney disease, Kidney stone, NICOLAS on CPAP, Osteopenia determined by x-ray, Pacemaker, Pinched nerve, Visual problems, and Vitamin D deficiency. SURGICAL HISTORY   has a past surgical history that includes Coronary angioplasty with stent; Nasal sinus surgery; Pacemaker insertion; Tympanostomy tube placement; Cataract removal with implant; Myringotomy Tympanostomy Tube Placement (12/3/12); Dental surgery (5759'A); pacemaker placement (2011); Colonoscopy (1559,9129); hernia repair; TURP (4/17/2013); Myringotomy Tympanostomy Tube Placement (7/23/13); other surgical history (04/27/2015); eye surgery; EKG 12 Lead (4/29/2015); TURP (4/27/15); Abdominal hernia repair (04/27/2017); hip pinning (Left, 8/31/2017); Tibia fracture surgery (Right, 10/8/2017); Upper gastrointestinal endoscopy (12/29/2017); Colonoscopy (12/29/2017); pr njx dx/ther sbst intrlmnr lmbr/sac w/img gdn (N/A, 6/25/2018); Cardiac surgery; Colonoscopy (8/16/2018);  Upper gastrointestinal endoscopy High Blood Pressure in his sister; High Cholesterol in his sister; Kidney Disease in his child, child, and father; Stroke in his father. SOCIAL HISTORY     reports that he quit smoking about 53 years ago. His smoking use included cigarettes. He has a 20.00 pack-year smoking history. He has never used smokeless tobacco. He reports that he drinks about 4.2 oz of alcohol per week. He reports that he does not use drugs. PHYSICAL EXAM      INITIAL VITALS: /70   Pulse 70   Temp 98.3 °F (36.8 °C) (Oral)   Resp 17   Ht 5' 6\" (1.676 m)   Wt 158 lb 4 oz (71.8 kg)   SpO2 95%   BMI 25.54 kg/m² Estimated body mass index is 25.54 kg/m² as calculated from the following:    Height as of this encounter: 5' 6\" (1.676 m). Weight as of this encounter: 158 lb 4 oz (71.8 kg). Physical Exam   Constitutional: He is oriented to person, place, and time. HENT:   Head: Normocephalic and atraumatic. Right Ear: External ear normal.   Left Ear: External ear normal.   Patient is [place]. Eyes: Conjunctivae are normal. Right eye exhibits no discharge. Left eye exhibits no discharge. No scleral icterus. Neck: Normal range of motion. No JVD present. Cardiovascular: Normal rate and regular rhythm. Pulmonary/Chest: Effort normal. No stridor. No respiratory distress. Decreased breath sound with crackles at mid to lung bases with faint wheezing   Abdominal: Soft. He exhibits no distension. There is no tenderness. There is no CVA tenderness. Genitourinary: Rectal exam shows anal tone normal. Prostate is not enlarged and not tender. Genitourinary Comments: Dark green stool present   Musculoskeletal: Normal range of motion. He exhibits no edema. Neurological: He is alert and oriented to person, place, and time. He displays tremor (chronic of upper extremities). He exhibits normal muscle tone. GCS eye subscore is 4. GCS verbal subscore is 5. GCS motor subscore is 6. Skin: Skin is warm and dry. He is not diaphoretic. No erythema. Nursing note and vitals reviewed. MEDICAL DECISION MAKING    DIFFERENTIAL DIAGNOSIS:  COPD exacerbation, rule out coronary syndrome heart failure or UTI or flu syndrome  Rule out PE      DIAGNOSTIC RESULTS    EKG   Interpreted by Wilbur Richmond MD  Vent. Rate: 90 bpm  VT interval: 142 ms  QRS duration: 120 ms  QTc: 472 ms  P-R-T axes: 52, 90, -2  Normal sinus rhythm with sinus arrhythmia. Right bundle branch block. T wave abnormality, consider inferior ischemia. Abnormal ECG. No STEMI    RADIOLOGY:  I have reviewed radiologic plain film image(s). The plain films will be read or overread by the radiologist.All other non-plain film images(s) such as CT, Ultrasound and MRI have been read by the radiologist.  NM LUNG VENT/PERFUSION (VQ)   Final Result       1. Indeterminate small subsegmental perfusion defect demonstrated within the posterior lateral left lung base. This is consistent with a low probability scan for pulmonary embolism. 2. Moderate radiotracer retention present on washout images compatible with air trapping. Additional incidental findings otherwise as above. **This report has been created using voice recognition software. It may contain minor errors which are inherent in voice recognition technology. **      Final report electronically signed by Dr. Linda Hwang on 5/11/2019 7:03 PM      XR CHEST STANDARD (2 VW)   Final Result   1. No acute intrathoracic process. 2. Stable cardiomegaly. 3. Chronic lung disease. **This report has been created using voice recognition software. It may contain minor errors which are inherent in voice recognition technology. **      Final report electronically signed by Dr. Jessica Smith on 5/11/2019 1:00 PM          LABS:   Labs Reviewed   CBC WITH AUTO DIFFERENTIAL - Abnormal; Notable for the following components:       Result Value    RBC 4.02 (*)     Hemoglobin 11.8 (*)     Hematocrit 36.3 (*)     RDW-CV 14.6 (*) RDW-SD 48.8 (*)     MPV 9.1 (*)     Lymphocytes # 0.9 (*)     All other components within normal limits   D-DIMER, QUANTITATIVE - Abnormal; Notable for the following components:    D-Dimer, Quant 764.00 (*)     All other components within normal limits   BASIC METABOLIC PANEL - Abnormal; Notable for the following components:    BUN 35 (*)     All other components within normal limits   HEPATIC FUNCTION PANEL - Abnormal; Notable for the following components:    ALT 8 (*)     All other components within normal limits   GLOMERULAR FILTRATION RATE, ESTIMATED - Abnormal; Notable for the following components:    Est, Glom Filt Rate 57 (*)     All other components within normal limits   URINE WITH REFLEXED MICRO - Abnormal; Notable for the following components:    Protein, UA TRACE (*)     All other components within normal limits   LACTIC ACID, PLASMA   BRAIN NATRIURETIC PEPTIDE   LIPASE   TROPONIN   MAGNESIUM   TSH WITH REFLEX   BLOOD OCCULT STOOL SCREEN #1   ANION GAP   OSMOLALITY     All other unresulted laboratory test above are normal:    Vitals:    Vitals:    05/11/19 1451 05/11/19 1559 05/11/19 1706 05/11/19 1822   BP: 131/74 126/75 116/72 121/70   Pulse: 92 95 72 70   Resp: 20 18 18 17   Temp:       TempSrc:       SpO2: 96% 96% 97% 95%   Weight:       Height:           EMERGENCY DEPARTMENT COURSE:    Medications   0.9 % sodium chloride infusion ( Intravenous New Bag 5/11/19 1350)   ipratropium-albuterol (DUONEB) nebulizer solution 1 ampule (1 ampule Inhalation Given 5/11/19 1350)   mometasone-formoterol (DULERA) 100-5 MCG/ACT inhaler 2 puff (2 puffs Inhalation Given 5/11/19 1350)   cefTRIAXone (ROCEPHIN) 1 g IVPB in 50 mL D5W minibag (0 g Intravenous Stopped 5/11/19 1522)   technetium albumin aggregated (MAA) solution 3 millicurie (3 millicuries Intravenous Given 5/11/19 1750)   xenon xe 078 gas 6.1 millicurie (6.1 millicuries Inhalation Given 5/11/19 1745)       The pt was seen and evaluated by me.  Within the department, I observed the pt's vital signs to be within acceptable range . Laboratory and Radiological studieswere performed, results were reviewed with the patient. Within the department, the pt was treated with IV fluid, Rocephin, Dulera, DuoNeb,. I observed the pt's condition to be hemodynamically stable and the patient's symptoms improved during the duration of their stay. I explained my proposed course of treatmentto the pt, and they were amenable to my decision. They were discharged home, and they will return to the ED if their symptoms become more severe in nature, or otherwise change. CRITICAL CARE:   None. CONSULTS:  None    PROCEDURES:  None. FINAL IMPRESSION       1. [facility]          DISPOSITION/PLAN  PATIENT REFERRED TO:  Warren Riddle DO  [address] [phone]    In 3 days      DISCHARGE MEDICATIONS:  New Prescriptions    AZITHROMYCIN (ZITHROMAX) 250 MG TABLET    Take 2 tabs (500 mg) on Day 1, and take 1 tab (250 mg) on days 2 through 5. MOMETASONE-FORMOTEROL (DULERA) 100-5 MCG/ACT INHALER    Inhale 2 puffs into the lungs 2 times daily    SPACER/AERO CHAMBER MOUTHPIECE MISC    Use it with Dulera inhaler       (Please note that portions of this note were completed with a voice recognition program.  Efforts were made to edit the dictations but occasionallywords are mis-transcribed.)      Scribe:  Jose Gupta 05/11/19 12:19 PM Scribing for and in thepresence of Christina Larsen M.D. Signed by: Romana Lindquist, 05/11/19 7:31 PM    Provider:  I personally performed the services described in the documentation, reviewed and edited the documentation which was dictated to the scribe in my presence, and it accurately records my words andactions.      05/11/19 7:31 PM      Rosalee Mejia MD      Emergency room physician              Rosalee Mejia MD  05/11/19 9253

## 2019-05-11 NOTE — ED TRIAGE NOTES
Pt to ed with c/o increased weakness and sob. Pt currently denies pain at this time. ekg obtained and pt placed on the cardiac monitor. Lung sounds labored with movement and crackles noted to bilateral bases. Dr. Gerber Moreland into assess pt and discuss the POC with family at the bedside.

## 2019-05-11 NOTE — ED NOTES
Pt resting in bed upon entering room. Pt appears to be sleeping respirations easy and unlabored at this time. Family reports that pt typically sleeps throughout the day and up throughout the night. Family informed that the IR had to be called in over the weekend and informed that the testing would be completed once they arrive. [facility] called and reports that the isotopes had to be ordered and the testing would not be completed for an hour. Will continue to monitor and update pt on the POC.  Call light remains in reach     [facility], RN  05/11/19 5428

## 2019-05-11 NOTE — ED NOTES
Pt resting in bed upon entering room requesting assistance with urinal. Pt provided with assistance and reports that he is unable to provide urine sample at this time and requested to leave the urinal in place in case he is able to urinate after I leave the room. Lung sounds clear and pt remains labored during conversation and is actively producing phlegm that appears gray and thick. Vs reassessed and stable at this time and pt currently denies pain and reports that he is ready to go home. Pt encouraged to stay and finish testing which pt agreed to. Pt medicated at this time per order will continue to monitor.      Josephine Jason RN  05/11/19 5578

## 2019-05-11 NOTE — ED NOTES
Patient returned from [place], warm blanket given. Family at the bedside and both deny any further needs at this time. Will continue to monitor for any changes.      Rolando Taylor RN  05/11/19 6033

## 2019-05-11 NOTE — ED NOTES
Pt informed that a urine sample still needs collected and provided with a urinal. Pt reports that he is unable to provide at this time.  Will continue to monitor     Juliano Nickerson RN  05/11/19 0369

## 2019-05-11 NOTE — ED NOTES
Bed: 020A  Expected date: 5/11/19  Expected time: 11:46 AM  Means of arrival: [facility]  Comments:     Alcides Phillips RN  05/11/19 4961

## 2019-05-11 NOTE — ED NOTES
Pt and family informed of orders placed at this time. Lab at the bedside to collect blood work ordered.  Will continue to monitor and call light in reach with sr up x2     Carolina Beatty RN  05/11/19 7662

## 2019-05-12 PROCEDURE — 93010 ELECTROCARDIOGRAM REPORT: CPT | Performed by: NUCLEAR MEDICINE

## 2019-05-13 RX ORDER — ISOSORBIDE MONONITRATE 30 MG/1
30 TABLET, EXTENDED RELEASE ORAL DAILY
Qty: 90 TABLET | Refills: 1 | Status: SHIPPED | OUTPATIENT
Start: 2019-05-13 | End: 2019-01-01

## 2019-05-14 ENCOUNTER — OFFICE VISIT (OUTPATIENT)
Dept: FAMILY MEDICINE CLINIC | Age: 84
End: 2019-05-14
Payer: MEDICARE

## 2019-05-14 VITALS
DIASTOLIC BLOOD PRESSURE: 81 MMHG | TEMPERATURE: 98 F | SYSTOLIC BLOOD PRESSURE: 138 MMHG | HEART RATE: 66 BPM | OXYGEN SATURATION: 98 % | WEIGHT: 142 LBS | BODY MASS INDEX: 22.92 KG/M2

## 2019-05-14 DIAGNOSIS — G47.9 SLEEP DIFFICULTIES: ICD-10-CM

## 2019-05-14 DIAGNOSIS — R06.02 SHORTNESS OF BREATH: Primary | ICD-10-CM

## 2019-05-14 DIAGNOSIS — F41.9 ANXIETY: ICD-10-CM

## 2019-05-14 PROCEDURE — 99214 OFFICE O/P EST MOD 30 MIN: CPT | Performed by: NURSE PRACTITIONER

## 2019-05-14 ASSESSMENT — ENCOUNTER SYMPTOMS
ABDOMINAL PAIN: 0
ANAL BLEEDING: 0
BLOOD IN STOOL: 0
RHINORRHEA: 0
COUGH: 0
EYE REDNESS: 0
ABDOMINAL DISTENTION: 0
COLOR CHANGE: 0
EYE DISCHARGE: 0
CONSTIPATION: 0
SHORTNESS OF BREATH: 0
NAUSEA: 0
SORE THROAT: 0
DIARRHEA: 0

## 2019-05-14 NOTE — PROGRESS NOTES
CORONARY ANGIOPLASTY WITH STENT PLACEMENT     [facility] DENTAL SURGERY  1960's    EKG 12-LEAD  4/29/2015         EYE SURGERY      cataracts    HERNIA REPAIR      several    HIP PINNING Left 8/31/2017    LEFT HIP INTERTAN performed by Yee Moreau MD at [address]  12/3/12    left     MYRINGOTOMY AND TYMPANOSTOMY TUBE PLACEMENT  7/23/13    left     NASAL SINUS SURGERY      OTHER SURGICAL HISTORY  04/27/2015    transurethral Incision bladder neck    PACEMAKER INSERTION      PACEMAKER PLACEMENT  2011    TN COLONOSCOPY FLX DX W/COLLJ SPEC WHEN PFRMD Left 8/18/2018    COLONOSCOPY performed by Santiago Morgan MD at [address] [place] [address] DX/THER SBST INTRLMNR LMBR/SAC W/IMG GDN N/A 6/25/2018    LUMBAR INTER LAMINAR RUBEN LESI @ L3. performed by Emir Villela MD at [place] [address] Right 10/8/2017    Right hip intertan performed by Juan David Fracnes MD at [address] [place] [address] TURP  4/17/2013    W/CYSTO WITH DIRECT VISION URETHROTOMY & RESECTION BLADDER NECK CONTRACTURE    TURP  4/27/15    re-do TURP    TYMPANOSTOMY TUBE PLACEMENT      multiple surgeries    UPPER GASTROINTESTINAL ENDOSCOPY  12/29/2017    COLONOSCOPY SUBMUCOSAL/BOTOX INJECTION performed by Santiago Morgan MD at [address] [place] [address]  8/16/2018    EGD DIAGNOSTIC ONLY performed by Santiago Morgan MD at [address] [place] [address] 8/19/2018    EGD DIAGNOSTIC ONLY performed by Santiago Morgan MD at [address] [facility]     Family History   Problem Relation Age of Onset    Cancer Brother 72        lung    Diabetes Brother     Kidney Disease Father         stones    Stroke Father     Kidney Disease Child         stones    Kidney Disease Child         stones    Heart Disease Sister     Arthritis Sister     High Blood Pressure Sister     High Cholesterol Sister     Diabetes Brother         multiple siblings had dm    Early Death Brother 72        lung cancer     Social History     Tobacco Use    Smoking status: Former Smoker     Packs/day: 1.00     Years: 20.00     Pack years: 20.00     Types: Cigarettes     Last attempt to quit: 1965     Years since quittin.5    Smokeless tobacco: Never Used    Tobacco comment: pt use to smoke cigars and pipe   Substance Use Topics    Alcohol use: Yes     Alcohol/week: 4.2 oz     Types: 7 Cans of beer per week     Comment: occasional 1 beer       Current Outpatient Medications   Medication Sig Dispense Refill    sertraline (ZOLOFT) 50 MG tablet TAKE 1 TABLET DAILY 14 tablet 0    isosorbide mononitrate (IMDUR) 30 MG extended release tablet Take 1 tablet by mouth daily 90 tablet 1    azithromycin (ZITHROMAX) 250 MG tablet Take 2 tabs (500 mg) on Day 1, and take 1 tab (250 mg) on days 2 through 5. 1 packet 0    Spacer/Aero Chamber Mouthpiece MISC Use it with Dulera inhaler 1 each 0    metoprolol tartrate (LOPRESSOR) 25 MG tablet TAKE ONE-HALF (1/2) TABLET DAILY SUBSTITUTE FOR ATENOLOL 45 tablet 1    losartan (COZAAR) 100 MG tablet Take 1 tablet by mouth daily 30 tablet 5    aspirin 81 MG tablet Take 81 mg by mouth daily      albuterol sulfate HFA (VENTOLIN HFA) 108 (90 Base) MCG/ACT inhaler Inhale 2 puffs into the lungs every 6 hours as needed for Wheezing 1 Inhaler 5    tamsulosin (FLOMAX) 0.4 MG capsule Take 1 capsule by mouth 2 times daily 180 capsule 1    acetaminophen (TYLENOL) 650 MG extended release tablet Take 650 mg by mouth every 8 hours as needed for Pain      ferrous sulfate 325 (65 Fe) MG tablet Take 325 mg by mouth Take 1 tablet every other day.       Omega-3 Fatty Acids (FISH OIL) 1200 MG CAPS Take by mouth daily      docusate sodium (COLACE) 100 MG capsule Take 100 mg by mouth 2 times daily as needed       vitamin B-12 1000 MCG tablet Take 1 tablet by mouth daily 30 tablet 3    Coenzyme Q10 (COQ-10 PO) Take 10 mg by mouth daily      Multiple Vitamins-Minerals (THERAPEUTIC MULTIVITAMIN-MINERALS) tablet Take 1 tablet by mouth daily        No current facility-administered medications for this visit. Allergies   Allergen Reactions    Iodine Swelling and Rash     Subjective:    Review of Systems   Constitutional: Negative for chills, fatigue and fever. HENT: Negative for congestion, ear pain, postnasal drip, rhinorrhea and sore throat. Eyes: Negative for discharge and redness. Respiratory: Negative for cough and shortness of breath. Cardiovascular: Negative for chest pain and leg swelling. Gastrointestinal: Negative for abdominal distention, abdominal pain, anal bleeding, blood in stool, constipation, diarrhea and nausea. Skin: Negative for color change and rash. Neurological: Negative for facial asymmetry, speech difficulty and weakness. Hematological: Does not bruise/bleed easily. Psychiatric/Behavioral: Positive for sleep disturbance. Negative for agitation and confusion. Objective:      Vitals:    05/14/19 1604   BP: 138/81   Site: Left Upper Arm   Position: Sitting   Cuff Size: Large Adult   Pulse: 66   Temp: 98 °F (36.7 °C)   TempSrc: Oral   SpO2: 98%   Weight: 142 lb (64.4 kg)     Body mass index is 22.92 kg/m². Wt Readings from Last 3 Encounters:   05/14/19 142 lb (64.4 kg)   05/11/19 158 lb 4 oz (71.8 kg)   04/25/19 142 lb 6.4 oz (64.6 kg)     BP Readings from Last 3 Encounters:   05/14/19 138/81   05/11/19 121/70   04/25/19 117/69     Physical Exam   Constitutional: Vital signs are normal. He appears well-developed and well-nourished. He does not appear ill. No distress. HENT:   Head: Normocephalic and atraumatic. Right Ear: Hearing and external ear normal. No decreased hearing is noted. Left Ear: Hearing and external ear normal. No decreased hearing is noted. Nose: Nose normal. No nasal deformity. Eyes: Conjunctivae are normal. Right eye exhibits no discharge. Left eye exhibits no discharge.    Neck: tablet     Sig: TAKE 1 TABLET DAILY     Dispense:  14 tablet     Refill:  0       Future Appointments   Date Time Provider Miguel Romo   6/10/2019  1:30 PM Julia Tomas DO [facility]   8/7/2019 10:30 AM CHRISTIN Gould CNP ENT [facility] [place] [address]   9/16/2019  1:00 PM MD CHARLENE Villagomez KIDNEY [facility] [place]   11/25/2019 11:30 AM DO JENN Wild  RES [address]        Patient given educational materials - see patient instructions. Discussed use, benefit, and side effects of prescribedmedications. All patient questions answered. Pt voiced understanding. Reviewed health maintenance. Instructed to continue current medications, diet and exercise. Patient agreed with treatment plan. Follow up as directed.     Electronically signed by CHRISTIN Lozano CNP on 5/14/2019 at 5:10 PM

## 2019-05-14 NOTE — PATIENT INSTRUCTIONS
too much or too little to do can make you anxious. · Keep a record of your symptoms. Discuss your fears with a good friend or family member, or join a support group for people with similar problems. Talking to others sometimes relieves stress. · Get involved in social groups, or volunteer to help others. Being alone sometimes makes things seem worse than they are. · Get at least 30 minutes of exercise on most days of the week to relieve stress. Walking is a good choice. You also may want to do other activities, such as running, swimming, cycling, or playing tennis or team sports. Relaxation techniques  Do relaxation exercises 10 to 20 minutes a day. You can play soothing, relaxing music while you do them, if you wish. · Tell others in your house that you are going to do your relaxation exercises. Ask them not to disturb you. · Find a comfortable place, away from all distractions and noise. · Lie down on your back, or sit with your back straight. · Focus on your breathing. Make it slow and steady. · Breathe in through your nose. Breathe out through either your nose or mouth. · Breathe deeply, filling up the area between your navel and your rib cage. Breathe so that your belly goes up and down. · Do not hold your breath. · Breathe like this for 5 to 10 minutes. Notice the feeling of calmness throughout your whole body. As you continue to breathe slowly and deeply, relax by doing the following for another 5 to 10 minutes:  · Tighten and relax each muscle group in your body. You can begin at your toes and work your way up to your head. · Imagine your muscle groups relaxing and becoming heavy. · Empty your mind of all thoughts. · Let yourself relax more and more deeply. · Become aware of the state of calmness that surrounds you.   · When your relaxation time is over, you can bring yourself back to alertness by moving your fingers and toes and then your hands and feet and then stretching and moving your heart problems, and pneumonia. If your shortness of breath continues, you may need tests and treatment. Watch for any changes in your breathing and other symptoms. Follow-up care is a key part of your treatment and safety. Be sure to make and go to all appointments, and call your doctor if you are having problems. It's also a good idea to know your test results and keep a list of the medicines you take. How can you care for yourself at home? · Do not smoke or allow others to smoke around you. If you need help quitting, talk to your doctor about stop-smoking programs and medicines. These can increase your chances of quitting for good. · Get plenty of rest and sleep. · Take your medicines exactly as prescribed. Call your doctor if you think you are having a problem with your medicine. · Find healthy ways to deal with stress. ? Exercise daily. ? Get plenty of sleep. ? Eat regularly and well. When should you call for help? Call 911 anytime you think you may need emergency care. For example, call if:    · You have severe shortness of breath.     · You have symptoms of a heart attack. These may include:  ? Chest pain or pressure, or a strange feeling in the chest.  ? Sweating. ? Shortness of breath. ? Nausea or vomiting. ? Pain, pressure, or a strange feeling in the back, neck, jaw, or upper belly or in one or both shoulders or arms. ? Lightheadedness or sudden weakness. ? A fast or irregular heartbeat. After you call 911, the  may tell you to chew 1 adult-strength or 2 to 4 low-dose aspirin. Wait for an ambulance. Do not try to drive yourself.    Call your doctor now or seek immediate medical care if:    · Your shortness of breath gets worse or you start to wheeze.  Wheezing is a high-pitched sound when you breathe.     · You wake up at night out of breath or have to prop your head up on several pillows to breathe.     · You are short of breath after only light activity or while at rest.   [facility] closely for changes in your health, and be sure to contact your doctor if:    · You do not get better over the next 1 to 2 days. Where can you learn more? Go to https://[employer] App. org and sign in to your [employer] account. Enter S780 in the [employer] box to learn more about \"Shortness of Breath: Care Instructions. \"     If you do not have an account, please click on the \"Sign Up Now\" link. Current as of: September 5, 2018  Content Version: 12.0  © 9066-2694 Healthwise, Incorporated. Care instructions adapted under license by [facility] ([facility]). If you have questions about a medical condition or this instruction, always ask your healthcare professional. [address] any warranty or liability for your use of this information.

## 2019-05-15 ENCOUNTER — CARE COORDINATION (OUTPATIENT)
Dept: CASE MANAGEMENT | Age: 84
End: 2019-05-15

## 2019-05-15 RX ORDER — TAMSULOSIN HYDROCHLORIDE 0.4 MG/1
CAPSULE ORAL
Qty: 180 CAPSULE | Refills: 1 | Status: SHIPPED | OUTPATIENT
Start: 2019-05-15 | End: 2019-01-01 | Stop reason: SDUPTHER

## 2019-05-15 NOTE — CARE COORDINATION
Care Transition  ED Follow up Call    Reason for ED visit: SOB, fatigue, COPD exacerbation     Status:     improved      Do you have any questions related to your discharge instructions? no    Review of Instructions:    Discharged with new prescription? yes - zithromax dulera    Review Medications:  Yes   Do you have any questions related to your medications? no    Understands what to report/when to return?:  Yes   Do you have a follow up appointment scheduled? Yes  Specific review if indicated (symptom, services, etc): Spoke with daughter Jonh Paris, introduced self/role. Had PCP appointment yesterday, unable to afford Marino Gum, advised to continue albuterol inhaler, may need nebulizer in future. Daughter thinks it was anxiety related. Daughter in law (nurse) fills weekly pill box, may have been out. Discussed early symptom recognition and notifying providers promptly, verbalized understanding. Discussed COPD triggers, verbalized understanding. Has good family support, denies further needs/assistance at this time.        Do you have any needs or concerns I can assist you with? no     FU appts/Provider:    Future Appointments   Date Time Provider Miguel Romo   6/10/2019  1:30 PM DO JENN Reyes [facility]   8/7/2019 10:30 AM CHRISTIN Jay - CNP [facility]NT [facility] [place] [facility] [place]   9/16/2019  1:00 PM MD [facility] [place]   11/25/2019 11:30 AM Celi Huynh DO SRPX FM RES [facility] [address]  [phone]

## 2019-05-15 NOTE — TELEPHONE ENCOUNTER
Last visit- 5/14/2019  Next visit- 6/10/2019    Requested Prescriptions     Pending Prescriptions Disp Refills    tamsulosin (FLOMAX) 0.4 MG capsule [Pharmacy Med Name: TAMSULOSIN HCL CAPS 0.4MG] 180 capsule 1     Sig: TAKE 1 CAPSULE TWICE A DAY

## 2019-06-10 ENCOUNTER — OFFICE VISIT (OUTPATIENT)
Dept: FAMILY MEDICINE CLINIC | Age: 84
End: 2019-06-10
Payer: MEDICARE

## 2019-06-10 ENCOUNTER — TELEPHONE (OUTPATIENT)
Dept: FAMILY MEDICINE CLINIC | Age: 84
End: 2019-06-10

## 2019-06-10 ENCOUNTER — NURSE ONLY (OUTPATIENT)
Dept: LAB | Age: 84
End: 2019-06-10

## 2019-06-10 VITALS
SYSTOLIC BLOOD PRESSURE: 131 MMHG | OXYGEN SATURATION: 98 % | TEMPERATURE: 97.6 F | WEIGHT: 144 LBS | DIASTOLIC BLOOD PRESSURE: 74 MMHG | RESPIRATION RATE: 12 BRPM | HEART RATE: 67 BPM | BODY MASS INDEX: 23.14 KG/M2 | HEIGHT: 66 IN

## 2019-06-10 DIAGNOSIS — G47.33 OSA (OBSTRUCTIVE SLEEP APNEA): ICD-10-CM

## 2019-06-10 DIAGNOSIS — I25.10 CORONARY ARTERY DISEASE INVOLVING NATIVE HEART WITHOUT ANGINA PECTORIS, UNSPECIFIED VESSEL OR LESION TYPE: Primary | ICD-10-CM

## 2019-06-10 DIAGNOSIS — R06.02 SHORTNESS OF BREATH: ICD-10-CM

## 2019-06-10 DIAGNOSIS — J43.9 PULMONARY EMPHYSEMA, UNSPECIFIED EMPHYSEMA TYPE (HCC): ICD-10-CM

## 2019-06-10 DIAGNOSIS — I25.10 CORONARY ARTERY DISEASE INVOLVING NATIVE HEART WITHOUT ANGINA PECTORIS, UNSPECIFIED VESSEL OR LESION TYPE: ICD-10-CM

## 2019-06-10 DIAGNOSIS — I10 ESSENTIAL HYPERTENSION: ICD-10-CM

## 2019-06-10 DIAGNOSIS — K92.2 LOWER GI BLEED: ICD-10-CM

## 2019-06-10 DIAGNOSIS — N18.30 CKD (CHRONIC KIDNEY DISEASE) STAGE 3, GFR 30-59 ML/MIN (HCC): ICD-10-CM

## 2019-06-10 LAB
ANION GAP SERPL CALCULATED.3IONS-SCNC: 12 MEQ/L (ref 8–16)
BASOPHILS # BLD: 1.5 %
BASOPHILS ABSOLUTE: 0.1 THOU/MM3 (ref 0–0.1)
BUN BLDV-MCNC: 29 MG/DL (ref 7–22)
CALCIUM SERPL-MCNC: 9.3 MG/DL (ref 8.5–10.5)
CHLORIDE BLD-SCNC: 100 MEQ/L (ref 98–111)
CO2: 26 MEQ/L (ref 23–33)
CREAT SERPL-MCNC: 1.3 MG/DL (ref 0.4–1.2)
D-DIMER QUANTITATIVE: 765 NG/ML FEU (ref 0–500)
EOSINOPHIL # BLD: 2.6 %
EOSINOPHILS ABSOLUTE: 0.1 THOU/MM3 (ref 0–0.4)
ERYTHROCYTE [DISTWIDTH] IN BLOOD BY AUTOMATED COUNT: 14.6 % (ref 11.5–14.5)
ERYTHROCYTE [DISTWIDTH] IN BLOOD BY AUTOMATED COUNT: 49.7 FL (ref 35–45)
GFR SERPL CREATININE-BSD FRML MDRD: 52 ML/MIN/1.73M2
GLUCOSE BLD-MCNC: 103 MG/DL (ref 70–108)
HCT VFR BLD CALC: 34 % (ref 42–52)
HEMOGLOBIN: 11.1 GM/DL (ref 14–18)
IMMATURE GRANS (ABS): 0.02 THOU/MM3 (ref 0–0.07)
IMMATURE GRANULOCYTES: 0.4 %
LYMPHOCYTES # BLD: 25.6 %
LYMPHOCYTES ABSOLUTE: 1.4 THOU/MM3 (ref 1–4.8)
MCH RBC QN AUTO: 30.1 PG (ref 26–33)
MCHC RBC AUTO-ENTMCNC: 32.6 GM/DL (ref 32.2–35.5)
MCV RBC AUTO: 92.1 FL (ref 80–94)
MONOCYTES # BLD: 9.3 %
MONOCYTES ABSOLUTE: 0.5 THOU/MM3 (ref 0.4–1.3)
NUCLEATED RED BLOOD CELLS: 0 /100 WBC
PLATELET # BLD: 214 THOU/MM3 (ref 130–400)
PMV BLD AUTO: 9.4 FL (ref 9.4–12.4)
POTASSIUM SERPL-SCNC: 4.9 MEQ/L (ref 3.5–5.2)
RBC # BLD: 3.69 MILL/MM3 (ref 4.7–6.1)
SEG NEUTROPHILS: 60.6 %
SEGMENTED NEUTROPHILS ABSOLUTE COUNT: 3.3 THOU/MM3 (ref 1.8–7.7)
SODIUM BLD-SCNC: 138 MEQ/L (ref 135–145)
WBC # BLD: 5.4 THOU/MM3 (ref 4.8–10.8)

## 2019-06-10 PROCEDURE — 99214 OFFICE O/P EST MOD 30 MIN: CPT | Performed by: FAMILY MEDICINE

## 2019-06-10 RX ORDER — AZITHROMYCIN 250 MG/1
250 TABLET, FILM COATED ORAL SEE ADMIN INSTRUCTIONS
Qty: 6 TABLET | Refills: 0 | Status: SHIPPED | OUTPATIENT
Start: 2019-06-10 | End: 2019-01-01

## 2019-06-10 ASSESSMENT — ENCOUNTER SYMPTOMS
CONSTIPATION: 0
COUGH: 1
NAUSEA: 0
EYE PAIN: 0
VOMITING: 0
TROUBLE SWALLOWING: 0
DIARRHEA: 0
BLOOD IN STOOL: 0
SHORTNESS OF BREATH: 1
ABDOMINAL PAIN: 0

## 2019-06-10 NOTE — PROGRESS NOTES
slipped disc in back    Visual problems     Vitamin D deficiency       Past Surgical History:   Procedure Laterality Date    ABDOMINAL HERNIA REPAIR  04/27/2017    incisional hernia repair--[facility] CARDIAC SURGERY      CATARACT REMOVAL WITH IMPLANT      bilateral    COLONOSCOPY  [id number]    COLONOSCOPY  12/29/2017    COLONOSCOPY CONTROL HEMORRHAGE performed by Darlin Mak MD at [address] End[facility]    COLONOSCOPY  8/16/2018    COLONOSCOPY POLYPECTOMY SNARE/COLD BIOPSY performed by Darlin Mak MD at [address] [facility]    COLONOSCOPY  8/19/2018    COLONOSCOPY CONTROL HEMORRHAGE performed by Darlin Mak MD at [address] Endoscopy    COLONOSCOPY N/A 8/20/2018    COLONOSCOPY CONTROL HEMORRHAGE performed by Darlin Mak MD at [address] [address]'s    EKG 12-LEAD  4/29/2015         EYE SURGERY      cataracts    HERNIA REPAIR      several    HIP PINNING Left 8/31/2017    LEFT HIP INTERTAN performed by Luis Rey MD at [facility]  12/3/12    left     MYRINGOTOMY AND TYMPANOSTOMY TUBE PLACEMENT  7/23/13    left     NASAL SINUS SURGERY      OTHER SURGICAL HISTORY  04/27/2015    transurethral Incision bladder neck    PACEMAKER INSERTION      PACEMAKER PLACEMENT  2011    [facility] COLONOSCOPY FLX DX W/COLLJ SPEC WHEN PFRMD Left 8/18/2018    COLONOSCOPY performed by Darlin Mak MD at [address] End[facility]    [facility] [address] DX/THER SBST INTRLMNR LMBR/SAC W/IMG GDN N/A 6/25/2018    LUMBAR INTER LAMINAR RUBEN LESI @ L3. performed by Jimmy Cárdenas MD at [place] [address] Right 10/8/2017    Right hip intertan performed by Zaid Larson MD at [address] [place] [address] TURP  4/17/2013    W/CYSTO WITH DIRECT VISION URETHROTOMY & RESECTION BLADDER NECK CONTRACTURE    TURP  4/27/15    re-do TURP    TYMPANOSTOMY TUBE PLACEMENT      multiple surgeries    UPPER GASTROINTESTINAL ENDOSCOPY tablet Take 1 tablet by mouth daily 30 tablet 5    aspirin 81 MG tablet Take 81 mg by mouth daily      albuterol sulfate HFA (VENTOLIN HFA) 108 (90 Base) MCG/ACT inhaler Inhale 2 puffs into the lungs every 6 hours as needed for Wheezing 1 Inhaler 5    acetaminophen (TYLENOL) 650 MG extended release tablet Take 650 mg by mouth every 8 hours as needed for Pain      ferrous sulfate 325 (65 Fe) MG tablet Take 325 mg by mouth Take 1 tablet every other day.  Omega-3 Fatty Acids (FISH OIL) 1200 MG CAPS Take by mouth daily      docusate sodium (COLACE) 100 MG capsule Take 100 mg by mouth 2 times daily as needed       vitamin B-12 1000 MCG tablet Take 1 tablet by mouth daily 30 tablet 3    Coenzyme Q10 (COQ-10 PO) Take 10 mg by mouth daily      Multiple Vitamins-Minerals (THERAPEUTIC MULTIVITAMIN-MINERALS) tablet Take 1 tablet by mouth daily        No current facility-administered medications for this visit. Allergies   Allergen Reactions    Iodine Swelling and Rash       Health Maintenance   Topic Date Due    Shingles Vaccine (1 of 2) 11/24/1981    Potassium monitoring  05/11/2020    Creatinine monitoring  05/11/2020    DTaP/Tdap/Td vaccine (2 - Td) 06/16/2027    Flu vaccine  Completed    Pneumococcal 65+ years Vaccine  Completed       Subjective:      Review of Systems   Constitutional: Negative for chills, fatigue and fever. HENT: Negative for ear pain, postnasal drip and trouble swallowing. Eyes: Negative for pain and visual disturbance. Respiratory: Positive for cough and shortness of breath. Cardiovascular: Negative for chest pain, palpitations and leg swelling. Gastrointestinal: Negative for abdominal pain, blood in stool, constipation, diarrhea, nausea and vomiting. Musculoskeletal: Positive for myalgias. Negative for arthralgias. Skin: Negative for rash and wound. Neurological: Negative for dizziness, light-headedness and headaches.    Psychiatric/Behavioral: Positive for sleep disturbance. Negative for dysphoric mood. The patient is nervous/anxious. Objective:     Vitals:    06/10/19 1328 06/10/19 1331   BP: (!) 141/79 131/74   Site: Right Upper Arm Left Upper Arm   Position: Sitting Sitting   Cuff Size: Medium Adult Medium Adult   Pulse: 69 67   Resp: 12    Temp: 97.6 °F (36.4 °C)    TempSrc: Oral    SpO2: 98%    Weight: 144 lb (65.3 kg)    Height: 5' 5.98\" (1.676 m)        Body mass index is 23.25 kg/m². Wt Readings from Last 3 Encounters:   06/10/19 144 lb (65.3 kg)   05/14/19 142 lb (64.4 kg)   05/11/19 158 lb 4 oz (71.8 kg)     BP Readings from Last 3 Encounters:   06/10/19 131/74   05/14/19 138/81   05/11/19 121/70       Physical Exam   Constitutional: He is oriented to person, place, and time. He appears well-developed and well-nourished. No distress. HENT:   Head: Normocephalic and atraumatic. Right Ear: External ear normal.   Left Ear: External ear normal.   Eyes: Pupils are equal, round, and reactive to light. Conjunctivae and EOM are normal. Right eye exhibits no discharge. Left eye exhibits no discharge. No scleral icterus. Neck: Normal range of motion. Cardiovascular: Normal rate, regular rhythm and normal heart sounds. No murmur heard. Pulmonary/Chest: Effort normal. He has wheezes (right lower lung). Abdominal: Soft. Bowel sounds are normal.   Musculoskeletal: He exhibits no edema. Neurological: He is alert and oriented to person, place, and time. No cranial nerve deficit. Skin: Skin is warm and dry. No rash noted. He is not diaphoretic. No erythema. Psychiatric: He has a normal mood and affect. His behavior is normal. Judgment and thought content normal.   Nursing note and vitals reviewed.       Lab Results   Component Value Date    WBC 5.4 06/10/2019    HGB 11.1 (L) 06/10/2019    HCT 34.0 (L) 06/10/2019     06/10/2019    CHOL 118 12/05/2016    TRIG 93 12/05/2016    HDL 56 12/05/2016    LDLCALC 43 12/05/2016    AST 14 05/11/2019  05/11/2019    K 4.3 05/11/2019     05/11/2019    CREATININE 1.2 05/11/2019    BUN 35 (H) 05/11/2019    CO2 25 05/11/2019    TSH 1.690 05/11/2019    INR 1.08 08/15/2018    LABMICR 3.18 09/14/2016    LABGLOM 57 (A) 05/11/2019    MG 1.9 05/11/2019    CALCIUM 9.2 05/11/2019    VITD25 43 09/05/2017       Imaging Results:    Nm Lung Vent/perfusion (vq)    Result Date: 5/11/2019  PROCEDURE: NM LUNG VENTILATION VQ PERFUSION CLINICAL INFORMATION: Shortness of breath, elevated d-dimer. Radiopharmaceuticals: Ventilation scan: 6 mCi xenon-133 gas by inhalation. Perfusion scan: 3 mCi technetium 99m MAA, intravenously. TECHNIQUE: Anterior and posterior ventilation images were acquired during breath hold, equilibrium and washout phases. Perfusion scan was acquired in multiple projections. COMPARISON: None Correlation: PA and lateral chest radiograph dated 5/11/2019 FINDINGS: Ventilation scan: There is a linear ventilation defect demonstrated within the right lung compatible with a focal area of bandlike scarring on chest radiograph from earlier same day. There is otherwise normal activity in the remaining lungs. There is moderate radiotracer retention present on washout images compatible with air trapping. Perfusion scan: There is a small subsegmental mismatch perfusion defect demonstrated within the periphery of the posterior left lung base. There is also a photopenic defect peripherally at the lateral left midlung likely representing overlying pacemaker device. There is mild blunting of the right costophrenic angle on the perfusion images which likely corresponds with an area of mild blunting on chest radiograph from earlier same day. This may represent pleural scarring or small pleural effusion. 1. Indeterminate small subsegmental perfusion defect demonstrated within the posterior lateral left lung base. This is consistent with a low probability scan for pulmonary embolism.  2. Moderate radiotracer retention present on washout images compatible with air trapping. Additional incidental findings otherwise as above. **This report has been created using voice recognition software. It may contain minor errors which are inherent in voice recognition technology. ** Final report electronically signed by Dr. Bernard Waldron on 5/11/2019 7:03 PM        Assessment:      Diagnosis Orders   1. Coronary artery disease involving native heart without angina pectoris, unspecified vessel or lesion type     2. Pulmonary emphysema, unspecified emphysema type ([address]     3. NICOLAS (obstructive sleep apnea)     4. Lower GI bleed     5. CKD (chronic kidney disease) stage 3, GFR 30-59 ml/min ([facility])         Plan:         Awaiting results of d-dimer, BMP, and CBC     Can always try a different antidepressant than zoloft to see how that helps    Will check out the cpap machine - can see our pulmonologists    Will follow the pains in the hips    Will keep up the zoloft for now - and can let us know if you would like to change    Discussed the air trapping - not on anything for COPD     Let us know if you develop a fever or the breathing is worse and the yellow phlegm is worse    Will call in some zithromax to use just in case as you have extra noises in the lungs             No follow-ups on file. Orders Placed:  No orders of the defined types were placed in this encounter. Medications Prescribed:  Orders Placed This Encounter   Medications    azithromycin (ZITHROMAX) 250 MG tablet     Sig: Take 1 tablet by mouth See Admin Instructions for 5 days 500mg on day 1 followed by 250mg on days 2 - 5     Dispense:  6 tablet     Refill:  0       Future Appointments   Date Time Provider Miguel Romo   8/7/2019 10:30 AM [facility]  [place]   9/16/2019  1:00 PM Sonia Gross MD [facility]  [place]   11/25/2019 11:30 AM DO ALBERT CabaX  RES [address]       Patient given educational materials - see patient instructions. Discussed use, benefit, and sideeffects of prescribed medications. All patient questions answered. Pt voiced understanding. Reviewed health maintenance. Instructed to continue current medications, diet and exercise. Patient agreed with treatment plan. Follow up as directed. I was present for the key portions of the exam and history and confirmed all areas of the note with the patient, staff and the student.       Electronically signed by Tanja Ambrosio DO on 6/10/2019 at 2:23 PM

## 2019-06-10 NOTE — TELEPHONE ENCOUNTER
----- Message from Shahla Davila DO sent at 6/10/2019  3:21 PM EDT -----  The results are ok - we will need to recheck them - can we be sure to see him in a month and can decide then if we need to recheck the d dimer - or the blood clotting measurement which may be up due to infection

## 2019-06-10 NOTE — PROGRESS NOTES
[address] [address] [address] [facility]  Dept: [phone]  Dept Fax: [phone]  Loc: [address] [place] [address] Due   Topic Date Due    Shingles Vaccine (1 of 2) 11/24/1981           Patient:  Janice Haq  Visit Date: 6/10/2019    ANTICIPATORY GUIDANCE : ADULT     WELL QUESTIONS  1. How many cups of caffeine do you drink per day?    3-4 cups  2. Do you exercise a minimum of 30 minutes per day, above normal activity? No    3. Do you eat a minimum of 8-10 servings of fruits and vegetables per day? No, on average the patient consumes 3-4 servings of fruits and vegetables per day  4. Do you drink alcohol? Yes, on average the patient consumes 1 cups of alcohol daily  5.  How many oz of water do you drink per day?  (64 oz per day recommended) none  EDUCATION PROVIDED  Discussed and/or Handout Given on the following items:            [x] Caffeine Use: Limit to 2 cups per day   (400mg of caffeine a day)     [x] Exercise: Ideal 30 minutes per day, above normal activity              [x] Eating Fruits and Vegetables: Studies show 8-10 servings per day decrease BP  [x] Alcohol: Ideal maximum of one cup per day for females and two cups per day for a male

## 2019-06-24 NOTE — CONSULTS
placement, ear surgery. SOCIAL HISTORY:  Quit smoking years ago. No alcohol abuse at this time. FAMILY HISTORY:  Arthritis, diabetes, hypertension, hypercholesterolemia,  skin disease, and stroke. ALLERGIES:  IODINE. CURRENT MEDICATIONS:  He was on Plavix, Zoloft, Cozaar, Lopressor, clove  oil, vitamin D, multivitamin, aspirin, calcium supplement, Imdur, Protonix,  and Lipitor. PHYSICAL EXAMINATION:  GENERAL:  Pleasant elderly male, comfortable, not short of breath, not  using accessory muscles, pleasant. Daughter with him at the time of  evaluation. VITAL SIGNS:  He is afebrile. Blood pressure is 127/66. HEENT:  His head is atraumatic. Sclerae anicteric. His pupils are round  and reactive to light and accommodation with normal extraocular muscular  movement. Conjunctivae normal.  Oral cavity, no lesions seen. NECK:  Supple. CHEST:  Normal.  CARDIOVASCULAR:  Normal.  ABDOMEN:   Soft, nontender, nondistended. No rebound. No guarding. No  stigmata of chronic liver disease. EXTREMITIES:  No clubbing. No cyanosis. DIAGNOSTIC STUDIES:  Previous endoscopy showed diverticular bleeding from  the sigmoid. IMPRESSION:  1.  GI bleed, likely hemorrhoidal versus diverticular bleed. 2.  Occult malignancy needs to be ruled out as the patient's weight loss  significant. 3.  Coronary artery disease, stable at this time on antiplatelet therapy. 4.  COPD. 5.  Chronic kidney disease. 6.  Hypertension. PLAN:  1. Colonoscopy to be done tomorrow to evaluate. 2.  If no diagnostic finding, upper endoscopy also will be done. 3.  The patient will receive PPI. 4.  Watch for GI bleed, transfuse as needed. 5.  For weight loss, imaging studies to be done to evaluate and rule out  occult malignancy. Thank you for allowing me to participate in this patient's care.         Bernard Watts M.D.    D: 08/15/2018 22:02:07       T: 08/16/2018 0:01:28     [email or web address]  Job#: [id number]     Doc#: [id number]    CC: [Joint Pain] : joint pain [Negative] : Heme/Lymph

## 2019-07-10 NOTE — PROGRESS NOTES
S: 80 y.o. male with   Chief Complaint   Patient presents with    1 Month Follow-Up    Cough     yellow        Doing better off of the zithromax - not using the inhalers      Still trouble sleeping- not sure why the bp is up today    BP Readings from Last 3 Encounters:   07/10/19 (!) 148/82   06/10/19 131/74   05/14/19 138/81     Wt Readings from Last 3 Encounters:   07/10/19 142 lb 3.2 oz (64.5 kg)   06/10/19 144 lb (65.3 kg)   05/14/19 142 lb (64.4 kg)           O: VS:  weight is 142 lb 3.2 oz (64.5 kg). His oral temperature is 98.4 °F (36.9 °C). His blood pressure is 148/82 (abnormal) and his pulse is 60. His oxygen saturation is 94%. AAO/NAD, appropriate affect for mood  CV:  RRR, no murmur  Resp: CTAB       Diagnosis Orders   1. NICOLAS (obstructive sleep apnea)     2. Pulmonary emphysema, unspecified emphysema type ([facility] [address]     3. Essential hypertension     4. CKD (chronic kidney disease) stage 3, GFR 30-59 ml/min ([facility])         Plan  Will keep an ey mookie the cough    Will keep the bp the same for now - but will check them when at home    Will stay on the imdur, lopressor, cozaar - but may increase the lopressor if the bp is not down in the next few weeks - can come back to see Anjali for the recheck of the bp    Discussed sleep again - will try to work on the cpap again and getting moisture to go into the machine to make it easier to use     Health Maintenance Due   Topic Date Due    Shingles Vaccine (1 of 2) 11/24/1981         Attending Physician Statement  I have discussed the case, including pertinent history and exam findings with the resident. I also have seen the patient and performed key portions of the examination. I agree with the documented assessment and plan as documented by the resident.   GE modifier added to this encounter      Mickey Robert DO 7/12/2019 11:02 AM
Take 81 mg by mouth daily      albuterol sulfate HFA (VENTOLIN HFA) 108 (90 Base) MCG/ACT inhaler Inhale 2 puffs into the lungs every 6 hours as needed for Wheezing 1 Inhaler 5    acetaminophen (TYLENOL) 650 MG extended release tablet Take 650 mg by mouth every 8 hours as needed for Pain      ferrous sulfate 325 (65 Fe) MG tablet Take 325 mg by mouth Take 1 tablet every other day.  Omega-3 Fatty Acids (FISH OIL) 1200 MG CAPS Take by mouth daily      docusate sodium (COLACE) 100 MG capsule Take 100 mg by mouth 2 times daily as needed       vitamin B-12 1000 MCG tablet Take 1 tablet by mouth daily 30 tablet 3    Coenzyme Q10 (COQ-10 PO) Take 10 mg by mouth daily      Multiple Vitamins-Minerals (THERAPEUTIC MULTIVITAMIN-MINERALS) tablet Take 1 tablet by mouth daily        No current facility-administered medications for this visit. Allergies   Allergen Reactions    Iodine Swelling and Rash       Health Maintenance   Topic Date Due    Shingles Vaccine (1 of 2) 11/24/1981    Flu vaccine (1) 09/01/2019    Annual Wellness Visit (AWV)  11/19/2019    Potassium monitoring  07/09/2020    Creatinine monitoring  07/09/2020    DTaP/Tdap/Td vaccine (2 - Td) 06/16/2027    Pneumococcal 65+ years Vaccine  Completed       Subjective:      Review of Systems   Constitutional: Positive for fatigue. Negative for fever. HENT: Negative for congestion and rhinorrhea. Respiratory: Positive for cough. Negative for shortness of breath and wheezing. Cardiovascular: Negative for chest pain and palpitations. Gastrointestinal: Negative for diarrhea, nausea and vomiting. Musculoskeletal: Positive for arthralgias (back and hands). Neurological: Negative for weakness, numbness and headaches.        Objective:     Vitals:    07/10/19 1314 07/10/19 1320   BP: (!) 159/83 (!) 148/82   Site: Left Upper Arm Left Upper Arm   Position: Sitting Sitting   Cuff Size: Medium Adult Medium Adult   Pulse: 60    Temp: 98.4 °F

## 2019-07-17 NOTE — PROGRESS NOTES
Pharmacist Visit - BP Check    [facility]  [address] [facility] [address] [address]  Dept: [phone]  Dept Fax: [phone]: [phone]        Patient:  Marley Agrawal   YOB: 1931    Chief Complaint   Patient presents with   [facility] Hypertension       Pete Forrester presented to the office for a blood pressure check per provider consult due to recent BP readings, which were above normal parameters. Last 3 BP Readings:  BP Readings from Last 3 Encounters:   07/17/19 116/69   07/10/19 (!) 148/82   06/10/19 131/74       Today's Vitals:  /69 (Site: Right Upper Arm)   Pulse 71   Wt 141 lb 9.6 oz (64.2 kg)   BMI 22.87 kg/m²     What doses have you missed in the past week? None   How much coffee or pop do you drink per day? 3 or 4 cups of coffee per day   How much exercise do you engage in weekly? None  Did you take your blood pressure medication today/what time do you usually take it? Took this AM   What does your blood pressure run at home? Has not been checking his BP at home, did check it this AM and it was 111/64 & HR 84   How much do you smoke? No   Have you felt lightheaded or dizziness? Any side effects? Patient reported some lightheadedness this AM, does not report any other episodes of lightheadedness or dizziness   How much salt do you eat in your diet?  Normal intake     Current Medications:  Outpatient Medications Marked as Taking for the 7/17/19 encounter (Nurse Only) with Saba Dong [facility]   Medication Sig Dispense Refill    tamsulosin (FLOMAX) 0.4 MG capsule TAKE 1 CAPSULE TWICE A  capsule 1    sertraline (ZOLOFT) 50 MG tablet TAKE 1 TABLET DAILY 14 tablet 0    isosorbide mononitrate (IMDUR) 30 MG extended release tablet Take 1 tablet by mouth daily 90 tablet 1    metoprolol tartrate (LOPRESSOR) 25 MG tablet TAKE ONE-HALF (1/2) TABLET DAILY SUBSTITUTE FOR ATENOLOL 45 tablet 1    losartan (COZAAR)

## 2019-08-14 NOTE — PROGRESS NOTES
8/20/2018    COLONOSCOPY CONTROL HEMORRHAGE performed by Corrie Clifford MD at [address]  1960's    EKG 12-LEAD  4/29/2015         EYE SURGERY      cataracts    HERNIA REPAIR      several    HIP PINNING Left 8/31/2017    LEFT HIP INTERTAN performed by Shankar Morley MD at [address]  12/3/12    left     MYRINGOTOMY AND TYMPANOSTOMY TUBE PLACEMENT  7/23/13    left     NASAL SINUS SURGERY      OTHER SURGICAL HISTORY  04/27/2015    transurethral Incision bladder neck    PACEMAKER INSERTION      PACEMAKER PLACEMENT  2011    [place] COLONOSCOPY FLX DX W/COLLJ SPEC WHEN PFRMD Left 8/18/2018    COLONOSCOPY performed by Corrie Clifford MD at [facility]    [place] [address] DX/THER SBST INTRLMNR LMBR/SAC W/IMG GDN N/A 6/25/2018    LUMBAR INTER LAMINAR RUBEN LESI @ L3. performed by Grover Ledbetter MD at [place] [address] Right 10/8/2017    Right hip intertan performed by Radha Lux MD at [address] TURP  4/17/2013    W/CYSTO WITH DIRECT VISION URETHROTOMY & RESECTION BLADDER NECK CONTRACTURE    TURP  4/27/15    re-do TURP    TYMPANOSTOMY TUBE PLACEMENT      multiple surgeries    UPPER GASTROINTESTINAL ENDOSCOPY  12/29/2017    COLONOSCOPY SUBMUCOSAL/BOTOX INJECTION performed by Corrie Clifford MD at [address] [facility]  8/16/2018    EGD DIAGNOSTIC ONLY performed by Corrie Clifford MD at [address] [facility] Left 8/19/2018    EGD DIAGNOSTIC ONLY performed by Corrie Clifford MD at [facility]     Family History   Problem Relation Age of Onset    Cancer Brother 72        lung    Diabetes Brother     Kidney Disease Father         stones    Stroke Father     Kidney Disease Child         stones    Kidney Disease Child         stones    Heart Disease Sister     Arthritis Sister     High Blood Pressure Sister impaired. Vitals:    08/14/19 1432   BP: (!) 156/82   Pulse: 84   Resp: 16       External ears are normal: no scars, lesions or masses. R External auditory canal clear and free of any pathology  L External auditory canal clear and free of any pathology   Tympanic membranes:  R tube in place-dry, patent                                            L intact, translucent, slightly retracted    Data:  All of the past medical history, past surgical history, family history, social history, allergies and current medications were reviewed. Assessment & Plan:        1. ETD (Eustachian tube dysfunction), bilateral    2. S/P tympanotomy with insertion of tube    - Discussed water precautions. - Call for any ear drainage.  - Follow up every 6 months while tubes in place. Return in about 6 months (around 2/14/2020) for tube check.

## 2019-09-16 NOTE — PROGRESS NOTES
[facility]    [facility], [address]   [place] [facility]  [phone]  Progress Note  9/16/2019 1:09 PM      Pt Name:    Julius Bianchi  YOB: 1931  Primary Care Physician:  Odette Peterson DO     Chief Complaint:   Chief Complaint   Patient presents with    Follow-up     CKD III        Background Information/Interval History:   81 yo pleasant patient with hx CKD III, HTN, Chronic anemia who is here for annual follow-up. He used to see Dr. Stefania Hernandez. He is following closely with Dr. Gwynneth Osler for GI bleeding polyp. He has had issues with low hemoglobin and has had multiple endoscopies and colonoscopies. He was previously taking aspirin and plavix but not anymore. He says he was taking \"some arthritis pill\" for sometime but not anymore. He has coronary stents and pacemaker. He has had kidney stones in the past. No known prostate cancer but has seen urology for TURP at [facility] (Dr. Hiram Clements). He is on flomax. He used to smoke in the past but none recently. Quit about 20 years ago. He has HTN and takes lopressor and cozaar. He is here for one year follow-up. He says he feels okay. He reports he does okay but does have some shortness of breath with exertion. No dark stools. No leg swelling. He denies any problems with urination. Feels okay overall. Here with his daughter.       Past History:  Past Medical History:   Diagnosis Date    Acute sinusitis     Angina pectoris ([address]     Anxiety disorder 10/27/2016    Arthritis     osteoporosis    BPH with urinary obstruction     CAD (coronary artery disease)     Chronic insomnia     CKD (chronic kidney disease) stage 3, GFR 30-59 ml/min (HCC)     COPD (chronic obstructive pulmonary disease) (HCC)     Gastroenteritis     HCAP (healthcare-associated pneumonia) 4/29/2015    Heart disease     HLD (hyperlipidemia)     [place] (hard of hearing)     wear hearing aids    Hypertension     Kidney disease     40% kdiney function    Kidney stone

## 2020-01-01 ENCOUNTER — APPOINTMENT (OUTPATIENT)
Dept: GENERAL RADIOLOGY | Age: 85
DRG: 064 | End: 2020-01-01
Payer: MEDICARE

## 2020-01-01 ENCOUNTER — ANESTHESIA (OUTPATIENT)
Dept: OPERATING ROOM | Age: 85
DRG: 177 | End: 2020-01-01
Payer: MEDICARE

## 2020-01-01 ENCOUNTER — APPOINTMENT (OUTPATIENT)
Dept: CT IMAGING | Age: 85
DRG: 064 | End: 2020-01-01
Payer: MEDICARE

## 2020-01-01 ENCOUNTER — APPOINTMENT (OUTPATIENT)
Dept: INTERVENTIONAL RADIOLOGY/VASCULAR | Age: 85
DRG: 064 | End: 2020-01-01
Payer: MEDICARE

## 2020-01-01 ENCOUNTER — TELEPHONE (OUTPATIENT)
Dept: FAMILY MEDICINE CLINIC | Age: 85
End: 2020-01-01

## 2020-01-01 ENCOUNTER — APPOINTMENT (OUTPATIENT)
Dept: GENERAL RADIOLOGY | Age: 85
DRG: 177 | End: 2020-01-01
Attending: INTERNAL MEDICINE
Payer: MEDICARE

## 2020-01-01 ENCOUNTER — HOSPITAL ENCOUNTER (INPATIENT)
Age: 85
LOS: 10 days | Discharge: NURSING, REHAB OR LONG-TERM CARE | DRG: 064 | End: 2020-04-24
Attending: FAMILY MEDICINE | Admitting: INTERNAL MEDICINE
Payer: MEDICARE

## 2020-01-01 ENCOUNTER — OFFICE VISIT (OUTPATIENT)
Dept: FAMILY MEDICINE CLINIC | Age: 85
End: 2020-01-01
Payer: MEDICARE

## 2020-01-01 ENCOUNTER — HOSPITAL ENCOUNTER (INPATIENT)
Age: 85
LOS: 1 days | Discharge: ANOTHER HOSPITAL | DRG: 056 | End: 2020-04-25
Attending: PHYSICAL MEDICINE & REHABILITATION | Admitting: PHYSICAL MEDICINE & REHABILITATION
Payer: MEDICARE

## 2020-01-01 ENCOUNTER — ANESTHESIA (OUTPATIENT)
Dept: OPERATING ROOM | Age: 85
DRG: 064 | End: 2020-01-01
Payer: MEDICARE

## 2020-01-01 ENCOUNTER — VIRTUAL VISIT (OUTPATIENT)
Dept: FAMILY MEDICINE CLINIC | Age: 85
End: 2020-01-01
Payer: MEDICARE

## 2020-01-01 ENCOUNTER — TELEPHONE (OUTPATIENT)
Dept: ADMINISTRATIVE | Age: 85
End: 2020-01-01

## 2020-01-01 ENCOUNTER — HOSPITAL ENCOUNTER (INPATIENT)
Age: 85
LOS: 10 days | Discharge: NURSING, REHAB OR LONG-TERM CARE | DRG: 177 | End: 2020-05-05
Attending: INTERNAL MEDICINE | Admitting: HOSPITALIST
Payer: MEDICARE

## 2020-01-01 ENCOUNTER — CARE COORDINATION (OUTPATIENT)
Dept: CASE MANAGEMENT | Age: 85
End: 2020-01-01

## 2020-01-01 ENCOUNTER — ANESTHESIA EVENT (OUTPATIENT)
Dept: OPERATING ROOM | Age: 85
DRG: 064 | End: 2020-01-01
Payer: MEDICARE

## 2020-01-01 ENCOUNTER — ANESTHESIA EVENT (OUTPATIENT)
Dept: OPERATING ROOM | Age: 85
DRG: 177 | End: 2020-01-01
Payer: MEDICARE

## 2020-01-01 ENCOUNTER — APPOINTMENT (OUTPATIENT)
Dept: CT IMAGING | Age: 85
DRG: 177 | End: 2020-01-01
Attending: INTERNAL MEDICINE
Payer: MEDICARE

## 2020-01-01 ENCOUNTER — APPOINTMENT (OUTPATIENT)
Dept: CT IMAGING | Age: 85
DRG: 056 | End: 2020-01-01
Attending: PHYSICAL MEDICINE & REHABILITATION
Payer: MEDICARE

## 2020-01-01 ENCOUNTER — APPOINTMENT (OUTPATIENT)
Dept: GENERAL RADIOLOGY | Age: 85
DRG: 056 | End: 2020-01-01
Attending: PHYSICAL MEDICINE & REHABILITATION
Payer: MEDICARE

## 2020-01-01 ENCOUNTER — OFFICE VISIT (OUTPATIENT)
Dept: ENT CLINIC | Age: 85
End: 2020-01-01
Payer: MEDICARE

## 2020-01-01 ENCOUNTER — HOSPITAL ENCOUNTER (EMERGENCY)
Age: 85
Discharge: HOME | DRG: 064 | End: 2020-04-13
Attending: FAMILY MEDICINE
Payer: MEDICARE

## 2020-01-01 ENCOUNTER — APPOINTMENT (OUTPATIENT)
Dept: GENERAL RADIOLOGY | Age: 85
DRG: 197 | End: 2020-01-01
Payer: MEDICARE

## 2020-01-01 ENCOUNTER — HOSPITAL ENCOUNTER (INPATIENT)
Age: 85
LOS: 7 days | Discharge: HOME HEALTH | DRG: 197 | End: 2020-03-10
Attending: INTERNAL MEDICINE | Admitting: INTERNAL MEDICINE
Payer: MEDICARE

## 2020-01-01 ENCOUNTER — APPOINTMENT (OUTPATIENT)
Dept: CT IMAGING | Age: 85
DRG: 197 | End: 2020-01-01
Payer: MEDICARE

## 2020-01-01 ENCOUNTER — TELEPHONE (OUTPATIENT)
Dept: PHARMACY | Facility: CLINIC | Age: 85
End: 2020-01-01

## 2020-01-01 ENCOUNTER — TELEPHONE (OUTPATIENT)
Dept: PULMONOLOGY | Age: 85
End: 2020-01-01

## 2020-01-01 VITALS
TEMPERATURE: 99.2 F | HEIGHT: 66 IN | HEART RATE: 85 BPM | RESPIRATION RATE: 32 BRPM | DIASTOLIC BLOOD PRESSURE: 58 MMHG | WEIGHT: 137.4 LBS | OXYGEN SATURATION: 94 % | BODY MASS INDEX: 22.08 KG/M2 | SYSTOLIC BLOOD PRESSURE: 114 MMHG

## 2020-01-01 VITALS
RESPIRATION RATE: 16 BRPM | BODY MASS INDEX: 22.92 KG/M2 | TEMPERATURE: 97.6 F | WEIGHT: 142.6 LBS | HEIGHT: 66 IN | SYSTOLIC BLOOD PRESSURE: 127 MMHG | OXYGEN SATURATION: 95 % | HEART RATE: 70 BPM | DIASTOLIC BLOOD PRESSURE: 73 MMHG

## 2020-01-01 VITALS
HEART RATE: 68 BPM | RESPIRATION RATE: 16 BRPM | BODY MASS INDEX: 23.45 KG/M2 | WEIGHT: 145.2 LBS | DIASTOLIC BLOOD PRESSURE: 84 MMHG | SYSTOLIC BLOOD PRESSURE: 130 MMHG

## 2020-01-01 VITALS
SYSTOLIC BLOOD PRESSURE: 115 MMHG | TEMPERATURE: 97.6 F | DIASTOLIC BLOOD PRESSURE: 68 MMHG | HEART RATE: 87 BPM | OXYGEN SATURATION: 97 % | HEIGHT: 66 IN | RESPIRATION RATE: 18 BRPM | WEIGHT: 139.2 LBS | BODY MASS INDEX: 22.37 KG/M2

## 2020-01-01 VITALS
HEART RATE: 77 BPM | DIASTOLIC BLOOD PRESSURE: 75 MMHG | TEMPERATURE: 97.7 F | BODY MASS INDEX: 22.5 KG/M2 | HEIGHT: 66 IN | WEIGHT: 140 LBS | OXYGEN SATURATION: 97 % | SYSTOLIC BLOOD PRESSURE: 149 MMHG | RESPIRATION RATE: 16 BRPM

## 2020-01-01 VITALS
WEIGHT: 144 LBS | BODY MASS INDEX: 23.14 KG/M2 | OXYGEN SATURATION: 96 % | RESPIRATION RATE: 20 BRPM | HEART RATE: 80 BPM | HEIGHT: 66 IN | TEMPERATURE: 97.5 F | DIASTOLIC BLOOD PRESSURE: 64 MMHG | SYSTOLIC BLOOD PRESSURE: 110 MMHG

## 2020-01-01 VITALS — OXYGEN SATURATION: 100 % | SYSTOLIC BLOOD PRESSURE: 150 MMHG | DIASTOLIC BLOOD PRESSURE: 66 MMHG

## 2020-01-01 VITALS — OXYGEN SATURATION: 99 % | DIASTOLIC BLOOD PRESSURE: 83 MMHG | SYSTOLIC BLOOD PRESSURE: 167 MMHG

## 2020-01-01 VITALS
RESPIRATION RATE: 20 BRPM | HEIGHT: 66 IN | TEMPERATURE: 98.1 F | WEIGHT: 158.07 LBS | HEART RATE: 78 BPM | DIASTOLIC BLOOD PRESSURE: 63 MMHG | SYSTOLIC BLOOD PRESSURE: 146 MMHG | OXYGEN SATURATION: 95 % | BODY MASS INDEX: 25.4 KG/M2

## 2020-01-01 LAB
ABO: NORMAL
ADENOVIRUS F 40 41 PCR: NOT DETECTED
ALBUMIN SERPL-MCNC: 2.7 G/DL (ref 3.5–5.1)
ALBUMIN SERPL-MCNC: 2.8 G/DL (ref 3.5–5.1)
ALBUMIN SERPL-MCNC: 2.9 G/DL (ref 3.5–5.1)
ALBUMIN SERPL-MCNC: 3 G/DL (ref 3.5–5.1)
ALBUMIN SERPL-MCNC: 3.2 G/DL (ref 3.5–5.1)
ALDOLASE: 4 U/L (ref 1.5–8.1)
ALLEN TEST: POSITIVE
ALP BLD-CCNC: 66 U/L (ref 38–126)
ALP BLD-CCNC: 74 U/L (ref 38–126)
ALP BLD-CCNC: 74 U/L (ref 38–126)
ALP BLD-CCNC: 75 U/L (ref 38–126)
ALP BLD-CCNC: 76 U/L (ref 38–126)
ALT SERPL-CCNC: 10 U/L (ref 11–66)
ALT SERPL-CCNC: 6 U/L (ref 11–66)
ALT SERPL-CCNC: 7 U/L (ref 11–66)
ALT SERPL-CCNC: 9 U/L (ref 11–66)
ALT SERPL-CCNC: < 5 U/L (ref 11–66)
ANA PATTERN: ABNORMAL
ANA SCREEN: DETECTED
ANA TITER: ABNORMAL
ANGIOTENSIN CONVERTING ENZYME: 15 U/L (ref 9–67)
ANION GAP SERPL CALCULATED.3IONS-SCNC: 10 MEQ/L (ref 8–16)
ANION GAP SERPL CALCULATED.3IONS-SCNC: 10 MEQ/L (ref 8–16)
ANION GAP SERPL CALCULATED.3IONS-SCNC: 11 MEQ/L (ref 8–16)
ANION GAP SERPL CALCULATED.3IONS-SCNC: 12 MEQ/L (ref 8–16)
ANION GAP SERPL CALCULATED.3IONS-SCNC: 13 MEQ/L (ref 8–16)
ANION GAP SERPL CALCULATED.3IONS-SCNC: 14 MEQ/L (ref 8–16)
ANION GAP SERPL CALCULATED.3IONS-SCNC: 15 MEQ/L (ref 8–16)
ANION GAP SERPL CALCULATED.3IONS-SCNC: 18 MEQ/L (ref 8–16)
ANION GAP SERPL CALCULATED.3IONS-SCNC: 21 MEQ/L (ref 8–16)
ANION GAP SERPL CALCULATED.3IONS-SCNC: 21 MEQ/L (ref 8–16)
ANION GAP SERPL CALCULATED.3IONS-SCNC: 6 MEQ/L (ref 8–16)
ANION GAP SERPL CALCULATED.3IONS-SCNC: 6 MEQ/L (ref 8–16)
ANION GAP SERPL CALCULATED.3IONS-SCNC: 8 MEQ/L (ref 8–16)
ANION GAP SERPL CALCULATED.3IONS-SCNC: 8 MEQ/L (ref 8–16)
ANION GAP SERPL CALCULATED.3IONS-SCNC: 9 MEQ/L (ref 8–16)
ANTI JO-1 IGG: 3 AU/ML (ref 0–40)
ANTIBODY SCREEN: NORMAL
ANTINUCLEAR AB INTERPRETIVE COMMENT: ABNORMAL
ANTINUCLEAR ANTIBODY, HEP-2, IGG: DETECTED
APTT: 27 SECONDS (ref 22–38)
APTT: 27.4 SECONDS (ref 22–38)
AST SERPL-CCNC: 11 U/L (ref 5–40)
AST SERPL-CCNC: 14 U/L (ref 5–40)
AST SERPL-CCNC: 15 U/L (ref 5–40)
AST SERPL-CCNC: 16 U/L (ref 5–40)
AST SERPL-CCNC: 17 U/L (ref 5–40)
ASTROVIRUS PCR: NOT DETECTED
AVERAGE GLUCOSE: 108 MG/DL (ref 70–126)
BACTERIA: ABNORMAL
BACTERIA: ABNORMAL /HPF
BASE EXCESS (CALCULATED): 4.6 MMOL/L (ref -2.5–2.5)
BASOPHILS # BLD: 0.2 %
BASOPHILS # BLD: 0.3 %
BASOPHILS # BLD: 0.4 %
BASOPHILS # BLD: 0.4 %
BASOPHILS # BLD: 0.5 %
BASOPHILS # BLD: 0.5 %
BASOPHILS # BLD: 0.6 %
BASOPHILS # BLD: 0.7 %
BASOPHILS # BLD: 0.7 %
BASOPHILS # BLD: 0.8 %
BASOPHILS # BLD: 0.8 %
BASOPHILS # BLD: 0.9 %
BASOPHILS # BLD: 0.9 %
BASOPHILS # BLD: 1 %
BASOPHILS ABSOLUTE: 0 THOU/MM3 (ref 0–0.1)
BASOPHILS ABSOLUTE: 0 THOU/MM3 (ref 0–0.1)
BASOPHILS ABSOLUTE: 0.1 THOU/MM3 (ref 0–0.1)
BILIRUB SERPL-MCNC: 0.2 MG/DL (ref 0.3–1.2)
BILIRUB SERPL-MCNC: 0.2 MG/DL (ref 0.3–1.2)
BILIRUB SERPL-MCNC: 0.3 MG/DL (ref 0.3–1.2)
BILIRUB SERPL-MCNC: 0.3 MG/DL (ref 0.3–1.2)
BILIRUB SERPL-MCNC: 0.5 MG/DL (ref 0.3–1.2)
BILIRUBIN URINE: NEGATIVE
BLOOD CULTURE, ROUTINE: NORMAL
BLOOD, URINE: NEGATIVE
BUN BLDV-MCNC: 19 MG/DL (ref 7–22)
BUN BLDV-MCNC: 19 MG/DL (ref 7–22)
BUN BLDV-MCNC: 20 MG/DL (ref 7–22)
BUN BLDV-MCNC: 21 MG/DL (ref 7–22)
BUN BLDV-MCNC: 22 MG/DL (ref 7–22)
BUN BLDV-MCNC: 22 MG/DL (ref 7–22)
BUN BLDV-MCNC: 23 MG/DL (ref 7–22)
BUN BLDV-MCNC: 24 MG/DL (ref 7–22)
BUN BLDV-MCNC: 25 MG/DL (ref 7–22)
BUN BLDV-MCNC: 26 MG/DL (ref 7–22)
BUN BLDV-MCNC: 27 MG/DL (ref 7–22)
BUN BLDV-MCNC: 28 MG/DL (ref 7–22)
BUN BLDV-MCNC: 29 MG/DL (ref 7–22)
BUN BLDV-MCNC: 29 MG/DL (ref 7–22)
BUN BLDV-MCNC: 30 MG/DL (ref 7–22)
BUN BLDV-MCNC: 31 MG/DL (ref 7–22)
BUN BLDV-MCNC: 32 MG/DL (ref 7–22)
BUN BLDV-MCNC: 36 MG/DL (ref 7–22)
BUN BLDV-MCNC: 52 MG/DL (ref 7–22)
BUN BLDV-MCNC: 56 MG/DL (ref 7–22)
C DIFF TOXIN/ANTIGEN: NEGATIVE
CALCIUM IONIZED: 1.08 MMOL/L (ref 1.12–1.32)
CALCIUM IONIZED: 1.09 MMOL/L (ref 1.12–1.32)
CALCIUM IONIZED: 1.13 MMOL/L (ref 1.12–1.32)
CALCIUM IONIZED: 1.16 MMOL/L (ref 1.12–1.32)
CALCIUM IONIZED: 1.2 MMOL/L (ref 1.12–1.32)
CALCIUM IONIZED: 1.22 MMOL/L (ref 1.12–1.32)
CALCIUM SERPL-MCNC: 7.8 MG/DL (ref 8.5–10.5)
CALCIUM SERPL-MCNC: 8 MG/DL (ref 8.5–10.5)
CALCIUM SERPL-MCNC: 8.1 MG/DL (ref 8.5–10.5)
CALCIUM SERPL-MCNC: 8.2 MG/DL (ref 8.5–10.5)
CALCIUM SERPL-MCNC: 8.2 MG/DL (ref 8.5–10.5)
CALCIUM SERPL-MCNC: 8.3 MG/DL (ref 8.5–10.5)
CALCIUM SERPL-MCNC: 8.4 MG/DL (ref 8.5–10.5)
CALCIUM SERPL-MCNC: 8.5 MG/DL (ref 8.5–10.5)
CALCIUM SERPL-MCNC: 8.6 MG/DL (ref 8.5–10.5)
CALCIUM SERPL-MCNC: 8.7 MG/DL (ref 8.5–10.5)
CALCIUM SERPL-MCNC: 8.9 MG/DL (ref 8.5–10.5)
CALCIUM SERPL-MCNC: 9 MG/DL (ref 8.5–10.5)
CALCIUM SERPL-MCNC: 9.2 MG/DL (ref 8.5–10.5)
CALCIUM SERPL-MCNC: 9.3 MG/DL (ref 8.5–10.5)
CAMPYLOBACTER PCR: NOT DETECTED
CASTS 2: ABNORMAL /LPF
CASTS UA: ABNORMAL /LPF
CASTS: ABNORMAL /LPF
CASTS: ABNORMAL /LPF
CHARACTER, URINE: CLEAR
CHLORIDE BLD-SCNC: 100 MEQ/L (ref 98–111)
CHLORIDE BLD-SCNC: 101 MEQ/L (ref 98–111)
CHLORIDE BLD-SCNC: 101 MEQ/L (ref 98–111)
CHLORIDE BLD-SCNC: 102 MEQ/L (ref 98–111)
CHLORIDE BLD-SCNC: 102 MEQ/L (ref 98–111)
CHLORIDE BLD-SCNC: 103 MEQ/L (ref 98–111)
CHLORIDE BLD-SCNC: 103 MEQ/L (ref 98–111)
CHLORIDE BLD-SCNC: 104 MEQ/L (ref 98–111)
CHLORIDE BLD-SCNC: 105 MEQ/L (ref 98–111)
CHLORIDE BLD-SCNC: 106 MEQ/L (ref 98–111)
CHLORIDE BLD-SCNC: 107 MEQ/L (ref 98–111)
CHLORIDE BLD-SCNC: 108 MEQ/L (ref 98–111)
CHLORIDE BLD-SCNC: 108 MEQ/L (ref 98–111)
CHLORIDE BLD-SCNC: 112 MEQ/L (ref 98–111)
CHLORIDE BLD-SCNC: 112 MEQ/L (ref 98–111)
CHLORIDE BLD-SCNC: 113 MEQ/L (ref 98–111)
CHLORIDE BLD-SCNC: 98 MEQ/L (ref 98–111)
CHLORIDE BLD-SCNC: 98 MEQ/L (ref 98–111)
CHLORIDE BLD-SCNC: 99 MEQ/L (ref 98–111)
CHOLESTEROL, TOTAL: 143 MG/DL (ref 100–199)
CLOSTRIDIUM DIFFICILE, PCR: NOT DETECTED
CO2: 15 MEQ/L (ref 23–33)
CO2: 18 MEQ/L (ref 23–33)
CO2: 18 MEQ/L (ref 23–33)
CO2: 19 MEQ/L (ref 23–33)
CO2: 21 MEQ/L (ref 23–33)
CO2: 21 MEQ/L (ref 23–33)
CO2: 22 MEQ/L (ref 23–33)
CO2: 23 MEQ/L (ref 23–33)
CO2: 24 MEQ/L (ref 23–33)
CO2: 25 MEQ/L (ref 23–33)
CO2: 26 MEQ/L (ref 23–33)
CO2: 27 MEQ/L (ref 23–33)
COLLECTED BY:: 4648
COLOR: YELLOW
CREAT SERPL-MCNC: 0.8 MG/DL (ref 0.4–1.2)
CREAT SERPL-MCNC: 0.8 MG/DL (ref 0.4–1.2)
CREAT SERPL-MCNC: 0.9 MG/DL (ref 0.4–1.2)
CREAT SERPL-MCNC: 1 MG/DL (ref 0.4–1.2)
CREAT SERPL-MCNC: 1.1 MG/DL (ref 0.4–1.2)
CREAT SERPL-MCNC: 1.2 MG/DL (ref 0.4–1.2)
CREAT SERPL-MCNC: 1.3 MG/DL (ref 0.4–1.2)
CREAT SERPL-MCNC: 1.3 MG/DL (ref 0.4–1.2)
CREAT SERPL-MCNC: 1.4 MG/DL (ref 0.4–1.2)
CREAT SERPL-MCNC: 1.4 MG/DL (ref 0.4–1.2)
CREAT SERPL-MCNC: 1.5 MG/DL (ref 0.4–1.2)
CREAT SERPL-MCNC: 1.5 MG/DL (ref 0.4–1.2)
CRENATED RBC'S: ABNORMAL
CRYPTOSPORIDIUM PCR: NOT DETECTED
CRYSTALS, UA: ABNORMAL
CRYSTALS: ABNORMAL
CYCLIC CITRULLINATED PEPTIDE ANTIBODY IGG: < 1.5 U/ML
CYCLOSPORA CAYETANENSIS PCR: NOT DETECTED
DEVICE: ABNORMAL
E COLI 0157 PCR: NORMAL
E COLI ENTEROAGGREGATIVE PCR: NOT DETECTED
E COLI ENTEROPATHOGENIC PCR: NOT DETECTED
E COLI ENTEROTOXIGENIC PCR: NOT DETECTED
E COLI SHIGA LIKE TOXIN PCR: NOT DETECTED
E COLI SHIGELLA/ENTEROINVASIVE PCR: NOT DETECTED
E HISTOLYTICA GI FILM ARRAY: NOT DETECTED
EKG ATRIAL RATE: 72 BPM
EKG ATRIAL RATE: 77 BPM
EKG ATRIAL RATE: 79 BPM
EKG ATRIAL RATE: 85 BPM
EKG ATRIAL RATE: 88 BPM
EKG P AXIS: 105 DEGREES
EKG P AXIS: 44 DEGREES
EKG P AXIS: 58 DEGREES
EKG P AXIS: 66 DEGREES
EKG P AXIS: 70 DEGREES
EKG P-R INTERVAL: 130 MS
EKG P-R INTERVAL: 140 MS
EKG P-R INTERVAL: 144 MS
EKG P-R INTERVAL: 146 MS
EKG P-R INTERVAL: 146 MS
EKG Q-T INTERVAL: 374 MS
EKG Q-T INTERVAL: 402 MS
EKG Q-T INTERVAL: 404 MS
EKG Q-T INTERVAL: 414 MS
EKG Q-T INTERVAL: 446 MS
EKG QRS DURATION: 120 MS
EKG QRS DURATION: 126 MS
EKG QRS DURATION: 126 MS
EKG QRS DURATION: 128 MS
EKG QRS DURATION: 128 MS
EKG QTC CALCULATION (BAZETT): 440 MS
EKG QTC CALCULATION (BAZETT): 445 MS
EKG QTC CALCULATION (BAZETT): 460 MS
EKG QTC CALCULATION (BAZETT): 488 MS
EKG QTC CALCULATION (BAZETT): 504 MS
EKG R AXIS: 68 DEGREES
EKG R AXIS: 74 DEGREES
EKG R AXIS: 83 DEGREES
EKG R AXIS: 87 DEGREES
EKG R AXIS: 93 DEGREES
EKG T AXIS: 22 DEGREES
EKG T AXIS: 25 DEGREES
EKG T AXIS: 3 DEGREES
EKG T AXIS: 5 DEGREES
EKG T AXIS: 6 DEGREES
EKG VENTRICULAR RATE: 68 BPM
EKG VENTRICULAR RATE: 77 BPM
EKG VENTRICULAR RATE: 79 BPM
EKG VENTRICULAR RATE: 85 BPM
EKG VENTRICULAR RATE: 88 BPM
EOSINOPHIL # BLD: 0.1 %
EOSINOPHIL # BLD: 0.3 %
EOSINOPHIL # BLD: 0.5 %
EOSINOPHIL # BLD: 0.8 %
EOSINOPHIL # BLD: 1 %
EOSINOPHIL # BLD: 1.2 %
EOSINOPHIL # BLD: 1.2 %
EOSINOPHIL # BLD: 1.6 %
EOSINOPHIL # BLD: 1.6 %
EOSINOPHIL # BLD: 1.9 %
EOSINOPHIL # BLD: 2 %
EOSINOPHIL # BLD: 2.5 %
EOSINOPHIL # BLD: 2.5 %
EOSINOPHIL # BLD: 2.8 %
EOSINOPHIL # BLD: 4.9 %
EOSINOPHIL # BLD: 7.1 %
EOSINOPHILS ABSOLUTE: 0 THOU/MM3 (ref 0–0.4)
EOSINOPHILS ABSOLUTE: 0 THOU/MM3 (ref 0–0.4)
EOSINOPHILS ABSOLUTE: 0.1 THOU/MM3 (ref 0–0.4)
EOSINOPHILS ABSOLUTE: 0.2 THOU/MM3 (ref 0–0.4)
EOSINOPHILS ABSOLUTE: 0.3 THOU/MM3 (ref 0–0.4)
EOSINOPHILS ABSOLUTE: 0.7 THOU/MM3 (ref 0–0.4)
EOSINOPHILS ABSOLUTE: 0.8 THOU/MM3 (ref 0–0.4)
EPITHELIAL CELLS, UA: ABNORMAL /HPF
EPITHELIAL CELLS, UA: ABNORMAL /HPF
ERYTHROCYTE [DISTWIDTH] IN BLOOD BY AUTOMATED COUNT: 13.5 % (ref 11.5–14.5)
ERYTHROCYTE [DISTWIDTH] IN BLOOD BY AUTOMATED COUNT: 13.5 % (ref 11.5–14.5)
ERYTHROCYTE [DISTWIDTH] IN BLOOD BY AUTOMATED COUNT: 13.6 % (ref 11.5–14.5)
ERYTHROCYTE [DISTWIDTH] IN BLOOD BY AUTOMATED COUNT: 13.6 % (ref 11.5–14.5)
ERYTHROCYTE [DISTWIDTH] IN BLOOD BY AUTOMATED COUNT: 13.7 % (ref 11.5–14.5)
ERYTHROCYTE [DISTWIDTH] IN BLOOD BY AUTOMATED COUNT: 13.7 % (ref 11.5–14.5)
ERYTHROCYTE [DISTWIDTH] IN BLOOD BY AUTOMATED COUNT: 14.8 % (ref 11.5–14.5)
ERYTHROCYTE [DISTWIDTH] IN BLOOD BY AUTOMATED COUNT: 14.8 % (ref 11.5–14.5)
ERYTHROCYTE [DISTWIDTH] IN BLOOD BY AUTOMATED COUNT: 14.9 % (ref 11.5–14.5)
ERYTHROCYTE [DISTWIDTH] IN BLOOD BY AUTOMATED COUNT: 15 % (ref 11.5–14.5)
ERYTHROCYTE [DISTWIDTH] IN BLOOD BY AUTOMATED COUNT: 15.1 % (ref 11.5–14.5)
ERYTHROCYTE [DISTWIDTH] IN BLOOD BY AUTOMATED COUNT: 15.1 % (ref 11.5–14.5)
ERYTHROCYTE [DISTWIDTH] IN BLOOD BY AUTOMATED COUNT: 15.3 % (ref 11.5–14.5)
ERYTHROCYTE [DISTWIDTH] IN BLOOD BY AUTOMATED COUNT: 15.5 % (ref 11.5–14.5)
ERYTHROCYTE [DISTWIDTH] IN BLOOD BY AUTOMATED COUNT: 15.5 % (ref 11.5–14.5)
ERYTHROCYTE [DISTWIDTH] IN BLOOD BY AUTOMATED COUNT: 15.6 % (ref 11.5–14.5)
ERYTHROCYTE [DISTWIDTH] IN BLOOD BY AUTOMATED COUNT: 15.7 % (ref 11.5–14.5)
ERYTHROCYTE [DISTWIDTH] IN BLOOD BY AUTOMATED COUNT: 15.8 % (ref 11.5–14.5)
ERYTHROCYTE [DISTWIDTH] IN BLOOD BY AUTOMATED COUNT: 15.8 % (ref 11.5–14.5)
ERYTHROCYTE [DISTWIDTH] IN BLOOD BY AUTOMATED COUNT: 15.9 % (ref 11.5–14.5)
ERYTHROCYTE [DISTWIDTH] IN BLOOD BY AUTOMATED COUNT: 16.1 % (ref 11.5–14.5)
ERYTHROCYTE [DISTWIDTH] IN BLOOD BY AUTOMATED COUNT: 45 FL (ref 35–45)
ERYTHROCYTE [DISTWIDTH] IN BLOOD BY AUTOMATED COUNT: 45 FL (ref 35–45)
ERYTHROCYTE [DISTWIDTH] IN BLOOD BY AUTOMATED COUNT: 45.3 FL (ref 35–45)
ERYTHROCYTE [DISTWIDTH] IN BLOOD BY AUTOMATED COUNT: 45.9 FL (ref 35–45)
ERYTHROCYTE [DISTWIDTH] IN BLOOD BY AUTOMATED COUNT: 46.3 FL (ref 35–45)
ERYTHROCYTE [DISTWIDTH] IN BLOOD BY AUTOMATED COUNT: 46.9 FL (ref 35–45)
ERYTHROCYTE [DISTWIDTH] IN BLOOD BY AUTOMATED COUNT: 48 FL (ref 35–45)
ERYTHROCYTE [DISTWIDTH] IN BLOOD BY AUTOMATED COUNT: 49.5 FL (ref 35–45)
ERYTHROCYTE [DISTWIDTH] IN BLOOD BY AUTOMATED COUNT: 49.8 FL (ref 35–45)
ERYTHROCYTE [DISTWIDTH] IN BLOOD BY AUTOMATED COUNT: 50 FL (ref 35–45)
ERYTHROCYTE [DISTWIDTH] IN BLOOD BY AUTOMATED COUNT: 50.4 FL (ref 35–45)
ERYTHROCYTE [DISTWIDTH] IN BLOOD BY AUTOMATED COUNT: 50.4 FL (ref 35–45)
ERYTHROCYTE [DISTWIDTH] IN BLOOD BY AUTOMATED COUNT: 50.5 FL (ref 35–45)
ERYTHROCYTE [DISTWIDTH] IN BLOOD BY AUTOMATED COUNT: 50.8 FL (ref 35–45)
ERYTHROCYTE [DISTWIDTH] IN BLOOD BY AUTOMATED COUNT: 51 FL (ref 35–45)
ERYTHROCYTE [DISTWIDTH] IN BLOOD BY AUTOMATED COUNT: 51.1 FL (ref 35–45)
ERYTHROCYTE [DISTWIDTH] IN BLOOD BY AUTOMATED COUNT: 51.3 FL (ref 35–45)
ERYTHROCYTE [DISTWIDTH] IN BLOOD BY AUTOMATED COUNT: 51.5 FL (ref 35–45)
ERYTHROCYTE [DISTWIDTH] IN BLOOD BY AUTOMATED COUNT: 51.5 FL (ref 35–45)
ERYTHROCYTE [DISTWIDTH] IN BLOOD BY AUTOMATED COUNT: 51.6 FL (ref 35–45)
ERYTHROCYTE [DISTWIDTH] IN BLOOD BY AUTOMATED COUNT: 51.8 FL (ref 35–45)
ERYTHROCYTE [DISTWIDTH] IN BLOOD BY AUTOMATED COUNT: 52 FL (ref 35–45)
ERYTHROCYTE [DISTWIDTH] IN BLOOD BY AUTOMATED COUNT: 52.1 FL (ref 35–45)
ERYTHROCYTE [DISTWIDTH] IN BLOOD BY AUTOMATED COUNT: 53.3 FL (ref 35–45)
ERYTHROCYTE [DISTWIDTH] IN BLOOD BY AUTOMATED COUNT: 54.5 FL (ref 35–45)
ERYTHROCYTE [DISTWIDTH] IN BLOOD BY AUTOMATED COUNT: 54.7 FL (ref 35–45)
ERYTHROCYTE [DISTWIDTH] IN BLOOD BY AUTOMATED COUNT: 54.7 FL (ref 35–45)
FERRITIN: 250 NG/ML (ref 22–322)
FLU A ANTIGEN: NEGATIVE
FLU A ANTIGEN: NEGATIVE
FLU B ANTIGEN: NEGATIVE
FLU B ANTIGEN: NEGATIVE
GFR SERPL CREATININE-BSD FRML MDRD: 44 ML/MIN/1.73M2
GFR SERPL CREATININE-BSD FRML MDRD: 44 ML/MIN/1.73M2
GFR SERPL CREATININE-BSD FRML MDRD: 48 ML/MIN/1.73M2
GFR SERPL CREATININE-BSD FRML MDRD: 48 ML/MIN/1.73M2
GFR SERPL CREATININE-BSD FRML MDRD: 52 ML/MIN/1.73M2
GFR SERPL CREATININE-BSD FRML MDRD: 52 ML/MIN/1.73M2
GFR SERPL CREATININE-BSD FRML MDRD: 57 ML/MIN/1.73M2
GFR SERPL CREATININE-BSD FRML MDRD: 63 ML/MIN/1.73M2
GFR SERPL CREATININE-BSD FRML MDRD: 70 ML/MIN/1.73M2
GFR SERPL CREATININE-BSD FRML MDRD: 80 ML/MIN/1.73M2
GFR SERPL CREATININE-BSD FRML MDRD: > 90 ML/MIN/1.73M2
GFR SERPL CREATININE-BSD FRML MDRD: > 90 ML/MIN/1.73M2
GIARDIA LAMBLIA PCR: NOT DETECTED
GLUCOSE BLD-MCNC: 100 MG/DL (ref 70–108)
GLUCOSE BLD-MCNC: 101 MG/DL (ref 70–108)
GLUCOSE BLD-MCNC: 101 MG/DL (ref 70–108)
GLUCOSE BLD-MCNC: 103 MG/DL (ref 70–108)
GLUCOSE BLD-MCNC: 103 MG/DL (ref 70–108)
GLUCOSE BLD-MCNC: 104 MG/DL (ref 70–108)
GLUCOSE BLD-MCNC: 105 MG/DL (ref 70–108)
GLUCOSE BLD-MCNC: 106 MG/DL (ref 70–108)
GLUCOSE BLD-MCNC: 107 MG/DL (ref 70–108)
GLUCOSE BLD-MCNC: 108 MG/DL (ref 70–108)
GLUCOSE BLD-MCNC: 108 MG/DL (ref 70–108)
GLUCOSE BLD-MCNC: 109 MG/DL (ref 70–108)
GLUCOSE BLD-MCNC: 110 MG/DL (ref 70–108)
GLUCOSE BLD-MCNC: 110 MG/DL (ref 70–108)
GLUCOSE BLD-MCNC: 111 MG/DL (ref 70–108)
GLUCOSE BLD-MCNC: 112 MG/DL (ref 70–108)
GLUCOSE BLD-MCNC: 113 MG/DL (ref 70–108)
GLUCOSE BLD-MCNC: 114 MG/DL (ref 70–108)
GLUCOSE BLD-MCNC: 114 MG/DL (ref 70–108)
GLUCOSE BLD-MCNC: 115 MG/DL (ref 70–108)
GLUCOSE BLD-MCNC: 115 MG/DL (ref 70–108)
GLUCOSE BLD-MCNC: 116 MG/DL (ref 70–108)
GLUCOSE BLD-MCNC: 117 MG/DL (ref 70–108)
GLUCOSE BLD-MCNC: 118 MG/DL (ref 70–108)
GLUCOSE BLD-MCNC: 118 MG/DL (ref 70–108)
GLUCOSE BLD-MCNC: 119 MG/DL (ref 70–108)
GLUCOSE BLD-MCNC: 119 MG/DL (ref 70–108)
GLUCOSE BLD-MCNC: 121 MG/DL (ref 70–108)
GLUCOSE BLD-MCNC: 122 MG/DL (ref 70–108)
GLUCOSE BLD-MCNC: 122 MG/DL (ref 70–108)
GLUCOSE BLD-MCNC: 123 MG/DL (ref 70–108)
GLUCOSE BLD-MCNC: 123 MG/DL (ref 70–108)
GLUCOSE BLD-MCNC: 125 MG/DL (ref 70–108)
GLUCOSE BLD-MCNC: 127 MG/DL (ref 70–108)
GLUCOSE BLD-MCNC: 128 MG/DL (ref 70–108)
GLUCOSE BLD-MCNC: 129 MG/DL (ref 70–108)
GLUCOSE BLD-MCNC: 129 MG/DL (ref 70–108)
GLUCOSE BLD-MCNC: 130 MG/DL (ref 70–108)
GLUCOSE BLD-MCNC: 130 MG/DL (ref 70–108)
GLUCOSE BLD-MCNC: 131 MG/DL (ref 70–108)
GLUCOSE BLD-MCNC: 132 MG/DL (ref 70–108)
GLUCOSE BLD-MCNC: 132 MG/DL (ref 70–108)
GLUCOSE BLD-MCNC: 134 MG/DL (ref 70–108)
GLUCOSE BLD-MCNC: 135 MG/DL (ref 70–108)
GLUCOSE BLD-MCNC: 137 MG/DL (ref 70–108)
GLUCOSE BLD-MCNC: 138 MG/DL (ref 70–108)
GLUCOSE BLD-MCNC: 141 MG/DL
GLUCOSE BLD-MCNC: 141 MG/DL (ref 70–108)
GLUCOSE BLD-MCNC: 141 MG/DL (ref 70–108)
GLUCOSE BLD-MCNC: 143 MG/DL (ref 70–108)
GLUCOSE BLD-MCNC: 144 MG/DL (ref 70–108)
GLUCOSE BLD-MCNC: 145 MG/DL (ref 70–108)
GLUCOSE BLD-MCNC: 146 MG/DL (ref 70–108)
GLUCOSE BLD-MCNC: 148 MG/DL (ref 70–108)
GLUCOSE BLD-MCNC: 153 MG/DL (ref 70–108)
GLUCOSE BLD-MCNC: 156 MG/DL (ref 70–108)
GLUCOSE BLD-MCNC: 160 MG/DL (ref 70–108)
GLUCOSE BLD-MCNC: 161 MG/DL (ref 70–108)
GLUCOSE BLD-MCNC: 162 MG/DL (ref 70–108)
GLUCOSE BLD-MCNC: 163 MG/DL (ref 70–108)
GLUCOSE BLD-MCNC: 163 MG/DL (ref 70–108)
GLUCOSE BLD-MCNC: 164 MG/DL (ref 70–108)
GLUCOSE BLD-MCNC: 166 MG/DL (ref 70–108)
GLUCOSE BLD-MCNC: 167 MG/DL (ref 70–108)
GLUCOSE BLD-MCNC: 201 MG/DL (ref 70–108)
GLUCOSE BLD-MCNC: 227 MG/DL (ref 70–108)
GLUCOSE BLD-MCNC: 60 MG/DL (ref 70–108)
GLUCOSE BLD-MCNC: 70 MG/DL (ref 70–108)
GLUCOSE BLD-MCNC: 76 MG/DL (ref 70–108)
GLUCOSE BLD-MCNC: 79 MG/DL (ref 70–108)
GLUCOSE BLD-MCNC: 80 MG/DL (ref 70–108)
GLUCOSE BLD-MCNC: 81 MG/DL (ref 70–108)
GLUCOSE BLD-MCNC: 82 MG/DL (ref 70–108)
GLUCOSE BLD-MCNC: 83 MG/DL (ref 70–108)
GLUCOSE BLD-MCNC: 85 MG/DL (ref 70–108)
GLUCOSE BLD-MCNC: 86 MG/DL (ref 70–108)
GLUCOSE BLD-MCNC: 86 MG/DL (ref 70–108)
GLUCOSE BLD-MCNC: 90 MG/DL (ref 70–108)
GLUCOSE BLD-MCNC: 92 MG/DL (ref 70–108)
GLUCOSE BLD-MCNC: 92 MG/DL (ref 70–108)
GLUCOSE BLD-MCNC: 95 MG/DL (ref 70–108)
GLUCOSE BLD-MCNC: 96 MG/DL (ref 70–108)
GLUCOSE BLD-MCNC: 96 MG/DL (ref 70–108)
GLUCOSE BLD-MCNC: 97 MG/DL (ref 70–108)
GLUCOSE BLD-MCNC: 97 MG/DL (ref 70–108)
GLUCOSE BLD-MCNC: 98 MG/DL (ref 70–108)
GLUCOSE BLD-MCNC: 99 MG/DL (ref 70–108)
GLUCOSE URINE: NEGATIVE MG/DL
GLUCOSE URINE: NEGATIVE MG/DL
GLUCOSE, URINE: NEGATIVE MG/DL
HBA1C MFR BLD: 5.6 % (ref 4.4–6.4)
HCO3: 29 MMOL/L (ref 23–28)
HCT VFR BLD CALC: 25.1 % (ref 42–52)
HCT VFR BLD CALC: 26.4 % (ref 42–52)
HCT VFR BLD CALC: 27.2 % (ref 42–52)
HCT VFR BLD CALC: 27.9 % (ref 42–52)
HCT VFR BLD CALC: 29.3 % (ref 42–52)
HCT VFR BLD CALC: 29.7 % (ref 42–52)
HCT VFR BLD CALC: 30 % (ref 42–52)
HCT VFR BLD CALC: 30 % (ref 42–52)
HCT VFR BLD CALC: 30.1 % (ref 42–52)
HCT VFR BLD CALC: 30.3 % (ref 42–52)
HCT VFR BLD CALC: 30.6 % (ref 42–52)
HCT VFR BLD CALC: 30.7 % (ref 42–52)
HCT VFR BLD CALC: 30.9 % (ref 42–52)
HCT VFR BLD CALC: 31 % (ref 42–52)
HCT VFR BLD CALC: 31.2 % (ref 42–52)
HCT VFR BLD CALC: 31.3 % (ref 42–52)
HCT VFR BLD CALC: 31.5 % (ref 42–52)
HCT VFR BLD CALC: 31.6 % (ref 42–52)
HCT VFR BLD CALC: 31.6 % (ref 42–52)
HCT VFR BLD CALC: 32 % (ref 42–52)
HCT VFR BLD CALC: 32.1 % (ref 42–52)
HCT VFR BLD CALC: 32.5 % (ref 42–52)
HCT VFR BLD CALC: 32.7 % (ref 42–52)
HCT VFR BLD CALC: 32.8 % (ref 42–52)
HCT VFR BLD CALC: 32.8 % (ref 42–52)
HCT VFR BLD CALC: 33.5 % (ref 42–52)
HCT VFR BLD CALC: 33.5 % (ref 42–52)
HCT VFR BLD CALC: 33.8 % (ref 42–52)
HCT VFR BLD CALC: 34 % (ref 42–52)
HCT VFR BLD CALC: 34.1 % (ref 42–52)
HCT VFR BLD CALC: 34.2 % (ref 42–52)
HCT VFR BLD CALC: 34.3 % (ref 42–52)
HCT VFR BLD CALC: 35.1 % (ref 42–52)
HCT VFR BLD CALC: 36.7 % (ref 42–52)
HDLC SERPL-MCNC: 47 MG/DL
HEMOCCULT STL QL: NEGATIVE
HEMOCCULT STL QL: NORMAL
HEMOGLOBIN: 10 GM/DL (ref 14–18)
HEMOGLOBIN: 10 GM/DL (ref 14–18)
HEMOGLOBIN: 10.1 GM/DL (ref 14–18)
HEMOGLOBIN: 10.2 GM/DL (ref 14–18)
HEMOGLOBIN: 10.2 GM/DL (ref 14–18)
HEMOGLOBIN: 10.3 GM/DL (ref 14–18)
HEMOGLOBIN: 10.4 GM/DL (ref 14–18)
HEMOGLOBIN: 10.4 GM/DL (ref 14–18)
HEMOGLOBIN: 10.5 GM/DL (ref 14–18)
HEMOGLOBIN: 10.6 GM/DL (ref 14–18)
HEMOGLOBIN: 10.7 GM/DL (ref 14–18)
HEMOGLOBIN: 10.7 GM/DL (ref 14–18)
HEMOGLOBIN: 10.8 GM/DL (ref 14–18)
HEMOGLOBIN: 10.9 GM/DL (ref 14–18)
HEMOGLOBIN: 10.9 GM/DL (ref 14–18)
HEMOGLOBIN: 11.1 GM/DL (ref 14–18)
HEMOGLOBIN: 7 GM/DL (ref 14–18)
HEMOGLOBIN: 7.1 GM/DL (ref 14–18)
HEMOGLOBIN: 7.7 GM/DL (ref 14–18)
HEMOGLOBIN: 7.8 GM/DL (ref 14–18)
HEMOGLOBIN: 8 GM/DL (ref 14–18)
HEMOGLOBIN: 8.4 GM/DL (ref 14–18)
HEMOGLOBIN: 8.5 GM/DL (ref 14–18)
HEMOGLOBIN: 8.6 GM/DL (ref 14–18)
HEMOGLOBIN: 8.6 GM/DL (ref 14–18)
HEMOGLOBIN: 8.7 GM/DL (ref 14–18)
HEMOGLOBIN: 8.8 GM/DL (ref 14–18)
HEMOGLOBIN: 8.8 GM/DL (ref 14–18)
HEMOGLOBIN: 8.9 GM/DL (ref 14–18)
HEMOGLOBIN: 9.2 GM/DL (ref 14–18)
HEMOGLOBIN: 9.2 GM/DL (ref 14–18)
HEMOGLOBIN: 9.3 GM/DL (ref 14–18)
HEMOGLOBIN: 9.4 GM/DL (ref 14–18)
HEMOGLOBIN: 9.4 GM/DL (ref 14–18)
HEMOGLOBIN: 9.5 GM/DL (ref 14–18)
HEMOGLOBIN: 9.6 GM/DL (ref 14–18)
HEMOGLOBIN: 9.7 GM/DL (ref 14–18)
HEMOGLOBIN: 9.7 GM/DL (ref 14–18)
HEMOGLOBIN: 9.8 GM/DL (ref 14–18)
HEMOGLOBIN: 9.9 GM/DL (ref 14–18)
HEMOGLOBIN: 9.9 GM/DL (ref 14–18)
IFIO2: 3
IMMATURE GRANS (ABS): 0.04 THOU/MM3 (ref 0–0.07)
IMMATURE GRANS (ABS): 0.05 THOU/MM3 (ref 0–0.07)
IMMATURE GRANS (ABS): 0.06 THOU/MM3 (ref 0–0.07)
IMMATURE GRANS (ABS): 0.07 THOU/MM3 (ref 0–0.07)
IMMATURE GRANS (ABS): 0.08 THOU/MM3 (ref 0–0.07)
IMMATURE GRANS (ABS): 0.11 THOU/MM3 (ref 0–0.07)
IMMATURE GRANS (ABS): 0.13 THOU/MM3 (ref 0–0.07)
IMMATURE GRANS (ABS): 0.14 THOU/MM3 (ref 0–0.07)
IMMATURE GRANS (ABS): 0.14 THOU/MM3 (ref 0–0.07)
IMMATURE GRANS (ABS): 0.15 THOU/MM3 (ref 0–0.07)
IMMATURE GRANS (ABS): 0.15 THOU/MM3 (ref 0–0.07)
IMMATURE GRANULOCYTES: 0.4 %
IMMATURE GRANULOCYTES: 0.5 %
IMMATURE GRANULOCYTES: 0.6 %
IMMATURE GRANULOCYTES: 0.7 %
IMMATURE GRANULOCYTES: 0.8 %
IMMATURE GRANULOCYTES: 1 %
IMMATURE GRANULOCYTES: 1 %
IMMATURE GRANULOCYTES: 1.1 %
IMMATURE GRANULOCYTES: 1.1 %
IMMATURE GRANULOCYTES: 1.3 %
INR BLD: 1.04 (ref 0.85–1.13)
INR BLD: 1.1 (ref 0.85–1.13)
INR BLD: 1.16 (ref 0.85–1.13)
IRON SATURATION: 25 % (ref 20–50)
IRON: 36 UG/DL (ref 65–195)
KETONES, URINE: NEGATIVE
LACTIC ACID: 0.9 MMOL/L (ref 0.5–2.2)
LD: 184 U/L (ref 100–190)
LDL CHOLESTEROL CALCULATED: 76 MG/DL
LEGIONELLA PNEUMOPHILIA AG, URINE: NEGATIVE
LEUKOCYTE ESTERASE, URINE: NEGATIVE
LV EF: 55 %
LVEF MODALITY: NORMAL
LYMPHOCYTES # BLD: 10.6 %
LYMPHOCYTES # BLD: 11.6 %
LYMPHOCYTES # BLD: 5.1 %
LYMPHOCYTES # BLD: 5.2 %
LYMPHOCYTES # BLD: 5.2 %
LYMPHOCYTES # BLD: 5.4 %
LYMPHOCYTES # BLD: 5.8 %
LYMPHOCYTES # BLD: 5.9 %
LYMPHOCYTES # BLD: 6 %
LYMPHOCYTES # BLD: 6 %
LYMPHOCYTES # BLD: 6.5 %
LYMPHOCYTES # BLD: 7.2 %
LYMPHOCYTES # BLD: 7.2 %
LYMPHOCYTES # BLD: 7.7 %
LYMPHOCYTES # BLD: 7.7 %
LYMPHOCYTES # BLD: 7.9 %
LYMPHOCYTES ABSOLUTE: 0.6 THOU/MM3 (ref 1–4.8)
LYMPHOCYTES ABSOLUTE: 0.6 THOU/MM3 (ref 1–4.8)
LYMPHOCYTES ABSOLUTE: 0.7 THOU/MM3 (ref 1–4.8)
LYMPHOCYTES ABSOLUTE: 0.8 THOU/MM3 (ref 1–4.8)
LYMPHOCYTES ABSOLUTE: 0.9 THOU/MM3 (ref 1–4.8)
LYMPHOCYTES ABSOLUTE: 1 THOU/MM3 (ref 1–4.8)
LYMPHOCYTES ABSOLUTE: 1.1 THOU/MM3 (ref 1–4.8)
LYMPHOCYTES ABSOLUTE: 1.1 THOU/MM3 (ref 1–4.8)
MAGNESIUM: 1.5 MG/DL (ref 1.6–2.4)
MAGNESIUM: 1.6 MG/DL (ref 1.6–2.4)
MAGNESIUM: 1.7 MG/DL (ref 1.6–2.4)
MAGNESIUM: 1.8 MG/DL (ref 1.6–2.4)
MAGNESIUM: 1.9 MG/DL (ref 1.6–2.4)
MAGNESIUM: 1.9 MG/DL (ref 1.6–2.4)
MAGNESIUM: 2 MG/DL (ref 1.6–2.4)
MAGNESIUM: 2 MG/DL (ref 1.6–2.4)
MCH RBC QN AUTO: 28 PG (ref 26–33)
MCH RBC QN AUTO: 28.2 PG (ref 26–33)
MCH RBC QN AUTO: 28.4 PG (ref 26–33)
MCH RBC QN AUTO: 28.5 PG (ref 26–33)
MCH RBC QN AUTO: 28.6 PG (ref 26–33)
MCH RBC QN AUTO: 28.7 PG (ref 26–33)
MCH RBC QN AUTO: 28.8 PG (ref 26–33)
MCH RBC QN AUTO: 28.8 PG (ref 26–33)
MCH RBC QN AUTO: 28.9 PG (ref 26–33)
MCH RBC QN AUTO: 29 PG (ref 26–33)
MCH RBC QN AUTO: 29 PG (ref 26–33)
MCH RBC QN AUTO: 29.2 PG (ref 26–33)
MCH RBC QN AUTO: 29.4 PG (ref 26–33)
MCH RBC QN AUTO: 29.5 PG (ref 26–33)
MCH RBC QN AUTO: 30 PG (ref 26–33)
MCHC RBC AUTO-ENTMCNC: 29.8 GM/DL (ref 32.2–35.5)
MCHC RBC AUTO-ENTMCNC: 30.2 GM/DL (ref 32.2–35.5)
MCHC RBC AUTO-ENTMCNC: 30.8 GM/DL (ref 32.2–35.5)
MCHC RBC AUTO-ENTMCNC: 30.9 GM/DL (ref 32.2–35.5)
MCHC RBC AUTO-ENTMCNC: 31.3 GM/DL (ref 32.2–35.5)
MCHC RBC AUTO-ENTMCNC: 31.3 GM/DL (ref 32.2–35.5)
MCHC RBC AUTO-ENTMCNC: 31.4 GM/DL (ref 32.2–35.5)
MCHC RBC AUTO-ENTMCNC: 31.4 GM/DL (ref 32.2–35.5)
MCHC RBC AUTO-ENTMCNC: 31.5 GM/DL (ref 32.2–35.5)
MCHC RBC AUTO-ENTMCNC: 31.5 GM/DL (ref 32.2–35.5)
MCHC RBC AUTO-ENTMCNC: 31.6 GM/DL (ref 32.2–35.5)
MCHC RBC AUTO-ENTMCNC: 31.7 GM/DL (ref 32.2–35.5)
MCHC RBC AUTO-ENTMCNC: 31.8 GM/DL (ref 32.2–35.5)
MCHC RBC AUTO-ENTMCNC: 31.9 GM/DL (ref 32.2–35.5)
MCHC RBC AUTO-ENTMCNC: 32 GM/DL (ref 32.2–35.5)
MCHC RBC AUTO-ENTMCNC: 32.1 GM/DL (ref 32.2–35.5)
MCHC RBC AUTO-ENTMCNC: 32.7 GM/DL (ref 32.2–35.5)
MCHC RBC AUTO-ENTMCNC: 32.9 GM/DL (ref 32.2–35.5)
MCV RBC AUTO: 88.4 FL (ref 80–94)
MCV RBC AUTO: 89.6 FL (ref 80–94)
MCV RBC AUTO: 89.6 FL (ref 80–94)
MCV RBC AUTO: 89.8 FL (ref 80–94)
MCV RBC AUTO: 90 FL (ref 80–94)
MCV RBC AUTO: 90 FL (ref 80–94)
MCV RBC AUTO: 90.2 FL (ref 80–94)
MCV RBC AUTO: 90.4 FL (ref 80–94)
MCV RBC AUTO: 90.4 FL (ref 80–94)
MCV RBC AUTO: 90.5 FL (ref 80–94)
MCV RBC AUTO: 90.6 FL (ref 80–94)
MCV RBC AUTO: 90.6 FL (ref 80–94)
MCV RBC AUTO: 90.7 FL (ref 80–94)
MCV RBC AUTO: 90.9 FL (ref 80–94)
MCV RBC AUTO: 91.2 FL (ref 80–94)
MCV RBC AUTO: 91.3 FL (ref 80–94)
MCV RBC AUTO: 91.3 FL (ref 80–94)
MCV RBC AUTO: 91.4 FL (ref 80–94)
MCV RBC AUTO: 92.1 FL (ref 80–94)
MCV RBC AUTO: 92.2 FL (ref 80–94)
MCV RBC AUTO: 92.6 FL (ref 80–94)
MCV RBC AUTO: 92.6 FL (ref 80–94)
MCV RBC AUTO: 93.3 FL (ref 80–94)
MCV RBC AUTO: 93.4 FL (ref 80–94)
MCV RBC AUTO: 93.8 FL (ref 80–94)
MCV RBC AUTO: 94.8 FL (ref 80–94)
MCV RBC AUTO: 95.4 FL (ref 80–94)
MISCELLANEOUS 2: ABNORMAL
MISCELLANEOUS LAB TEST RESULT: ABNORMAL
MONOCYTES # BLD: 5.5 %
MONOCYTES # BLD: 5.7 %
MONOCYTES # BLD: 5.9 %
MONOCYTES # BLD: 6 %
MONOCYTES # BLD: 6 %
MONOCYTES # BLD: 6.4 %
MONOCYTES # BLD: 6.5 %
MONOCYTES # BLD: 6.6 %
MONOCYTES # BLD: 6.7 %
MONOCYTES # BLD: 7 %
MONOCYTES # BLD: 7.2 %
MONOCYTES # BLD: 7.5 %
MONOCYTES # BLD: 7.6 %
MONOCYTES # BLD: 7.9 %
MONOCYTES # BLD: 8.2 %
MONOCYTES # BLD: 8.7 %
MONOCYTES ABSOLUTE: 0.6 THOU/MM3 (ref 0.4–1.3)
MONOCYTES ABSOLUTE: 0.7 THOU/MM3 (ref 0.4–1.3)
MONOCYTES ABSOLUTE: 0.7 THOU/MM3 (ref 0.4–1.3)
MONOCYTES ABSOLUTE: 0.8 THOU/MM3 (ref 0.4–1.3)
MONOCYTES ABSOLUTE: 0.9 THOU/MM3 (ref 0.4–1.3)
MONOCYTES ABSOLUTE: 1.2 THOU/MM3 (ref 0.4–1.3)
MRSA SCREEN RT-PCR: NEGATIVE
NITRITE, URINE: NEGATIVE
NOROVIRUS GI GII PCR: NOT DETECTED
NUCLEATED RED BLOOD CELLS: 0 /100 WBC
O2 SATURATION: 97 %
OSMOLALITY CALCULATION: 272.7 MOSMOL/KG (ref 275–300)
OSMOLALITY CALCULATION: 280.1 MOSMOL/KG (ref 275–300)
OSMOLALITY CALCULATION: 283.6 MOSMOL/KG (ref 275–300)
PCO2: 40 MMHG (ref 35–45)
PH BLOOD GAS: 7.47 (ref 7.35–7.45)
PH UA: 5.5 (ref 5–9)
PH UA: 5.5 (ref 5–9)
PH UA: 6.5 (ref 5–9)
PHOSPHORUS: 2.1 MG/DL (ref 2.4–4.7)
PHOSPHORUS: 2.2 MG/DL (ref 2.4–4.7)
PHOSPHORUS: 2.7 MG/DL (ref 2.4–4.7)
PHOSPHORUS: 3 MG/DL (ref 2.4–4.7)
PHOSPHORUS: 3.1 MG/DL (ref 2.4–4.7)
PHOSPHORUS: 3.2 MG/DL (ref 2.4–4.7)
PHOSPHORUS: 3.2 MG/DL (ref 2.4–4.7)
PLATELET # BLD: 246 THOU/MM3 (ref 130–400)
PLATELET # BLD: 254 THOU/MM3 (ref 130–400)
PLATELET # BLD: 259 THOU/MM3 (ref 130–400)
PLATELET # BLD: 268 THOU/MM3 (ref 130–400)
PLATELET # BLD: 268 THOU/MM3 (ref 130–400)
PLATELET # BLD: 279 THOU/MM3 (ref 130–400)
PLATELET # BLD: 284 THOU/MM3 (ref 130–400)
PLATELET # BLD: 287 THOU/MM3 (ref 130–400)
PLATELET # BLD: 288 THOU/MM3 (ref 130–400)
PLATELET # BLD: 293 THOU/MM3 (ref 130–400)
PLATELET # BLD: 294 THOU/MM3 (ref 130–400)
PLATELET # BLD: 296 THOU/MM3 (ref 130–400)
PLATELET # BLD: 298 THOU/MM3 (ref 130–400)
PLATELET # BLD: 308 THOU/MM3 (ref 130–400)
PLATELET # BLD: 309 THOU/MM3 (ref 130–400)
PLATELET # BLD: 324 THOU/MM3 (ref 130–400)
PLATELET # BLD: 326 THOU/MM3 (ref 130–400)
PLATELET # BLD: 326 THOU/MM3 (ref 130–400)
PLATELET # BLD: 331 THOU/MM3 (ref 130–400)
PLATELET # BLD: 337 THOU/MM3 (ref 130–400)
PLATELET # BLD: 344 THOU/MM3 (ref 130–400)
PLATELET # BLD: 345 THOU/MM3 (ref 130–400)
PLATELET # BLD: 347 THOU/MM3 (ref 130–400)
PLATELET # BLD: 351 THOU/MM3 (ref 130–400)
PLATELET # BLD: 360 THOU/MM3 (ref 130–400)
PLATELET # BLD: 369 THOU/MM3 (ref 130–400)
PLATELET # BLD: 373 THOU/MM3 (ref 130–400)
PLESIOMONAS SHIGELLOIDES PCR: NOT DETECTED
PMV BLD AUTO: 10 FL (ref 9.4–12.4)
PMV BLD AUTO: 8.8 FL (ref 9.4–12.4)
PMV BLD AUTO: 8.9 FL (ref 9.4–12.4)
PMV BLD AUTO: 9 FL (ref 9.4–12.4)
PMV BLD AUTO: 9 FL (ref 9.4–12.4)
PMV BLD AUTO: 9.1 FL (ref 9.4–12.4)
PMV BLD AUTO: 9.1 FL (ref 9.4–12.4)
PMV BLD AUTO: 9.2 FL (ref 9.4–12.4)
PMV BLD AUTO: 9.3 FL (ref 9.4–12.4)
PMV BLD AUTO: 9.4 FL (ref 9.4–12.4)
PMV BLD AUTO: 9.5 FL (ref 9.4–12.4)
PMV BLD AUTO: 9.5 FL (ref 9.4–12.4)
PMV BLD AUTO: 9.6 FL (ref 9.4–12.4)
PMV BLD AUTO: 9.7 FL (ref 9.4–12.4)
PMV BLD AUTO: 9.7 FL (ref 9.4–12.4)
PMV BLD AUTO: 9.8 FL (ref 9.4–12.4)
PO2: 81 MMHG (ref 71–104)
POIKILOCYTES: ABNORMAL
POTASSIUM REFLEX MAGNESIUM: 3.9 MEQ/L (ref 3.5–5.2)
POTASSIUM REFLEX MAGNESIUM: 4.2 MEQ/L (ref 3.5–5.2)
POTASSIUM REFLEX MAGNESIUM: 4.4 MEQ/L (ref 3.5–5.2)
POTASSIUM REFLEX MAGNESIUM: 4.5 MEQ/L (ref 3.5–5.2)
POTASSIUM SERPL-SCNC: 2.9 MEQ/L (ref 3.5–5.2)
POTASSIUM SERPL-SCNC: 3 MEQ/L (ref 3.5–5.2)
POTASSIUM SERPL-SCNC: 3.1 MEQ/L (ref 3.5–5.2)
POTASSIUM SERPL-SCNC: 3.4 MEQ/L (ref 3.5–5.2)
POTASSIUM SERPL-SCNC: 3.5 MEQ/L (ref 3.5–5.2)
POTASSIUM SERPL-SCNC: 3.5 MEQ/L (ref 3.5–5.2)
POTASSIUM SERPL-SCNC: 3.6 MEQ/L (ref 3.5–5.2)
POTASSIUM SERPL-SCNC: 3.7 MEQ/L (ref 3.5–5.2)
POTASSIUM SERPL-SCNC: 3.7 MEQ/L (ref 3.5–5.2)
POTASSIUM SERPL-SCNC: 3.8 MEQ/L (ref 3.5–5.2)
POTASSIUM SERPL-SCNC: 3.9 MEQ/L (ref 3.5–5.2)
POTASSIUM SERPL-SCNC: 4.1 MEQ/L (ref 3.5–5.2)
POTASSIUM SERPL-SCNC: 4.1 MEQ/L (ref 3.5–5.2)
POTASSIUM SERPL-SCNC: 4.2 MEQ/L (ref 3.5–5.2)
POTASSIUM SERPL-SCNC: 4.3 MEQ/L (ref 3.5–5.2)
POTASSIUM SERPL-SCNC: 4.4 MEQ/L (ref 3.5–5.2)
POTASSIUM SERPL-SCNC: 4.6 MEQ/L (ref 3.5–5.2)
PRO-BNP: 6803 PG/ML (ref 0–1800)
PRO-BNP: 768.4 PG/ML (ref 0–1800)
PROCALCITONIN: 0.07 NG/ML (ref 0.01–0.09)
PROCALCITONIN: 0.08 NG/ML (ref 0.01–0.09)
PROCALCITONIN: 0.08 NG/ML (ref 0.01–0.09)
PROTEIN UA: 30
PROTEIN UA: ABNORMAL MG/DL
PROTEIN UA: NEGATIVE
RBC # BLD: 2.77 MILL/MM3 (ref 4.7–6.1)
RBC # BLD: 2.91 MILL/MM3 (ref 4.7–6.1)
RBC # BLD: 2.95 MILL/MM3 (ref 4.7–6.1)
RBC # BLD: 3.07 MILL/MM3 (ref 4.7–6.1)
RBC # BLD: 3.21 MILL/MM3 (ref 4.7–6.1)
RBC # BLD: 3.24 MILL/MM3 (ref 4.7–6.1)
RBC # BLD: 3.24 MILL/MM3 (ref 4.7–6.1)
RBC # BLD: 3.3 MILL/MM3 (ref 4.7–6.1)
RBC # BLD: 3.32 MILL/MM3 (ref 4.7–6.1)
RBC # BLD: 3.35 MILL/MM3 (ref 4.7–6.1)
RBC # BLD: 3.37 MILL/MM3 (ref 4.7–6.1)
RBC # BLD: 3.39 MILL/MM3 (ref 4.7–6.1)
RBC # BLD: 3.39 MILL/MM3 (ref 4.7–6.1)
RBC # BLD: 3.4 MILL/MM3 (ref 4.7–6.1)
RBC # BLD: 3.42 MILL/MM3 (ref 4.7–6.1)
RBC # BLD: 3.46 MILL/MM3 (ref 4.7–6.1)
RBC # BLD: 3.46 MILL/MM3 (ref 4.7–6.1)
RBC # BLD: 3.49 MILL/MM3 (ref 4.7–6.1)
RBC # BLD: 3.55 MILL/MM3 (ref 4.7–6.1)
RBC # BLD: 3.57 MILL/MM3 (ref 4.7–6.1)
RBC # BLD: 3.59 MILL/MM3 (ref 4.7–6.1)
RBC # BLD: 3.62 MILL/MM3 (ref 4.7–6.1)
RBC # BLD: 3.65 MILL/MM3 (ref 4.7–6.1)
RBC # BLD: 3.66 MILL/MM3 (ref 4.7–6.1)
RBC # BLD: 3.66 MILL/MM3 (ref 4.7–6.1)
RBC # BLD: 3.87 MILL/MM3 (ref 4.7–6.1)
RBC # BLD: 3.91 MILL/MM3 (ref 4.7–6.1)
RBC URINE: ABNORMAL /HPF
RBC URINE: ABNORMAL /HPF
RENAL EPITHELIAL, UA: ABNORMAL
RENAL EPITHELIAL, UA: ABNORMAL
RH FACTOR: NORMAL
RHEUMATOID FACTOR: 65 IU/ML (ref 0–13)
ROTAVIRUS A PCR: NOT DETECTED
SALMONELLA PCR: NOT DETECTED
SAPOVIRUS PCR: NOT DETECTED
SARS-COV-2, NAAT: NOT DETECTED
SARS-COV-2, NAAT: NOT DETECTED
SCAN OF BLOOD SMEAR: NORMAL
SCLERODERMA (SCL-70) AB: 25 U/ML
SEDIMENTATION RATE, ERYTHROCYTE: 90 MM/HR (ref 0–10)
SEG NEUTROPHILS: 78 %
SEG NEUTROPHILS: 78.9 %
SEG NEUTROPHILS: 79.8 %
SEG NEUTROPHILS: 80.6 %
SEG NEUTROPHILS: 81.6 %
SEG NEUTROPHILS: 81.6 %
SEG NEUTROPHILS: 82 %
SEG NEUTROPHILS: 83.6 %
SEG NEUTROPHILS: 83.8 %
SEG NEUTROPHILS: 83.9 %
SEG NEUTROPHILS: 84.6 %
SEG NEUTROPHILS: 84.8 %
SEG NEUTROPHILS: 85.3 %
SEG NEUTROPHILS: 85.4 %
SEG NEUTROPHILS: 86 %
SEG NEUTROPHILS: 86.3 %
SEGMENTED NEUTROPHILS ABSOLUTE COUNT: 10.2 THOU/MM3 (ref 1.8–7.7)
SEGMENTED NEUTROPHILS ABSOLUTE COUNT: 10.9 THOU/MM3 (ref 1.8–7.7)
SEGMENTED NEUTROPHILS ABSOLUTE COUNT: 11 THOU/MM3 (ref 1.8–7.7)
SEGMENTED NEUTROPHILS ABSOLUTE COUNT: 12.7 THOU/MM3 (ref 1.8–7.7)
SEGMENTED NEUTROPHILS ABSOLUTE COUNT: 14.5 THOU/MM3 (ref 1.8–7.7)
SEGMENTED NEUTROPHILS ABSOLUTE COUNT: 7.4 THOU/MM3 (ref 1.8–7.7)
SEGMENTED NEUTROPHILS ABSOLUTE COUNT: 7.5 THOU/MM3 (ref 1.8–7.7)
SEGMENTED NEUTROPHILS ABSOLUTE COUNT: 8.1 THOU/MM3 (ref 1.8–7.7)
SEGMENTED NEUTROPHILS ABSOLUTE COUNT: 8.4 THOU/MM3 (ref 1.8–7.7)
SEGMENTED NEUTROPHILS ABSOLUTE COUNT: 8.6 THOU/MM3 (ref 1.8–7.7)
SEGMENTED NEUTROPHILS ABSOLUTE COUNT: 9 THOU/MM3 (ref 1.8–7.7)
SEGMENTED NEUTROPHILS ABSOLUTE COUNT: 9.2 THOU/MM3 (ref 1.8–7.7)
SEGMENTED NEUTROPHILS ABSOLUTE COUNT: 9.4 THOU/MM3 (ref 1.8–7.7)
SEGMENTED NEUTROPHILS ABSOLUTE COUNT: 9.4 THOU/MM3 (ref 1.8–7.7)
SODIUM BLD-SCNC: 133 MEQ/L (ref 135–145)
SODIUM BLD-SCNC: 134 MEQ/L (ref 135–145)
SODIUM BLD-SCNC: 134 MEQ/L (ref 135–145)
SODIUM BLD-SCNC: 135 MEQ/L (ref 135–145)
SODIUM BLD-SCNC: 136 MEQ/L (ref 135–145)
SODIUM BLD-SCNC: 136 MEQ/L (ref 135–145)
SODIUM BLD-SCNC: 137 MEQ/L (ref 135–145)
SODIUM BLD-SCNC: 138 MEQ/L (ref 135–145)
SODIUM BLD-SCNC: 139 MEQ/L (ref 135–145)
SODIUM BLD-SCNC: 140 MEQ/L (ref 135–145)
SODIUM BLD-SCNC: 141 MEQ/L (ref 135–145)
SODIUM BLD-SCNC: 142 MEQ/L (ref 135–145)
SODIUM BLD-SCNC: 143 MEQ/L (ref 135–145)
SODIUM BLD-SCNC: 144 MEQ/L (ref 135–145)
SODIUM BLD-SCNC: 145 MEQ/L (ref 135–145)
SOURCE, BLOOD GAS: ABNORMAL
SPECIFIC GRAVITY UA: 1.01 (ref 1–1.03)
SPECIFIC GRAVITY, URINE: 1.01 (ref 1–1.03)
SPECIFIC GRAVITY, URINE: 1.02 (ref 1–1.03)
STREP PNEUMO AG, UR: NEGATIVE
TOTAL CK: 14 U/L (ref 55–170)
TOTAL IRON BINDING CAPACITY: 145 UG/DL (ref 171–450)
TOTAL PROTEIN: 5.4 G/DL (ref 6.1–8)
TOTAL PROTEIN: 6.2 G/DL (ref 6.1–8)
TOTAL PROTEIN: 6.4 G/DL (ref 6.1–8)
TOTAL PROTEIN: 6.7 G/DL (ref 6.1–8)
TOTAL PROTEIN: 6.7 G/DL (ref 6.1–8)
TOTAL PROTEIN: 7.1 G/DL (ref 6.1–8)
TRIGL SERPL-MCNC: 78 MG/DL (ref 0–199)
TRIGL SERPL-MCNC: 98 MG/DL (ref 0–199)
TROPONIN T: 0.02 NG/ML
TROPONIN T: 0.03 NG/ML
UROBILINOGEN, URINE: 0.2 EU/DL (ref 0–1)
UROBILINOGEN, URINE: 0.2 EU/DL (ref 0–1)
UROBILINOGEN, URINE: 1 EU/DL (ref 0–1)
VIBRIO CHOLERAE PCR: NOT DETECTED
VIBRIO PCR: NOT DETECTED
VITAMIN D 25-HYDROXY: 36 NG/ML (ref 30–100)
WBC # BLD: 10.1 THOU/MM3 (ref 4.8–10.8)
WBC # BLD: 10.2 THOU/MM3 (ref 4.8–10.8)
WBC # BLD: 10.2 THOU/MM3 (ref 4.8–10.8)
WBC # BLD: 10.3 THOU/MM3 (ref 4.8–10.8)
WBC # BLD: 10.7 THOU/MM3 (ref 4.8–10.8)
WBC # BLD: 10.8 THOU/MM3 (ref 4.8–10.8)
WBC # BLD: 10.9 THOU/MM3 (ref 4.8–10.8)
WBC # BLD: 11.3 THOU/MM3 (ref 4.8–10.8)
WBC # BLD: 11.5 THOU/MM3 (ref 4.8–10.8)
WBC # BLD: 11.5 THOU/MM3 (ref 4.8–10.8)
WBC # BLD: 11.7 THOU/MM3 (ref 4.8–10.8)
WBC # BLD: 12 THOU/MM3 (ref 4.8–10.8)
WBC # BLD: 12 THOU/MM3 (ref 4.8–10.8)
WBC # BLD: 12.7 THOU/MM3 (ref 4.8–10.8)
WBC # BLD: 12.9 THOU/MM3 (ref 4.8–10.8)
WBC # BLD: 13 THOU/MM3 (ref 4.8–10.8)
WBC # BLD: 13.3 THOU/MM3 (ref 4.8–10.8)
WBC # BLD: 14.5 THOU/MM3 (ref 4.8–10.8)
WBC # BLD: 14.7 THOU/MM3 (ref 4.8–10.8)
WBC # BLD: 15.3 THOU/MM3 (ref 4.8–10.8)
WBC # BLD: 17.1 THOU/MM3 (ref 4.8–10.8)
WBC # BLD: 8.5 THOU/MM3 (ref 4.8–10.8)
WBC # BLD: 9.2 THOU/MM3 (ref 4.8–10.8)
WBC # BLD: 9.2 THOU/MM3 (ref 4.8–10.8)
WBC # BLD: 9.6 THOU/MM3 (ref 4.8–10.8)
WBC UA: ABNORMAL /HPF
WBC UA: ABNORMAL /HPF
YEAST: ABNORMAL
YEAST: ABNORMAL
YERSINIA ENTEROCOLITICA PCR: NOT DETECTED

## 2020-01-01 PROCEDURE — 97116 GAIT TRAINING THERAPY: CPT

## 2020-01-01 PROCEDURE — 94760 N-INVAS EAR/PLS OXIMETRY 1: CPT

## 2020-01-01 PROCEDURE — 6360000002 HC RX W HCPCS: Performed by: NURSE ANESTHETIST, CERTIFIED REGISTERED

## 2020-01-01 PROCEDURE — 3600007502: Performed by: INTERNAL MEDICINE

## 2020-01-01 PROCEDURE — APPSS30 APP SPLIT SHARED TIME 16-30 MINUTES: Performed by: NURSE PRACTITIONER

## 2020-01-01 PROCEDURE — 92610 EVALUATE SWALLOWING FUNCTION: CPT

## 2020-01-01 PROCEDURE — 36415 COLL VENOUS BLD VENIPUNCTURE: CPT

## 2020-01-01 PROCEDURE — 84100 ASSAY OF PHOSPHORUS: CPT

## 2020-01-01 PROCEDURE — 6360000002 HC RX W HCPCS: Performed by: INTERNAL MEDICINE

## 2020-01-01 PROCEDURE — 6360000002 HC RX W HCPCS: Performed by: PHYSICIAN ASSISTANT

## 2020-01-01 PROCEDURE — 82948 REAGENT STRIP/BLOOD GLUCOSE: CPT

## 2020-01-01 PROCEDURE — 97110 THERAPEUTIC EXERCISES: CPT

## 2020-01-01 PROCEDURE — 2580000003 HC RX 258: Performed by: INTERNAL MEDICINE

## 2020-01-01 PROCEDURE — 94010 BREATHING CAPACITY TEST: CPT

## 2020-01-01 PROCEDURE — 84155 ASSAY OF PROTEIN SERUM: CPT

## 2020-01-01 PROCEDURE — 2709999900 HC NON-CHARGEABLE SUPPLY: Performed by: INTERNAL MEDICINE

## 2020-01-01 PROCEDURE — 86200 CCP ANTIBODY: CPT

## 2020-01-01 PROCEDURE — 6370000000 HC RX 637 (ALT 250 FOR IP): Performed by: STUDENT IN AN ORGANIZED HEALTH CARE EDUCATION/TRAINING PROGRAM

## 2020-01-01 PROCEDURE — 99223 1ST HOSP IP/OBS HIGH 75: CPT | Performed by: INTERNAL MEDICINE

## 2020-01-01 PROCEDURE — 85025 COMPLETE CBC W/AUTO DIFF WBC: CPT

## 2020-01-01 PROCEDURE — 84478 ASSAY OF TRIGLYCERIDES: CPT

## 2020-01-01 PROCEDURE — 6370000000 HC RX 637 (ALT 250 FOR IP): Performed by: FAMILY MEDICINE

## 2020-01-01 PROCEDURE — 83036 HEMOGLOBIN GLYCOSYLATED A1C: CPT

## 2020-01-01 PROCEDURE — 2580000003 HC RX 258: Performed by: PHYSICIAN ASSISTANT

## 2020-01-01 PROCEDURE — 2500000003 HC RX 250 WO HCPCS: Performed by: FAMILY MEDICINE

## 2020-01-01 PROCEDURE — 6360000002 HC RX W HCPCS: Performed by: PHARMACIST

## 2020-01-01 PROCEDURE — 85027 COMPLETE CBC AUTOMATED: CPT

## 2020-01-01 PROCEDURE — 99232 SBSQ HOSP IP/OBS MODERATE 35: CPT | Performed by: INTERNAL MEDICINE

## 2020-01-01 PROCEDURE — 87040 BLOOD CULTURE FOR BACTERIA: CPT

## 2020-01-01 PROCEDURE — 2580000003 HC RX 258: Performed by: PSYCHIATRY & NEUROLOGY

## 2020-01-01 PROCEDURE — 99233 SBSQ HOSP IP/OBS HIGH 50: CPT | Performed by: PSYCHIATRY & NEUROLOGY

## 2020-01-01 PROCEDURE — 2580000003 HC RX 258: Performed by: NURSE PRACTITIONER

## 2020-01-01 PROCEDURE — 80048 BASIC METABOLIC PNL TOTAL CA: CPT

## 2020-01-01 PROCEDURE — 99233 SBSQ HOSP IP/OBS HIGH 50: CPT | Performed by: HOSPITALIST

## 2020-01-01 PROCEDURE — 2580000003 HC RX 258: Performed by: FAMILY MEDICINE

## 2020-01-01 PROCEDURE — 2500000003 HC RX 250 WO HCPCS: Performed by: PHARMACIST

## 2020-01-01 PROCEDURE — 97530 THERAPEUTIC ACTIVITIES: CPT

## 2020-01-01 PROCEDURE — 82728 ASSAY OF FERRITIN: CPT

## 2020-01-01 PROCEDURE — 0DH63UZ INSERTION OF FEEDING DEVICE INTO STOMACH, PERCUTANEOUS APPROACH: ICD-10-PCS | Performed by: INTERNAL MEDICINE

## 2020-01-01 PROCEDURE — 36430 TRANSFUSION BLD/BLD COMPNT: CPT

## 2020-01-01 PROCEDURE — 74176 CT ABD & PELVIS W/O CONTRAST: CPT

## 2020-01-01 PROCEDURE — 6370000000 HC RX 637 (ALT 250 FOR IP): Performed by: INTERNAL MEDICINE

## 2020-01-01 PROCEDURE — 85018 HEMOGLOBIN: CPT

## 2020-01-01 PROCEDURE — 82550 ASSAY OF CK (CPK): CPT

## 2020-01-01 PROCEDURE — 2060000000 HC ICU INTERMEDIATE R&B

## 2020-01-01 PROCEDURE — 71250 CT THORAX DX C-: CPT

## 2020-01-01 PROCEDURE — 3600007512: Performed by: INTERNAL MEDICINE

## 2020-01-01 PROCEDURE — 6360000004 HC RX CONTRAST MEDICATION: Performed by: FAMILY MEDICINE

## 2020-01-01 PROCEDURE — 94640 AIRWAY INHALATION TREATMENT: CPT

## 2020-01-01 PROCEDURE — 71046 X-RAY EXAM CHEST 2 VIEWS: CPT

## 2020-01-01 PROCEDURE — 80053 COMPREHEN METABOLIC PANEL: CPT

## 2020-01-01 PROCEDURE — 49465 FLUORO EXAM OF G/COLON TUBE: CPT

## 2020-01-01 PROCEDURE — 96375 TX/PRO/DX INJ NEW DRUG ADDON: CPT

## 2020-01-01 PROCEDURE — 1200000003 HC TELEMETRY R&B

## 2020-01-01 PROCEDURE — 71045 X-RAY EXAM CHEST 1 VIEW: CPT

## 2020-01-01 PROCEDURE — 0DJ08ZZ INSPECTION OF UPPER INTESTINAL TRACT, VIA NATURAL OR ARTIFICIAL OPENING ENDOSCOPIC: ICD-10-PCS | Performed by: INTERNAL MEDICINE

## 2020-01-01 PROCEDURE — 2709999900 HC NON-CHARGEABLE SUPPLY

## 2020-01-01 PROCEDURE — 82272 OCCULT BLD FECES 1-3 TESTS: CPT

## 2020-01-01 PROCEDURE — 6360000002 HC RX W HCPCS: Performed by: FAMILY MEDICINE

## 2020-01-01 PROCEDURE — 82330 ASSAY OF CALCIUM: CPT

## 2020-01-01 PROCEDURE — 6370000000 HC RX 637 (ALT 250 FOR IP): Performed by: HOSPITALIST

## 2020-01-01 PROCEDURE — G0378 HOSPITAL OBSERVATION PER HR: HCPCS

## 2020-01-01 PROCEDURE — 97535 SELF CARE MNGMENT TRAINING: CPT

## 2020-01-01 PROCEDURE — 96361 HYDRATE IV INFUSION ADD-ON: CPT

## 2020-01-01 PROCEDURE — 94761 N-INVAS EAR/PLS OXIMETRY MLT: CPT

## 2020-01-01 PROCEDURE — 99239 HOSP IP/OBS DSCHRG MGMT >30: CPT | Performed by: FAMILY MEDICINE

## 2020-01-01 PROCEDURE — 99213 OFFICE O/P EST LOW 20 MIN: CPT | Performed by: FAMILY MEDICINE

## 2020-01-01 PROCEDURE — 99213 OFFICE O/P EST LOW 20 MIN: CPT | Performed by: NURSE PRACTITIONER

## 2020-01-01 PROCEDURE — 99233 SBSQ HOSP IP/OBS HIGH 50: CPT | Performed by: PHYSICIAN ASSISTANT

## 2020-01-01 PROCEDURE — 99233 SBSQ HOSP IP/OBS HIGH 50: CPT | Performed by: INTERNAL MEDICINE

## 2020-01-01 PROCEDURE — 99232 SBSQ HOSP IP/OBS MODERATE 35: CPT | Performed by: FAMILY MEDICINE

## 2020-01-01 PROCEDURE — 87449 NOS EACH ORGANISM AG IA: CPT

## 2020-01-01 PROCEDURE — 93005 ELECTROCARDIOGRAM TRACING: CPT | Performed by: FAMILY MEDICINE

## 2020-01-01 PROCEDURE — 2580000003 HC RX 258: Performed by: HOSPITALIST

## 2020-01-01 PROCEDURE — 7100000001 HC PACU RECOVERY - ADDTL 15 MIN: Performed by: INTERNAL MEDICINE

## 2020-01-01 PROCEDURE — 83615 LACTATE (LD) (LDH) ENZYME: CPT

## 2020-01-01 PROCEDURE — P9016 RBC LEUKOCYTES REDUCED: HCPCS

## 2020-01-01 PROCEDURE — 74018 RADEX ABDOMEN 1 VIEW: CPT

## 2020-01-01 PROCEDURE — 99232 SBSQ HOSP IP/OBS MODERATE 35: CPT | Performed by: SURGERY

## 2020-01-01 PROCEDURE — 2580000003 HC RX 258: Performed by: STUDENT IN AN ORGANIZED HEALTH CARE EDUCATION/TRAINING PROGRAM

## 2020-01-01 PROCEDURE — 70450 CT HEAD/BRAIN W/O DYE: CPT

## 2020-01-01 PROCEDURE — 86901 BLOOD TYPING SEROLOGIC RH(D): CPT

## 2020-01-01 PROCEDURE — G0426 INPT/ED TELECONSULT50: HCPCS | Performed by: PSYCHIATRY & NEUROLOGY

## 2020-01-01 PROCEDURE — 92526 ORAL FUNCTION THERAPY: CPT

## 2020-01-01 PROCEDURE — 97112 NEUROMUSCULAR REEDUCATION: CPT

## 2020-01-01 PROCEDURE — 82085 ASSAY OF ALDOLASE: CPT

## 2020-01-01 PROCEDURE — 99232 SBSQ HOSP IP/OBS MODERATE 35: CPT | Performed by: NURSE PRACTITIONER

## 2020-01-01 PROCEDURE — 6370000000 HC RX 637 (ALT 250 FOR IP): Performed by: NURSE PRACTITIONER

## 2020-01-01 PROCEDURE — 96372 THER/PROPH/DIAG INJ SC/IM: CPT

## 2020-01-01 PROCEDURE — 3700000000 HC ANESTHESIA ATTENDED CARE: Performed by: INTERNAL MEDICINE

## 2020-01-01 PROCEDURE — 83735 ASSAY OF MAGNESIUM: CPT

## 2020-01-01 PROCEDURE — 99232 SBSQ HOSP IP/OBS MODERATE 35: CPT | Performed by: PSYCHIATRY & NEUROLOGY

## 2020-01-01 PROCEDURE — 86850 RBC ANTIBODY SCREEN: CPT

## 2020-01-01 PROCEDURE — 96374 THER/PROPH/DIAG INJ IV PUSH: CPT

## 2020-01-01 PROCEDURE — 3609017100 HC EGD: Performed by: INTERNAL MEDICINE

## 2020-01-01 PROCEDURE — 84484 ASSAY OF TROPONIN QUANT: CPT

## 2020-01-01 PROCEDURE — 99233 SBSQ HOSP IP/OBS HIGH 50: CPT | Performed by: FAMILY MEDICINE

## 2020-01-01 PROCEDURE — U0002 COVID-19 LAB TEST NON-CDC: HCPCS

## 2020-01-01 PROCEDURE — 97167 OT EVAL HIGH COMPLEX 60 MIN: CPT

## 2020-01-01 PROCEDURE — 1111F DSCHRG MED/CURRENT MED MERGE: CPT

## 2020-01-01 PROCEDURE — 87804 INFLUENZA ASSAY W/OPTIC: CPT

## 2020-01-01 PROCEDURE — 85014 HEMATOCRIT: CPT

## 2020-01-01 PROCEDURE — 85610 PROTHROMBIN TIME: CPT

## 2020-01-01 PROCEDURE — 3E0G76Z INTRODUCTION OF NUTRITIONAL SUBSTANCE INTO UPPER GI, VIA NATURAL OR ARTIFICIAL OPENING: ICD-10-PCS | Performed by: INTERNAL MEDICINE

## 2020-01-01 PROCEDURE — 87641 MR-STAPH DNA AMP PROBE: CPT

## 2020-01-01 PROCEDURE — 81001 URINALYSIS AUTO W/SCOPE: CPT

## 2020-01-01 PROCEDURE — 86039 ANTINUCLEAR ANTIBODIES (ANA): CPT

## 2020-01-01 PROCEDURE — C9113 INJ PANTOPRAZOLE SODIUM, VIA: HCPCS | Performed by: INTERNAL MEDICINE

## 2020-01-01 PROCEDURE — 94669 MECHANICAL CHEST WALL OSCILL: CPT

## 2020-01-01 PROCEDURE — 51798 US URINE CAPACITY MEASURE: CPT

## 2020-01-01 PROCEDURE — 93005 ELECTROCARDIOGRAM TRACING: CPT | Performed by: PHYSICIAN ASSISTANT

## 2020-01-01 PROCEDURE — 83550 IRON BINDING TEST: CPT

## 2020-01-01 PROCEDURE — 2500000003 HC RX 250 WO HCPCS: Performed by: STUDENT IN AN ORGANIZED HEALTH CARE EDUCATION/TRAINING PROGRAM

## 2020-01-01 PROCEDURE — 86038 ANTINUCLEAR ANTIBODIES: CPT

## 2020-01-01 PROCEDURE — 2580000003 HC RX 258: Performed by: NURSE ANESTHETIST, CERTIFIED REGISTERED

## 2020-01-01 PROCEDURE — 85651 RBC SED RATE NONAUTOMATED: CPT

## 2020-01-01 PROCEDURE — 93010 ELECTROCARDIOGRAM REPORT: CPT | Performed by: INTERNAL MEDICINE

## 2020-01-01 PROCEDURE — 97163 PT EVAL HIGH COMPLEX 45 MIN: CPT

## 2020-01-01 PROCEDURE — 99239 HOSP IP/OBS DSCHRG MGMT >30: CPT | Performed by: INTERNAL MEDICINE

## 2020-01-01 PROCEDURE — 99231 SBSQ HOSP IP/OBS SF/LOW 25: CPT | Performed by: PSYCHIATRY & NEUROLOGY

## 2020-01-01 PROCEDURE — 82306 VITAMIN D 25 HYDROXY: CPT

## 2020-01-01 PROCEDURE — 85730 THROMBOPLASTIN TIME PARTIAL: CPT

## 2020-01-01 PROCEDURE — 3700000001 HC ADD 15 MINUTES (ANESTHESIA): Performed by: INTERNAL MEDICINE

## 2020-01-01 PROCEDURE — 99152 MOD SED SAME PHYS/QHP 5/>YRS: CPT | Performed by: INTERNAL MEDICINE

## 2020-01-01 PROCEDURE — 6370000000 HC RX 637 (ALT 250 FOR IP): Performed by: PHYSICIAN ASSISTANT

## 2020-01-01 PROCEDURE — 84145 PROCALCITONIN (PCT): CPT

## 2020-01-01 PROCEDURE — 99291 CRITICAL CARE FIRST HOUR: CPT

## 2020-01-01 PROCEDURE — 84132 ASSAY OF SERUM POTASSIUM: CPT

## 2020-01-01 PROCEDURE — 99449 NTRPROF PH1/NTRNET/EHR 31/>: CPT | Performed by: PSYCHIATRY & NEUROLOGY

## 2020-01-01 PROCEDURE — 82164 ANGIOTENSIN I ENZYME TEST: CPT

## 2020-01-01 PROCEDURE — C1769 GUIDE WIRE: HCPCS

## 2020-01-01 PROCEDURE — 70496 CT ANGIOGRAPHY HEAD: CPT

## 2020-01-01 PROCEDURE — 83605 ASSAY OF LACTIC ACID: CPT

## 2020-01-01 PROCEDURE — 7100000000 HC PACU RECOVERY - FIRST 15 MIN: Performed by: INTERNAL MEDICINE

## 2020-01-01 PROCEDURE — 83540 ASSAY OF IRON: CPT

## 2020-01-01 PROCEDURE — 99223 1ST HOSP IP/OBS HIGH 75: CPT | Performed by: PSYCHIATRY & NEUROLOGY

## 2020-01-01 PROCEDURE — 99284 EMERGENCY DEPT VISIT MOD MDM: CPT

## 2020-01-01 PROCEDURE — 6370000000 HC RX 637 (ALT 250 FOR IP): Performed by: PSYCHIATRY & NEUROLOGY

## 2020-01-01 PROCEDURE — 02HV33Z INSERTION OF INFUSION DEVICE INTO SUPERIOR VENA CAVA, PERCUTANEOUS APPROACH: ICD-10-PCS | Performed by: RADIOLOGY

## 2020-01-01 PROCEDURE — 81003 URINALYSIS AUTO W/O SCOPE: CPT

## 2020-01-01 PROCEDURE — 83880 ASSAY OF NATRIURETIC PEPTIDE: CPT

## 2020-01-01 PROCEDURE — 86900 BLOOD TYPING SEROLOGIC ABO: CPT

## 2020-01-01 PROCEDURE — 6360000002 HC RX W HCPCS: Performed by: STUDENT IN AN ORGANIZED HEALTH CARE EDUCATION/TRAINING PROGRAM

## 2020-01-01 PROCEDURE — 80061 LIPID PANEL: CPT

## 2020-01-01 PROCEDURE — 86923 COMPATIBILITY TEST ELECTRIC: CPT

## 2020-01-01 PROCEDURE — 6360000002 HC RX W HCPCS: Performed by: HOSPITALIST

## 2020-01-01 PROCEDURE — 70498 CT ANGIOGRAPHY NECK: CPT

## 2020-01-01 PROCEDURE — 99223 1ST HOSP IP/OBS HIGH 75: CPT | Performed by: SURGERY

## 2020-01-01 PROCEDURE — 36593 DECLOT VASCULAR DEVICE: CPT

## 2020-01-01 PROCEDURE — 36597 REPOSITION VENOUS CATHETER: CPT | Performed by: RADIOLOGY

## 2020-01-01 PROCEDURE — 0DJD8ZZ INSPECTION OF LOWER INTESTINAL TRACT, VIA NATURAL OR ARTIFICIAL OPENING ENDOSCOPIC: ICD-10-PCS | Performed by: INTERNAL MEDICINE

## 2020-01-01 PROCEDURE — 87899 AGENT NOS ASSAY W/OPTIC: CPT

## 2020-01-01 PROCEDURE — 2700000000 HC OXYGEN THERAPY PER DAY

## 2020-01-01 PROCEDURE — G8510 SCR DEP NEG, NO PLAN REQD: HCPCS | Performed by: FAMILY MEDICINE

## 2020-01-01 PROCEDURE — 94667 MNPJ CHEST WALL 1ST: CPT

## 2020-01-01 PROCEDURE — 2580000003 HC RX 258: Performed by: PHARMACIST

## 2020-01-01 PROCEDURE — 2580000003 HC RX 258

## 2020-01-01 PROCEDURE — 3600007501: Performed by: INTERNAL MEDICINE

## 2020-01-01 PROCEDURE — 0097U HC GI PTHGN MULT REV TRANS & AMP PRB TECH 22 TRGT: CPT

## 2020-01-01 PROCEDURE — 97166 OT EVAL MOD COMPLEX 45 MIN: CPT

## 2020-01-01 PROCEDURE — 99214 OFFICE O/P EST MOD 30 MIN: CPT | Performed by: FAMILY MEDICINE

## 2020-01-01 PROCEDURE — 86235 NUCLEAR ANTIGEN ANTIBODY: CPT

## 2020-01-01 PROCEDURE — 6360000004 HC RX CONTRAST MEDICATION: Performed by: SURGERY

## 2020-01-01 PROCEDURE — 99285 EMERGENCY DEPT VISIT HI MDM: CPT

## 2020-01-01 PROCEDURE — 3600007511: Performed by: INTERNAL MEDICINE

## 2020-01-01 PROCEDURE — 2500000003 HC RX 250 WO HCPCS: Performed by: NURSE ANESTHETIST, CERTIFIED REGISTERED

## 2020-01-01 PROCEDURE — 7100000010 HC PHASE II RECOVERY - FIRST 15 MIN: Performed by: INTERNAL MEDICINE

## 2020-01-01 PROCEDURE — 82803 BLOOD GASES ANY COMBINATION: CPT

## 2020-01-01 PROCEDURE — 93306 TTE W/DOPPLER COMPLETE: CPT

## 2020-01-01 PROCEDURE — 99222 1ST HOSP IP/OBS MODERATE 55: CPT | Performed by: INTERNAL MEDICINE

## 2020-01-01 PROCEDURE — 93010 ELECTROCARDIOGRAM REPORT: CPT | Performed by: NUCLEAR MEDICINE

## 2020-01-01 PROCEDURE — 1200000000 HC SEMI PRIVATE

## 2020-01-01 PROCEDURE — 97165 OT EVAL LOW COMPLEX 30 MIN: CPT

## 2020-01-01 PROCEDURE — 86430 RHEUMATOID FACTOR TEST QUAL: CPT

## 2020-01-01 PROCEDURE — 2720000010 HC SURG SUPPLY STERILE: Performed by: INTERNAL MEDICINE

## 2020-01-01 PROCEDURE — 36600 WITHDRAWAL OF ARTERIAL BLOOD: CPT

## 2020-01-01 PROCEDURE — 1180000000 HC REHAB R&B

## 2020-01-01 RX ORDER — PREDNISONE 20 MG/1
40 TABLET ORAL DAILY
Status: COMPLETED | OUTPATIENT
Start: 2020-01-01 | End: 2020-01-01

## 2020-01-01 RX ORDER — ACETAMINOPHEN 160 MG/5ML
650 SOLUTION ORAL EVERY 4 HOURS PRN
Status: DISCONTINUED | OUTPATIENT
Start: 2020-01-01 | End: 2020-01-01 | Stop reason: HOSPADM

## 2020-01-01 RX ORDER — DOCUSATE SODIUM 100 MG/1
100 CAPSULE, LIQUID FILLED ORAL 2 TIMES DAILY PRN
Status: DISCONTINUED | OUTPATIENT
Start: 2020-01-01 | End: 2020-01-01 | Stop reason: HOSPADM

## 2020-01-01 RX ORDER — BUMETANIDE 0.25 MG/ML
1 INJECTION, SOLUTION INTRAMUSCULAR; INTRAVENOUS ONCE
Status: COMPLETED | OUTPATIENT
Start: 2020-01-01 | End: 2020-01-01

## 2020-01-01 RX ORDER — POTASSIUM CHLORIDE 29.8 MG/ML
20 INJECTION INTRAVENOUS
Status: COMPLETED | OUTPATIENT
Start: 2020-01-01 | End: 2020-01-01

## 2020-01-01 RX ORDER — ASPIRIN 81 MG/1
324 TABLET, CHEWABLE ORAL ONCE
Status: COMPLETED | OUTPATIENT
Start: 2020-01-01 | End: 2020-01-01

## 2020-01-01 RX ORDER — SODIUM CHLORIDE 0.9 % (FLUSH) 0.9 %
10 SYRINGE (ML) INJECTION EVERY 12 HOURS SCHEDULED
Status: CANCELLED | OUTPATIENT
Start: 2020-01-01

## 2020-01-01 RX ORDER — SODIUM CHLORIDE 0.9 % (FLUSH) 0.9 %
10 SYRINGE (ML) INJECTION EVERY 12 HOURS SCHEDULED
Status: DISCONTINUED | OUTPATIENT
Start: 2020-01-01 | End: 2020-01-01 | Stop reason: HOSPADM

## 2020-01-01 RX ORDER — LOPERAMIDE HYDROCHLORIDE 2 MG/1
2 CAPSULE ORAL PRN
Status: DISCONTINUED | OUTPATIENT
Start: 2020-01-01 | End: 2020-01-01 | Stop reason: HOSPADM

## 2020-01-01 RX ORDER — ATORVASTATIN CALCIUM 10 MG/1
10 TABLET, FILM COATED ORAL DAILY
Qty: 30 TABLET | Refills: 0 | Status: ON HOLD | OUTPATIENT
Start: 2020-01-01 | End: 2020-01-01 | Stop reason: HOSPADM

## 2020-01-01 RX ORDER — TAMSULOSIN HYDROCHLORIDE 0.4 MG/1
0.4 CAPSULE ORAL 2 TIMES DAILY
Status: DISCONTINUED | OUTPATIENT
Start: 2020-01-01 | End: 2020-01-01

## 2020-01-01 RX ORDER — LIDOCAINE HYDROCHLORIDE 20 MG/ML
JELLY TOPICAL ONCE
Status: COMPLETED | OUTPATIENT
Start: 2020-01-01 | End: 2020-01-01

## 2020-01-01 RX ORDER — PROMETHAZINE HYDROCHLORIDE 25 MG/1
12.5 TABLET ORAL EVERY 6 HOURS PRN
Status: DISCONTINUED | OUTPATIENT
Start: 2020-01-01 | End: 2020-01-01

## 2020-01-01 RX ORDER — DEXTROSE MONOHYDRATE 50 MG/ML
100 INJECTION, SOLUTION INTRAVENOUS PRN
Status: CANCELLED | OUTPATIENT
Start: 2020-01-01

## 2020-01-01 RX ORDER — ALBUTEROL SULFATE 90 UG/1
2 AEROSOL, METERED RESPIRATORY (INHALATION) EVERY 6 HOURS PRN
Status: DISCONTINUED | OUTPATIENT
Start: 2020-01-01 | End: 2020-01-01 | Stop reason: HOSPADM

## 2020-01-01 RX ORDER — SODIUM CHLORIDE 9 MG/ML
INJECTION, SOLUTION INTRAVENOUS CONTINUOUS
Status: DISCONTINUED | OUTPATIENT
Start: 2020-01-01 | End: 2020-01-01

## 2020-01-01 RX ORDER — PANTOPRAZOLE SODIUM 40 MG/1
40 TABLET, DELAYED RELEASE ORAL
Status: DISCONTINUED | OUTPATIENT
Start: 2020-01-01 | End: 2020-01-01 | Stop reason: HOSPADM

## 2020-01-01 RX ORDER — ISOSORBIDE MONONITRATE 30 MG/1
30 TABLET, EXTENDED RELEASE ORAL DAILY
Status: CANCELLED | OUTPATIENT
Start: 2020-01-01

## 2020-01-01 RX ORDER — FENTANYL CITRATE 50 UG/ML
INJECTION, SOLUTION INTRAMUSCULAR; INTRAVENOUS
Status: DISPENSED
Start: 2020-01-01 | End: 2020-01-01

## 2020-01-01 RX ORDER — ALBUTEROL SULFATE 2.5 MG/3ML
2.5 SOLUTION RESPIRATORY (INHALATION) EVERY 6 HOURS PRN
Status: DISCONTINUED | OUTPATIENT
Start: 2020-01-01 | End: 2020-01-01

## 2020-01-01 RX ORDER — ASPIRIN 81 MG/1
81 TABLET, CHEWABLE ORAL DAILY
Qty: 30 TABLET | Refills: 0 | Status: ON HOLD
Start: 2020-01-01 | End: 2020-01-01 | Stop reason: HOSPADM

## 2020-01-01 RX ORDER — ONDANSETRON 2 MG/ML
4 INJECTION INTRAMUSCULAR; INTRAVENOUS EVERY 6 HOURS PRN
Status: CANCELLED | OUTPATIENT
Start: 2020-01-01

## 2020-01-01 RX ORDER — DOCUSATE SODIUM 100 MG/1
100 CAPSULE, LIQUID FILLED ORAL DAILY
Status: CANCELLED | OUTPATIENT
Start: 2020-01-01

## 2020-01-01 RX ORDER — NICOTINE POLACRILEX 4 MG
15 LOZENGE BUCCAL PRN
Status: DISCONTINUED | OUTPATIENT
Start: 2020-01-01 | End: 2020-01-01 | Stop reason: HOSPADM

## 2020-01-01 RX ORDER — ONDANSETRON 2 MG/ML
4 INJECTION INTRAMUSCULAR; INTRAVENOUS EVERY 6 HOURS PRN
Status: DISCONTINUED | OUTPATIENT
Start: 2020-01-01 | End: 2020-01-01 | Stop reason: HOSPADM

## 2020-01-01 RX ORDER — LANSOPRAZOLE
30 KIT
Status: DISCONTINUED | OUTPATIENT
Start: 2020-01-01 | End: 2020-01-01 | Stop reason: HOSPADM

## 2020-01-01 RX ORDER — SODIUM CHLORIDE 0.9 % (FLUSH) 0.9 %
10 SYRINGE (ML) INJECTION PRN
Status: DISCONTINUED | OUTPATIENT
Start: 2020-01-01 | End: 2020-01-01 | Stop reason: HOSPADM

## 2020-01-01 RX ORDER — LIDOCAINE HYDROCHLORIDE 20 MG/ML
JELLY TOPICAL ONCE
Status: DISCONTINUED | OUTPATIENT
Start: 2020-01-01 | End: 2020-01-01 | Stop reason: ALTCHOICE

## 2020-01-01 RX ORDER — HYDRALAZINE HYDROCHLORIDE 25 MG/1
25 TABLET, FILM COATED ORAL EVERY 6 HOURS PRN
Status: DISCONTINUED | OUTPATIENT
Start: 2020-01-01 | End: 2020-01-01

## 2020-01-01 RX ORDER — FERROUS SULFATE 325(65) MG
325 TABLET ORAL
Status: DISCONTINUED | OUTPATIENT
Start: 2020-01-01 | End: 2020-01-01 | Stop reason: HOSPADM

## 2020-01-01 RX ORDER — PROMETHAZINE HYDROCHLORIDE 25 MG/1
12.5 TABLET ORAL EVERY 6 HOURS PRN
Status: DISCONTINUED | OUTPATIENT
Start: 2020-01-01 | End: 2020-01-01 | Stop reason: HOSPADM

## 2020-01-01 RX ORDER — SUCCINYLCHOLINE/SOD CL,ISO/PF 100 MG/5ML
SYRINGE (ML) INTRAVENOUS PRN
Status: DISCONTINUED | OUTPATIENT
Start: 2020-01-01 | End: 2020-01-01 | Stop reason: SDUPTHER

## 2020-01-01 RX ORDER — HYDRALAZINE HYDROCHLORIDE 20 MG/ML
5 INJECTION INTRAMUSCULAR; INTRAVENOUS EVERY 6 HOURS PRN
Status: DISCONTINUED | OUTPATIENT
Start: 2020-01-01 | End: 2020-01-01 | Stop reason: HOSPADM

## 2020-01-01 RX ORDER — PROPOFOL 10 MG/ML
INJECTION, EMULSION INTRAVENOUS PRN
Status: DISCONTINUED | OUTPATIENT
Start: 2020-01-01 | End: 2020-01-01 | Stop reason: SDUPTHER

## 2020-01-01 RX ORDER — ACETAMINOPHEN 325 MG/1
650 TABLET ORAL EVERY 6 HOURS PRN
Status: DISCONTINUED | OUTPATIENT
Start: 2020-01-01 | End: 2020-01-01 | Stop reason: HOSPADM

## 2020-01-01 RX ORDER — LOSARTAN POTASSIUM 100 MG/1
100 TABLET ORAL DAILY
Status: DISCONTINUED | OUTPATIENT
Start: 2020-01-01 | End: 2020-01-01

## 2020-01-01 RX ORDER — DEXTROSE MONOHYDRATE 25 G/50ML
12.5 INJECTION, SOLUTION INTRAVENOUS PRN
Status: DISCONTINUED | OUTPATIENT
Start: 2020-01-01 | End: 2020-01-01 | Stop reason: HOSPADM

## 2020-01-01 RX ORDER — DIPHENHYDRAMINE HYDROCHLORIDE 50 MG/ML
25 INJECTION INTRAMUSCULAR; INTRAVENOUS ONCE
Status: COMPLETED | OUTPATIENT
Start: 2020-01-01 | End: 2020-01-01

## 2020-01-01 RX ORDER — ACETAMINOPHEN 160 MG/5ML
650 SOLUTION ORAL EVERY 4 HOURS PRN
Status: CANCELLED | OUTPATIENT
Start: 2020-01-01

## 2020-01-01 RX ORDER — POLYETHYLENE GLYCOL 3350 17 G/17G
17 POWDER, FOR SOLUTION ORAL DAILY PRN
Status: DISCONTINUED | OUTPATIENT
Start: 2020-01-01 | End: 2020-01-01 | Stop reason: HOSPADM

## 2020-01-01 RX ORDER — LOSARTAN POTASSIUM 25 MG/1
25 TABLET ORAL DAILY
Status: DISCONTINUED | OUTPATIENT
Start: 2020-01-01 | End: 2020-01-01 | Stop reason: HOSPADM

## 2020-01-01 RX ORDER — ASPIRIN 81 MG/1
81 TABLET, CHEWABLE ORAL DAILY
Status: DISCONTINUED | OUTPATIENT
Start: 2020-01-01 | End: 2020-01-01

## 2020-01-01 RX ORDER — 0.9 % SODIUM CHLORIDE 0.9 %
500 INTRAVENOUS SOLUTION INTRAVENOUS ONCE
Status: COMPLETED | OUTPATIENT
Start: 2020-01-01 | End: 2020-01-01

## 2020-01-01 RX ORDER — LANSOPRAZOLE
30 KIT
Status: CANCELLED | OUTPATIENT
Start: 2020-01-01

## 2020-01-01 RX ORDER — FINASTERIDE 5 MG/1
5 TABLET, FILM COATED ORAL DAILY
Status: DISCONTINUED | OUTPATIENT
Start: 2020-01-01 | End: 2020-01-01

## 2020-01-01 RX ORDER — LANSOPRAZOLE
30 KIT
Qty: 300 ML | Refills: 0 | Status: ON HOLD
Start: 2020-01-01 | End: 2020-01-01 | Stop reason: HOSPADM

## 2020-01-01 RX ORDER — HYDRALAZINE HYDROCHLORIDE 20 MG/ML
5 INJECTION INTRAMUSCULAR; INTRAVENOUS EVERY 6 HOURS PRN
Status: DISCONTINUED | OUTPATIENT
Start: 2020-01-01 | End: 2020-01-01

## 2020-01-01 RX ORDER — DEXTROSE MONOHYDRATE 25 G/50ML
12.5 INJECTION, SOLUTION INTRAVENOUS PRN
Status: CANCELLED | OUTPATIENT
Start: 2020-01-01

## 2020-01-01 RX ORDER — ATORVASTATIN CALCIUM 40 MG/1
40 TABLET, FILM COATED ORAL ONCE
Status: COMPLETED | OUTPATIENT
Start: 2020-01-01 | End: 2020-01-01

## 2020-01-01 RX ORDER — LOPERAMIDE HYDROCHLORIDE 2 MG/1
2 CAPSULE ORAL PRN
DISCHARGE
Start: 2020-01-01 | End: 2020-01-01

## 2020-01-01 RX ORDER — ACETAMINOPHEN 650 MG/1
650 SUPPOSITORY RECTAL EVERY 4 HOURS PRN
Status: CANCELLED | OUTPATIENT
Start: 2020-01-01

## 2020-01-01 RX ORDER — ASPIRIN 81 MG/1
81 TABLET, CHEWABLE ORAL DAILY
Status: DISCONTINUED | OUTPATIENT
Start: 2020-01-01 | End: 2020-01-01 | Stop reason: HOSPADM

## 2020-01-01 RX ORDER — TAMSULOSIN HYDROCHLORIDE 0.4 MG/1
0.4 CAPSULE ORAL 2 TIMES DAILY
Status: CANCELLED | OUTPATIENT
Start: 2020-01-01

## 2020-01-01 RX ORDER — FUROSEMIDE 10 MG/ML
20 INJECTION INTRAMUSCULAR; INTRAVENOUS ONCE
Status: COMPLETED | OUTPATIENT
Start: 2020-01-01 | End: 2020-01-01

## 2020-01-01 RX ORDER — IPRATROPIUM BROMIDE AND ALBUTEROL SULFATE 2.5; .5 MG/3ML; MG/3ML
3 SOLUTION RESPIRATORY (INHALATION) EVERY 4 HOURS PRN
Status: DISCONTINUED | OUTPATIENT
Start: 2020-01-01 | End: 2020-01-01 | Stop reason: HOSPADM

## 2020-01-01 RX ORDER — DEXTROSE MONOHYDRATE 50 MG/ML
100 INJECTION, SOLUTION INTRAVENOUS PRN
Status: DISCONTINUED | OUTPATIENT
Start: 2020-01-01 | End: 2020-01-01 | Stop reason: HOSPADM

## 2020-01-01 RX ORDER — FERROUS SULFATE 325(65) MG
325 TABLET ORAL
Status: DISCONTINUED | OUTPATIENT
Start: 2020-01-01 | End: 2020-01-01

## 2020-01-01 RX ORDER — LIDOCAINE HYDROCHLORIDE 20 MG/ML
INJECTION, SOLUTION INTRAVENOUS PRN
Status: DISCONTINUED | OUTPATIENT
Start: 2020-01-01 | End: 2020-01-01 | Stop reason: SDUPTHER

## 2020-01-01 RX ORDER — IPRATROPIUM BROMIDE AND ALBUTEROL SULFATE 2.5; .5 MG/3ML; MG/3ML
3 SOLUTION RESPIRATORY (INHALATION) EVERY 4 HOURS PRN
Status: CANCELLED | OUTPATIENT
Start: 2020-01-01

## 2020-01-01 RX ORDER — ATORVASTATIN CALCIUM 80 MG/1
80 TABLET, FILM COATED ORAL NIGHTLY
Status: DISCONTINUED | OUTPATIENT
Start: 2020-01-01 | End: 2020-01-01

## 2020-01-01 RX ORDER — TAMSULOSIN HYDROCHLORIDE 0.4 MG/1
0.4 CAPSULE ORAL 2 TIMES DAILY
Qty: 30 CAPSULE | Refills: 0 | Status: ON HOLD
Start: 2020-01-01 | End: 2020-01-01 | Stop reason: HOSPADM

## 2020-01-01 RX ORDER — ISOSORBIDE DINITRATE 10 MG/1
10 TABLET ORAL 3 TIMES DAILY
Status: DISCONTINUED | OUTPATIENT
Start: 2020-01-01 | End: 2020-01-01 | Stop reason: HOSPADM

## 2020-01-01 RX ORDER — ISOSORBIDE MONONITRATE 30 MG/1
30 TABLET, EXTENDED RELEASE ORAL DAILY
Qty: 30 TABLET | Refills: 0 | Status: ON HOLD
Start: 2020-01-01 | End: 2020-01-01 | Stop reason: HOSPADM

## 2020-01-01 RX ORDER — NICOTINE POLACRILEX 4 MG
15 LOZENGE BUCCAL PRN
Status: DISCONTINUED | OUTPATIENT
Start: 2020-01-01 | End: 2020-01-01

## 2020-01-01 RX ORDER — SODIUM CHLORIDE 0.9 % (FLUSH) 0.9 %
10 SYRINGE (ML) INJECTION EVERY 12 HOURS SCHEDULED
Status: DISCONTINUED | OUTPATIENT
Start: 2020-01-01 | End: 2020-01-01 | Stop reason: SDUPTHER

## 2020-01-01 RX ORDER — ASPIRIN 81 MG/1
81 TABLET, CHEWABLE ORAL DAILY
Status: CANCELLED | OUTPATIENT
Start: 2020-01-01

## 2020-01-01 RX ORDER — POTASSIUM CHLORIDE 7.45 MG/ML
10 INJECTION INTRAVENOUS PRN
Status: DISCONTINUED | OUTPATIENT
Start: 2020-01-01 | End: 2020-01-01

## 2020-01-01 RX ORDER — POLYETHYLENE GLYCOL 3350 17 G/17G
238 POWDER, FOR SOLUTION ORAL ONCE
Status: COMPLETED | OUTPATIENT
Start: 2020-01-01 | End: 2020-01-01

## 2020-01-01 RX ORDER — FERROUS SULFATE 325(65) MG
325 TABLET ORAL
Status: CANCELLED | OUTPATIENT
Start: 2020-01-01

## 2020-01-01 RX ORDER — HYDRALAZINE HYDROCHLORIDE 25 MG/1
25 TABLET, FILM COATED ORAL EVERY 6 HOURS PRN
Status: CANCELLED | OUTPATIENT
Start: 2020-01-01

## 2020-01-01 RX ORDER — TAMSULOSIN HYDROCHLORIDE 0.4 MG/1
0.4 CAPSULE ORAL 2 TIMES DAILY
Status: DISCONTINUED | OUTPATIENT
Start: 2020-01-01 | End: 2020-01-01 | Stop reason: HOSPADM

## 2020-01-01 RX ORDER — DEXTROSE MONOHYDRATE 25 G/50ML
12.5 INJECTION, SOLUTION INTRAVENOUS PRN
Status: DISCONTINUED | OUTPATIENT
Start: 2020-01-01 | End: 2020-01-01

## 2020-01-01 RX ORDER — POTASSIUM CHLORIDE 7.45 MG/ML
10 INJECTION INTRAVENOUS PRN
Status: DISCONTINUED | OUTPATIENT
Start: 2020-01-01 | End: 2020-01-01 | Stop reason: SDUPTHER

## 2020-01-01 RX ORDER — 0.9 % SODIUM CHLORIDE 0.9 %
20 INTRAVENOUS SOLUTION INTRAVENOUS ONCE
Status: COMPLETED | OUTPATIENT
Start: 2020-01-01 | End: 2020-01-01

## 2020-01-01 RX ORDER — POLYETHYLENE GLYCOL 3350 17 G/17G
17 POWDER, FOR SOLUTION ORAL DAILY PRN
Qty: 527 G | Refills: 0
Start: 2020-01-01 | End: 2020-01-01

## 2020-01-01 RX ORDER — M-VIT,TX,IRON,MINS/CALC/FOLIC 27MG-0.4MG
1 TABLET ORAL DAILY
Qty: 30 TABLET | Refills: 0 | Status: ON HOLD
Start: 2020-01-01 | End: 2020-01-01 | Stop reason: HOSPADM

## 2020-01-01 RX ORDER — LOSARTAN POTASSIUM 50 MG/1
50 TABLET ORAL DAILY
Qty: 30 TABLET | Refills: 3 | Status: CANCELLED | OUTPATIENT
Start: 2020-01-01

## 2020-01-01 RX ORDER — PANTOPRAZOLE SODIUM 40 MG/1
40 TABLET, DELAYED RELEASE ORAL
Status: DISCONTINUED | OUTPATIENT
Start: 2020-01-01 | End: 2020-01-01

## 2020-01-01 RX ORDER — POTASSIUM CHLORIDE 20 MEQ/1
40 TABLET, EXTENDED RELEASE ORAL PRN
Status: DISCONTINUED | OUTPATIENT
Start: 2020-01-01 | End: 2020-01-01

## 2020-01-01 RX ORDER — LORAZEPAM 2 MG/ML
1 INJECTION INTRAMUSCULAR EVERY 6 HOURS PRN
Status: DISCONTINUED | OUTPATIENT
Start: 2020-01-01 | End: 2020-01-01 | Stop reason: HOSPADM

## 2020-01-01 RX ORDER — ATORVASTATIN CALCIUM 80 MG/1
80 TABLET, FILM COATED ORAL NIGHTLY
Qty: 30 TABLET | Refills: 0 | Status: ON HOLD
Start: 2020-01-01 | End: 2020-01-01 | Stop reason: HOSPADM

## 2020-01-01 RX ORDER — SENNOSIDES 8.6 MG
650 CAPSULE ORAL EVERY 8 HOURS PRN
Status: DISCONTINUED | OUTPATIENT
Start: 2020-01-01 | End: 2020-01-01 | Stop reason: SDUPTHER

## 2020-01-01 RX ORDER — ASPIRIN 81 MG/1
81 TABLET ORAL DAILY
Status: DISCONTINUED | OUTPATIENT
Start: 2020-01-01 | End: 2020-01-01

## 2020-01-01 RX ORDER — IPRATROPIUM BROMIDE AND ALBUTEROL SULFATE 2.5; .5 MG/3ML; MG/3ML
3 SOLUTION RESPIRATORY (INHALATION) EVERY 4 HOURS PRN
Qty: 360 ML | Refills: 0 | Status: SHIPPED | OUTPATIENT
Start: 2020-01-01 | End: 2020-01-01 | Stop reason: SDUPTHER

## 2020-01-01 RX ORDER — OXYMETAZOLINE HYDROCHLORIDE 0.05 G/100ML
2 SPRAY NASAL ONCE
Status: COMPLETED | OUTPATIENT
Start: 2020-01-01 | End: 2020-01-01

## 2020-01-01 RX ORDER — SODIUM CHLORIDE 9 MG/ML
INJECTION, SOLUTION INTRAVENOUS CONTINUOUS PRN
Status: DISCONTINUED | OUTPATIENT
Start: 2020-01-01 | End: 2020-01-01 | Stop reason: SDUPTHER

## 2020-01-01 RX ORDER — PANTOPRAZOLE SODIUM 40 MG/10ML
40 INJECTION, POWDER, LYOPHILIZED, FOR SOLUTION INTRAVENOUS EVERY 12 HOURS
Status: DISCONTINUED | OUTPATIENT
Start: 2020-01-01 | End: 2020-01-01

## 2020-01-01 RX ORDER — DEXTROSE MONOHYDRATE 50 MG/ML
100 INJECTION, SOLUTION INTRAVENOUS PRN
Status: DISCONTINUED | OUTPATIENT
Start: 2020-01-01 | End: 2020-01-01

## 2020-01-01 RX ORDER — ALBUTEROL SULFATE 90 UG/1
2 AEROSOL, METERED RESPIRATORY (INHALATION) EVERY 6 HOURS PRN
Status: CANCELLED | OUTPATIENT
Start: 2020-01-01

## 2020-01-01 RX ORDER — LANOLIN ALCOHOL/MO/W.PET/CERES
3 CREAM (GRAM) TOPICAL NIGHTLY PRN
Status: DISCONTINUED | OUTPATIENT
Start: 2020-01-01 | End: 2020-01-01 | Stop reason: HOSPADM

## 2020-01-01 RX ORDER — IPRATROPIUM BROMIDE AND ALBUTEROL SULFATE 2.5; .5 MG/3ML; MG/3ML
3 SOLUTION RESPIRATORY (INHALATION) EVERY 4 HOURS PRN
Qty: 360 ML | Refills: 0 | Status: SHIPPED | OUTPATIENT
Start: 2020-01-01

## 2020-01-01 RX ORDER — ALBUTEROL SULFATE 2.5 MG/3ML
2.5 SOLUTION RESPIRATORY (INHALATION) EVERY 6 HOURS PRN
Status: CANCELLED | OUTPATIENT
Start: 2020-01-01

## 2020-01-01 RX ORDER — ACETAMINOPHEN 650 MG/1
650 SUPPOSITORY RECTAL EVERY 6 HOURS PRN
Status: DISCONTINUED | OUTPATIENT
Start: 2020-01-01 | End: 2020-01-01 | Stop reason: HOSPADM

## 2020-01-01 RX ORDER — SODIUM CHLORIDE 9 MG/ML
10 INJECTION INTRAVENOUS DAILY
Status: DISCONTINUED | OUTPATIENT
Start: 2020-01-01 | End: 2020-01-01 | Stop reason: ALTCHOICE

## 2020-01-01 RX ORDER — MIDAZOLAM HYDROCHLORIDE 1 MG/ML
INJECTION INTRAMUSCULAR; INTRAVENOUS PRN
Status: DISCONTINUED | OUTPATIENT
Start: 2020-01-01 | End: 2020-01-01 | Stop reason: ALTCHOICE

## 2020-01-01 RX ORDER — ALBUTEROL SULFATE 90 UG/1
AEROSOL, METERED RESPIRATORY (INHALATION)
Qty: 18 G | Refills: 0 | Status: SHIPPED | OUTPATIENT
Start: 2020-01-01

## 2020-01-01 RX ORDER — POLYETHYLENE GLYCOL 3350 17 G/17G
17 POWDER, FOR SOLUTION ORAL DAILY PRN
Status: CANCELLED | OUTPATIENT
Start: 2020-01-01

## 2020-01-01 RX ORDER — PROMETHAZINE HYDROCHLORIDE 25 MG/1
12.5 TABLET ORAL EVERY 6 HOURS PRN
Status: CANCELLED | OUTPATIENT
Start: 2020-01-01

## 2020-01-01 RX ORDER — ONDANSETRON 2 MG/ML
INJECTION INTRAMUSCULAR; INTRAVENOUS PRN
Status: DISCONTINUED | OUTPATIENT
Start: 2020-01-01 | End: 2020-01-01 | Stop reason: SDUPTHER

## 2020-01-01 RX ORDER — FLUMAZENIL 0.1 MG/ML
INJECTION, SOLUTION INTRAVENOUS
Status: DISCONTINUED
Start: 2020-01-01 | End: 2020-01-01 | Stop reason: WASHOUT

## 2020-01-01 RX ORDER — LANSOPRAZOLE
30 KIT
Status: DISCONTINUED | OUTPATIENT
Start: 2020-01-01 | End: 2020-01-01

## 2020-01-01 RX ORDER — 0.9 % SODIUM CHLORIDE 0.9 %
20 INTRAVENOUS SOLUTION INTRAVENOUS ONCE
Status: DISCONTINUED | OUTPATIENT
Start: 2020-01-01 | End: 2020-01-01 | Stop reason: SDUPTHER

## 2020-01-01 RX ORDER — ACETAMINOPHEN 500 MG
500 TABLET ORAL EVERY 8 HOURS PRN
Status: DISCONTINUED | OUTPATIENT
Start: 2020-01-01 | End: 2020-01-01

## 2020-01-01 RX ORDER — NALOXONE HYDROCHLORIDE 0.4 MG/ML
INJECTION, SOLUTION INTRAMUSCULAR; INTRAVENOUS; SUBCUTANEOUS
Status: DISCONTINUED
Start: 2020-01-01 | End: 2020-01-01 | Stop reason: WASHOUT

## 2020-01-01 RX ORDER — MIDAZOLAM HYDROCHLORIDE 1 MG/ML
INJECTION INTRAMUSCULAR; INTRAVENOUS
Status: DISPENSED
Start: 2020-01-01 | End: 2020-01-01

## 2020-01-01 RX ORDER — ATORVASTATIN CALCIUM 80 MG/1
80 TABLET, FILM COATED ORAL NIGHTLY
Status: CANCELLED | OUTPATIENT
Start: 2020-01-01

## 2020-01-01 RX ORDER — ASPIRIN 300 MG/1
300 SUPPOSITORY RECTAL DAILY
Status: DISCONTINUED | OUTPATIENT
Start: 2020-01-01 | End: 2020-01-01

## 2020-01-01 RX ORDER — SODIUM CHLORIDE 0.9 % (FLUSH) 0.9 %
10 SYRINGE (ML) INJECTION PRN
Status: CANCELLED | OUTPATIENT
Start: 2020-01-01

## 2020-01-01 RX ORDER — POTASSIUM CHLORIDE 20 MEQ/1
40 TABLET, EXTENDED RELEASE ORAL PRN
Status: DISCONTINUED | OUTPATIENT
Start: 2020-01-01 | End: 2020-01-01 | Stop reason: SDUPTHER

## 2020-01-01 RX ORDER — ALBUTEROL SULFATE 90 UG/1
2 AEROSOL, METERED RESPIRATORY (INHALATION) EVERY 4 HOURS PRN
Status: DISCONTINUED | OUTPATIENT
Start: 2020-01-01 | End: 2020-01-01 | Stop reason: HOSPADM

## 2020-01-01 RX ORDER — LANSOPRAZOLE
30 KIT
Status: DISCONTINUED | OUTPATIENT
Start: 2020-01-01 | End: 2020-01-01 | Stop reason: SDUPTHER

## 2020-01-01 RX ORDER — SODIUM CHLORIDE 0.9 % (FLUSH) 0.9 %
10 SYRINGE (ML) INJECTION PRN
Status: DISCONTINUED | OUTPATIENT
Start: 2020-01-01 | End: 2020-01-01

## 2020-01-01 RX ORDER — MORPHINE SULFATE 4 MG/ML
4 INJECTION, SOLUTION INTRAMUSCULAR; INTRAVENOUS
Status: DISCONTINUED | OUTPATIENT
Start: 2020-01-01 | End: 2020-01-01 | Stop reason: HOSPADM

## 2020-01-01 RX ORDER — SODIUM CHLORIDE 0.9 % (FLUSH) 0.9 %
10 SYRINGE (ML) INJECTION PRN
Status: DISCONTINUED | OUTPATIENT
Start: 2020-01-01 | End: 2020-01-01 | Stop reason: SDUPTHER

## 2020-01-01 RX ORDER — ATORVASTATIN CALCIUM 80 MG/1
80 TABLET, FILM COATED ORAL NIGHTLY
Status: DISCONTINUED | OUTPATIENT
Start: 2020-01-01 | End: 2020-01-01 | Stop reason: HOSPADM

## 2020-01-01 RX ORDER — LIDOCAINE HYDROCHLORIDE 10 MG/ML
5 INJECTION, SOLUTION EPIDURAL; INFILTRATION; INTRACAUDAL; PERINEURAL ONCE
Status: DISCONTINUED | OUTPATIENT
Start: 2020-01-01 | End: 2020-01-01 | Stop reason: HOSPADM

## 2020-01-01 RX ORDER — PSEUDOEPHEDRINE HCL 30 MG
100 TABLET ORAL 2 TIMES DAILY PRN
Qty: 30 CAPSULE | Refills: 0 | Status: ON HOLD
Start: 2020-01-01 | End: 2020-01-01 | Stop reason: HOSPADM

## 2020-01-01 RX ORDER — SODIUM CHLORIDE 450 MG/100ML
INJECTION, SOLUTION INTRAVENOUS CONTINUOUS
Status: DISCONTINUED | OUTPATIENT
Start: 2020-01-01 | End: 2020-01-01

## 2020-01-01 RX ORDER — CARVEDILOL 3.12 MG/1
3.12 TABLET ORAL 2 TIMES DAILY WITH MEALS
Status: DISCONTINUED | OUTPATIENT
Start: 2020-01-01 | End: 2020-01-01

## 2020-01-01 RX ORDER — GLYCOPYRROLATE 0.2 MG/ML
0.2 INJECTION INTRAMUSCULAR; INTRAVENOUS EVERY 4 HOURS PRN
Status: DISCONTINUED | OUTPATIENT
Start: 2020-01-01 | End: 2020-01-01 | Stop reason: HOSPADM

## 2020-01-01 RX ORDER — POTASSIUM CHLORIDE 7.45 MG/ML
10 INJECTION INTRAVENOUS
Status: COMPLETED | OUTPATIENT
Start: 2020-01-01 | End: 2020-01-01

## 2020-01-01 RX ORDER — DOCUSATE SODIUM 100 MG/1
100 CAPSULE, LIQUID FILLED ORAL DAILY
Status: DISCONTINUED | OUTPATIENT
Start: 2020-01-01 | End: 2020-01-01 | Stop reason: HOSPADM

## 2020-01-01 RX ORDER — SUCRALFATE 1 G/1
1 TABLET ORAL
Status: DISCONTINUED | OUTPATIENT
Start: 2020-01-01 | End: 2020-01-01

## 2020-01-01 RX ORDER — CLOPIDOGREL 300 MG/1
300 TABLET, FILM COATED ORAL ONCE
Status: COMPLETED | OUTPATIENT
Start: 2020-01-01 | End: 2020-01-01

## 2020-01-01 RX ORDER — MAGNESIUM SULFATE IN WATER 40 MG/ML
2 INJECTION, SOLUTION INTRAVENOUS PRN
Status: DISCONTINUED | OUTPATIENT
Start: 2020-01-01 | End: 2020-01-01

## 2020-01-01 RX ORDER — ASPIRIN 81 MG/1
81 TABLET, CHEWABLE ORAL ONCE
Status: COMPLETED | OUTPATIENT
Start: 2020-01-01 | End: 2020-01-01

## 2020-01-01 RX ORDER — NICOTINE POLACRILEX 4 MG
15 LOZENGE BUCCAL PRN
Status: CANCELLED | OUTPATIENT
Start: 2020-01-01

## 2020-01-01 RX ORDER — ISOSORBIDE MONONITRATE 30 MG/1
30 TABLET, EXTENDED RELEASE ORAL DAILY
Status: DISCONTINUED | OUTPATIENT
Start: 2020-01-01 | End: 2020-01-01

## 2020-01-01 RX ORDER — POLYETHYLENE GLYCOL 3350 17 G/17G
17 POWDER, FOR SOLUTION ORAL DAILY PRN
Status: DISCONTINUED | OUTPATIENT
Start: 2020-01-01 | End: 2020-01-01

## 2020-01-01 RX ORDER — PANTOPRAZOLE SODIUM 40 MG/10ML
40 INJECTION, POWDER, LYOPHILIZED, FOR SOLUTION INTRAVENOUS DAILY
Status: DISCONTINUED | OUTPATIENT
Start: 2020-01-01 | End: 2020-01-01 | Stop reason: ALTCHOICE

## 2020-01-01 RX ORDER — ONDANSETRON 2 MG/ML
4 INJECTION INTRAMUSCULAR; INTRAVENOUS EVERY 6 HOURS PRN
Status: DISCONTINUED | OUTPATIENT
Start: 2020-01-01 | End: 2020-01-01

## 2020-01-01 RX ORDER — FENTANYL CITRATE 50 UG/ML
INJECTION, SOLUTION INTRAMUSCULAR; INTRAVENOUS PRN
Status: DISCONTINUED | OUTPATIENT
Start: 2020-01-01 | End: 2020-01-01 | Stop reason: ALTCHOICE

## 2020-01-01 RX ORDER — METHYLPREDNISOLONE SODIUM SUCCINATE 40 MG/ML
40 INJECTION, POWDER, LYOPHILIZED, FOR SOLUTION INTRAMUSCULAR; INTRAVENOUS EVERY 12 HOURS
Status: DISCONTINUED | OUTPATIENT
Start: 2020-01-01 | End: 2020-01-01

## 2020-01-01 RX ORDER — IPRATROPIUM BROMIDE AND ALBUTEROL SULFATE 2.5; .5 MG/3ML; MG/3ML
1 SOLUTION RESPIRATORY (INHALATION) EVERY 4 HOURS PRN
Status: DISCONTINUED | OUTPATIENT
Start: 2020-01-01 | End: 2020-01-01 | Stop reason: HOSPADM

## 2020-01-01 RX ORDER — ACETAMINOPHEN 650 MG/1
650 SUPPOSITORY RECTAL EVERY 4 HOURS PRN
Status: DISCONTINUED | OUTPATIENT
Start: 2020-01-01 | End: 2020-01-01 | Stop reason: HOSPADM

## 2020-01-01 RX ORDER — DOXAZOSIN MESYLATE 1 MG/1
1 TABLET ORAL NIGHTLY
Status: DISCONTINUED | OUTPATIENT
Start: 2020-01-01 | End: 2020-01-01 | Stop reason: HOSPADM

## 2020-01-01 RX ORDER — IPRATROPIUM BROMIDE AND ALBUTEROL SULFATE 2.5; .5 MG/3ML; MG/3ML
1 SOLUTION RESPIRATORY (INHALATION)
Status: DISCONTINUED | OUTPATIENT
Start: 2020-01-01 | End: 2020-01-01

## 2020-01-01 RX ORDER — FERROUS SULFATE 325(65) MG
325 TABLET ORAL
Qty: 30 TABLET | Refills: 0 | Status: ON HOLD
Start: 2020-01-01 | End: 2020-01-01 | Stop reason: HOSPADM

## 2020-01-01 RX ORDER — LOSARTAN POTASSIUM 50 MG/1
50 TABLET ORAL DAILY
Status: DISCONTINUED | OUTPATIENT
Start: 2020-01-01 | End: 2020-01-01

## 2020-01-01 RX ORDER — LEUCINE, PHENYLALANINE, LYSINE, METHIONINE, ISOLEUCINE, VALINE, HISTIDINE, THREONINE, TRYPTOPHAN, ALANINE, GLYCINE, ARGININE, PROLINE, SERINE, TYROSINE, DEXTROSE 311; 238; 247; 170; 255; 247; 204; 179; 77; 880; 438; 489; 289; 213; 17; 5 MG/100ML; MG/100ML; MG/100ML; MG/100ML; MG/100ML; MG/100ML; MG/100ML; MG/100ML; MG/100ML; MG/100ML; MG/100ML; MG/100ML; MG/100ML; MG/100ML; MG/100ML; G/100ML
2000 INJECTION INTRAVENOUS
Status: DISPENSED | OUTPATIENT
Start: 2020-01-01 | End: 2020-01-01

## 2020-01-01 RX ORDER — PANTOPRAZOLE SODIUM 40 MG/1
40 TABLET, DELAYED RELEASE ORAL
Qty: 30 TABLET | Refills: 3 | Status: ON HOLD
Start: 2020-01-01 | End: 2020-01-01 | Stop reason: HOSPADM

## 2020-01-01 RX ORDER — MORPHINE SULFATE 2 MG/ML
2 INJECTION, SOLUTION INTRAMUSCULAR; INTRAVENOUS
Status: DISCONTINUED | OUTPATIENT
Start: 2020-01-01 | End: 2020-01-01 | Stop reason: HOSPADM

## 2020-01-01 RX ORDER — HYDRALAZINE HYDROCHLORIDE 25 MG/1
25 TABLET, FILM COATED ORAL EVERY 6 HOURS PRN
Status: DISCONTINUED | OUTPATIENT
Start: 2020-01-01 | End: 2020-01-01 | Stop reason: HOSPADM

## 2020-01-01 RX ORDER — SODIUM CHLORIDE 0.9 % (FLUSH) 0.9 %
10 SYRINGE (ML) INJECTION EVERY 12 HOURS SCHEDULED
Status: DISCONTINUED | OUTPATIENT
Start: 2020-01-01 | End: 2020-01-01

## 2020-01-01 RX ORDER — ACETAMINOPHEN 160 MG/5ML
650 SOLUTION ORAL EVERY 4 HOURS PRN
Status: DISCONTINUED | OUTPATIENT
Start: 2020-01-01 | End: 2020-01-01

## 2020-01-01 RX ORDER — FERROUS SULFATE 325(65) MG
325 TABLET ORAL
Qty: 30 TABLET | Refills: 3 | Status: ON HOLD
Start: 2020-01-01 | End: 2020-01-01 | Stop reason: HOSPADM

## 2020-01-01 RX ORDER — CLOPIDOGREL BISULFATE 75 MG/1
75 TABLET ORAL DAILY
Qty: 30 TABLET | Refills: 0 | Status: ON HOLD | OUTPATIENT
Start: 2020-01-01 | End: 2020-01-01 | Stop reason: HOSPADM

## 2020-01-01 RX ORDER — SENNOSIDES 8.8 MG/5ML
30 LIQUID ORAL ONCE
Status: COMPLETED | OUTPATIENT
Start: 2020-01-01 | End: 2020-01-01

## 2020-01-01 RX ORDER — LIDOCAINE HYDROCHLORIDE 10 MG/ML
INJECTION, SOLUTION INFILTRATION; PERINEURAL PRN
Status: DISCONTINUED | OUTPATIENT
Start: 2020-01-01 | End: 2020-01-01 | Stop reason: SDUPTHER

## 2020-01-01 RX ORDER — CLOPIDOGREL BISULFATE 75 MG/1
75 TABLET ORAL DAILY
Status: DISCONTINUED | OUTPATIENT
Start: 2020-01-01 | End: 2020-01-01

## 2020-01-01 RX ORDER — HYDRALAZINE HYDROCHLORIDE 20 MG/ML
5 INJECTION INTRAMUSCULAR; INTRAVENOUS EVERY 6 HOURS PRN
Status: CANCELLED | OUTPATIENT
Start: 2020-01-01

## 2020-01-01 RX ORDER — MAGNESIUM SULFATE IN WATER 40 MG/ML
2 INJECTION, SOLUTION INTRAVENOUS ONCE
Status: COMPLETED | OUTPATIENT
Start: 2020-01-01 | End: 2020-01-01

## 2020-01-01 RX ORDER — DOCUSATE SODIUM 100 MG/1
100 CAPSULE, LIQUID FILLED ORAL DAILY
Status: DISCONTINUED | OUTPATIENT
Start: 2020-01-01 | End: 2020-01-01 | Stop reason: SDUPTHER

## 2020-01-01 RX ORDER — POLYETHYLENE GLYCOL 3350 17 G/17G
17 POWDER, FOR SOLUTION ORAL DAILY PRN
Qty: 527 G | Refills: 1 | Status: SHIPPED | OUTPATIENT
Start: 2020-01-01 | End: 2020-01-01

## 2020-01-01 RX ORDER — PREDNISONE 20 MG/1
20 TABLET ORAL DAILY
Status: COMPLETED | OUTPATIENT
Start: 2020-01-01 | End: 2020-01-01

## 2020-01-01 RX ORDER — ASPIRIN 81 MG/1
81 TABLET ORAL DAILY
Qty: 30 TABLET | Refills: 0 | Status: ON HOLD | OUTPATIENT
Start: 2020-01-01 | End: 2020-01-01 | Stop reason: HOSPADM

## 2020-01-01 RX ORDER — LANOLIN ALCOHOL/MO/W.PET/CERES
3 CREAM (GRAM) TOPICAL NIGHTLY PRN
Status: DISCONTINUED | OUTPATIENT
Start: 2020-01-01 | End: 2020-01-01

## 2020-01-01 RX ORDER — ALBUTEROL SULFATE 2.5 MG/3ML
2.5 SOLUTION RESPIRATORY (INHALATION) 4 TIMES DAILY
Status: DISCONTINUED | OUTPATIENT
Start: 2020-01-01 | End: 2020-01-01 | Stop reason: HOSPADM

## 2020-01-01 RX ORDER — METHYLPREDNISOLONE SODIUM SUCCINATE 125 MG/2ML
60 INJECTION, POWDER, LYOPHILIZED, FOR SOLUTION INTRAMUSCULAR; INTRAVENOUS ONCE
Status: COMPLETED | OUTPATIENT
Start: 2020-01-01 | End: 2020-01-01

## 2020-01-01 RX ORDER — ISOSORBIDE MONONITRATE 30 MG/1
30 TABLET, EXTENDED RELEASE ORAL DAILY
Status: DISCONTINUED | OUTPATIENT
Start: 2020-01-01 | End: 2020-01-01 | Stop reason: HOSPADM

## 2020-01-01 RX ORDER — FERROUS SULFATE 325(65) MG
325 TABLET ORAL
Status: DISCONTINUED | OUTPATIENT
Start: 2020-01-01 | End: 2020-01-01 | Stop reason: SDUPTHER

## 2020-01-01 RX ADMIN — METOPROLOL TARTRATE 12.5 MG: 25 TABLET ORAL at 08:50

## 2020-01-01 RX ADMIN — ALBUTEROL SULFATE 2.5 MG: 2.5 SOLUTION RESPIRATORY (INHALATION) at 08:03

## 2020-01-01 RX ADMIN — POTASSIUM CHLORIDE 10 MEQ: 7.46 INJECTION, SOLUTION INTRAVENOUS at 19:34

## 2020-01-01 RX ADMIN — Medication 30 MG: at 09:15

## 2020-01-01 RX ADMIN — ASPIRIN 81 MG 81 MG: 81 TABLET ORAL at 10:19

## 2020-01-01 RX ADMIN — LOSARTAN POTASSIUM 100 MG: 100 TABLET, FILM COATED ORAL at 08:50

## 2020-01-01 RX ADMIN — METOPROLOL TARTRATE 12.5 MG: 25 TABLET ORAL at 07:57

## 2020-01-01 RX ADMIN — POTASSIUM CHLORIDE 10 MEQ: 7.46 INJECTION, SOLUTION INTRAVENOUS at 15:22

## 2020-01-01 RX ADMIN — SODIUM CHLORIDE, PRESERVATIVE FREE 10 ML: 5 INJECTION INTRAVENOUS at 09:14

## 2020-01-01 RX ADMIN — CARVEDILOL 3.12 MG: 3.12 TABLET, FILM COATED ORAL at 18:37

## 2020-01-01 RX ADMIN — PANTOPRAZOLE SODIUM 40 MG: 40 TABLET, DELAYED RELEASE ORAL at 05:58

## 2020-01-01 RX ADMIN — ASPIRIN 81 MG 324 MG: 81 TABLET ORAL at 14:49

## 2020-01-01 RX ADMIN — Medication 10 ML: at 12:31

## 2020-01-01 RX ADMIN — CARVEDILOL 3.12 MG: 3.12 TABLET, FILM COATED ORAL at 08:55

## 2020-01-01 RX ADMIN — ASPIRIN 300 MG: 300 SUPPOSITORY RECTAL at 08:25

## 2020-01-01 RX ADMIN — Medication 30 MG: at 08:06

## 2020-01-01 RX ADMIN — TAMSULOSIN HYDROCHLORIDE 0.4 MG: 0.4 CAPSULE ORAL at 20:33

## 2020-01-01 RX ADMIN — SODIUM CHLORIDE 20 ML: 9 INJECTION, SOLUTION INTRAVENOUS at 20:56

## 2020-01-01 RX ADMIN — ENOXAPARIN SODIUM 40 MG: 40 INJECTION SUBCUTANEOUS at 18:04

## 2020-01-01 RX ADMIN — SERTRALINE HYDROCHLORIDE 50 MG: 50 TABLET ORAL at 08:55

## 2020-01-01 RX ADMIN — PIPERACILLIN AND TAZOBACTAM 3.38 G: 3; .375 INJECTION, POWDER, FOR SOLUTION INTRAVENOUS at 04:11

## 2020-01-01 RX ADMIN — IPRATROPIUM BROMIDE AND ALBUTEROL SULFATE 3 ML: .5; 3 SOLUTION RESPIRATORY (INHALATION) at 12:32

## 2020-01-01 RX ADMIN — FINASTERIDE 5 MG: 5 TABLET, FILM COATED ORAL at 09:42

## 2020-01-01 RX ADMIN — POTASSIUM CHLORIDE 20 MEQ: 400 INJECTION, SOLUTION INTRAVENOUS at 12:43

## 2020-01-01 RX ADMIN — SODIUM CHLORIDE 8 MG/HR: 9 INJECTION, SOLUTION INTRAVENOUS at 19:16

## 2020-01-01 RX ADMIN — PIPERACILLIN AND TAZOBACTAM 3.38 G: 3; .375 INJECTION, POWDER, FOR SOLUTION INTRAVENOUS at 00:30

## 2020-01-01 RX ADMIN — NYSTATIN 500000 UNITS: 100000 SUSPENSION ORAL at 12:31

## 2020-01-01 RX ADMIN — ATORVASTATIN CALCIUM 80 MG: 80 TABLET, FILM COATED ORAL at 21:56

## 2020-01-01 RX ADMIN — SODIUM CHLORIDE: 9 INJECTION, SOLUTION INTRAVENOUS at 11:19

## 2020-01-01 RX ADMIN — FERROUS SULFATE TAB 325 MG (65 MG ELEMENTAL FE) 325 MG: 325 (65 FE) TAB at 08:56

## 2020-01-01 RX ADMIN — CEFEPIME HYDROCHLORIDE 1 G: 1 INJECTION, POWDER, FOR SOLUTION INTRAMUSCULAR; INTRAVENOUS at 16:28

## 2020-01-01 RX ADMIN — NYSTATIN 500000 UNITS: 100000 SUSPENSION ORAL at 19:48

## 2020-01-01 RX ADMIN — FERROUS SULFATE TAB 325 MG (65 MG ELEMENTAL FE) 325 MG: 325 (65 FE) TAB at 07:58

## 2020-01-01 RX ADMIN — SODIUM CHLORIDE: 9 INJECTION, SOLUTION INTRAVENOUS at 16:30

## 2020-01-01 RX ADMIN — IPRATROPIUM BROMIDE AND ALBUTEROL SULFATE 3 ML: .5; 3 SOLUTION RESPIRATORY (INHALATION) at 02:14

## 2020-01-01 RX ADMIN — PIPERACILLIN AND TAZOBACTAM 3.38 G: 3; .375 INJECTION, POWDER, FOR SOLUTION INTRAVENOUS at 23:37

## 2020-01-01 RX ADMIN — NYSTATIN 500000 UNITS: 100000 SUSPENSION ORAL at 17:23

## 2020-01-01 RX ADMIN — INSULIN LISPRO 1 UNITS: 100 INJECTION, SOLUTION INTRAVENOUS; SUBCUTANEOUS at 18:08

## 2020-01-01 RX ADMIN — FERROUS SULFATE TAB 325 MG (65 MG ELEMENTAL FE) 325 MG: 325 (65 FE) TAB at 07:57

## 2020-01-01 RX ADMIN — HYDRALAZINE HYDROCHLORIDE 5 MG: 20 INJECTION INTRAMUSCULAR; INTRAVENOUS at 04:26

## 2020-01-01 RX ADMIN — SERTRALINE HYDROCHLORIDE 50 MG: 50 TABLET ORAL at 08:01

## 2020-01-01 RX ADMIN — SODIUM CHLORIDE: 9 INJECTION, SOLUTION INTRAVENOUS at 06:02

## 2020-01-01 RX ADMIN — FERROUS SULFATE TAB 325 MG (65 MG ELEMENTAL FE) 325 MG: 325 (65 FE) TAB at 12:18

## 2020-01-01 RX ADMIN — TAMSULOSIN HYDROCHLORIDE 0.4 MG: 0.4 CAPSULE ORAL at 10:12

## 2020-01-01 RX ADMIN — ISOSORBIDE MONONITRATE 30 MG: 30 TABLET ORAL at 08:55

## 2020-01-01 RX ADMIN — CARVEDILOL 3.12 MG: 3.12 TABLET, FILM COATED ORAL at 16:55

## 2020-01-01 RX ADMIN — SERTRALINE 50 MG: 50 TABLET, FILM COATED ORAL at 10:11

## 2020-01-01 RX ADMIN — SODIUM CHLORIDE 8 MG/HR: 9 INJECTION, SOLUTION INTRAVENOUS at 13:03

## 2020-01-01 RX ADMIN — SODIUM CHLORIDE 80 MG: 9 INJECTION, SOLUTION INTRAVENOUS at 12:06

## 2020-01-01 RX ADMIN — FERROUS SULFATE TAB 325 MG (65 MG ELEMENTAL FE) 325 MG: 325 (65 FE) TAB at 15:25

## 2020-01-01 RX ADMIN — SERTRALINE HYDROCHLORIDE 50 MG: 50 TABLET ORAL at 09:02

## 2020-01-01 RX ADMIN — ATORVASTATIN CALCIUM 80 MG: 80 TABLET, FILM COATED ORAL at 20:18

## 2020-01-01 RX ADMIN — POTASSIUM CHLORIDE 20 MEQ: 29.8 INJECTION, SOLUTION INTRAVENOUS at 08:54

## 2020-01-01 RX ADMIN — OXYMETAZOLINE HYDROCHLORIDE 2 SPRAY: 0.05 SPRAY NASAL at 16:02

## 2020-01-01 RX ADMIN — NYSTATIN 500000 UNITS: 100000 SUSPENSION ORAL at 16:30

## 2020-01-01 RX ADMIN — ONDANSETRON HYDROCHLORIDE 4 MG: 4 INJECTION, SOLUTION INTRAMUSCULAR; INTRAVENOUS at 16:00

## 2020-01-01 RX ADMIN — CEFEPIME HYDROCHLORIDE 1 G: 1 INJECTION, POWDER, FOR SOLUTION INTRAMUSCULAR; INTRAVENOUS at 20:22

## 2020-01-01 RX ADMIN — SODIUM CHLORIDE, PRESERVATIVE FREE 10 ML: 5 INJECTION INTRAVENOUS at 22:05

## 2020-01-01 RX ADMIN — CEFEPIME HYDROCHLORIDE 1 G: 1 INJECTION, POWDER, FOR SOLUTION INTRAMUSCULAR; INTRAVENOUS at 05:26

## 2020-01-01 RX ADMIN — FINASTERIDE 5 MG: 5 TABLET, FILM COATED ORAL at 08:55

## 2020-01-01 RX ADMIN — ONDANSETRON 4 MG: 2 INJECTION INTRAMUSCULAR; INTRAVENOUS at 21:48

## 2020-01-01 RX ADMIN — PREDNISONE 40 MG: 20 TABLET ORAL at 09:30

## 2020-01-01 RX ADMIN — PIPERACILLIN AND TAZOBACTAM 3.38 G: 3; .375 INJECTION, POWDER, FOR SOLUTION INTRAVENOUS at 07:04

## 2020-01-01 RX ADMIN — PIPERACILLIN AND TAZOBACTAM 3.38 G: 3; .375 INJECTION, POWDER, FOR SOLUTION INTRAVENOUS at 09:17

## 2020-01-01 RX ADMIN — ATORVASTATIN CALCIUM 80 MG: 80 TABLET, FILM COATED ORAL at 20:55

## 2020-01-01 RX ADMIN — SODIUM CHLORIDE, PRESERVATIVE FREE 10 ML: 5 INJECTION INTRAVENOUS at 20:01

## 2020-01-01 RX ADMIN — ISOSORBIDE MONONITRATE 30 MG: 30 TABLET ORAL at 11:35

## 2020-01-01 RX ADMIN — CEFEPIME HYDROCHLORIDE 1 G: 1 INJECTION, POWDER, FOR SOLUTION INTRAMUSCULAR; INTRAVENOUS at 12:48

## 2020-01-01 RX ADMIN — SERTRALINE 50 MG: 50 TABLET, FILM COATED ORAL at 08:55

## 2020-01-01 RX ADMIN — PIPERACILLIN AND TAZOBACTAM 3.38 G: 3; .375 INJECTION, POWDER, FOR SOLUTION INTRAVENOUS at 09:47

## 2020-01-01 RX ADMIN — ATORVASTATIN CALCIUM 80 MG: 80 TABLET, FILM COATED ORAL at 21:03

## 2020-01-01 RX ADMIN — Medication 10 ML: at 09:48

## 2020-01-01 RX ADMIN — SODIUM CHLORIDE: 9 INJECTION, SOLUTION INTRAVENOUS at 13:35

## 2020-01-01 RX ADMIN — CARVEDILOL 3.12 MG: 3.12 TABLET, FILM COATED ORAL at 09:42

## 2020-01-01 RX ADMIN — NYSTATIN 500000 UNITS: 100000 SUSPENSION ORAL at 20:47

## 2020-01-01 RX ADMIN — TAMSULOSIN HYDROCHLORIDE 0.4 MG: 0.4 CAPSULE ORAL at 09:11

## 2020-01-01 RX ADMIN — SODIUM CHLORIDE, PRESERVATIVE FREE 10 ML: 5 INJECTION INTRAVENOUS at 09:10

## 2020-01-01 RX ADMIN — ACETAMINOPHEN ORAL SOLUTION 650 MG: 650 SOLUTION ORAL at 15:02

## 2020-01-01 RX ADMIN — CALCIUM GLUCONATE: 98 INJECTION, SOLUTION INTRAVENOUS at 18:06

## 2020-01-01 RX ADMIN — ISOSORBIDE MONONITRATE 30 MG: 30 TABLET ORAL at 08:50

## 2020-01-01 RX ADMIN — Medication 10 ML: at 20:17

## 2020-01-01 RX ADMIN — TAMSULOSIN HYDROCHLORIDE 0.4 MG: 0.4 CAPSULE ORAL at 21:20

## 2020-01-01 RX ADMIN — FERROUS SULFATE TAB 325 MG (65 MG ELEMENTAL FE) 325 MG: 325 (65 FE) TAB at 17:03

## 2020-01-01 RX ADMIN — ASPIRIN 81 MG 81 MG: 81 TABLET ORAL at 08:00

## 2020-01-01 RX ADMIN — PANTOPRAZOLE SODIUM 40 MG: 40 INJECTION, POWDER, FOR SOLUTION INTRAVENOUS at 09:49

## 2020-01-01 RX ADMIN — PROPOFOL 80 MG: 10 INJECTION, EMULSION INTRAVENOUS at 15:41

## 2020-01-01 RX ADMIN — PIPERACILLIN AND TAZOBACTAM 3.38 G: 3; .375 INJECTION, POWDER, FOR SOLUTION INTRAVENOUS at 23:46

## 2020-01-01 RX ADMIN — PANTOPRAZOLE SODIUM 40 MG: 40 TABLET, DELAYED RELEASE ORAL at 09:11

## 2020-01-01 RX ADMIN — SERTRALINE 50 MG: 50 TABLET, FILM COATED ORAL at 08:50

## 2020-01-01 RX ADMIN — Medication 10 ML: at 20:00

## 2020-01-01 RX ADMIN — INSULIN LISPRO 1 UNITS: 100 INJECTION, SOLUTION INTRAVENOUS; SUBCUTANEOUS at 09:27

## 2020-01-01 RX ADMIN — INSULIN LISPRO 2 UNITS: 100 INJECTION, SOLUTION INTRAVENOUS; SUBCUTANEOUS at 16:59

## 2020-01-01 RX ADMIN — PANTOPRAZOLE SODIUM 40 MG: 40 TABLET, DELAYED RELEASE ORAL at 07:57

## 2020-01-01 RX ADMIN — SODIUM CHLORIDE 8 MG/HR: 9 INJECTION, SOLUTION INTRAVENOUS at 01:34

## 2020-01-01 RX ADMIN — CALCIUM GLUCONATE: 98 INJECTION, SOLUTION INTRAVENOUS at 18:33

## 2020-01-01 RX ADMIN — FUROSEMIDE 20 MG: 10 INJECTION, SOLUTION INTRAMUSCULAR; INTRAVENOUS at 05:48

## 2020-01-01 RX ADMIN — SERTRALINE 50 MG: 50 TABLET, FILM COATED ORAL at 09:30

## 2020-01-01 RX ADMIN — SODIUM CHLORIDE, PRESERVATIVE FREE 10 ML: 5 INJECTION INTRAVENOUS at 22:37

## 2020-01-01 RX ADMIN — NYSTATIN 500000 UNITS: 100000 SUSPENSION ORAL at 21:03

## 2020-01-01 RX ADMIN — SERTRALINE HYDROCHLORIDE 50 MG: 50 TABLET ORAL at 11:37

## 2020-01-01 RX ADMIN — SODIUM CHLORIDE: 9 INJECTION, SOLUTION INTRAVENOUS at 03:46

## 2020-01-01 RX ADMIN — FERROUS SULFATE TAB 325 MG (65 MG ELEMENTAL FE) 325 MG: 325 (65 FE) TAB at 10:19

## 2020-01-01 RX ADMIN — PIPERACILLIN AND TAZOBACTAM 3.38 G: 3; .375 INJECTION, POWDER, FOR SOLUTION INTRAVENOUS at 13:38

## 2020-01-01 RX ADMIN — NYSTATIN 500000 UNITS: 100000 SUSPENSION ORAL at 16:54

## 2020-01-01 RX ADMIN — SODIUM CHLORIDE: 9 INJECTION, SOLUTION INTRAVENOUS at 06:07

## 2020-01-01 RX ADMIN — SODIUM CHLORIDE 8 MG/HR: 9 INJECTION, SOLUTION INTRAVENOUS at 05:32

## 2020-01-01 RX ADMIN — POTASSIUM CHLORIDE 10 MEQ: 7.46 INJECTION, SOLUTION INTRAVENOUS at 11:25

## 2020-01-01 RX ADMIN — Medication 30 MG: at 12:30

## 2020-01-01 RX ADMIN — Medication 10 ML: at 21:02

## 2020-01-01 RX ADMIN — SODIUM PHOSPHATE, MONOBASIC, MONOHYDRATE 10 MMOL: 276; 142 INJECTION, SOLUTION INTRAVENOUS at 13:11

## 2020-01-01 RX ADMIN — Medication 3 MG: at 23:20

## 2020-01-01 RX ADMIN — VANCOMYCIN HYDROCHLORIDE 1000 MG: 1 INJECTION, POWDER, LYOPHILIZED, FOR SOLUTION INTRAVENOUS at 12:56

## 2020-01-01 RX ADMIN — IOPAMIDOL 85 ML: 755 INJECTION, SOLUTION INTRAVENOUS at 14:58

## 2020-01-01 RX ADMIN — SERTRALINE 50 MG: 50 TABLET, FILM COATED ORAL at 09:13

## 2020-01-01 RX ADMIN — ENOXAPARIN SODIUM 40 MG: 40 INJECTION SUBCUTANEOUS at 12:50

## 2020-01-01 RX ADMIN — ATORVASTATIN CALCIUM 80 MG: 80 TABLET, FILM COATED ORAL at 19:49

## 2020-01-01 RX ADMIN — DOCUSATE SODIUM 100 MG: 100 CAPSULE, LIQUID FILLED ORAL at 21:03

## 2020-01-01 RX ADMIN — NYSTATIN 500000 UNITS: 100000 SUSPENSION ORAL at 09:42

## 2020-01-01 RX ADMIN — DOCUSATE SODIUM 100 MG: 50 LIQUID ORAL at 11:16

## 2020-01-01 RX ADMIN — SODIUM CHLORIDE 8 MG/HR: 9 INJECTION, SOLUTION INTRAVENOUS at 16:48

## 2020-01-01 RX ADMIN — NYSTATIN 500000 UNITS: 100000 SUSPENSION ORAL at 21:36

## 2020-01-01 RX ADMIN — FINASTERIDE 5 MG: 5 TABLET, FILM COATED ORAL at 11:35

## 2020-01-01 RX ADMIN — PROPOFOL 20 MG: 10 INJECTION, EMULSION INTRAVENOUS at 16:16

## 2020-01-01 RX ADMIN — Medication 10 ML: at 09:42

## 2020-01-01 RX ADMIN — ACETAMINOPHEN 650 MG: 650 SUPPOSITORY RECTAL at 09:07

## 2020-01-01 RX ADMIN — METHYLPREDNISOLONE SODIUM SUCCINATE 40 MG: 40 INJECTION, POWDER, FOR SOLUTION INTRAMUSCULAR; INTRAVENOUS at 20:34

## 2020-01-01 RX ADMIN — LEUCINE, PHENYLALANINE, LYSINE, METHIONINE, ISOLEUCINE, VALINE, HISTIDINE, THREONINE, TRYPTOPHAN, ALANINE, GLYCINE, ARGININE, PROLINE, SERINE, TYROSINE, DEXTROSE 2000 ML: 311; 238; 247; 170; 255; 247; 204; 179; 77; 880; 438; 489; 289; 213; 17; 5 INJECTION INTRAVENOUS at 18:07

## 2020-01-01 RX ADMIN — Medication 3 MG: at 19:50

## 2020-01-01 RX ADMIN — INSULIN LISPRO 2 UNITS: 100 INJECTION, SOLUTION INTRAVENOUS; SUBCUTANEOUS at 12:41

## 2020-01-01 RX ADMIN — SODIUM CHLORIDE: 9 INJECTION, SOLUTION INTRAVENOUS at 14:57

## 2020-01-01 RX ADMIN — ISOSORBIDE MONONITRATE 30 MG: 30 TABLET ORAL at 13:26

## 2020-01-01 RX ADMIN — POLYETHYLENE GLYCOL 3350 238 G: 17 POWDER, FOR SOLUTION ORAL at 17:04

## 2020-01-01 RX ADMIN — CEFEPIME HYDROCHLORIDE 1 G: 1 INJECTION, POWDER, FOR SOLUTION INTRAMUSCULAR; INTRAVENOUS at 21:04

## 2020-01-01 RX ADMIN — DOCUSATE SODIUM 100 MG: 50 LIQUID ORAL at 16:16

## 2020-01-01 RX ADMIN — TAMSULOSIN HYDROCHLORIDE 0.4 MG: 0.4 CAPSULE ORAL at 07:57

## 2020-01-01 RX ADMIN — SODIUM CHLORIDE, PRESERVATIVE FREE 10 ML: 5 INJECTION INTRAVENOUS at 00:51

## 2020-01-01 RX ADMIN — SERTRALINE HYDROCHLORIDE 50 MG: 50 TABLET ORAL at 09:42

## 2020-01-01 RX ADMIN — Medication 10 ML: at 08:08

## 2020-01-01 RX ADMIN — IPRATROPIUM BROMIDE AND ALBUTEROL SULFATE 3 ML: .5; 3 SOLUTION RESPIRATORY (INHALATION) at 14:13

## 2020-01-01 RX ADMIN — Medication 10 ML: at 08:50

## 2020-01-01 RX ADMIN — SODIUM CHLORIDE 500 ML: 9 INJECTION, SOLUTION INTRAVENOUS at 12:03

## 2020-01-01 RX ADMIN — NYSTATIN 500000 UNITS: 100000 SUSPENSION ORAL at 20:06

## 2020-01-01 RX ADMIN — HYDRALAZINE HYDROCHLORIDE 5 MG: 20 INJECTION INTRAMUSCULAR; INTRAVENOUS at 20:35

## 2020-01-01 RX ADMIN — NYSTATIN 500000 UNITS: 100000 SUSPENSION ORAL at 20:22

## 2020-01-01 RX ADMIN — FERROUS SULFATE TAB 325 MG (65 MG ELEMENTAL FE) 325 MG: 325 (65 FE) TAB at 09:13

## 2020-01-01 RX ADMIN — ACETAMINOPHEN 650 MG: 650 SUPPOSITORY RECTAL at 23:49

## 2020-01-01 RX ADMIN — FINASTERIDE 5 MG: 5 TABLET, FILM COATED ORAL at 11:37

## 2020-01-01 RX ADMIN — ISOSORBIDE MONONITRATE 30 MG: 30 TABLET ORAL at 10:09

## 2020-01-01 RX ADMIN — FUROSEMIDE 20 MG: 10 INJECTION, SOLUTION INTRAMUSCULAR; INTRAVENOUS at 18:41

## 2020-01-01 RX ADMIN — INSULIN LISPRO 1 UNITS: 100 INJECTION, SOLUTION INTRAVENOUS; SUBCUTANEOUS at 17:48

## 2020-01-01 RX ADMIN — FERROUS SULFATE TAB 325 MG (65 MG ELEMENTAL FE) 325 MG: 325 (65 FE) TAB at 13:15

## 2020-01-01 RX ADMIN — PREDNISONE 40 MG: 20 TABLET ORAL at 07:57

## 2020-01-01 RX ADMIN — Medication 10 ML: at 09:26

## 2020-01-01 RX ADMIN — Medication 52.8 MG: at 17:47

## 2020-01-01 RX ADMIN — POTASSIUM CHLORIDE 10 MEQ: 7.46 INJECTION, SOLUTION INTRAVENOUS at 13:24

## 2020-01-01 RX ADMIN — ATORVASTATIN CALCIUM 80 MG: 80 TABLET, FILM COATED ORAL at 21:06

## 2020-01-01 RX ADMIN — SODIUM CHLORIDE, PRESERVATIVE FREE 10 ML: 5 INJECTION INTRAVENOUS at 07:57

## 2020-01-01 RX ADMIN — PREDNISONE 20 MG: 20 TABLET ORAL at 10:02

## 2020-01-01 RX ADMIN — CALCIUM GLUCONATE: 98 INJECTION, SOLUTION INTRAVENOUS at 17:53

## 2020-01-01 RX ADMIN — CARVEDILOL 3.12 MG: 3.12 TABLET, FILM COATED ORAL at 10:19

## 2020-01-01 RX ADMIN — METHYLPREDNISOLONE SODIUM SUCCINATE 60 MG: 125 INJECTION, POWDER, FOR SOLUTION INTRAMUSCULAR; INTRAVENOUS at 14:52

## 2020-01-01 RX ADMIN — Medication 10 ML: at 21:39

## 2020-01-01 RX ADMIN — FINASTERIDE 5 MG: 5 TABLET, FILM COATED ORAL at 08:00

## 2020-01-01 RX ADMIN — SODIUM CHLORIDE: 9 INJECTION, SOLUTION INTRAVENOUS at 17:51

## 2020-01-01 RX ADMIN — PIPERACILLIN AND TAZOBACTAM 3.38 G: 3; .375 INJECTION, POWDER, FOR SOLUTION INTRAVENOUS at 12:50

## 2020-01-01 RX ADMIN — TAMSULOSIN HYDROCHLORIDE 0.4 MG: 0.4 CAPSULE ORAL at 20:01

## 2020-01-01 RX ADMIN — ISOSORBIDE MONONITRATE 30 MG: 30 TABLET ORAL at 08:00

## 2020-01-01 RX ADMIN — Medication 10 ML: at 18:37

## 2020-01-01 RX ADMIN — Medication 10 ML: at 12:10

## 2020-01-01 RX ADMIN — SERTRALINE HYDROCHLORIDE 50 MG: 50 TABLET ORAL at 10:19

## 2020-01-01 RX ADMIN — SERTRALINE HYDROCHLORIDE 50 MG: 50 TABLET ORAL at 12:11

## 2020-01-01 RX ADMIN — Medication 10 ML: at 20:52

## 2020-01-01 RX ADMIN — ASPIRIN 81 MG 81 MG: 81 TABLET ORAL at 12:30

## 2020-01-01 RX ADMIN — PIPERACILLIN AND TAZOBACTAM 3.38 G: 3; .375 INJECTION, POWDER, FOR SOLUTION INTRAVENOUS at 00:24

## 2020-01-01 RX ADMIN — PIPERACILLIN AND TAZOBACTAM 3.38 G: 3; .375 INJECTION, POWDER, FOR SOLUTION INTRAVENOUS at 14:50

## 2020-01-01 RX ADMIN — Medication 10 ML: at 10:04

## 2020-01-01 RX ADMIN — PIPERACILLIN AND TAZOBACTAM 3.38 G: 3; .375 INJECTION, POWDER, FOR SOLUTION INTRAVENOUS at 00:13

## 2020-01-01 RX ADMIN — FERROUS SULFATE TAB 325 MG (65 MG ELEMENTAL FE) 325 MG: 325 (65 FE) TAB at 12:11

## 2020-01-01 RX ADMIN — Medication 100 MG: at 15:41

## 2020-01-01 RX ADMIN — INSULIN LISPRO 2 UNITS: 100 INJECTION, SOLUTION INTRAVENOUS; SUBCUTANEOUS at 12:11

## 2020-01-01 RX ADMIN — TAMSULOSIN HYDROCHLORIDE 0.4 MG: 0.4 CAPSULE ORAL at 19:48

## 2020-01-01 RX ADMIN — IPRATROPIUM BROMIDE AND ALBUTEROL SULFATE 1 AMPULE: .5; 3 SOLUTION RESPIRATORY (INHALATION) at 15:34

## 2020-01-01 RX ADMIN — PIPERACILLIN AND TAZOBACTAM 3.38 G: 3; .375 INJECTION, POWDER, FOR SOLUTION INTRAVENOUS at 07:12

## 2020-01-01 RX ADMIN — SODIUM CHLORIDE 8 MG/HR: 9 INJECTION, SOLUTION INTRAVENOUS at 21:58

## 2020-01-01 RX ADMIN — NYSTATIN 500000 UNITS: 100000 SUSPENSION ORAL at 17:03

## 2020-01-01 RX ADMIN — DIATRIZOATE MEGLUMINE AND DIATRIZOATE SODIUM 30 ML: 600; 100 SOLUTION ORAL; RECTAL at 09:05

## 2020-01-01 RX ADMIN — ISOSORBIDE MONONITRATE 30 MG: 30 TABLET ORAL at 07:58

## 2020-01-01 RX ADMIN — ATORVASTATIN CALCIUM 80 MG: 80 TABLET, FILM COATED ORAL at 20:22

## 2020-01-01 RX ADMIN — Medication 10 ML: at 11:35

## 2020-01-01 RX ADMIN — CARVEDILOL 3.12 MG: 3.12 TABLET, FILM COATED ORAL at 09:02

## 2020-01-01 RX ADMIN — POTASSIUM CHLORIDE 20 MEQ: 400 INJECTION, SOLUTION INTRAVENOUS at 11:50

## 2020-01-01 RX ADMIN — WATER 2.2 ML: 1 INJECTION INTRAMUSCULAR; INTRAVENOUS; SUBCUTANEOUS at 13:02

## 2020-01-01 RX ADMIN — PIPERACILLIN AND TAZOBACTAM 3.38 G: 3; .375 INJECTION, POWDER, FOR SOLUTION INTRAVENOUS at 06:27

## 2020-01-01 RX ADMIN — METOPROLOL TARTRATE 12.5 MG: 25 TABLET ORAL at 09:10

## 2020-01-01 RX ADMIN — IPRATROPIUM BROMIDE AND ALBUTEROL SULFATE 1 AMPULE: .5; 3 SOLUTION RESPIRATORY (INHALATION) at 21:37

## 2020-01-01 RX ADMIN — ALBUTEROL SULFATE 2.5 MG: 2.5 SOLUTION RESPIRATORY (INHALATION) at 15:58

## 2020-01-01 RX ADMIN — ISOSORBIDE MONONITRATE 30 MG: 30 TABLET ORAL at 09:13

## 2020-01-01 RX ADMIN — ENOXAPARIN SODIUM 40 MG: 40 INJECTION SUBCUTANEOUS at 22:37

## 2020-01-01 RX ADMIN — FERROUS SULFATE TAB 325 MG (65 MG ELEMENTAL FE) 325 MG: 325 (65 FE) TAB at 13:19

## 2020-01-01 RX ADMIN — SODIUM CHLORIDE: 9 INJECTION, SOLUTION INTRAVENOUS at 00:04

## 2020-01-01 RX ADMIN — NYSTATIN 500000 UNITS: 100000 SUSPENSION ORAL at 16:59

## 2020-01-01 RX ADMIN — CEFEPIME HYDROCHLORIDE 1 G: 1 INJECTION, POWDER, FOR SOLUTION INTRAMUSCULAR; INTRAVENOUS at 04:53

## 2020-01-01 RX ADMIN — FINASTERIDE 5 MG: 5 TABLET, FILM COATED ORAL at 10:19

## 2020-01-01 RX ADMIN — ISOSORBIDE MONONITRATE 30 MG: 30 TABLET ORAL at 09:02

## 2020-01-01 RX ADMIN — ENOXAPARIN SODIUM 40 MG: 40 INJECTION SUBCUTANEOUS at 15:02

## 2020-01-01 RX ADMIN — SODIUM CHLORIDE, PRESERVATIVE FREE 10 ML: 5 INJECTION INTRAVENOUS at 10:13

## 2020-01-01 RX ADMIN — ACETAMINOPHEN 650 MG: 650 SUPPOSITORY RECTAL at 15:55

## 2020-01-01 RX ADMIN — Medication 10 ML: at 21:08

## 2020-01-01 RX ADMIN — NYSTATIN 500000 UNITS: 100000 SUSPENSION ORAL at 16:15

## 2020-01-01 RX ADMIN — METHYLPREDNISOLONE SODIUM SUCCINATE 40 MG: 40 INJECTION, POWDER, FOR SOLUTION INTRAMUSCULAR; INTRAVENOUS at 07:57

## 2020-01-01 RX ADMIN — PIPERACILLIN AND TAZOBACTAM 3.38 G: 3; .375 INJECTION, POWDER, FOR SOLUTION INTRAVENOUS at 21:34

## 2020-01-01 RX ADMIN — NYSTATIN 500000 UNITS: 100000 SUSPENSION ORAL at 08:50

## 2020-01-01 RX ADMIN — SODIUM CHLORIDE 500 ML: 9 INJECTION, SOLUTION INTRAVENOUS at 15:24

## 2020-01-01 RX ADMIN — SODIUM CHLORIDE: 9 INJECTION, SOLUTION INTRAVENOUS at 18:05

## 2020-01-01 RX ADMIN — PREDNISONE 40 MG: 20 TABLET ORAL at 09:11

## 2020-01-01 RX ADMIN — NYSTATIN 500000 UNITS: 100000 SUSPENSION ORAL at 10:19

## 2020-01-01 RX ADMIN — SERTRALINE 50 MG: 50 TABLET, FILM COATED ORAL at 09:17

## 2020-01-01 RX ADMIN — ISOSORBIDE MONONITRATE 30 MG: 30 TABLET ORAL at 11:37

## 2020-01-01 RX ADMIN — ISOSORBIDE MONONITRATE 30 MG: 30 TABLET ORAL at 09:17

## 2020-01-01 RX ADMIN — ISOSORBIDE MONONITRATE 30 MG: 30 TABLET ORAL at 09:30

## 2020-01-01 RX ADMIN — INSULIN LISPRO 2 UNITS: 100 INJECTION, SOLUTION INTRAVENOUS; SUBCUTANEOUS at 06:02

## 2020-01-01 RX ADMIN — PIPERACILLIN AND TAZOBACTAM 3.38 G: 3; .375 INJECTION, POWDER, FOR SOLUTION INTRAVENOUS at 18:11

## 2020-01-01 RX ADMIN — CARVEDILOL 3.12 MG: 3.12 TABLET, FILM COATED ORAL at 20:17

## 2020-01-01 RX ADMIN — SODIUM CHLORIDE: 9 INJECTION, SOLUTION INTRAVENOUS at 15:41

## 2020-01-01 RX ADMIN — Medication 10 ML: at 20:48

## 2020-01-01 RX ADMIN — POTASSIUM PHOSPHATE, MONOBASIC AND POTASSIUM PHOSPHATE, DIBASIC 10 MMOL: 224; 236 INJECTION, SOLUTION, CONCENTRATE INTRAVENOUS at 09:53

## 2020-01-01 RX ADMIN — SODIUM CHLORIDE 8 MG/HR: 9 INJECTION, SOLUTION INTRAVENOUS at 05:28

## 2020-01-01 RX ADMIN — NYSTATIN 500000 UNITS: 100000 SUSPENSION ORAL at 20:17

## 2020-01-01 RX ADMIN — INSULIN LISPRO 1 UNITS: 100 INJECTION, SOLUTION INTRAVENOUS; SUBCUTANEOUS at 18:28

## 2020-01-01 RX ADMIN — Medication 10 ML: at 20:39

## 2020-01-01 RX ADMIN — ASPIRIN 81 MG 81 MG: 81 TABLET ORAL at 12:26

## 2020-01-01 RX ADMIN — CARVEDILOL 3.12 MG: 3.12 TABLET, FILM COATED ORAL at 17:03

## 2020-01-01 RX ADMIN — FERROUS SULFATE TAB 325 MG (65 MG ELEMENTAL FE) 325 MG: 325 (65 FE) TAB at 11:35

## 2020-01-01 RX ADMIN — SODIUM CHLORIDE: 9 INJECTION, SOLUTION INTRAVENOUS at 07:44

## 2020-01-01 RX ADMIN — SODIUM CHLORIDE, PRESERVATIVE FREE 10 ML: 5 INJECTION INTRAVENOUS at 20:02

## 2020-01-01 RX ADMIN — BUMETANIDE 1 MG: 0.25 INJECTION INTRAMUSCULAR; INTRAVENOUS at 07:56

## 2020-01-01 RX ADMIN — SODIUM CHLORIDE, PRESERVATIVE FREE 10 ML: 5 INJECTION INTRAVENOUS at 08:51

## 2020-01-01 RX ADMIN — FERROUS SULFATE TAB 325 MG (65 MG ELEMENTAL FE) 325 MG: 325 (65 FE) TAB at 08:55

## 2020-01-01 RX ADMIN — TAMSULOSIN HYDROCHLORIDE 0.4 MG: 0.4 CAPSULE ORAL at 21:16

## 2020-01-01 RX ADMIN — NYSTATIN 500000 UNITS: 100000 SUSPENSION ORAL at 16:08

## 2020-01-01 RX ADMIN — TAMSULOSIN HYDROCHLORIDE 0.4 MG: 0.4 CAPSULE ORAL at 09:30

## 2020-01-01 RX ADMIN — Medication 10 ML: at 09:53

## 2020-01-01 RX ADMIN — NYSTATIN 500000 UNITS: 100000 SUSPENSION ORAL at 09:47

## 2020-01-01 RX ADMIN — ISOSORBIDE MONONITRATE 30 MG: 30 TABLET ORAL at 10:19

## 2020-01-01 RX ADMIN — ISOSORBIDE MONONITRATE 30 MG: 30 TABLET ORAL at 09:42

## 2020-01-01 RX ADMIN — SERTRALINE HYDROCHLORIDE 50 MG: 50 TABLET ORAL at 11:35

## 2020-01-01 RX ADMIN — PIPERACILLIN AND TAZOBACTAM 3.38 G: 3; .375 INJECTION, POWDER, FOR SOLUTION INTRAVENOUS at 14:11

## 2020-01-01 RX ADMIN — Medication 10 ML: at 09:16

## 2020-01-01 RX ADMIN — NYSTATIN 500000 UNITS: 100000 SUSPENSION ORAL at 13:00

## 2020-01-01 RX ADMIN — Medication 10 ML: at 09:49

## 2020-01-01 RX ADMIN — PIPERACILLIN AND TAZOBACTAM 3.38 G: 3; .375 INJECTION, POWDER, FOR SOLUTION INTRAVENOUS at 20:19

## 2020-01-01 RX ADMIN — Medication 10 ML: at 08:00

## 2020-01-01 RX ADMIN — PANTOPRAZOLE SODIUM 40 MG: 40 TABLET, DELAYED RELEASE ORAL at 04:50

## 2020-01-01 RX ADMIN — CALCIUM GLUCONATE 1 G: 98 INJECTION, SOLUTION INTRAVENOUS at 14:41

## 2020-01-01 RX ADMIN — FERROUS SULFATE TAB 325 MG (65 MG ELEMENTAL FE) 325 MG: 325 (65 FE) TAB at 14:38

## 2020-01-01 RX ADMIN — FERROUS SULFATE TAB 325 MG (65 MG ELEMENTAL FE) 325 MG: 325 (65 FE) TAB at 11:37

## 2020-01-01 RX ADMIN — NYSTATIN 500000 UNITS: 100000 SUSPENSION ORAL at 18:37

## 2020-01-01 RX ADMIN — PIPERACILLIN AND TAZOBACTAM 3.38 G: 3; .375 INJECTION, POWDER, FOR SOLUTION INTRAVENOUS at 02:00

## 2020-01-01 RX ADMIN — NYSTATIN 500000 UNITS: 100000 SUSPENSION ORAL at 12:26

## 2020-01-01 RX ADMIN — DOCUSATE SODIUM 100 MG: 50 LIQUID ORAL at 10:19

## 2020-01-01 RX ADMIN — PIPERACILLIN AND TAZOBACTAM 3.38 G: 3; .375 INJECTION, POWDER, FOR SOLUTION INTRAVENOUS at 04:27

## 2020-01-01 RX ADMIN — SERTRALINE 50 MG: 50 TABLET, FILM COATED ORAL at 07:57

## 2020-01-01 RX ADMIN — LIDOCAINE HYDROCHLORIDE 30 MG: 10 INJECTION, SOLUTION INFILTRATION; PERINEURAL at 15:41

## 2020-01-01 RX ADMIN — NYSTATIN 500000 UNITS: 100000 SUSPENSION ORAL at 13:04

## 2020-01-01 RX ADMIN — ACETAMINOPHEN 650 MG: 325 TABLET ORAL at 09:01

## 2020-01-01 RX ADMIN — IPRATROPIUM BROMIDE AND ALBUTEROL SULFATE 1 AMPULE: .5; 3 SOLUTION RESPIRATORY (INHALATION) at 12:14

## 2020-01-01 RX ADMIN — PREDNISONE 20 MG: 20 TABLET ORAL at 09:17

## 2020-01-01 RX ADMIN — POTASSIUM CHLORIDE 10 MEQ: 7.46 INJECTION, SOLUTION INTRAVENOUS at 20:46

## 2020-01-01 RX ADMIN — METOPROLOL TARTRATE 12.5 MG: 25 TABLET ORAL at 13:26

## 2020-01-01 RX ADMIN — SODIUM CHLORIDE 8 MG/HR: 9 INJECTION, SOLUTION INTRAVENOUS at 22:48

## 2020-01-01 RX ADMIN — Medication 3 MG: at 21:16

## 2020-01-01 RX ADMIN — SODIUM CHLORIDE 8 MG/HR: 9 INJECTION, SOLUTION INTRAVENOUS at 11:51

## 2020-01-01 RX ADMIN — INSULIN LISPRO 2 UNITS: 100 INJECTION, SOLUTION INTRAVENOUS; SUBCUTANEOUS at 00:13

## 2020-01-01 RX ADMIN — NYSTATIN 500000 UNITS: 100000 SUSPENSION ORAL at 08:55

## 2020-01-01 RX ADMIN — SODIUM CHLORIDE: 9 INJECTION, SOLUTION INTRAVENOUS at 09:29

## 2020-01-01 RX ADMIN — DIPHENHYDRAMINE HYDROCHLORIDE 25 MG: 50 INJECTION, SOLUTION INTRAMUSCULAR; INTRAVENOUS at 14:52

## 2020-01-01 RX ADMIN — PIPERACILLIN AND TAZOBACTAM 3.38 G: 3; .375 INJECTION, POWDER, FOR SOLUTION INTRAVENOUS at 14:55

## 2020-01-01 RX ADMIN — Medication 10 ML: at 16:16

## 2020-01-01 RX ADMIN — Medication 10 ML: at 08:56

## 2020-01-01 RX ADMIN — SERTRALINE 50 MG: 50 TABLET, FILM COATED ORAL at 09:11

## 2020-01-01 RX ADMIN — PANTOPRAZOLE SODIUM 40 MG: 40 TABLET, DELAYED RELEASE ORAL at 06:44

## 2020-01-01 RX ADMIN — FERROUS SULFATE TAB 325 MG (65 MG ELEMENTAL FE) 325 MG: 325 (65 FE) TAB at 12:30

## 2020-01-01 RX ADMIN — PANTOPRAZOLE SODIUM 40 MG: 40 TABLET, DELAYED RELEASE ORAL at 04:51

## 2020-01-01 RX ADMIN — ALBUTEROL SULFATE 2.5 MG: 2.5 SOLUTION RESPIRATORY (INHALATION) at 16:00

## 2020-01-01 RX ADMIN — NYSTATIN 500000 UNITS: 100000 SUSPENSION ORAL at 17:00

## 2020-01-01 RX ADMIN — NYSTATIN 500000 UNITS: 100000 SUSPENSION ORAL at 16:16

## 2020-01-01 RX ADMIN — NYSTATIN 500000 UNITS: 100000 SUSPENSION ORAL at 12:09

## 2020-01-01 RX ADMIN — SODIUM CHLORIDE, PRESERVATIVE FREE 10 ML: 5 INJECTION INTRAVENOUS at 19:48

## 2020-01-01 RX ADMIN — SODIUM CHLORIDE: 9 INJECTION, SOLUTION INTRAVENOUS at 04:35

## 2020-01-01 RX ADMIN — LIDOCAINE HYDROCHLORIDE 100 MG: 20 INJECTION, SOLUTION INTRAVENOUS at 16:08

## 2020-01-01 RX ADMIN — PIPERACILLIN AND TAZOBACTAM 3.38 G: 3; .375 INJECTION, POWDER, FOR SOLUTION INTRAVENOUS at 08:37

## 2020-01-01 RX ADMIN — Medication 10 ML: at 20:01

## 2020-01-01 RX ADMIN — ATORVASTATIN CALCIUM 80 MG: 80 TABLET, FILM COATED ORAL at 20:06

## 2020-01-01 RX ADMIN — TAMSULOSIN HYDROCHLORIDE 0.4 MG: 0.4 CAPSULE ORAL at 09:13

## 2020-01-01 RX ADMIN — CLOPIDOGREL BISULFATE 300 MG: 300 TABLET, FILM COATED ORAL at 16:56

## 2020-01-01 RX ADMIN — ASPIRIN 81 MG 81 MG: 81 TABLET ORAL at 12:10

## 2020-01-01 RX ADMIN — ALBUTEROL SULFATE 2.5 MG: 2.5 SOLUTION RESPIRATORY (INHALATION) at 22:21

## 2020-01-01 RX ADMIN — Medication 10 ML: at 20:35

## 2020-01-01 RX ADMIN — CARVEDILOL 3.12 MG: 3.12 TABLET, FILM COATED ORAL at 12:26

## 2020-01-01 RX ADMIN — LEUCINE, PHENYLALANINE, LYSINE, METHIONINE, ISOLEUCINE, VALINE, HISTIDINE, THREONINE, TRYPTOPHAN, ALANINE, GLYCINE, ARGININE, PROLINE, SERINE, TYROSINE, DEXTROSE 2000 ML: 311; 238; 247; 170; 255; 247; 204; 179; 77; 880; 438; 489; 289; 213; 17; 5 INJECTION INTRAVENOUS at 19:35

## 2020-01-01 RX ADMIN — ENOXAPARIN SODIUM 40 MG: 40 INJECTION SUBCUTANEOUS at 17:11

## 2020-01-01 RX ADMIN — ISOSORBIDE MONONITRATE 30 MG: 30 TABLET ORAL at 09:11

## 2020-01-01 RX ADMIN — POTASSIUM PHOSPHATE, MONOBASIC AND POTASSIUM PHOSPHATE, DIBASIC 10 MMOL: 224; 236 INJECTION, SOLUTION, CONCENTRATE INTRAVENOUS at 19:33

## 2020-01-01 RX ADMIN — SODIUM CHLORIDE 8 MG/HR: 9 INJECTION, SOLUTION INTRAVENOUS at 00:42

## 2020-01-01 RX ADMIN — POTASSIUM CHLORIDE 10 MEQ: 7.46 INJECTION, SOLUTION INTRAVENOUS at 09:26

## 2020-01-01 RX ADMIN — NYSTATIN 500000 UNITS: 100000 SUSPENSION ORAL at 15:25

## 2020-01-01 RX ADMIN — MAGNESIUM SULFATE HEPTAHYDRATE 2 G: 40 INJECTION, SOLUTION INTRAVENOUS at 10:34

## 2020-01-01 RX ADMIN — INSULIN LISPRO 1 UNITS: 100 INJECTION, SOLUTION INTRAVENOUS; SUBCUTANEOUS at 13:24

## 2020-01-01 RX ADMIN — NYSTATIN 500000 UNITS: 100000 SUSPENSION ORAL at 09:15

## 2020-01-01 RX ADMIN — Medication 10 ML: at 20:22

## 2020-01-01 RX ADMIN — ALBUTEROL SULFATE 2.5 MG: 2.5 SOLUTION RESPIRATORY (INHALATION) at 20:41

## 2020-01-01 RX ADMIN — SODIUM CHLORIDE 8 MG/HR: 9 INJECTION, SOLUTION INTRAVENOUS at 08:52

## 2020-01-01 RX ADMIN — PIPERACILLIN AND TAZOBACTAM 3.38 G: 3; .375 INJECTION, POWDER, FOR SOLUTION INTRAVENOUS at 16:59

## 2020-01-01 RX ADMIN — POTASSIUM CHLORIDE 10 MEQ: 7.46 INJECTION, SOLUTION INTRAVENOUS at 07:04

## 2020-01-01 RX ADMIN — CALCIUM GLUCONATE 2 G: 98 INJECTION, SOLUTION INTRAVENOUS at 08:45

## 2020-01-01 RX ADMIN — NYSTATIN 500000 UNITS: 100000 SUSPENSION ORAL at 19:55

## 2020-01-01 RX ADMIN — SODIUM CHLORIDE, PRESERVATIVE FREE 10 ML: 5 INJECTION INTRAVENOUS at 21:23

## 2020-01-01 RX ADMIN — ENOXAPARIN SODIUM 40 MG: 40 INJECTION SUBCUTANEOUS at 17:06

## 2020-01-01 RX ADMIN — SODIUM CHLORIDE 8 MG/HR: 9 INJECTION, SOLUTION INTRAVENOUS at 02:56

## 2020-01-01 RX ADMIN — DOCUSATE SODIUM 100 MG: 50 LIQUID ORAL at 11:35

## 2020-01-01 RX ADMIN — TAMSULOSIN HYDROCHLORIDE 0.4 MG: 0.4 CAPSULE ORAL at 22:37

## 2020-01-01 RX ADMIN — FINASTERIDE 5 MG: 5 TABLET, FILM COATED ORAL at 16:16

## 2020-01-01 RX ADMIN — NYSTATIN 500000 UNITS: 100000 SUSPENSION ORAL at 21:39

## 2020-01-01 RX ADMIN — SODIUM CHLORIDE, PRESERVATIVE FREE 10 ML: 5 INJECTION INTRAVENOUS at 20:33

## 2020-01-01 RX ADMIN — SODIUM CHLORIDE 8 MG/HR: 9 INJECTION, SOLUTION INTRAVENOUS at 16:25

## 2020-01-01 RX ADMIN — NYSTATIN 500000 UNITS: 100000 SUSPENSION ORAL at 20:56

## 2020-01-01 RX ADMIN — POTASSIUM CHLORIDE 20 MEQ: 29.8 INJECTION, SOLUTION INTRAVENOUS at 07:53

## 2020-01-01 RX ADMIN — SODIUM CHLORIDE, PRESERVATIVE FREE 10 ML: 5 INJECTION INTRAVENOUS at 07:58

## 2020-01-01 RX ADMIN — NYSTATIN 500000 UNITS: 100000 SUSPENSION ORAL at 12:07

## 2020-01-01 RX ADMIN — POTASSIUM CHLORIDE 10 MEQ: 7.46 INJECTION, SOLUTION INTRAVENOUS at 23:47

## 2020-01-01 RX ADMIN — Medication 10 ML: at 20:55

## 2020-01-01 RX ADMIN — PANTOPRAZOLE SODIUM 40 MG: 40 INJECTION, POWDER, FOR SOLUTION INTRAVENOUS at 18:22

## 2020-01-01 RX ADMIN — ACETAMINOPHEN 500 MG: 500 TABLET ORAL at 22:09

## 2020-01-01 RX ADMIN — ATORVASTATIN CALCIUM 80 MG: 80 TABLET, FILM COATED ORAL at 21:40

## 2020-01-01 RX ADMIN — POTASSIUM CHLORIDE 10 MEQ: 7.46 INJECTION, SOLUTION INTRAVENOUS at 05:25

## 2020-01-01 RX ADMIN — FINASTERIDE 5 MG: 5 TABLET, FILM COATED ORAL at 09:02

## 2020-01-01 RX ADMIN — PIPERACILLIN AND TAZOBACTAM 3.38 G: 3; .375 INJECTION, POWDER, FOR SOLUTION INTRAVENOUS at 06:34

## 2020-01-01 RX ADMIN — PIPERACILLIN AND TAZOBACTAM 3.38 G: 3; .375 INJECTION, POWDER, FOR SOLUTION INTRAVENOUS at 15:05

## 2020-01-01 RX ADMIN — SODIUM CHLORIDE: 9 INJECTION, SOLUTION INTRAVENOUS at 23:28

## 2020-01-01 RX ADMIN — CARVEDILOL 3.12 MG: 3.12 TABLET, FILM COATED ORAL at 20:55

## 2020-01-01 RX ADMIN — ALTEPLASE 2 MG: 2.2 INJECTION, POWDER, LYOPHILIZED, FOR SOLUTION INTRAVENOUS at 13:01

## 2020-01-01 RX ADMIN — TAMSULOSIN HYDROCHLORIDE 0.4 MG: 0.4 CAPSULE ORAL at 08:49

## 2020-01-01 RX ADMIN — SODIUM CHLORIDE 20 ML: 9 INJECTION, SOLUTION INTRAVENOUS at 19:53

## 2020-01-01 RX ADMIN — FERROUS SULFATE TAB 325 MG (65 MG ELEMENTAL FE) 325 MG: 325 (65 FE) TAB at 08:02

## 2020-01-01 RX ADMIN — SODIUM CHLORIDE, PRESERVATIVE FREE 10 ML: 5 INJECTION INTRAVENOUS at 08:59

## 2020-01-01 RX ADMIN — PIPERACILLIN AND TAZOBACTAM 3.38 G: 3; .375 INJECTION, POWDER, FOR SOLUTION INTRAVENOUS at 15:58

## 2020-01-01 RX ADMIN — Medication 10 ML: at 10:19

## 2020-01-01 RX ADMIN — Medication 10 ML: at 09:51

## 2020-01-01 RX ADMIN — ATORVASTATIN CALCIUM 40 MG: 40 TABLET, FILM COATED ORAL at 16:56

## 2020-01-01 RX ADMIN — Medication 10 ML: at 20:06

## 2020-01-01 RX ADMIN — TAMSULOSIN HYDROCHLORIDE 0.4 MG: 0.4 CAPSULE ORAL at 21:40

## 2020-01-01 RX ADMIN — INSULIN LISPRO 2 UNITS: 100 INJECTION, SOLUTION INTRAVENOUS; SUBCUTANEOUS at 23:30

## 2020-01-01 RX ADMIN — POTASSIUM CHLORIDE 10 MEQ: 7.46 INJECTION, SOLUTION INTRAVENOUS at 01:11

## 2020-01-01 RX ADMIN — SODIUM CHLORIDE, PRESERVATIVE FREE 10 ML: 5 INJECTION INTRAVENOUS at 09:30

## 2020-01-01 RX ADMIN — NYSTATIN 500000 UNITS: 100000 SUSPENSION ORAL at 08:00

## 2020-01-01 RX ADMIN — DOCUSATE SODIUM 100 MG: 50 LIQUID ORAL at 09:02

## 2020-01-01 RX ADMIN — TAMSULOSIN HYDROCHLORIDE 0.4 MG: 0.4 CAPSULE ORAL at 09:17

## 2020-01-01 RX ADMIN — TAMSULOSIN HYDROCHLORIDE 0.4 MG: 0.4 CAPSULE ORAL at 07:58

## 2020-01-01 RX ADMIN — HYDRALAZINE HYDROCHLORIDE 5 MG: 20 INJECTION INTRAMUSCULAR; INTRAVENOUS at 21:32

## 2020-01-01 RX ADMIN — LIDOCAINE HYDROCHLORIDE: 20 JELLY TOPICAL at 16:03

## 2020-01-01 RX ADMIN — IPRATROPIUM BROMIDE AND ALBUTEROL SULFATE 3 ML: .5; 3 SOLUTION RESPIRATORY (INHALATION) at 05:40

## 2020-01-01 RX ADMIN — Medication 10 ML: at 19:45

## 2020-01-01 RX ADMIN — SERTRALINE 50 MG: 50 TABLET, FILM COATED ORAL at 07:58

## 2020-01-01 RX ADMIN — FERROUS SULFATE TAB 325 MG (65 MG ELEMENTAL FE) 325 MG: 325 (65 FE) TAB at 08:50

## 2020-01-01 RX ADMIN — FERROUS SULFATE TAB 325 MG (65 MG ELEMENTAL FE) 325 MG: 325 (65 FE) TAB at 09:44

## 2020-01-01 RX ADMIN — PIPERACILLIN AND TAZOBACTAM 3.38 G: 3; .375 INJECTION, POWDER, FOR SOLUTION INTRAVENOUS at 15:06

## 2020-01-01 RX ADMIN — NYSTATIN 500000 UNITS: 100000 SUSPENSION ORAL at 14:02

## 2020-01-01 RX ADMIN — NYSTATIN 500000 UNITS: 100000 SUSPENSION ORAL at 11:38

## 2020-01-01 RX ADMIN — IPRATROPIUM BROMIDE AND ALBUTEROL SULFATE 1 AMPULE: .5; 3 SOLUTION RESPIRATORY (INHALATION) at 07:43

## 2020-01-01 RX ADMIN — ASPIRIN 81 MG 81 MG: 81 TABLET ORAL at 09:17

## 2020-01-01 RX ADMIN — ASPIRIN 300 MG: 300 SUPPOSITORY RECTAL at 17:05

## 2020-01-01 RX ADMIN — LOSARTAN POTASSIUM 50 MG: 50 TABLET, FILM COATED ORAL at 09:17

## 2020-01-01 RX ADMIN — SODIUM CHLORIDE 80 MG: 9 INJECTION, SOLUTION INTRAVENOUS at 16:15

## 2020-01-01 RX ADMIN — PIPERACILLIN AND TAZOBACTAM 3.38 G: 3; .375 INJECTION, POWDER, FOR SOLUTION INTRAVENOUS at 07:59

## 2020-01-01 RX ADMIN — ASPIRIN 81 MG 324 MG: 81 TABLET ORAL at 16:20

## 2020-01-01 RX ADMIN — POTASSIUM CHLORIDE 10 MEQ: 7.46 INJECTION, SOLUTION INTRAVENOUS at 22:13

## 2020-01-01 RX ADMIN — Medication 30 MG: at 06:06

## 2020-01-01 RX ADMIN — PIPERACILLIN AND TAZOBACTAM 3.38 G: 3; .375 INJECTION, POWDER, FOR SOLUTION INTRAVENOUS at 22:48

## 2020-01-01 RX ADMIN — SODIUM CHLORIDE 8 MG/HR: 9 INJECTION, SOLUTION INTRAVENOUS at 12:38

## 2020-01-01 RX ADMIN — PIPERACILLIN AND TAZOBACTAM 3.38 G: 3; .375 INJECTION, POWDER, FOR SOLUTION INTRAVENOUS at 22:13

## 2020-01-01 RX ADMIN — SODIUM CHLORIDE 8 MG/HR: 9 INJECTION, SOLUTION INTRAVENOUS at 17:03

## 2020-01-01 RX ADMIN — NYSTATIN 500000 UNITS: 100000 SUSPENSION ORAL at 14:30

## 2020-01-01 RX ADMIN — CARVEDILOL 3.12 MG: 3.12 TABLET, FILM COATED ORAL at 08:00

## 2020-01-01 RX ADMIN — NYSTATIN 500000 UNITS: 100000 SUSPENSION ORAL at 09:30

## 2020-01-01 RX ADMIN — NYSTATIN 500000 UNITS: 100000 SUSPENSION ORAL at 21:02

## 2020-01-01 RX ADMIN — PANTOPRAZOLE SODIUM 40 MG: 40 TABLET, DELAYED RELEASE ORAL at 04:19

## 2020-01-01 RX ADMIN — TAMSULOSIN HYDROCHLORIDE 0.4 MG: 0.4 CAPSULE ORAL at 21:03

## 2020-01-01 RX ADMIN — Medication 10 ML: at 21:33

## 2020-01-01 ASSESSMENT — ENCOUNTER SYMPTOMS
ABDOMINAL PAIN: 0
EYE DISCHARGE: 1
STRIDOR: 0
VOICE CHANGE: 0
TROUBLE SWALLOWING: 1
CONSTIPATION: 0
BACK PAIN: 0
FACIAL SWELLING: 0
BLOOD IN STOOL: 1
SHORTNESS OF BREATH: 0
SHORTNESS OF BREATH: 0
EYE PAIN: 0
TROUBLE SWALLOWING: 0
COUGH: 0
NAUSEA: 0
ALLERGIC/IMMUNOLOGIC NEGATIVE: 1
EYE PAIN: 0
VOICE CHANGE: 1
CHEST TIGHTNESS: 0
COUGH: 0
SINUS PRESSURE: 0
TROUBLE SWALLOWING: 0
VOMITING: 0
DIARRHEA: 0
SHORTNESS OF BREATH: 1
RHINORRHEA: 0
VOMITING: 0
BLOOD IN STOOL: 0
NAUSEA: 0
DIARRHEA: 0
DIARRHEA: 0
COUGH: 0
ABDOMINAL PAIN: 0
APNEA: 0
WHEEZING: 0
SHORTNESS OF BREATH: 1
COUGH: 0
CONSTIPATION: 0
SHORTNESS OF BREATH: 1
NAUSEA: 0
ABDOMINAL PAIN: 0
ABDOMINAL PAIN: 0
NAUSEA: 0
SORE THROAT: 0
SORE THROAT: 0
PHOTOPHOBIA: 0
CHOKING: 0
EYE PAIN: 0
VOMITING: 0
COUGH: 0
RHINORRHEA: 0
DIARRHEA: 0
TROUBLE SWALLOWING: 0
SHORTNESS OF BREATH: 0
COLOR CHANGE: 0

## 2020-01-01 ASSESSMENT — PAIN SCALES - GENERAL
PAINLEVEL_OUTOF10: 0
PAINLEVEL_OUTOF10: 5
PAINLEVEL_OUTOF10: 0
PAINLEVEL_OUTOF10: 3
PAINLEVEL_OUTOF10: 0
PAINLEVEL_OUTOF10: 3
PAINLEVEL_OUTOF10: 0
PAINLEVEL_OUTOF10: 4
PAINLEVEL_OUTOF10: 0
PAINLEVEL_OUTOF10: 4
PAINLEVEL_OUTOF10: 4
PAINLEVEL_OUTOF10: 0

## 2020-01-01 ASSESSMENT — PULMONARY FUNCTION TESTS
PIF_VALUE: 23
PIF_VALUE: 0
PIF_VALUE: 0
PIF_VALUE: 22
PIF_VALUE: 0
PIF_VALUE: 2
PIF_VALUE: 23
PIF_VALUE: 7
PIF_VALUE: 2
PIF_VALUE: 0
PIF_VALUE: 18
PIF_VALUE: 18
PIF_VALUE: 0
PIF_VALUE: 1
PIF_VALUE: 0
PIF_VALUE: 0
PIF_VALUE: 1
PIF_VALUE: 2
PIF_VALUE: 1
PIF_VALUE: 1
PIF_VALUE: 7
PIF_VALUE: 11
PIF_VALUE: 23
PIF_VALUE: 1
PIF_VALUE: 6
PIF_VALUE: 0
PIF_VALUE: 1
PIF_VALUE: 22
PIF_VALUE: 0
PIF_VALUE: 22
PIF_VALUE: 23
PIF_VALUE: 22
PIF_VALUE: 0
PIF_VALUE: 0
PIF_VALUE: 1
PIF_VALUE: 0
PIF_VALUE: 2
PIF_VALUE: 0
PIF_VALUE: 2
PIF_VALUE: 0
PIF_VALUE: 3
PIF_VALUE: 1
PIF_VALUE: 0
PIF_VALUE: 0
PIF_VALUE: 2
PIF_VALUE: 25
PIF_VALUE: 0
PIF_VALUE: 22
PIF_VALUE: 22
PIF_VALUE: 0
PIF_VALUE: 0
PIF_VALUE: 19
PIF_VALUE: 0

## 2020-01-01 ASSESSMENT — PAIN DESCRIPTION - ORIENTATION
ORIENTATION: RIGHT
ORIENTATION: MID
ORIENTATION: RIGHT
ORIENTATION: MID

## 2020-01-01 ASSESSMENT — PAIN SCALES - WONG BAKER
WONGBAKER_NUMERICALRESPONSE: 0
WONGBAKER_NUMERICALRESPONSE: 4
WONGBAKER_NUMERICALRESPONSE: 0

## 2020-01-01 ASSESSMENT — PAIN DESCRIPTION - PAIN TYPE
TYPE: ACUTE PAIN
TYPE: CHRONIC PAIN
TYPE: ACUTE PAIN

## 2020-01-01 ASSESSMENT — PAIN - FUNCTIONAL ASSESSMENT
PAIN_FUNCTIONAL_ASSESSMENT: 0-10
PAIN_FUNCTIONAL_ASSESSMENT: PREVENTS OR INTERFERES SOME ACTIVE ACTIVITIES AND ADLS
PAIN_FUNCTIONAL_ASSESSMENT: PREVENTS OR INTERFERES SOME ACTIVE ACTIVITIES AND ADLS
PAIN_FUNCTIONAL_ASSESSMENT: ACTIVITIES ARE NOT PREVENTED

## 2020-01-01 ASSESSMENT — PAIN DESCRIPTION - DIRECTION: RADIATING_TOWARDS: HEAD

## 2020-01-01 ASSESSMENT — PAIN DESCRIPTION - FREQUENCY
FREQUENCY: INTERMITTENT

## 2020-01-01 ASSESSMENT — PAIN DESCRIPTION - LOCATION
LOCATION: ABDOMEN
LOCATION: HIP
LOCATION: NECK
LOCATION: GENERALIZED
LOCATION: ABDOMEN

## 2020-01-01 ASSESSMENT — PAIN DESCRIPTION - ONSET
ONSET: ON-GOING
ONSET: GRADUAL
ONSET: GRADUAL

## 2020-01-01 ASSESSMENT — PAIN DESCRIPTION - DESCRIPTORS
DESCRIPTORS: ACHING
DESCRIPTORS: SHOOTING

## 2020-01-01 ASSESSMENT — PAIN DESCRIPTION - PROGRESSION
CLINICAL_PROGRESSION: RESOLVED
CLINICAL_PROGRESSION: GRADUALLY WORSENING
CLINICAL_PROGRESSION: NOT CHANGED

## 2020-01-01 ASSESSMENT — PATIENT HEALTH QUESTIONNAIRE - PHQ9
SUM OF ALL RESPONSES TO PHQ QUESTIONS 1-9: 2
1. LITTLE INTEREST OR PLEASURE IN DOING THINGS: 1
SUM OF ALL RESPONSES TO PHQ9 QUESTIONS 1 & 2: 2
SUM OF ALL RESPONSES TO PHQ QUESTIONS 1-9: 2
2. FEELING DOWN, DEPRESSED OR HOPELESS: 1

## 2020-01-02 NOTE — TELEPHONE ENCOUNTER
Last visit- 11/25/2019  Next visit- 3/2/2020    Requested Prescriptions     Pending Prescriptions Disp Refills    sertraline (ZOLOFT) 50 MG tablet [Pharmacy Med Name: SERTRALINE HCL TABS 50MG] 90 tablet 4     Sig: TAKE 1 TABLET DAILY

## 2020-01-15 NOTE — PROGRESS NOTES
[facility]  [address] [address]  Dept: [phone]  Dept Fax: [phone]  Loc: [phone]    Rebecca Segovia is a 80 y.o. male who was referred by No ref. provider found for:  Chief Complaint   Patient presents with    Follow-up     Patient here for 6 month follow up. Migue Enriquez HPI:       Patient here with daughter for 6 month tube check and ear cleaning. He had left PET placed in office 1/4/2019 with Dr Celena Bermeo for recurrent left serous otitis media- has since extruded. The right ear has a T tube placed by Dr Erin Wise 8/5/2014. No complaints today.     Past Medical History:   Diagnosis Date    Acute sinusitis     Angina pectoris (HCC)     Anxiety disorder 10/27/2016    Arthritis     osteoporosis    BPH with urinary obstruction     CAD (coronary artery disease)     Chronic insomnia     CKD (chronic kidney disease) stage 3, GFR 30-59 ml/min (HCC)     COPD (chronic obstructive pulmonary disease) (HCC)     Gastroenteritis     HCAP (healthcare-associated pneumonia) 4/29/2015    Heart disease     HLD (hyperlipidemia)     [place] (hard of hearing)     wear hearing aids    Hypertension     Kidney disease     40% kdiney function    Kidney stone     years ago 15-20    NICOLAS on CPAP     Osteopenia determined by x-ray     Pacemaker 2011    Pinched nerve     slipped disc in back    Visual problems     Vitamin D deficiency      Past Surgical History:   Procedure Laterality Date    ABDOMINAL HERNIA REPAIR  04/27/2017    incisional hernia repair--Dr. Ny Guidry CARDIAC SURGERY      CATARACT REMOVAL WITH IMPLANT      bilateral    COLONOSCOPY  [id number]    COLONOSCOPY  12/29/2017    COLONOSCOPY CONTROL HEMORRHAGE performed by Alex Valentine MD at [facility]    COLONOSCOPY  8/16/2018    COLONOSCOPY POLYPECTOMY SNARE/COLD BIOPSY performed by Alex Valentine MD at [facility]    COLONOSCOPY  8/19/2018    COLONOSCOPY stones    Heart Disease Sister     Arthritis Sister     High Blood Pressure Sister     High Cholesterol Sister     Diabetes Brother         multiple siblings had dm    Early Death Brother 72        lung cancer           Subjective:        Review of Systems   Constitutional: Negative for activity change, appetite change, chills, diaphoresis, fatigue, fever and unexpected weight change. HENT: Negative for congestion, dental problem, ear discharge, ear pain, facial swelling, hearing loss, mouth sores, nosebleeds, postnasal drip, rhinorrhea, sinus pressure, sneezing, sore throat, tinnitus, trouble swallowing and voice change. Eyes: Negative for visual disturbance. Respiratory: Negative for apnea, cough, choking, chest tightness, shortness of breath, wheezing and stridor. Cardiovascular: Negative for chest pain, palpitations and leg swelling. Gastrointestinal: Negative for abdominal pain, diarrhea, nausea and vomiting. Endocrine: Negative for cold intolerance, heat intolerance, polydipsia and polyuria. Genitourinary: Negative for difficulty urinating, discharge, dysuria, enuresis, hematuria, penile pain, penile swelling, scrotal swelling, testicular pain and urgency. Musculoskeletal: Negative for arthralgias, gait problem, neck pain and neck stiffness. Skin: Negative for color change, rash and wound. Allergic/Immunologic: Negative for environmental allergies, food allergies and immunocompromised state. Neurological: Negative for dizziness, seizures, syncope, facial asymmetry, speech difficulty, light-headedness and headaches. Hematological: Negative for adenopathy. Does not bruise/bleed easily. Psychiatric/Behavioral: Negative for confusion, sleep disturbance and suicidal ideas. The patient is not nervous/anxious. Objective:       Physical Exam     This is a 80 y.o. male. Patient is alert and oriented to person, place and time. Mood is happy. No respiratory distress.  No nasal voice, no hoarseness. Not obviously hearing impaired. Vitals:    01/15/20 1029   BP: 130/84   Pulse: 68   Resp: 16       External ears are normal: no scars, lesions or masses. R External auditory canal: cerumen removed with instrumentation under magnifcation  L External auditory canal: cerumen removed with instrumentation under magnifcation   Tympanic membranes:  R tube in place-dry, patent                                            L intact, translucent, thickened    Data:  All of the past medical history, past surgical history, family history, social history, allergies and current medications were reviewed. Assessment & Plan:        1. S/P tympanotomy with insertion of tube    2. Excessive cerumen in ear canal, bilateral    3. Wears hearing aid in both ears        Return in about 6 months (around 7/15/2020) for tube check, ear cleaning.

## 2020-03-02 NOTE — ED NOTES
Patient resting quietly in cot showing no signs of distress at this time. Denies any pain. Updated on POC. Will continue to monitor. Tabs alarm in place.        Kathy Roa, BRIAN  03/02/20 1799

## 2020-03-02 NOTE — H&P
HISTORY & PHYSICAL       Patient:  Luis Lovell  YOB: 1931    MRN: [id number]     Acct: [de-identified]    PCP: Gennaro Aguilar DO    Date of Admission: 3/2/2020    Date of Service: Pt seen/examined on 3/2/2020 and Admitted to Inpatient with expected LOS less than two midnights due to medical therapy. Placed in Observation. ASSESSMENT:    Active Hospital Problems    Diagnosis Date Noted    Dyspnea [R06.00] 05/31/2013       PLAN:    1. Dyspnea on exertion - Acute worsening of dyspnea on exertion. History of emphysema. .4 WNL. Pulse ox. Last Echo 2017 EF 55%. Repeat echo. Gentle diuresis with 20 mg Lasix IV. Check Magnesium and phosphorus tomorrow AM.   2. Leukocytosis - 17.1. Denies recent illness or steroids. CXR negative for infiltrate. Afebrile. Urinalysis pending. CBC tomorrow AM.   3. Emphysema - Home inhalers ordered. Continue to monitor. Saturation 94% on room air. He is a former 1ppd smoker for 20 years. CT chest w/o contrast to further characterize emphysema vs. Pulmonary fibrosis. 4. Normocytic Anemia - 10.7 today; 11.7 Sept 2019. Patient reported dark stools. Check fecal occult blood test given history of lower GI bleed requiring transfusion and recheck CBC in the AM.   5. Elevated troponin - 0.022. Trend x2. EKG: atrial sensed ventricular paced rhythm. Will repeat troponins at 3 and 6 hours. 6. History of Lower GI bleed - History of colonoscopy with polypectomy at the end of 2017, Aug 2018 had acute GI bleed requiring 2U PRBCs. Check fecal occult blood test. CBC tomorrow AM.   7. Hyponatremia, mild - 134 today. Monitor. Tolerating PO intake. 8. History of prostate cancer - s/p turps; continue flomax. Monitor. 9. Anxiety - continue home Zoloft. Chief Complaint:  Dyspnea on exertion      History Of Present Illness:      80 y.o. male who presented to [address] [place] [address] with dyspnea on exertion that has been acutely worsening the past several weeks.  He was seen by his PCP, Dr. Celeste Benavidez today for a check up. He and his family report that she was concerned about possible fluid overload due to \"crackles in his lungs. \" He was transported to the ED by wheelchair from her office. He reports that he has had worsening shortness of breath with exertion over the last few months. He reports being short of breath just moving around his home and has to stop and rest to catch his breath. He denies chest pain. His daughter and wife, are present with him in the ED report that he has been using his CPAP during the day due to difficulty catching his breath. He denies cough, fevers, chills, alteration in mental status, congestion, swelling in his legs, and chest pain. He is a former smoker 20 pack year history. He has a pacemaker. His daughter also reports that his bowel movements have become darker. He had a lower GI bleed in 2018 requiring multiple colonoscopies and transfusions. Denies dizziness or new onset fatigue.       Past Medical History:          Diagnosis Date    Acute sinusitis     Angina pectoris (HCC)     Anxiety disorder 10/27/2016    Arthritis     osteoporosis    BPH with urinary obstruction     CAD (coronary artery disease)     Chronic insomnia     CKD (chronic kidney disease) stage 3, GFR 30-59 ml/min (HCC)     COPD (chronic obstructive pulmonary disease) (HCC)     Gastroenteritis     HCAP (healthcare-associated pneumonia) 4/29/2015    Heart disease     HLD (hyperlipidemia)     [place] (hard of hearing)     wear hearing aids    Hypertension     Kidney disease     40% kdiney function    Kidney stone     years ago 15-20    NICOLAS on CPAP     Osteopenia determined by x-ray     Pacemaker 2011    Pinched nerve     slipped disc in back    Visual problems     Vitamin D deficiency        Past Surgical History:          Procedure Laterality Date    ABDOMINAL HERNIA REPAIR  04/27/2017    incisional hernia repair--Dr. Nikky Mckinley CARDIAC SURGERY      CATARACT REMOVAL WITH IMPLANT      bilateral    COLONOSCOPY  [id number]    COLONOSCOPY  12/29/2017    COLONOSCOPY CONTROL HEMORRHAGE performed by Kate June MD at [facility]  8/16/2018    COLONOSCOPY POLYPECTOMY SNARE/COLD BIOPSY performed by Kate June MD at [facility]    COLONOSCOPY  8/19/2018    COLONOSCOPY CONTROL HEMORRHAGE performed by Kate June MD at [facility]    COLONOSCOPY N/A 8/20/2018    COLONOSCOPY CONTROL HEMORRHAGE performed by Kate June MD at [address]  1960's    EKG 12-LEAD  4/29/2015         EYE SURGERY      cataracts    HERNIA REPAIR      several    HIP PINNING Left 8/31/2017    LEFT HIP INTERTAN performed by Governor MD Mateo at [address]  12/3/12    left     MYRINGOTOMY AND TYMPANOSTOMY TUBE PLACEMENT  7/23/13    left     NASAL SINUS SURGERY      OTHER SURGICAL HISTORY  04/27/2015    transurethral Incision bladder neck    PACEMAKER INSERTION      PACEMAKER PLACEMENT  2011    DC COLONOSCOPY FLX DX W/COLLJ SPEC WHEN PFRMD Left 8/18/2018    COLONOSCOPY performed by Kate June MD at [facility] End[facility]    DC [address] DX/THER SBST INTRLMNR LMBR/SAC W/IMG GDN N/A 6/25/2018    LUMBAR INTER LAMINAR RUBEN LESI @ L3. performed by Jim Gonzales MD at [facility] Right 10/8/2017    Right hip intertan performed by Alonso Kurtz MD at [address] TURP  4/17/2013    W/CYSTO WITH DIRECT VISION URETHROTOMY & RESECTION BLADDER NECK CONTRACTURE    TURP  4/27/15    re-do TURP    TYMPANOSTOMY TUBE PLACEMENT      multiple surgeries    UPPER GASTROINTESTINAL ENDOSCOPY  12/29/2017    COLONOSCOPY SUBMUCOSAL/BOTOX INJECTION performed by Kate June MD at [address] [facility]  8/16/2018    EGD DIAGNOSTIC ONLY performed by Kate June MD at [address] [facility] ENDOSCOPY Left 8/19/2018    EGD DIAGNOSTIC ONLY performed by Geovany Glasgow MD at [address] [facility] End[facility]       Medications Prior to Admission:      Prior to Admission medications    Medication Sig Start Date End Date Taking? Authorizing Provider   sertraline (ZOLOFT) 50 MG tablet TAKE 1 TABLET DAILY 1/2/20   CHRISTIN Pascual CNP   isosorbide mononitrate (IMDUR) 30 MG extended release tablet TAKE 1 TABLET DAILY 12/30/19   Malcolm Dose, DO   tamsulosin (FLOMAX) 0.4 MG capsule TAKE 1 CAPSULE TWICE A DAY 11/18/19   CHRISTIN Pascual CNP   albuterol sulfate  (90 Base) MCG/ACT inhaler INHALE 2 PUFFS BY MOUTH INTO THE LUNGS EVERY 6 HOURS AS NEEDED FOR WHEEZING 10/28/19   Malcolm Dose, DO   metoprolol tartrate (LOPRESSOR) 25 MG tablet TAKE ONE-HALF (1/2) TABLET DAILY 10/3/19   Malcolm Dose, DO   losartan (COZAAR) 100 MG tablet TAKE 1 TABLET BY MOUTH ONCE DAILY 10/3/19   Malcolm Dose, DO   Spacer/Aero Chamber Mouthpiece MISC Use it with [facility] inhaler 5/11/19   Navi Umaña MD   acetaminophen (TYLENOL) 650 MG extended release tablet Take 650 mg by mouth every 8 hours as needed for Pain    Historical Provider, MD   ferrous sulfate 325 (65 Fe) MG tablet Take 325 mg by mouth Take 1 tablet every other day. Historical Provider, MD   docusate sodium (COLACE) 100 MG capsule Take 100 mg by mouth 2 times daily as needed     Historical Provider, MD   vitamin B-12 1000 MCG tablet Take 1 tablet by mouth daily 1/1/18   Andrew Coker MD   Coenzyme Q10 (COQ-10 PO) Take 10 mg by mouth daily    Historical Provider, MD   Multiple Vitamins-Minerals (THERAPEUTIC MULTIVITAMIN-MINERALS) tablet Take 1 tablet by mouth daily     Historical Provider, MD       Allergies:  Iodine    Social History:      The patient currently lives at home with his wife. His daughter is his POA. TOBACCO:   reports that he quit smoking about 54 years ago. His smoking use included cigarettes. He has a 20.00 pack-year smoking history.  He has never used smokeless tobacco.  ETOH:   reports current alcohol use of about 7.0 standard drinks of alcohol per week. Family History:    Positive as follows:        Problem Relation Age of Onset    Cancer Brother 72        lung    Diabetes Brother     Kidney Disease Father         stones    Stroke Father     Kidney Disease Child         stones    Kidney Disease Child         stones    Heart Disease Sister     Arthritis Sister     High Blood Pressure Sister     High Cholesterol Sister     Diabetes Brother         multiple siblings had dm    Early Death Brother 72        lung cancer       Diet:  No diet orders on file    REVIEW OF SYSTEMS:   Pertinent positives as noted in the HPI. All other systems reviewed and negative. Review of Systems - History obtained from spouse and child and the patient    General ROS: positive for  - shortness of breath; negative for fevers, chills, fatigue  HEENT: denies headaches, changes in vision, changes in hearing, denies cough, congestion  CV: denies heart palpitations, chest pain  Pulm +SOB with exertion, denies cough  Abd: negative for pain, +dark stools, negative for diarrhea, nausea, vomiting  : negative for dysuria, hematuria   Skin: denies rashes            PHYSICAL EXAM:    /66   Pulse 81   Temp 98.4 °F (36.9 °C) (Oral)   Resp 20   Ht 5' 6\" (1.676 m)   Wt 144 lb (65.3 kg)   SpO2 94%   BMI 23.24 kg/m²     General appearance:  No apparent distress, appears stated age and cooperative; Hard of hearing - uses hearing aids. HEENT:  Normocephalic, atraumatic without obvious deformity. Pupils equal, round, and reactive to light. Extra ocular muscles intact. Conjunctivae/corneas clear. Neck: Supple, with full range of motion. No jugular venous distention. Trachea midline. Respiratory:  Normal respiratory effort. Crackles diffusely anteriorly and posteriorly. No wheezing or rhonchi.    Cardiovascular:  Regular rate and rhythm with normal S1/S2 without recognition software. It may contain minor errors which are inherent in voice recognition technology. **      Final report electronically signed by Dr. Jessica Norman on 3/2/2020 2:12 PM      CT CHEST WO CONTRAST    (Results Pending)            DVT prophylaxis: [x] Lovenox                                 [] SCDs                                 [] SQ Heparin                                 [x] Encourage ambulation           [] Already on Anticoagulation    Code Status: Limited      PT/OT Eval Status: Not consulted    Disposition:    [x] Home       [] TCU       [] Rehab       [] Psych       [] SNF       [] [place]       [] Other-        Thank you Gennaro Aguilar DO for the opportunity to be involved in this patient's care.     Electronically signed by Romeo Mas DO on 3/2/2020 at 5:28 PM

## 2020-03-02 NOTE — ED NOTES
Patient resting quietly in cot showing no signs of distress at this time. Denies any pain. Updated on POC. Family at bedside. Will continue to monitor.       John Medina RN  03/02/20 [id number]

## 2020-03-02 NOTE — PROGRESS NOTES
Marlyn Ernandez is a 80 y.o. male who presents today for:  Chief Complaint   Patient presents with    3 Month Follow-Up     per patient states that his stool is dark in color       Goals      Blood Pressure < 140/90           HPI:     HPI  Has the arthritis pain and it is limiting for him how much he does - takes tylenol es but it does not help that much    Darker stools - last GI bleed was a while ago - 1 year ago - had to have colonoscopies then - does take and iron supplement and stools have been dark since then    Hearing aids are being worked on    Shortness of breath when active but not at rest - no chest pians just needs to catch his breath    No question data found.     Past Medical History:   Diagnosis Date    Acute sinusitis     Angina pectoris (HCC)     Anxiety disorder 10/27/2016    Arthritis     osteoporosis    BPH with urinary obstruction     CAD (coronary artery disease)     Chronic insomnia     CKD (chronic kidney disease) stage 3, GFR 30-59 ml/min (HCC)     COPD (chronic obstructive pulmonary disease) (HCC)     Gastroenteritis     HCAP (healthcare-associated pneumonia) 4/29/2015    Heart disease     HLD (hyperlipidemia)     [place] (hard of hearing)     wear hearing aids    Hypertension     Kidney disease     40% kdiney function    Kidney stone     years ago 15-20    NICOLAS on CPAP     Osteopenia determined by x-ray     Pacemaker 2011    Pinched nerve     slipped disc in back    Visual problems     Vitamin D deficiency       Past Surgical History:   Procedure Laterality Date    ABDOMINAL HERNIA REPAIR  04/27/2017    incisional hernia repair--Dr. Robert Engle CARDIAC SURGERY      CATARACT REMOVAL WITH IMPLANT      bilateral    COLONOSCOPY  [id number]    COLONOSCOPY  12/29/2017    COLONOSCOPY CONTROL HEMORRHAGE performed by Gregory Harden MD at [address] [facility]    COLONOSCOPY  8/16/2018    COLONOSCOPY POLYPECTOMY SNARE/COLD BIOPSY performed by Gregory Harden MD at [address] [facility]    COLONOSCOPY  8/19/2018    COLONOSCOPY CONTROL HEMORRHAGE performed by Aron Sultana MD at [address] End[facility]    COLONOSCOPY N/A 8/20/2018    COLONOSCOPY CONTROL HEMORRHAGE performed by Aron Sultana MD at [address] [place] [address]'s    EKG 12-LEAD  4/29/2015         EYE SURGERY      cataracts    HERNIA REPAIR      several    HIP PINNING Left 8/31/2017    LEFT HIP INTERTAN performed by Marybeth Roman MD at [address]  12/3/12    left     MYRINGOTOMY AND TYMPANOSTOMY TUBE PLACEMENT  7/23/13    left     NASAL SINUS SURGERY      OTHER SURGICAL HISTORY  04/27/2015    transurethral Incision bladder neck    PACEMAKER INSERTION      PACEMAKER PLACEMENT  2011    [place] COLONOSCOPY FLX DX W/COLLJ SPEC WHEN PFRMD Left 8/18/2018    COLONOSCOPY performed by Aron Sultana MD at [address] Endoscopy    [place] [address] DX/THER SBST INTRLMNR LMBR/SAC W/IMG GDN N/A 6/25/2018    LUMBAR INTER LAMINAR RUBEN LESI @ L3. performed by Angela Reyes MD at [place] [address] Right 10/8/2017    Right hip intertan performed by Cristian Landa MD at [address] [place] [address] TURP  4/17/2013    W/CYSTO WITH DIRECT VISION URETHROTOMY & RESECTION BLADDER NECK CONTRACTURE    TURP  4/27/15    re-do TURP    TYMPANOSTOMY TUBE PLACEMENT      multiple surgeries    UPPER GASTROINTESTINAL ENDOSCOPY  12/29/2017    COLONOSCOPY SUBMUCOSAL/BOTOX INJECTION performed by Aron Sultana MD at [address] [place] [address]  8/16/2018    EGD DIAGNOSTIC ONLY performed by Aron Sultana MD at [address] [place] [address] 8/19/2018    EGD DIAGNOSTIC ONLY performed by Aron Sultana MD at [address] Endoscopy     Family History   Problem Relation Age of Onset    Cancer Brother 72        lung    Diabetes Brother     Kidney Disease Father         stones    Stroke Father     Kidney Disease Child stones    Kidney Disease Child         stones    Heart Disease Sister     Arthritis Sister     High Blood Pressure Sister     High Cholesterol Sister     Diabetes Brother         multiple siblings had dm    Early Death Brother 72        lung cancer     Social History     Tobacco Use    Smoking status: Former Smoker     Packs/day: 1.00     Years: 20.00     Pack years: 20.00     Types: Cigarettes     Last attempt to quit: 1965     Years since quittin.3    Smokeless tobacco: Never Used    Tobacco comment: pt use to smoke cigars and pipe   Substance Use Topics    Alcohol use: Yes     Alcohol/week: 7.0 standard drinks     Types: 7 Cans of beer per week     Comment: occasional 1 beer       Current Outpatient Medications   Medication Sig Dispense Refill    sertraline (ZOLOFT) 50 MG tablet TAKE 1 TABLET DAILY 90 tablet 4    isosorbide mononitrate (IMDUR) 30 MG extended release tablet TAKE 1 TABLET DAILY 90 tablet 4    tamsulosin (FLOMAX) 0.4 MG capsule TAKE 1 CAPSULE TWICE A  capsule 4    albuterol sulfate  (90 Base) MCG/ACT inhaler INHALE 2 PUFFS BY MOUTH INTO THE LUNGS EVERY 6 HOURS AS NEEDED FOR WHEEZING 1 Inhaler 3    metoprolol tartrate (LOPRESSOR) 25 MG tablet TAKE ONE-HALF (1/2) TABLET DAILY 45 tablet 4    losartan (COZAAR) 100 MG tablet TAKE 1 TABLET BY MOUTH ONCE DAILY 30 tablet 5    Spacer/Aero Chamber Mouthpiece MISC Use it with Dulera inhaler 1 each 0    acetaminophen (TYLENOL) 650 MG extended release tablet Take 650 mg by mouth every 8 hours as needed for Pain      ferrous sulfate 325 (65 Fe) MG tablet Take 325 mg by mouth Take 1 tablet every other day.       docusate sodium (COLACE) 100 MG capsule Take 100 mg by mouth 2 times daily as needed       vitamin B-12 1000 MCG tablet Take 1 tablet by mouth daily 30 tablet 3    Coenzyme Q10 (COQ-10 PO) Take 10 mg by mouth daily      Multiple Vitamins-Minerals (THERAPEUTIC MULTIVITAMIN-MINERALS) tablet Take 1 tablet by mouth daily        No current facility-administered medications for this visit. Allergies   Allergen Reactions    Iodine Swelling and Rash       Health Maintenance   Topic Date Due    Shingles Vaccine (2 of 3) 06/17/2014    Potassium monitoring  09/13/2020    Creatinine monitoring  09/13/2020    Annual Wellness Visit (AWV)  11/25/2020    DTaP/Tdap/Td vaccine (2 - Td) 06/16/2027    Flu vaccine  Completed    Pneumococcal 65+ years Vaccine  Completed    Hepatitis A vaccine  Aged Out    Hepatitis B vaccine  Aged Out    Hib vaccine  Aged Out    Meningococcal (ACWY) vaccine  Aged Out       Subjective:      Review of Systems   Constitutional: Negative for chills, fatigue and fever. HENT: Negative for ear pain, postnasal drip and trouble swallowing. Eyes: Negative for pain and visual disturbance. Respiratory: Negative for cough and shortness of breath. Cardiovascular: Negative for chest pain and palpitations. Gastrointestinal: Negative for abdominal pain, blood in stool, constipation, diarrhea, nausea and vomiting. Genitourinary: Negative for difficulty urinating. Skin: Negative for rash. Neurological: Negative for headaches. Psychiatric/Behavioral: Negative for agitation. Objective:     Vitals:    03/02/20 1131   BP: 110/64   Site: Left Upper Arm   Position: Sitting   Cuff Size: Medium Adult   Pulse: 80   Resp: 20   Temp: 97.5 °F (36.4 °C)   TempSrc: Oral   SpO2: 96%   Weight: 144 lb (65.3 kg)   Height: 5' 6\" (1.676 m)       Body mass index is 23.24 kg/m². Wt Readings from Last 3 Encounters:   03/02/20 144 lb (65.3 kg)   01/15/20 145 lb 3.2 oz (65.9 kg)   11/25/19 148 lb 9.6 oz (67.4 kg)     BP Readings from Last 3 Encounters:   03/02/20 110/64   01/15/20 130/84   11/25/19 138/82       Physical Exam  Vitals signs and nursing note reviewed. Constitutional:       General: He is not in acute distress. Appearance: He is well-developed. He is not diaphoretic.    HENT: Head: Normocephalic and atraumatic. Right Ear: External ear normal.      Left Ear: External ear normal.   Eyes:      General: No scleral icterus. Right eye: No discharge. Left eye: No discharge. Conjunctiva/sclera: Conjunctivae normal.   Neck:      Musculoskeletal: Normal range of motion. Cardiovascular:      Rate and Rhythm: Normal rate and regular rhythm. Heart sounds: Normal heart sounds. No murmur. Pulmonary:      Effort: Pulmonary effort is normal.      Breath sounds: Rhonchi present. Skin:     General: Skin is warm and dry. Findings: No erythema or rash. Neurological:      Mental Status: He is alert and oriented to person, place, and time. Cranial Nerves: No cranial nerve deficit. Psychiatric:         Behavior: Behavior normal.         Thought Content: Thought content normal.         Judgment: Judgment normal.           Lab Results   Component Value Date    WBC 5.8 07/09/2019    HGB 11.7 (L) 09/13/2019    HCT 35.9 (L) 09/13/2019     07/09/2019    CHOL 118 12/05/2016    TRIG 93 12/05/2016    HDL 56 12/05/2016    LDLCALC 43 12/05/2016    AST 14 05/11/2019     09/13/2019    K 4.8 09/13/2019     09/13/2019    CREATININE 1.3 (H) 09/13/2019    BUN 32 (H) 09/13/2019    CO2 24 09/13/2019    TSH 1.690 05/11/2019    INR 1.08 08/15/2018    LABMICR 3.18 09/14/2016    LABGLOM 52 (A) 09/13/2019    MG 1.9 05/11/2019    CALCIUM 9.6 09/13/2019    VITD25 43 09/05/2017       Imaging Results:    No results found.     ----------------------------------------------------------------   Electronically signed by Olvin Johnson MD (Interpreting   physician) on 12/27/2017 at 07:04 PM   ----------------------------------------------------------------      Findings      Mitral Valve   The mitral valve structure was normal with normal leaflet separation. DOPPLER: The transmitral velocity was within the normal range with no   evidence for mitral stenosis.  There was no evidence of mitral   regurgitation. Aortic Valve   The aortic valve was trileaflet with normal thickness and cuspal   separation. DOPPLER: Transaortic velocity was within the normal range with   no evidence of aortic stenosis. There was no evidence of aortic   regurgitation. Tricuspid Valve   The tricuspid valve structure was normal with normal leaflet separation. DOPPLER: There was no evidence of tricuspid stenosis. There was no   evidence of tricuspid regurgitation. Pulmonic Valve   The pulmonic valve leaflets exhibited normal thickness, no calcification,   and normal cuspal separation. DOPPLER: The transpulmonic velocity was   within the normal range with no evidence for regurgitation. Left Atrium   The left atrium is Mildly dilated. Left Ventricle   Normal left ventricle size and systolic function. Ejection fraction was   estimated at 55 %. There were no regional left ventricular wall motion   abnormalities and wall thickness was within normal limits. Right Atrium   Right atrial size was normal.      Right Ventricle   The right ventricular size was normal with normal systolic function and   wall thickness. Pericardial Effusion   The pericardium was normal in appearance with no evidence of a pericardial   effusion. Pleural Effusion   No evidence of pleural effusion. Aorta / Great Vessels   -Aortic root dimension within normal limits.   -The Pulmonary artery is within normal limits. -IVC size is within normal limits with normal respiratory phasic changes. Assessment:      Diagnosis Orders   1. Lower GI bleed  CBC Auto Differential    Basic Metabolic Panel    Magnesium    TSH with Reflex    Iron and TIBC    Hepatic Function Panel    Brain Natriuretic Peptide   2. Essential hypertension  CBC Auto Differential    Basic Metabolic Panel    Magnesium    TSH with Reflex    Iron and TIBC    Hepatic Function Panel    Brain Natriuretic Peptide   3.  Shortness of breath  CBC

## 2020-03-02 NOTE — ED PROVIDER NOTES
[facility] EMERGENCY DEPT      CHIEF COMPLAINT       Chief Complaint   Patient presents with    Shortness of Breath       Nurses Notes reviewed and I agree except as noted in the HPI. HISTORY OF PRESENT ILLNESS    Pierre Meneses is a 80 y.o. male who presents for shortness of breath. Patient was at a routine visit with his PCP, Dr. Uvaldo Schneider. Patient was sent here for evaluation of fluid on his lungs and he was SOB. Patient denies being SOB. Patient states he hasn't felt the best since he broke both of his hips last year. Patient states he doesn't have much energy any more and he is \"worn out\". Daughter states she has noticed the patient has been SOB with exertion. She states the patient has been using his CPAP during the day and it helps him breath better. Patient has been eating and drinking well. Patient denies fever, chills, cough, nausea, or vomiting. Patient has been having normal bowel movement and urinating without difficulty. Patient has night sweats but states he has been having them for years. Patient denies CHF but has COPD. Patient is a former smoker. Patient has no other complaints at this time. REVIEW OF SYSTEMS     Review of Systems   Constitutional: Positive for activity change and fatigue. Negative for appetite change, chills and fever. HENT: Negative for congestion, ear pain, rhinorrhea and sore throat. Eyes: Negative for photophobia. Respiratory: Positive for shortness of breath (on exertion noticed by daughter). Negative for cough. Cardiovascular: Negative for chest pain. Gastrointestinal: Negative for abdominal pain, diarrhea, nausea and vomiting. Endocrine: Negative for polyuria. Genitourinary: Negative for difficulty urinating, dysuria, flank pain and frequency. Musculoskeletal: Negative for back pain and gait problem. Skin: Negative for rash. Neurological: Positive for weakness (generalized, worse last 2 days). Negative for dizziness and numbness. TABLET DAILY  Qty: 90 tablet, Refills: 4    Associated Diagnoses: Anxiety      isosorbide mononitrate (IMDUR) 30 MG extended release tablet TAKE 1 TABLET DAILY  Qty: 90 tablet, Refills: 4      tamsulosin (FLOMAX) 0.4 MG capsule TAKE 1 CAPSULE TWICE A DAY  Qty: 180 capsule, Refills: 4      albuterol sulfate  (90 Base) MCG/ACT inhaler INHALE 2 PUFFS BY MOUTH INTO THE LUNGS EVERY 6 HOURS AS NEEDED FOR WHEEZING  Qty: 1 Inhaler, Refills: 3    Comments: Please consider 90 day supplies to promote better adherence  Associated Diagnoses: Shortness of breath      metoprolol tartrate (LOPRESSOR) 25 MG tablet TAKE ONE-HALF (1/2) TABLET DAILY  Qty: 45 tablet, Refills: 4      losartan (COZAAR) 100 MG tablet TAKE 1 TABLET BY MOUTH ONCE DAILY  Qty: 30 tablet, Refills: 5    Comments: Please consider 90 day supplies to promote better adherence  Associated Diagnoses: Essential hypertension      acetaminophen (TYLENOL) 650 MG extended release tablet Take 650 mg by mouth every 8 hours as needed for Pain      ferrous sulfate 325 (65 Fe) MG tablet Take 325 mg by mouth three times a week Take 1 tablet every other day. M-W-F      docusate sodium (COLACE) 100 MG capsule Take 100 mg by mouth 2 times daily as needed       vitamin B-12 1000 MCG tablet Take 1 tablet by mouth daily  Qty: 30 tablet, Refills: 3      Multiple Vitamins-Minerals (THERAPEUTIC MULTIVITAMIN-MINERALS) tablet Take 1 tablet by mouth daily       Spacer/Aero Chamber Mouthpiece MISC Use it with Dulera inhaler  Qty: 1 each, Refills: 0             ALLERGIES     is allergic to iodine. FAMILY HISTORY     He indicated that his mother is . He indicated that his father is . He indicated that the status of his sister is unknown. He indicated that two of his three brothers are . He indicated that both of his children are alive.    family history includes Arthritis in his sister; Cancer (age of onset: 72) in his brother; Diabetes in his brother and Tenderness: There is no abdominal tenderness. There is no guarding. Musculoskeletal: Normal range of motion. Lymphadenopathy:      Cervical: No cervical adenopathy. Skin:     General: Skin is warm and dry. Coloration: Skin is not pale. Findings: No rash. Neurological:      Mental Status: He is alert and oriented to person, place, and time. GCS: GCS eye subscore is 4. GCS verbal subscore is 5. GCS motor subscore is 6. Motor: Tremor present. Gait: Gait normal.      Comments: No gross deficits observed   Psychiatric:         Speech: Speech normal.         Behavior: Behavior normal.         Thought Content: Thought content normal.         DIFFERENTIAL DIAGNOSIS:   Including but not limited to: CASSIDY, URI, pneumonia, CHF, mass, ACS, COPD exacerbation    DIAGNOSTIC RESULTS     EKG: All EKG's are interpreted by [facility] who either signs or Co-signs this chart in the absence of a cardiologist.  Jakijoselo Ball. Rate: 85 bpm  ND interval: 146 ms  QRS duration: 126 ms  QTc: 445 ms  P-R-T axes: 58, 83, 5  Atrial- sensed ventricular paced rhythm  No STEMI      RADIOLOGY: non-plain film images(s) such as CT,Ultrasound and MRI are read by the radiologist.  Plain radiographic images are visualized and preliminarily interpreted by the emergency physician unless otherwise stated below. 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 Superimposed infection cannot be entirely excluded. Findings have progressed when compared to prior examination from 2013. Follow-up is advised. Incompletely healed right lateral seventh rib fracture. **This report has been created using voice recognition software. It may contain minor errors which are inherent in voice recognition technology. **      Final report electronically signed by Dr. Karen Coffey on 3/2/2020 5:50 PM      XR CHEST STANDARD (2 VW) Temp:  98.6 °F (37 °C) 98.1 °F (36.7 °C) 98.9 °F (37.2 °C)   TempSrc:  Oral Oral Oral   SpO2: 94% 93% 95% 95%   Weight:       Height:           MDM:  The patient was seen by me in the ER for dyspnea and weakness appreciated by family and PCP. Physical exam was unremarkable, although patient had labored breathing entering the room. Old records were reviewed. Vital noted stable. Appropriate laboratory and imaging studies were ordered and reviewed upon completion. Leukocytosis of 17 and bumped troponin noted. The patient remained stable in the ER and was given  mg. The patient had no complaints. I discussed results and desire for observation admission and the patient was amenable. Dr Lyndon Tilley (hospitalist) was consulted and graciously accepted admission. The patient was admitted to the hospital in fair condition. CRITICAL CARE:   None    CONSULTS:  Dr. Lyndon Tilley (hospitalist)    PROCEDURES:  None    FINAL IMPRESSION      1. Dyspnea on exertion    2. Elevated troponin    3. General weakness          DISPOSITION/PLAN     1. Dyspnea on exertion    2. Elevated troponin    3. General weakness    Admission      (Please note that portions of this note were completed with a voice recognition program.  Efforts were made to edit the dictations but occasionally words are mis-transcribed.)    Scribe:  Nikhil Murguia 3/2/20 2:19 PM Scribing for and in the presence of ALEXEI Cannon. Signed by: Morales Free, 03/04/20 9:57 PM    Provider:  I personally performed the services described in the documentation, reviewed and edited the documentation which was dictated to the scribe in my presence, and it accurately records my words and actions.     ALEXEI Cannon 03/04/20 9:57 PM           ALEXEI Cannon  03/04/20 2623

## 2020-03-02 NOTE — ED NOTES
Patient resting quietly in cot showing no signs of distress at this time. Medicated per MAR. Updated on POC. Will continue to monitor.       Margaret Ochoa RN  03/02/20 9099

## 2020-03-03 NOTE — PROGRESS NOTES
PROGRESS NOTE      Patient:  Ander Lujan      Unit/Bed:8B-27/027-A    YOB: 1931    MRN: [id number]       Acct: [de-identified]     PCP: Joleen Rod DO    Date of Admission: 3/2/2020    Assessment/Plan:    Anticipated Discharge in : 1 day    ION[facility]/Simi Dyllan P[facility] 1106 Problems    Diagnosis Date Noted    Dyspnea [R06.00] 05/31/2013     Dyspnea on exertion- likely secondary to pulmonary fibrosis, emphysema and possible COPD  - History of emphysema.  - .4 WNL, Last Echo 2017 EF 55%. Repeat echo. - Gentle diuresis with 20 mg Lasix IV yesterday  - Magnesium and phosphorus normal   - Elevated WBC but does not appear sick, will hold off antibiotics until evaluated by pulmonology as the possibility of superimposed infection cannot be ruled out completely. - Consulted pulmonology, recommend bedside spirometry, connective serologies as a part of connective tissue work up for ILD       Hypotension  - Patient became acutely hypotensive with blood pressures down into the 00H systolic  - 376 mL bolus of NS was given 3/3/2020  - Losartan, Imdur, Metoprolol, and Flomax all held for low BP    Leukocytosis   - WBC 17.1 on admission 14.5 today. - Denies recent illness or steroids and does not appear sick, will hold off antibiotics until evaluated by pulmonology as the possibility of superimposed infection cannot be ruled out completely. - CXR negative for infiltrate. Afebrile. Urinalysis WNL. - CBC tomorrow AM.     Emphysema- He is a former 1ppd smoker for 20 years. CT chest w/o contrast reported as Fibroemphysematous appearance throughout the lungs with paraseptal emphysema and fibrosis  - Home inhalers ordered. Continue to monitor.   - Saturation 94% on room air . Normocytic Anemia   - 10.7 on admission, 9.9 today 11.7 Sept 2019. Patient reported dark stools.    - Check fecal occult blood test given history of lower GI bleed requiring transfusion   - GI consulted given hx of GI and dark stools  - CBC in the palpitations, swelling in his legs. Anya Marie reports that he has not had a bowel movement since being admitted to the unit yesterday. He denies any bleeding, lightheadedness, dizziness, headaches. He reports that he slept poorly last night and that his appetite is poor today. Medications:  Reviewed    Infusion Medications   Scheduled Medications    ferrous sulfate  325 mg Oral Daily with breakfast    isosorbide mononitrate  30 mg Oral Daily    losartan  100 mg Oral Daily    metoprolol tartrate  12.5 mg Oral Daily    sertraline  50 mg Oral Daily    tamsulosin  0.4 mg Oral BID    sodium chloride flush  10 mL Intravenous 2 times per day    enoxaparin  40 mg Subcutaneous Q24H     PRN Meds: albuterol sulfate HFA, docusate sodium, sodium chloride flush, acetaminophen **OR** acetaminophen, polyethylene glycol, promethazine **OR** ondansetron      Intake/Output Summary (Last 24 hours) at 3/3/2020 0839  Last data filed at 3/3/2020 0336  Gross per 24 hour   Intake 590 ml   Output --   Net 590 ml       Diet:  DIET GENERAL; No Added Salt (3-4 GM)    Exam:  /75   Pulse 109   Temp 98.2 °F (36.8 °C) (Oral)   Resp 20   Ht 5' 6\" (1.676 m)   Wt 137 lb 4.8 oz (62.3 kg)   SpO2 91%   BMI 22.16 kg/m²     Physical Exam  Vitals signs and nursing note reviewed. Constitutional:       General: He is not in acute distress. Appearance: He is ill-appearing. HENT:      Head: Normocephalic and atraumatic. Right Ear: External ear normal.      Left Ear: External ear normal.      Nose: Nose normal. No congestion or rhinorrhea. Mouth/Throat:      Mouth: Mucous membranes are moist.      Pharynx: Oropharynx is clear. No oropharyngeal exudate or posterior oropharyngeal erythema. Eyes:      General:         Right eye: No discharge. Left eye: No discharge. Conjunctiva/sclera: Conjunctivae normal.      Pupils: Pupils are equal, round, and reactive to light.    Neck:      Musculoskeletal: Neck when compared to prior examination from 2013. Follow-up is advised. Incompletely healed right lateral seventh rib fracture. **This report has been created using voice recognition software. It may contain minor errors which are inherent in voice recognition technology. **      Final report electronically signed by Dr. Aj العراقي on 3/2/2020 5:50 PM      XR CHEST STANDARD (2 VW)   Final Result      Fibroemphysematous changes throughout the lungs greatest in the lung bases. Mild cardiomegaly. No superimposed infiltrate. Appearance is similar to prior. .**This report has been created using voice recognition software. It may contain minor errors which are inherent in voice recognition technology. **      Final report electronically signed by Dr. Aj العراقي on 3/2/2020 2:12 PM          Diet: DIET GENERAL; No Added Salt (3-4 GM)    DVT prophylaxis: [] Lovenox                                 [x] SCDs                                 [] SQ Heparin                                 [] Encourage ambulation           [] Already on Anticoagulation     Disposition:    [x] Home       [] TCU       [] Rehab       [] Psych       [] SNF       [] [place]       [] Other-    Code Status: Full Code        Electronically signed by Beryle Amabile, MD on 3/3/2020 at 8:39 AM

## 2020-03-03 NOTE — CONSULTS
[facility]      Patient - Liam Steven   MRN -  [id number]   [facility]   - 1931      Date of Admission -  3/2/2020  1:11 PM  Date of evaluation -  3/3/2020  Room - -/027-JJ Pittman MD Primary Care Physician - Kate Mcgovern, DO     Problem List      Active Hospital Problems    Diagnosis Date Noted    Dyspnea [R06.00] 2013     Reason for Consult    For evaluation and management of ? Pulmonary fibrosis. ?COPD. HPI   History Obtained From: Patient and electronic medical record. Liam Steven is a 80 y.o. male  was initially admitted under hospitalist service. Pulmonary medicine was consulted for further evaluation abnormal CT chest suggestive of pulmonary fibrosis along with management of COPD. He is a poor historian. Majority of the history obtained from reviewing the patient's chart. He also had decreased hearing sensation. He is using hearing aids on both sides. The patient is a 80 y.o. male with past medical hx of chronic tobacco smoking for 25 to 30years with 1PPD, quit smoking 25 to 30years back. He was never diagnosed with COPD, asthma or pulmonary fibrosis in the past. He is having exertional shortness of breath for the last few months. He denies any cough, cold and sputum Production. The patient is a 80 y.o. male who presented with worsening of shortness of breath; He is having shortness of breath: Yes  Onset: gradual   Duration:Chronic  Diurnal variation: None. Current functional capacity on level ground: ~1block on level ground. He denies orthopnea. He denies paroxysmal nocturnal dyspnea. His shortness of breath is associated with cough. He is having cough: Yes  Duration of cough: Chronic. His cough is associated with sputum production: No   Hemoptysis:No  Diurnal variation: None.     Relieving factors: No  Aggravating factors: No    He gives a history of fever: No  Chills & Medications    ferrous sulfate  325 mg Oral Daily with breakfast    isosorbide mononitrate  30 mg Oral Daily    losartan  100 mg Oral Daily    metoprolol tartrate  12.5 mg Oral Daily    sertraline  50 mg Oral Daily    tamsulosin  0.4 mg Oral BID    sodium chloride flush  10 mL Intravenous 2 times per day    enoxaparin  40 mg Subcutaneous Q24H     albuterol sulfate HFA, docusate sodium, sodium chloride flush, acetaminophen **OR** acetaminophen, polyethylene glycol, promethazine **OR** ondansetron  IV Drips/Infusions    Home Medications  Medications Prior to Admission: Coenzyme Q10 (COQ10 PO), Take 1 tablet by mouth daily  sertraline (ZOLOFT) 50 MG tablet, TAKE 1 TABLET DAILY  isosorbide mononitrate (IMDUR) 30 MG extended release tablet, TAKE 1 TABLET DAILY  tamsulosin (FLOMAX) 0.4 MG capsule, TAKE 1 CAPSULE TWICE A DAY  albuterol sulfate  (90 Base) MCG/ACT inhaler, INHALE 2 PUFFS BY MOUTH INTO THE LUNGS EVERY 6 HOURS AS NEEDED FOR WHEEZING  metoprolol tartrate (LOPRESSOR) 25 MG tablet, TAKE ONE-HALF (1/2) TABLET DAILY  losartan (COZAAR) 100 MG tablet, TAKE 1 TABLET BY MOUTH ONCE DAILY  acetaminophen (TYLENOL) 650 MG extended release tablet, Take 650 mg by mouth every 8 hours as needed for Pain  ferrous sulfate 325 (65 Fe) MG tablet, Take 325 mg by mouth three times a week Take 1 tablet every other day.  M-W-F  docusate sodium (COLACE) 100 MG capsule, Take 100 mg by mouth 2 times daily as needed   vitamin B-12 1000 MCG tablet, Take 1 tablet by mouth daily  [DISCONTINUED] Coenzyme Q10 (COQ-10 PO), Take 10 mg by mouth daily  Multiple Vitamins-Minerals (THERAPEUTIC MULTIVITAMIN-MINERALS) tablet, Take 1 tablet by mouth daily   Spacer/Aero Chamber Mouthpiece MISC, Use it with Dulera inhaler  Diet    DIET GENERAL; No Added Salt (3-4 GM)  Allergies    Iodine  Family History          Problem Relation Age of Onset    Cancer Brother 72        lung    Diabetes Brother     Kidney Disease Father         stones    Stroke Father     Kidney Disease Child         stones    Kidney Disease Child         stones    Heart Disease Sister     Arthritis Sister     High Blood Pressure Sister     High Cholesterol Sister     Diabetes Brother         multiple siblings had dm    Early Death Brother 72        lung cancer     Sleep History    Never diagnosed with sleep apnea in the past    Social History     Social History     Socioeconomic History    Marital status:      Spouse name: hE Cadena Number of children: 2    Years of education: Not on file    Highest education level: Not on file   Occupational History    Occupation: Retired   Social Needs    Financial resource strain: Not on file    Food insecurity:     Worry: Not on file     Inability: Not on file   [employer] needs:     Medical: Not on file     Non-medical: Not on file   Tobacco Use    Smoking status: Former Smoker     Packs/day: 1.00     Years: 20.00     Pack years: 20.00     Types: Cigarettes     Last attempt to quit: 1965     Years since quittin.3    Smokeless tobacco: Never Used    Tobacco comment: pt use to smoke cigars and pipe   Substance and Sexual Activity    Alcohol use:  Yes     Alcohol/week: 7.0 standard drinks     Types: 7 Cans of beer per week     Comment: occasional 1 beer     Drug use: No    Sexual activity: Not Currently     Partners: Female   Lifestyle    Physical activity:     Days per week: Not on file     Minutes per session: Not on file    Stress: Not on file   Relationships    Social connections:     Talks on phone: Not on file     Gets together: Not on file     Attends Hinduism service: Not on file     Active member of club or organization: Not on file     Attends meetings of clubs or organizations: Not on file     Relationship status: Not on file    Intimate partner violence:     Fear of current or ex partner: Not on file     Emotionally abused: Not on file     Physically abused: Not on file     Forced sexual activity: Not on file   Other Topics Concern    Not on file   Social History Narrative    - Never       [place] of systems   General/Constitutional: No recent loss of weight or appetite changes. No fever or chills. HENT: Negative. Eyes: Negative. Upper respiratory tract: Occasional nasal stuffiness with no post nasal drip. Lower respiratory tract/ lungs: See HPI for details. No hemoptysis. Cardiovascular: No palpitations or chest pain. Gastrointestinal: No nausea or vomiting. Neurological: No focal neurologiacal weakness. Extremities: No edema. Musculoskeletal: No complaints. Genitourinary: No complaints. Hematological: Negative. Psychiatric/Behavioral: Negative. Skin: No itching. Vitals     height is 5' 6\" (1.676 m) and weight is 137 lb 4.8 oz (62.3 kg). His oral temperature is 98.2 °F (36.8 °C). His blood pressure is 129/75 and his pulse is 109. His respiration is 20 and oxygen saturation is 91%. Body mass index is 22.16 kg/m². SUPPLEMENTAL O2:         I/O        Intake/Output Summary (Last 24 hours) at 3/3/2020 0753  Last data filed at 3/3/2020 0336  Gross per 24 hour   Intake 590 ml   Output --   Net 590 ml     I/O last 3 completed shifts: In: 590 [P.O.:590]  Out: -    Patient Vitals for the past 96 hrs (Last 3 readings):   Weight   03/03/20 0630 137 lb 4.8 oz (62.3 kg)   03/02/20 1319 144 lb (65.3 kg)       Exam   General Appearance: moderately built, moderately nourished in no acute distress on room air. HEENT: Normal, Head is normocephalic, atraumatic. Oropharynx is clear and moist.  No oral thrush. PERRL  Neck - Supple, No JVD present. No tracheal deviation. Lungs - Bilateral air entry present. Bilateral good breath sounds heard. Bilateral expiratory wheezes. Bilateral basal rales. Cardiovascular - Heart sounds are normal.  Regular rhythm normal rate without murmur, gallop or rub.   Abdomen - Soft, nontender, nondistended, no masses or organomegaly  Neurologic - Awake, Summary   Normal left ventricle size and systolic function. Ejection fraction was   estimated at 55 %. There were no regional left ventricular wall motion   abnormalities and wall thickness was within normal limits. The left atrium is Mildly dilated. Signature      ----------------------------------------------------------------   Electronically signed by Milton Mccord MD (Interpreting   physician) on 12/27/2017 at 07:04 PM   ----------------------------------------------------------------       Radiology    CXR  Mar 2, 2020   PROCEDURE: XR CHEST (2 VW)   Fibroemphysematous changes throughout the lungs greatest in the lung bases. Mild cardiomegaly. No superimposed infiltrate. Appearance is similar to prior. CT Scans  (See actual reports for details)  Mar 2, 2020  PROCEDURE: CT CHEST WO CONTRAST   IMPRESSION: Fibroemphysematous appearance throughout the lungs with paraseptal emphysema and fibrosis. Superimposed infection cannot be entirely excluded. Findings have progressed when compared to prior examination from 2013. Follow-up is advised. Incompletely healed right lateral seventh rib fracture. CT chest - 6/1/13- The following CT films were extracted from Dr. Juan Arellano MD note dated 2013.                    1.  BILATERAL INTERSTITIAL LUNG DISEASE WITH A PREDOMINANTLY   PERIPHERAL  AND BASILAR PREDOMINANCE.  FINDINGS ARE SUGGESTIVE OF PROBABLE   INTERSTITIAL FIBROSIS. 2.  NO AREA OF FOCAL PULMONARY CONSOLIDATION TO INDICATE AN ACUTE  PNEUMONIA IS IDENTIFIED.       Assessment   -Bilateral lower lobe predominant peripherally distributed interstitial lung disease initially noted in 2013. He used to follow with Dr. Juan Arellano MD at that time. 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 Due to his clinical stability no further work up was done.    -Hx of tobacco abuse- ? COPD. -CAD (coronary artery disease). -Anxiety disorder.   -CKD (chronic kidney disease) stage 3, GFR 30-59 ml/min

## 2020-03-03 NOTE — CONSULTS
Chief Complaint:  Acute on chronic anemia, hx of GI bleed    History of present Illness: Emelia Keating is a 80 y.o. male, well known to our practice, admitted to [address] [place] [address] with increased SOB. He states that he has been feeling more short of breath and fatigued \"for a while. \"  The patient's daughter is at the bedside and states for the past 2 months, she has noted that he has been using his C-pap during the day as well as during the night. We last saw this patient in October 2018. He had acute GI bleed in August of 2018 and underwent multiple colonoscopies and an EGD at that time. The patient states he has not noticed any blood in his stool recently. He states his stools are \"dark\" but he does take oral iron replacement. Stool for occult blood was negative on admission. The patient denies epistaxis or hematuria. He denies abdominal pain or chest pain. He denies Nausea or vomiting. He denies GERD or heartburn. He was not taking PPI or H2 blocker prior to admission. He is noted to be on pantoprazole and sucralfate at this time. The patient denies dysphagia or odynophagia. He denies changes in bowel habits, constipation, diarrhea, melena or hematochezia. He denies changes in his weight. He has a history of CHF and COPD and was seen by his PCP yesterday who appreciated crackles in his lung sounds and directed him to the ER. He was subsequently admitted for COPD and CHF exacerbation. Pulmonary has seen this admission. GI was consulted due to a drop in Hgb from his baseline and concern for recurrent GI bleed.       Past Medical History:  has a past medical history of Acute sinusitis, Angina pectoris ([address] Anxiety disorder, Arthritis, BPH with urinary obstruction, CAD (coronary artery disease), Chronic insomnia, CKD (chronic kidney disease) stage 3, GFR 30-59 ml/min ([facility]), COPD (chronic obstructive pulmonary disease) ([address] Gastroenteritis, HCAP (healthcare-associated pneumonia), Heart disease, HLD VISION URETHROTOMY & RESECTION BLADDER NECK CONTRACTURE    TURP  4/27/15    re-do TURP    TYMPANOSTOMY TUBE PLACEMENT      multiple surgeries    UPPER GASTROINTESTINAL ENDOSCOPY  12/29/2017    COLONOSCOPY SUBMUCOSAL/BOTOX INJECTION performed by Gregory Harden MD at [address]  8/16/2018    EGD DIAGNOSTIC ONLY performed by Gregory Harden MD at [facility] ENDOSCOPY Left 8/19/2018    EGD DIAGNOSTIC ONLY performed by Gregory Harden MD at [facility] [place] [facility]       Social History:  reports that he quit smoking about 54 years ago. His smoking use included cigarettes. He has a 20.00 pack-year smoking history. He has never used smokeless tobacco. He reports current alcohol use of about 7.0 standard drinks of alcohol per week. He reports that he does not use drugs. Family History: family history includes Arthritis in his sister; Cancer (age of onset: 72) in his brother; Diabetes in his brother and brother; Early Death (age of onset: 72) in his brother; Heart Disease in his sister; High Blood Pressure in his sister; High Cholesterol in his sister; Kidney Disease in his child, child, and father; Stroke in his father.     Review of Systems:   -History obtained from chart review and the patient  General ROS: positive for  - fatigue  negative for - chills, fever, hot flashes, malaise, night sweats, sleep disturbance, weight gain or weight loss  Psychological ROS: negative  ENT ROS: positive for - hearing change  negative for - epistaxis, headaches, nasal congestion, nasal discharge, sinus pain, sneezing, sore throat, tinnitus, vertigo, visual changes or vocal changes  Hematological and Lymphatic ROS: positive for - blood transfusions, fatigue and anemia  negative for - bleeding problems, blood clots, bruising, jaundice, night sweats, pallor, swollen lymph nodes or weight loss  Endocrine ROS: negative  Respiratory ROS: positive for - shortness of breath  negative sulfate 325 (65 Fe) MG tablet Take 325 mg by mouth three times a week Take 1 tablet every other day.  M-W-F   Yes Historical Provider, MD   docusate sodium (COLACE) 100 MG capsule Take 100 mg by mouth 2 times daily as needed    Yes Historical Provider, MD   vitamin B-12 1000 MCG tablet Take 1 tablet by mouth daily 1/1/18  Yes Donny Haines MD   Multiple Vitamins-Minerals (THERAPEUTIC MULTIVITAMIN-MINERALS) tablet Take 1 tablet by mouth daily    Yes Historical Provider, MD   Spacer/Aero Chamber Mouthpiece MISC Use it with [facility] inhaler 5/11/19   Lena Deleon MD       Current Meds:  Current Facility-Administered Medications:   Nnamdi Wilson ON 3/4/2020] pantoprazole (PROTONIX) tablet 40 mg, 40 mg, Oral, QAM AC, Reynaldo Galeazzi, MD    sucralfate (CARAFATE) tablet 1 g, 1 g, Oral, 4x Daily AC & HS, Reynaldo Galeazzi, MD    albuterol sulfate  (90 Base) MCG/ACT inhaler 2 puff, 2 puff, Inhalation, Q4H PRN, Haleigh Ernandez DO    docusate sodium (COLACE) capsule 100 mg, 100 mg, Oral, BID PRN, Haleigh Ernandez DO    ferrous sulfate tablet 325 mg, 325 mg, Oral, Daily with breakfast, Kellie Ernandez DO, 325 mg at 03/03/20 0850    [Held by provider] isosorbide mononitrate (IMDUR) extended release tablet 30 mg, 30 mg, Oral, Daily, Haleigh Ernandez DO, 30 mg at 03/03/20 0850    [Held by provider] losartan (COZAAR) tablet 100 mg, 100 mg, Oral, Daily, Haleigh Ernandez DO, 100 mg at 03/03/20 0850    [Held by provider] metoprolol tartrate (LOPRESSOR) tablet 12.5 mg, 12.5 mg, Oral, Daily, Haleigh Ernandez DO, 12.5 mg at 03/03/20 0850    sertraline (ZOLOFT) tablet 50 mg, 50 mg, Oral, Daily, Haleigh Ernandez DO, 50 mg at 03/03/20 0850    [Held by provider] tamsulosin (FLOMAX) capsule 0.4 mg, 0.4 mg, Oral, BID, Haleigh Ernandez DO, 0.4 mg at 03/03/20 0849    sodium chloride flush 0.9 % injection 10 mL, 10 mL, Intravenous, 2 times per day, Haleigh Ernandez DO, 10 mL at 03/03/20 0851    sodium chloride AST 11 03/03/2020    PROT 6.2 03/03/2020    BILITOT 0.3 03/03/2020    BILIDIR <0.2 05/11/2019    LABALBU 2.7 03/03/2020     Lab Results   Component Value Date    LACTA 0.9 03/03/2020     No results found for: AMYLASE  Lab Results   Component Value Date    LIPASE 16.2 05/11/2019     Lab Results   Component Value Date    INR 1.08 08/15/2018       Radiology:  PROCEDURE: CT CHEST WO CONTRAST       CLINICAL INFORMATION: New onset dyspnea on exertion, leukocysosis, history of emphysema, former smoker.       COMPARISON: June 1, 2013 TECHNIQUE: 5 mm noncontrast axial images were obtained through the chest. Sagittal and coronal reconstructions were obtained.       All CT scans at this facility use dose modulation, iterative reconstruction, and/or weight-based dosing when appropriate to reduce radiation dose to as low as reasonably achievable.       FINDINGS:       Fibroemphysematous appearance throughout the lungs with paraseptal emphysema and fibrosis. Superimposed infection cannot be entirely excluded. Findings have progressed when compared to prior examination from 2013. Follow-up is advised. No pleural or pericardial effusion. Thyroid gland is unremarkable. Ascending thoracic aorta is 4.1 cm. Generalized atherosclerotic changes   Coronary artery calcification. Calcified hilar and mediastinal lymph nodes. Degenerative changes thoracic spine. Permanent chest wall pacer. Upper abdominal images demonstrates distended stomach with food debris. No acute findings. Osteopenia and degenerative changes with anterior vertebral body bridging with thin syndesmophytes. Probable old compression deformities in the upper thoracic spine. Incompletely healed right rib fracture. Possible old anterior mediastinal fracture. Correlate with history of injury.           Impression    IMPRESSION:    Fibroemphysematous appearance throughout the lungs with paraseptal emphysema and fibrosis.  Superimposed infection cannot be entirely excluded. Findings have progressed when compared to prior examination from 2013. Follow-up is advised. Incompletely healed right lateral seventh rib fracture.           **This report has been created using voice recognition software. It may contain minor errors which are inherent in voice recognition technology. **       Final report electronically signed by Dr. Alison Suggs on 3/2/2020 5:50 PM       ASSESSMENT AND PLAN:  1. Anemia - acute on chronic. No evidence of shree GI bleeding at this time and stool (-) for occult blood. Continue to monitor and transfuse prn. Obtain iron studies in the morning. May need IV Venofer. No plan for endoscopy unless shree GI bleeding noted or hemodynamic instability until cardiac and pulmonary status improves. 2.  ? COPD exacerbation with increased SOB - Pulmonary managing. Continue supportive mgt. 3.  ? CHF exacerbation with increased SOB - diuretics per Attending. Continue supportive measures  4. Increased SOB - multifactorial due to #1, #2, and #3.  5.  Mild hyponatremia - Attending managing. 6.  Malnutrition - dietician consulted per Attending. 7.  Hx of NICOLAS - compliant with C-pap  8. Hx HTN - medications on hold due to hypotension at this time  9. Hx HLD   10. Hx CAD   11. Hx CKD      Thank You Dr. Javier Harmon MD for allowing me to participate in the care of this patient. Assessment and POC were discussed with Dr Haseeb Flynn.     Time In Room:  1635  Time Out Room:    1650  Total Dictation Time:  25 min (including chart review)    CHRISTIN Perez - CNP  3/3/2020  4:52 PM     Patient is seen independently from the nurse practitioner and all  the pertinent data along with physical examination and assessment and plans are all obtained by my self and  Laboratory data, Radiology results, medications all are reviewed by my self and care is discussed extensively with the patient  and the patients nurse and all agree with plan and in addition see orders and plans    Electronically signed by Lizz Hodges MD

## 2020-03-04 NOTE — PLAN OF CARE
Problem: Falls - Risk of:  Goal: Will remain free from falls  Description  Will remain free from falls  3/4/2020 0951 by Jamilah Coello RN  Outcome: Ongoing  Note:   Bed alarm/chair alarm utilized. Problem: Falls - Risk of:  Goal: Absence of physical injury  Description  Absence of physical injury  3/4/2020 0951 by Jamilah Coello RN  Outcome: Ongoing  Note:   Free from injury. Problem: Breathing Pattern - Ineffective:  Goal: Ability to achieve and maintain a regular respiratory rate will improve  Description  Ability to achieve and maintain a regular respiratory rate will improve  3/4/2020 0951 by Jamilah Coello RN  Outcome: Ongoing  Note:   Vitals WNL     Problem: Discharge Planning:  Goal: Discharged to appropriate level of care  Description  Discharged to appropriate level of care  3/4/2020 0951 by Jamilah Coello RN  Outcome: Ongoing  Note:   Discharge to home pending. Care plan reviewed with patient. Patient  verbalize understanding of the plan of care and contribute to goal setting.

## 2020-03-04 NOTE — PLAN OF CARE
Problem: Falls - Risk of:  Goal: Will remain free from falls  Description  Will remain free from falls  3/4/2020 0957 by Hailee Mueller RN  Outcome: Ongoing  Note:   Free from falls. Problem: Falls - Risk of:  Goal: Absence of physical injury  Description  Absence of physical injury  3/4/2020 0957 by Hailee Mueller RN  Outcome: Ongoing  Note:   Free from injury. Problem: Breathing Pattern - Ineffective:  Goal: Ability to achieve and maintain a regular respiratory rate will improve  Description  Ability to achieve and maintain a regular respiratory rate will improve  3/4/2020 0957 by Hailee Mueller RN  Outcome: Ongoing  Note:   See vitals     Problem: Discharge Planning:  Goal: Discharged to appropriate level of care  Description  Discharged to appropriate level of care  3/4/2020 0957 by Hailee Mueller RN  Outcome: Ongoing  Note:   Discharge pending. Care plan reviewed with patient. Patient  verbalize understanding of the plan of care and contribute to goal setting.

## 2020-03-04 NOTE — PROGRESS NOTES
[facility]  INPATIENT PHYSICAL THERAPY  EVALUATION  [facility] MED SURG 8B - 8B-27/027-A    Time In: 3436  Time Out: 8410  Timed Code Treatment Minutes: 8 Minutes  Minutes: 16          Date: 3/4/2020  Patient Name: La Alvarenga,  Gender:  male        MRN: [id number]  : 1931  (80 y.o.)      Referring Practitioner: Kip Beach MD  Diagnosis: Dyspnea on exertion  Additional Pertinent Hx: Pt admitted 3-3 from home with above. Restrictions/Precautions:  Restrictions/Precautions: Fall Risk    Subjective:  Chart Reviewed: Yes  Patient assessed for rehabilitation services?: Yes  Family / Caregiver Present: No  Subjective: Pt in chair, agreeable to PT evaluation. Very pleasant and motivated     General:  Overall Orientation Status: Within Functional Limits  Follows Commands: Within Functional Limits    Vision: Impaired  Vision Exceptions: Wears glasses at all times    Hearing: Exceptions to [facility]  Hearing Exceptions: Hard of hearing/hearing concerns, Bilateral hearing aid         Pain:  Denies.           Social/Functional History:    Lives With: Spouse  Type of Home: House  Home Layout: One level  Home Access: Stairs to enter with rails  Entrance Stairs - Number of Steps: 1 MORGAN with B grab bars  Home Equipment: Rolling walker, Cane     Bathroom Shower/Tub: Tub/Shower unit  Bathroom Toilet: Handicap height  Bathroom Equipment: Tub transfer bench, Grab bars in shower       ADL Assistance: Independent     Ambulation Assistance: Independent  Transfer Assistance: Independent          Additional Comments: daughter and brother assists with IADLs, pt cares for wife with dementia     OBJECTIVE:  Range of Motion:  Bilateral Lower Extremity: WFL    Strength:  Bilateral Lower Extremity: Impaired - 4-/5    Balance:  Static Sitting Balance:  Stand By Assistance  Static Standing Balance: Stand By Assistance, Contact Guard Assistance  Dynamic Standing Balance: Contact Guard Assistance, Minimal Assistance    Bed Mobility:  Supine to Sit: Contact Guard Assistance  Scooting: Stand By Assistance    Transfers:  Sit to Stand: Contact Guard Assistance  Stand to [employer] Assistance    Ambulation:  Contact Guard Assistance  Distance: 40 feet   Surface: Level Tile  Device:Rolling Walker  Gait Deviations: Forward Flexed Posture, Slow Leona, Decreased Step Length Bilaterally, Decreased Gait Speed, Decreased Heel Strike Bilaterally, Mild Path Deviations, Unsteady Gait and very little foot clearance       Exercise:  Patient was guided in 1 set(s) 10 reps of exercise to both lower extremities. Ankle pumps, Glut sets and Quad sets. Exercises were completed for increased independence with functional mobility. Functional Outcome Measures: Completed  AM-PAC Inpatient Mobility without Stair Climbing Raw Score : 15  AM-PAC Inpatient without Stair Climbing T-Scale Score : 43.03    ASSESSMENT:  Activity Tolerance:  Patient tolerance of  treatment: good. Treatment Initiated: Treatment and education initiated within context of evaluation. Evaluation time included review of current medical information, gathering information related to past medical, social and functional history, completion of standardized testing, formal and informal observation of tasks, assessment of data and development of plan of care and goals. Treatment time included skilled education and facilitation of tasks to increase safety and independence with functional mobility for improved independence and quality of life. Assessment: Body structures, Functions, Activity limitations: Decreased functional mobility , Decreased balance, Decreased endurance, Decreased strength  Assessment: Pt with generalized weakness, decreased balance, activity tolerance and functional mobility. Pt needs PT to improve safety with mobility  Prognosis: Good, Excellent    REQUIRES PT FOLLOW UP: Yes    Discharge Recommendations:  Discharge Recommendations: Subacute/Skilled Nursing Facility, Patient would benefit from continued therapy after discharge    Patient Education:  PT Education: Goals, Transfer Training, PT Role, Plan of Care, Home Exercise Program, Gait Training    Equipment Recommendations:  Equipment Needed: No    Plan:  Times per week: 3-5 X GM  Current Treatment Recommendations: Strengthening, Transfer Training, Endurance Training, Balance Training, Gait Training, Functional Mobility Training, Home Exercise Program    Goals:  Patient goals : Get stronger  Short term goals  Time Frame for Short term goals: by discharge  Short term goal 1: supine to sit and return with CGA to get in and out of bed   Short term goal 2: sit to stand with S to get on and off various surfaces  Short term goal 3: ambulate with RW 50 feet with S to walk safely household distances   Short term goal 4: Improve tinetti score to 26/28 to reduce fall risk  Long term goals  Time Frame for Long term goals : NA due to short ELOS    Following session, patient left in safe position with all fall risk precautions in place.

## 2020-03-04 NOTE — PROGRESS NOTES
Gastroenterology  Progress Note  3/4/2020 7:51 AM  Subjective:   Admit Date: 3/2/2020    Interval History: He has no nausea vomiting no discomfort at this time H&H anemia however he stable that typical for him, no blood in the stool no medication no diarrhea no melena he is short of breath with his exertion. [place] Fernando History of interstitial lung disease his white cell is high of unclear etiology at this time possible pneumonia ,   Diet: DIET GENERAL; No Added Salt (3-4 GM)    Medications:   Scheduled Meds:   pantoprazole  40 mg Oral QAM AC    ferrous sulfate  325 mg Oral Daily with breakfast    [Held by provider] isosorbide mononitrate  30 mg Oral Daily    [Held by provider] losartan  100 mg Oral Daily    [Held by provider] metoprolol tartrate  12.5 mg Oral Daily    sertraline  50 mg Oral Daily    [Held by provider] tamsulosin  0.4 mg Oral BID    sodium chloride flush  10 mL Intravenous 2 times per day     Continuous Infusions:  CBC:   Recent Labs     03/02/20  1330 03/03/20  0554 03/04/20  0616   WBC 17.1* 14.5* 13.0*   HGB 10.7* 9.9* 9.9*    326 345     BMP:    Recent Labs     03/02/20  1330 03/03/20  0554   * 134*   K 4.4 4.2   CL 98 98   CO2 23 24   BUN 25* 25*   CREATININE 1.2 1.5*   GLUCOSE 99 115*     Hepatic:   Recent Labs     03/02/20  1330 03/03/20  0554   AST 15 11   ALT 6* <5*   BILITOT 0.5 0.3   ALKPHOS 74 66     INR: No results for input(s): INR in the last 72 hours.   Xray:   Endoscopy Finding:      Objective:   Vitals: /75   Pulse 78   Temp 98.3 °F (36.8 °C) (Oral)   Resp 18   Ht 5' 6\" (1.676 m)   Wt 138 lb 1.6 oz (62.6 kg)   SpO2 94%   BMI 22.29 kg/m²     Intake/Output Summary (Last 24 hours) at 3/4/2020 0751  Last data filed at 3/4/2020 0420  Gross per 24 hour   Intake 650 ml   Output 5 ml   Net 645 ml     General appearance: alert and cooperative with exam  Lungs: clear to auscultation bilaterally  Heart: regular rate and rhythm, S1, S2 normal, no murmur, click, rub or gallop  Abdomen: soft, non-tender; bowel sounds normal; no masses,  no organomegaly  Extremities: extremities normal, atraumatic, no cyanosis or edema    Assessment and Plan:   1. H&H is stable no gross GI bleed at this time, he is short of breath is not stable for any endoscopy at this time no significant discomfort   2. Dyspnea on exertion interstitial lung disease followed by pulmonary service he has also history of sleep apnea  3. Follow up in GI Clinic after discharge in as needed.  week(s)    Patient Active Problem List:     Dyspnea     Bladder neck contracture     BPH (benign prostatic hyperplasia)     Suprapubic pain     S/P TURP (status post transurethral resection of prostate)     CKD (chronic kidney disease) stage 3, GFR 30-59 ml/min ([facility])     CAD (coronary artery disease)     Nausea and vomiting     Chronic serous otitis media of left ear     Hearing loss, sensorineural     Conductive hearing loss, bilateral     History of tympanoplasty of left ear     Tympanosclerosis involving combination of structures     Anxiety disorder     Disc disorder of lumbar region     Pacemaker     Incisional hernia, without obstruction or gangrene     S/P ventral herniorrhaphy     Essential hypertension     Head injury, closed, without LOC     Closed right hip fracture ([facility])     At high risk for pain from procedure     NICOLAS (obstructive sleep apnea)     Closed comminuted intertrochanteric fracture of right femur with routine healing     Acute blood loss anemia     Lower GI bleed     B12 deficiency     Diverticulosis of large intestine with hemorrhage     Lumbar radiculitis     HNP (herniated nucleus pulposus), lumbar     Severe malnutrition (HCC)     Gastritis     Tachycardia     COPD (chronic obstructive pulmonary disease) ([facility] [address] [address]      Juana Dalton MD

## 2020-03-04 NOTE — PROGRESS NOTES
factors: No    He gives a history of fever: No  Chills & rigors:No  Recent travel history:No.    Rrecent sick contacts: No.    Associated night sweats: No    He was ever treated with Amiodarone,Methotrexate or Macrobid (Nitrofurantoin) in the past: No.  He ever exposed to farm/grain dust in the past:No.  He ever diagnosed with connective tissue diseases including Systemic lupus Erythematosus, Rheumatoid arthritis or Sarcoidosis etc in the past:No.  He ever exposed to birds or exotic animals or old furniture in the past:No.  He ever exposed to [address] or Asbestos in the past:No.  His family member ever diagnosed with interstitial lung disease or Sarcoidosis in the past:No.    He used to work in [employer] in the past. He used to work with grinding machines. He gives a hx of exposure to lot of metal dust at his work [employer] in the past.    Past 24 Hours   -Stable on room air- 93%  -Afebrile  -Eval today for possible ECF  -OOB to chair    -All systems reviewed.      PMHx   Past Medical History      Diagnosis Date    Acute sinusitis     Angina pectoris (HCC)     Anxiety disorder 10/27/2016    Arthritis     osteoporosis    BPH with urinary obstruction     CAD (coronary artery disease)     Chronic insomnia     CKD (chronic kidney disease) stage 3, GFR 30-59 ml/min (HCC)     COPD (chronic obstructive pulmonary disease) (HCC)     Gastroenteritis     HCAP (healthcare-associated pneumonia) 4/29/2015    Heart disease     HLD (hyperlipidemia)     [place] (hard of hearing)     wear hearing aids    Hypertension     Kidney disease     40% kdiney function    Kidney stone     years ago 15-20    NICOLAS on CPAP     Osteopenia determined by x-ray     Pacemaker 2011    Pinched nerve     slipped disc in back    Visual problems     Vitamin D deficiency       Past Surgical History        Procedure Laterality Date    ABDOMINAL HERNIA REPAIR  04/27/2017    incisional hernia repair--Dr. Tiffany Castillo CARDIAC SURGERY      CATARACT REMOVAL WITH IMPLANT      bilateral    COLONOSCOPY  8034,3110    COLONOSCOPY  12/29/2017    COLONOSCOPY CONTROL HEMORRHAGE performed by Crys Domingo MD at [address] End[facility]    COLONOSCOPY  8/16/2018    COLONOSCOPY POLYPECTOMY SNARE/COLD BIOPSY performed by Crys Domingo MD at [address] End[facility]    COLONOSCOPY  8/19/2018    COLONOSCOPY CONTROL HEMORRHAGE performed by Crys Domingo MD at [address] End[facility]    COLONOSCOPY N/A 8/20/2018    COLONOSCOPY CONTROL HEMORRHAGE performed by Crys Domingo MD at [address] [address]'s    EKG 12-LEAD  4/29/2015         ENDOSCOPY, COLON, DIAGNOSTIC      EYE SURGERY      cataracts    HERNIA REPAIR      several    HIP PINNING Left 8/31/2017    LEFT HIP INTERTAN performed by Iliana De Santiago MD at [address] [place], [place] [address]y 60  12/3/12    left     MYRINGOTOMY AND TYMPANOSTOMY TUBE PLACEMENT  7/23/13    left     NASAL SINUS SURGERY      OTHER SURGICAL HISTORY  04/27/2015    transurethral Incision bladder neck    PACEMAKER INSERTION      PACEMAKER PLACEMENT  2011    [place] COLONOSCOPY FLX DX W/COLLJ SPEC WHEN PFRMD Left 8/18/2018    COLONOSCOPY performed by Crys Domingo MD at [address] Endoscopy    [place] [address] DX/THER SBST INTRLMNR LMBR/SAC W/IMG GDN N/A 6/25/2018    LUMBAR INTER LAMINAR RUBEN LESI @ L3. performed by Mitchel Stubbs MD at [place] [address] Right 10/8/2017    Right hip intertan performed by Kevin Hamilton MD at [address] [place] [address] TURP  4/17/2013    W/CYSTO WITH DIRECT VISION URETHROTOMY & RESECTION BLADDER NECK CONTRACTURE    TURP  4/27/15    re-do TURP    TYMPANOSTOMY TUBE PLACEMENT      multiple surgeries    UPPER GASTROINTESTINAL ENDOSCOPY  12/29/2017    COLONOSCOPY SUBMUCOSAL/BOTOX INJECTION performed by Crys Domingo MD at [address] [place] [address]  8/16/2018    EGD DIAGNOSTIC ONLY performed by Crys Domingo MD at [address] [facility] [address] Left 8/19/2018    EGD DIAGNOSTIC ONLY performed by Crys Domingo MD at [facility]     Meds    Current Medications    pantoprazole  40 mg Oral QAM AC    ferrous sulfate  325 mg Oral Daily with breakfast    [Held by provider] isosorbide mononitrate  30 mg Oral Daily    [Held by provider] losartan  100 mg Oral Daily    [Held by provider] metoprolol tartrate  12.5 mg Oral Daily    sertraline  50 mg Oral Daily    tamsulosin  0.4 mg Oral BID    sodium chloride flush  10 mL Intravenous 2 times per day     albuterol sulfate HFA, docusate sodium, sodium chloride flush, acetaminophen **OR** acetaminophen, polyethylene glycol, promethazine **OR** ondansetron  IV Drips/Infusions    Home Medications  Medications Prior to Admission: Coenzyme Q10 (COQ10 PO), Take 1 tablet by mouth daily  sertraline (ZOLOFT) 50 MG tablet, TAKE 1 TABLET DAILY  isosorbide mononitrate (IMDUR) 30 MG extended release tablet, TAKE 1 TABLET DAILY  tamsulosin (FLOMAX) 0.4 MG capsule, TAKE 1 CAPSULE TWICE A DAY  albuterol sulfate  (90 Base) MCG/ACT inhaler, INHALE 2 PUFFS BY MOUTH INTO THE LUNGS EVERY 6 HOURS AS NEEDED FOR WHEEZING  metoprolol tartrate (LOPRESSOR) 25 MG tablet, TAKE ONE-HALF (1/2) TABLET DAILY  losartan (COZAAR) 100 MG tablet, TAKE 1 TABLET BY MOUTH ONCE DAILY  acetaminophen (TYLENOL) 650 MG extended release tablet, Take 650 mg by mouth every 8 hours as needed for Pain  ferrous sulfate 325 (65 Fe) MG tablet, Take 325 mg by mouth three times a week Take 1 tablet every other day.  M-W-F  docusate sodium (COLACE) 100 MG capsule, Take 100 mg by mouth 2 times daily as needed   vitamin B-12 1000 MCG tablet, Take 1 tablet by mouth daily  [DISCONTINUED] Coenzyme Q10 (COQ-10 PO), Take 10 mg by mouth daily  Multiple Vitamins-Minerals (THERAPEUTIC MULTIVITAMIN-MINERALS) tablet, Take 1 tablet by mouth daily   Spacer/Aero Chamber Mouthpiece MISC, Use it with Dulera inhaler  Diet    DIET GENERAL; No Added Salt (3-4 GM)  Allergies    Iodine  Family History          Problem Relation Age of Onset    Cancer Brother 72        lung    Diabetes Brother     Kidney Disease Father         stones    Stroke Father     Kidney Disease Child         stones    Kidney Disease Child         stones    Heart Disease Sister     Arthritis Sister     High Blood Pressure Sister     High Cholesterol Sister     Diabetes Brother         multiple siblings had dm    Early Death Brother 72        lung cancer     Sleep History    Never diagnosed with sleep apnea in the past    Social History     Social History     Socioeconomic History    Marital status:      Spouse name: Eh Cadena Number of children: 2    Years of education: Not on file    Highest education level: Not on file   Occupational History    Occupation: Retired   Social Needs    Financial resource strain: Not on file    Food insecurity:     Worry: Not on file     Inability: Not on file   [employer] needs:     Medical: Not on file     Non-medical: Not on file   Tobacco Use    Smoking status: Former Smoker     Packs/day: 1.00     Years: 20.00     Pack years: 20.00     Types: Cigarettes     Last attempt to quit: 1965     Years since quittin.3    Smokeless tobacco: Never Used    Tobacco comment: pt use to smoke cigars and pipe   Substance and Sexual Activity    Alcohol use:  Yes     Alcohol/week: 7.0 standard drinks     Types: 7 Cans of beer per week     Comment: occasional 1 beer     Drug use: No    Sexual activity: Not Currently     Partners: Female   Lifestyle    Physical activity:     Days per week: Not on file     Minutes per session: Not on file    Stress: Not on file   Relationships    Social connections:     Talks on phone: Not on file     Gets together: Not on file     Attends Cheondoism service: Not on file     Active member of club or organization: Not on file     Attends meetings of clubs or organizations: Not on file     Relationship status: Not on file    Intimate partner violence:     Fear of current or ex partner: Not on file     Emotionally abused: Not on file     Physically abused: Not on file     Forced sexual activity: Not on file   Other Topics Concern    Not on file   Social History Narrative    - Never       Vitals     height is 5' 6\" (1.676 m) and weight is 138 lb 1.6 oz (62.6 kg). His oral temperature is 98.6 °F (37 °C). His blood pressure is 126/64 and his pulse is 84. His respiration is 18 and oxygen saturation is 93%. Body mass index is 22.29 kg/m². SUPPLEMENTAL O2:         I/O        Intake/Output Summary (Last 24 hours) at 3/4/2020 1439  Last data filed at 3/4/2020 0859  Gross per 24 hour   Intake 660 ml   Output 5 ml   Net 655 ml     I/O last 3 completed shifts: In: 650 [P.O.:650]  Out: 5 [Urine:3; Stool:2]   Patient Vitals for the past 96 hrs (Last 3 readings):   Weight   03/04/20 0415 138 lb 1.6 oz (62.6 kg)   03/03/20 0630 137 lb 4.8 oz (62.3 kg)   03/02/20 1319 144 lb (65.3 kg)       Exam   General Appearance: moderately built, moderately nourished in no acute distress on room air. OOB to chair  HEENT: Normal, Head is normocephalic, atraumatic. Oropharynx is clear and moist.  No oral thrush. PERRL  Neck - Supple, No JVD present. No tracheal deviation. Lungs - Bilateral air entry present. Bilateral good breath sounds heard. Bilateral expiratory wheezes. Bilateral basal rales. Cardiovascular - Heart sounds are normal.  Regular rhythm normal rate without murmur, gallop or rub. Abdomen - Soft, nontender, nondistended, no masses or organomegaly  Neurologic - Awake, alert, oriented. There are no focal motor or sensory deficits. Decreased hearing sensation in both ears. Extremities - No cyanosis, clubbing or edema. Musculoskeletal: Normal range of motion. Patient exhibits no tenderness. Lymphadenopathy:  No cervical adenopathy.   Psychiatric: Patient  has Epic    EKG     Echocardiogram   03/03/2020   ECHOCARDIOGRAM COMPLETE 2D W DOPPLER W COLOR. Conclusions      Summary   Ejection fraction is visually estimated at 55%. Overall left ventricular function is normal.   The aortic valve leaflets were not well visualized. Aortic valve appears tricuspid. Mild aortic regurgitation is noted. Signature      ----------------------------------------------------------------   Electronically signed by Satya Vieira MD (Interpreting   physician) on 03/03/2020 at 04:19 PM      12/27/2017   Narrative & Impression     Transthoracic Echocardiography Report (TTE)  Conclusions      Summary   Normal left ventricle size and systolic function. Ejection fraction was   estimated at 55 %. There were no regional left ventricular wall motion   abnormalities and wall thickness was within normal limits. The left atrium is Mildly dilated. Signature      ----------------------------------------------------------------   Electronically signed by Oswald Ta MD (Interpreting   physician) on 12/27/2017 at 07:04 PM   ----------------------------------------------------------------       Radiology    CXR  Mar 2, 2020   PROCEDURE: XR CHEST (2 VW)   Fibroemphysematous changes throughout the lungs greatest in the lung bases. Mild cardiomegaly. No superimposed infiltrate. Appearance is similar to prior. CT Scans  (See actual reports for details)  Mar 2, 2020  PROCEDURE: CT CHEST WO CONTRAST   IMPRESSION: Fibroemphysematous appearance throughout the lungs with paraseptal emphysema and fibrosis. Superimposed infection cannot be entirely excluded. Findings have progressed when compared to prior examination from 2013. Follow-up is advised. Incompletely healed right lateral seventh rib fracture.          CT chest - 6/1/13- The following CT films were extracted from Dr. Belinda Arellano MD note dated 2013.                    1.  BILATERAL INTERSTITIAL LUNG DISEASE WITH A PREDOMINANTLY

## 2020-03-04 NOTE — PROGRESS NOTES
Hospitalist Progress Note    Patient:  Sloane Fontaine  YOB: 1931  MRN: [id number]   PCP: Alek Brannon DO         Acct: [de-identified]  Unit/Bed: [address] [place] [address]-    Date of Admission: 3/2/2020      ASSESSMENT     1. Dyspnea on exertion: Question of pulmonary fibrosis. ESR elevatedBedside PFTS indicate evidence for mild restriction and mild diffusion. Patient received IV Lasix in consideration of possible pulmonary edema- unclear if his symptoms are improved. Pulmonology following and multiple rheumatological studies ordered. ESR elevated . Question as to whether steroids will be started. Workup for Hypersensitivity pneumonitis to be considered as outpatient? 2. Interstitial lung disease: Previously followed with Dr. Amrita Driscoll per Pulmonology. Dr. Joann Haro, Pulmonology now consulted  3. Elevated troponin: Previously unremarkable. Possibly underlying CAD contribuotry to SOB with exertion? Trend troponin. Elevated in setting of CKD? Patient otherwise denies CP but does have SOB. Consider Cardiology consult  4. Unspecified leukocytosis: Trending downwards. No other signs of infection , procalcitonin unremarkable. Deferred use of antibiotics. WBC trending downwards. UA unremarkable. CXR unremarkable for pneumonia  5. Acute kidney injury on CKD 3: Likely related to diuretic administration,. Continue to monitor  6. Normocytic anemia/Hx of GI bleed: slight trend downwards, however Hgb stable. Seen by Gastroenterology secondary to history of GI bleed. On iron supplements   7. Hyponatremia: stable continue to monitor  8. Essential HTN: Losartan, isosorbide mononitrate and metoprolol held and BP currently controlledL  9. Emphysema/COPD: CXR shows emphysematous changes throughout the lungs. On breathing treatments   10. BPH: tamsulosin  11. CAD  15. Chronic anxiety  13. NICOLAS: CPAP to be reinitiated?- Patient non-compliant. Pulmonology following      PLAN     1.  Question as to whether patient should be placed on chronic steroids. Awaiting recommendations from Pulmonology  2. Check influenza  3. Consider Cardiology consult for elevated troponin  4. Continue to monitor    Anticipated Discharge in : 1 to 2 days    Code Status: Full Code    Electronically signed by Liliya Gallardo MD on 3/4/2020 at 5:07 PM      Chief Complaint     Shortness of breath    SUBJECTIVE     The patient is a 80 y.o. male who presented to [address] [place] [address] based on a referral from an office visit with Dr. Kiel Seth. According to the H&P, the patient's family was concerned about possible fluid overload due to \"crackles in his lungs. \" He was transported to the ED by wheelchair from her office. He reported that he has had worsening shortness of breath with exertion over the last few months. He reports being short of breath just moving around his home and has to stop and rest to catch his breath. He denies chest pain. His daughter and wife, are present with him in the ED report that he has been using his CPAP during the day due to difficulty catching his breath. He denies cough, fevers, chills, alteration in mental status, congestion, swelling in his legs, and chest pain. He is a former smoker 20 pack year history. He has a pacemaker. His daughter also reports that his bowel movements have become darker. He had a lower GI bleed in 2018 requiring multiple colonoscopies and transfusions. Denies dizziness or new onset fatigue.           OBJECTIVE     Medications:  Reviewed    Infusion Medications   Scheduled Medications    ipratropium-albuterol  1 ampule Inhalation Q4H WA    pantoprazole  40 mg Oral QAM AC    ferrous sulfate  325 mg Oral Daily with breakfast    [Held by provider] isosorbide mononitrate  30 mg Oral Daily    [Held by provider] losartan  100 mg Oral Daily    [Held by provider] metoprolol tartrate  12.5 mg Oral Daily    sertraline  50 mg Oral Daily    tamsulosin  0.4 mg Oral BID    sodium chloride flush  10 mL Intravenous 2 times per day     PRN Meds: albuterol sulfate HFA, docusate sodium, sodium chloride flush, acetaminophen **OR** acetaminophen, polyethylene glycol, promethazine **OR** ondansetron    Ins and outs:      Intake/Output Summary (Last 24 hours) at 3/4/2020 1707  Last data filed at 3/4/2020 0859  Gross per 24 hour   Intake 660 ml   Output 2 ml   Net 658 ml       Physical Examination     BP (!) 112/57   Pulse 86   Temp 98.1 °F (36.7 °C) (Oral)   Resp 16   Ht 5' 6\" (1.676 m)   Wt 138 lb 1.6 oz (62.6 kg)   SpO2 95%   BMI 22.29 kg/m²     General appearance: No apparent distress. HEENT: Extraocular motion intact. Neck: Supple  Respiratory:  Fine crackles throughout the lungs  Cardiovascular: RRR with normal S1/S2 without murmurs, rubs or gallops. Abdomen: Soft, non-tender, non-distended with normal bowel sounds. Musculoskeletal: Patient is moving extremities x 4 spontaneously  Neurologic: Grossly non focal. CN: II-XII intact  Psychiatric: Alert and oriented  Vascular: Dorsalis pedis palpable bilaterally. Radial pulses palpable bilaterally. No peripheral edema  Skin:  No visible rashes or lesions. Labs     Recent Labs     03/02/20  1330 03/03/20  0554 03/04/20  0616   WBC 17.1* 14.5* 13.0*   HGB 10.7* 9.9* 9.9*   HCT 33.5* 31.2* 31.2*    326 345     Recent Labs     03/02/20  1330 03/03/20  0554   * 134*   K 4.4 4.2   CL 98 98   CO2 23 24   BUN 25* 25*   CREATININE 1.2 1.5*   CALCIUM 8.7 8.7   PHOS  --  3.2     Recent Labs     03/02/20  1330 03/03/20  0554   AST 15 11   ALT 6* <5*   BILITOT 0.5 0.3   ALKPHOS 74 66     No results for input(s): INR in the last 72 hours.   Recent Labs     03/03/20  1513   CKTOTAL 14*       Urinalysis:      Lab Results   Component Value Date    NITRU NEGATIVE 03/03/2020    WBCUA 0-2 05/11/2019    WBCUA 0-5 02/19/2018    BACTERIA NONE 05/11/2019    RBCUA 0-2 05/11/2019    BLOODU NEGATIVE 03/03/2020    GLUCOSEU NEGATIVE 03/03/2020       Diagnostic imaging/procedures       Xr Chest Standard (2 Vw)    Result Date: 3/2/2020  PROCEDURE: XR CHEST (2 VW) CLINICAL INFORMATION: sob. COMPARISON: May 11, 2019 TECHNIQUE: PA and lateral views the chest. FINDINGS: Fibroemphysematous changes throughout the lungs greatest in the lung bases. Mild cardiomegaly. No superimposed infiltrate. Permanent left chest wall pacer. Osteopenia and degenerative changes thoracic spine. Old compression deformities in the mid thoracic spine similar to prior. Fibroemphysematous changes throughout the lungs greatest in the lung bases. Mild cardiomegaly. No superimposed infiltrate. Appearance is similar to prior. .**This report has been created using voice recognition software. It may contain minor errors which are inherent in voice recognition technology. ** Final report electronically signed by Dr. Navid Madera on 3/2/2020 2:12 PM    Ct Chest Wo Contrast    Result Date: 3/2/2020  PROCEDURE: CT CHEST WO CONTRAST CLINICAL INFORMATION: New onset dyspnea on exertion, leukocysosis, history of emphysema, former smoker. COMPARISON: June 1, 2013 TECHNIQUE: 5 mm noncontrast axial images were obtained through the chest. Sagittal and coronal reconstructions were obtained. All CT scans at this facility use dose modulation, iterative reconstruction, and/or weight-based dosing when appropriate to reduce radiation dose to as low as reasonably achievable. 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 Superimposed infection cannot be entirely excluded. Findings have progressed when compared to prior examination from 2013. Follow-up is advised. No pleural or pericardial effusion. Thyroid gland is unremarkable. Ascending thoracic aorta is 4.1 cm. Generalized atherosclerotic changes Coronary artery calcification. Calcified hilar and mediastinal lymph nodes. Degenerative changes thoracic spine. Permanent chest wall pacer.  Upper abdominal images demonstrates distended stomach with food

## 2020-03-04 NOTE — PROGRESS NOTES
with rails  Entrance Stairs - Number of Steps: 1 MORGAN with B grab bars  Home Equipment: Rolling walker, Cane   Bathroom Shower/Tub: Tub/Shower unit  Bathroom Toilet: Handicap height  Bathroom Equipment: Tub transfer bench, Grab bars in shower       ADL Assistance: Independent  Ambulation Assistance: Independent  Transfer Assistance: Independent          Additional Comments: daughter and brother assists with IADLs, pt cares for wife with dementia     Cognition/Orientation:  Overall Orientation Status: Within Functional Limits  Cognition Comment: min decreased STM at times. ADL's:  Grooming: Contact guard assistance(standing sink side to wash hands, complete oral care )  UE Bathing: Minimal assistance(for back only seated)  LE Bathing: Contact guard assistance(for balance to stand to wash bottom, min unsteady )  UE Dressing: Minimal assistance(to change hospital gown)  LE Dressing: Maximum assistance  Toileting: Contact guard assistance     Completed sponge bath this date, mod rest breaks needed d/t increased SOB, min unsteady when standing.    Functional Mobility:  Bed mobility  Supine to Sit: Stand by assistance  Scooting: Stand by assistance    Functional Mobility  Functional - Mobility Device: Rolling Walker  Activity: To/from bathroom  Assist Level: Contact guard assistance  Functional Mobility Comments: min vc for walker placement and safety      Balance:  Balance  Sitting Balance: Stand by assistance  Standing Balance: Contact guard assistance  Standing Balance  Time: 4 minutes, 3 minutes  Activity: ADLs  Comment: pt slightly unsteady with 1-2 hand release     Transfers:  Sit to stand: Contact guard assistance  Stand to sit: Contact guard assistance  Transfer Comments: from various surfaces, vc for technique       Upper Extremity Assessment:   LUE AROM : WFL  RUE AROM : WFL    LUE Strength  LUE Strength Comment: BUE general deconditioning noted     RUE Tone: Normotonic  LUE Tone: Normotonic       Activity Tolerance: Patient Tolerated treatment well, Patient limited by fatigue  min SOB after activity, rest breaks provided     Assessment:  Assessment: Pt with stated deficits and will continue to benefit from skilled OT services to improve strength, balance and activity tolerance needed for safe ADL completion at home  Performance deficits / Impairments: Decreased functional mobility , Decreased ADL status, Decreased balance, Decreased endurance, Decreased strength, Decreased high-level IADLs  Prognosis: Good  REQUIRES OT FOLLOW UP: Yes    Treatment Initiated: Treatment and education initiated within context of evaluation. Evaluation time included review of current medical information, gathering information related to past medical, social and functional history, completion of standardized testing, formal and informal observation of tasks, assessment of data and development of plan of care and goals. Treatment time included skilled education and facilitation of tasks to increase safety and independence with ADL's for improved functional independence and quality of life.     Discharge Recommendations:  Continue to assess pending progress, Subacute/Skilled Nursing Facility, Patient would benefit from continued therapy after discharge    Patient Education:  OT Education: OT Role, Plan of Care, Transfer Training, Energy Conservation    Equipment Recommendations:  Equipment Needed: No    Plan:  Times per week: 3-5X  Current Treatment Recommendations: Strengthening, Balance Training, Functional Mobility Training, Safety Education & Training, Self-Care / ADL, Patient/Caregiver Education & Training, Endurance Training    Goals:  Patient goals : to get back home  Short term goals  Time Frame for Short term goals: by discharge  Short term goal 1: pt will complete functional mobility HH distances with SBA and RW with no vc for safety for ease with getting around home environment  Short term goal 2: Pt will complete functional transfers with S and no vc for safety including to/from toilet  Short term goal 3: Pt will complete dynamic standing task with S X 3 minutes with 1-2 hand release and noLOB for ease with shower routine  Short term goal 4: Pt will tolerate BUE light strengthening exercises to improve UB strength and activity tolerance needed for ADLs  Long term goals  Time Frame for Long term goals : no LTG established d/t short ELOS  See long-term goal time frame for expected duration of plan of care. If no long-term goals established, a short length of stay is anticipated. Following session, patient left in safe position with all fall risk precautions in place.

## 2020-03-05 PROBLEM — E44.0 MODERATE MALNUTRITION (HCC): Chronic | Status: ACTIVE | Noted: 2020-01-01

## 2020-03-05 NOTE — PLAN OF CARE
Problem: Nutrition  Goal: Optimal nutrition therapy  Outcome: Ongoing  Nutrition Problem: Moderate malnutrition  Intervention: Food and/or Nutrient Delivery: Continue current diet, Start ONS  Nutritional Goals: Pt. will tolerate and consume 75% or more of meals during LOS.

## 2020-03-05 NOTE — PROGRESS NOTES
ever diagnosed with connective tissue diseases including Systemic lupus Erythematosus, Rheumatoid arthritis or Sarcoidosis etc in the past:No.  He ever exposed to birds or exotic animals or old furniture in the past:No.  He ever exposed to [address] or Asbestos in the past:No.  His family member ever diagnosed with interstitial lung disease or Sarcoidosis in the past:No.    He used to work in [employer] in the past. He used to work with grinding machines. He gives a hx of exposure to lot of metal dust at his work [employer] in the past.    Past 24 Hours   -Stable on room air- 93%  -Awaiting pre-cert to ECF  -Anti-CCP (-)  -All systems reviewed.      PMHx   Past Medical History      Diagnosis Date    Acute sinusitis     Angina pectoris (HCC)     Anxiety disorder 10/27/2016    Arthritis     osteoporosis    BPH with urinary obstruction     CAD (coronary artery disease)     Chronic insomnia     CKD (chronic kidney disease) stage 3, GFR 30-59 ml/min (HCC)     COPD (chronic obstructive pulmonary disease) (HCC)     Gastroenteritis     HCAP (healthcare-associated pneumonia) 4/29/2015    Heart disease     HLD (hyperlipidemia)     [place] (hard of hearing)     wear hearing aids    Hypertension     Kidney disease     40% kdiney function    Kidney stone     years ago 15-20    NICOLAS on CPAP     Osteopenia determined by x-ray     Pacemaker 2011    Pinched nerve     slipped disc in back    Visual problems     Vitamin D deficiency       Past Surgical History        Procedure Laterality Date    ABDOMINAL HERNIA REPAIR  04/27/2017    incisional hernia repair--Dr. Juliana Luna CARDIAC SURGERY      CATARACT REMOVAL WITH IMPLANT      bilateral    COLONOSCOPY  2040,3460    COLONOSCOPY  12/29/2017    COLONOSCOPY CONTROL HEMORRHAGE performed by Geovany Glasgow MD [facility]    COLONOSCOPY  8/16/2018    COLONOSCOPY POLYPECTOMY SNARE/COLD BIOPSY performed by Geovany Glasgow MD at [facility]    (3-4 GM)  Allergies    Iodine  Family History          Problem Relation Age of Onset    Cancer Brother 72        lung    Diabetes Brother     Kidney Disease Father         stones    Stroke Father     Kidney Disease Child         stones    Kidney Disease Child         stones    Heart Disease Sister     Arthritis Sister     High Blood Pressure Sister     High Cholesterol Sister     Diabetes Brother         multiple siblings had dm    Early Death Brother 72        lung cancer     Sleep History    Never diagnosed with sleep apnea in the past    Social History     Social History     Socioeconomic History    Marital status:      Spouse name: Kimber Vo Number of children: 2    Years of education: Not on file    Highest education level: Not on file   Occupational History    Occupation: Retired   Social Needs    Financial resource strain: Not on file    Food insecurity:     Worry: Not on file     Inability: Not on file   [employer] needs:     Medical: Not on file     Non-medical: Not on file   Tobacco Use    Smoking status: Former Smoker     Packs/day: 1.00     Years: 20.00     Pack years: 20.00     Types: Cigarettes     Last attempt to quit: 1965     Years since quittin.3    Smokeless tobacco: Never Used    Tobacco comment: pt use to smoke cigars and pipe   Substance and Sexual Activity    Alcohol use:  Yes     Alcohol/week: 7.0 standard drinks     Types: 7 Cans of beer per week     Comment: occasional 1 beer     Drug use: No    Sexual activity: Not Currently     Partners: Female   Lifestyle    Physical activity:     Days per week: Not on file     Minutes per session: Not on file    Stress: Not on file   Relationships    Social connections:     Talks on phone: Not on file     Gets together: Not on file     Attends Latter-day service: Not on file     Active member of club or organization: Not on file     Attends meetings of clubs or organizations: Not on file     Relationship status: Not on file    Intimate partner violence:     Fear of current or ex partner: Not on file     Emotionally abused: Not on file     Physically abused: Not on file     Forced sexual activity: Not on file   Other Topics Concern    Not on file   Social History Narrative    - Never       Vitals     height is 5' 6\" (1.676 m) and weight is 136 lb 6.4 oz (61.9 kg). His oral temperature is 98 °F (36.7 °C). His blood pressure is 120/70 and his pulse is 94. His respiration is 18 and oxygen saturation is 97%. Body mass index is 22.02 kg/m². SUPPLEMENTAL O2:         I/O        Intake/Output Summary (Last 24 hours) at 3/5/2020 0826  Last data filed at 3/5/2020 0757  Gross per 24 hour   Intake 480 ml   Output 0 ml   Net 480 ml     I/O last 3 completed shifts: In: 26 [P.O.:450; I.V.:10]  Out: 0    Patient Vitals for the past 96 hrs (Last 3 readings):   Weight   03/05/20 0431 136 lb 6.4 oz (61.9 kg)   03/04/20 0415 138 lb 1.6 oz (62.6 kg)   03/03/20 0630 137 lb 4.8 oz (62.3 kg)       Exam   General Appearance: moderately built, moderately nourished in no acute distress on room air. OOB to chair  HEENT: Normal, Head is normocephalic, atraumatic. Oropharynx is clear and moist.  No oral thrush. PERRL  Neck - Supple, No JVD present. No tracheal deviation. Lungs - Bilateral air entry present. Bilateral good breath sounds heard. Bilateral expiratory wheezes. Bilateral basal rales. Cardiovascular - Heart sounds are normal.  Regular rhythm normal rate without murmur, gallop or rub. Abdomen - Soft, nontender, nondistended, no masses or organomegaly  Neurologic - Awake, alert, oriented. There are no focal motor or sensory deficits. Decreased hearing sensation in both ears. Extremities - No cyanosis, clubbing or edema. Musculoskeletal: Normal range of motion. Patient exhibits no tenderness. Lymphadenopathy:  No cervical adenopathy. Psychiatric: Patient  has a normal mood and affect.    Skin - No bruising or bleeding. Labs  - Old records and notes have been reviewed in [facility]   ABG  Lab Results   Component Value Date    PH 7.47 12/27/2017    PO2 93 12/27/2017    PCO2 31 12/27/2017    HCO3 23 12/27/2017    O2SAT 98 12/27/2017     No results found for: NOHEMI Duque  CBC  Recent Labs     03/03/20  0554 03/04/20  0616 03/05/20  0413   WBC 14.5* 13.0* 11.7*   RBC 3.37* 3.42* 3.59*   HGB 9.9* 9.9* 10.6*   HCT 31.2* 31.2* 33.5*   MCV 92.6 91.2 93.3   MCH 29.4 28.9 29.5   MCHC 31.7* 31.7* 31.6*    345 351   MPV 9.3* 8.8* 8.8*      BMP  Recent Labs     03/02/20  1330 03/03/20  0554 03/05/20  0413   * 134* 135   K 4.4 4.2 4.6   CL 98 98 99   CO2 23 24 23   BUN 25* 25* 28*   CREATININE 1.2 1.5* 1.5*   GLUCOSE 99 115* 201*   MG  --  2.0  --    PHOS  --  3.2  --    CALCIUM 8.7 8.7 9.0     LFT  Recent Labs     03/02/20  1330 03/03/20  0554   AST 15 11   ALT 6* <5*   BILITOT 0.5 0.3   ALKPHOS 74 66     TROP  Lab Results   Component Value Date    TROPONINT 0.017 03/02/2020    TROPONINT 0.028 03/02/2020    TROPONINT 0.022 03/02/2020     BNP  No results for input(s): BNP in the last 72 hours. Lactic Acid  Recent Labs     03/03/20  0554   LACTA 0.9     INR  No results for input(s): INR, PROTIME in the last 72 hours. PTT  No results for input(s): APTT in the last 72 hours. Glucose  No results for input(s): POCGLU in the last 72 hours. UA   Recent Labs     03/03/20  0330   PHUR 5.5   COLORU YELLOW   PROTEINU NEGATIVE   BLOODU NEGATIVE   NITRU NEGATIVE   GLUCOSEU NEGATIVE   BILIRUBINUR NEGATIVE   UROBILINOGEN 1.0   KETUA NEGATIVE   . -CANDE( Antinuclear antibody). -RA ( Rheumatoid factor): 65  -Anti CCP (-)  -ACE( Angiotensin converting enzyme level). -ESR ( Sed rate): 90  -Anti Scleroderma (SCL-70): 25 (-)  -Anti RNP ( SU antibody),   -Anti Centromere Ab screen.  -Anti Nissa-1.   -CPK (Creatinine phosphokinase)  -Aldolase level.  -CK: 14  PFTs 2014               Sleep studies 2016             Cultures None in Epic    EKG     Echocardiogram   03/03/2020   ECHOCARDIOGRAM COMPLETE 2D W DOPPLER W COLOR. Conclusions      Summary   Ejection fraction is visually estimated at 55%. Overall left ventricular function is normal.   The aortic valve leaflets were not well visualized. Aortic valve appears tricuspid. Mild aortic regurgitation is noted. Signature      ----------------------------------------------------------------   Electronically signed by Richar Costello MD (Interpreting   physician) on 03/03/2020 at 04:19 PM      12/27/2017   Narrative & Impression     Transthoracic Echocardiography Report (TTE)  Conclusions      Summary   Normal left ventricle size and systolic function. Ejection fraction was   estimated at 55 %. There were no regional left ventricular wall motion   abnormalities and wall thickness was within normal limits. The left atrium is Mildly dilated. Signature      ----------------------------------------------------------------   Electronically signed by Evelyne Neil MD (Interpreting   physician) on 12/27/2017 at 07:04 PM   ----------------------------------------------------------------       Radiology    CXR  Mar 2, 2020   PROCEDURE: XR CHEST (2 VW)   Fibroemphysematous changes throughout the lungs greatest in the lung bases. Mild cardiomegaly. No superimposed infiltrate. Appearance is similar to prior. CT Scans  (See actual reports for details)  Mar 2, 2020  PROCEDURE: CT CHEST WO CONTRAST   IMPRESSION: Fibroemphysematous appearance throughout the lungs with paraseptal emphysema and fibrosis. Superimposed infection cannot be entirely excluded. Findings have progressed when compared to prior examination from 2013. Follow-up is advised. Incompletely healed right lateral seventh rib fracture.          CT chest - 6/1/13- The following CT films were extracted from Dr. Everlyn Burkitt Rovner,MD note dated 2013.                    1.  BILATERAL INTERSTITIAL LUNG DISEASE WITH A

## 2020-03-05 NOTE — PROGRESS NOTES
Nutrition Assessment    Type and Reason for Visit: Initial, Consult(malnutrition)    Nutrition Recommendations: Will send Ensure Enlive TID as pt. Agreeable. Encouraged po intake at best efforts. Added ground meats to diet order per pt. Request.  Will monitor blood sugars with steroids vs need for CHO controlled diet. Nutrition Assessment:   Pt. moderately malnourished AEB criteria as listed below. At risk for further nutrition compromise r/t underlying medical condition (hx CKD, COPD). Nutrition recommendations/interventions as per above. Malnutrition Assessment:  · Malnutrition Status: Meets the criteria for moderate malnutrition  · Context: Chronic illness  · Findings of the 6 clinical characteristics of malnutrition (Minimum of 2 out of 6 clinical characteristics is required to make the diagnosis of moderate or severe Protein Calorie Malnutrition based on AND/ASPEN Guidelines):  1. Energy Intake-Less than or equal to 75% of estimated energy requirement, Greater than or equal to 1 month    2. Weight Loss-5% loss or greater(6%), in 3 months(2 months)  3. Fat Loss-Moderate subcutaneous fat loss, Orbital  4. Muscle Loss-Mild muscle mass loss, Temples (temporalis muscle), Clavicles (pectoralis and deltoids), Calf (gastrocnemius)  5. Fluid Accumulation-No significant fluid accumulation,    6.  Strength-Normal    Nutrition Risk Level: Moderate    Nutrient Needs:  · Estimated Daily Total Kcal: 6482-1237 kcals (25-30 kcals/kgm wt of 62 kgm)  · Estimated Daily Protein (g): 43-50 gm (0.7-0.8 gm/kgm wt of 62 kgm) - CKD    Nutrition Diagnosis:   · Problem:  Moderate malnutrition  · Etiology: related to Catabolic illness, Insufficient energy/nutrient consumption     Signs and symptoms:  as evidenced by Diet history of poor intake, Moderate loss of subcutaneous fat, Mild muscle loss    Objective Information:  · Nutrition-Focused Physical Findings: some trouble chewing meats - dentures don't fit as well as they used to; requests ground meats; 3/3: Glucose 115, 3/5: 201; Rx includes Insulin, Solumedrol; BUN 28, Cr 1.5; daughter reports pt. Does well with protein sources but consumes small, portions - about 2 meals/day  · Wound Type: None  · Current Nutrition Therapies:  · Oral Diet Orders: General, No Added Salt (3-4gm)   · Oral Diet intake: 51-75%, %  · Oral Nutrition Supplement (ONS) Orders: Standard High Calorie Oral Supplement(TID)  · ONS intake: (initiated)  · Anthropometric Measures:  · Ht: 5' 6\" (167.6 cm)   · Current Body Wt: 136 lb 6.4 oz (61.9 kg)(3/5, no edema)  · Admission Body Wt: 137 lb 4.8 oz (62.3 kg)(3/3, no edema)  · Usual Body Wt: (per pt 130#; admits to losing 30# over last couple of years; per EMR: 9/16/19: 146# 6.4 oz, 1/15/20: 145# 3.2 oz)  · % Weight Change:  ,  -6% in 2 months  · Ideal Body Wt: 142 lb (64.4 kg),    · BMI Classification: BMI 18.5 - 24.9 Normal Weight    Nutrition Interventions:   Continue current diet, Start ONS  Continued Inpatient Monitoring, Education Initiated, Coordination of Care(3/5 Encouraged po intake at best efforts. Discussed appropriate use of ONS with pt and daughter.)    Nutrition Evaluation:   · Evaluation: Goals set   · Goals: Pt. will tolerate and consume 75% or more of meals during LOS.     · Monitoring: Meal Intake, Supplement Intake, Diet Tolerance, Skin Integrity, Weight, Pertinent Labs, Chewing/Swallowing, Patient/Family Education, Monitor Bowel Function      Electronically signed by Priscilla Lockhart RD, LD on 3/5/20 at 11:51 AM    Contact Number: [phone]

## 2020-03-05 NOTE — PROGRESS NOTES
with a history of coronary artery disease, chronic kidney disease stage III, COPD who presented from clinic with worsening shortness nests of breath on exertion. Family had noticed that patient's dyspnea on exertion had acutely gotten worse over the past several weeks. He was brought in to see his primary care physician who noticed \"crackles in his lungs\". He was then sent to the emergency department for further evaluation. Chest x-ray 3/2/2020 reported as fibro-emphysematous chest changes throughout the lungs greatest in the lung bases, appearance is similar to prior exam.  CT chest 3/2/2020 reported as fibroemphysematous changes throughout the lungs with paraseptal emphysema and fibrosis, superimposed infection cannot be entirely suited, findings at progressed when compared to prior examination from 2013. Pulmonology was consulted on admission. Patient's blood pressure dropped on 3/3 and he was given a 500 mL bolus. His antihypertensive medications were all held and his blood pressures have been stable since. Subjective:   No acute events overnight. Natalie Araiza is doing well this morning. He stated that he was a little nauseous earlier in the morning right after he took his medications, but he denies nausea right now. He reports that his breathing is more normal with the breathing treatments he is receiving. His daughter also reports that his breathing has been better since receiving the treatments. He denies shortness of breath, cough, chest pain, and palpitations.   He reports that his most recent bowel movement was normal and did not have any blood nor was it dark.'        Medications:  Reviewed    Infusion Medications    dextrose       Scheduled Medications    insulin lispro  0-6 Units Subcutaneous TID WC    insulin lispro  0-3 Units Subcutaneous Nightly    ipratropium-albuterol  1 ampule Inhalation Q4H WA    methylPREDNISolone  40 mg Intravenous Q12H    pantoprazole  40 mg Oral QAM AC    ferrous sulfate  325 mg Oral Daily with breakfast    [Held by provider] isosorbide mononitrate  30 mg Oral Daily    [Held by provider] losartan  100 mg Oral Daily    [Held by provider] metoprolol tartrate  12.5 mg Oral Daily    sertraline  50 mg Oral Daily    tamsulosin  0.4 mg Oral BID    sodium chloride flush  10 mL Intravenous 2 times per day     PRN Meds: glucose, dextrose, glucagon (rDNA), dextrose, melatonin, albuterol sulfate HFA, docusate sodium, sodium chloride flush, acetaminophen **OR** acetaminophen, polyethylene glycol, promethazine **OR** ondansetron      Intake/Output Summary (Last 24 hours) at 3/5/2020 0937  Last data filed at 3/5/2020 0757  Gross per 24 hour   Intake 470 ml   Output 0 ml   Net 470 ml       Diet:  DIET GENERAL; No Added Salt (3-4 GM)    Exam:  /70   Pulse 94   Temp 98 °F (36.7 °C) (Oral)   Resp 18   Ht 5' 6\" (1.676 m)   Wt 136 lb 6.4 oz (61.9 kg)   SpO2 97%   BMI 22.02 kg/m²     Physical Exam  Vitals signs and nursing note reviewed. Constitutional:       General: He is not in acute distress. Appearance: He is ill-appearing. HENT:      Head: Normocephalic and atraumatic. Right Ear: External ear normal.      Left Ear: External ear normal.      Nose: Nose normal. No congestion or rhinorrhea. Mouth/Throat:      Mouth: Mucous membranes are moist.      Pharynx: Oropharynx is clear. No oropharyngeal exudate or posterior oropharyngeal erythema. Eyes:      General:         Right eye: No discharge. Left eye: No discharge. Conjunctiva/sclera: Conjunctivae normal.      Pupils: Pupils are equal, round, and reactive to light. Neck:      Musculoskeletal: Neck supple. Cardiovascular:      Rate and Rhythm: Normal rate and regular rhythm. Pulses: Normal pulses. Heart sounds: Normal heart sounds. Pulmonary:      Effort: Pulmonary effort is normal.      Breath sounds: Examination of the right-lower field reveals rales.  Examination of the left-lower field reveals rales. Rales present. Abdominal:      General: Abdomen is flat. Bowel sounds are normal. There is no distension. Palpations: Abdomen is soft. Tenderness: There is no abdominal tenderness. Musculoskeletal:      Right lower leg: No edema. Left lower leg: No edema. Skin:     General: Skin is warm and dry. Neurological:      General: No focal deficit present. Mental Status: He is alert. Mental status is at baseline. Cranial Nerves: No cranial nerve deficit. Psychiatric:         Mood and Affect: Mood normal.         Behavior: Behavior normal.         Judgment: Judgment normal.       Labs:   Recent Labs     03/03/20  0554 03/04/20  0616 03/05/20  0413   WBC 14.5* 13.0* 11.7*   HGB 9.9* 9.9* 10.6*   HCT 31.2* 31.2* 33.5*    345 351     Recent Labs     03/02/20  1330 03/03/20  0554 03/05/20  0413   * 134* 135   K 4.4 4.2 4.6   CL 98 98 99   CO2 23 24 23   BUN 25* 25* 28*   CREATININE 1.2 1.5* 1.5*   CALCIUM 8.7 8.7 9.0   PHOS  --  3.2  --      Recent Labs     03/02/20  1330 03/03/20  0554   AST 15 11   ALT 6* <5*   BILITOT 0.5 0.3   ALKPHOS 74 66     No results for input(s): INR in the last 72 hours. Recent Labs     03/03/20  1513   CKTOTAL 14*       Urinalysis:      Lab Results   Component Value Date    NITRU NEGATIVE 03/03/2020    WBCUA 0-2 05/11/2019    WBCUA 0-5 02/19/2018    BACTERIA NONE 05/11/2019    RBCUA 0-2 05/11/2019    BLOODU NEGATIVE 03/03/2020    GLUCOSEU NEGATIVE 03/03/2020       Radiology:  CT CHEST WO CONTRAST   Final Result    IMPRESSION:    Fibroemphysematous appearance throughout the lungs with paraseptal emphysema and fibrosis. Superimposed infection cannot be entirely excluded. Findings have progressed when compared to prior examination from 2013. Follow-up is advised. Incompletely healed right lateral seventh rib fracture. **This report has been created using voice recognition software.  It may contain minor errors

## 2020-03-05 NOTE — PLAN OF CARE
Problem: Falls - Risk of:  Goal: Will remain free from falls  Description  Will remain free from falls  Outcome: Ongoing  Note:   Bed/chair alarm utilized. Call light is with in reach. Hourly rounding. Goal: Absence of physical injury  Description  Absence of physical injury  Outcome: Ongoing  Note:   Free from injury. Problem: Breathing Pattern - Ineffective:  Goal: Ability to achieve and maintain a regular respiratory rate will improve  Description  Ability to achieve and maintain a regular respiratory rate will improve  Outcome: Ongoing  Note:   See vitals. Problem: Discharge Planning:  Goal: Discharged to appropriate level of care  Description  Discharged to appropriate level of care  Outcome: Ongoing  Note:   Awaiting precert to [facility]     Problem: DISCHARGE BARRIERS  Goal: Patient's continuum of care needs are met  Outcome: Ongoing  Note:   Care needs are being met. Problem: Impaired respiratory status  Goal: Clear lung sounds  Description  Clear lung sounds     3/5/2020 0746 by Mirlande Ruano RCP  Outcome: Ongoing  Note:    Patient lung sounds are considered normal for their current lung condition. No signs of distress noted. Current treatment regimen appropriate     3/4/2020 2140 by Shiva Shannon RCP  Outcome: Ongoing  Note:   Treatment will be continued as ordered to improve aeration. Problem: Risk for Impaired Skin Integrity  Goal: Tissue integrity - skin and mucous membranes  Description  Structural intactness and normal physiological function of skin and  mucous membranes. Outcome: Ongoing  Note:   Skin intact. Care plan reviewed with patient. Patient verbalize understanding of the plan of care and contribute to goal setting.

## 2020-03-05 NOTE — DISCHARGE INSTR - COC
Continuity of Care Form    Patient Name: Florencio Andrea   :  1931  MRN:  [id number]    Admit date:  3/2/2020  Discharge date:  ***    Code Status Order: Full Code   Advance Directives:   Advance Care Flowsheet Documentation     Date/Time Healthcare Directive Type of Healthcare Directive Copy in [address] Agent's Name Healthcare Agent's Phone Number    20 1052  Yes, patient has an advance directive for healthcare treatment  Living will  Yes, copy in chart  --  --  --    20  Yes, patient has an advance directive for healthcare treatment  Durable power of  for health care;Living will  Yes, copy in chart  --  --  --          Admitting Physician:  Sung Sicard, MD  PCP: Bridgette Patel DO    Discharging Nurse: [facility] Unit/Room#: 8B-27/027-A  Discharging Unit Phone Number: [phone]    Emergency Contact:   Extended Emergency Contact Information  Primary Emergency Contact: Luly Yancey  Address: [address] [place] [address] Phone: [phone]  Relation: Spouse  Hearing or visual needs: None  Other needs: None  Preferred language: English   needed? No  Secondary Emergency Contact: [address] Phone: [phone]  Mobile Phone: [phone]  Relation: Child  Hearing or visual needs: None  Other needs: None  Preferred language: English   needed?  No    Past Surgical History:  Past Surgical History:   Procedure Laterality Date    ABDOMINAL HERNIA REPAIR  2017    incisional hernia repair--Dr. Tiffany Castillo CARDIAC SURGERY      CATARACT REMOVAL WITH IMPLANT      bilateral    COLONOSCOPY  [id number],0113    COLONOSCOPY  2017    COLONOSCOPY CONTROL HEMORRHAGE performed by Manolo Villegas MD at [facility]    COLONOSCOPY  2018    COLONOSCOPY POLYPECTOMY SNARE/COLD BIOPSY performed by Manolo Villegas MD at [address] [place] [address] unspecified formulation 10/13/2011, 11/13/2013, 10/28/2014    Influenza Virus Vaccine 09/01/2012    Influenza, High Dose (Fluzone 65 yrs and older) 10/27/2016, 09/26/2017, 10/15/2019    Influenza, Emiliano Kallman, IM, PF (6 mo and older Fluzone, Flulaval, Fluarix, and 3 yrs and older Afluria) 09/27/2018    Pneumococcal Conjugate 13-valent ([id number]) 08/28/2018    Pneumococcal Polysaccharide ([id number]) 10/27/2016    Tdap (Boostrix, Adacel) 06/16/2017    Zoster Live (Zostavax) 04/22/2014       Active Problems:  Patient Active Problem List   Diagnosis Code    Dyspnea R06.00    Bladder neck contracture N32.0    BPH (benign prostatic hyperplasia) N40.0    Suprapubic pain R10.2    S/P TURP (status post transurethral resection of prostate) Z90.79    CKD (chronic kidney disease) stage 3, GFR 30-59 ml/min ([facility]) N18.3    CAD (coronary artery disease) I25.10    Nausea and vomiting R11.2    Chronic serous otitis media of left ear H65.22    Hearing loss, sensorineural H90.5    Conductive hearing loss, bilateral H90.0    History of tympanoplasty of left ear Z98.890    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    Anxiety disorder F41.9    Disc disorder of lumbar region M51.9    Pacemaker Z95.0    Incisional hernia, without obstruction or gangrene K43.2    S/P ventral herniorrhaphy Z98.890, Z87.19    Essential hypertension I10    Head injury, closed, without LOC S09.90XA    Closed right hip fracture (HCC) S72.001A    At high risk for pain from procedure Z91.89    NICOLAS (obstructive sleep apnea) G47.33    Closed comminuted intertrochanteric fracture of right femur with routine healing S72.141D    Acute blood loss anemia D62    Lower GI bleed K92.2    B12 deficiency E53.8    Diverticulosis of large intestine with hemorrhage K57.31    Lumbar radiculitis M54.16    HNP (herniated nucleus pulposus), lumbar M51.26    Severe malnutrition (HCC) E43    Gastritis K29.70    Tachycardia R00.0

## 2020-03-05 NOTE — FLOWSHEET NOTE
03/05/20 1052   Encounter Summary   Services provided to: Patient; Family   [facility]   Support System Spouse; Children;Family members   Place of Jehovah's witness No [facility]. Contact Lutheran No   Continue Visiting Yes  (3/5 yes for spiritual support.)   Complexity of Encounter Moderate   Length of Encounter 30 minutes   Advance Care Planning Yes   Grief and Life Adjustment   Type Adjustment to illness   Assessment Approachable   Intervention Explored feelings, thoughts, concerns; Discussed illness/injury and it's impact;Prayer   Outcome Engaged in conversation;Coping   Advance Directives (For Healthcare)   Healthcare Directive Yes, patient has an advance directive for healthcare treatment   Type of Healthcare Directive Living will   Copy in Chart Yes, copy in chart   Chart Copy Status : Reviewed   Date Reviewed and Current: 03/05/20     S--patient was sitting in his chair with his daughter, Brian Elizondo. Patient lives at his residence with his wife of 70 years who has dementia. Patient has two children. He retired from [employer] and [employer]. \"  O--patient is alert and oriented. He is hard of hearing. He is the 5th of 13 siblings. His mother stood barely 4'1\". His parents  when they were in their 35s. A--patient appears to be coping and is at peace with God. He wants to be reunited with his wife at their residence. Both of their children are involved. P--spiritual care remains available for continued support.

## 2020-03-05 NOTE — CARE COORDINATION
3/5/20, 1:06 PM    DISCHARGE ONGOING EVALUATION:     Criselda 54 day: 2  Location: [facility]27/027-A Reason for admit: Dyspnea on exertion [R06.09]  Dyspnea on exertion [R06.09]   Treatment Plan of Care: Dietitian following nutritional needs, encourage po intake. Mod malnourishment. PT/OT. Dr. Hafsa Eid (pulmonology) has signed off. GI was consulted for anemia and ordered iron studies. Protonix. Awaiting pre-cert for [employer]. SW following. Barriers to Discharge: Awaiting pre-cert for [employer].    PCP: Hunter Fernandez DO  Readmission Risk Score: 21%
DISCHARGE PLANNING EVALUATION: OP/OBSERVATION        3/3/20, 1:24 PM    Ector Bear       Admitted from: ER 3/2/2020/ 1311   Location: [address] [place] [address]- Reason for admit: Dyspnea on exertion [R06.09]   Admit order signed?: yes    Procedure: No    Pertinent Info/Orders/Treatment Plan:  Presented to ER after seeing his PCP and being instructed to come to [facility] for eval of fluid in lungs. Dark stools also with hx GI bleed. Leukocytosis. 17.1. Elevated Troponin. BP low this afternoon, IVF bolus. Pulmonology and GI consulted. PT/OT and SW consulted    PCP: Jose Manuel Keller DO    Patient Goals/Plan/Treatment Preferences: Met with pt today. Daughter and granddaughter present. Pt is very pleasant, alert, appropriate and talkative. He is from home with his wife (who has dementia). He has no home services. He does have a walker. He has a PCP, he is transported per his family members. Basic needs are met and no issues obtaining meds. Daughter who is present is very attentive to pt and his spouse, checking on them every day. This CM discussed options of potential services at discharge. CM will continue to follow for needs. Transportation/Food Security/Housekeeping Addressed:  No issues identified.
treatment preferences and discharge plan: Patient plans ECF prior to returning home or transition to [address] [place] [address] or [address]. Message left for Mattie at [facility] for referral.    Electronically signed by ROSS Richardson on 3/4/2020 at 10:17 AM     11:37 AM  Mattie from [facility] called, they will accept pending pre-cert. She will start pre-cert when PT/OT notes are completed.

## 2020-03-06 NOTE — PROGRESS NOTES
Daily    pantoprazole  40 mg Oral QAM AC    [Held by provider] losartan  100 mg Oral Daily    sertraline  50 mg Oral Daily    tamsulosin  0.4 mg Oral BID    sodium chloride flush  10 mL Intravenous 2 times per day     PRN Meds: glucose, dextrose, glucagon (rDNA), dextrose, ipratropium-albuterol, melatonin, albuterol sulfate HFA, docusate sodium, sodium chloride flush, acetaminophen **OR** acetaminophen, polyethylene glycol, promethazine **OR** ondansetron    Ins and outs:      Intake/Output Summary (Last 24 hours) at 3/6/2020 1637  Last data filed at 3/6/2020 0757  Gross per 24 hour   Intake 580 ml   Output --   Net 580 ml       Physical Examination     /61   Pulse 79   Temp 98.3 °F (36.8 °C) (Oral)   Resp 18   Ht 5' 6\" (1.676 m)   Wt 139 lb 12.8 oz (63.4 kg)   SpO2 95%   BMI 22.56 kg/m²     General appearance: No apparent distress. HEENT: Extraocular motion intact. Neck: Supple  Respiratory:  Fine crackles throughout the lungs  Cardiovascular: RRR with normal S1/S2 without murmurs, rubs or gallops. Abdomen: Soft, non-tender, non-distended with normal bowel sounds. Musculoskeletal: Patient is moving extremities x 4 spontaneously  Neurologic: Grossly non focal. CN: II-XII intact  Psychiatric: Alert and oriented  Vascular: Dorsalis pedis palpable bilaterally. Radial pulses palpable bilaterally. No peripheral edema  Skin:  No visible rashes or lesions. Labs     Recent Labs     03/04/20  0616 03/05/20  0413   WBC 13.0* 11.7*   HGB 9.9* 10.6*   HCT 31.2* 33.5*    351     Recent Labs     03/05/20  0413      K 4.6   CL 99   CO2 23   BUN 28*   CREATININE 1.5*   CALCIUM 9.0     No results for input(s): AST, ALT, BILIDIR, BILITOT, ALKPHOS in the last 72 hours. No results for input(s): INR in the last 72 hours. No results for input(s): Dori Stage in the last 72 hours.     Urinalysis:      Lab Results   Component Value Date    NITRU NEGATIVE 03/03/2020    WBCUA 0-2 05/11/2019 WBCUA 0-5 02/19/2018    BACTERIA NONE 05/11/2019    RBCUA 0-2 05/11/2019    BLOODU NEGATIVE 03/03/2020    GLUCOSEU NEGATIVE 03/03/2020       Diagnostic imaging/procedures     Xr Chest Standard (2 Vw)    Result Date: 3/2/2020  PROCEDURE: XR CHEST (2 VW) CLINICAL INFORMATION: sob. COMPARISON: May 11, 2019 TECHNIQUE: PA and lateral views the chest. FINDINGS: Fibroemphysematous changes throughout the lungs greatest in the lung bases. Mild cardiomegaly. No superimposed infiltrate. Permanent left chest wall pacer. Osteopenia and degenerative changes thoracic spine. Old compression deformities in the mid thoracic spine similar to prior. Fibroemphysematous changes throughout the lungs greatest in the lung bases. Mild cardiomegaly. No superimposed infiltrate. Appearance is similar to prior. .**This report has been created using voice recognition software. It may contain minor errors which are inherent in voice recognition technology. ** Final report electronically signed by Dr. Matt Roman on 3/2/2020 2:12 PM    Ct Chest Wo Contrast    Result Date: 3/2/2020  PROCEDURE: CT CHEST WO CONTRAST CLINICAL INFORMATION: New onset dyspnea on exertion, leukocysosis, history of emphysema, former smoker. COMPARISON: June 1, 2013 TECHNIQUE: 5 mm noncontrast axial images were obtained through the chest. Sagittal and coronal reconstructions were obtained. All CT scans at this facility use dose modulation, iterative reconstruction, and/or weight-based dosing when appropriate to reduce radiation dose to as low as reasonably achievable. 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 Superimposed infection cannot be entirely excluded. Findings have progressed when compared to prior examination from 2013. Follow-up is advised. No pleural or pericardial effusion. Thyroid gland is unremarkable. Ascending thoracic aorta is 4.1 cm.  Generalized atherosclerotic changes Coronary artery calcification. Calcified hilar and mediastinal lymph nodes. Degenerative changes thoracic spine. Permanent chest wall pacer. Upper abdominal images demonstrates distended stomach with food debris. No acute findings. Osteopenia and degenerative changes with anterior vertebral body bridging with thin syndesmophytes. Probable old compression deformities in the upper thoracic spine. Incompletely healed right rib fracture. Possible old anterior mediastinal fracture. Correlate with history of injury. 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 Superimposed infection cannot be entirely excluded. Findings have progressed when compared to prior examination from 2013. Follow-up is advised. Incompletely healed right lateral seventh rib fracture. **This report has been created using voice recognition software. It may contain minor errors which are inherent in voice recognition technology. ** Final report electronically signed by Dr. Diann Lopez on 3/2/2020 5:50 PM        DVT prophylaxis: [] Lovenox                                 [x] SCDs                                 [] SQ Heparin                                 [] Encourage ambulation           [] Already on Anticoagulation     Disposition:    [x] Home       [] TCU       [] Rehab       [] Psych       [] SNF       [] [facility]       [] Other-       i

## 2020-03-06 NOTE — PROGRESS NOTES
[address] [address]  INPATIENT PHYSICAL THERAPY  DAILY NOTE  [facility] MED SURG 8B - 8B-27/027-A    Time In: 0374  Time Out: 9395  Timed Code Treatment Minutes: 24 Minutes  Minutes: 24          Date: 3/6/2020  Patient Name: Missael Garcia,  Gender:  male        MRN: [id number]  : 1931  (80 y.o.)     Referring Practitioner: Benjamín Jacques MD  Diagnosis: Dyspnea on exertion  Additional Pertinent Hx: Pt admitted 3-3 from home with above. Prior Level of Function:  Lives With: Spouse  Type of Home: House  Home Layout: One level  Home Access: Stairs to enter with rails  Entrance Stairs - Number of Steps: 1 MORGAN with B grab bars  Home Equipment: Rolling walker, Cane   Bathroom Shower/Tub: Tub/Shower unit  Bathroom Toilet: Handicap height  Bathroom Equipment: Tub transfer bench, Grab bars in shower    ADL Assistance: Independent  Ambulation Assistance: Independent  Transfer Assistance: Independent  Additional Comments: daughter and brother assists with IADLs, pt cares for wife with dementia     Restrictions/Precautions:  Restrictions/Precautions: Fall Risk    SUBJECTIVE: RN in pt's room, approved session. Pt pleasant and agreeable to tx session. PAIN: none rated     OBJECTIVE:  Transfers:  Sit to Stand: Stand By Assistance  Stand to Sit:Stand By Assistance  From bedside chair with cues for hand placement   Ambulation:  Contact Guard Assistance  Distance: 40ft + 10ft   Surface: Level Tile  Device:Rolling Walker  Gait Deviations: Forward Flexed Posture, Decreased Step Length Bilaterally, Decreased Weight Shift Bilaterally and Decreased Heel Strike Bilaterally  Poor AD management with directional changes and obstacle negotiation. Balance:  Static Standing Balance: Contact Guard Assistance  Dynamic Standing Balance: Contact Guard Assistance  Pt stood at toilet unsupported and sink with no LOB. Unsteadiness noted with trunk sway.    Exercise:  Patient was guided in 15 reps of exercise to both lower extremities. Heelslides, Long arc quads, Hip abduction/adduction, Seated hip flexion, Seated heel/toe raises and Seated isometric hip adduction. Exercises were completed for increased independence with functional mobility. Functional Outcome Measures: Completed  AM-PAC Inpatient Mobility without Stair Climbing Raw Score : 15  AM-PAC Inpatient without Stair Climbing T-Scale Score : 43.03    ASSESSMENT:  Assessment: Patient progressing toward established goals. Activity Tolerance:  Patient tolerance of  treatment: good. Equipment Recommendations:Equipment Needed: No  Discharge Recommendations:  [address], Patient would benefit from continued therapy after discharge    Plan: Times per week: 3-5 X GM  Current Treatment Recommendations: Strengthening, Transfer Training, Endurance Training, Balance Training, Gait Training, Functional Mobility Training, Home Exercise Program    Patient Education  Patient Education: Equipment Education, Transfers, Reviewed Prior Education, Gait, Use of Gait Belt, Verbal Exercise Instruction    Goals:  Patient goals : Get stronger  Short term goals  Time Frame for Short term goals: by discharge  Short term goal 1: supine to sit and return with CGA to get in and out of bed   Short term goal 2: sit to stand with S to get on and off various surfaces  Short term goal 3: ambulate with RW 50 feet with S to walk safely household distances   Short term goal 4: Improve tinetti score to 26/28 to reduce fall risk  Long term goals  Time Frame for Long term goals : NA due to short ELOS    Following session, patient left in safe position with all fall risk precautions in place.

## 2020-03-06 NOTE — PROGRESS NOTES
Net 1000 ml     General appearance: alert and cooperative with exam.  Well nourished, well groomed elderly  male  Lungs: clear to auscultation bilaterally  Heart: regular rate and rhythm, S1, S2 normal, no murmur, click, rub or gallop  Abdomen: soft, non-tender; bowel sounds normal; no masses,  no organomegaly  Extremities: extremities normal, atraumatic, no cyanosis or edema    Assessment and Plan:   1. Anemia - H&H improved w/o transfusion. Continue oral iron - do not take with PPI. 2. Dyspnea on exertion interstitial lung disease followed by pulmonary service he has also history of sleep apnea  3. Ok to discharge from GI standpoint at Attending's discretion. Follow up in GI Clinic after discharge in as needed.  week(s)    Patient Active Problem List:     Dyspnea     Bladder neck contracture     BPH (benign prostatic hyperplasia)     Suprapubic pain     S/P TURP (status post transurethral resection of prostate)     CKD (chronic kidney disease) stage 3, GFR 30-59 ml/min ([facility])     CAD (coronary artery disease)     Nausea and vomiting     Chronic serous otitis media of left ear     Hearing loss, sensorineural     Conductive hearing loss, bilateral     History of tympanoplasty of left ear     Tympanosclerosis involving combination of structures     Anxiety disorder     Disc disorder of lumbar region     Pacemaker     Incisional hernia, without obstruction or gangrene     S/P ventral herniorrhaphy     Essential hypertension     Head injury, closed, without LOC     Closed right hip fracture ([address]     At high risk for pain from procedure     NICOLAS (obstructive sleep apnea)     Closed comminuted intertrochanteric fracture of right femur with routine healing     Acute blood loss anemia     Lower GI bleed     B12 deficiency     Diverticulosis of large intestine with hemorrhage     Lumbar radiculitis     HNP (herniated nucleus pulposus), lumbar     Severe malnutrition ([facility])     Gastritis     Tachycardia COPD (chronic obstructive pulmonary disease) ([facility]    CHRISTIN Valle CNP     Patient chart reviewed independently from the nurse practitioner and all the pertinent data assessment and plans were reviewed by myself. Laboratory data, Radiology results, medications all are reviewed by myself and care is discussed extensively with nurse practitioner and I agree with plan.   In addition, see orders and plans    Electronically signed by Juana Dalton MD

## 2020-03-06 NOTE — TELEPHONE ENCOUNTER
Jennifer Derreksonia  daughter, Bree Wilson, called wanting the most recent AVS to be fax to them. [address] Main: [phone] ..  Fax: [phone]

## 2020-03-07 NOTE — PROGRESS NOTES
fibrosis. Superimposed infection cannot be entirely excluded. Findings have progressed when compared to prior examination from 2013. Follow-up is advised. No pleural or pericardial effusion. Thyroid gland is unremarkable. Ascending thoracic aorta is 4.1 cm. Generalized atherosclerotic changes Coronary artery calcification. Calcified hilar and mediastinal lymph nodes. Degenerative changes thoracic spine. Permanent chest wall pacer. Upper abdominal images demonstrates distended stomach with food debris. No acute findings. Osteopenia and degenerative changes with anterior vertebral body bridging with thin syndesmophytes. Probable old compression deformities in the upper thoracic spine. Incompletely healed right rib fracture. Possible old anterior mediastinal fracture. Correlate with history of injury. 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 Superimposed infection cannot be entirely excluded. Findings have progressed when compared to prior examination from 2013. Follow-up is advised. Incompletely healed right lateral seventh rib fracture. **This report has been created using voice recognition software. It may contain minor errors which are inherent in voice recognition technology. ** Final report electronically signed by Dr. Diane Valera on 3/2/2020 5:50 PM        DVT prophylaxis: [] Lovenox                                 [x] SCDs                                 [] SQ Heparin                                 [] Encourage ambulation           [] Already on Anticoagulation     Disposition:    [x] Home       [] TCU       [] Rehab       [] Psych       [] SNF       [] [facility]       [] Other-       i

## 2020-03-08 NOTE — PLAN OF CARE
Problem: Falls - Risk of:  Goal: Will remain free from falls  Description: Will remain free from falls  Outcome: Ongoing  Note: Pt absent of  falls this shift. RN visual checks on pt hourly with rounds. Call light in reach, bed in lowest position. Fall band intact. Bed alarm set. Pt educated to not get up per self to reduce the risk for falls. Goal: Absence of physical injury  Description: Absence of physical injury  Outcome: Ongoing  Note: Pt absent of  falls this shift. RN visual checks on pt hourly with rounds. Call light in reach, bed in lowest position. Fall band intact. Bed alarm set. Pt educated to not get up per self to reduce the risk for falls. Problem: Breathing Pattern - Ineffective:  Goal: Ability to achieve and maintain a regular respiratory rate will improve  Description: Ability to achieve and maintain a regular respiratory rate will improve  Outcome: Ongoing  Note: Denies shortness of breath. PO2 Level WNL on RA. Problem: Discharge Planning:  Goal: Discharged to appropriate level of care  Description: Discharged to appropriate level of care  Outcome: Ongoing  Note: Waiting on pre-cert to go to [facility]. Problem: DISCHARGE BARRIERS  Goal: Patient's continuum of care needs are met  Outcome: Ongoing  Note: Waiting on pre-cert to go to [facility]. Problem: Risk for Impaired Skin Integrity  Goal: Tissue integrity - skin and mucous membranes  Description: Structural intactness and normal physiological function of skin and  mucous membranes. Outcome: Ongoing  Note: No new skin issues noted. Turns and repositions self often. Care plan reviewed with patient. Patient verbalize understanding of the plan of care and contribute to goal setting.

## 2020-03-08 NOTE — PROGRESS NOTES
feels overall improved and is ready to be discharged as soon as he gets pre-certification back. OBJECTIVE     Medications:  Reviewed    Infusion Medications    dextrose       Scheduled Medications    ferrous sulfate  325 mg Oral Lunch    insulin lispro  0-6 Units Subcutaneous TID WC    insulin lispro  0-3 Units Subcutaneous Nightly    [Held by provider] metoprolol tartrate  12.5 mg Oral Daily    isosorbide mononitrate  30 mg Oral Daily    pantoprazole  40 mg Oral QAM AC    [Held by provider] losartan  100 mg Oral Daily    sertraline  50 mg Oral Daily    tamsulosin  0.4 mg Oral BID    sodium chloride flush  10 mL Intravenous 2 times per day     PRN Meds: glucose, dextrose, glucagon (rDNA), dextrose, ipratropium-albuterol, melatonin, albuterol sulfate HFA, docusate sodium, sodium chloride flush, acetaminophen **OR** acetaminophen, polyethylene glycol, promethazine **OR** ondansetron    Ins and outs:      Intake/Output Summary (Last 24 hours) at 3/8/2020 1159  Last data filed at 3/8/2020 0930  Gross per 24 hour   Intake 450 ml   Output --   Net 450 ml       Physical Examination     BP (!) 156/77   Pulse 65   Temp 97.5 °F (36.4 °C) (Oral)   Resp 16   Ht 5' 6\" (1.676 m)   Wt 138 lb 14.4 oz (63 kg)   SpO2 97%   BMI 22.42 kg/m²     General appearance: No apparent distress. Sitting in chair- appears pleasant and comfortable. HEENT: Extraocular motion intact. Neck: Supple  Respiratory:  Fine crackles throughout the lungs  Cardiovascular: RRR with normal S1/S2 without murmurs, rubs or gallops. Abdomen: Soft, non-tender, non-distended with normal bowel sounds. Musculoskeletal: Patient is moving extremities x 4 spontaneously  Neurologic: Grossly non focal. CN: II-XII intact  Psychiatric: Alert and oriented  Vascular: Dorsalis pedis palpable bilaterally. Radial pulses palpable bilaterally. No peripheral edema  Skin:  No visible rashes or lesions.     Labs     Recent Labs     03/07/20  0518   WBC 10.1 radiation dose to as low as reasonably achievable. 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 Superimposed infection cannot be entirely excluded. Findings have progressed when compared to prior examination from 2013. Follow-up is advised. No pleural or pericardial effusion. Thyroid gland is unremarkable. Ascending thoracic aorta is 4.1 cm. Generalized atherosclerotic changes Coronary artery calcification. Calcified hilar and mediastinal lymph nodes. Degenerative changes thoracic spine. Permanent chest wall pacer. Upper abdominal images demonstrates distended stomach with food debris. No acute findings. Osteopenia and degenerative changes with anterior vertebral body bridging with thin syndesmophytes. Probable old compression deformities in the upper thoracic spine. Incompletely healed right rib fracture. Possible old anterior mediastinal fracture. Correlate with history of injury. 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 Superimposed infection cannot be entirely excluded. Findings have progressed when compared to prior examination from 2013. Follow-up is advised. Incompletely healed right lateral seventh rib fracture. **This report has been created using voice recognition software. It may contain minor errors which are inherent in voice recognition technology. ** Final report electronically signed by Dr. Tiffanie Mazariegos on 3/2/2020 5:50 PM        DVT prophylaxis: [] Lovenox                                 [x] SCDs                                 [] SQ Heparin                                 [] Encourage ambulation           [] Already on Anticoagulation     Disposition:    [x] Home       [] TCU       [] Rehab       [] Psych       [] SNF       [] [facility]       [] Other-       i

## 2020-03-09 NOTE — PROGRESS NOTES
[facility]  INPATIENT PHYSICAL THERAPY  DAILY NOTE  STRZ MED SURG 8B - 8B-27/027-A    Time In: 7154  Time Out: 6042  Timed Code Treatment Minutes: 23 Minutes  Minutes: 23          Date: 3/9/2020  Patient Name: Quinten Marte,  Gender:  male        MRN: [id number]  : 1931  (80 y.o.)     Referring Practitioner: Ming Hayes MD  Diagnosis: Dyspnea on exertion  Additional Pertinent Hx: Pt admitted 3-3 from home with above. Prior Level of Function:  Lives With: Spouse  Type of Home: House  Home Layout: One level  Home Access: Stairs to enter with rails  Entrance Stairs - Number of Steps: 1 MORGAN with B grab bars  Home Equipment: Rolling walker, Cane   Bathroom Shower/Tub: Tub/Shower unit  Bathroom Toilet: Handicap height  Bathroom Equipment: Tub transfer bench, Grab bars in shower    ADL Assistance: Independent  Ambulation Assistance: Independent  Transfer Assistance: Independent  Additional Comments: daughter and brother assists with IADLs, pt cares for wife with dementia     Restrictions/Precautions:  Restrictions/Precautions: Fall Risk    SUBJECTIVE: RN approved session. Pt resting in bed with family member present. Pt very pleasant and agreeable to therapy. PAIN: 0/10: \"I feel fine\"    OBJECTIVE:  Bed Mobility:  Rolling to Right: Independent   Supine to Sit: Stand By Assistance  Sit to Supine: Stand By Assistance     Transfers:  Sit to Stand: Contact Guard Assistance  Stand to [employer] Assistance    Ambulation:  Contact Guard Assistance  Distance: 80 feet x1   Surface: Level Tile  Device:Rolling Walker  Gait Deviations: Forward Flexed Posture, Decreased Step Length Bilaterally, Decreased Gait Speed and Decreased Heel Strike Bilaterally    Balance:  Dynamic Standing Balance: Contact Guard Assistance    Exercise:  Patient was guided in 1 set(s) 15 reps of exercise to both lower extremities.   Glut sets, Long arc quads, Hip abduction/adduction, Seated hip flexion, Seated hamstring curls

## 2020-03-09 NOTE — PROGRESS NOTES
Hospitalist Progress Note    Patient:  Barry Heart  YOB: 1931  MRN: [id number]   PCP: Andre Mora DO       Acct: [de-identified]  Unit/Bed: [address] [place] [address]    Date of Admission: 3/2/2020      ASSESSMENT/PLAN     1. Dyspnea on exertion thought to be related to interstitial lung disease: Question of pulmonary fibrosis. CANDE elevated. ESR elevated. Bedside PFTS indicate evidence for mild restriction and mild diffusion. Patient received IV Lasix in consideration of possible pulmonary edema- unclear if his symptoms are improved but he states that he had peripheral edema that is now improved. Pulmonology following and multiple rheumatological studies ordered and pending. On steroids and breathing treatments with improvement. Workup for hypersensitivity pneumonitis to be considered as outpatient- per Pulmonology  2. Interstitial lung disease: Previously followed with Dr. Elva Hart per Pulmonology. Dr. Yvonne Noguera, Pulmonology now consulted. On steroids and breathing treatments  and improving  3. Elevated troponin: Previously unremarkable. Possibly underlying CAD contributor to SOB with exertion? Trend troponin. Elevated in setting of CKD? Patient otherwise denies CP but does have SOB. Cardiology consult deferred as patient improved  4. Acute hypotension: Monitor. NS bolus if it does not improve on its own since patient symptomatic. Holdmetoprolol for now and reevaluate tomorrow  5. Unspecified leukocytosis, resolve: Trended downwards. No other signs of infection , procalcitonin unremarkable. Deferred use of antibiotics. WBC trending downwards. UA unremarkable. CXR unremarkable for pneumonia  6. Acute kidney injury on CKD 3: Likely related to diuretic administration,. Continue to monitor  7. Normocytic anemia/Hx of GI bleed: slight trend downwards, however Hgb stable. Seen by Gastroenterology secondary to history of GI bleed. On iron supplements   8. Hyponatremia: stable continue to monitor  9.  Essential HTN: Losartan, isosorbide mononitrate and metoprolol initially held. Isosorbide mononitrate and metoprolol restarted and BP controlled. Still holding losartan, may consider resuming at lower dose of 25 mg, but patient hypotensive on .  10. Emphysema/COPD: CXR shows emphysematous changes throughout the lungs. On breathing treatments   11. BPH: tamsulosin  12. CAD  15. Chronic anxiety  14. NICOLAS: CPAP to be reinitiated?- Patient non-compliant. Pulmonology following  15. Moderate protein calorie malnutrition: dietitian following      Anticipated Discharge in : TBD. Case discussed with Dr. Kelvin Garrison for peer to peer review at [phone] Ref # [id number] and nursing home authorization denied. Watch BP and adjust meds as necessary. Losartan to be decreased back to 25 mg daily    Code Status: Full Code    Electronically signed by Ivana Cristina MD on 3/9/2020 at 4:41 PM      Chief Complaint     Shortness of breath    SUBJECTIVE     The patient is a 80 y.o. male who presented to [address] [place] [address] based on a referral from an office visit with Dr. Nani Argueta. According to the H&P, the patient's family was concerned about possible fluid overload due to \"crackles in his lungs. \" He was transported to the ED by wheelchair from her office. He reported that he has had worsening shortness of breath with exertion over the last few months. He reports being short of breath just moving around his home and has to stop and rest to catch his breath. He denies chest pain. His daughter and wife, are present with him in the ED report that he has been using his CPAP during the day due to difficulty catching his breath. He denies cough, fevers, chills, alteration in mental status, congestion, swelling in his legs, and chest pain. He is a former smoker 20 pack year history. He has a pacemaker. His daughter also reports that his bowel movements have become darker.  He had a lower GI bleed in 2018 requiring multiple colonoscopies and spontaneously  Neurologic: Grossly non focal. CN: II-XII intact  Psychiatric: Alert and oriented  Vascular: Dorsalis pedis palpable bilaterally. Radial pulses palpable bilaterally. No peripheral edema  Skin:  No visible rashes or lesions. Labs     Recent Labs     03/07/20  0518 03/09/20  0542   WBC 10.1 10.8   HGB 10.0* 10.0*   HCT 31.6* 31.2*    369     Recent Labs     03/07/20  0518 03/09/20  0542    137   K 4.4 4.4   CL 99 100   CO2 23 26   BUN 52* 56*   CREATININE 1.4* 1.3*   CALCIUM 9.2 9.3     No results for input(s): AST, ALT, BILIDIR, BILITOT, ALKPHOS in the last 72 hours. No results for input(s): INR in the last 72 hours. No results for input(s): Sloan Leland in the last 72 hours. Urinalysis:      Lab Results   Component Value Date    NITRU NEGATIVE 03/03/2020    WBCUA 0-2 05/11/2019    WBCUA 0-5 02/19/2018    BACTERIA NONE 05/11/2019    RBCUA 0-2 05/11/2019    BLOODU NEGATIVE 03/03/2020    GLUCOSEU NEGATIVE 03/03/2020       Diagnostic imaging/procedures     Xr Chest Standard (2 Vw)    Result Date: 3/2/2020  PROCEDURE: XR CHEST (2 VW) CLINICAL INFORMATION: sob. COMPARISON: May 11, 2019 TECHNIQUE: PA and lateral views the chest. FINDINGS: Fibroemphysematous changes throughout the lungs greatest in the lung bases. Mild cardiomegaly. No superimposed infiltrate. Permanent left chest wall pacer. Osteopenia and degenerative changes thoracic spine. Old compression deformities in the mid thoracic spine similar to prior. Fibroemphysematous changes throughout the lungs greatest in the lung bases. Mild cardiomegaly. No superimposed infiltrate. Appearance is similar to prior. .**This report has been created using voice recognition software. It may contain minor errors which are inherent in voice recognition technology. ** Final report electronically signed by Dr. Nickie Diehl on 3/2/2020 2:12 PM    Ct Chest Wo Contrast    Result Date: 3/2/2020  PROCEDURE: CT CHEST WO CONTRAST CLINICAL

## 2020-03-09 NOTE — DISCHARGE SUMMARY
Hospital Medicine Discharge Summary      Patient:  Emelia Keating  YOB: 1931  MRN: [id number]   PCP: Vadim Ricci DO         Acct: [de-identified]     Admit Date: 3/2/2020     Discharge Date:  3/9/2020    Admitting Physician: Cinthia Paredes MD  Discharge Physician: Yovany Tomlinson MD     Discharge Diagnoses     DISCHARGE DIAGNOSES:  1. Dyspnea on exertion thought to be related to interstitial lung disease  2. Interstitial lung disease  3. Elevated troponin: no further workup obtained as patient was asymptomatic and improved while on steroids  4. Acute hypotension, resolved  5. Unspecified leukocytosis, resolved  6. Acute kidney injury on CKD 3:   7. Normocytic anemia/Hx of GI bleed  8. Hyponatremia  9. Essential HTN  10. Emphysema/COPD: CXR shows emphysematous changes throughout the lungs. On breathing treatments   11. BPH: tamsulosin  12. CAD  15. Chronic anxiety  14. NICOLAS: CPAP to be reinitiated?- Patient non-compliant. Pulmonology following  15. Moderate protein calorie malnutrition: dietitian following    Disposition:    [x] ECF         Condition at Discharge: Stable     The patient was seen and examined on day of discharge and this discharge summary is in conjunction with any daily progress note from day of discharge. Hospital course     Emelia Keating is a 80 y.o. male who presented to [address] [place] [address] based on a referral from an office visit with Dr. Amanda Anthony. According to the H&P, the patient's family was concerned about possible fluid overload due to \"crackles in his lungs. \" He was transported to the ED by wheelchair from her office. He reported that he has had worsening shortness of breath with exertion over the last few months. He reports being short of breath just moving around his home and has to stop and rest to catch his breath. He denies chest pain.  His daughter and wife, are present with him in the ED report that he has been using his CPAP during the day due to difficulty catching his breath. He denies cough, fevers, chills, alteration in mental status, congestion, swelling in his legs, and chest pain. He is a former smoker 20 pack year history. He has a pacemaker. His daughter also reports that his bowel movements have become darker. He had a lower GI bleed in 2018 requiring multiple colonoscopies and transfusions. Denies dizziness or new onset fatigue. 1. Dyspnea on exertion thought to be related to interstitial lung disese: Question of pulmonary fibrosis. CANDE elevated. RF abnormal. ESR elevated. Bedside PFTS indicate evidence for mild restriction and mild diffusion. Patient received IV Lasix in consideration of possible pulmonary edema- unclear if his symptoms were improved but he states that he had peripheral edema that is now improved. Pulmonology following and multiple rheumatological studies ordered and pending. On steroids and breathing treatments with improvement. Workup for hypersensitivity pneumonitis to be considered as outpatient- per Pulmonology  2. Interstitial lung disease: Previously followed with Dr. Marlee Sicard per Pulmonology. Dr. Nathalie Purcell, Pulmonology now consulted. On steroids and breathing treatments  and improving. F/u with Soundra Mix in the office in 1 month  3. Elevated troponin: Previously unremarkable. Possibly underlying CAD contributor to SOB with exertion? Trend troponin. Elevated in setting of CKD? Patient otherwise denies CP but does have SOB. Cardiology consult deferred as patient improved  4. Acute hypotension: s/p NS bolus- improved. Metoprolol held. Continue to monitor. Losartan decreased again to 25 mg daily due to hypotension. Continue to hold metoprolol  5. Unspecified leukocytosis, resolve: Trended downwards. No other signs of infection , procalcitonin unremarkable. Deferred use of antibiotics. UA unremarkable. CXR unremarkable for pneumonia  6. Acute kidney injury on CKD 3: Likely related to diuretic administration,. provided:      CBC:    Lab Results   Component Value Date    WBC 10.8 03/09/2020    HGB 10.0 03/09/2020    HCT 31.2 03/09/2020     03/09/2020       Renal:    Lab Results   Component Value Date     03/09/2020    K 4.4 03/09/2020    K 4.2 03/03/2020     03/09/2020    CO2 26 03/09/2020    BUN 56 03/09/2020    CREATININE 1.3 03/09/2020    CALCIUM 9.3 03/09/2020    PHOS 3.2 03/03/2020       Radiology     Radiology:        Xr Chest Standard (2 Vw)    Result Date: 3/2/2020  PROCEDURE: XR CHEST (2 VW) CLINICAL INFORMATION: sob. COMPARISON: May 11, 2019 TECHNIQUE: PA and lateral views the chest. FINDINGS: Fibroemphysematous changes throughout the lungs greatest in the lung bases. Mild cardiomegaly. No superimposed infiltrate. Permanent left chest wall pacer. Osteopenia and degenerative changes thoracic spine. Old compression deformities in the mid thoracic spine similar to prior. Fibroemphysematous changes throughout the lungs greatest in the lung bases. Mild cardiomegaly. No superimposed infiltrate. Appearance is similar to prior. .**This report has been created using voice recognition software. It may contain minor errors which are inherent in voice recognition technology. ** Final report electronically signed by Dr. Raechel Favre on 3/2/2020 2:12 PM    Ct Chest Wo Contrast    Result Date: 3/2/2020  PROCEDURE: CT CHEST WO CONTRAST CLINICAL INFORMATION: New onset dyspnea on exertion, leukocysosis, history of emphysema, former smoker. COMPARISON: June 1, 2013 TECHNIQUE: 5 mm noncontrast axial images were obtained through the chest. Sagittal and coronal reconstructions were obtained. All CT scans at this facility use dose modulation, iterative reconstruction, and/or weight-based dosing when appropriate to reduce radiation dose to as low as reasonably achievable. 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  Superimposed infection cannot be entirely

## 2020-03-09 NOTE — PROGRESS NOTES
[address] [place] [address] 8B  Occupational Therapy  Daily Note  Time:   Time In: 8640  Time Out: 1330  Timed Code Treatment Minutes: 25 Minutes  Minutes: 25          Date: 3/9/2020  Patient Name: Emelia Keating,   Gender: male      Room: -27/027-A  MRN: [id number]  : 1931  (80 y.o.)  Referring Practitioner: Ely Tavarez MD  Diagnosis: dyspnea on exertion   Additional Pertinent Hx: 80 y.o. male who presented to [address] [place] [address] with dyspnea on exertion that has been acutely worsening the past several weeks. He was seen by his PCP, Dr. Amanda Anthony today for a check up. He and his family report that she was concerned about possible fluid overload due to \"crackles in his lungs. \" He was transported to the ED by wheelchair from her office. He reports that he has had worsening shortness of breath with exertion over the last few months. He reports being short of breath just moving around his home and has to stop and rest to catch his breath. He denies chest pain. His daughter and wife, are present with him in the ED report that he has been using his CPAP during the day due to difficulty catching his breath. He denies cough, fevers, chills, alteration in mental status, congestion, swelling in his legs, and chest pain. He is a former smoker 20 pack year history. He has a pacemaker. His daughter also reports that his bowel movements have become darker. He had a lower GI bleed in 2018 requiring multiple colonoscopies and transfusions. Denies dizziness or new onset fatigue. Restrictions/Precautions:  Restrictions/Precautions: Fall Risk    SUBJECTIVE: Pt seated in bedside chair with family present upon arrival, agreeable to OT session. RN okayed therapy session. PAIN: No c/o. COGNITION: Decreased Safety Awareness    ADL:   Feeding: Modified Independent. Finishing lunch tray. Grooming: Contact Guard Assistance. Standing sink side washing hands.   Lower Extremity Dressing: Stand By Assistance. SBA crossing legs to adjust slipper socks. Toileting: Contact Guard Assistance. CGA standing to void at STS. Rob Apple BALANCE:  Sitting Balance:  Stand By Assistance. Standing Balance: Contact Guard Assistance. x1 minute in prep for mobility; x2 minutes standing sink side for grooming tasks. TRANSFERS:  Sit to Stand:  Contact Guard Assistance. Stand to Sit: Contact Guard Assistance. FUNCTIONAL MOBILITY:  Assistive Device: Rolling Walker  Assist Level:  Contact Guard Assistance. Distance: Completed functional mobility within unit hallway at slow pace, no LOB noted. Pt requires min cues for walker safety to keep within AMARA reaching destination- lengthy seated rest break after trial of mobility, mod-min fatigue noted. ASSESSMENT:     Activity Tolerance:  Patient tolerance of  treatment: fair. Pt limited by fatigue. Discharge Recommendations: Continue to assess pending progress, Subacute/Skilled Nursing Facility, Patient would benefit from continued therapy after discharge  Equipment Recommendations: Equipment Needed: No  Plan: Times per week: 3-5X  Current Treatment Recommendations: Strengthening, Balance Training, Functional Mobility Training, Safety Education & Training, Self-Care / ADL, Patient/Caregiver Education & Training, Endurance Training    Patient Education  Patient Education: ADL's, Importance of Increasing Activity, Assistive Device Safety and Safety with transfers and mobility.     Goals  Short term goals  Time Frame for Short term goals: by discharge  Short term goal 1: pt will complete functional mobility HH distances with SBA and RW with no vc for safety for ease with getting around home environment  Short term goal 2: Pt will complete functional transfers with S and no vc for safety including to/from toilet  Short term goal 3: Pt will complete dynamic standing task with S X 3 minutes with 1-2 hand release and noLOB for ease with shower routine  Short term goal 4: Pt

## 2020-03-10 NOTE — DISCHARGE SUMMARY
solution  Inhale 3 mLs into the lungs every 4 hours as needed for Shortness of Breath             isosorbide mononitrate (IMDUR) 30 MG extended release tablet  TAKE 1 TABLET DAILY             Multiple Vitamins-Minerals (THERAPEUTIC MULTIVITAMIN-MINERALS) tablet  Take 1 tablet by mouth daily              pantoprazole (PROTONIX) 40 MG tablet  Take 1 tablet by mouth every morning (before breakfast)             polyethylene glycol (GLYCOLAX) packet  Take 17 g by mouth daily as needed for Constipation             sertraline (ZOLOFT) 50 MG tablet  TAKE 1 TABLET DAILY             Spacer/Aero Chamber Mouthpiece MISC  Use it with Dulera inhaler             tamsulosin (FLOMAX) 0.4 MG capsule  TAKE 1 CAPSULE TWICE A DAY             Tiotropium Bromide-Olodaterol (STIOLTO RESPIMAT) 2.5-2.5 MCG/ACT AERS  Inhale 2 puffs into the lungs daily             vitamin B-12 1000 MCG tablet  Take 1 tablet by mouth daily                      Physical Examination     Vitals:  Vitals:    03/10/20 0830 03/10/20 1000 03/10/20 1134 03/10/20 1152   BP: (!) 145/70 (!) 124/59 115/68    Pulse: 68 81 87    Resp: 18  18    Temp: 97.7 °F (36.5 °C)  97.6 °F (36.4 °C)    TempSrc: Oral  Oral    SpO2: 95%   97%   Weight:       Height:           Weight: Weight: 139 lb 3.2 oz (63.1 kg)     24 hour intake/output:    Intake/Output Summary (Last 24 hours) at 3/10/2020 1321  Last data filed at 3/10/2020 1013  Gross per 24 hour   Intake 210 ml   Output --   Net 210 ml     General appearance:  No apparent distress. HEENT: Normocephalic. PERRL. Extraocular motion intact. Conjunctivae clear. Nose symmetric without evidence of discharge. Oral mucosa moist w/o erythema or exudate. Neck: Supple. No JVD. Trachea midline. No thyromegaly. Cardiovascular:  RRR w/ normal S1/S2. No murmurs, rubs or gallops. Respiratory: Diffuse fine crackles throughout lungs  Abdomen: Soft, non-tender, non-distended with normal bowel sounds.   Musculoskeletal:  Full ROM without deformity. Neurologic:  No focal sensory/motor deficits. Cranial nerves: II-XII intact. Lymphatic: No cervical lymphadenopathy. Psychiatric:  Alert and oriented. Vascular: Dorsalis pedis pulses bilaterally palpable . Radial pulses palpable bilaterally . No peripheral edema. Capillary refill<3 seconds  Genitourinary: Deferred. Skin: No visible rashes or lesions. Labs     Labs: For convenience and continuity at follow-up the following most recent labs are provided:      CBC:    Lab Results   Component Value Date    WBC 10.8 03/09/2020    HGB 10.0 03/09/2020    HCT 31.2 03/09/2020     03/09/2020       Renal:    Lab Results   Component Value Date     03/09/2020    K 4.4 03/09/2020    K 4.2 03/03/2020     03/09/2020    CO2 26 03/09/2020    BUN 56 03/09/2020    CREATININE 1.3 03/09/2020    CALCIUM 9.3 03/09/2020    PHOS 3.2 03/03/2020       Radiology     Radiology:        Xr Chest Standard (2 Vw)    Result Date: 3/2/2020  PROCEDURE: XR CHEST (2 VW) CLINICAL INFORMATION: sob. COMPARISON: May 11, 2019 TECHNIQUE: PA and lateral views the chest. FINDINGS: Fibroemphysematous changes throughout the lungs greatest in the lung bases. Mild cardiomegaly. No superimposed infiltrate. Permanent left chest wall pacer. Osteopenia and degenerative changes thoracic spine. Old compression deformities in the mid thoracic spine similar to prior. Fibroemphysematous changes throughout the lungs greatest in the lung bases. Mild cardiomegaly. No superimposed infiltrate. Appearance is similar to prior. .**This report has been created using voice recognition software. It may contain minor errors which are inherent in voice recognition technology. ** Final report electronically signed by Dr. Noah Snachez on 3/2/2020 2:12 PM    Ct Chest Wo Contrast    Result Date: 3/2/2020  PROCEDURE: CT CHEST WO CONTRAST CLINICAL INFORMATION: New onset dyspnea on exertion, leukocysosis, history of emphysema, former smoker. COMPARISON: June 1, 2013 TECHNIQUE: 5 mm noncontrast axial images were obtained through the chest. Sagittal and coronal reconstructions were obtained. All CT scans at this facility use dose modulation, iterative reconstruction, and/or weight-based dosing when appropriate to reduce radiation dose to as low as reasonably achievable. 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 Superimposed infection cannot be entirely excluded. Findings have progressed when compared to prior examination from 2013. Follow-up is advised. No pleural or pericardial effusion. Thyroid gland is unremarkable. Ascending thoracic aorta is 4.1 cm. Generalized atherosclerotic changes Coronary artery calcification. Calcified hilar and mediastinal lymph nodes. Degenerative changes thoracic spine. Permanent chest wall pacer. Upper abdominal images demonstrates distended stomach with food debris. No acute findings. Osteopenia and degenerative changes with anterior vertebral body bridging with thin syndesmophytes. Probable old compression deformities in the upper thoracic spine. Incompletely healed right rib fracture. Possible old anterior mediastinal fracture. Correlate with history of injury. 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 Superimposed infection cannot be entirely excluded. Findings have progressed when compared to prior examination from 2013. Follow-up is advised. Incompletely healed right lateral seventh rib fracture. **This report has been created using voice recognition software. It may contain minor errors which are inherent in voice recognition technology. ** Final report electronically signed by Dr. Rex Zavala on 3/2/2020 5:50 PM        Consults     IP CONSULT TO PULMONOLOGY  IP CONSULT TO SOCIAL WORK  IP CONSULT TO GI  IP CONSULT TO DIETITIAN      Discharge Instructions     Patient Instructions:    Discharge

## 2020-03-11 NOTE — TELEPHONE ENCOUNTER
NEW medications for you. START taking them after you leave the hospital  Medication Sig Notes    ipratropium-albuterol (DUONEB) 0.5-2.5 (3) MG/3ML SOLN nebulizer solution Inhale 3 mLs into the lungs every 4 hours as needed for Shortness of Breath Does not have neb machine so has not picked up. Daughter does not think he'd use the neb machine \"I just know how he is. I don't think he would use that thing. \" Spoke with technician at [facility] who says RX appropriately written and will be $50 when they decide would like prescription.  pantoprazole (PROTONIX) 40 MG tablet Take 1 tablet by mouth every morning (before breakfast) Has medication and is taking. Counseled on how to take and possible side effects.  polyethylene glycol (GLYCOLAX) packet Take 17 g by mouth daily as needed for Constipation Has medication and is taking. Counseled on how to take and possible side effects. Does not currently need.     Tiotropium Bromide-Olodaterol (STIOLTO RESPIMAT) 2.5-2.5 MCG/ACT AERS Inhale 2 puffs into the lungs daily Did not  due to price. > $300     You told us you were taking these medications at home, but the amount or how often you take this medication has CHANGED  Medication Sig Notes    ferrous sulfate (IRON 325) 325 (65 Fe) MG tablet Take 1 tablet by mouth Daily with lunch      These are medications you told us you were taking at home, CONTINUE taking them after you leave the hospital   Medication Sig Notes    Coenzyme Q10 (COQ10 PO) Take 1 tablet by mouth daily     sertraline (ZOLOFT) 50 MG tablet TAKE 1 TABLET DAILY     isosorbide mononitrate (IMDUR) 30 MG extended release tablet TAKE 1 TABLET DAILY     tamsulosin (FLOMAX) 0.4 MG capsule TAKE 1 CAPSULE TWICE A DAY     albuterol sulfate  (90 Base) MCG/ACT inhaler INHALE 2 PUFFS BY MOUTH INTO THE LUNGS EVERY 6 HOURS AS NEEDED FOR WHEEZING     Spacer/Aero Chamber Mouthpiece MISC Use it with Dulera inhaler     acetaminophen (TYLENOL) 650 MG extended release tablet Take 650 mg by mouth every 8 hours as needed for Pain     docusate sodium (COLACE) 100 MG capsule Take 100 mg by mouth 2 times daily as needed      vitamin B-12 1000 MCG tablet Take 1 tablet by mouth daily     Multiple Vitamins-Minerals (THERAPEUTIC MULTIVITAMIN-MINERALS) tablet Take 1 tablet by mouth daily       These are the medications you have told us you were taking at home, STOP taking them after you leave the hospital   losartan 100 MG tablet (COZAAR)  metoprolol tartrate 25 MG tablet (LOPRESSOR) - Has stopped taking. Kassidy Herrera states she took both of these medications out of the pill box that her sibling sets up for patient. Additional Medications:  None per Christina    Meds to Beds:No    Estimated Creatinine Clearance: 35 mL/min (A) (based on SCr of 1.3 mg/dL (H)). Assessment/Plan:  - Medication reconciliation completed. Number of medications reviewed: 14    - Pt is not taking medications as directed by discharging physician.    Number of discrepancies: 2.     - Identified Potential Medication Interactions: No clinically significant interactions identified via [employer] Interaction Analysis as category D or higher.    - Renal Dosing: No renal adjustments necessary.    - Follow up appointment date (7 days for more severe illness, 14 days for others):    · Patient reminded of upcoming appointment  Future Appointments   Date Time Provider Miguel Romo   3/18/2020 10:00 [address] [facility], APRN - CNP SRPX FM RES [facility] [place]   7/15/2020 11:15 [facility] - CNP [facility] [place]   9/21/2020  1:00 PM MD CHARLENE Jones KIDNEY [facility] [place]     Thank you,    Fransico Fraser, PharmD  [address]  Phone: O: [phone], Toll free: [phone], option 7

## 2020-03-11 NOTE — TELEPHONE ENCOUNTER
CLINICAL PHARMACY CONSULTATION: Transition of Care (MAAME)  -----------------------------------------------------------------------------------------------  Jimi Rodriguez is a 80 y.o. male  who was discharged from [facility] on 3/10/2020 with a diagnosis of dyspnea on exertion. Attempt made to contact pt. Left msg on home/mobile TAD requesting call back at [phone] or [phone] option 7. Will continue to attempt to contact pt as appropriate.     Bryan Gupta, PharmD, [address] [facility] Clinical Pharmacist  O: [phone]  Toll free: [phone], option 7    TCM Call Made?: Yes

## 2020-03-11 NOTE — TELEPHONE ENCOUNTER
Eduardo 45 Transitions Initial Follow Up Call    Call within 2 business days of discharge: No     Patient: Nacho Ramsay Patient : 1931 MRN: [id number]    [unfilled]    RARS: Readmission Risk Score: 19       Spoke with: Divya Guidry    Discharge department/facility: home    Non-face-to-face services provided:  3/18/2020 with Juan M Meyer off all bp medications and on protonix for stomach, eating fine.     Follow Up  Future Appointments   Date Time Provider Miguel Romo   3/18/2020 10:00 AM CHRISTIN Pfeiffer CNP SRPX FM RES [place]   7/15/2020 11:15 AM CHRISTIN Berger CNP ENT [facility]   2020  1:00 PM MD CHARLENE Gautam Chi KIDNEY P - [address] [facility] ([address] [place] [address])

## 2020-03-12 NOTE — TELEPHONE ENCOUNTER
Received message from medication  that Art Poisson shows it should be $45 through patient's insurance. Writer called Clif Griffiths who states that they accidentally ran it through a discount card instead of his insurance and that the prescription is now $45 instead of >$300. Spoke with daughter Ella Griggs who states she will pick this medication up. Briefly reviewed how medication is used. Instructed to call back if has more questions after receives inhaler. Also questioned Christina if they are interested in the nebulizer machine yet, the benefits in a person with arthritis, and if I can assist in getting that. She again declines \"for now\" but states she is considering getting a spacer for the albuterol. No further outreach planned by PharmD at this time. Please let me know if I can be of any further assistance.     CLINICAL PHARMACY NOTE   POST-DISCHARGE TELEPHONE FOLLOW-UP ADDENDUM  For Pharmacy Admin Tracking Only  TCM Call Made?: Yes  [facility] Select Patient?: Yes  Total # of Interventions Recommended: 2 -   Total # Interventions Accepted: 1  Intervention Severity:   - Level 1 Intervention Present?: No   - Level 2 #: 1 Assisted patient in getting maintenance inhaler   - Level 3 #: 0  Outreach Status: Review Complete  Care Coordinator Outreach to Patient?: Yes  Provider Contacted?: Yes - Note Routed, Routine  Waiting on response from: n/a  Time Spent (min): 50

## 2020-03-12 NOTE — CARE COORDINATION
Eduardo 45 Transitions Initial Follow Up Call    Call within 2 business days of discharge: Yes    Patient: Barry Heart Patient : 1931   MRN: [id number]  Reason for Admission:  Dyspnea on exertion   Discharge Date: 3/10/20 RARS: Readmission Risk Score: 19      Last Discharge [facility]       Complaint Diagnosis Description Type Department Provider    3/2/20 Shortness of Breath Dyspnea on exertion . .. ED to Hosp-Admission (Discharged) (ADMITTED) Jeet Ramsey MD; [address] [place] [address]. .. Spoke with: Daughter Almaz Sheppard  Daughter does not live with patient. Almaz Sheppard has not spoken with patient today. She is unsure how patient's breathing, edema, urination, and etc is doing. Daughter did know that patient no longer has GI bleeding. They did not  nebulizer because patient stated he will not use it. Inhaler spacer was not purchased due to another daughter is a retired nurse and told him a spacer makes it hard to use an inhaler. Patient has a difficult time using his inhaler and has missed used it. Explained to Almaz Sheppard that a spacer is used for people that has a difficult time with an inhaler to make it easier and safer. She states she will pick it up at [facility]. Sister in law takes care of medication and pill box. Almaz Sheppard stated all medication that was stopped was removed. Pharmacy reviewed medications with family. Reviewed appts. Almaz Sheppard will be making Pulmonary f/u appt within in month. Educated daughter on COPD. Exercise regularly. Eat healthy foods. Avoid some and air pollution. See your doctor regularly. Stick to you treatment provided by your doctor. Educated daughter on diuretic medication. Avoid taking you medication at night. Take it at lease six hours before bedtime it you need to take it twice a day. Your kidneys will be making more urine. Watch for water retention which includes swollen ankles and unexplained weight gain over a short period of time.  Notify PCP if you gain 3lbs in a day or 5lb in a week. Manage you salt intake. Follow a fluid restriction if you were given one by PCP. Daughter is waiting on [facility] to call to give a time to come today. Denies any concerns or issues. Will continue to follow.          Facility: [facility]    Non-face-to-face services provided:  Obtained and reviewed discharge summary and/or continuity of care documents    Care Transitions 24 Hour Call    Do you have any ongoing symptoms?:  No  Do you have a copy of your discharge instructions?:  Yes  Do you have all of your prescriptions and are they filled?:  Yes  Have you been contacted by a [facility] Pharmacist?:  Yes  Have you scheduled your follow up appointment?:  Yes  How are you going to get to your appointment?:  Car - family or friend to transport  Were you discharged with any Home Care or Post Acute Services:  Yes  Post Acute Services:  Home Health (Comment: [facility])  Patient DME:  [place] [facility]  Do you have support at home?:  Partner/Spouse/SO  Do you feel like you have everything you need to keep you well at home?:  Yes  Are you an active caregiver in your home?:  No  Care Transitions Interventions  No Identified Needs         Follow Up  Future Appointments   Date Time Provider Miguel Romo   3/18/2020 10:00 [address] [facility], CHRISTIN - CNP SRPX FM RES [facility] - [place]   7/15/2020 11:15 AM Jamey Lamas [place] [address], CHRISTIN - CNP ENT [facility] - [place]   9/21/2020  1:00 PM Kenya Contreras MD [facility] P - Otto Lara RN

## 2020-03-16 NOTE — TELEPHONE ENCOUNTER
Order and DME note in for home nebulizer and Duonebs script sent to [facility]. Fabian Vargas was evaluated today and a DME order was entered for a nebulizer compressor in order to administer Duoneb due to the diagnosis of COPD. The need for this equipment and treatment was discussed with the patient and he understands and is in agreement.

## 2020-03-19 NOTE — CARE COORDINATION
Eduardo 45 Transitions Follow Up Call    3/19/2020    Patient: Laura Cardozo  Patient : 1931   MRN: [id number]  Reason for Admission: Dyspnea on exertion   Discharge Date: 3/10/20 RARS: Readmission Risk Score: 23         Spoke with: daughter radhika  Daughter does not live with patient. Kassidy Herrera has not spoken with patient today. She is unsure how patient's breathing, edema, urination, and etc is doing. Daughter states he has caregivers and they have not reported anything. Daughter declined any more calls. She stated she can call the PCP if she needs anything    Care Transitions Subsequent and Final Call    Subsequent and Final Calls  Care Transitions Interventions  Other Interventions:             Follow Up  Future Appointments   Date Time Provider Miguel Romo   2020  2:00 PM CHRISTIN Pascual CNP SRPX FM RES [facility] [place]   7/15/2020 11:15 AM CHRISTIN Quinteros CNP ENT [facility]   2020  1:00 PM MD CHARLENE Jones KIDNEY P - Fabricio Prescott RN

## 2020-03-31 NOTE — TELEPHONE ENCOUNTER
Forms in Dr. Katerina Mclaughlin folder to be signed. Once it is completed, it will be fax.    Received forms from : [address] [place] [address] last appointment was : 3/2/2020  Patients next appointment is : 4/14/2020

## 2020-04-07 NOTE — TELEPHONE ENCOUNTER
SENT MY CHART STATING:    [email or web address]  / Renetta Video Visits     This is how a virtual visit works:  Jessica Webber DO will send you a text or e-mail to your smart device. It will show up like an 800 number, but you should be able to read \"From Jessica Webber DO \" join me for a virtual visit. Step 1) Tap on the link,  Step 2) Enter your name and then tap on \"Check in\"  Step 3) You should then see your face in the upper right hand corner, and see Jessica Webber DO 's face on your screen. Step 4)  Have your visit! \"We want to confirm that, for purposes of billing, this is a virtual visit with your provider   for which we will submit a claim for reimbursement with your insurance company. You   will be responsible for any copays, coinsurance amounts or other amounts not covered   by your insurance company. If you do not accept this, unfortunately we will not be able   to schedule a virtual visit with the provider. Do you accept? \"     Obie Jay stated \"SURE, THAT'S FINE\"

## 2020-04-13 PROBLEM — I65.21 CAROTID STENOSIS, RIGHT: Status: ACTIVE | Noted: 2020-01-01

## 2020-04-13 NOTE — ED NOTES
Pt's POA returned phone aurora to speak with pt and discuss the POC. Pt states that he would prefer to go home. POA states that she has talked with primary provider and was provided options for care at home with close supervision. POA requesting to talk with Dequan Adame for further assistance in pt's POC.       Krystin Marinelli RN  04/13/20 [id number]

## 2020-04-13 NOTE — PROGRESS NOTES
 Spacer/Aero Chamber Mouthpiece MISC Use it with [place] inhaler 1 each 0    acetaminophen (TYLENOL) 650 MG extended release tablet Take 650 mg by mouth every 8 hours as needed for Pain      docusate sodium (COLACE) 100 MG capsule Take 100 mg by mouth 2 times daily as needed       vitamin B-12 1000 MCG tablet Take 1 tablet by mouth daily 30 tablet 3    Multiple Vitamins-Minerals (THERAPEUTIC MULTIVITAMIN-MINERALS) tablet Take 1 tablet by mouth daily        No current facility-administered medications for this visit. Allergies   Allergen Reactions    Iodine Swelling and Rash       Health Maintenance   Topic Date Due    Shingles Vaccine (2 of 3) 06/17/2014    Annual Wellness Visit (AWV)  11/25/2020    Potassium monitoring  03/09/2021    Creatinine monitoring  03/09/2021    DTaP/Tdap/Td vaccine (2 - Td) 06/16/2027    Flu vaccine  Completed    Pneumococcal 65+ years Vaccine  Completed    Hepatitis A vaccine  Aged Out    Hepatitis B vaccine  Aged Out    Hib vaccine  Aged Out    Meningococcal (ACWY) vaccine  Aged Out       Subjective:      Review of Systems   Constitutional: Positive for fatigue. Negative for chills and fever. HENT: Negative for ear pain, postnasal drip and trouble swallowing. Eyes: Positive for discharge and visual disturbance (hard to open the eye, draining). Negative for pain. Respiratory: Negative for cough and shortness of breath. Cardiovascular: Negative for chest pain and palpitations. Gastrointestinal: Negative for abdominal pain. Genitourinary: Negative for difficulty urinating. Skin: Negative for rash. Neurological: Negative for headaches. Psychiatric/Behavioral: Negative for agitation. Objective:     As this is a video visit new vitals are not able to be obtained - the vitals listed below are from previous visits to our facility. There were no vitals filed for this visit. There is no height or weight on file to calculate BMI.     Wt Readings from Last 3 Encounters:   03/10/20 139 lb 3.2 oz (63.1 kg)   03/02/20 144 lb (65.3 kg)   01/15/20 145 lb 3.2 oz (65.9 kg)     BP Readings from Last 3 Encounters:   03/10/20 115/68   03/02/20 110/64   01/15/20 130/84       Physical Exam  Constitutional:       General: He is not in acute distress. Appearance: Normal appearance. He is not ill-appearing, toxic-appearing or diaphoretic. HENT:      Head: Normocephalic and atraumatic. Right Ear: External ear normal.      Left Ear: External ear normal.      Nose: Nose normal.   Eyes:      General: No scleral icterus. Extraocular Movements: Extraocular movements intact. Conjunctiva/sclera: Conjunctivae normal.   Pulmonary:      Effort: Pulmonary effort is normal.   Skin:     Findings: No erythema or rash. Neurological:      Mental Status: He is alert and oriented to person, place, and time. Comments: Unable to open the left eye on camera, slurring words    Using both hands to wipe his eyes when on camera - appears to be moving both sides of his mouth - but the left side may be drawn up   Psychiatric:         Mood and Affect: Mood normal.         Behavior: Behavior normal.         Thought Content: Thought content normal.         Judgment: Judgment normal.           Lab Results   Component Value Date    WBC 10.8 03/09/2020    HGB 10.0 (L) 03/09/2020    HCT 31.2 (L) 03/09/2020     03/09/2020    CHOL 118 12/05/2016    TRIG 93 12/05/2016    HDL 56 12/05/2016    LDLCALC 43 12/05/2016    AST 11 03/03/2020     03/09/2020    K 4.4 03/09/2020     03/09/2020    CREATININE 1.3 (H) 03/09/2020    BUN 56 (H) 03/09/2020    CO2 26 03/09/2020    TSH 1.690 05/11/2019    INR 1.08 08/15/2018    LABMICR 3.18 09/14/2016    LABGLOM 52 (A) 03/09/2020    MG 2.0 03/03/2020    CALCIUM 9.3 03/09/2020    VITD25 43 09/05/2017       Imaging Results:    No results found. Assessment:      Diagnosis Orders   1. Weakness on left side of face     2. Slurred speech         Plan: Will ask you to go to the [facility] and see them - you may need some bloodwork and a CT scan of the head    Will see you back in the next few weeks to follow up - may need OT for the speech and to make sure you can swallow ok           Return in about 2 weeks (around 4/27/2020). Orders Placed:  No orders of the defined types were placed in this encounter. Medications Prescribed:  No orders of the defined types were placed in this encounter. Future Appointments   Date Time Provider Miguel Romo   4/13/2020  1:30 PM Dany Harris DO [facility] RES [address]   7/15/2020 11:15 AM CHRISTIN Dorantes - CNP ENT [address]   9/21/2020  1:00 PM Jenni Lopez MD [facility] [address]       Patient given educational materials - see patient instructions. Discussed use, benefit, and side effects of prescribed medications. All patient questions answered. Pt voiced understanding. Reviewed health maintenance. Instructed to continue current medications, diet and exercise. Patient agreed with treatment plan. Follow up as directed. Electronically signed by Gurmeet Villa DO on 4/13/2020 at 1:07 PM      Pursuant to the emergency declaration under the [facility]5 waiver authority and the [employer] Act, this Virtual  Visit was conducted, with patient's consent, to reduce the patient's risk of exposure to COVID-19 and provide continuity of care for an established patient. Services were provided through a video synchronous discussion virtually to substitute for in-person clinic visit.

## 2020-04-13 NOTE — ED PROVIDER NOTES
EMERGENCY DEPARTMENT ENCOUNTER        CHIEF COMPLAINT    Chief Complaint   Patient presents with    Aphasia     resolved    Facial Droop     resolved       HPI    Heather Burks is a 80 y.o. male last known well at 12:50 while on telehealth video consult with his PCP who presents with acute onset left-sided weakness since the onset 12:50. The PCP then called ambulance to  pt due to concern for CVA. Pt has heart disease and did not sleep well last night. He apparently woke up fine but due to concerns about that, [facility] and [facility] found to have aphasia and acute confusion as well. The duration has been constant since the onset. The quality is possible stroke. Associated with left-sided symptoms. No aggravating or alleviating factors. REVIEW OF SYSTEMS    Cardiac: No chest pain or palpitations  Respiratory: No shortness of breath or new cough  General: No fevers  : No dysuria or hematuria  GI: No vomiting or diarrhea  Neuro: +aphasia, confusion, facial droop  See HPI for further details. All other systems reviewed and are negative.     PAST MEDICAL OR SURGICAL HISTORY    Past Medical History:   Diagnosis Date    Acute sinusitis     Angina pectoris ([facility]     Anxiety disorder 10/27/2016    Arthritis     osteoporosis    BPH with urinary obstruction     CAD (coronary artery disease)     Chronic insomnia     CKD (chronic kidney disease) stage 3, GFR 30-59 ml/min ([facility])     COPD (chronic obstructive pulmonary disease) (HCC)     Gastroenteritis     HCAP (healthcare-associated pneumonia) 4/29/2015    Heart disease     HLD (hyperlipidemia)     [place] (hard of hearing)     wear hearing aids    Hypertension     Kidney disease     40% kdiney function    Kidney stone     years ago 15-20    NICOLAS on CPAP     Osteopenia determined by x-ray     Pacemaker 2011    Pinched nerve     slipped disc in back    Visual problems     Vitamin D deficiency      Past Surgical History:   Procedure Laterality Date    ABDOMINAL HERNIA REPAIR  04/27/2017    incisional hernia repair--Dr. Ijeoma Romo CARDIAC SURGERY      CATARACT REMOVAL WITH IMPLANT      bilateral    COLONOSCOPY  8595,1242    COLONOSCOPY  12/29/2017    COLONOSCOPY CONTROL HEMORRHAGE performed by Tyra Brnener MD at [facility]    COLONOSCOPY  8/16/2018    COLONOSCOPY POLYPECTOMY SNARE/COLD BIOPSY performed by Tyra Brenner MD at [address] [facility]    COLONOSCOPY  8/19/2018    COLONOSCOPY CONTROL HEMORRHAGE performed by Tyra Brenner MD at [address] [facility]    COLONOSCOPY N/A 8/20/2018    COLONOSCOPY CONTROL HEMORRHAGE performed by Tyra Brenner MD at [address]    EKG 12-LEAD  4/29/2015         ENDOSCOPY, COLON, DIAGNOSTIC      EYE SURGERY      cataracts    HERNIA REPAIR      several    HIP PINNING Left 8/31/2017    LEFT HIP INTERTAN performed by Adalberto Ordonez MD at [address] [place] [address] 60  12/3/12    left     MYRINGOTOMY AND TYMPANOSTOMY TUBE PLACEMENT  7/23/13    left     NASAL SINUS SURGERY      OTHER SURGICAL HISTORY  04/27/2015    transurethral Incision bladder neck    PACEMAKER INSERTION      PACEMAKER PLACEMENT  2011    HI COLONOSCOPY FLX DX W/COLLJ SPEC WHEN PFRMD Left 8/18/2018    COLONOSCOPY performed by Tyra Brenner MD at [facility]    HI [address] DX/THER SBST INTRLMNR LMBR/SAC W/IMG GDN N/A 6/25/2018    LUMBAR INTER LAMINAR RUBEN LESI @ L3. performed by Lopez Mcclendon MD at [place] [address] Right 10/8/2017    Right hip intertan performed by Zainab Carr MD at [address] [place] [address] TURP  4/17/2013    W/CYSTO WITH DIRECT VISION URETHROTOMY & RESECTION BLADDER NECK CONTRACTURE    TURP  4/27/15    re-do TURP    TYMPANOSTOMY TUBE PLACEMENT      multiple surgeries    UPPER GASTROINTESTINAL ENDOSCOPY  12/29/2017    COLONOSCOPY SUBMUCOSAL/BOTOX INJECTION performed by Tyra Brenner MD at [facility] 0  9 -   Best Language =          1  10 - Dysarthria =          0  11 - Extinction & inattention =          0    NIHSS score = 6      Vitals:    04/13/20 1400 04/13/20 1500 04/13/20 1615 04/13/20 1629   BP: 120/70 120/74 (!) 158/89    Pulse:  88 89    Resp:  16 22    Temp:       TempSrc:       SpO2: 94% 96% (!) 88% 98%   Weight:       Height:           Differential diagnosis: Thrombotic Stroke, Embolic Stroke, Hemorrhagic Stroke, TIA,  Hypoglycemia,  Mass Lesions, Metabolic cause, Head Injury, Encephalopathy, Multiple Sclerosis, Seizure, other    FINAL IMPRESSION    1. Carotid artery stenosis with cerebral infarction ([facility]    2. Left leg weakness    3. Facial droop due to acute stroke ([facility]    4. Slurred speech    5. Other iron deficiency anemia        PLAN  MDM - CVA likely. I added the stroke alert at 1405. Pt has acute CVA, and will undergo CT head, with possible CTA neck and head as well. Will discuss case with stroke neurology. Dr. Cathleen Han and I spoke over the phone at  - pt's last known well time is a bit uncertain as 66:96 was when the PCP noticed the facial droop and accompanying changes. Added to the fact that he is 79yo, he did not think tPA makes sense at this time unless there is major occlusion seen on CTA. Pt will then be admitted with 325mg ASA, 40mg atorvastatin and fluids. He will call back to discuss whether pt needs transfer and admission to [facility]. Pt is doing well after CTA studies. He received solumedrol and benadryl prior to the CTA but his allergies to iodine was only mild rash. PT has an 80% stenosis in right carotid bulb that is likely source of his CVA. He has mild left-sided weakness (leg more than arm) with continued facial droop (left) and confusion. Dr. Cathleen Han of stroke neurology agreed pt will benefit from transfer to [facility] neuro ICU so he can get stenting in AM. In the meanwhile, pt was loaded with Plavix 300mg. Hep lock IV.       William Mobley MD  04/13/20 1640    There is a change in the disposition of the patient. As of 1700, pt's daughter Kyler Kemp expressed unease with transferring pt to [facility] during this [place] pandemic. She would like to speak with Dr. Tamar Chaudhry and discussed the situation. I called Dr. GUZMAN [facility] at [phone] to inform her of patient's workup, and she did contact [address] [place] [address]. In the meanwhile, I made a phone call to Dr. Kat Roy of [facility]. Angela's cardiology group and asked whether cardiology will perform any carotid stenting at [address] [place] [address] - he stated he does perform these procedures but only for non-emergent cases. Since pt has an active acute CVA, he did think pt is best served going to [facility]. At this time, I have put on hold the transfer, and will have pt talk to [address] [place] [address] on the phone to confirm final plan of care. Dr. Tres Morataya has been apprised of the entire case up to this point, and will determine the final disposition for pt.        Lenin Steven MD  04/13/20 3819

## 2020-04-13 NOTE — ED NOTES
Informed that pt's family is refusing transport to [facility] at this time due to covid-19 and requesting to talk with pt in regards to the [address]. LACP informed of current situation and pt placed on call list if situation changes. Attempted to contact POA x2 will attempt again.      Peyman Antunez RN  04/13/20 5017

## 2020-04-13 NOTE — ED NOTES
Pt returned to room 1 in ed in stable condition. Pt reports chronic right hip pain 4/10. Vs reassessed and stable. Pt appears in no distress, respirations easy and unlabored. Call light in reach will continue to monitor.       Odalys Martines RN  04/13/20 2044

## 2020-04-13 NOTE — VIRTUAL HEALTH
[facility] Neurology Consult for  [facility] Stroke Alert through [address] @ 2879 pm   4/13/2020 5:18 PM  Pt Name: Obie Jay  MRN: [id number]  YOB: 1931  Date of evaluation: 4/13/2020  Primary Care Physician: Deepa Hall DO  Reason for Evaluation: Stroke evaluation with Phone Consult, Discussion and Review of imaging    Obie Jay is a 80 y.o. male with hx of CKD, hypertension, CAD who presents with left-sided deficits. The patient was found by his telehealth doctor today and urine will be and not able to walk as usual in the last 2 days. So last known well uncertain. His doctor instructed him to go to the ED where he was found to have left facial droop and left side drift in the leg. NIH stroke scale 2    Patient with history of iodine allergy for which she was given Benadryl 25 mg IV and Solu-Medrol 60 g for preparation of CTA head and neck. CT head not finding   CTA head and neck with Right carotid proximal ICA with 80% stenosis. For which patient was loaded with oral aspirin 325 and Plavix 300 mg. Patient is being transferred to [facility] neuro ICU for right ICA intervention tomorrow    Allergies  is allergic to iodine. Medications  Prior to Admission medications    Medication Sig Start Date End Date Taking?  Authorizing Provider   albuterol sulfate  (90 Base) MCG/ACT inhaler INHALE 2 PUFFS BY MOUTH INTO THE LUNGS EVERY 6 HOURS AS NEEDED FOR WHEEZING 3/16/20   CHRISTIN Pinto CNP   ipratropium-albuterol (DUONEB) 0.5-2.5 (3) MG/3ML SOLN nebulizer solution Inhale 3 mLs into the lungs every 4 hours as needed for Shortness of Breath 3/16/20   CHRISTIN Chapman CNP   ferrous sulfate (IRON 325) 325 (65 Fe) MG tablet Take 1 tablet by mouth Daily with lunch 3/10/20   Chikis Dominique MD   pantoprazole (PROTONIX) 40 MG tablet Take 1 tablet by mouth every morning (before breakfast) 3/10/20   Chikis Dominique MD   Tiotropium give and Plavix  75 mg.  4. Lipitor 40 mg daily  5. Keep blood pressure 140-180  6. Patient with baseline CKD, give IV fluids and Mucomyst 600 mg twice daily for 2 days. 7. Patient has pacemaker unlikely he will get MRI because of incompatibility. Discussed with ED Physician        This is a Phone Consult, I have not seen the patient face to face, the telemedicine device was not utilized.     Linda Carr MD   Stroke, Neurocritical Care And/or [address] [place] [address] Stroke [address]  Electronically signed 4/13/2020 at 5:18 PM

## 2020-04-13 NOTE — ED NOTES
Contacted and left message for return call to [employer]. Call returned and Dr. Arias Jurist informed. Waiting for further orders/instruction in pt's POC.      Berta Lewis RN  04/13/20 8518

## 2020-04-13 NOTE — ED NOTES
Dr. Lillian Weir into update pt on the POC. Pt provided with a denture cup and upper/lower dentures placed in container with personal I.d in pt belongings bag with clothing and ball cap.       Amy Barrios RN  04/13/20 0589

## 2020-04-14 PROBLEM — I63.9 STROKE OF UNKNOWN CAUSE (HCC): Status: ACTIVE | Noted: 2020-01-01

## 2020-04-14 NOTE — PROGRESS NOTES
House  Home Layout: One level  Home Access: Stairs to enter with rails  Entrance Stairs - Number of Steps: 1 milton with 2 grab bars   Entrance Stairs - Rails: Both  Home Equipment: Rolling walker, Cane             ADL Assistance: Needs assistance(dtr and son help )                Additional Comments: unsure of if pt was living at home alone with his wife that has dementia . Pt difficult to understand     OBJECTIVE:  Range of Motion:  Bilateral Lower Extremity: WFL    Strength:  Bilateral Lower Extremity: Impaired - 4-/5    Balance:  Static Sitting Balance:  Stand By Assistance  Static Standing Balance: Contact Guard Assistance  Dynamic Standing Balance: Minimal Assistance    Bed Mobility:  Supine to Sit: Stand By Assistance, Contact Guard Assistance  Scooting: Stand By Assistance    Transfers:  Sit to Stand: Contact Guard Assistance, Minimal Assistance, X 1, with increased time for completion  Stand to Sit:Minimal Assistance, Moderate Assistance    Ambulation:  Minimal Assistance, X 1, with increased time for completion  Distance: 3 feet  Surface: Level Tile  Device:Rolling Walker  Gait Deviations:  Slow Leona, Decreased Step Length Bilaterally, Decreased Gait Speed, Decreased Heel Strike Bilaterally, Mild Path Deviations and Unsteady Gait    Exercise:  Patient was guided in 1 set(s) 10 reps of exercise to both lower extremities. Ankle pumps, Heelslides and Straight leg raises. Exercises were completed for increased independence with functional mobility. Functional Outcome Measures: Completed  AM-PAC Inpatient Mobility without Stair Climbing Raw Score : 14  AM-PAC Inpatient without Stair Climbing T-Scale Score : 40.85    ASSESSMENT:  Activity Tolerance:  Patient tolerance of  treatment: good. Treatment Initiated: Treatment and education initiated within context of evaluation.   Evaluation time included review of current medical information, gathering information related to past medical, social and functional history, completion of standardized testing, formal and informal observation of tasks, assessment of data and development of plan of care and goals. Treatment time included skilled education and facilitation of tasks to increase safety and independence with functional mobility for improved independence and quality of life. Assessment: Body structures, Functions, Activity limitations: Decreased functional mobility , Decreased balance, Decreased strength, Decreased endurance  Assessment: Pt with generalized weakness, decreased balance , activity tolerance and functional mobility. Pt needs continued therapy to improve safety with mobility  Prognosis: Good    REQUIRES PT FOLLOW UP: Yes    Discharge Recommendations:  Discharge Recommendations: Continue to assess pending progress, Patient would benefit from continued therapy after discharge    Patient Education:  PT Education: Goals, Transfer Training, PT Role, Plan of Care, Home Exercise Program, Gait Training, Functional Mobility Training    Equipment Recommendations:  Equipment Needed: No    Plan:  Times per week: 5 X N  Current Treatment Recommendations: Strengthening, Gait Training, Balance Training, Functional Mobility Training, Endurance Training, Transfer Training, Home Exercise Program    Goals:  Patient goals : Go home  Short term goals  Time Frame for Short term goals: by discharge  Short term goal 1: supine to sit and return  with S to get in and out of bed   Short term goal 2: sit to stand with S to get on and off variou surfaces  Short term goal 3: ambulate with walker 80 feet with CGA to walk safely in  home  Short term goal 4: ascend/descend 1 steps with 2 HR and CGA to enter home  Long term goals  Time Frame for Long term goals : NA due to short ELOS    Following session, patient left in safe position with all fall risk precautions in place.

## 2020-04-14 NOTE — ED TRIAGE NOTES
PT brought in by bob. PT was here at [facility] 4/13/2020 for TIA. PT was taken home and refused  transfer to [facility] 4/13/2020. Bob reported PT went to bed around 2230 and woke up with worse stroke like symptoms. Bob reported that pt has increased slurred speech. PT stated that he does not know what is going on. PT reported that he went to bed and woke up like this. Pt's granddaughter is at bedside to help pt understand situation. PT talking to Dr. Evelin Pearl and Dr. Josef He stroke bot). PT stated that he will talk to family and decide on transfer or not. PT in no distress.

## 2020-04-14 NOTE — H&P
the HPI. All other systems reviewed and negative. PHYSICAL EXAM:  BP (!) 154/77   Pulse 69   Temp 97.5 °F (36.4 °C) (Oral)   Resp 16   Ht 5' 6\" (1.676 m)   Wt 140 lb (63.5 kg)   SpO2 95%   BMI 22.60 kg/m²   General appearance: No apparent distress, appears stated age and cooperative. HEENT: Normal cephalic, atraumatic without obvious deformity. Pupils equal, round, and reactive to light. Extra ocular muscles intact. Conjunctivae/corneas clear. Neck: Supple, with full range of motion. No jugular venous distention. Trachea midline. Respiratory:  Normal respiratory effort. Clear to auscultation, bilaterally without Rales/Wheezes/Rhonchi. Cardiovascular: Regular rate and rhythm with normal S1/S2 without murmurs, rubs or gallops. Abdomen: Soft, non-tender, non-distended with normal bowel sounds. Musculoskeletal:  5/5 UE strength, 4/5 LE strength  Skin: Skin color, texture, turgor normal.  No rashes or lesions. Neurologic:  Follows commands, dysarthria, facial droop, no changes in sensation. Psychiatric: Alert. Capillary Refill: Brisk,< 3 seconds   Peripheral Pulses: +2 palpable, equal bilaterally     Labs:   Recent Labs     04/13/20 1436 04/14/20  0955   WBC 9.6 9.2   HGB 9.2* 9.5*   HCT 30.9* 30.7*    326     Recent Labs     04/13/20  1436 04/14/20  0955    139   K 4.5 4.4    103   CO2 23 23   BUN 25* 30*   CREATININE 1.4* 1.2   CALCIUM 8.6 8.9     Recent Labs     04/13/20  1436 04/14/20  0955   AST 14 17   ALT 7* 9*   BILITOT 0.2* 0.2*   ALKPHOS 74 76     Recent Labs     04/13/20  1436 04/14/20  0955   INR 1.10 1.04     No results for input(s): Jean Bending in the last 72 hours.     Urinalysis:    Lab Results   Component Value Date    NITRU NEGATIVE 03/03/2020    WBCUA 0-2 05/11/2019    WBCUA 0-5 02/19/2018    BACTERIA NONE 05/11/2019    RBCUA 0-2 05/11/2019    BLOODU NEGATIVE 03/03/2020    GLUCOSEU NEGATIVE 03/03/2020       Radiology:   CT HEAD WO CONTRAST   Final Result No acute process. Stable appearance of the brain. **This report has been created using voice recognition software. It may contain minor errors which are inherent in voice recognition technology. **      Final report electronically signed by Dr. Mil Acosta on 4/14/2020 9:37 AM      MRI brain without contrast    (Results Pending)     Ct Head Wo Contrast    Result Date: 4/14/2020  PROCEDURE: CT HEAD WO CONTRAST CLINICAL INFORMATION: slurred speech. . COMPARISON: 4/13/2020 TECHNIQUE: 2-D multiplanar noncontrast images of the brain. All CT scans at this facility use dose modulation, iterative reconstruction, and/or weight-based dosing when appropriate to reduce radiation dose to as low as reasonably achievable. FINDINGS: No acute infarction or hemorrhage is identified. The severity crosstable vessel ischemic disease changes as previously seen stable. No extra-axial fluid collection. Ventricles are normal. There is generalized cerebral volume loss. Mastoid air cells are underpneumatized. Visualized paranasal sinuses are  clear. Calvarium is intact. No acute process. Stable appearance of the brain. **This report has been created using voice recognition software. It may contain minor errors which are inherent in voice recognition technology. ** Final report electronically signed by Dr. Mil Acosta on 4/14/2020 9:37 AM      Electronically signed by Shoaib Trinidad MD on 4/14/2020 at 11:59 AM

## 2020-04-14 NOTE — ED PROVIDER NOTES
tablet by mouth daily 30 tablet 3    Multiple Vitamins-Minerals (THERAPEUTIC MULTIVITAMIN-MINERALS) tablet Take 1 tablet by mouth daily          ALLERGIES    Allergies   Allergen Reactions    Iodine Swelling and Rash       FAMILY OR SOCIAL HISTORY    Family History   Problem Relation Age of Onset    Cancer Brother 72        lung    Diabetes Brother     Kidney Disease Father         stones    Stroke Father     Kidney Disease Child         stones    Kidney Disease Child         stones    Heart Disease Sister     Arthritis Sister     High Blood Pressure Sister     High Cholesterol Sister     Diabetes Brother         multiple siblings had dm    Early Death Brother 72        lung cancer     Social History     Socioeconomic History    Marital status:      Spouse name: Christopher Martines Number of children: 2    Years of education: Not on file    Highest education level: Not on file   Occupational History    Occupation: Retired   Social Needs    Financial resource strain: Not on file    Food insecurity     Worry: Not on file     Inability: Not on file   [employer] needs     Medical: Not on file     Non-medical: Not on file   Tobacco Use    Smoking status: Former Smoker     Packs/day: 1.00     Years: 20.00     Pack years: 20.00     Types: Cigarettes     Last attempt to quit: 1965     Years since quittin.4    Smokeless tobacco: Never Used    Tobacco comment: pt use to smoke cigars and pipe   Substance and Sexual Activity    Alcohol use:  Yes     Alcohol/week: 7.0 standard drinks     Types: 7 Cans of beer per week     Comment: occasional 1 beer     Drug use: No    Sexual activity: Not Currently     Partners: Female   Lifestyle    Physical activity     Days per week: Not on file     Minutes per session: Not on file    Stress: Not on file   Relationships    Social connections     Talks on phone: Not on file     Gets together: Not on file     Attends Nondenominational service: Not on file

## 2020-04-14 NOTE — VIRTUAL HEALTH
ED Physician Dr. Matthew Jung    At least 64 min of Telemedicine and time in conversation directly with ED staff and physician for the patient who is in imminent and life threatening deterioration without further treatment and evaluation. This Virtual Visit was conducted with patient's (and/or legal guardian's) consent, to provide telestroke consultation and necessary medical care.   Time spent examining patient, reviewing the images personally, reviewing the chart, perform high complexity decision making and speaking with the nursing staff regarding recommendations        Helen Norman MD   Stroke, [facility]or [address] [place] [address] Stroke [address]  Electronically signed 4/14/2020 at 9:49 AM

## 2020-04-15 NOTE — PROGRESS NOTES
Giov[facility] Muniz 60  INPATIENT OCCUPATIONAL THERAPY  [facility] MED SURG 8A  EVALUATION    Time:   Time In: 06  Time Out: 6077  Timed Code Treatment Minutes: 15 Minutes  Minutes: 29     Date: 4/15/2020  Patient Name: Heather Burks,   Gender: male      MRN: [id number]  : 1931  (80 y.o.)  Referring Practitioner: Lisette WALLER-CNP  Diagnosis: Stroke of unknown cause  Additional Pertinent Hx: 80-year-old gentleman with past medical history of hyperlipidemia, CAD, CKD, COPD came to ER due to slurring of speech. Patient initially came in yesterday with left-sided weakness. He was not considered a TPA candidate. Patient was evaluated by tele-stroke and had imaging which showed right ICA 80% stenosis. It was recommended the patient be transferred to [facility] for intervention. At that time family declined and patient felt that he was stable enough to go home. Patient was discharged. Patient comes back today due to worsening speech and facial droop. He denies any weakness. History is limited due to patient's speech changes and is hard of hearing and does not have his hearing device. Patient was evaluated by tele-stroke the case was again discussed with daughter and they agree to admit for medical management. Restrictions/Precautions:  Restrictions/Precautions: Fall Risk    Subjective  Chart Reviewed: Yes, Progress Notes, Orders, History and Physical, Imaging  Patient assessed for rehabilitation services?: Yes    Subjective: RN approved OT. Upon arrival, Pt sitting EOB with PT and Pt just had an episode of incontinence. Assisted Pt and PT with hygiene.     Pain:  Pain Assessment  Patient Currently in Pain: Denies    Social/Functional History:  Lives With: Spouse  Type of Home: House  Home Layout: One level  Home Access: Stairs to enter with rails  Entrance Stairs - Number of Steps: 1 milton with 2 grab bars   Entrance Stairs - Rails: Both  Home Equipment: Rolling walker, [place] Shower/Tub: (Pt unable to report)  Bathroom Toilet: (Pt unable to report)    ADL Assistance: Needs assistance(Daughter and son assist)    Additional Comments: Pt with limited speech this date and difficult to understand. Per PT, unsure if Pt was living at home with his wife who has dementia. Cognition/Orientation:     Cognition Comment: Pt able to follow one step commands. Pt demonstrated difficulty answering \"yes/no\" questions. Pt with dysarthria    ADL's:  Grooming: Setup, Verbal cueing, Stand by assistance(Pt washed face while sitting )  LE Dressing: Moderate assistance(Pt able to doff socks but required total assist to don socks)  Toileting: Dependent/Total(for toilet hygiene)  Vision: Pt unable to open L eye. Pt demonstrated difficulty scanning to the R with the R eye. Difficult to assess due to Pt opening R eye minimally  Hearing: Pt is [place] with R hearing aide. Functional Mobility:  Bed mobility  Rolling to Left: Minimal assistance  Supine to Sit: Minimal assistance, Moderate assistance  Scooting: Minimal assistance    Functional Mobility  Functional - Mobility Device: Rolling Walker  Activity: (Short distance from bed to recliner)  Assist Level: Moderate assistance  Functional Mobility Comments: Pt required verbal cues to stay inside walker     Balance:  Balance  Sitting Balance: Contact guard assistance  Standing Balance: Minimal assistance  Standing Balance  Time: ~2 min  Activity: Toilet hygiene    Transfers:  Sit to stand:  Moderate assistance  Stand to sit: Moderate assistance  Transfer Comments: Pt required verbL cues for safe transfer and cues for hand placement       Upper Extremity Assessment:   LUE AROM : WFL  Left Hand AROM: WFL  RUE AROM : WFL  Right Hand AROM: WFL    LUE Strength  L Hand General: 4-/5  RUE Strength  R Hand General: 4-/5    Sensation  Overall Sensation Status: WFL     Activity Tolerance: Patient limited by fatigue     Assessment:  Assessment: Pt would benefit from skilled OT intervention to maximize pt safety and increase independence with performing self care tasks and functional mobility to ensure safe transition to the next level of care and return to OF. Performance deficits / Impairments: Decreased functional mobility , Decreased cognition, Decreased high-level IADLs, Decreased ADL status, Decreased endurance, Decreased ROM, Decreased strength, Decreased balance, Decreased safe awareness, Decreased vision/visual deficit  REQUIRES OT FOLLOW UP: Yes  Safety Devices in place: Yes    Treatment Initiated: Treatment and education initiated within context of evaluation. Evaluation time included review of current medical information, gathering information related to past medical, social and functional history, completion of standardized testing, formal and informal observation of tasks, assessment of data and development of plan of care and goals. Treatment time included skilled education and facilitation of tasks to increase safety and independence with ADL's for improved functional independence and quality of life. Discharge Recommendations:  Continue to assess pending progress, 24 hour supervision or assist, Patient would benefit from continued therapy after discharge    Patient Education:  OT Education: OT Role, Plan of Care, Transfer Training    Equipment Recommendations:   Other: Continue to assess pending progress    Plan:  Times per week: 6x  Current Treatment Recommendations: Strengthening, Safety Education & Training, Balance Training, Patient/Caregiver Education & Training, Self-Care / ADL, Cognitive/Perceptual Training, Functional Mobility Training, Equipment Evaluation, Education, & procurement, Endurance Training    Goals:  Patient goals : Pt did not state  Short term goals  Time Frame for Short term goals: Until discharge  Short term goal 1: Complete functional sit to stand transfer with SBA and no cues for safety to increase independence with toileting

## 2020-04-15 NOTE — CONSULTS
NEUROLOGY CONSULT NOTE       Requesting Physician: Yoseph Mccarthy, *     Reason for Consult: In-patient neurology for stroke; had been evaluated initially by tele-stroke    History of Present Illness:  Connor Ha is a 80 y.o. male admitted to [facility] on 4/14/2020. He had had some mild dysarthria and a left facial droop on 4/13, and went to the ER where he had a tele-stroke evaluation. CTA showed right carotid bulb stenosis, but he refused transfer to [facility] and went home. He had been loaded with Plavix and Lipitor The next day his speech was markedly worse, and essentially unintelligible. Not clear if there was aphasia, or just dysarthria. No limb weakness described. For me he denies numbness or paresthesias, or headache. He is, however, very drowsy, and not much of a historian.     Past Medical History:        Diagnosis Date    Acute sinusitis     [facility]ina pectoris ([facility])     Anxiety disorder 10/27/2016    Arthritis     osteoporosis    BPH with urinary obstruction     CAD (coronary artery disease)     Chronic insomnia     CKD (chronic kidney disease) stage 3, GFR 30-59 ml/min ([facility])     COPD (chronic obstructive pulmonary disease) (HCC)     Gastroenteritis     HCAP (healthcare-associated pneumonia) 4/29/2015    Heart disease     HLD (hyperlipidemia)     [place] (hard of hearing)     wear hearing aids    Hypertension     Kidney disease     40% kdiney function    Kidney stone     years ago 15-20    NICOLAS on CPAP     Osteopenia determined by x-ray     Pacemaker 2011    Pinched nerve     slipped disc in back    Visual problems     Vitamin D deficiency            Procedure Laterality Date    ABDOMINAL HERNIA REPAIR  04/27/2017    incisional hernia repair--Dr. Denice Garcia CARDIAC SURGERY      CATARACT REMOVAL WITH IMPLANT      bilateral    COLONOSCOPY  [id number]    COLONOSCOPY  12/29/2017    COLONOSCOPY CONTROL HEMORRHAGE performed by Arias Yusuf MD at [facility] per day  sodium chloride flush 0.9 % injection 10 mL, PRN  polyethylene glycol (GLYCOLAX) packet 17 g, Daily PRN  promethazine (PHENERGAN) tablet 12.5 mg, Q6H PRN    Or  ondansetron (ZOFRAN) injection 4 mg, Q6H PRN  enoxaparin (LOVENOX) injection 40 mg, Q24H  0.9 % sodium chloride infusion, Continuous  aspirin EC tablet 81 mg, Daily  Tiotropium Bromide-Olodaterol 2.5-2.5 MCG/ACT AERS 2 puff, Daily         Medications Prior to Admission: aspirin EC 81 MG EC tablet, Take 1 tablet by mouth daily  clopidogrel (PLAVIX) 75 MG tablet, Take 1 tablet by mouth daily  atorvastatin (LIPITOR) 10 MG tablet, Take 1 tablet by mouth daily  albuterol sulfate  (90 Base) MCG/ACT inhaler, INHALE 2 PUFFS BY MOUTH INTO THE LUNGS EVERY 6 HOURS AS NEEDED FOR WHEEZING  ipratropium-albuterol (DUONEB) 0.5-2.5 (3) MG/3ML SOLN nebulizer solution, Inhale 3 mLs into the lungs every 4 hours as needed for Shortness of Breath  ferrous sulfate (IRON 325) 325 (65 Fe) MG tablet, Take 1 tablet by mouth Daily with lunch  pantoprazole (PROTONIX) 40 MG tablet, Take 1 tablet by mouth every morning (before breakfast)  Tiotropium Bromide-Olodaterol (STIOLTO RESPIMAT) 2.5-2.5 MCG/ACT AERS, Inhale 2 puffs into the lungs daily (Patient not taking: Reported on 3/11/2020)  Coenzyme Q10 (COQ10 PO), Take 1 tablet by mouth daily  sertraline (ZOLOFT) 50 MG tablet, TAKE 1 TABLET DAILY  isosorbide mononitrate (IMDUR) 30 MG extended release tablet, TAKE 1 TABLET DAILY  tamsulosin (FLOMAX) 0.4 MG capsule, TAKE 1 CAPSULE TWICE A DAY  Spacer/Aero Chamber Mouthpiece MISC, Use it with Dulera inhaler  acetaminophen (TYLENOL) 650 MG extended release tablet, Take 650 mg by mouth every 8 hours as needed for Pain  docusate sodium (COLACE) 100 MG capsule, Take 100 mg by mouth 2 times daily as needed   vitamin B-12 1000 MCG tablet, Take 1 tablet by mouth daily  Multiple Vitamins-Minerals (THERAPEUTIC MULTIVITAMIN-MINERALS) tablet, Take 1 tablet by mouth daily     Physical Exam:  BP (!) 169/80   Pulse 74   Temp 98.7 °F (37.1 °C) (Axillary)   Resp 14   Ht 5' 6\" (1.676 m)   Wt 134 lb 14.7 oz (61.2 kg)   SpO2 94%   BMI 21.78 kg/m²  I Body mass index is 21.78 kg/m². I   Wt Readings from Last 1 Encounters:   04/15/20 134 lb 14.7 oz (61.2 kg)          GENERAL: he is in no apparent distress, and appears stated age; very drowsy  EYE:  Fundi not examined due to coronavirus outbreak  CARDIOVASCULAR:  Heart rhythm irregular, with a normal rate. Could not auscultate carotids well due to breathing sounds. NEUROLOGIC:  Level of Alertness: very drowsy  MSE:  Unable to assess due to drowsiness  Language: no aphasia apparent with limited assessment; speech extremity slurred, but said granddaughter's name; followed some simple commands, and nodded yes and no correctly to some questions. Cranial Nerves: pupils are equal; eyes conjugate, but EOMs not adequately assessed; did not respond to sensory testing; smiled a little, and no clear asymmetry; hard of hearing; did not raise palate; the tongue protrudes midline; did not shrug shoulders to command  Motor Exam: Tone symmetric.   Unable to cooperate for confrontational strength testing, but better than antigravity strength and no clear asymmetry; grasps very well, and pushed down well with elbow extension  Sensory and cerebellar testing not possible due to drowsiness  Deep Tendon Reflexes: Reflexes no readily evoked; patient not relaxed  Plantar Responses:  Upgoing bilaterally  Abnormal movements: none  Station and gait:  Not safe for me to get up; min-to-mod assist for PT    Diagnostics:  CBC:   Lab Results   Component Value Date    WBC 9.2 04/15/2020    RBC 3.39 04/15/2020    HGB 9.7 04/15/2020    HCT 31.0 04/15/2020    MCV 91.4 04/15/2020    MCH 28.6 04/15/2020    MCHC 31.3 04/15/2020    RDW 15.7 02/27/2019     04/15/2020    MPV 9.1 04/15/2020     CMP:    Lab Results   Component Value Date     04/15/2020    K 3.9 04/15/2020    CL

## 2020-04-15 NOTE — PROGRESS NOTES
gallops. Abdomen: Soft, non-tender, non-distended with normal bowel sounds. Musculoskeletal: passive and active ROM x 4 extremities. Skin: Skin color, texture, turgor normal.    Neurologic: Left facial droop. Dysarthria. Following commands. Strength equal bilaterally. Psychiatric: Alert and oriented, thought content appropriate  Capillary Refill: Brisk,< 3 seconds   Peripheral Pulses: +2 palpable, equal bilaterally       Labs:   Recent Labs     04/13/20  1436 04/14/20  0955 04/15/20  0345   WBC 9.6 9.2 9.2   HGB 9.2* 9.5* 9.7*   HCT 30.9* 30.7* 31.0*    326 331     Recent Labs     04/13/20  1436 04/14/20  0955 04/15/20  0345    139 141   K 4.5 4.4 3.9    103 104   CO2 23 23 23   BUN 25* 30* 23*   CREATININE 1.4* 1.2 1.2   CALCIUM 8.6 8.9 8.9     Recent Labs     04/13/20  1436 04/14/20  0955   AST 14 17   ALT 7* 9*   BILITOT 0.2* 0.2*   ALKPHOS 74 76     Recent Labs     04/13/20  1436 04/14/20  0955   INR 1.10 1.04     No results for input(s): CKTOTAL, TROPONINI in the last 72 hours. No results for input(s): PROCAL in the last 72 hours. Microbiology:      Urinalysis:      Lab Results   Component Value Date    NITRU NEGATIVE 03/03/2020    WBCUA 0-2 05/11/2019    WBCUA 0-5 02/19/2018    BACTERIA NONE 05/11/2019    RBCUA 0-2 05/11/2019    BLOODU NEGATIVE 03/03/2020    GLUCOSEU NEGATIVE 03/03/2020       Radiology:  CT HEAD WO CONTRAST   Final Result   No acute process. Stable appearance of the brain. **This report has been created using voice recognition software. It may contain minor errors which are inherent in voice recognition technology. **      Final report electronically signed by Dr. Jeffrey Mcadams on 4/14/2020 9:37 AM          DVT prophylaxis: [x] Lovenox                                 [] SCDs                                 [] SQ Heparin                                 [] Encourage ambulation           [] Already on Anticoagulation     Code Status: Full Code    Tele:   [x]

## 2020-04-16 NOTE — PROGRESS NOTES
[facility]  INPATIENT SPEECH THERAPY  STRZ MED SURG 8A  DAILY NOTE    TIME   SLP Individual Minutes  Time In: 0820  Time Out: 8401  Minutes: 13  Timed Code Treatment Minutes: 0 Minutes       Date: 2020  Patient Name: Dusty Mcdaniel      CSN: [id number]   : 1931  (80 y.o.)  Gender: male   Referring Physician:  Lorraine Bajwa MD  Diagnosis: Stroke of unknown cause  Secondary Diagnosis: Dysphagia  Precautions: Fall risk, aspiration precautions  Current Diet: NPO  Swallowing Strategies: Not Applicable and Comprehensive Oral Care  Date of Last MBS: Not Applicable    Pain:  No pain reported. Subjective:  Pt's granddaughter-in-law Carlos Cleveland present in room prior to ST session; Carlos Cleveland denies further questions for ST but does report ongoing notation of high level of fatigue apparent. [place] appreciative of update and does request ongoing updates to be routed to [facility]. ST to continue to update family with re: POC. Pt resting in recliner chair upon arrival, awake and alert. Fatigue apparent, however, pt able to maintain adequate fair level of wakefulness for engagement in service provision at date. Short-Term Goals:  SHORT TERM GOAL #1:  Goal 1: Pt will safely consume PO trials of ice chips, thin liquids, and purees (as deemed clinically appropriate) without overt s/s aspiration to determine readiness for potential PO diet level initiation and/or completion of formal instrumentation. INTERVENTIONS: PO trials facilitated to determine readiness for potential PO diet level initiation. Prior to intake, patient seen sitting upright in chair; opening of eyes minimal throughout session despite max cues. ST provided extensive oral care; patient tolerated fair-no effort to accept toothett as evidence by protruded tongue.   The following trials administered:  -Trial 1, thin liquids via teaspoon: reduced labial seal on utensil with ST requiring to place conservative offering on anterior lingual

## 2020-04-16 NOTE — PROGRESS NOTES
reaching activity and encouraged wt shifting forward    Pre gait activity w/ obi UE at support of walker w/ wt shifting right and left w/ assist to shift to the left then controlled stepping w/ difficulty at times at right LE he needed many verbal cues and demonstration to follow along w/ this activity   Standing balance- static standing at walker varied from CGA to min assist pt had difficulty w/ finding midline he tended to lean to the right and post he was able to free an UE to complete reaching at shoulder ht and out of base of support and then to knee ht with CGA to min assist, however w/ a duel task activity he struggled w/ his balance and was pushing heavily to the right cues to place obi UEs on walker to get his midline and I could assist w/ toileting task as he fatigued his midline worsened    Exercise:  Patient was guided in 1 set(s) 10 reps of exercise to both lower extremities. ankle df/pf limited active range at right and was very tight to stretch his heelcord, heelslides w/ assist at right for alignment as he tended to ER, hooklying hip abd/add and lower trunk rotations pt was getting a little restless w/ supine ex so progressed to sitting up and completed long arc qauds noted weakness at right vs left LE . Exercises were completed for increased independence with functional mobility. Functional Outcome Measures: Completed  -PAC Inpatient Mobility without Stair Climbing Raw Score : 13  -PAC Inpatient without Stair Climbing T-Scale Score : 38.96    ASSESSMENT:  Assessment: Patient progressing toward established goals.  and pt demonstrated generalized weakness and endurance and midline awareness, he requried much assist for mobility transfers and limited gait, pt was a little impulsive at times and demonstrated decreased midline awareness as he tends to lean to the right, he would benefit from cont skilled therapy to work on strength, balance, endurance and increased independence with functional

## 2020-04-16 NOTE — PROGRESS NOTES
Hospitalist Progress Note      Patient:  Kartik Bourgeois    Unit/Bed:8A-19/019-A  YOB: 1931  MRN: [id number]   Acct: [de-identified]   PCP: Xavier Doshi DO  Date of Admission: 4/14/2020    Assessment/Plan:    1. Acute CVA: Left-sided weakness and speech changes noted. Evaluated by tele-stroke team, not TPA candidate. Daughter declined transfer to [facility]'s prevention of right internal carotid stenosis 80%. They were agreeable for medical management [facility]. Initially started on ASA/Plavix/statin. CT head 4/15 shows moderate sized hypodense area right temporal lobe. Patient with severe dysphagia, possible need for NG or PEG tube placement. Plavix held, neurology transitioning ASA to rectal.  PT OT/ST following. Palliative care following. 2. Severe Dysphagia: Significant dysphagia noted, likely secondary to CVA. Family meeting carried out by neurology, palliative care, patient, and his children via telecommunication. They concluded that we will hold on NG and PEG tube at this time to monitor for improvement in swallow function, speech therapy to see patient tomorrow and Saturday. If no improvement at that time, tentatively plan for PEG which family is agreeable with if needed. 3. Aspiration pneumonia: Chest x-ray 4/15/2020 shows right upper and middle lobe infiltrate, this is consistent with aspiration pneumonia. Patient has remained afebrile with normal WBC. Due to possibility of pneumonia contributing to lethargy, will treat with Zosyn. Continue to monitor CBC and vitals. 4. History of interstitial lung disease/COPD: Follows with pulmonology outpatient. Continue tiotropium and albuterol as needed. 5. Elevated Troponin: Troponin 0.019, appears to be chronically elevated secondary to history of CKD. No chest pain, paced rhythm on EKG. 6. Chronic Normocytic anemia: Likely secondary to CKD, hemoglobin 10.5, MCV 93.4.   No signs of active bleed.  Continue to monitor and trend CBC in a.m. 7. CKD stage III: Creatinine 1.2, appears to be at baseline. Continue IV fluid hydration. Continue to monitor. 8. BPH: Continue Flomax  9. Depression: Continue Zoloft:     Chief Complaint: Facial droop, speech changes    Initial H and P:-    \"80year-old gentleman with past medical history of hyperlipidemia, CAD, CKD, COPD came to ER due to slurring of speech.  Patient initially came in yesterday with left-sided weakness.  He was not considered a TPA candidate.  Patient was evaluated by tele-stroke and had imaging which showed right ICA 80% stenosis.  It was recommended the patient be transferred to [facility] for intervention.  At that time family declined and patient felt that he was stable enough to go home.  Patient was discharged. Fatoumata Lundy comes back today due to worsening speech and facial droop.  He denies any weakness.  History is limited due to patient's speech changes and is hard of hearing and does not have his hearing device.  Patient was evaluated by tele-stroke the case was again discussed with daughter and they agree to admit for medical management. \"    Subjective (past 24 hours):   Patient evaluated resting in chair. Keeps eyes closed throughout most of examination but follows all commands. Patient does not answer orientation questions and when he attempts to speak has significant difficulty forming words. Family meeting was completed with neurology, palliative care, patient, and family. Plans as described in assessment and plan. Past medical history, family history, social history and allergies reviewed again and is unchanged since admission. ROS unable to obtain secondary to aphasia.     Medications:  Reviewed    Infusion Medications    sodium chloride 100 mL/hr at 04/16/20 1335     Scheduled Medications    piperacillin-tazobactam  3.375 g Intravenous Q8H    aspirin  300 mg Rectal Daily    ferrous sulfate  325 mg Oral Lunch    software. It may contain minor errors which are inherent in voice recognition technology. ** Final report electronically signed by Dr. Kahlil Torres on 4/15/2020 7:06 PM    Ct Head Wo Contrast    Result Date: 4/15/2020  PROCEDURE: CT HEAD WO CONTRAST CLINICAL INFORMATION: Stroke follow up study, left facial asymmetry. COMPARISON: 4/14/2020. TECHNIQUE: Noncontrast 5 mm axial images were obtained through the brain. Sagittal and coronal reconstructions were obtained. All CT scans at this facility use dose modulation, iterative reconstruction, and/or weight-based dosing when appropriate to reduce radiation dose to as low as reasonably achievable. FINDINGS: There is stable moderate temporoparietal prominent volume loss. There are moderate confluent and patchy areas of hypodensity in the periventricular, subcortical deep white matter as evidence for chronic microvascular angiopathy, stable compared to prior exam. There is a moderate-sized area of hypodensity in the lateral aspect of the right temporal lobe which has appeared/worsened in the interval. There appears to be some linear hypodensity in the midline on the coronal view suggesting possible artifact. Gray-white matter differentiation otherwise appears grossly preserved. No intracranial hemorrhage, mass effect or midline shift is identified. The calvarium appears intact. Orbits are unremarkable. Visualized paranasal sinuses are clear. There is coalescence of mastoid air cells, stable compared to prior exam.      Moderate-sized area of hypodensity in the lateral aspect of the right temporal lobe may be artifactual although involving infarct can't be excluded. No significant mass effect or intracranial hemorrhage is present. **This report has been created using voice recognition software. It may contain minor errors which are inherent in voice recognition technology. ** Final report electronically signed by Dr. Boogie Penn MD on 4/15/2020 3:44 PM    Ct Head Wo Contrast    Result Date: 4/14/2020  PROCEDURE: CT HEAD WO CONTRAST CLINICAL INFORMATION: slurred speech. . COMPARISON: 4/13/2020 TECHNIQUE: 2-D multiplanar noncontrast images of the brain. All CT scans at this facility use dose modulation, iterative reconstruction, and/or weight-based dosing when appropriate to reduce radiation dose to as low as reasonably achievable. FINDINGS: No acute infarction or hemorrhage is identified. The severity crosstable vessel ischemic disease changes as previously seen stable. No extra-axial fluid collection. Ventricles are normal. There is generalized cerebral volume loss. Mastoid air cells are underpneumatized. Visualized paranasal sinuses are  clear. Calvarium is intact. No acute process. Stable appearance of the brain. **This report has been created using voice recognition software. It may contain minor errors which are inherent in voice recognition technology. ** Final report electronically signed by Dr. Chelsea Tolbert on 4/14/2020 9:37 AM      Electronically signed by Daniela Medellin PA-C on 4/16/2020 at 2:57 PM

## 2020-04-16 NOTE — PROGRESS NOTES
[facility]  INPATIENT SPEECH THERAPY  STRZ MED SURG 8A  DAILY NOTE    TIME   SLP Individual Minutes  Time In: 3723  Time Out: 6119  Minutes: 25  Timed Code Treatment Minutes: 0 Minutes       Date: 2020  Patient Name: Everardo Jay      CSN: [id number]   : 1931  (80 y.o.)  Gender: male   Referring Physician:  Blanca Busby MD  Diagnosis: Stroke of unknown cause  Secondary Diagnosis: Dysphagia  Precautions: Fall risk, aspiration precautions  Current Diet: NPO  Swallowing Strategies: Not Applicable and Comprehensive Oral Care  Date of Last MBS: Not Applicable    Pain:  No pain reported. Subjective:  Pt seen at bedside, improved alertness with improved intentional communication via use of gestures. Patient with successful vocalization x2 throughout therapy session. Pt's granddaughter-in-law Marko Pitts present in room following ST session. ST updated Marko Pitts re: most recent dysphagia treatment. Ely verbalized understanding and appreciation and requested ongoing decision making to be achieved through phone visit with Dr. Dong Marks and Garret Gee and [facility] Palliative [facility] also in midway through session; ST updated BRIAN Yañez re: most recent POC. BRIAN Parish in throughout entire session. ST also placed call to Tiffany Marquez via phone following session; ST review dysphagia treatments this date with ongoing recommendation for alternate means of nutrition via NG or PEG tube. Many questions answered re: NG vs PEG tube. Garret Gee very appreciative and denied further questions verbalizing \"I also will plan to wait for the phone call with Dr. Payton Muñoz and Marko Pitts later this afternoon. \"        ST with additional attempt ~20 minutes later following this session to provide communication boards via request of the family. Very brief education provided to Marko Pitts stating \"typically the more basic the communication board the more effective the communication is. \"  Patient highly fatigued at this time; able to identify \"no\"via pointing x1. ST recommending yes/no questions at this time; will continue to attempt within subsequent treatment sessions as clinically appropriate. ST also present for phone conversation with Dr. Lawson Gayle (in person), Justine Keller (in person), Trae Mitchell (phone), Jose Angel Vargas (phone) and palliative care Pari (in person); ST reviewed current dysphagia interventions with recommendation for non-oral means of nutrition at this time; all in agreement to begin NG tube at this time with ongoing dysphagia interventions. All verbalized understanding and agreement. ST to continue to update family re: ongoing POC. Short-Term Goals:  SHORT TERM GOAL #1:  Goal 1: Pt will safely consume PO trials of ice chips, thin liquids, and purees (as deemed clinically appropriate) without overt s/s aspiration to determine readiness for potential PO diet level initiation and/or completion of formal instrumentation. INTERVENTIONS: PO trials facilitated to determine readiness for potential PO diet level initiation. Prior to intake, patient seen sitting upright in bed with hearing aids in place; opening of eyes frequently throughout session when provided max cues. ST provided extensive oral care; patient tolerated fair. Assistance from [address] utilized to assist with removing copious, thick amount of secretions from posterior portion of the hard palate. Patient with dry vocal quality to follow x1. The following trials administered:  -Trial 1, NECTAR/MILDLY thick liquids via teaspoon: slight effort to utilize labial seal on utensil but still requiring ST to place conservative offering on anterior lingual surface; delays noted for mastication with lingual pumping highly notable; patient with swallow reflex x1; however, poor efficiency of swallow reflex as swallow reflex was completed with open mouth posture.   Patient requesting use of suction to follow; ST assisted appropriaty removing secretions and suspected nectar/mildly thick PO trial.  No further PO trials completed d/t overt s/s of aspiration with minimal PO intake; patient highly fatigued following very minimal PO trial and oral care. Pt notes to remain at [facility] for aspiration events at this time d/t current medical complexity levels, fatigue, recent neurological insult, and adverse findings within dysphagia interventions at date. Recommend continuation of strict NPO diet level with continuation for comprehensive oral care procedural analysis. ST recommending alternate means of nutrition via NG tube or PEG tube pending patient and family choice to then continue dysphagia intervention with ultimate goals to complete MBS when patient is clinically appropriate. SHORT TERM GOAL #2:  Goal 2: Staff will exhibit return demonstration for implementation of comprehensive oral care procedural analysis to maximize oral integrity and to reduce potential bacteria colonization. INTERVENTIONS: See STG1 for details     SHORT TERM GOAL #3:  Goal 3: Complete full ST evaluation to develop goals per POC. INTERVENTIONS: Patient able to answer very basic, familiar yes/no questions 3/3. Unable to elicit additional tasks despite max cues    Long-Term Goals:  No LTG established given short ELOS       EDUCATION:  Learner: Patient and Daugher-in-law via phone contact  Education:  Reviewed results and recommendations of this evaluation, Reviewed diet and strategies, Reviewed signs, symptoms and risks of aspiration and Reviewed ST goals and Plan of Care  Evaluation of Education: Verbalizes understanding, Needs further instruction and Family not present    ASSESSMENT/PLAN:  Activity Tolerance:  Patient tolerance of  treatment: fair. Increasing lethargy levels. Assessment/Plan: Patient progressing toward established goals. Continues to require skilled care of licensed speech pathologist to progress toward achievement of established goals and plan of care. .     Plan for Next Session: PO trials, oral care, monitor readiness for further ST evaluation     LU Gutierrez 23

## 2020-04-16 NOTE — PROGRESS NOTES
Dr. Dagoberto Hernadez (granddaughter-in-law), this RN, speech therapist all present in the room and patient's daughter Ashley Benson present via facetime and patient's son, Nayeli Jacob present via speaker phone. Dr. Bong Markham and speech therapist able to update family regarding patient's progress. Discussion regarding initiation of antibiotics may increase patient's level of alertness/ability to swallow and the next couple of days will be necessary to monitor if any improvements. Will continue IV hydration and if NG tube is necessary, this may be placed. If patient is not showing any improvement, discussion of PEG tube did take place and discussed that this could be removed if the patient's swallowing improved. Ashley Benson and Nayeli Jacob are in agreement with this plan. Updated Nayla Taylor. Palliative care will remain available if needs arise.

## 2020-04-17 NOTE — PLAN OF CARE
Planning:  Goal: Discharged to appropriate level of care  Description: Discharged to appropriate level of care  Outcome: Ongoing  Note: Discharge planning remains in process. Patient is unable to participate in care planning and goal setting. Family has been updated and voices understanding.

## 2020-04-17 NOTE — PROGRESS NOTES
to worsening speech and facial droop. He denies any weakness. History is limited due to patient's speech changes and is hard of hearing and does not have his hearing device. Patient was evaluated by tele-stroke the case was again discussed with daughter and they agree to admit for medical management.     Subjective (past 24 hours): Answers yes or no by shaking head. Denies pain. [facility] with NG tube placement today. Does not attempt any words. Medications:  Reviewed    Infusion Medications    sodium chloride 100 mL/hr at 04/17/20 2319     Scheduled Medications    [START ON 4/18/2020] aspirin  81 mg Per NG tube Daily    oxymetazoline  2 spray Each Nostril Once    lidocaine   Topical Once    piperacillin-tazobactam  3.375 g Intravenous Q8H    ferrous sulfate  325 mg Oral Lunch    isosorbide mononitrate  30 mg Oral Daily    pantoprazole  40 mg Oral QAM AC    sertraline  50 mg Oral Daily    tamsulosin  0.4 mg Oral BID    atorvastatin  80 mg Oral Nightly    sodium chloride flush  10 mL Intravenous 2 times per day    enoxaparin  40 mg Subcutaneous Q24H    Tiotropium Bromide-Olodaterol  2 puff Inhalation Daily     PRN Meds: hydrALAZINE, acetaminophen, albuterol sulfate HFA, docusate sodium, ipratropium-albuterol, sodium chloride flush, polyethylene glycol, promethazine **OR** ondansetron      Intake/Output Summary (Last 24 hours) at 4/17/2020 1521  Last data filed at 4/16/2020 2132  Gross per 24 hour   Intake 10 ml   Output --   Net 10 ml       Diet:  DIET TUBE FEED CONTINUOUS/CYCLIC NPO; STANDARD WITH FIBER (Jevity 1.5); Nasogastric; 10; 50; 24    Exam:  BP (!) 168/73   Pulse 68   Temp 97.9 °F (36.6 °C) (Axillary)   Resp 22   Ht 5' 6\" (1.676 m)   Wt 134 lb 14.7 oz (61.2 kg)   SpO2 94%   BMI 21.78 kg/m²     General appearance: No apparent distress, appears stated age and cooperative. HEENT: Pupils equal, round, and reactive to light. Conjunctivae/corneas clear.   Neck: Supple, with full range of

## 2020-04-17 NOTE — PROGRESS NOTES
[address] [place] [address]  INPATIENT SPEECH THERAPY  STRZ MED SURG 8A  DAILY NOTE    TIME   SLP Individual Minutes  Time In: 6223  Time Out: 8156  Minutes: 15  Timed Code Treatment Minutes: 0 Minutes       Date: 2020  Patient Name: Joes Arteaga      CSN: [id number]   : 1931  (80 y.o.)  Gender: male   Referring Physician:  Stella Dueñas MD  Diagnosis: Stroke of unknown cause  Secondary Diagnosis: Dysphagia  Precautions: Fall risk, aspiration precautions  Current Diet: NPO  Swallowing Strategies: Not Applicable and Comprehensive Oral Care  Date of Last MBS: Not Applicable    Pain:  No pain reported. Subjective:  Pt seen resting in recliner chair upon arrival, awake and alert. Alertness levels maintained adequate for participation in therapy session. RN Nancy reporting placement of NGT later date. Short-Term Goals:  SHORT TERM GOAL #1:  Goal 1: Pt will safely consume PO trials of ice chips, thin liquids, and purees (as deemed clinically appropriate) without overt s/s aspiration to determine readiness for potential PO diet level initiation and/or completion of formal instrumentation. INTERVENTIONS: PO trials facilitated to determine readiness for potential PO diet level initiation. Prior to intake, bilateral hearing aids in place with pt successfully able to maintain eyes being open for entirety of therapy session. PTA Vicenta with provision of physical assist in conjunction with ST to improve postural placement in recliner chair given \"slumpped\" positioning. The following trials were administered:  -Trial 1: improved effort in comparison to morning session for engagement in labial rounding on utensil to permit extraction of conservative bolus into cavity; minimal attempts at manipulation with NO evidence for AP transit (anterior spillage at midline) with pt shaking head yes given question of, \"did you swallow? \"; tactile palpitation maintained within completion of trial with no pharyngeal swallow Long-Term Goals:  No LTG established given short ELOS       EDUCATION:  Learner: Patient  Education:  Reviewed results and recommendations of this evaluation, Reviewed diet and strategies, Reviewed signs, symptoms and risks of aspiration and Reviewed ST goals and Plan of Care  Evaluation of Education: Verbalizes understanding, Needs further instruction and Family not present    ASSESSMENT/PLAN:  Activity Tolerance:  Patient tolerance of  treatment: fair. Barriers at this time include lethargy levels. Assessment/Plan: Patient progressing toward established goals. Continues to require skilled care of licensed speech pathologist to progress toward achievement of established goals and plan of care. .     Plan for Next Session: PO trials, oral care, monitor readiness for further ST evaluation         Ramsey Ventura M.A., [address] [place] [address]

## 2020-04-17 NOTE — FLOWSHEET NOTE
04/16/20 2109   Provider Notification   Reason for Communication Evaluate   Provider Name [facility]   Provider Notification Physician Assistant   Method of Communication Secure Message   Response Waiting for response   Notification Time 2109 2109: Perfect serve message sent to [place], [place]. Pt blood pressure 175/84, goal blood pressure 130-160. Order placed for hydralazine. 2132: Hydralazine given per order, see mar.

## 2020-04-17 NOTE — PROGRESS NOTES
continued OT when medically stable. IP Rehab would benefit as pt is making slow but steady progress toward his goals. He can participate in self care and follows simple commands. Pt has poor safety awareness while doing transfers. Activity Tolerance:  Patient tolerance of  treatment: fair. Pt was very tired after the session. He remained at the edge of bed for 3 minutes while having help with changing his Depends. Discharge Recommendations: Continue to assess pending progress, 24 hour supervision or assist, Patient would benefit from continued therapy after discharge  Equipment Recommendations: Other: Continue to assess pending progress  Plan: Times per week: 6x  Current Treatment Recommendations: Strengthening, Safety Education & Training, Balance Training, Patient/Caregiver Education & Training, Self-Care / ADL, Cognitive/Perceptual Training, Functional Mobility Training, Equipment Evaluation, Education, & procurement, Endurance Training    Patient Education  Patient Education: Role of OT and ADL's; transfer safety. Goals  Short term goals  Time Frame for Short term goals: Until discharge  Short term goal 1: Complete functional sit to stand transfer with SBA and no cues for safety to increase independence with toileting routine. Not met. Revise goal.   Short term goal 2: Complete LE dressing with min A to increase independence with dressing tasks. Not met. Cont. Goal.   Short term goal 3: Complete functional mobility with RW to and from bathroom with SBA and no cues for walker safety to increase independence with accessing environment. Goal not met. Revise goal.  Short term goal 4: Complete BUE light exercises x10 reps to increase strength and endurance needed to complete ADL tasks. Goal not met. Continue goal.   Short term goal 5: Complete dynamic standing x 3 min with SBA to increase independence with toileting and sinkside grooming. Goal not met.  Revise goal.   Revised Short-Term Goals  Short term goals  Time Frame for Short term goals: Until discharge  Short term goal 1: Complete functional sit to stand transfer with CGA and min cues for safety to increase independence with toileting routine  Short term goal 2: Complete LE dressing with min A to increase independence with dressing tasks  Short term goal 3: Pt will complete functional mobility walking with a rolling walker to/from he bedside chair or commode while using a rolling walker with MIN A and cues as needed for walker safety to prepare for doing self care. Short term goal 4: Complete BUE light exercises x10 reps to increase strength and endurance needed to complete ADL tasks  Short term goal 5: Complete dynamic standing x 3 min with CGA to increase independence with toileting and sinkside grooming    Following session, patient left in safe position with all fall risk precautions in place.

## 2020-04-17 NOTE — PROGRESS NOTES
progressed to reaching activity forward and right and left w/ min assist he does lean to the right and struggled coming back to midline he used his right hand at times he did require assist due to decreased strength at right UE,   Standing balance w/ UE at support of walker with min assist pt pushing and leanign to the right tactile cues given for wt shifting over left LE however he does get resistive, progressed to reaching activity mostly w/ his left UE trying to reach to the left to get wt shifting over his left LE but still needing min assist due to leaning to the right he also requried cues at right knee for TKE   pt completed controlled stepping/tapping to a target w/ both LEs noted in closed chain at right LE he demonstrated decreased quad control and awareness and required tactile cues for TKE  Pt is impulsive w/ standing activity and requried cues for safety and noted he fatigued easy w/ SOB he required sitting rest breaks and cues for deep breathing at one point did check his vitals and he was 137/70 and O2 95%     Exercise:  Patient was guided in 1 set(s) 10 reps of exercise to both lower extremities. Ankle pumps, Heelslides, Short arc quads, Long arc quads, Hip abduction/adduction, Hooklying hip abduction/adduction, Lower trunk rotations and stretched right heelcord due to tightness I was unable to stretch to neutral, pt requried cues for technique and he did demonstrate fair carryover w/ ex noted decreased strength at right LE vs left . Exercises were completed for increased independence with functional mobility. Functional Outcome Measures: Completed  AM-PAC Inpatient Mobility without Stair Climbing Raw Score : 11  AM-PAC Inpatient without Stair Climbing T-Scale Score : 35.66    ASSESSMENT:  Assessment: Patient progressing toward established goals.  and pt cont to demonstrate generalized decreased strength, coord and awarness more so at right krzysztof body vs left, he demonstrated a decreased in

## 2020-04-17 NOTE — PROGRESS NOTES
4/15/2020 7:06 PM    Ct Head Wo Contrast    Result Date: 4/15/2020  PROCEDURE: CT HEAD WO CONTRAST CLINICAL INFORMATION: Stroke follow up study, left facial asymmetry. COMPARISON: 4/14/2020. TECHNIQUE: Noncontrast 5 mm axial images were obtained through the brain. Sagittal and coronal reconstructions were obtained. All CT scans at this facility use dose modulation, iterative reconstruction, and/or weight-based dosing when appropriate to reduce radiation dose to as low as reasonably achievable. FINDINGS: There is stable moderate temporoparietal prominent volume loss. There are moderate confluent and patchy areas of hypodensity in the periventricular, subcortical deep white matter as evidence for chronic microvascular angiopathy, stable compared to prior exam. There is a moderate-sized area of hypodensity in the lateral aspect of the right temporal lobe which has appeared/worsened in the interval. There appears to be some linear hypodensity in the midline on the coronal view suggesting possible artifact. Gray-white matter differentiation otherwise appears grossly preserved. No intracranial hemorrhage, mass effect or midline shift is identified. The calvarium appears intact. Orbits are unremarkable. Visualized paranasal sinuses are clear. There is coalescence of mastoid air cells, stable compared to prior exam.      Moderate-sized area of hypodensity in the lateral aspect of the right temporal lobe may be artifactual although involving infarct can't be excluded. No significant mass effect or intracranial hemorrhage is present. **This report has been created using voice recognition software. It may contain minor errors which are inherent in voice recognition technology. ** Final report electronically signed by Dr. Royer Nance MD on 4/15/2020 3:44 PM                 Assessment:    Active Problems:    Stroke of unknown cause [facility])  Resolved Problems:    * No resolved hospital problems. *      Plan:     For NG tube

## 2020-04-17 NOTE — CONSULTS
MD       History:   History of Present Illness:  Patricia Sheehan is a 80 y.o. male who  has a past medical history of Acute sinusitis, Angina pectoris ([address] Anxiety disorder, Arthritis, BPH with urinary obstruction, CAD (coronary artery disease), Chronic insomnia, CKD (chronic kidney disease) stage 3, GFR 30-59 ml/min ([facility]), COPD (chronic obstructive pulmonary disease) ([address] Gastroenteritis, HCAP (healthcare-associated pneumonia), Heart disease, HLD (hyperlipidemia), [place] (hard of hearing), Hypertension, Kidney disease, Kidney stone, NICOLAS on CPAP, Osteopenia determined by x-ray, Pacemaker, Pinched nerve, Visual problems, and Vitamin D deficiency. He was admitted 4/14/2020 after initially presenting with acute onset of left-sided weakness with left-sided facial droop while on a telehealth video consult with his PCP on 4/13. Additional noted symptoms included aphasia and confusion with onset of the symptoms at approximately 1250 that day. His PCP referred him to come into the emergency department and he declined to be admitted. Recommendation for tele-stroke consult was for the patient to transfer to [facility] for neuro intervention for a known right ICA stenosis of 80%; patient also declined this and with his family is agreement he was discharged to home from the emergency department. On 4/14 he presented again to the emergency department for acute worsening of his slurred speech with absence of any limb weakness. Head CT is on both of these instances were absent any findings of acute processes; with the CTA of head and neck revealing the 80% right ICA stenosis. Patient unable to have an MRI due to an MRI incompatible pacemaker. Patient was noted to have severe oropharyngeal dysphasia and was made n.p.o. After a family meeting it was decided that the patient would receive a PEG tube for nutritional support and this was placed on 4/22.   The patient was noted by earlier episode, several days prior of noted blood clots rehabilitation unit with plan to continue his IV Zosyn for his pneumonia. Patient has been tolerating continuous tube feeds without issues. Prior Function  ADL Assistance: Needs assistance(Daughter and son assist)  Additional Comments: Pt with limited speech this date and difficult to understand. Per PT, unsure if Pt was living at home with his wife who has dementia. Previously was not independent of ADLs and used Rolling Walker and Straight Cane AD for ambulation prior to recent admission. Initially has not ambulated with PT. With therapy is Moderate assistance for bed mobility, Maximal Assistance, Minimal contact assistance and Total Assistance for transfers, and Minimal contact assistance and Moderate assistance with balance. ROM restrictions, WB restrictions, precautions: Restrictions/Precautions: Fall Risk    Fall Risk    Current Rehabilitation Assessments:  PT:    OBJECTIVE:  Bed Mobility:  Rolling to Right: Moderate Assistance, Maximum Assistance, X 1, with verbal cues , with increased time for completion   Sit to Supine: Moderate Assistance, Maximum Assistance, X 1, with verbal cues , with increased time for completion, bed flat, assist at trunk and BLE      Transfers:  Sit to Stand: Maximum Assistance, X 1, with increased time for completion, cues for hand placement, with verbal cues, completed x2 trials from bedside chair to [address] [place] [address] stedy, assist to reach for [employer] bar with RUE, patient pushes towards the right, patient c/o abdominal pain  Stand to Sit:Minimal Assistance, Moderate Assistance, X 1, with increased time for completion, cues for hand placement, with verbal cues  Chair>bed transfer: dependent, love stedy for safety     Balance:  Dynamic Sitting Balance: Minimal Assistance, Moderate Assistance, X 1, sitting edge of bed, patient fatigued and demoed posterior lean, cues to stay upright     Exercise:  Patient was guided in 1 set(s) 10 reps of exercise to both lower extremities.   Long arc quads, Hip abduction/adduction, Straight leg raises and Seated hip flexion. Exercises were completed for increased independence with functional mobility. Slow to process at times. Verbal and tactile cues for desired motion.      Functional Outcome Measures: Completed  AM-PAC Inpatient Mobility without Stair Climbing Raw Score : 8  AM-PAC Inpatient without Stair Climbing T-Scale Score : 30.65     ASSESSMENT:  Assessment: Fatigues easily, slow processing. Activity Tolerance:  Patient tolerance of  treatment: fair. Equipment Recommendations:Equipment Needed: No  Discharge Recommendations:  Continue to assess pending progress(anticipate pt would require an inpatient therapy stay prior to return home     OT:  ADL  Grooming: Setup, Verbal cueing, Stand by assistance(Pt washed face while sitting )  LE Dressing: Moderate assistance(Pt able to doff socks but required total assist to don socks)  Toileting: Dependent/Total(for toilet hygiene)     Bed mobility  Rolling to Left: Minimal assistance  Supine to Sit: Minimal assistance, Moderate assistance  Scooting: Minimal assistance    Transfers  Sit to stand: Moderate assistance  Stand to sit: Moderate assistance  Transfer Comments: Pt required verbL cues for safe transfer and cues for hand placement    Balance  Sitting Balance: Contact guard assistance  Standing Balance: Minimal assistance        ST:  Impresssions: Pt presents with at least severe oropharyngeal dysphagia based on findings outlined above. Oral phase highly uncoordinated with pt exhibiting poor manipulation attempts of conservative PO offerings with extensive lingual pumping. Eventual AP transit obtained with swallow initiation to be mildly delayed with reduced hyolaryngeal elevation upon visual inspection (will need formal imaging to confirm). Thin H20 via spoon yielding significant, immediate cough on 1/1 trial consumed with recovery time approximately 1 minute to return to unlabored respirations. POLYPECTOMY SNARE/COLD BIOPSY [facility]    COLONOSCOPY  8/19/2018    COLONOSCOPY CONTROL HEMORRHAGE [facility]    COLONOSCOPY N/A 8/20/2018    COLONOSCOPY CONTROL HEMORRHAGE performed by Lizette Nash MD at [address]  1960's    EKG 12-LEAD  4/29/2015         ENDOSCOPY, COLON, DIAGNOSTIC      EYE SURGERY      cataracts    HERNIA REPAIR      several    HIP PINNING Left 8/31/2017    LEFT HIP INTERTAN performed by Rehana Em MD at [address]  12/3/12    left     MYRINGOTOMY AND TYMPANOSTOMY TUBE PLACEMENT  7/23/13    left     NASAL SINUS SURGERY      OTHER SURGICAL HISTORY  04/27/2015    transurethral Incision bladder neck    PACEMAKER INSERTION      PACEMAKER PLACEMENT  2011    SC COLONOSCOPY FLX DX W/COLLJ SPEC WHEN PFRMD Left 8/18/2018    COLONOSCOPY [facility]    SC [address] DX/THER SBST INTRLMNR LMBR/SAC W/IMG GDN N/A 6/25/2018    LUMBAR INTER LAMINAR RUBEN LESI @ L3. performed by Karol Jacinto MD at [place] [address] Right 10/8/2017    Right hip intertan performed by Pb Newton MD at [address] [place] [address] TURP  4/17/2013    W/CYSTO WITH DIRECT VISION URETHROTOMY & RESECTION BLADDER NECK CONTRACTURE    TURP  4/27/15    re-do TURP    TYMPANOSTOMY TUBE PLACEMENT      multiple surgeries    UPPER GASTROINTESTINAL ENDOSCOPY  12/29/2017    COLONOSCOPY SUBMUCOSAL/BOTOX INJECTION performed by Lizette Nash MD at [address] [place] [address]  8/16/2018    EGD DIAGNOSTIC ONLY performed by Lizette Nash MD at [address] [place] [address] Left 8/19/2018    EGD DIAGNOSTIC ONLY [facility]     Family:   Family History   Problem Relation Age of Onset    Cancer Brother 72 lung    Diabetes Brother     Kidney Disease Father         stones    Stroke Father     Kidney Disease Child         stones    Kidney Disease Child         stones    Heart Disease Sister     Arthritis Sister     High Blood Pressure Sister     High Cholesterol Sister     Diabetes Brother         multiple siblings had dm    Early Death Brother 72        lung cancer       Social History:   reports that he quit smoking about 54 years ago. His smoking use included cigarettes. He has a 20.00 pack-year smoking history. He has never used smokeless tobacco. He reports current alcohol use of about 7.0 standard drinks of alcohol per week. He reports that he does not use drugs. Lives at [place] [address]. Review of Systems   Review of Systems   Unable to perform ROS: Patient nonverbal     Medications     Reviewed in EMR   [START ON 4/18/2020] aspirin  81 mg Per NG tube Daily    piperacillin-tazobactam  3.375 g Intravenous Q8H    ferrous sulfate  325 mg Oral Lunch    isosorbide mononitrate  30 mg Oral Daily    pantoprazole  40 mg Oral QAM AC    sertraline  50 mg Oral Daily    tamsulosin  0.4 mg Oral BID    atorvastatin  80 mg Oral Nightly    sodium chloride flush  10 mL Intravenous 2 times per day    enoxaparin  40 mg Subcutaneous Q24H    Tiotropium Bromide-Olodaterol  2 puff Inhalation Daily     PRNs: hydrALAZINE, acetaminophen, albuterol sulfate HFA, docusate sodium, ipratropium-albuterol, sodium chloride flush, polyethylene glycol, promethazine **OR** ondansetron    Allergies:    Allergies   Allergen Reactions    Iodine Swelling and Rash     Physical Exam:     Physical Exam:  BP (!) 150/68   Pulse 68   Temp 97.9 °F (36.6 °C) (Axillary)   Resp 22   Ht 5' 6\" (1.676 m)   Wt 134 lb 14.7 oz (61.2 kg)   SpO2 94%   BMI 21.78 kg/m²     awake  Orientation:   Person, unable to assess more due to significant expressive aphasia  Mood: anxious  Affect: anxious  General appearance: Patient is well nourished, well developed, well groomed and in no acute distress, appearing stated age, PEG, up in bed    Memory:   abnormal -abnormal to assess due to significant expressive aphasia  Attention/Concentration: abnormal -poor command following with perseveration of motor movements  Language:  abnormal -patient nonverbal at this time    Cranial Nerves:  cranial nerves II-XII are grossly intact with exception of mild left lower facial droop with forehead sparing   ROM:  normal  Motor Exam:  Motor exam is grossly 4/5 for the right hemibody and 3+/5 for the left hemibody    Tone:  normal  Muscle bulk: within normal limits  Sensory:  Sensory intact  Coordination:   abnormal -unable to test due to poor command following  Deep Tendon Reflexes:  Reflexes are intact and symmetrical bilaterally    Skin: warm and dry, no rash or erythema  Peripheral vascular: Pulses: Normal upper and lower extremity pulses; Edema: 1+    Relevant Studies:     Diagnostics:  Recent Results (from the past 24 hour(s))   EKG 12 Lead    Collection Time: 04/17/20  5:08 AM   Result Value Ref Range    Ventricular Rate 88 BPM    Atrial Rate 88 BPM    P-R Interval 140 ms    QRS Duration 128 ms    Q-T Interval 404 ms    QTc Calculation (Bazett) 488 ms    P Axis 44 degrees    R Axis 93 degrees    T Axis 25 degrees   CBC    Collection Time: 04/17/20  5:20 AM   Result Value Ref Range    WBC 12.0 (H) 4.8 - 10.8 thou/mm3    RBC 3.87 (L) 4.70 - 6.10 mill/mm3    Hemoglobin 11.1 (L) 14.0 - 18.0 gm/dl    Hematocrit 36.7 (L) 42.0 - 52.0 %    MCV 94.8 (H) 80.0 - 94.0 fL    MCH 28.7 26.0 - 33.0 pg    MCHC 30.2 (L) 32.2 - 35.5 gm/dl    RDW-CV 15.8 (H) 11.5 - 14.5 %    RDW-SD 54.7 (H) 35.0 - 45.0 fL    Platelets 592 128 - 346 thou/mm3    MPV 9.0 (L) 9.4 - 12.4 fL   Basic Metabolic Panel    Collection Time: 04/17/20  5:20 AM   Result Value Ref Range    Sodium 141 135 - 145 meq/L    Potassium 3.6 3.5 - 5.2 meq/L    Chloride 105 98 - 111 meq/L    CO2 15 (L) without focal abnormality identified. Superior cerebellar arteries appear patent. The dural venous sinuses appear patent without focal filling defect. No focal areas of abnormal parenchymal enhancement are identified. CTA NECK: There is a typical 3 vessel arch. The brachiocephalic and left subclavian arteries are patent without flow-limiting stenosis. The common carotid arteries are patent without focal stenosis. There is calcified and noncalcified mural plaque at the carotid bifurcation with mild stenosis at the takeoff of the right external carotid artery. The narrowest luminal diameter in the right carotid bulb is 0.9 mm, with a point more distal, where the walls are parallel, measuring 4.5 mm. There is minimal mural plaque at the left carotid bulb without hemodynamically significant stenosis by NASCET criteria. Cervical segments of internal carotid arteries are otherwise patent without focal stenosis. The vertebral arteries are codominant. They're patent throughout their course without focal stenosis. Mucosal surfaces of the aerodigestive tract are symmetric without focal nodular thickening or visualized mass. No cervical lymphadenopathy is identified. The left parotid gland is atrophied. The right right gland is unremarkable. Submandibular glands are  unremarkable. Thyroid gland is unremarkable. There is subcutaneous air adjacent to the left subclavian vein, internal jugular vein. There is also subcutaneous air at the anterior aspect of the neck along the anterior strap muscles and adjacent to the right submandibular gland. There are fibrotic changes in the superior portions of the lungs. There are moderate degenerative changes of the cervical spine. 1. No flow-limiting stenosis or aneurysm in the anterior or posterior intracranial circulation. 2. Prominent calcified mural plaque at the carotid bulb on the right with 80% stenosis by NASCET criteria.  3. Scattered foci of subcutaneous air in the neck most achievable. FINDINGS: There is stable moderate temporoparietal prominent volume loss. There are moderate confluent and patchy areas of hypodensity in the periventricular, subcortical deep white matter as evidence for chronic microvascular angiopathy, stable compared to prior exam. There is a moderate-sized area of hypodensity in the lateral aspect of the right temporal lobe which has appeared/worsened in the interval. There appears to be some linear hypodensity in the midline on the coronal view suggesting possible artifact. Gray-white matter differentiation otherwise appears grossly preserved. No intracranial hemorrhage, mass effect or midline shift is identified. The calvarium appears intact. Orbits are unremarkable. Visualized paranasal sinuses are clear. There is coalescence of mastoid air cells, stable compared to prior exam.      Moderate-sized area of hypodensity in the lateral aspect of the right temporal lobe may be artifactual although involving infarct can't be excluded. No significant mass effect or intracranial hemorrhage is present. **This report has been created using voice recognition software. It may contain minor errors which are inherent in voice recognition technology. ** Final report electronically signed by Dr. Mana Posey MD on 4/15/2020 3:44 PM    Ct Head Wo Contrast    Result Date: 4/14/2020  PROCEDURE: CT HEAD WO CONTRAST CLINICAL INFORMATION: slurred speech. . COMPARISON: 4/13/2020 TECHNIQUE: 2-D multiplanar noncontrast images of the brain. All CT scans at this facility use dose modulation, iterative reconstruction, and/or weight-based dosing when appropriate to reduce radiation dose to as low as reasonably achievable. FINDINGS: No acute infarction or hemorrhage is identified. The severity crosstable vessel ischemic disease changes as previously seen stable. No extra-axial fluid collection. Ventricles are normal. There is generalized cerebral volume loss.  Mastoid air cells are obtained from the aortic arch through the head after the fast bolus 85 mL [id number]-[id number] injected in the left AC. A noncontrast localizer was obtained. 3-D reconstructions were performed on a dedicated 3-D workstation. These include multiplanar MPR images and multiplanar MIP images. All CT scans at this facility use dose modulation, iterative reconstruction, and/or weight-based dosing when appropriate to reduce radiation dose to as low as reasonably achievable. FINDINGS:  CTA head: There are mild mural calcifications in the cavernous and clinoid segments of the internal carotid arteries without flow-limiting stenosis or visualized aneurysm. The bilateral middle cerebral and anterior cerebral arteries are patent without focal abnormality identified. There is a diminutive A1 segment on the right likely representing normal anatomic variation. The basilar artery is patent without focal stenosis or visualized aneurysm. The bilateral posterior cerebral arteries are patent without focal abnormality identified. Superior cerebellar arteries appear patent. The dural venous sinuses appear patent without focal filling defect. No focal areas of abnormal parenchymal enhancement are identified. CTA NECK: There is a typical 3 vessel arch. The brachiocephalic and left subclavian arteries are patent without flow-limiting stenosis. The common carotid arteries are patent without focal stenosis. There is calcified and noncalcified mural plaque at the carotid bifurcation with mild stenosis at the takeoff of the right external carotid artery. The narrowest luminal diameter in the right carotid bulb is 0.9 mm, with a point more distal, where the walls are parallel, measuring 4.5 mm. There is minimal mural plaque at the left carotid bulb without hemodynamically significant stenosis by NASCET criteria. Cervical segments of internal carotid arteries are otherwise patent without focal stenosis. The vertebral arteries are codominant.  They're patent throughout their course without focal stenosis. Mucosal surfaces of the aerodigestive tract are symmetric without focal nodular thickening or visualized mass. No cervical lymphadenopathy is identified. The left parotid gland is atrophied. The right right gland is unremarkable. Submandibular glands are  unremarkable. Thyroid gland is unremarkable. There is subcutaneous air adjacent to the left subclavian vein, internal jugular vein. There is also subcutaneous air at the anterior aspect of the neck along the anterior strap muscles and adjacent to the right submandibular gland. There are fibrotic changes in the superior portions of the lungs. There are moderate degenerative changes of the cervical spine. 1. No flow-limiting stenosis or aneurysm in the anterior or posterior intracranial circulation. 2. Prominent calcified mural plaque at the carotid bulb on the right with 80% stenosis by NASCET criteria. 3. Scattered foci of subcutaneous air in the neck most prominent around the left subclavian vein possibly on the basis of venipuncture. **This report has been created using voice recognition software. It may contain minor errors which are inherent in voice recognition technology. ** Final report electronically signed by Dr. Kacey Powers MD on 4/13/2020 3:50 PM    Xr Chest 1 Vw    Result Date: 4/13/2020  PROCEDURE: XR CHEST 1 VIEW CLINICAL INFORMATION: Stroke TECHNIQUE: AP chest radiograph COMPARISON: PA and lateral chest radiographs 3/2/2020 FINDINGS: A left-sided cardiac device is in stable position. There is mild stable enlargement of the cardiac silhouette. Atherosclerotic calcifications are present in the thoracic aorta. Lungs are hyperinflated. Fibrotic changes are again noted in the bilateral lungs. In addition, there are slightly more prominent  reticular opacities in the right upper and mid lung. Bones are osteopenic.  Degenerative and scoliotic changes in the thoracolumbar spine are poorly visualized. There are poorly visualized thoracic vertebral body compression deformities which were also present on the prior study. 1. Right upper and midlung atelectasis/infiltrate. 2. Chronic lung disease. 3. Mild stable cardiomegaly. Final report electronically signed by Dr. Herve Griffith on 4/13/2020 2:38 PM    Xr Abdomen For Ng/og/ne Tube Placement    Result Date: 4/17/2020  PROCEDURE: XR ABDOMEN FOR NG/OG/NE TUBE PLACEMENT CLINICAL INFORMATION: Nasogastric tube placement TECHNIQUE: 2 AP supine abdominal radiograph COMPARISON: Abdomen 4/17/2020 at 4:41 PM FINDINGS: The tip of a nasogastric tube again overlies the left upper quadrant of the abdomen, likely in the proximal stomach. The sidehole is slightly distal to the gastroesophageal junction. Advancement of 4 to 6 cm is recommended. Bowel gas pattern is nonobstructive. There are surgical fasteners overlying the pelvis. Severe degenerative and scoliotic changes in the thoracolumbar spine are poorly visualized. Bones are osteopenic. There are incompletely visualized surgical changes at the proximal femurs. Nasogastric tube as above. Final report electronically signed by Dr. Herve Griffith on 4/17/2020 5:49 PM    Xr Abdomen For Ng/og/ne Tube Placement    Result Date: 4/17/2020  PROCEDURE: XR ABDOMEN FOR NG/OG/NE TUBE PLACEMENT CLINICAL INFORMATION: Confirmation of course of NG/OG/NE tube and location of tip of tube . COMPARISON: Chest radiograph dated 4/15/2020. TECHNIQUE: A portable AP supine view of the abdomen was obtained. FINDINGS:  There has been interval placement of an NG tube with tip below the diaphragm projecting over the proximal stomach. There is a nonspecific bowel gas pattern. There are degenerative changes of the lumbar spine. NG tube tip projecting over the proximal stomach. Consider advancing a few centimeters. **This report has been created using voice recognition software.  It may contain minor errors which are inherent in voice

## 2020-04-17 NOTE — PLAN OF CARE
Problem: Falls - Risk of:  Goal: Will remain free from falls  Description: Will remain free from falls  4/17/2020 1504 by Lee Marx RN  Outcome: Met This Shift  Note: Free from falls this shift, at this time time. Patient up with 2 assist and love stedy. Problem: Falls - Risk of:  Goal: Absence of physical injury  Description: Absence of physical injury  4/17/2020 1504 by Lee Marx RN  Outcome: Met This Shift  Note: Free from physical injury this shift, at this time. Problem: HEMODYNAMIC STATUS  Goal: Patient has stable vital signs and fluid balance  4/17/2020 1504 by Lee Marx RN  Outcome: Ongoing  Note:   Vitals:    04/17/20 1121   BP: (!) 148/69   Pulse: 69   Resp: 20   Temp: 98.3 °F (36.8 °C)   SpO2: 95%          Problem: ACTIVITY INTOLERANCE/IMPAIRED MOBILITY  Goal: Mobility/activity is maintained at optimum level for patient  4/17/2020 1504 by Lee Marx RN  Outcome: Ongoing  Note: Up X 2 assist and love stedy. Problem: Discharge Planning:  Goal: Discharged to appropriate level of care  Description: Discharged to appropriate level of care  4/17/2020 1504 by Lee Marx RN  Outcome: Ongoing  Note: No orders for d/c at this time. Problem: Neurological  Goal: Maximum potential motor/sensory/cognitive function  4/17/2020 1504 by Lee Marx RN  Outcome: Not Met This Shift  Note: Pt up with 2 assist and love bryce. Working with PT/OT/ST. Problem: Nutrition  Goal: Optimal nutrition therapy  4/17/2020 1504 by Lee Marx RN  Outcome: Not Met This Shift  Note: Pt NPO this shift, at this time. Awaiting orders to place NG tube. Problem: COMMUNICATION IMPAIRMENT  Goal: Ability to express needs and understand communication  4/17/2020 1504 by Lee Marx RN  Outcome: Not Met This Shift  Note: Pt unable to express needs d/t aphasia and dysarthria. Care plan reviewed with patient's family.   Patient's family verbalizes

## 2020-04-18 NOTE — PROGRESS NOTES
of Care  Evaluation of Education: Verbalizes understanding, Needs further instruction and Family not present    ASSESSMENT/PLAN:  Activity Tolerance:  Patient tolerance of  treatment: fair. Barriers at this time include lethargy levels. Assessment/Plan: Patient progressing toward established goals. Continues to require skilled care of licensed speech pathologist to progress toward achievement of established goals and plan of care. .     Plan for Next Session: PO trials, oral care, monitor readiness for further ST evaluation     LU Cid 23

## 2020-04-18 NOTE — PLAN OF CARE
restraint indications  Outcome: Ongoing  Note: Patient placed in bilateral soft wrist restraints. Patient pulled out NG tube overnight and has had multiple other attempts. Patient unable to be directed. Placed in restraints at 0400 for patient's safety. Goal: Absence of restraint-related injury  Description: Absence of restraint-related injury  Outcome: Ongoing     Care plan reviewed with patient and daughter. Patient and daughter verbalize understanding of the plan of care and contribute to goal setting.

## 2020-04-18 NOTE — PROGRESS NOTES
Pharmacy Consult       PPN    Ordering Provider: Dr. Luis Miguel Pederson, CnP    Indication for TPN: malnutrition    Macronutrients   DW: 61 kg   Total Kcal: 10 Kcal/kg   Infusion Rate: 75 mL/hr    Electrolyte Replacement:   10 mmol potassium phosphate  50 mEq potassium chloride    Assessment/ Plan:  Initiate Clinimix 4.25/5 at 75 mL/hour. Plan on switching to 3-in-1 PPN on Monday. Medium dose sliding scale ordered to be checked/administered every 6 hours while on PPN. BMP, mag, phosph, and ionized calcium ordered to be checked tomorrow (4/19/20) morning.     Duglas Small PharmD, BCPS  4/18/2020  5:12 PM

## 2020-04-18 NOTE — PROGRESS NOTES
Xray confirmed placement of NG tube. Oral care completed and NG noted to have coiled into mouth. Hospitalist notified and orders for PPN received, she states she will review other meds for conversion.

## 2020-04-18 NOTE — PROGRESS NOTES
and patient felt that he was stable enough to go home. Patient was discharged. Patient comes back today due to worsening speech and facial droop. He denies any weakness. History is limited due to patient's speech changes and is hard of hearing and does not have his hearing device. Patient was evaluated by tele-stroke the case was again discussed with daughter and they agree to admit for medical management.     Subjective (past 24 hours): Dysphagia with minimal improvement since yesterday. Shakes your hand with intent. Continues with global aphasia. Medications:  Reviewed    Infusion Medications     Scheduled Medications    aspirin  81 mg Per NG tube Daily    piperacillin-tazobactam  3.375 g Intravenous Q8H    ferrous sulfate  325 mg Oral Lunch    isosorbide mononitrate  30 mg Oral Daily    pantoprazole  40 mg Oral QAM AC    sertraline  50 mg Oral Daily    tamsulosin  0.4 mg Oral BID    atorvastatin  80 mg Oral Nightly    sodium chloride flush  10 mL Intravenous 2 times per day    enoxaparin  40 mg Subcutaneous Q24H    Tiotropium Bromide-Olodaterol  2 puff Inhalation Daily     PRN Meds: hydrALAZINE, acetaminophen, albuterol sulfate HFA, docusate sodium, ipratropium-albuterol, sodium chloride flush, polyethylene glycol, promethazine **OR** ondansetron      Intake/Output Summary (Last 24 hours) at 4/18/2020 1229  Last data filed at 4/18/2020 0343  Gross per 24 hour   Intake 3987.91 ml   Output --   Net 3987.91 ml       Diet:  DIET TUBE FEED CONTINUOUS/CYCLIC NPO; STANDARD WITH FIBER (Jevity 1.5); Nasogastric; 10; 50; 24    Exam:  BP (!) 172/77   Pulse 63   Temp 98.3 °F (36.8 °C) (Oral)   Resp 16   Ht 5' 6\" (1.676 m)   Wt 134 lb 11.2 oz (61.1 kg)   SpO2 93%   BMI 21.74 kg/m²     General appearance: No apparent distress, appears stated age and cooperative. HEENT: Pupils equal, round, and reactive to light. Conjunctivae/corneas clear. Neck: Supple, with full range of motion.  No jugular are inherent in voice recognition technology. **      Final report electronically signed by Dr. Shavonne Barnes on 4/18/2020 6:05 AM      XR ABDOMEN FOR NG/OG/NE TUBE PLACEMENT   Final Result   NG tube tip in the region of the proximal stomach, side-port in the region of the GE junction. Consider advancing the tube 5 to 10 cm. Non-obstructive bowel gas pattern. Bibasilar pulmonary infiltrates with small right pleural effusion. **This report has been created using voice recognition software. It may contain minor errors which are inherent in voice recognition technology. **      Final report electronically signed by Dr. Shavonne Barnes on 4/18/2020 4:49 AM      XR CHEST PORTABLE   Final Result      Worsening interstitial pulmonary edema versus atypical pneumonia. Stable small right pleural effusion. **This report has been created using voice recognition software. It may contain minor errors which are inherent in voice recognition technology. **      Final report electronically signed by Dr. Shavonne Barnes on 4/18/2020 1:54 AM      XR ABDOMEN FOR NG/OG/NE TUBE PLACEMENT   Final Result   NG tube tip in the region of the distal stomach in satisfactory position. Non-obstructive bowel gas pattern. **This report has been created using voice recognition software. It may contain minor errors which are inherent in voice recognition technology. **      Final report electronically signed by Dr. Shavonne Barnes on 4/17/2020 9:11 PM      XR ABDOMEN FOR NG/OG/NE TUBE PLACEMENT   Final Result      Nasogastric tube as above. Final report electronically signed by Dr. Elham Fuentes on 4/17/2020 5:49 PM      XR ABDOMEN FOR NG/OG/NE TUBE PLACEMENT   Final Result    NG tube tip projecting over the proximal stomach. Consider advancing a few centimeters. **This report has been created using voice recognition software.  It may contain minor errors which are inherent in voice at 12:29 PM

## 2020-04-18 NOTE — PROGRESS NOTES
RN entered room for 0400 assessment and vitals. Patient noted to have removed NG tube by self. Patient bathed. [facility] PA notified, stated to reinsert NG tube and restrain patient BUE. NG tube replaced. Xray for placement ordered. 0400: Patient placed in bilateral wrist soft restraints for patient's safety. 9584: NG tube advanced 10cm per Xray results recommendations. Repeat Xray for placement ordered.

## 2020-04-19 NOTE — PROGRESS NOTES
PICC Procedure Note    Renato Vallejo   Admitted- 4/14/2020  9:21 AM  Admission diagnosis- Stroke of unknown cause [facility]) [I63.9]      Attending Physician- Payal Wade, *  Ordering Physician-same  Indication for Insertion: TPN    Catheter Insertion Date- 4/19/2020   Lot Number- [id number]  Gauge-6  Lumen-triple    Insertion Site- NENA Basilic  Vein Diameter- 3 mm  Catheter Length- 36 cm  Internal Length- 36 cm  Exposed Catheter Length- 0cm   Upper Arm Circumference- 25cm  Tip Confirmation System Bundle met- Yes  If X-ray required, Tip Location- IR took patient down to adjust PICC placement before xray was taken.   Radiologist-     PICC insertion successful- No: IR to adjust PICC placement  Ultrasound- yes    Okay To Use PICC- No:     Electronically signed by Cathi Macdonald RN on 4/19/2020 at 5:05 PM

## 2020-04-19 NOTE — PLAN OF CARE
Problem: Falls - Risk of:  Goal: Will remain free from falls  Description: Will remain free from falls  Outcome: Ongoing  Note: No falls this shift. Pt remains in bed. No attempts to get up unassisted. Problem: Neurological  Goal: Maximum potential motor/sensory/cognitive function  Outcome: Ongoing  Note: No changes noted for this RN today     Problem: Nutrition  Goal: Optimal nutrition therapy  Outcome: Ongoing  Note: Continues with PPN, PICC line placed to help with continued therapy. No PEG placement yet per GI     Problem: HEMODYNAMIC STATUS  Goal: Patient has stable vital signs and fluid balance  Outcome: Ongoing  Note:   Vitals:    04/19/20 1522   BP: (!) 158/79   Pulse: 66   Resp: 18   Temp: 98 °F (36.7 °C)   SpO2: 91%        Problem: ACTIVITY INTOLERANCE/IMPAIRED MOBILITY  Goal: Mobility/activity is maintained at optimum level for patient  Outcome: Ongoing  Note: Therapy following     Problem: Discharge Planning:  Goal: Discharged to appropriate level of care  Description: Discharged to appropriate level of care  Outcome: Ongoing  Note: Awaiting discharge planning. Care plan reviewed with patient and daughter via telephone. Patient and family verbalize understanding of the plan of care and contribute to goal setting.

## 2020-04-19 NOTE — PROGRESS NOTES
PPN Follow Up Note    Assessment: NG removed; GI consulted for PEG    Electrolyte Replacement:   Kphos 10mmol  Calcium 1gm    TPN changes for (today) at 1800: none    Re-check BMP, Mg, PO4, iCa in am        Mike Mustafa, BCPS, [facility] 4/19/2020 8:43 AM

## 2020-04-19 NOTE — PLAN OF CARE
Rehab. Care plan reviewed with patient and family. Patient and family verbalize understanding of the plan of care and contribute to goal setting.

## 2020-04-19 NOTE — PROGRESS NOTES
am going off service; Dr Rito Riggs is familiar with the case and will follow.       Electronically signed by Jayden Nielsen DO on 4/19/2020 at 6:31 PM    Neurology

## 2020-04-19 NOTE — PROGRESS NOTES
RBCUA 0-2 05/11/2019    BLOODU NEGATIVE 03/03/2020    GLUCOSEU NEGATIVE 03/03/2020       Radiology:  XR ABDOMEN FOR NG/OG/NE TUBE PLACEMENT   Final Result   Study somewhat limited by motion artifact. Esophageal tube tip appears to be in the stomach, however. **This report has been created using voice recognition software. It may contain minor errors which are inherent in voice recognition technology. **      Final report electronically signed by Dr. Lucy Michel on 4/18/2020 1:00 PM      XR ABDOMEN FOR NG/OG/NE TUBE PLACEMENT   Final Result   NG tube tip remains in the proximal stomach, side-port in the region of the GE junction. Consider advancing the tube 5 to 10 cm. Nonobstructive bowel gas pattern. Bibasilar interstitial infiltrates and small right pleural effusion as previously noted. **This report has been created using voice recognition software. It may contain minor errors which are inherent in voice recognition technology. **      Final report electronically signed by Dr. Desmond Goldman on 4/18/2020 6:05 AM      XR ABDOMEN FOR NG/OG/NE TUBE PLACEMENT   Final Result   NG tube tip in the region of the proximal stomach, side-port in the region of the GE junction. Consider advancing the tube 5 to 10 cm. Non-obstructive bowel gas pattern. Bibasilar pulmonary infiltrates with small right pleural effusion. **This report has been created using voice recognition software. It may contain minor errors which are inherent in voice recognition technology. **      Final report electronically signed by Dr. Desmond Goldman on 4/18/2020 4:49 AM      XR CHEST PORTABLE   Final Result      Worsening interstitial pulmonary edema versus atypical pneumonia. Stable small right pleural effusion. **This report has been created using voice recognition software. It may contain minor errors which are inherent in voice recognition technology. **      Final report electronically signed by Dr. Dieter Gatica on 4/18/2020 1:54 AM      XR ABDOMEN FOR NG/OG/NE TUBE PLACEMENT   Final Result   NG tube tip in the region of the distal stomach in satisfactory position. Non-obstructive bowel gas pattern. **This report has been created using voice recognition software. It may contain minor errors which are inherent in voice recognition technology. **      Final report electronically signed by Dr. Dieter Gatica on 4/17/2020 9:11 PM      XR ABDOMEN FOR NG/OG/NE TUBE PLACEMENT   Final Result      Nasogastric tube as above. Final report electronically signed by Dr. Cheng Client on 4/17/2020 5:49 PM      XR ABDOMEN FOR NG/OG/NE TUBE PLACEMENT   Final Result    NG tube tip projecting over the proximal stomach. Consider advancing a few centimeters. **This report has been created using voice recognition software. It may contain minor errors which are inherent in voice recognition technology. **      Final report electronically signed by Dr. Lainey Goldberg MD on 4/17/2020 4:55 PM      XR CHEST STANDARD (2 VW)   Final Result   1. Persistent right upper and midlung atelectasis/infiltrate. 2. Chronic lung disease. 3. Mild stable cardiomegaly. **This report has been created using voice recognition software. It may contain minor errors which are inherent in voice recognition technology. **      Final report electronically signed by Dr. Cheng Client on 4/15/2020 7:06 PM      CT Head WO Contrast   Final Result    Moderate-sized area of hypodensity in the lateral aspect of the right temporal lobe may be artifactual although involving infarct can't be excluded. No significant mass effect or intracranial hemorrhage is present. **This report has been created using voice recognition software. It may contain minor errors which are inherent in voice recognition technology. **      Final report electronically signed by Dr. Lainey Goldberg MD

## 2020-04-20 PROBLEM — S72.001A CLOSED RIGHT HIP FRACTURE (HCC): Status: RESOLVED | Noted: 2017-08-31 | Resolved: 2020-01-01

## 2020-04-20 PROBLEM — K43.2 INCISIONAL HERNIA, WITHOUT OBSTRUCTION OR GANGRENE: Status: RESOLVED | Noted: 2017-03-29 | Resolved: 2020-01-01

## 2020-04-20 PROBLEM — I63.039 CEREBROVASCULAR ACCIDENT (CVA) DUE TO THROMBOSIS OF CAROTID ARTERY (HCC): Status: ACTIVE | Noted: 2020-01-01

## 2020-04-20 NOTE — PLAN OF CARE
Problem: Falls - Risk of:  Goal: Will remain free from falls  Description: Will remain free from falls  Outcome: Ongoing  Note: Patient remained free from falls this shift. Bed is in low position with alarm on and siderails up x2. Education given on use of call light and patient voiced understanding. Call light and beside table within reach. Arm band and falling star in place. Hourly rounds completed. Will continue to monitor. Problem: Neurological  Goal: Maximum potential motor/sensory/cognitive function  Outcome: Ongoing  Note: Patient is alert and orient to person at some times. Pt is aphasic this shift but is able to nod head yes or no to answer questions. Responding to commands appropriately. Neuro checks q4hrs. Will continue to monitor and assess. Ongoing until discharge. Problem: Nutrition  Goal: Optimal nutrition therapy  4/20/2020 1827 by Shobha Stevenson RN  Outcome: Ongoing  Note: Pt is currently NPO but is receiving nutrition via TPN through central line. Will continue to monitor. Problem: HEMODYNAMIC STATUS  Goal: Patient has stable vital signs and fluid balance  Outcome: Ongoing  Note: Patient atrial paced on telemetry monitor. No changes in cardiac rhythm noted with each tele strip reading. No evidence of DVT this shift. DVT prophylaxis in place- patient has SCDs in place. Patient denies chest pain or shortness of breath with assessments. Problem: ACTIVITY INTOLERANCE/IMPAIRED MOBILITY  Goal: Mobility/activity is maintained at optimum level for patient  Outcome: Ongoing  Note: Pt is encouraged to do range of motion in all extremities when possible. Pt was able to get up to chair this shift. Pt turned every 2 hours to help prevent skin breakdown. Will continue to monitor. Problem: COMMUNICATION IMPAIRMENT  Goal: Ability to express needs and understand communication  Outcome: Ongoing  Note: Pt. Is able to nod yes or no, but is unable to make words.  Pt is able to squeeze fingers. Will continue to monitor. Problem: Discharge Planning:  Goal: Discharged to appropriate level of care  Description: Discharged to appropriate level of care  Outcome: Ongoing  Note: Discharge planning in process and discussed with patient/family. Social work consulted for any additional needs. Care manager aware of discharge needs. Plan of care discussed with pt and family. Pt verbalizes understand.     Electronically signed by Kai Mota RN on 4/20/2020 at 6:30 PM

## 2020-04-20 NOTE — PROGRESS NOTES
[facility]  INPATIENT SPEECH THERAPY  4A  DAILY NOTE    TIME   SLP Individual Minutes  Time In: 1106  Time Out: 6569  Minutes: 16  Timed Code Treatment Minutes: 0 Minutes       Date: 2020  Patient Name: Desmond Whyte      CSN: [id number]   : 1931  (80 y.o.)  Gender: male   Referring Physician:  Mary Alegre MD  Diagnosis: Stroke of unknown cause  Secondary Diagnosis: Dysphagia  Precautions: Fall risk, aspiration precautions  Current Diet: NPO  Swallowing Strategies: Not Applicable and Comprehensive Oral Care  Date of Last MBS: Not Applicable    Pain:  No pain reported. Subjective:  Pt seen sitting upright in chair; alert upon ST entering room as evidence by raising hand to wave/greet ST. Patient able to vocalize basic vowel sounds; unable to formulate oral motor movements to vocalize name. BRIAN Patel reports patient currently with IV nutrition d/t poor tolerance of NG tube over the weekend. BRIAN Patel also reported, \"Dr. Kenya Hitchcock reporting preference to hold on PEG at this time until respiratory status stabilizes. \"      ST to see patient to continue to evaluate to determine readiness for potential initiaiton of oral intake vs ongoing need for alternate means of nutrition or potentially consideration of comfort care. Short-Term Goals:  SHORT TERM GOAL #1:  Goal 1: Pt will safely consume PO trials of ice chips, thin liquids, and purees (as deemed clinically appropriate) without overt s/s aspiration to determine readiness for potential PO diet level initiation and/or completion of formal instrumentation. INTERVENTIONS:  Patient presented to be upright alert, following basic commands upon initiation of session.  Patient with fair tolerance of oral care prior to presentation of very limited/conservative PO trial. Oral cavity presented to be improved, pink with only minimal hard/crusting of secretions on dorsal surface of tongue and surrounding areas of uvula and posterior portion of hard pallate. Patient continues with open postural breathing and shallow patterned breathing with poor postural support despite upright in chair with support of pillows. Patient currently receiving IV nutrition d/t NG tube complications. Patient with poor ability to manage secretions as evidence by notable drooling prior to PO trials. ST provided max cues and tactile stimulation to assist patient to appropriately formulate labial seal to elicit swallow reflex; when provided max cues patient able to achieve dry swallow reflex x1. ST then presented patient with 1/2 tea-spoon PO trial of NECTAR/MIDLY thick OJ via spoon presentation; patient required max cues to attempt to achieve very limited labial seal resulting in ST to \"dump\" remaining material into oral cavity. Patient with NO effort to complete A-P transit or bolus manipulation resulting in ST to again provided tactile and verbal cues to formulate labial seal to elicit swallow; following x7 \"pumping\" efforts to elicit swallow patient able to achieve swallow reflex, swallow reflex inefficient as evidence by immediate coughing to follow and copious amounts of secretions spilling externally from oral cavity. Patient HIGHLY lethargic following coughing episode as evidence by shallow breathing pattern and closing of eyes. ST provided additional verbal cues + tactile stimulation to again \"close lips and swallow\" patient able to achieve additional x1 swallow following approximately 7-8 \"pumping efforts of the swallow mechanism\"  NO further PO trials attempted d/t HIGH RISK for aspiration and severity of dysphagia with any PO intake. Recommend NPO with ongoing consideration for PEG tube vs comfort care pending patient and family wishes. Patient remains at [address] [place] [address] for aspiration, d/t poor ability to manage secretions at this time  + overall severity of dysphagia. RN Livia Lazaro and Yanet Campbell + [facility] verbalized understanding.   ST to plan to update patient's daughter Aidan Malone. SHORT TERM GOAL #2:  Goal 2: Staff will exhibit return demonstration for implementation of comprehensive oral care procedural analysis to maximize oral integrity and to reduce potential bacteria colonization. INTERVENTIONS: Prior to intake, comprehensive oral care completed via toothettes + mouthwash. Fair toleration to completion of oral care; patient \"grimmacing with growl like verbalization\" upon initiation of oral care. ST also attempted to utilize toothett with water only in attempt to allow patient to \"suck\" on toothett to extract water; patient unable to achieve tasks despite max cues     Recommend ongoing oral care within completion of nursing rounding/assessments. SHORT TERM GOAL #3:  Goal 3: Complete full ST evaluation to develop goals per POC. INTERVENTIONS: Verbalization of \"ah\" x3  given verbal prompt with appropriate pre-vocalic production. Continued attempts to engage in gestural communication attempts. Long-Term Goals:  No LTG established given short ELOS       EDUCATION:  Learner: Patient  Education:  Reviewed results and recommendations of this evaluation, Reviewed diet and strategies, Reviewed signs, symptoms and risks of aspiration and Reviewed ST goals and Plan of Care  Evaluation of Education: Verbalizes understanding, Needs further instruction and Family not present    ASSESSMENT/PLAN:  Activity Tolerance:  Patient tolerance of  treatment: fair. Barriers at this time include lethargy levels. Assessment/Plan: Patient progressing toward established goals. Continues to require skilled care of licensed speech pathologist to progress toward achievement of established goals and plan of care. .     Plan for Next Session: PO trials, oral care, monitor readiness for further ST evaluation     LU Young 23

## 2020-04-20 NOTE — PROGRESS NOTES
Nutrition Assessment (Parenteral Nutrition)    Type and Reason for Visit: Reassess, Consult(TPN)    Nutrition Recommendations:   Pharm please advise re: advise re: K+, Mg & Phos replacement. Suggest switch to 3 in 1 TPN using dose wt: 64kgm, 10kcals/kgm, 20% kcals lipids, 1 gram protein/kgm to yield ~640kcals, 64 grams protein,75 grams CHO     Nutrition Assessment:   Pt. with no improvement from a nutritional standpoint AEB unable to keep NGT in, high risk for PEG per GI. Remains at risk for further nutritional compromise r/t Acute Thrombotic Stroke with Rt Hemiparesis, Dysphagia,  and underlying medical condition (hx: Small Hiatal Hernia, tortuous esophagus, GI Bleed). Nutrition recommendations/interventions as per above. Malnutrition Assessment:  · Malnutrition Status: Meets the criteria for moderate malnutrition  · Context: Chronic illness  · Findings of the 6 clinical characteristics of malnutrition (Minimum of 2 out of 6 clinical characteristics is required to make the diagnosis of moderate or severe Protein Calorie Malnutrition based on AND/ASPEN Guidelines):  1. Energy Intake-Less than or equal to 75% of estimated energy requirement, Greater than or equal to 5 days    2. Weight Loss-2% loss or greater, in 3 months  3. Fat Loss-Moderate subcutaneous fat loss, Orbital  4. Muscle Loss-Mild muscle mass loss, Temples (temporalis muscle)  5. Fluid Accumulation-Unable to assess,    6.  Strength-Not measured    Nutrition Risk Level: High    Nutrient Needs:  · Estimated Daily Total Kcal: 1600-1920kcals (25-30kcals/kgm wt. of 64kgm 4/20)  · Estimated Daily Protein (g): 51-64 grams (0.8-1 gram protein/kgm wt. of 64kgm 4/20)  · Estimated Daily Total Fluid (ml/day): 1600-1920ml (25-30ml/kgm wt. of 64kgm 4/20)    Nutrition Diagnosis:   · Problem:  Moderate malnutrition  · Etiology: related to Cognitive or neurological impairment     Signs and symptoms:  as evidenced by Moderate loss of subcutaneous fat, Mild muscle loss    Objective Information:  · Nutrition-Focused Physical Findings: Received consult for TPN. Visited pt ~ 1100 who was receing SLP eval. SLP mentions pt unsafe for po intake. Pt does not keep NGT in & GI noted pt not stable enough for PEG at this time. Melenic stools with possible clots per GI. Hx: GI bleed noted. Pt with hx: Small Hiatus Hernia, Tortuous Esophagus. Labs: (4/20) Na 133, K+ 3.1, BUN 26, Mg 1.5, Ca 8.4, Phos 2.1, Hemoglobin 10.5. BM x 2(4/19-4/20). Pt receiving clinimix 4.25/5 @ 75ml/hr. · Wound Type: None  · Current Nutrition Therapies:  · Oral Diet Orders: NPO(per SLP)   · Oral Diet intake: NPO  · Oral Nutrition Supplement (ONS) Orders: None  · ONS intake: NPO  · Parenteral Nutrition Orders:  · Type and Formula: 2-in-1 Peripheral   · Lipids: None  · Rate/Volume: Clinimix 4/25/5 75ml/hr  · Duration: Continuous  · Current PN Order Provides: Clinimix 4.25/5 75ml/hr ( 77 grams protein, 1800ml, 90 grams CHO, 612kcals)  · Goal PN Orders Provides: Suggest switch to 3 in 1 TPN using dose wt: 64kgm, 10kcals/kgm, 20% kcals lipids, 1 gram protein/kgm to yield ~640kcals, 64 grams protein,75 grams CHO   · Anthropometric Measures:  · Ht: 5' 6\" (167.6 cm)   · Current Body Wt: 139 lb 15.9 oz (63.5 kg)  · Admission Body Wt: 134 lb 14.7 oz (61.2 kg)(4/15, no edema)  · Usual Body Wt: (per pt 130#; admits to losing 30# over last couple of years; per EMR: 9/16/19: 146# 6.4 oz, 1/15/20: 145# 3.2 oz, 3/5/20: 136# 6.4 oz (no edema))  · % Weight Change:  ,  6# (4.1%) wt. loss in 3 months per EMR   · Ideal Body Wt: 142 lb (64.4 kg), BMI Classification: BMI 18.5 - 24.9 Normal Weight    Nutrition Interventions:   Continue NPO, Start Tube Feeding  Continued Inpatient Monitoring, Education not appropriate at this time, Coordination of Care    Nutrition Evaluation:   · Evaluation: Goals set   · Goals: TPN to provide 75% or more of estimated nutritional needs during LOS.     · Monitoring: PN Intake, Skin Integrity,

## 2020-04-20 NOTE — PROGRESS NOTES
Received a phone call from [facility]. Will attempt to set up a video conference between the patient's children and MD.  Will attempt to arrange a meeting for 2 PM.    1050  Arrived to the unit. Discussed with [facility], Dr. Chelsea Nieto and Dr. Nava Mercedes. Will hold off on meeting at this time. Palliative care will continue to follow and assist as appropriate.

## 2020-04-20 NOTE — CONSULTS
[address] [place] [address]                                  CONSULTATION    PATIENT NAME: Godwin Rendon                       :        1931  MED REC NO:   [id number]                           ROOM:       0010  ACCOUNT NO:   [de-identified]                           ADMIT DATE: 2020  PROVIDER:     Rafia Hagen M.D.    Wale Pearl:  2020    HISTORY OF PRESENT ILLNESS:  The patient seen in GI consult for  evaluation of history for possible PEG tube placement due to CVA. However, today, nursing staff described melenic stool with possible  clots also in the bowel movement. The patient is able to provide a  little history only, due to CVA. Apparently, the patient came with  potential CVA, discharged home, was clinically stable, came again with  more weakness. Currently, he is kind of sleeping, but he is answering  questions and responded to some movements when requested for him to do. He is known to the practice. He had left facial droop on 2020. CTA showed right carotid valve stenosis. Refused intervention to move  to [facility]. He was started on Plavix and Lipitor. His speech ability became more worse lately, admitted at this time. He  has CVA. He has been currently managed conservatively. Family likes  the PEG tube to be placed for feeding as NG tube placement failed. The  patient is known to the practice. He had endoscopy done by me more than  one time in the past.  He is not able to eat at this time. He has  history of small hiatus hernia, slightly tortuous esophagus, but no  major anatomy to prevent NG tube placement. He has history of GI bleed  in the past from the large bowel, required epinephrine injection as well  as treatment with argon plasma coagulator as well as Resolution clip to  control the bleeding.   He said at this time not able to provide history,  if he is able to 32.7 which basically seemed  to be acceptable for him. His white blood cell count 11.3. Platelets  640,536. His INR checked before was normal.    IMAGING STUDY:  CT head showed moderate-sized area of hypodensity in the  lateral aspect of the right temporal lobe. IMPRESSION:  1.  CVA. He is not confused at this time. This clinically might improve,  seemed to be able to move both sides at this time. He might need a PEG  tube placement. I do not see that urgent to be done in the next two  days until he is clinically more stable. 2.  GI bleed. Has history of GI bleed in the past, rule out peptic  ulcer disease, erosive gastritis. Large bowel movement was seen in him  in the past, required intervention in 2018. 3.  History of obstructive sleep apnea. 4.  Hypertension. 5.  Coronary artery disease. 6.  Chronic kidney disease. PLAN:  1. Start PPI. 2.  I do not think the patient is stable or that he wants to proceed  with PEG tube placement at this time. Certainly, the intervention might  reduce the blood pressure making stroke more. 3.  PICC line might be helpful with TPN at this time versus Radiology  attempt to put a Dobhoff tube for him to be fit. 4.  Upper endoscopy might need to be done tomorrow if he has still drop  in H and H. If that is negative, colonoscopy is an option as the  patient had GI bleed in the past, required interventions. 5.  Antiplatelet therapy needs to be on hold at this time. 6.  In the future, if he clinically improves, PEG tube will be an  option, but I think it is too early to attempt that at this time. Thank you for allowing me to participate in this patient's care. Parisa Dallas M.D.    D: 04/19/2020 12:58:22       T: 04/19/2020 15:47:45     [email or web address]  Job#: [id number]     Doc#: [id number]    CC:  Wale Terry.  Lamonte Grimes.

## 2020-04-20 NOTE — PROGRESS NOTES
cerebral and anterior cerebral arteries are patent without focal   abnormality identified. There is a diminutive A1 segment on the right likely representing normal anatomic variation. The basilar artery is patent without focal stenosis or visualized aneurysm. The bilateral posterior cerebral arteries are patent without   focal abnormality identified. Superior cerebellar arteries appear patent. The dural venous sinuses appear patent without focal filling defect. No focal areas of abnormal parenchymal enhancement are identified. CTA NECK:  There is a typical 3 vessel arch. The brachiocephalic and left subclavian arteries are patent without flow-limiting stenosis. The common carotid arteries are patent without focal stenosis. There is calcified and noncalcified mural plaque at the carotid   bifurcation with mild stenosis at the takeoff of the right external carotid artery. The narrowest luminal diameter in the right carotid bulb is 0.9 mm, with a point more distal, where the walls are parallel, measuring 4.5 mm. There is minimal mural   plaque at the left carotid bulb without hemodynamically significant stenosis by NASCET criteria. Cervical segments of internal carotid arteries are otherwise patent without focal stenosis. The vertebral arteries are codominant. They're patent throughout   their course without focal stenosis. Mucosal surfaces of the aerodigestive tract are symmetric without focal nodular thickening or visualized mass. No cervical lymphadenopathy is identified. The left parotid gland is atrophied. The right right gland is unremarkable. Submandibular glands are   unremarkable. Thyroid gland is unremarkable. There is subcutaneous air adjacent to the left subclavian vein, internal jugular vein. There is also subcutaneous air at the anterior aspect of the neck along the anterior strap muscles and adjacent to the   right submandibular gland.  There are fibrotic changes in the superior portions of the criteria. 3. Scattered foci of subcutaneous air in the neck most prominent around the left subclavian vein possibly on the basis of venipuncture. **This report has been created using voice recognition software. It may contain minor errors which are inherent in voice recognition technology. **    Final report electronically signed by Dr. Marlin Taylor MD on 4/13/2020 3:50 PM     No results found for this or any previous visit. No results found for this or any previous visit. No results found for this or any previous visit. Results for orders placed during the hospital encounter of 04/14/20   CT HEAD WO CONTRAST    Narrative PROCEDURE: CT HEAD WO CONTRAST    CLINICAL INFORMATION: increased right hemiparesis. COMPARISON: None    TECHNIQUE: Noncontrast 5 mm axial images were obtained through the brain. Sagittal and coronal reconstructions were obtained and reviewed. All CT scans at this facility use dose modulation, iterative reconstruction, and/or weight-based dosing when appropriate to reduce radiation dose to as low as reasonably achievable. FINDINGS:    Brain: There is an ovoid focus of fairly well-defined diminished hypodensity in the left corona radiata deep white matter, more well-defined and more hypodense than on the prior study consistent with an acute ischemic infarct. There is no hemorrhage. There is a subacute to chronic ischemic lacunar infarct in the right thalamus which has become more well-defined and more hypodense when compared to prior studies. . The previously noted hypodensity in the posterior right temporal lobe is not seen and was   likely artifactual.  There are moderate deep white matter hypodensities, nonspecific in nature but probably representing small vessel chronic ischemic changes. There is age appropriate cortical atrophy. Ventricles: The ventricles, cisterns and cortical sulci are enlarged concordant with the moderate degree of age-appropriate atrophy.  No obstructive hydrocephalus. Skull base/calvarium/osseous structures: Unremarkable  Soft tissues: Unremarkable  Intraorbital contents: Unremarkable  Sinuses: Unremarkable; the imaged sinuses are clear without evidence of mucosal thickening or fluid levels. Mastoids: Unremarkable; the mastoid air cells are clear. Impression Acute ischemic infarct in the left corona radiata deep white matter. Subacute to chronic lacunar infarct in the right thalamus as discussed above. No hemorrhage  Senescent changes as discussed above. Results discussed with Dr. Dawit Forte on 4/19/2020 at 10:29 PM.    **This report has been created using voice recognition software. It may contain minor errors which are inherent in voice recognition technology. **    Final report electronically signed by Dr. Ian Hernandez on 4/19/2020 10:31 PM         Assessment:  Active Problems: Moderate malnutrition ([address]    Stroke of unknown cause ([address]  Resolved Problems:    * No resolved hospital problems. [de-identified]    80year old man with acute stroke with left face droop, right hemiparesis, dysarthria and dysphagia. Now with GI bleeding and resulted in antiplatelets being held. Plan:    1. Patient situation is complicated, his severe right ICA stenosis vs his GI bleeding and his fragile age. 2. Given his dysphagic situation, he will benefit from PEG  3. Will have a family discussion tomorrow to see how family like to proceed.       Anya Banks MD, 4/20/2020 4:54 PM   Haleigh Lara MD  Attending Neurologist/Neurointensivist

## 2020-04-20 NOTE — PROGRESS NOTES
10.3* 10.5*     --   --  309     BMP:    Recent Labs     04/18/20  1554 04/19/20  0404 04/20/20  0325    140 133*   K 3.0* 3.9 3.1*    107 101   CO2 18* 18* 21*   BUN 20 22 26*   CREATININE 1.0 1.0 0.9   GLUCOSE 83 128* 117*     Hepatic: No results for input(s): AST, ALT, ALB, BILITOT, ALKPHOS in the last 72 hours. INR: No results for input(s): INR in the last 72 hours. Imaging:  No results found for this or any previous visit. No results found for this or any previous visit. No results found for this or any previous visit. No results found for this or any previous visit. Endoscopy Finding:      Objective:   Vitals: BP (!) 148/70   Pulse 73   Temp 97.4 °F (36.3 °C) (Oral)   Resp 16   Ht 5' 6\" (1.676 m)   Wt 139 lb 14.4 oz (63.5 kg)   SpO2 96%   BMI 22.58 kg/m²     Intake/Output Summary (Last 24 hours) at 4/20/2020 1048  Last data filed at 4/20/2020 0347  Gross per 24 hour   Intake 3734.34 ml   Output 0 ml   Net 3734.34 ml     General appearance: alert and cooperative with exam, aphasia and not able to talk   Lungs: clear to auscultation bilaterally  Heart: regular rate and rhythm, S1, S2 normal, no murmur, click, rub or gallop  Abdomen: soft, non-tender; bowel sounds normal; no masses,  no organomegaly  Extremities: Right side weakness however he still able to move his right side    Assessment and Plan:   1. 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  Dysphagia will wait for speech therapy evaluations if is improving however the PEG tube placement otherwise PEG tube replaced soon once clinically more stable      Follow up in GI Clinic after discharge in 2 week(s)    Patient Active Problem List:     Dyspnea     Bladder neck contracture     BPH (benign prostatic hyperplasia)     Suprapubic pain     S/P TURP (status post transurethral resection of prostate)     CKD (chronic kidney disease) stage 3, GFR 30-59 ml/min ([facility])     CAD (coronary artery disease)     Nausea and vomiting     Chronic serous otitis media of left ear     Hearing loss, sensorineural     Conductive hearing loss, bilateral     History of tympanoplasty of left ear     Tympanosclerosis involving combination of structures     Anxiety disorder     Disc disorder of lumbar region     Pacemaker     Incisional hernia, without obstruction or gangrene     S/P ventral herniorrhaphy     Essential hypertension     Head injury, closed, without LOC     Closed right hip fracture (HCC)     At high risk for pain from procedure     NICOLAS (obstructive sleep apnea)     Closed comminuted intertrochanteric fracture of right femur with routine healing     Acute blood loss anemia     Lower GI bleed     B12 deficiency     Diverticulosis of large intestine with hemorrhage     Lumbar radiculitis     HNP (herniated nucleus pulposus), lumbar     Severe malnutrition (HCC)     Gastritis     Tachycardia     COPD (chronic obstructive pulmonary disease) ([facility])     Normocytic anemia     Malnutrition ([address]     Interstitial lung disease ([address]     Leukocytosis     Acute kidney injury ([address]     History of GI bleed     Hyponatremia     Subcutaneous emphysema resulting from a procedure     Chronic anxiety     Pulmonary fibrosis (HCC)     Tobacco abuse     Abnormal CT of the chest     Moderate malnutrition (HCC)     Acute hypotension     Carotid stenosis, right     Left leg weakness     Stroke due to stenosis of right carotid artery (HCC)     Nihss score 2     Slurred speech     Stroke of unknown cause [facility])      Electronically signed by Juliana Padilla MD on 4/20/2020 at 10:48 AM

## 2020-04-21 NOTE — PROGRESS NOTES
Discussed with Lilian TORRES. Per GI's note,  is now planning on PEG tube placement tomorrow if patient/family is agreeable. The decision for PEG tube was agreed upon last week (see note on 04/16 from palliative care and Dr. Filomena Velasco). Text message received from Dr. Michelle Russ regarding arranging a family meeting to discuss goals of care with Dr. Dominique Cunha and Dr. Mirta Epps input given to the family. Did reply to verify a time that would work with both physicians and will make arrangements as this information is received. The goal of the family meeting would not be needed to verify if PEG placement is desired as this was already decided last week but it would be to discuss the obstacles that have interfered with placement thus far (GI bleed and respiratory/airway compromise).

## 2020-04-21 NOTE — PROGRESS NOTES
Pulm  -Continue inhalers  -No signs of distress    Elevated Troponin  -chronic. ECG with paced rhythm    Normocytic anemia  -Stable  -No signs of acute bleed  -Will continue to trend H/H.     BPH  -Flomax    Depression  -Zoloft. Disposition:   -Goals of care discussion today with family including Palliative care, Speech Therapy, Neurology, and Myself. -Patient may benefit from PEG placement but condition seems to be slightly worsening. Rehab potential is declining. May need to discuss hospice care if patient continues to deteriorate. Chief Complaint: facial droop    Hospital Course:   80-year-old gentleman with past medical history of hyperlipidemia, CAD, CKD, COPD came to ER due to slurring of speech.  Patient initially came in yesterday with left-sided weakness.  He was not considered a TPA candidate.  Patient was evaluated by tele-stroke and had imaging which showed right ICA 80% stenosis.  It was recommended the patient be transferred to [facility] for intervention.  At that time family declined and patient felt that he was stable enough to go home.  Patient was discharged. Makayla Cox comes back today due to worsening speech and facial droop.  He denies any weakness.  History is limited due to patient's speech changes and is hard of hearing and does not have his hearing device.  Patient was evaluated by tele-stroke the case was again discussed with daughter and they agree to admit for medical management. Patient remains NPO and ST is following for dysphagia. Patient may benefit for rehab stay but no reliable form of nutrition at this time. Possible PEG. GI following. Subjective:   Patient seen and examined at bedside. Patient continues to follow commands this morning and is comprehending speech. His global aphasia continues. Patient appears to be deteriorating from a therapy standpoint. Appears very weak. Afebrile. VSS. Goals of care discussion this afternoon.        Medications: ABDOMEN FOR NG/OG/NE TUBE PLACEMENT   Final Result    NG tube tip projecting over the proximal stomach. Consider advancing a few centimeters. **This report has been created using voice recognition software. It may contain minor errors which are inherent in voice recognition technology. **      Final report electronically signed by Dr. Arina Pleitez MD on 4/17/2020 4:55 PM      XR CHEST STANDARD (2 VW)   Final Result   1. Persistent right upper and midlung atelectasis/infiltrate. 2. Chronic lung disease. 3. Mild stable cardiomegaly. **This report has been created using voice recognition software. It may contain minor errors which are inherent in voice recognition technology. **      Final report electronically signed by Dr. Silas Williamson on 4/15/2020 7:06 PM      CT Head WO Contrast   Final Result    Moderate-sized area of hypodensity in the lateral aspect of the right temporal lobe may be artifactual although involving infarct can't be excluded. No significant mass effect or intracranial hemorrhage is present. **This report has been created using voice recognition software. It may contain minor errors which are inherent in voice recognition technology. **      Final report electronically signed by Dr. Arina Pleitez MD on 4/15/2020 3:44 PM      CT HEAD WO CONTRAST   Final Result   No acute process. Stable appearance of the brain. **This report has been created using voice recognition software. It may contain minor errors which are inherent in voice recognition technology. **      Final report electronically signed by Dr. Maurice Tabor on 4/14/2020 9:37 AM          Diet: DIET TUBE FEED CONTINUOUS/CYCLIC NPO; STANDARD WITH FIBER (Jevity 1.5);  Nasogastric; 10; 50; 24  PN-Adult  3 IN 1 Central Line (Custom)    DVT prophylaxis: [] Lovenox                                 [x] SCDs                                 [] SQ Heparin [] Encourage ambulation           [] Already on Anticoagulation     Disposition:    [] Home       [] TCU       [x] Rehab       [] Psych       [] SNF       [] [place]       [] Other-    Code Status: Limited    PT/OT Eval Status:  Following, will benefit from further rehab      Electronically signed by Kulwant Crouch DO on 4/21/2020 at 9:19 AM

## 2020-04-21 NOTE — PLAN OF CARE
Problem: Falls - Risk of:  Goal: Will remain free from falls  Description: Will remain free from falls  Outcome: Ongoing  Note: Bed in lowest position and locked. Bed alarm activated. Educated on use of call light when in need of assistance- needs reinforcement. Visual checks performed and charted. No falls during shift at this time. Armband and falling star in place. Call light within reach. Stands at bedside with two assist.     Problem: Neurological  Goal: Maximum potential motor/sensory/cognitive function  Outcome: Ongoing  Note: Patient has right sided weakness, limited function of right side. Patient is alert, oriented to name. Unable to assess orientation to place, time, or situation. Problem: Nutrition  Goal: Optimal nutrition therapy  Outcome: Ongoing  Note: Patient's PPN changed to TPN yesterday. Infusing at 75ml/hr. NPO at this time. Problem: HEMODYNAMIC STATUS  Goal: Patient has stable vital signs and fluid balance  Outcome: Ongoing  Note:   Vitals:    04/21/20 0316   BP: (!) 141/67   Pulse: 65   Resp: 20   Temp: 97.9 °F (36.6 °C)   SpO2: 92%     Continuing to monitor VS q4hr and PRN. No bleeding noted from rectum. Problem: ACTIVITY INTOLERANCE/IMPAIRED MOBILITY  Goal: Mobility/activity is maintained at optimum level for patient  Outcome: Ongoing  Note: Repositioning patient q2hr and PRN. Passive ROM with RUE/RLE. Working with PT/OT. Problem: COMMUNICATION IMPAIRMENT  Goal: Ability to express needs and understand communication  Outcome: Ongoing  Note: Patient has severe aphasia/dysarthria. NPO. Working with speech therapy. Problem: Discharge Planning:  Goal: Discharged to appropriate level of care  Description: Discharged to appropriate level of care  Outcome: Ongoing  Note: Discharge planning remains in progress at this time. Patient's family possibly having a meeting today with palliative care. Care plan reviewed with patient.   Patient unable to verbalize understanding of

## 2020-04-21 NOTE — PROGRESS NOTES
04/20/20  1900 04/21/20  0325    133*  --  138   K 3.9 3.1* 3.6 3.5    101  --  103   CO2 18* 21*  --  25   BUN 22 26*  --  26*   CREATININE 1.0 0.9  --  1.1   GLUCOSE 128* 117*  --  107     Hepatic: No results for input(s): AST, ALT, ALB, BILITOT, ALKPHOS in the last 72 hours. INR: No results for input(s): INR in the last 72 hours. Imaging:  No results found for this or any previous visit. No results found for this or any previous visit. No results found for this or any previous visit. No results found for this or any previous visit. Endoscopy Finding:      Objective:   Vitals: /71   Pulse 70   Temp 97.8 °F (36.6 °C) (Axillary)   Resp 20   Ht 5' 6\" (1.676 m)   Wt 128 lb 14.4 oz (58.5 kg)   SpO2 98%   BMI 20.81 kg/m²     Intake/Output Summary (Last 24 hours) at 4/21/2020 0941  Last data filed at 4/21/2020 0347  Gross per 24 hour   Intake 2661.93 ml   Output --   Net 2661.93 ml     General appearance: alert and cooperative with exam, aphasia and not able to talk   Lungs: clear to auscultation bilaterally  Heart: regular rate and rhythm, S1, S2 normal, no murmur, click, rub or gallop  Abdomen: soft, non-tender; bowel sounds normal; no masses,  no organomegaly  Extremities: Right side weakness however he still able to move his right side    Assessment and Plan:   1. 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 Dysphagia - tentatively plan for PEG tube placement tomorrow if pt and his family agreeable. Continue TPN for now. Continue to hold ASA and Lovenox for impending PEG placement.       Follow up in GI Clinic after discharge in 2 week(s)    Patient Active Problem List:     Dyspnea     Bladder neck contracture     BPH (benign prostatic hyperplasia)     Suprapubic pain     S/P TURP (status post transurethral resection of prostate)     CKD (chronic kidney disease) stage 3, GFR 30-59 ml/min ([facility])     CAD (coronary artery disease)     Nausea and vomiting     Chronic serous otitis media of left ear     Hearing loss, sensorineural     Conductive hearing loss, bilateral     History of tympanoplasty of left ear     Tympanosclerosis involving combination of structures     Anxiety disorder     Disc disorder of lumbar region     Pacemaker     Incisional hernia, without obstruction or gangrene     S/P ventral herniorrhaphy     Essential hypertension     Head injury, closed, without LOC     Closed right hip fracture (HCC)     At high risk for pain from procedure     NICOLAS (obstructive sleep apnea)     Closed comminuted intertrochanteric fracture of right femur with routine healing     Acute blood loss anemia     Lower GI bleed     B12 deficiency     Diverticulosis of large intestine with hemorrhage     Lumbar radiculitis     HNP (herniated nucleus pulposus), lumbar     Severe malnutrition (HCC)     Gastritis     Tachycardia     COPD (chronic obstructive pulmonary disease) (HCC)     Normocytic anemia     Malnutrition ([address]     Interstitial lung disease ([address]     Leukocytosis     Acute kidney injury ([address]     History of GI bleed     Hyponatremia     Subcutaneous emphysema resulting from a procedure     Chronic anxiety     Pulmonary fibrosis (HCC)     Tobacco abuse     Abnormal CT of the chest     Moderate malnutrition (HCC)     Acute hypotension     Carotid stenosis, right     Left leg weakness     Stroke due to stenosis of right carotid artery (HCC)     Nihss score 2     Slurred speech     Stroke of unknown cause [facility])      Electronically signed by CHRISTIN Tijerina CNP on 4/21/2020 at 9:41 AM     Patient on antibiotics nurse discuss procedure with family.  Plan for PEG tomorrow     Patient is seen independently from the nurse practitioner and all  the pertinent data along with physical examination and assessment and plans are all obtained by my self and  Laboratory data, Radiology results, medications all are reviewed by my self and care is discussed extensively with the patient  and the patients nurse and all agree with plan and in addition see orders and plans    Electronically signed by Berkley Del Valle MD on 4/21/2020 at 6:21 PM

## 2020-04-21 NOTE — PROGRESS NOTES
Care    Nutrition Evaluation:   · Evaluation: Progressing toward goals   · Goals: TPN to provide 75% or more of estimated nutritional needs during LOS.     · Monitoring: Nutrition Progression, PN Intake, PN Tolerance, Skin Integrity, Mental Status/Confusion, Weight, Pertinent Labs, Chewing/Swallowing      Electronically signed by Iqra Artis RD, LD on 4/21/20 at 10:45 AM EDT    Contact Number: [phone]

## 2020-04-21 NOTE — PROGRESS NOTES
visualized aneurysm. The bilateral middle cerebral and anterior cerebral arteries are patent without focal   abnormality identified. There is a diminutive A1 segment on the right likely representing normal anatomic variation. The basilar artery is patent without focal stenosis or visualized aneurysm. The bilateral posterior cerebral arteries are patent without   focal abnormality identified. Superior cerebellar arteries appear patent. The dural venous sinuses appear patent without focal filling defect. No focal areas of abnormal parenchymal enhancement are identified. CTA NECK:  There is a typical 3 vessel arch. The brachiocephalic and left subclavian arteries are patent without flow-limiting stenosis. The common carotid arteries are patent without focal stenosis. There is calcified and noncalcified mural plaque at the carotid   bifurcation with mild stenosis at the takeoff of the right external carotid artery. The narrowest luminal diameter in the right carotid bulb is 0.9 mm, with a point more distal, where the walls are parallel, measuring 4.5 mm. There is minimal mural   plaque at the left carotid bulb without hemodynamically significant stenosis by NASCET criteria. Cervical segments of internal carotid arteries are otherwise patent without focal stenosis. The vertebral arteries are codominant. They're patent throughout   their course without focal stenosis. Mucosal surfaces of the aerodigestive tract are symmetric without focal nodular thickening or visualized mass. No cervical lymphadenopathy is identified. The left parotid gland is atrophied. The right right gland is unremarkable. Submandibular glands are   unremarkable. Thyroid gland is unremarkable. There is subcutaneous air adjacent to the left subclavian vein, internal jugular vein. There is also subcutaneous air at the anterior aspect of the neck along the anterior strap muscles and adjacent to the   right submandibular gland.  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 The bilateral posterior cerebral arteries are patent without   focal abnormality identified. Superior cerebellar arteries appear patent. The dural venous sinuses appear patent without focal filling defect. No focal areas of abnormal parenchymal enhancement are identified. CTA NECK:  There is a typical 3 vessel arch. The brachiocephalic and left subclavian arteries are patent without flow-limiting stenosis. The common carotid arteries are patent without focal stenosis. There is calcified and noncalcified mural plaque at the carotid   bifurcation with mild stenosis at the takeoff of the right external carotid artery. The narrowest luminal diameter in the right carotid bulb is 0.9 mm, with a point more distal, where the walls are parallel, measuring 4.5 mm. There is minimal mural   plaque at the left carotid bulb without hemodynamically significant stenosis by NASCET criteria. Cervical segments of internal carotid arteries are otherwise patent without focal stenosis. The vertebral arteries are codominant. They're patent throughout   their course without focal stenosis. Mucosal surfaces of the aerodigestive tract are symmetric without focal nodular thickening or visualized mass. No cervical lymphadenopathy is identified. The left parotid gland is atrophied. The right right gland is unremarkable. Submandibular glands are   unremarkable. Thyroid gland is unremarkable. There is subcutaneous air adjacent to the left subclavian vein, internal jugular vein. There is also subcutaneous air at the anterior aspect of the neck along the anterior strap muscles and adjacent to the   right submandibular gland. There are fibrotic changes in the superior portions of the lungs. There are moderate degenerative changes of the cervical spine. Impression    1. No flow-limiting stenosis or aneurysm in the anterior or posterior intracranial circulation.   2. Prominent calcified mural plaque at the carotid bulb on the right with 80% stenosis by NASCET criteria. 3. Scattered foci of subcutaneous air in the neck most prominent around the left subclavian vein possibly on the basis of venipuncture. **This report has been created using voice recognition software. It may contain minor errors which are inherent in voice recognition technology. **    Final report electronically signed by Dr. Kacey Powers MD on 4/13/2020 3:50 PM     No results found for this or any previous visit. No results found for this or any previous visit. No results found for this or any previous visit. Results for orders placed during the hospital encounter of 04/14/20   CT HEAD WO CONTRAST    Narrative PROCEDURE: CT HEAD WO CONTRAST    CLINICAL INFORMATION: increased right hemiparesis. COMPARISON: None    TECHNIQUE: Noncontrast 5 mm axial images were obtained through the brain. Sagittal and coronal reconstructions were obtained and reviewed. All CT scans at this facility use dose modulation, iterative reconstruction, and/or weight-based dosing when appropriate to reduce radiation dose to as low as reasonably achievable. FINDINGS:    Brain: There is an ovoid focus of fairly well-defined diminished hypodensity in the left corona radiata deep white matter, more well-defined and more hypodense than on the prior study consistent with an acute ischemic infarct. There is no hemorrhage. There is a subacute to chronic ischemic lacunar infarct in the right thalamus which has become more well-defined and more hypodense when compared to prior studies. . The previously noted hypodensity in the posterior right temporal lobe is not seen and was   likely artifactual.  There are moderate deep white matter hypodensities, nonspecific in nature but probably representing small vessel chronic ischemic changes. There is age appropriate cortical atrophy. Ventricles:  The ventricles, cisterns and cortical sulci are enlarged concordant with the moderate

## 2020-04-21 NOTE — PROGRESS NOTES
Family meeting set up for 2:30 PM with Dr. Negro Fontaine, Dr. Rose Nguyễn, palliative care and speech therapy to be present. Spiritual care will assist with zoom meeting. 1230  Received a text message from the patient's son inquiring about a  to assist the patient with a conversation regarding salvation through [facility].  Spiritual care consult placed.

## 2020-04-22 NOTE — PRE SEDATION
dextrose 5 % solution, 100 mL/hr, Intravenous, PRN, Ma Xochilt Meredith MD    PN-Adult  3 IN 1 Central Line (Custom), , Intravenous, Continuous TPN, Master Vogel, [facility]    pantoprazole (PROTONIX) injection 40 mg, 40 mg, Intravenous, Daily **AND** sodium chloride (PF) 0.9 % injection 10 mL, 10 mL, Intravenous, Daily, Tammy Henriquez MD    PN-Adult  3 IN 1 Central Line (Custom), , Intravenous, Continuous TPN, Sascha Love DO, Last Rate: 80 mL/hr at 04/21/20 1806    magnesium replacement protocol, , Other, RX Placeholder, Sascha Alaniztnale, DO    potassium replacement protocol, , Other, RX Placeholder, Sascha Alaniztnale, DO    calcium replacement protocol, , Other, RX Placeholder, Sascha Alaniztnale, DO    acetaminophen (TYLENOL) suppository 650 mg, 650 mg, Rectal, Q4H PRN, [employer], PA-IONA, 650 mg at 04/20/20 2329    lidocaine PF 1 % injection 5 mL, 5 mL, Intradermal, Once, [employer], APRN - CNP    sodium chloride flush 0.9 % injection 10 mL, 10 mL, Intravenous, 2 times per day, Reyna Pederson APRN - CNP, 10 mL at 04/22/20 0926    sodium chloride flush 0.9 % injection 10 mL, 10 mL, Intravenous, PRN, Reyna Pederson APRN - CNP    insulin lispro (HUMALOG) injection vial 0-12 Units, 0-12 Units, Subcutaneous, Q6H, Dejah Velazquez MD, 2 Units at 04/19/20 1211    [Held by provider] aspirin chewable tablet 81 mg, 81 mg, Per NG tube, Daily, Janki Webb DO    hydrALAZINE (APRESOLINE) injection 5 mg, 5 mg, Intravenous, Q6H PRN, Janki Webb DO    piperacillin-tazobactam (ZOSYN) 3.375 g in dextrose 5 % 50 mL IVPB extended infusion (mini-bag), 3.375 g, Intravenous, Q8H, Manolo Kong PA-C, Last Rate: 12.5 mL/hr at 04/22/20 1505, 3.375 g at 04/22/20 1505    albuterol sulfate  (90 Base) MCG/ACT inhaler 2 puff, 2 puff, Inhalation, Q6H PRN, Catia Espinosa MD    docusate sodium (COLACE) capsule 100 mg, 100 mg, Oral, BID PRN, Catia Espinosa MD  [facility] ferrous sulfate (IRON 325) tablet 325 mg, 325 mg, Oral, Lunch, Stella Dueñas MD    ipratropium-albuterol (DUONEB) nebulizer solution 3 mL, 3 mL, Inhalation, Q4H PRN, Stella Dueñas MD, 3 mL at 04/18/20 0214    [Held by provider] isosorbide mononitrate (IMDUR) extended release tablet 30 mg, 30 mg, Oral, Daily, Stella Dueñas MD    [Held by provider] sertraline (ZOLOFT) tablet 50 mg, 50 mg, Oral, Daily, Stella Dueñas MD    [Held by provider] [facility]) capsule 0.4 mg, 0.4 mg, Oral, BID, Stella Dueñas MD    [Held by provider] atorvastatin (LIPITOR) tablet 80 mg, 80 mg, Oral, Nightly, Stella Dueñas MD, 80 mg at 04/17/20 2156    polyethylene glycol (GLYCOLAX) packet 17 g, 17 g, Oral, Daily PRN, Stella Dueñas MD    promethazine (PHENERGAN) tablet 12.5 mg, 12.5 mg, Oral, Q6H PRN **OR** ondansetron (ZOFRAN) injection 4 mg, 4 mg, Intravenous, Q6H PRN, Stella Dueñas MD    [Held by provider] enoxaparin (LOVENOX) injection 40 mg, 40 mg, Subcutaneous, Q24H, Stella Dueñas MD, 40 mg at 04/18/20 1706    Tiotropium Bromide-Olodaterol 2.5-2.5 MCG/ACT AERS 2 puff, 2 puff, Inhalation, Daily, Stella Dueñas MD, 2 puff at 04/22/20 1038    Facility-Administered Medications Ordered in Other Encounters:     lidocaine 1 % injection, , , PRN, CHRISTIN Segal CRNA, 30 mg at 04/22/20 1541    propofol injection, , , PRN, CHRISTIN Segal CRNA, 80 mg at 04/22/20 1541    0.9 % sodium chloride infusion, , , Continuous PRN, CHRISTIN Segal CRNA    succinylcholine chloride (ANECTINE) injection, , , PRN, CHRISTIN Segal CRNA, 100 mg at 04/22/20 1541  Prior to Admission medications    Medication Sig Start Date End Date Taking?  Authorizing Provider   aspirin EC 81 MG EC tablet Take 1 tablet by mouth daily 4/13/20   Regan Carl,    clopidogrel (PLAVIX) 75 MG tablet Take 1 tablet by mouth daily 4/13/20   Regan Carl, DO   atorvastatin (LIPITOR) 10 []Other:    Abdomen: [x]Soft    []Other:    Mental Status: [x]Alert & Oriented  []Other:        PLANNED PROCEDURE   [x]EGD  []Colonoscopy []Flex Sigmoid     Consent: I have discussed with the patient and/or the patient representative the indication, alternatives, and the possible risks and/or complications of the planned procedure and the anesthesia methods. The patient and/or patient representative appear to understand and agree to proceed. SEDATION  Please see anesthesia note. The medication Planned :  Planned agent:[]Midazolam []Meperidine []Sublimaze []Morphine  []Diazepam  [x]Propofol     Airway Assessment:   See anesthesia no please     Monitoring and Safety: The patient will be placed on a cardiac monitor and vital signs, pulse oximetry and level of consciousness will be continuously evaluated throughout the procedure. The patient will be closely monitored until recovery from the medications is complete and the patient has returned to baseline status. Respiratory therapy will be on standby during the procedure. [x]Pre-procedure diagnostic studies complete and results available. Comment:    [x]Previous sedation/anesthesia experiences assessed. Comment:    [x]The patient is an appropriate candidate to undergo the planned procedure sedation and anesthesia. (Refer to nursing sedation/analgesia documentation record)  [x]Formulation and discussion of sedation/procedure plan, risks, and expectations with patient and/or responsible adult completed. [x]Patient examined immediately prior to the procedure.  (Refer to nursing sedation/analgesia documentation record)    Eveline James MD   Electronically signed 4/22/2020

## 2020-04-22 NOTE — ANESTHESIA PRE PROCEDURE
Cerebrovascular accident (CVA) due to thrombosis of carotid artery ([address] I63.039    Dysphagia R13.10       Past Medical History:        Diagnosis Date    Acute sinusitis     Angina pectoris (HCC)     Anxiety disorder 10/27/2016    Arthritis     osteoporosis    BPH with urinary obstruction     CAD (coronary artery disease)     Chronic insomnia     CKD (chronic kidney disease) stage 3, GFR 30-59 ml/min (HCC)     COPD (chronic obstructive pulmonary disease) (HCC)     Gastroenteritis     HCAP (healthcare-associated pneumonia) 4/29/2015    Heart disease     HLD (hyperlipidemia)     [place] (hard of hearing)     wear hearing aids    Hypertension     Kidney disease     40% kdiney function    Kidney stone     years ago 15-20    Moderate malnutrition ([address] 3/5/2020    NICOLAS on CPAP     Osteopenia determined by x-ray     Pacemaker 2011    Pinched nerve     slipped disc in back    S/P TURP (status post transurethral resection of prostate)     Stroke due to stenosis of right carotid artery (HCC)     Visual problems     Vitamin D deficiency        Past Surgical History:        Procedure Laterality Date    ABDOMINAL HERNIA REPAIR  04/27/2017    incisional hernia repair--Dr. Yael Murrieta CARDIAC SURGERY      CATARACT REMOVAL WITH IMPLANT      bilateral    COLONOSCOPY  [id number]    COLONOSCOPY  12/29/2017    COLONOSCOPY CONTROL HEMORRHAGE performed by Kan Pederson MD at [facility]    COLONOSCOPY  8/16/2018    COLONOSCOPY POLYPECTOMY SNARE/COLD BIOPSY performed by Kan Pederson MD at [facility]    COLONOSCOPY  8/19/2018    COLONOSCOPY CONTROL HEMORRHAGE performed by Kan Pederson MD at [facility]    COLONOSCOPY N/A 8/20/2018    COLONOSCOPY CONTROL HEMORRHAGE performed by Kan Pederson MD at [address]  1960's    EKG 12-LEAD  4/29/2015         ENDOSCOPY, COLON, DIAGNOSTIC      EYE SURGERY      cataracts    HERNIA REPAIR 08/19/2018       Anesthesia Evaluation   no history of anesthetic complications:   Airway: Mallampati: II        Dental:          Pulmonary:normal exam    (+) COPD:  shortness of breath:  sleep apnea:            Patient did not smoke on day of surgery. Cardiovascular:  Exercise tolerance: poor (<4 METS),   (+) hypertension:, angina:, pacemaker:, CAD:,                   Neuro/Psych:   (+) CVA:, psychiatric history:            GI/Hepatic/Renal:             Endo/Other:              Pt had no PAT visit       Abdominal:           Vascular:   + PVD, aortic or cerebral, . Anesthesia Plan      MAC     ASA 4       Induction: intravenous. Anesthetic plan and risks discussed with patient. Plan discussed with CRNA.                   Marvin Olivarez MD   4/22/2020

## 2020-04-22 NOTE — PROGRESS NOTES
Nutrition Assessment (Enteral Nutrition)    Type and Reason for Visit: Reassess    Nutrition Recommendations:   -If TPN continues tonight recommend increase to 20 kcals/kg/day, increase to 30% lipid kcals, continue 1.2 gram protein/kg/day and 64 kg.  -When tubefeedings are able to be started would recommend: Jevity 1.5 at 10 mL/hr increasing by 10 mL every 4 hours as tolerated to goal rate of 50 mL/hr. Recommend 230 mL free water every 6 hours or per MD.  -Further diet recommendations per SLP. Nutrition Assessment:   Pt improving from a nutritional standpoint AEB PN being provided for nutrition support while pt remains NPO due to dysphagia until PEG placed. Remains at risk for further nutritional compromise r/t continued NPO status due to dysphagia from CVA, pt previously pulled NGT out, underlying medical condition (small HH, tortuous esophagus, GI bleed, history of CKD, COPD), and need for continued nutrition support. Nutrition recommendations/interventions as per above    Malnutrition Assessment:  · Malnutrition Status: Meets the criteria for moderate malnutrition  · Context: Chronic illness  · Findings of the 6 clinical characteristics of malnutrition (Minimum of 2 out of 6 clinical characteristics is required to make the diagnosis of moderate or severe Protein Calorie Malnutrition based on AND/ASPEN Guidelines):  1. Energy Intake-Less than or equal to 75% of estimated energy requirement, Greater than or equal to 5 days    2. Weight Loss-2% loss or greater, in 3 months  3. Fat Loss-Moderate subcutaneous fat loss, Orbital  4. Muscle Loss-Mild muscle mass loss, Temples (temporalis muscle)  5. Fluid Accumulation-Unable to assess,    6.  Strength-Not measured    Nutrition Risk Level: High    Nutrition Needs:  · Estimated Daily Total Kcal: 1600-1920kcals (25-30kcals/kgm wt.  of 64kgm 4/20)  · Estimated Daily Protein (g): 41-77 grams (0.8-1 gram protein/kg/day based on 64 kg)   · Estimated Daily edema))  · Weight Change: 6# (4.1%) wt. loss in 3 months per EMR    · Ideal Body Wt: 142 lb (64.4 kg),   · BMI Classification: BMI 18.5 - 24.9 Normal Weight    Nutrition Interventions:   Continue NPO, Modify current Parenteral Nutrition(Start tubefeeding when able after PEG placed and then wean TPN off)  Continued Inpatient Monitoring, Education not appropriate at this time, Coordination of Care    Nutrition Evaluation:   · Evaluation: Goals set   · Goals: TPN to provide 75% or more of estimated nutrient needs until able to transition to EN.    · Monitoring: Nutrition Progression, TF Intake, PN Intake, TF Tolerance, PN Tolerance, Weight, Pertinent Labs, Chewing/Swallowing, Monitor Bowel Function      Electronically signed by Garrel Lanes, RD, BENOIT on 4/22/20 at 9:39 AM EDT    Contact Number: [phone]

## 2020-04-22 NOTE — CARE COORDINATION
4/20/20, 12:46 PM EDT    DISCHARGE ON GOING [place] 89 day: 6  Location: -10/010-A Reason for admit: Stroke of unknown cause [facility]) [I63.9]   Procedure: PICC line placement  Treatment Plan of Care: PPN to TPN when PICC line available. GI consult for possible PEG tube placement, Potassium, Calcium, magnesium replacement protocols, PT/OT/ST, Palliative Care following, diabetes management, IV antibiotics. Barriers to Discharge: medical stability. PCP: Freya Cano,   Readmission Risk Score: 34%  Patient Goals/Plan/Treatment Preferences: from home, possible Hospice? Will follow. SW following.
4/20/20, 7:35 AM EDT    DISCHARGE BARRIERS        Patient transferred to [facility]. Report given to unit Iram Morley, regarding discharge plan for this patient.
4/22/20, 11:45 AM EDT    DISCHARGE PLANNING EVALUATION  CM Missy Aguirre spoke with Bruna in admissions for TCU/ Inpatient Rehab. She told Missy Aguirre that [facility] would not be appropriate for Inpatient Rehab at this time. SUZETTE left a message for daughter Wandy Stephen. Would like to discuss ECF options with Christina.
Evaluation  Current Residence:  Private Residence  Living Arrangements:  Spouse/Significant Other   Support Systems:  Spouse/Significant Other, Family Members  Current Services PTA:     Potential Assistance Needed:  N/A  Potential Assistance Purchasing Medications:  No  Does patient want to participate in local refill/ meds to beds program?  No  Type of Home Care Services:  Nursing Services  Patient expects to be discharged to:  home  Expected Discharge date:  04/17/20  Follow Up Appointment: Best Day/ Time: Tuesday AM    Patient Goals/Plan/Treatment Preferences: spoke with Kyler Kemp, patients daughter. She states that Dolores Crowell had been living at home with his wife. He is current with [facility]. He did have PT/OT as well but they had discharged him. Laron Marbella feels that Dolores Crowell may benefit from additional rehab prior to returning home. Will make consult to [facility] IP Rehab. Explained to Laron Tyson that she can change her mind to an outside facility if she chooses. Will continue to follow. Transportation/Food Security/Housekeeping Addressed:  No issues identified.     Evaluation: yes

## 2020-04-22 NOTE — PLAN OF CARE
Problem: Nutrition  Goal: Optimal nutrition therapy  4/22/2020 0929 by Orlando Quiros RD, LD  Outcome: Ongoing   Nutrition Problem: Moderate malnutrition  Intervention: Food and/or Nutrient Delivery: Continue NPO, Modify current Parenteral Nutrition(Start tubefeeding when able after PEG placed and then wean TPN off)  Nutritional Goals: TPN to provide 75% or more of estimated nutrient needs until able to transition to EN.

## 2020-04-22 NOTE — PROGRESS NOTES
lidocaine 1 % injection  5 mL Intradermal Once    sodium chloride flush  10 mL Intravenous 2 times per day    insulin lispro  0-12 Units Subcutaneous Q6H    [Held by provider] aspirin  81 mg Per NG tube Daily    piperacillin-tazobactam  3.375 g Intravenous Q8H    ferrous sulfate  325 mg Oral Lunch    [Held by provider] isosorbide mononitrate  30 mg Oral Daily    [Held by provider] sertraline  50 mg Oral Daily    [Held by provider] tamsulosin  0.4 mg Oral BID    [Held by provider] atorvastatin  80 mg Oral Nightly    [Held by provider] enoxaparin  40 mg Subcutaneous Q24H    Tiotropium Bromide-Olodaterol  2 puff Inhalation Daily     PRN Meds: acetaminophen, sodium chloride flush, hydrALAZINE, albuterol sulfate HFA, docusate sodium, ipratropium-albuterol, polyethylene glycol, promethazine **OR** ondansetron      Intake/Output Summary (Last 24 hours) at 4/22/2020 0724  Last data filed at 4/22/2020 0342  Gross per 24 hour   Intake 2352 ml   Output --   Net 2352 ml       Diet:  DIET TUBE FEED CONTINUOUS/CYCLIC NPO; STANDARD WITH FIBER (Jevity 1.5); Nasogastric; 10; 50; 24  PN-Adult  3 IN 1 Central Line (Custom)    Exam:  BP (!) 144/67   Pulse 73   Temp 97.6 °F (36.4 °C) (Oral)   Resp 20   Ht 5' 6\" (1.676 m)   Wt 132 lb 12.8 oz (60.2 kg)   SpO2 92%   BMI 21.43 kg/m²     General appearance: still sleepy, wakes up when called by name, not in acute distress  HEENT: Pupils equal, round, and reactive to light. Conjunctivae clear. Clear oral  Mucosa. Neck: Supple, with full range of motion. Respiratory:  Normal respiratory effort. (+) crackles on anterior lung fields, hard to assess posterior lung fields as pt did not move when asked. Cardiovascular: normal rate, regular rhythm with normal S1/S2 without murmurs, rubs or gallops.   Abdomen: Soft, non-tender, non-distended   Neurological exam reveals alert, oriented, (+) dysarthria, cranial nerves II through XII intact, gait not examine, (+) left facial droop, motor 4/5 on RUE and RLE, otherwise 5/5  Exam of extremities: peripheral pulses normal, no pedal edema, no clubbing or cyanosis      Labs:   Recent Labs     04/20/20  0325  04/21/20  0325 04/21/20  1032 04/21/20  1830 04/22/20  0250   WBC 11.5*  --  10.9*  --   --  11.7*   HGB 10.5*   < > 10.2* 10.9* 10.4* 10.8*   HCT 32.8*   < > 32.0* 34.0* 32.5* 32.8*     --  288  --   --  308    < > = values in this interval not displayed. Recent Labs     04/20/20  0325 04/20/20  1900 04/21/20  0325 04/22/20  0250   *  --  138 140   K 3.1* 3.6 3.5 4.2     --  103 105   CO2 21*  --  25 26   BUN 26*  --  26* 29*   CREATININE 0.9  --  1.1 1.1   CALCIUM 8.4*  --  8.6 8.7   PHOS 2.1*  --  3.1 3.0     No results for input(s): AST, ALT, BILIDIR, BILITOT, ALKPHOS in the last 72 hours. No results for input(s): INR in the last 72 hours. No results for input(s): Bouchra Gunnels in the last 72 hours. Urinalysis:      Lab Results   Component Value Date    NITRU NEGATIVE 03/03/2020    WBCUA 0-2 05/11/2019    WBCUA 0-5 02/19/2018    BACTERIA NONE 05/11/2019    RBCUA 0-2 05/11/2019    BLOODU NEGATIVE 03/03/2020    GLUCOSEU NEGATIVE 03/03/2020       Radiology:  XR CHEST STANDARD (2 VW)   Final Result   1. There are coarse linear interstitial lung markings demonstrated bilaterally predominantly in a perihilar distribution with more focal consolidative bibasilar right greater than left opacities which may represent edema versus pneumonia versus sequela    from pulmonary fibrosis. Overall aeration of the lower lung bases appears slightly worsened from the prior examination suggesting a component of underlying bibasilar dependent atelectasis or pneumonia. **This report has been created using voice recognition software. It may contain minor errors which are inherent in voice recognition technology. **      Final report electronically signed by Dr. Trinidad Liu on 4/20/2020 7:42 AM      CT HEAD WO lung disease. 3. Mild stable cardiomegaly. **This report has been created using voice recognition software. It may contain minor errors which are inherent in voice recognition technology. **      Final report electronically signed by Dr. Tammy Muñoz on 4/15/2020 7:06 PM      CT Head WO Contrast   Final Result    Moderate-sized area of hypodensity in the lateral aspect of the right temporal lobe may be artifactual although involving infarct can't be excluded. No significant mass effect or intracranial hemorrhage is present. **This report has been created using voice recognition software. It may contain minor errors which are inherent in voice recognition technology. **      Final report electronically signed by Dr. La Sharma MD on 4/15/2020 3:44 PM      CT HEAD WO CONTRAST   Final Result   No acute process. Stable appearance of the brain. **This report has been created using voice recognition software. It may contain minor errors which are inherent in voice recognition technology. **      Final report electronically signed by Dr. Milagro Baltazar on 4/14/2020 9:37 AM            Assessment/Plan:      Acute CVA    -Seen by teleneuro. Not a good candidate for tPA. -Does have R ICA 80% stenosis. [facility] [place] -Medical management with ASA/plavix/statin. However, ASA and plavix currently on hold due to recent GI bleed   -also, Plavix/ASA/Lovenox on hold for PEG placement 4/22  -GI and Neurology on-board. Appreciate specialists input   -PT/OT/SLP following. Some rehab potential. Consult made to Inpatient Rehab.   -Patient continues to be strict NPO and needs long term nutritional support at this time. Currently on TPN. Pharmacy and dietician on-board. Appreciate input.    -BP goal should be 130-160.   -per neurology note on 4/21, \"Due to his GI bleeding all antiplatelets need to be held for now, I brought up to them 1.5); Nasogastric; 10; 50; 24  PN-Adult  3 IN 1 Central Line (Custom)    DVT prophylaxis: [] Lovenox                                 [x] SCDs                                 [] SQ Heparin                                 [] Encourage ambulation           [] Already on Anticoagulation     Disposition:    [] Home       [] TCU       [] Rehab       [] Psych       [] SNF       [] [place]       [x] Other-Goals of care discussion with family including Palliative care, Speech Therapy, Neurologist Dr. Zaida Kang, and  resident Dr. Jersey Ojeda done via zoom on 4/21/2020. Family agreed for PEG tube placement. DC planning is still pending. IP rehab vs ECF.        Code Status: Limited x 4    PT/OT Eval Status: on-board     Electronically signed by Serenity Arizmendi MD on 4/22/2020 at 7:24 AM

## 2020-04-22 NOTE — PROGRESS NOTES
[address]  Occupational Therapy  Daily Note  Time:    Time In: 1200  Time Out: 1230  Timed Code Treatment Minutes: 30 Minutes  Minutes: 30          Date: 2020  Patient Name: Milagros Mendoza,   Gender: male      Room: [facility]10/010-A  MRN: [id number]  : 1931  (80 y.o.)  Referring Practitioner: Kat MICHELLE  Diagnosis: Stroke of unknown cause  Additional Pertinent Hx: 59-year-old gentleman with past medical history of hyperlipidemia, CAD, CKD, COPD came to ER due to slurring of speech. Patient initially came in yesterday with left-sided weakness. He was not considered a TPA candidate. Patient was evaluated by tele-stroke and had imaging which showed right ICA 80% stenosis. It was recommended the patient be transferred to [facility] for intervention. At that time family declined and patient felt that he was stable enough to go home. Patient comes back today due to worsening speech and facial droop. He denies any weakness. Patient was evaluated by tele-stroke the case was again discussed with daughter and they agree to admit for medical management. CT head showed Moderate-sized area of hypodensity in the lateral aspect of the right temporal lobe may be artifactual although involving infarct can't be excluded. CT 2020: Acute ischemic infarct in the left corona radiata deep white matter. Subacute to chronic lacunar infarct in the right thalamus.      Restrictions/Precautions:  Restrictions/Precautions: Fall Risk  Position Activity Restriction  Other position/activity restrictions: expressive aphasia, NPO, pushes to R side, R neglect    SUBJECTIVE: cooperative, inconsistent with answering therapist only replying \"uhuh\" at times    PAIN: 0/10: no physical signs of pain during session    COGNITION: Slow Processing, Decreased Recall, Decreased Insight, Decreased Problem Solving, Decreased Safety Awareness, Impaired Attention and R side neglect    ADL:   Grooming: Maximum Assistance. for completeness of combing hair. BALANCE:  Sitting balance: CGA on EOB tactile cues for midline. Forward flexed posture noted with head down  Standing Balance: mod A x 1 within LANCE TAPIA, Pt pushing heavily to R side, poor awareness of midline       BED MOBILITY:  Sit to Supine: Maximum Assistance, X 1      TRANSFERS:  Sit to Stand:  Maximum Assistance, X 1. from recliner onto LANCE STEDY  Stand to sit: from [address] onto EOB mod A x 1      ASSESSMENT:  Assessment: Pt demo significant R side neglect, weakness, decreased balance, decreased cognition, & expressive aphasia. Continued OT recommended to faciliate improved participation & indep with ADL tasks within discharge environment. Activity Tolerance:  Patient tolerance of  treatment: fair. Discharge Recommendations: Subacute/Skilled Nursing Facility  Equipment Recommendations: Other: Continue to assess pending progress  Plan: Times per week: 6x  Current Treatment Recommendations: Strengthening, Safety Education & Training, Balance Training, Patient/Caregiver Education & Training, Self-Care / ADL, Cognitive/Perceptual Training, Functional Mobility Training, Equipment Evaluation, Education, & procurement, Endurance Training    Patient Education  Patient Education: see above    Goals  Short term goals  Time Frame for Short term goals: Until discharge  Short term goal 1: Complete functional sit to stand transfer with CGA and min cues for safety to increase independence with toileting routine NOT MET, REVISE  Short term goal 2: Complete LE dressing with min A to increase independence with dressing tasks NOT MET, REVISE  Short term goal 3: Pt will complete functional mobility walking with a rolling walker to/from he bedside chair or commode while using a rolling walker with MIN A and cues as needed for walker safety to prepare for doing self care.  NOT MET, REVISE  Short term goal 4: Complete BUE light

## 2020-04-22 NOTE — PROGRESS NOTES
1609 Awake and looking around , moans on and off , when you call his name , no sign of recollection noted  1625 pt unchanged  1638 unable to get and ABD binder , Peg tube secured with wide tape to cover tubing   1640 meets criteria for discharge , transported to  , no family present

## 2020-04-23 NOTE — PROGRESS NOTES
lung fields and posterior lung bases  Cardiovascular: normal rate, regular rhythm with normal S1/S2 without murmurs, rubs or gallops. Abdomen: Soft, non-tender, non-distended   Neurological exam reveals alert, oriented, (+) dysarthria, cranial nerves II through XII intact, gait not examine, (+) left facial droop, motor 4/5 on RUE and RLE, otherwise 5/5  Exam of extremities: peripheral pulses normal, no pedal edema, no clubbing or cyanosis      Labs:   Recent Labs     04/21/20  0325  04/22/20  0250  04/22/20  1849 04/23/20  0220 04/23/20  0550   WBC 10.9*  --  11.7*  --   --   --  13.3*   HGB 10.2*   < > 10.8*   < > 10.2* 9.5* 9.7*   HCT 32.0*   < > 32.8*   < > 31.3* 30.1* 30.6*     --  308  --   --   --  279    < > = values in this interval not displayed. Recent Labs     04/21/20 0325 04/22/20  0250 04/23/20  0550    140 140   K 3.5 4.2 4.3    105 106   CO2 25 26 26   BUN 26* 29* 27*   CREATININE 1.1 1.1 1.0   CALCIUM 8.6 8.7 8.3*   PHOS 3.1 3.0  --      No results for input(s): AST, ALT, BILIDIR, BILITOT, ALKPHOS in the last 72 hours. No results for input(s): INR in the last 72 hours. No results for input(s): Corrina Cumberland in the last 72 hours. Urinalysis:      Lab Results   Component Value Date    NITRU NEGATIVE 03/03/2020    WBCUA 0-2 05/11/2019    WBCUA 0-5 02/19/2018    BACTERIA NONE 05/11/2019    RBCUA 0-2 05/11/2019    BLOODU NEGATIVE 03/03/2020    GLUCOSEU NEGATIVE 03/03/2020       Radiology:  XR CHEST STANDARD (2 VW)   Final Result   1. There are coarse linear interstitial lung markings demonstrated bilaterally predominantly in a perihilar distribution with more focal consolidative bibasilar right greater than left opacities which may represent edema versus pneumonia versus sequela    from pulmonary fibrosis.  Overall aeration of the lower lung bases appears slightly worsened from the prior examination suggesting a component of underlying bibasilar dependent atelectasis or pneumonia. **This report has been created using voice recognition software. It may contain minor errors which are inherent in voice recognition technology. **      Final report electronically signed by Dr. Hayden Engel on 4/20/2020 7:42 AM      CT HEAD WO CONTRAST   Final Result      Acute ischemic infarct in the left corona radiata deep white matter. Subacute to chronic lacunar infarct in the right thalamus as discussed above. No hemorrhage   Senescent changes as discussed above. Results discussed with Dr. Dong Marks on 4/19/2020 at 10:29 PM.      **This report has been created using voice recognition software. It may contain minor errors which are inherent in voice recognition technology. **      Final report electronically signed by Dr. Jenn Doll on 4/19/2020 10:31 PM      IR FLUOROSCOPY LESS THAN 1 HOUR   Final Result   Status post successful PICC line repositioning. .. **This report has been created using voice recognition software. It may contain minor errors which are inherent in voice recognition technology. **      Final report electronically signed by Dr. Kenya Gee on 4/20/2020 8:00 AM      XR ABDOMEN FOR NG/OG/NE TUBE PLACEMENT   Final Result   Study somewhat limited by motion artifact. Esophageal tube tip appears to be in the stomach, however. **This report has been created using voice recognition software. It may contain minor errors which are inherent in voice recognition technology. **      Final report electronically signed by Dr. Kenya Gee on 4/18/2020 1:00 PM      XR ABDOMEN FOR NG/OG/NE TUBE PLACEMENT   Final Result   NG tube tip remains in the proximal stomach, side-port in the region of the GE junction. Consider advancing the tube 5 to 10 cm. Nonobstructive bowel gas pattern. Bibasilar interstitial infiltrates and small right pleural effusion as previously noted. **This report has been created using voice recognition software. which are inherent in voice recognition technology. **      Final report electronically signed by Dr. Marlin Taylor MD on 4/17/2020 4:55 PM      XR CHEST STANDARD (2 VW)   Final Result   1. Persistent right upper and midlung atelectasis/infiltrate. 2. Chronic lung disease. 3. Mild stable cardiomegaly. **This report has been created using voice recognition software. It may contain minor errors which are inherent in voice recognition technology. **      Final report electronically signed by Dr. Nasario Siemens on 4/15/2020 7:06 PM      CT Head WO Contrast   Final Result    Moderate-sized area of hypodensity in the lateral aspect of the right temporal lobe may be artifactual although involving infarct can't be excluded. No significant mass effect or intracranial hemorrhage is present. **This report has been created using voice recognition software. It may contain minor errors which are inherent in voice recognition technology. **      Final report electronically signed by Dr. Marlin Taylor MD on 4/15/2020 3:44 PM      CT HEAD WO CONTRAST   Final Result   No acute process. Stable appearance of the brain. **This report has been created using voice recognition software. It may contain minor errors which are inherent in voice recognition technology. **      Final report electronically signed by Dr. Josefina Izaguirre on 4/14/2020 9:37 AM            Assessment/Plan:      Acute CVA    -Seen by teleneuro. Not a good candidate for tPA. -Does have R ICA 80% stenosis. [facility] -Medical management with ASA/plavix/statin. However, ASA and plavix currently on hold due to recent GI bleed   -also, Plavix/ASA/Lovenox on hold for PEG placement 4/22  -GI and Neurology on-board. Appreciate specialists input   -PT/OT/SLP following.  Some rehab potential. Consult made to Inpatient Rehab.   -Patient is starting PEG tube feeding

## 2020-04-23 NOTE — PROGRESS NOTES
malnutrition  · Etiology: related to Cognitive or neurological impairment     Signs and symptoms:  as evidenced by Moderate loss of subcutaneous fat, Mild muscle loss    Objective Information:  · Nutrition-Focused Physical Findings: CVA, dysphagia, SLP following-continue NPO recommendations. s/p PEG placed 4/23/20, started this morning. Spoke with pt's nurse and Dr. Krystal Traylor, plan decrease TPN 1/2 rate 40 ml/hr, and then stopped at 1330 today. BUN 27. On iron supplementation. 2 bowel movements in the last 24 hours.    · Wound Type: None  · Current Nutrition Therapies:  · Oral Diet Orders: NPO(per SLP)   · Tube Feeding (TF) Orders:   · Feeding Route: Gastrostomy(PEG placed 4/22/20 by Dr. Ramya Ro)  · Formula: 1.5 Calorie with Fiber(Jevity 1.5)  · Rate (ml/hr):goal of 50 ml/hr    · Volume (ml/day): 1200 ml at goal  · Duration: Continuous  · Water Flushes: (recommend 230 ml every 6 hours (if no IVF))  · Goal TF & Flush Orders Provides: provides pt. with 1800 kcals, 77 gm protein, 259 gm CHO, 26 gm fiber and 912 ml free water (1832 ml with flushes)   Parenteral Nutrition Orders: Plan 40 ml/hr and then stop at 1330 today  3:1  TPN at 80 ml :20 kcals/kg/day, increase to 30% lipid, cotninue 1.2 grams protein/kg/day, and 64 kg to dose to provide~ 1280 kcals, 77 grams protein, and 173 grams CHO per 24 hours  · Anthropometric Measures:  · Ht: 5' 6\" (167.6 cm)   · Current Body Wt: 142 lb 9.6 oz (64.7 kg)(4/23/20 no edema)  · Admission Body Wt: 134 lb 14.7 oz (61.2 kg)(4/15, no edema)  · Usual Body Wt: (per pt 130#; admits to losing 30# over last couple of years; per EMR: 9/16/19: 146# 6.4 oz, 1/15/20: 145# 3.2 oz, 3/5/20: 136# 6.4 oz (no edema))  · Weight Change: 6# (4.1%) wt. loss in 3 months per EMR    · Ideal Body Wt: 142 lb (64.4 kg),   · BMI Classification: BMI 18.5 - 24.9 Normal Weight    Nutrition Interventions:   Continue NPO, Start Tube Feeding(Plan to stop TPN at 1330 today.)  Continued Inpatient Monitoring, Education

## 2020-04-23 NOTE — PROGRESS NOTES
[address]  Occupational Therapy  Daily Note  Time:    Time In: 06  Time Out: 1230  Timed Code Treatment Minutes: 28 Minutes  Minutes: 28          Date: 2020  Patient Name: Dewayne Llamas,   Gender: male      Room: [facility]10/010-A  MRN: [id number]  : 1931  (80 y.o.)  Referring Practitioner: Edwardo MICHELLE  Diagnosis: Stroke of unknown cause  Additional Pertinent Hx: 80-year-old gentleman with past medical history of hyperlipidemia, CAD, CKD, COPD came to ER due to slurring of speech. Patient initially came in yesterday with left-sided weakness. He was not considered a TPA candidate. Patient was evaluated by tele-stroke and had imaging which showed right ICA 80% stenosis. It was recommended the patient be transferred to [facility] for intervention. At that time family declined and patient felt that he was stable enough to go home. Patient comes back today due to worsening speech and facial droop. He denies any weakness. Patient was evaluated by tele-stroke the case was again discussed with daughter and they agree to admit for medical management. CT head showed Moderate-sized area of hypodensity in the lateral aspect of the right temporal lobe may be artifactual although involving infarct can't be excluded. Restrictions/Precautions:  Restrictions/Precautions: Fall Risk  Position Activity Restriction  Other position/activity restrictions: expressive aphasia, NPO, pushes to R side, R neglect    SUBJECTIVE: up in recliner, fatigued, shook head \"yes\" when asked to go back to bed    PAIN: 0/10: no c/o pain during session    COGNITION: Slow Processing, decreased insight & safety    ADL:   Lower Extremity Dressing: Dependent. for adjusting slipper socks. BALANCE:  Sitting Balance:  Contact Guard Assistance. -mod A EOB x 8 min in prep for laying back down. Increased Leaning posteriorly & to R with fatigue  Standing Balance: Moderate Assistance.  vcs for not pushing to R side, stood within the [address]:  Sit to Supine: Maximum Assistance, X 1      TRANSFERS:  Sit to Stand:  Minimal Assistance, X 2. onto LANCE STEDY, hand over hand with RUE  Stand to sit: mod A x 1 onto EOB from [address], hand over hand RUE    ASSESSMENT:     Activity Tolerance:  Patient tolerance of  treatment: fair. Discharge Recommendations: Subacute/Skilled Nursing Facility  Equipment Recommendations: Other: Continue to assess pending progress  Plan: Times per week: 6x  Current Treatment Recommendations: Strengthening, Safety Education & Training, Balance Training, Patient/Caregiver Education & Training, Self-Care / ADL, Cognitive/Perceptual Training, Functional Mobility Training, Equipment Evaluation, Education, & procurement, Endurance Training    Patient Education  Patient Education: see above    Goals  Short term goals  Time Frame for Short term goals: Until discharge  Short term goal 1: Complete functional sit to stand transfer with mod A and min cues for safety to increase independence with toileting routine  Short term goal 2: Complete grooming task with min A & min vcs for R sided awareness  Short term goal 3: Complete EOB ADL task 10-12 min with CGA & min vcs for midline posture   Short term goal 4: Complete RUE AROM/[facility] tasks to increase RUE incorporation in ADL tasks  Short term goal 5:     Long term goals  Time Frame for Long term goals : No LTG set d/t short ELOS    Following session, patient left in safe position with all fall risk precautions in place.

## 2020-04-23 NOTE — OP NOTE
The  marker of the PEG tube was 1 cm. The ring retainer was put in and the  tube was cut in a proper length and the procedure was terminated with no  immediate complications. IMPRESSION:  Successful placement of the PEG tube. PLAN:  Start using the PEG tube for feeding tomorrow. Carolyn Rosas M.D.    D: 04/22/2020 16:35:06       T: 04/22/2020 18:39:45     [id number]  Job#: [id number]     Doc#: [id number]    CC:  Paloma Olivarez.  Gloria Davis.

## 2020-04-24 PROBLEM — I63.9 CEREBROVASCULAR ACCIDENT (CVA) DETERMINED BY CLINICAL ASSESSMENT (HCC): Status: ACTIVE | Noted: 2020-01-01

## 2020-04-24 NOTE — FLOWSHEET NOTE
04/24/20 0930   Encounter Summary   Services provided to: Patient   Referral/Consult From: Bobby   Continue Visiting Yes  (4/24/2020 P)   Complexity of Encounter Low   Length of Encounter 15 minutes   Spiritual Assessment Completed Yes   Advance Care Planning Yes   Routine   Type Follow up   Assessment Coping   Intervention Nurtured hope   Outcome Comfort   Spiritual/Taoist   Type Spiritual support   During my contact with the 80 yr (Palliative Care) patient, he was very weak and didn't articulate or verbalize. The pt was coping from a stroke/ cerebrovascular accident. The pt's code status is Limited and the pt does have a Advance Directive on file. Plan: Continued support would be helpful to the patient and his family.

## 2020-04-24 NOTE — DISCHARGE SUMMARY
Hospital Medicine Discharge Summary      Patient Identification:   Kylee Ace   : 1931  MRN: [id number]   Account: [de-identified]      Patient's PCP: Brian Magallanes DO    Admit Date: 2020     Discharge Date:   2020     Admitting Physician: Tho Jackson MD     Discharge Physician: Alla Biswas MD     Discharge Diagnoses:    -Acute CVA  -GI bleed/FOBT positive    -Severe Dysphagia due to acute CVA  -Aspiration Pneumonia  -Mild azotemia, likely due to dehydration due to being NPO, stable  -Leukocytosis, reactive vs related to pna, improving   -Pseudohypocalcemia due to hypoalbuminemia   -Hx of ILD/COPD  -Elevated Troponin, chronic   -Normocytic anemia, stable  -Oral thrush  -Hx of BPH   -Hx of Depression  -Moderate malnutrition      The patient was seen and examined on day of discharge and this discharge summary is in conjunction with any daily progress note from day of discharge. Hospital Course:     Kylee Ace is an 77-year-old Male with past medical history of hyperlipidemia, CAD, CKD, COPD came to [address] [place] [address] ER on 2020 due to slurring of speech and left-sided weakness.  Patient initially was seen in [facility] ER on 2020 with left-sided facial droop.  He was not considered a TPA candidate.  Patient was evaluated by tele-stroke and had CTA of head and neck which showed right ICA 80% stenosis.  It was recommended the patient be transferred to [facility] for intervention, however, at that time family declined and patient felt that he was stable enough to go home.  Patient was then discharged home.  Patient came back to [facility] ER on 2020 due to worsening slurred speech and left-sided facial droop. Per H/P note, history was limited due to patient's speech changes and is hard of hearing and did not have his hearing device.  CT of the head on 2020 showed no acute process, stable appearance of the brain.  Unable to do MRI due to PPM. Patient was evaluated by tele-stroke the case was again discussed with daughter and they agreed to admit for medical management. Patient was then admitted under hospital medicine service for acute CVA. Patient  was started on aspirin, Plavix and high-dose Lipitor. He also received loading dose of Plavix. Neurology was consulted. Patient was seen by neurologist on 4/15/2020, who thinks that this could be acute from both the ischemic stroke, possibly lacunar due to small vessel disease. PT/OT and speech therapy were consulted. Per speech therapy note on 4/14/2020, patient has severe oropharyngeal dysphagia. Unable to perform MBS due to severity. NG placed 4/17 - discontinued by patient. Patient was kept NPO. NG tube was initially placed, however was pulled by patient. TPN was then started. Neurology recommend to hold Plavix in case out PEG is pursued. On 4/19/2020, patient had burgundy stool per previous note. FOBT was done which came back positive. GI was consulted. Aspirin and Plavix and DVT prophylaxis Lovenox were all held. Patient remains NPO and ST is following for dysphagia. Family meeting was done on 4/21/2020 and family agreed for PEG tube placement on 4/22/2020. GI following. On 4/22/2020, patient underwent PEG tube placement done by Dr. Monique Griffith. On 4/23/2020, Dr. Monique Griffith (GI) was okay to start PEG tube feeding and resume asa 81 mg via PEG daily. No bleeding noted since 4/19/2020. Neurology was also okay to resume aspirin. Aspirin 81 mg via PEG tube was resumed on 4/23/2020. Please see below for details of hospital course:       Acute CVA     -Seen by teleneuro. Not a good candidate for tPA. -Does have R ICA 80% stenosis. Family defering intervention by transfer to [facility]. Scooter's currently. -Medical management with ASA/plavix/statin. However, ASA and plavix currently on hold due to recent GI bleed   -also, Plavix/ASA/Lovenox on hold for PEG placement 4/22  -GI and Neurology on-board.   Appreciate specialists admission      Normocytic anemia, stable     -No signs of acute bleed     Oral thrush     -cont nystatin, swab and spit      Hx of BPH     -cont Flomax     Hx of Depression     -cont Zoloft     Moderate malnutrition     -dietician on-board       On 4/24/2020, Dr. Hoy Shone (neurology) and Dr. Bailey Uriarte (GI) are both okay for discharge today. Patient needs to follow-up with Dr. Juanita Coelho in 1 month after hospital discharge. Patient was discharged in IP rehab in stable condition on 4/24/2020. Exam:     Vitals:  Vitals:    04/24/20 0344 04/24/20 0901 04/24/20 0938 04/24/20 1121   BP:  126/63  129/71   Pulse:  70  68   Resp:  16  17   Temp:  98.4 °F (36.9 °C)  97.4 °F (36.3 °C)   TempSrc:  Oral  Oral   SpO2:  95% 96% 96%   Weight: 142 lb 9.6 oz (64.7 kg)      Height:         Weight: Weight: 142 lb 9.6 oz (64.7 kg)     24 hour intake/output:    Intake/Output Summary (Last 24 hours) at 4/24/2020 1201  Last data filed at 4/24/2020 0430  Gross per 24 hour   Intake 1207.25 ml   Output --   Net 1207.25 ml         General appearance: alert, not in acute distress  HEENT: Pupils equal, round, and reactive to light. Conjunctivae clear. (+) some whitish plaque on top of the tongue. Neck: Supple, with full range of motion. Respiratory:  Normal respiratory effort. (+) crackles on posterior lung bases, no wheezing, no rhonchi   Cardiovascular: normal rate, regular rhythm with normal S1/S2 without murmurs, rubs or gallops. Abdomen: (+) PEG tube in placed, soft, non-tender, non-distended   Neurological exam: alert, oriented, (+) dysarthria, follows commands, cranial nerves II through XII intact, gait not examine, (+) left facial droop, motor 4/5 on RUE and RLE, otherwise 5/5  Exam of extremities: peripheral pulses normal, no pedal edema, no clubbing or cyanosis      Labs:  For convenience and continuity at follow-up the following most recent labs are provided:      CBC:    Lab Results   Component Value Date    WBC 11.7 04/24/2020 4/18/2020 1:54 AM      XR ABDOMEN FOR NG/OG/NE TUBE PLACEMENT   Final Result   NG tube tip in the region of the distal stomach in satisfactory position. Non-obstructive bowel gas pattern. **This report has been created using voice recognition software. It may contain minor errors which are inherent in voice recognition technology. **      Final report electronically signed by Dr. Desmond Goldman on 4/17/2020 9:11 PM      XR ABDOMEN FOR NG/OG/NE TUBE PLACEMENT   Final Result      Nasogastric tube as above. Final report electronically signed by Dr. Joselyn Moreno on 4/17/2020 5:49 PM      XR ABDOMEN FOR NG/OG/NE TUBE PLACEMENT   Final Result    NG tube tip projecting over the proximal stomach. Consider advancing a few centimeters. **This report has been created using voice recognition software. It may contain minor errors which are inherent in voice recognition technology. **      Final report electronically signed by Dr. Misael Diaz MD on 4/17/2020 4:55 PM      XR CHEST STANDARD (2 VW)   Final Result   1. Persistent right upper and midlung atelectasis/infiltrate. 2. Chronic lung disease. 3. Mild stable cardiomegaly. **This report has been created using voice recognition software. It may contain minor errors which are inherent in voice recognition technology. **      Final report electronically signed by Dr. Joselyn Moreno on 4/15/2020 7:06 PM      CT Head WO Contrast   Final Result    Moderate-sized area of hypodensity in the lateral aspect of the right temporal lobe may be artifactual although involving infarct can't be excluded. No significant mass effect or intracranial hemorrhage is present. **This report has been created using voice recognition software. It may contain minor errors which are inherent in voice recognition technology. **      Final report electronically signed by Dr. Misael Diaz MD on 4/15/2020 3:44 PM      CT HEAD

## 2020-04-24 NOTE — H&P
80% right ICA stenosis. Patient unable to have an MRI due to an MRI incompatible pacemaker. Patient was noted to have severe oropharyngeal dysphasia and was made n.p.o. After a family meeting it was decided that the patient would receive a PEG tube for nutritional support and this was placed on 4/22. The patient was noted by earlier episode, several days prior of noted blood clots with a bowel movement. The gastroenterology service was initially consulted for this issue; but due to the patient's current medical status a colonoscopy was not completed. His hemoglobin levels have remained stable during this admission. Hospital course was further complicated by aspiration pneumonia for which the patient is currently on Zosyn. The patient is noted to live with his spouse, who has dementia, and receives help from his adult children.     Patient was initially seen on 4/17 while resting comfortably in his hospital bed. Patient was noted to be very fidgety and anxious and presented with poor command following as well is being completely nonverbal at that time. He did engage in social pleasantries to shake hands with this provider and also examined the identification badge of this provider after having introduced himself. He had poor command following at this time; but it is noted that he was moving all 4 limbs spontaneously with a preference for using the left hemibody over the right side. He did have a mild left-sided facial droop at time of examination.     Patient was examined again on 4/24 at which time he had a PEG tube in place and indicated that he was having some mild discomfort at the PEG tube site; examination of the site did not show any redness or draining associated with the recent procedure. He is still moving all 4 limbs spontaneously; command following remains poor at this time. He does initiate speech at this time which is noted to be poorly intelligible.   The left-sided facial droop is largely resolved at this time. He has been reported to be relatively lethargic throughout the day; but has participated with therapies. He has not yet ambulated with assistance including a [employer] stedy for transfers. His family is opted that their preference is for the patient to come to the acute inpatient rehabilitation unit and he appears to be a reasonable candidate at this time; although low level as a starting point. He was deemed medically stable today and will come to the acute inpatient rehabilitation unit with plan to continue his IV Zosyn for his pneumonia. Patient has been tolerating continuous tube feeds without issues. Previously was not independent of ADLs and used Rolling Walker and Straight Cane AD for ambulation prior to recent admission. Initially has not ambulated with PT. With therapy is Moderate assistance for bed mobility, Maximal Assistance, Minimal contact assistance and Total Assistance for transfers, and Minimal contact assistance and Moderate assistance with balance. ROM restrictions, WB restrictions, precautions: Restrictions/Precautions: Fall Risk  Other position/activity restrictions: expressive aphasia, NPO-peg, pushes to R side, R neglect    Fall Risk    Current Rehabilitation Assessments:  Physical Therapy:  General:  Overall Orientation Status: Impaired(Pt very lethargic, limited in following commands. No head shaking yes or no. Eyes closed 80% of session.  )        Hearing: Exceptions to [facility]  Hearing Exceptions: Hard of hearing/hearing concerns, Right hearing aid        Pain:  No(Pt unable to state.   No wincing of pain).        Social/Functional History:    Lives With: Spouse  Type of Home: House  Home Layout: One level  Home Access: Stairs to enter with rails  Entrance Stairs - Number of Steps: 1 milton with 2 grab bars   Entrance Stairs - Rails: Both  Home Equipment: Rolling walker, Cane         ADL Assistance: Needs assistance  Ambulation Assistance: Independent  Transfer work towards increasing strength and functional mobility.         Treatment Initiated: Treatment and education initiated within context of evaluation. Evaluation time included review of current medical information, gathering information related to past medical, social and functional history, completion of standardized testing, formal and informal observation of tasks, assessment of data and development of plan of care and goals. Treatment time included skilled education and facilitation of tasks to increase safety and independence with functional mobility for improved independence and quality of life. See there ex above.      Assessment: Body structures, Functions, Activity limitations: Decreased functional mobility , Decreased balance, Decreased strength, Decreased endurance  Assessment: Pt less alert today than previous treatments. RN reported MD ordering tests and is possible pt may be shipped back to acute based on test results. Pt with weakness, decreased balance , activity tolerance and functional mobility. Pt with limited participation this treatment session. Pt needs continued therapy to improve safety with mobility  Prognosis: Fair    Discharge Recommendations:  Discharge Recommendations: Continue to assess pending progress     Occupational Therapy:  Cognition/Orientation:  Overall Cognitive Status: Exceptions(followed 1 step commands less than 25% of the time, expressive aphasia noted) poor initiation     ADL's:  Feeding: NPO, Dependent/Total  Grooming: Dependent/Total for oral care in bed  UE Bathing: Dependent/Total x2  LE Bathing: Dependent/Total x2  UE Dressing: Dependent/Total x2  LE Dressing: Dependent/Total x2  Toileting: Dependent/Total x2  **Pt found incontinent of stool & urine upon arrival of therapist, dependent for clean up rolling side to side in bed. Pt then sat EOB for initiation of BADL required Max-dependent of 1 for sitting balance while dependent of another for ADL task.  Poor alertness & not following commands to initiate tasks. Returned to supine to pull pants up then when sat back up Pt was incontinent of large amount of loose stool. Nurse, [facility], in & assisted therapist with care of this Pt.      Functional Mobility:  Bed mobility  Rolling to Left: Dependent/Total  Rolling to Right: Dependent/Total  Supine to Sit: Dependent/Total(x 2)  Sit to Supine: Dependent/Total(x 2)    Balance:  Balance  Sitting Balance: Maximum assistance(-dependent; Pt pushing heavily to R side) x 1; requiring A of another for functional tasks     Transfers:    Not safe to complete at this time. Expect dependent x 2 this am.      Upper Extremity Assessment:   LUE AROM : Exceptions(observed shoulder flexion to 45 degrees, distal WFL)  RUE AROM : Exceptions(minimal movement in RUE on this date, Pt able to squeeze therapist's UE )     LUE Strength  Gross LUE Strength: (shoulder 3-/5, elbow 3/5)  RUE Strength  Gross RUE Strength: (minimal AROM noted RUE, able to squeeze therapist's UE 4/5)     Sensation  Overall Sensation Status: Impaired(decreased proprioception noted R hemibody)     Activity Tolerance: Treatment limited secondary to decreased cognition, Patient limited by fatigue     Assessment:  Assessment: Pt demo significant R side neglect, weakness, decreased balance, decreased cognition, & expressive aphasia. Continued OT recommended to faciliate improved participation & indep with ADL tasks within discharge environment. Performance deficits / Impairments: Decreased functional mobility , Decreased cognition, Decreased high-level IADLs, Decreased ADL status, Decreased endurance, Decreased ROM, Decreased strength, Decreased balance, Decreased safe awareness, Decreased vision/visual deficit  Decision Making: High Complexity  Safety Devices in place: Yes  Type of devices:  All fall risk precautions in place, Bed alarm in place, Call light within reach, Gait belt     Treatment Initiated: Treatment and (GLUTOSE) 40 % oral gel 15 g, 15 g, Oral, PRN  acetaminophen (TYLENOL) suppository 650 mg, 650 mg, Rectal, Q4H PRN  albuterol sulfate  (90 Base) MCG/ACT inhaler 2 puff, 2 puff, Inhalation, Q6H PRN  aspirin chewable tablet 81 mg, 81 mg, Per NG tube, Daily  atorvastatin (LIPITOR) tablet 80 mg, 80 mg, PEG Tube, Nightly  [START ON 4/20/2021] calcium replacement protocol, , Other, RX Placeholder  docusate sodium (COLACE) capsule 100 mg, 100 mg, PEG Tube, Daily  ferrous sulfate (IRON 325) tablet 325 mg, 325 mg, PEG Tube, Lunch  insulin lispro (HUMALOG) injection vial 0-12 Units, 0-12 Units, Subcutaneous, Q6H  ipratropium-albuterol (DUONEB) nebulizer solution 3 mL, 3 mL, Inhalation, Q4H PRN  isosorbide mononitrate (IMDUR) extended release tablet 30 mg, 30 mg, Oral, Daily  lansoprazole suspension SUSP 30 mg, 30 mg, Per G Tube, QAM AC  [START ON 4/20/2021] magnesium replacement protocol, , Other, RX Placeholder  nystatin (MYCOSTATIN) 685136 UNIT/ML suspension 500,000 Units, 5 mL, Oral, 4x Daily  piperacillin-tazobactam (ZOSYN) 3.375 g in dextrose 5 % 50 mL IVPB extended infusion (mini-bag), 3.375 g, Intravenous, Q8H  polyethylene glycol (GLYCOLAX) packet 17 g, 17 g, Per G Tube, Daily PRN  [START ON 4/20/2021] potassium replacement protocol, , Other, RX Placeholder  sertraline (ZOLOFT) tablet 50 mg, 50 mg, PEG Tube, Daily  sodium chloride flush 0.9 % injection 10 mL, 10 mL, Intravenous, 2 times per day  sodium chloride flush 0.9 % injection 10 mL, 10 mL, Intravenous, PRN  tamsulosin (FLOMAX) capsule 0.4 mg, 0.4 mg, Oral, BID  Tiotropium Bromide-Olodaterol 2.5-2.5 MCG/ACT AERS 2 puff, 2 puff, Inhalation, Daily  hydrALAZINE (APRESOLINE) tablet 25 mg, 25 mg, Oral, Q6H PRN     Social/Functional History:   reports that he quit smoking about 54 years ago. His smoking use included cigarettes. He has a 20.00 pack-year smoking history.  He has never used smokeless tobacco. He reports current alcohol use of about 7.0 standard drinks of alcohol per week. He reports that he does not use drugs. Lives at [address] [place] [address]. Prior Level of Function:  Lives With: Spouse  Type of Home: House  Home Layout: One level  Home Access: Stairs to enter with rails  Entrance Stairs - Number of Steps: 1 milton with 2 grab bars   Entrance Stairs - Rails: Both  Home Equipment: Rolling walker, Cane   Bathroom Shower/Tub: (Pt unable to report)  Bathroom Toilet: (Pt unable to report)     ADL Assistance: Needs assistance(Daughter and son assist)  Additional Comments: Pt with limited speech this date and difficult to understand. Per PT, unsure if Pt was living at home with his wife who has dementia. Family History:       Problem Relation Age of Onset    Cancer Brother 72        lung    Diabetes Brother     Kidney Disease Father         stones    Stroke Father     Kidney Disease Child         stones    Kidney Disease Child         stones    Heart Disease Sister     Arthritis Sister     High Blood Pressure Sister     High Cholesterol Sister     Diabetes Brother         multiple siblings had dm    Early Death Brother 72        lung cancer     Review of Systems:  Review of Systems   Unable to perform ROS: Other   Constitutional: Positive for activity change, appetite change and fatigue. Negative for fever. HENT: Positive for trouble swallowing and voice change. Eyes: Negative for pain. Respiratory: Positive for shortness of breath. Negative for cough. Gastrointestinal: Positive for blood in stool. Negative for constipation, diarrhea and nausea. Endocrine: Negative for cold intolerance. Genitourinary: Negative for frequency and urgency. Musculoskeletal: Positive for gait problem and joint swelling. Skin: Negative for pallor. Allergic/Immunologic: Negative. Neurological: Positive for speech difficulty and weakness. Negative for dizziness, tremors and headaches.    Psychiatric/Behavioral: Positive for confusion and decreased concentration. Negative for dysphoric mood. The patient is not nervous/anxious. All other systems reviewed and are negative.   Severe expressive aphasia    Physical Exam:  /67   Pulse 86   Temp 101 °F (38.3 °C) (Rectal)   Resp 30   Ht 5' 6\" (1.676 m)   Wt 137 lb 6.4 oz (62.3 kg)   SpO2 94%   BMI 22.18 kg/m²     awake  Orientation:   Person, unable to assess more due to significant expressive aphasia  Mood: anxious  Affect: anxious  General appearance: Patient is well nourished, well developed, well groomed and in no acute distress, appearing stated age, PEG, up in bed     Memory:   abnormal -abnormal to assess due to significant expressive aphasia  Attention/Concentration: abnormal -poor command following with perseveration of motor movements  Language:  abnormal -patient with severe expressive aphasia     Cranial Nerves:  cranial nerves II-XII are grossly intact   ROM:  normal  Motor Exam:  Motor exam is grossly 4/5 for the right hemibody and 3+/5 for the left hemibody     Tone:  normal  Muscle bulk: within normal limits  Sensory:  Sensory intact  Coordination:   abnormal -unable to test due to poor command following  Deep Tendon Reflexes:  Reflexes are intact and symmetrical bilaterally     Skin: warm and dry, no rash or erythema  Peripheral vascular: Pulses: Normal upper and lower extremity pulses; Edema: 1+    Diagnostics:  Recent Results (from the past 24 hour(s))   POCT Glucose    Collection Time: 04/24/20  8:56 PM   Result Value Ref Range    POC Glucose 111 (H) 70 - 108 mg/dl   POCT Glucose    Collection Time: 04/25/20 12:42 AM   Result Value Ref Range    POC Glucose 148 (H) 70 - 108 mg/dl   CBC    Collection Time: 04/25/20  4:40 AM   Result Value Ref Range    WBC 15.3 (H) 4.8 - 10.8 thou/mm3    RBC 3.91 (L) 4.70 - 6.10 mill/mm3    Hemoglobin 11.1 (L) 14.0 - 18.0 gm/dl    Hematocrit 35.1 (L) 42.0 - 52.0 %    MCV 89.8 80.0 - 94.0 fL    MCH 28.4 26.0 - 33.0 pg    MCHC 31.6 (L) 32.2 -

## 2020-04-24 NOTE — PROGRESS NOTES
[address] [address]  INPATIENT SPEECH THERAPY  4A  DAILY NOTE    TIME   SLP Individual Minutes  Time In: 4805  Time Out: 1113  Minutes: 15  Timed Code Treatment Minutes: 0 Minutes       Date: 2020  Patient Name: Ramona Be      CSN: [id number]   : 1931  (80 y.o.)  Gender: male   Referring Physician:  Gigi Garcia MD  Diagnosis: Stroke of unknown cause  Secondary Diagnosis: Dysphagia  Precautions: Fall risk, aspiration precautions  Current Diet: NPO  Swallowing Strategies: Not Applicable and Comprehensive Oral Care  Date of Last MBS: Not Applicable    Pain:  No pain reported. Subjective:  Pt with PEG tube placement ; patient tolerating well. Pt seen sitting upright in chair; alert upon ST entering room. Patient with improved alertness and engagement within therapeutic interventions. No family present. Per chart review patient with plans for IP Rehab later this date. Short-Term Goals:  SHORT TERM GOAL #1:  Goal 1: Pt will safely consume PO trials of ice chips, thin liquids, and purees (as deemed clinically appropriate) without overt s/s aspiration to determine readiness for potential PO diet level initiation and/or completion of formal instrumentation. INTERVENTIONS:    Patient with fair-good tolerance of aggressive oral care measures; patient continues with improved oral care hygiene. Patient able to elicit swallow reflex x9 total this date provided mod-max cues; significant improvement compared to previous sessions. Patient with improved swallow reflex provided tactile stimulation with use of toothett to the facial arches; delayed swallow of 8-10+ seconds -although able to achieve successful swallow.  Patient completed additional PO trials of honey thick liquids via spoon; improved effort to complete labial seal to extract material from spoon, poor A-P transit with material remaining within anterior portion of labial seal. Provided max cues patient + tactile stimulation to facial arches patient able to elicit swallow reflex. Patient with delayed cough noted x2 throughout treatment; able to elicit swallow reflex to clear material. Reduced drooling also noted this date. No further PO trials completed d/t HIGH level of fatigue. Recommend NPO with all hydration/nutrition via PEG tube + ongoing dysphagia intervention. Patient remains at [facility] for aspiration, d/t poor ability to manage secretions at this time  + overall severity of dysphagia. ST with plan to updated daughter Mitchell Bamberger later this date. SHORT TERM GOAL #2:  Goal 2: Staff will exhibit return demonstration for implementation of comprehensive oral care procedural analysis to maximize oral integrity and to reduce potential bacteria colonization. INTERVENTIONS: Prior to intake, comprehensive oral care completed via toothettes + hydrogen peroxide solution. Good tolerance of oral care overall; oral hygiene much improved compared to pervious sessions, continued amounts of white like patches noted on lingual surface of tongue. Recommend ongoing oral care within completion of nursing rounding/assessments. SHORT TERM GOAL #3:  Goal 3: Complete full ST evaluation to develop goals per POC. INTERVENTIONS:  Yes/no questions via head nod:  4/5 indep, 1/5 max cues     Long-Term Goals:  No LTG established given short ELOS       EDUCATION:  Learner: Patient  Education:  Reviewed results and recommendations of this evaluation, Reviewed diet and strategies, Reviewed signs, symptoms and risks of aspiration and Reviewed ST goals and Plan of Care  Evaluation of Education: Verbalizes understanding, Needs further instruction and Family not present    ASSESSMENT/PLAN:  Activity Tolerance:  Patient tolerance of  treatment: fair. Barriers at this time include lethargy levels. Assessment/Plan: Patient progressing toward established goals.   Continues to require skilled care of licensed speech pathologist to progress

## 2020-04-24 NOTE — PROGRESS NOTES
Spoke with Dr. Chantale Ramsey and he is ok with patient coming to inpatient rehab today. Patient will got to [phone]. Jesika Romo CM and [facility] PSY[facility] RN made aware.

## 2020-04-24 NOTE — PROGRESS NOTES
Admitted to the Inpatient Rehabilitation Unit via bed. Patient was then oriented to room and unit. Education provided on the rehabilitation routine: three hours of therapy five days per week and dining room for lunch. Explained patients right to have family, representative or physician notified of their admission. Patient has Requested for physician to be notified. Patient has Requested for family/representative to be notified. Admitting medication orders compared with acute stay medications; home medication list reviewed with patient/family. Medication issues identified No  Medication issue: na  If yes, physician notified na. Bladder and Bowel Function Assessment:  1. Prior history of bladder problems: no problems with bladder  2. Number of pads used per day: unknown  3. Frequency of night time voiding: twice  4. Fluid intake volume and pattern: adequate   5. Last BM: 4/24/2020  6. Prior history of bowel problems: No      Incontinence      Frequent diarrhea      Constipation      Hemorrhoids      Diverticulitis      Bowel Surgery     Two nurse skin assessment performed by LEIA Cameron LPN  and Marcel Ni LPN . Care plan was created with patient's input and goals were agreed upon. Admission folder provided with education regarding patients diagnoses, fall prevention, skin care, and M in the box. \"Data Collection Information Summary for Patients in Inpatient Rehabilitation Facilities\" and \"Privacy Act Statement - Health Care Records\" provided. Please refer to the admission navigator for further information.

## 2020-04-24 NOTE — PROGRESS NOTES
expressive aphasia, NPO, pushes to R side, R neglect    Isolation Precautions: None       Physiatrist: Dr. Bob Spatz    Patients Occupation: Retired  Reviewed Lab and Diagnostic reports from Current Admission: Yes    Patients Prior Functional  Level: Prior Function  ADL Assistance: Needs assistance(Daughter and son assist)  Additional Comments: Pt with limited speech this date and difficult to understand. Per PT, unsure if Pt was living at home with his wife who has dementia. Current functional status for upper extremity ADLs:   Moderate assistance  Current functional status for lower extremity ADLs:   dependent  Current functional status for bed, chair, wheelchair transfers:   Sit to Stand:  Minimal Assistance, X 2. onto LANCE STEDY, hand over hand with RUE  Stand to sit: mod A x 1 onto EOB from [address], hand over hand RUE  Current functional status for toilet transfers:   Sit to Stand:  Minimal Assistance, X 2. onto LANCE STEDY, hand over hand with RUE  Stand to sit: mod A x 1 onto EOB from [address], hand over hand RUE       Current functional status for bladder management: Total Assistance    Current functional status for bowel management:Total Assistance    Current functional status for comprehension: Moderate assistance    Current functional status for expression: Moderate assistance    Current functional status for social interaction: Moderate assistance    Current functional status for problem solving: Moderate assistance    Current functional status for memory: Moderate assistance    Expected level of Improvement in Self-Care:   Moderate assistance    Expected level of Improvement in Sphincter Control:  Complete independence    Expected level of Improvement in Transfers: Complete independence    Expected level of Improvement in Locomotion:  Complete independence    Expected level of Improvement in Communication and Social Cognition: Complete independence    Expected length of time to achieve that level

## 2020-04-24 NOTE — PROGRESS NOTES
[address]  Occupational Therapy  Daily Note  Time:    Time In: 1001  Time Out: 1030  Timed Code Treatment Minutes: 29 Minutes  Minutes: 29          Date: 2020  Patient Name: Yanick Luis,   Gender: male      Room: [facility]10/010-A  MRN: [id number]  : 1931  (80 y.o.)  Referring Practitioner: Carlota MICHELLE  Diagnosis: Stroke of unknown cause  Additional Pertinent Hx: 70-year-old gentleman with past medical history of hyperlipidemia, CAD, CKD, COPD came to ER due to slurring of speech. Patient initially came in yesterday with left-sided weakness. He was not considered a TPA candidate. Patient was evaluated by tele-stroke and had imaging which showed right ICA 80% stenosis. It was recommended the patient be transferred to [place] [address] for intervention. At that time family declined and patient felt that he was stable enough to go home. Patient comes back today due to worsening speech and facial droop. He denies any weakness. Patient was evaluated by tele-stroke the case was again discussed with daughter and they agree to admit for medical management. CT head showed Moderate-sized area of hypodensity in the lateral aspect of the right temporal lobe may be artifactual although involving infarct can't be excluded. Restrictions/Precautions:  Restrictions/Precautions: Fall Risk  Position Activity Restriction  Other position/activity restrictions: expressive aphasia, NPO, pushes to R side, R neglect    SUBJECTIVE: Pt lying in be with family present this date. Pt attempting to verbalize and express himself. Pt was able to communicate to this therapist that his abdominal binder wasn't hooked by using left hand to manipulate clothing. Pt moaning and groaning while attempting to hook d/t increased pain in site. Roselyn Hollingsworth RN in room and aware.      PAIN: unable to state/10: Pt demo pain behaviors of moaning when attempting to hook up abdominal binder probably at peg tube site. COGNITION: Pt wit expressive aphasia throughout. Pt was abl eto commuicate through use of hands on several occasions his wishes/concerns. Pt attmetping to answer simple questions with head shaking but unsure of reliability. ADL:   Lower Extremity Dressing: Maximum Assistance. donning socks. Pt was able to raise left UE up off bed and hold it to allow sock placement. Pt requiring assistance for right Le to be raised but was able to hold against gravity for several seconds   Toileting: Maximum Assistance. for hgyiene d/t incontinence. BALANCE:  Sitting Balance:  Maximum Assistance. x1 initially at EOB d/t pt pushing with left UE. Once properly positioned at EOB and left hand placed on right knee pt demo less pushing  and better midline sitting requiring mod to occasonal min A. Pt with improved midline sitting as well when bilateral UEs on bar of love stedy. complted EOB sitting for increased core strength and to facilitate midline sitting for participation in ADLs. Standing Balance: Contact Guard Assistance, Moderate Assistance, X 2. to min A andCGA x1 [facility]. tacile and verbal cues provided for left UE to flex elbow to decrease pushing to right side with improved results of midline standing requiring less assistance as well. hand over hand completed for right UE during    BED MOBILITY:  Rolling to Left: Maximum Assistance to initiate roll with right hand placed on bed rail during  Rolling to Right: Moderate Assistance with cues to bring left UE over to bedrail during  Supine to Sit: Maximum Assistance from sidelying to sit    TRANSFERS:  Sit to Stand:  Minimal Assistance, Moderate Assistance, X 2. from EOB into love sTedy   Stand to Sit: Moderate Assistance, X 2. for safet and controlled decent into chair    FUNCTIONAL MOBILITY:  Not appropriate         ASSESSMENT:     Activity Tolerance:  Patient tolerance of  treatment: fair.  Increased fatigue and pain noted during

## 2020-04-24 NOTE — PROGRESS NOTES
[address] [place] [address]  INPATIENT PHYSICAL THERAPY  DAILY NOTE  [facility] 4A - 4A-10/010-A    Time In: 1065  Time Out: 1148  Timed Code Treatment Minutes: 32 Minutes  Minutes: 31          Date: 2020  Patient Name: Beryle Lorenzo,  Gender:  male        MRN: [id number]  : 1931  (80 y.o.)     Referring Practitioner: Annia Patrick MD  Diagnosis: Stroke of unknown cause   Additional Pertinent Hx: 59-year-old gentleman with past medical history of hyperlipidemia, CAD, CKD, COPD came to ER due to slurring of speech. Patient initially came in yesterday with left-sided weakness. He was not considered a TPA candidate. Patient was evaluated by tele-stroke and had imaging which showed right ICA 80% stenosis. It was recommended the patient be transferred to [facility] for intervention. At that time family declined and patient felt that he was stable enough to go home. Patient was discharged. Patient comes back today due to worsening speech and facial droop. He denies any weakness. History is limited due to patient's speech changes and is hard of hearing and does not have his hearing device. Patient was evaluated by tele-stroke the case was again discussed with daughter and they agree to admit for medical management. Prior Level of Function:  Lives With: Spouse  Type of Home: House  Home Layout: One level  Home Access: Stairs to enter with rails  Entrance Stairs - Number of Steps: 1 milton with 2 grab bars   Entrance Stairs - Rails: Both  Home Equipment: Rolling walker, Cane   Bathroom Shower/Tub: (Pt unable to report)  Bathroom Toilet: (Pt unable to report)    ADL Assistance: Needs assistance(Daughter and son assist)  Additional Comments: Pt with limited speech this date and difficult to understand. Per PT, unsure if Pt was living at home with his wife who has dementia.     Restrictions/Precautions:  Restrictions/Precautions: Fall Risk  Position Activity Restriction  Other position/activity

## 2020-04-24 NOTE — DISCHARGE SUMMARY
follow commands to perform other there ex at this time. Exercises were completed for increased independence with functional mobility.     Functional Outcome Measures: Completed     ASSESSMENT:  Activity Tolerance:  Patient tolerance of  treatment: poor. Pt appears to have additional medical complications arising. Further testing being performed at this time. Will continue to assess pt and if pt appropriate will work towards increasing strength and functional mobility.         Treatment Initiated: Treatment and education initiated within context of evaluation. Evaluation time included review of current medical information, gathering information related to past medical, social and functional history, completion of standardized testing, formal and informal observation of tasks, assessment of data and development of plan of care and goals. Treatment time included skilled education and facilitation of tasks to increase safety and independence with functional mobility for improved independence and quality of life. See there ex above.      Assessment: Body structures, Functions, Activity limitations: Decreased functional mobility , Decreased balance, Decreased strength, Decreased endurance  Assessment: Pt less alert today than previous treatments. RN reported MD ordering tests and is possible pt may be shipped back to acute based on test results. Pt with weakness, decreased balance , activity tolerance and functional mobility. Pt with limited participation this treatment session. Pt needs continued therapy to improve safety with mobility  Prognosis: Fair    Discharge Recommendations:  Discharge Recommendations: Continue to assess pending progress    Mobility:  , PT Equipment Recommendations  Equipment Needed: No, Assessment: Pt less alert today than previous treatments. RN reported MD ordering tests and is possible pt may be shipped back to acute based on test results.   Pt with weakness, decreased balance , and confusion with onset of the symptoms at approximately 1250 that day. [facility] PCP referred him to come into the emergency department and he declined to be admitted.  Recommendation for tele-stroke consult was for the patient to transfer to [facility] for neuro intervention for a known right ICA stenosis of 80%; patient also declined this and with his family in agreement he was discharged to home from the emergency department.  On 4/14 he presented again to the emergency department for acute worsening of his slurred speech with absence of any limb weakness.  Head CT is on both of these instances were absent any findings of acute processes; with the CTA of head and neck revealing the 80% right ICA stenosis.  Patient unable to have an MRI due to an MRI incompatible pacemaker.  Patient was noted to have severe oropharyngeal dysphasia and was made n.p.o.  After a family meeting it was decided that the patient would receive a PEG tube for nutritional support and this was placed on 4/22.  The patient was noted by earlier episode, several days prior of noted blood clots with a bowel movement.  The gastroenterology service was initially consulted for this issue; but due to the patient's current medical status a colonoscopy was not completed.  His hemoglobin levels have remained stable during this admission.  Hospital course was further complicated by aspiration pneumonia for which the patient is currently on Zosyn.   The patient is noted to live with his spouse, who has dementia, and receives help from his adult children.     Patient was initially seen on 4/17 while resting comfortably in his hospital bed.  Patient was noted to be very fidgety and anxious and presented with poor command following as well is being completely nonverbal at that time. Billy Rice did engage in social pleasantries to shake hands with this provider and also examined the identification badge of this provider after having introduced himself. Billy Rice had poor command decreased cognition     Equipment Recommendations: Other: Monitor pending progress     Plan:  Times per week: 90 minutes 5x/wk, 30 minutes 1x/wk  Current Treatment Recommendations: Strengthening, Safety Education & Training, Balance Training, Patient/Caregiver Education & Training, Self-Care / ADL, Cognitive/Perceptual Training, Functional Mobility Training, Equipment Evaluation, Education, & procurement, Endurance Training     Goals:  Patient goals : Pt unable to state  Short term goals  Time Frame for Short term goals: 1 week  Short term goal 1: Tolerate sitting EOB x 8 min with 0>mod A & min vcs for midline posture for eventual EOB ADLs  Short term goal 2: Follow 1-step commands 50% of the time with mod encouragement for increased safety & participation & ADL tasks  Short term goal 3: Attend to R hemibody with mod vcs to increase BUE intergration for grooming tasks  Short term goal 4: Tolerate further assessment of functional t/fs by OTR when able to tolerate  Short term goal 5:    Long term goals  Time Frame for Long term goals : 4 weeks  Long term goal 1: Complete EOB ADL task with 0>min A for sitting balance 8-10 min EOB  See long-term goal time frame for expected duration of plan of care. If no long-term goals established, a short length of stay is anticipated.   Significant Diagnostics:     Lab Results   Component Value Date     04/25/2020    K 4.1 04/25/2020     04/25/2020    CO2 23 04/25/2020    BUN 26 (H) 04/25/2020    CREATININE 1.0 04/25/2020    GLUCOSE 132 (H) 04/25/2020    CALCIUM 8.5 04/25/2020    PROT 6.7 04/25/2020    LABALBU 2.8 (L) 04/25/2020    BILITOT 0.3 04/25/2020    ALKPHOS 75 04/25/2020    AST 16 04/25/2020    ALT 10 (L) 04/25/2020    LABGLOM 70 (A) 04/25/2020     Recent Labs     04/25/20  0440 04/24/20  0400 04/23/20  0550   WBC 15.3* 11.7* 13.3*   HGB 11.1* 10.1* 9.7*   HCT 35.1* 31.6* 30.6*   MCV 89.8 90.5 90.0    287 279     Recent Labs     04/25/20  0558 04/25/20  0841 04/25/20  1315   POCGLU 160* 100 123*     Cholesterol Panel:   Results in Past 30 Days  Result Component Current Result Ref Range Previous Result Ref Range   Cholesterol, Total 143 (4/16/2020) 100 - 199 mg/dL Not in Time Range    HDL 47 (4/16/2020) mg/dL Not in Time Range    LDL Calculated 76 (4/16/2020) mg/dL Not in Time Range    Triglycerides 78 (4/18/2020) 0 - 199 mg/dL 98 (4/16/2020) 0 - 199 mg/dL     Labs Renal Latest Ref Rng & Units 4/25/2020 4/24/2020 4/23/2020 4/22/2020 4/21/2020   BUN 7 - 22 mg/dL 26(H) 27(H) 27(H) 29(H) 26(H)   Cr 0.4 - 1.2 mg/dL 1.0 1.2 1.0 1.1 1.1   K 3.5 - 5.2 meq/L 4.1 4.1 4.3 4.2 3.5   Na 135 - 145 meq/L 137 137 140 140 138      Results for Wyatt Guillen (MRN [id number]) as of 4/28/2020 22:43   Ref.  Range 4/19/2020 13:53   Vit D, 25-Hydroxy Latest Ref Range: 30 - 100 ng/ml 36     Patient Instructions:    See AVS  Follow-up visits: See after visit summary from hospitalization    Discharge Medications:   [place], [address] Medication Instructions UTF:[id number]    Printed on:04/25/20 1404   Medication Information                      acetaminophen (TYLENOL) 650 MG extended release tablet  Take 650 mg by mouth every 8 hours as needed for Pain             albuterol sulfate  (90 Base) MCG/ACT inhaler  INHALE 2 PUFFS BY MOUTH INTO THE LUNGS EVERY 6 HOURS AS NEEDED FOR WHEEZING             aspirin 81 MG chewable tablet  1 tablet by Per NG tube route daily             atorvastatin (LIPITOR) 80 MG tablet  Take 1 tablet by mouth nightly             cyanocobalamin 1000 MCG tablet  1 tablet by PEG Tube route daily             docusate sodium (COLACE, DULCOLAX) 100 MG CAPS  100 mg by PEG Tube route 2 times daily as needed for Constipation             ferrous sulfate (IRON 325) 325 (65 Fe) MG tablet  1 tablet by PEG Tube route Daily with lunch             ipratropium-albuterol (DUONEB) 0.5-2.5 (3) MG/3ML SOLN nebulizer solution  Inhale 3 mLs into the lungs every 4 hours as

## 2020-04-25 NOTE — PROGRESS NOTES
N.O. consult neurology per Dr. Edelmira Vogel.    Dr. Rafael Gutierrez consulted order received for Head CT without contrast.

## 2020-04-25 NOTE — PROGRESS NOTES
[facility]REAT[facility]  [address]      Date: 2020  Patient Name: Myah Alfaro        MRN: [id number]    : 1931  (80 y.o.)    REASON FOR MISSED TREATMENT: RN deferred ST evaluation (Speech Eval/BSE) on this date d/t recent medical decline. Per chart review and RN report, they are running labs and anticipate the pt being transferred back to acute care setting. ST will continue to monitor and complete further assessment as medically appropriate and/or available. Jia Fletcher MA., SOQ-KBN

## 2020-04-25 NOTE — PROGRESS NOTES
[address] [place] [address]  PHYSICAL THERAPY MISSED TREATMENT NOTE  ACUTE CARE  [facility]- [address]              Missed Treatment  Attempted to see pt for missed time. Pt taken for chest xray in AM.  In PM pt on hold due to small PE. Will check back on pt next available treatment time.     Zoë Hernandez, DPT [id number]

## 2020-04-25 NOTE — PROGRESS NOTES
Patient was kept NPO. NG tube was initially placed, however was pulled by patient. Family meeting was done on 2020 and family agreed for PEG tube placement on 2020. GI following. On 2020, patient underwent PEG tube placement done by Dr. Dyan Apgar. On 2020, Dr. Dyan Apgar (GI) was okay to start PEG tube feeding and resume asa 81 mg via PEG daily. No bleeding noted since 2020. Restrictions/Precautions:  Restrictions/Precautions: Fall Risk  Position Activity Restriction  Other position/activity restrictions: expressive aphasia, NPO-peg, pushes to R side, R neglect    Subjective:  Chart Reviewed: Yes  Patient assessed for rehabilitation services?: Yes  Subjective: Pt sleeping soundly in bed at start of treatment session. Pt difficult to arouse. RN reported pt is triggering the scale for possible sepsis. RN reported pt has a 101 fever rectaly. RN approved session but pt going down for a chest xray to r/o pneumonia. Lab in to draw blood during session. Unable to complete treatment time due to pt being taken for testing. General:  Overall Orientation Status: Impaired(Pt very lethargic, limited in following commands. No head shaking yes or no. Eyes closed 80% of session.  )         Hearing: Exceptions to [facility]  Hearing Exceptions: Hard of hearing/hearing concerns, Right hearing aid         Pain:  No(Pt unable to state. No wincing of pain). Social/Functional History:    Lives With: Spouse  Type of Home: House  Home Layout: One level  Home Access: Stairs to enter with rails  Entrance Stairs - Number of Steps: 1 milton with 2 grab bars   Entrance Stairs - Rails: Both  Home Equipment: Rolling walker, Cane             ADL Assistance: Needs assistance     Ambulation Assistance: Independent  Transfer Assistance: Independent          Additional Comments: The above is per previous eval: Pt unable to give any home info even with head nods on this date.   Per SW note, Pt was living home & caregiver to wife who has dementia-family A.     OBJECTIVE:  Range of Motion:  Right Lower Extremity: Impaired - No AROM R LE, pt not following commands. PROM WFL   Left Lower Extremity: Impaired - Pt limited in following commands this date. Pt able to perform AAROM inconsitently. PROM WFL     Strength:  Right Lower Extremity: NO AROM R LE   Left Lower Extremity: Impaired - Unable to accurately assess MMT, pt able perform AAROM for ther ex. Balance:  Static Sitting Balance:  Maximum Assistance, X 1 Pt sat EOB 3'x  1 with Max A. Pt with extremely flexed posture and unable to lift head up. Pt pushing to the R. Upon extending L LE out in front of pt pt able to improve balance towards midline slightly. Static Standing Balance: Maximum Assistance, X 2 to  the sera steady. Pt stood in sera steady 20\"x 1 with max A x 2. Bed Mobility:  Rolling to Left: Dependent   Rolling to Right: Maximum Assistance, X 1   Supine to Sit: Dependent, X 1  Sit to Supine: Dependent, X 2     Transfers:  Sit to Stand: Maximum Assistance, X 2, with max A  to place B UEs onto the sera steady. Stand to Sit:Maximum Assistance, X 2    Ambulation:  Not Tested      Exercise:  Patient was guided in 2 set(s) 10 reps of exercise (AAROM on L and PROM on the R) to B lower extremities. Ankle pumps, heel slides, hip ABD. Pt unable to follow commands to perform other there ex at this time. Exercises were completed for increased independence with functional mobility. Functional Outcome Measures: Completed       ASSESSMENT:  Activity Tolerance:  Patient tolerance of  treatment: poor. Pt appears to have additional medical complications arising. Further testing being performed at this time. Will continue to assess pt and if pt appropriate will work towards increasing strength and functional mobility. Treatment Initiated: Treatment and education initiated within context of evaluation.   Evaluation time included review of current medical information, gathering information related to past medical, social and functional history, completion of standardized testing, formal and informal observation of tasks, assessment of data and development of plan of care and goals. Treatment time included skilled education and facilitation of tasks to increase safety and independence with functional mobility for improved independence and quality of life. See there ex above. Assessment: Body structures, Functions, Activity limitations: Decreased functional mobility , Decreased balance, Decreased strength, Decreased endurance  Assessment: Pt less alert today than previous treatments. RN reported MD ordering tests and is possible pt may be shipped back to acute based on test results. Pt with weakness, decreased balance , activity tolerance and functional mobility. Pt with limited participation this treatment session. Pt needs continued therapy to improve safety with mobility  Prognosis: Fair         Discharge Recommendations:  Discharge Recommendations: Continue to assess pending progress    Patient Education:  PT Education: Transfer Training, PT Role, Plan of Care, Home Exercise Program, Gait Training, Functional Mobility Training    Equipment Recommendations:  Equipment Needed: No    Plan:  Times per week: 5x/week 90 min 1x/week 30 min   Current Treatment Recommendations: Strengthening, Gait Training, Balance Training, Functional Mobility Training, Endurance Training, Transfer Training, Home Exercise Program    Goals:  Patient goals : None stated at this time. Anticipate increase strength   Short term goals  Time Frame for Short term goals: 1 week   Short term goal 1: Supine <-->sit with min A x 1 to get in and out of bed   Short term goal 2: Sit <-->stand with min A x 1 in sera steady to get up to go to the bathroom  Short term goal 3: Stand/sit pivot bed to chair t/f with mod A x 1 to safely transfer on and off various surfaces.    Short term goal 4: Static sitting balance with min A x 1 attaining midline x 3' x 1 for functional activtieis   Short term goal 5: PT to assess w/c mobility   Long term goals  Time Frame for Long term goals : 2 weeks s  Long term goal 1: Supine <-->Sit with CGA x 1 to get in and out of bed   Long term goal 2: Sit <-->Stand with CGA x 1 in sera steady to get up to go to the bathroom   Long term goal 3: Stand/sit pivot bed to chair t/f with min A x1 to safely t/f on and off various surfaces. Long term goal 4: Pt to tolerate standing 2'x 1 in midline with CGA x 1 to prepare for pregait activiteis. Long term goal 5: PT to assess gait when pt appropriate. Following session, patient left in safe position with all fall risk precautions in place.

## 2020-04-25 NOTE — PROGRESS NOTES
[address]  EVALUATION    Time:    Time In: 0700  Time Out: 0800  Timed Code Treatment Minutes: 39 Minutes  Minutes: 60          Date: 2020  Patient Name: Gabriel Rodriguez,   Gender: male      MRN: [id number]  : 1931  (80 y.o.)  Referring Practitioner: Dr. Christine Kiser  Diagnosis: CVA  Additional Pertinent Hx: 42-year-old gentleman with past medical history of hyperlipidemia, CAD, CKD, COPD came to ER due to slurring of speech. Patient initially came in yesterday with left-sided weakness. He was not considered a TPA candidate. Patient was evaluated by tele-stroke and had imaging which showed right ICA 80% stenosis. It was recommended the patient be transferred to [facility] for intervention. At that time family declined and patient felt that he was stable enough to go home. Patient comes back today due to worsening speech and facial droop. He denies any weakness. Patient was evaluated by tele-stroke the case was again discussed with daughter and they agree to admit for medical management. CT head showed Moderate-sized area of hypodensity in the lateral aspect of the right temporal lobe may be artifactual although involving infarct can't be excluded. Pt had a peg tube placed 2020. Admitted to [facility] on 2020. Estiven Boykin Restrictions/Precautions:  Restrictions/Precautions: Fall Risk  Position Activity Restriction  Other position/activity restrictions: expressive aphasia, NPO-peg, pushes to R side, R neglect    Subjective  Chart Reviewed: Yes, Progress Notes, Orders, History and Physical, Imaging  Patient assessed for rehabilitation services?: Yes  Family / Caregiver Present: No    Subjective: drowsy, eyes closed 98% of session. Pt noted to be rubbing belly when therapist came in, Pt was found to be incontinent of urine & stool. Pt squeezed therapist's UE, little attempts at verbalization.      Pain:  Pain

## 2020-04-25 NOTE — PROGRESS NOTES
accumulation,    6.  Strength-Not measured    Nutrition Risk Level: High    Nutrition Needs:  · Estimated Daily Total Kcal: ~1807 kcals ( 29 kcals/kg on admit weight of 62.3 kg)  · Estimated Daily Protein (g): ~69 grams ( 1.1 grams protein/kg on admit weight of 62.3 kg)  · Estimated Daily Fluid (ml/day): ~5321-7219 ml ( 25-30 ml/kg on weight of 62.3 kg)    Nutrition Diagnosis:   · Problem: Moderate malnutrition  · Etiology: related to Difficulty swallowing, Insufficient energy/nutrient consumption, Cognitive or neurological impairment     Signs and symptoms:  as evidenced by Moderate muscle loss, Moderate loss of subcutaneous fat    Objective Information:  · Nutrition-Focused Physical Findings: Pt seen with Jevity 1.5 TF infusing at 50 ml/hr with 230 ml free H20 flush every 6 hours via PEG. Unable to wake pt this morning & per nursing has tried as well. Per Dejah RN concerned with possible sepsis & getting chest Xray. Per Dejah RN, Dr Deysi Cazares would like TF changed to bolus & they will make sure to use Aspiration precautions as they do for all patients. Diid tell Dejah RN that TF recs will be in Sticky note it pt declines & needs to change back to continous TF. WBC 15.3, glucose 132, meds include humalog, bm med & iron 4/19 Vitamin D 36, but at lower end of accepted range & pt with hx of Vitamin D deficency. Pt NPO. + bm yesterday . · Wound Type:  none documented. Pt s/p PEG 4/22  · Current Nutrition Therapies:  · Oral Diet Orders: NPO   · Oral Diet intake: NPO  · Oral Nutrition Supplement (ONS) Orders: None  · ONS intake: NPO  · Tube Feeding (TF) Orders:   · Feeding Route: Gastrostomy(PRG placed 4/22/20)  · Formula: 1.5 Calorie with Fiber(Jevity 1.5 )  · Rate (ml/hr):Per Dr. Deysi Cazares order to change TF to boluses. Bolus 240 ml Jevity 1.5 , 5 times daily. Flush 90 ml free H20 before & after each TF bolus. Suggested bolus times 6AM, 10AM, 2PM, 6PM & 10 PM.  Aspiration precautions.     · Volume (ml/day): 1200

## 2020-04-25 NOTE — PLAN OF CARE
Care plan reviewed with patient and daughter. Patient and daughter verbalize understanding of the plan of care and contribute to goal setting. Problem: Skin Integrity:  Goal: Absence of new skin breakdown  Description: Absence of new skin breakdown  Outcome: Completed  Note: No new skin breakdown noted. Cely care completed with each bowel movement. Problem: Falls - Risk of:  Goal: Will remain free from falls  Description: Will remain free from falls  Outcome: Completed  Note: No falls. Patient stayed in bed all shift. Problem: Falls - Risk of:  Goal: Absence of physical injury  Description: Absence of physical injury  Outcome: Completed     Problem: Infection - Central Venous Catheter-Associated Bloodstream Infection:  Goal: Will show no infection signs and symptoms  Description: Will show no infection signs and symptoms  Outcome: Completed     Problem: Mobility - Impaired:  Goal: Mobility will improve  Description: Mobility will improve  Outcome: Completed     Problem: Discharge Planning:  Goal: Discharged to appropriate level of care  Description: Discharged to appropriate level of care  Outcome: Completed     Problem: IP BOWEL ELIMINATION  Goal: LTG - patient will have regular and routine bowel evacuation  Outcome: Completed     Problem: IP BOWEL ELIMINATION  Goal: STG - patient will be accident free  Outcome: Completed  Note: Patient incontinent of stool all shift. Problem: IP BLADDER/VOIDING  Goal: LTG - patient will complete bladder elimination  Outcome: Completed     Problem: IP BLADDER/VOIDING  Goal: LTG - patient will achieve acceptable level of continence  Outcome: Completed     Problem: IP BLADDER/VOIDING  Goal: STG - Patient demonstrates no accidents  Outcome: Completed  Note: Incontinent of bladder all shift.       Problem: IP TRANSFERS  Goal: LTG - patient will transfer from one surface to another  Outcome: Completed     Problem: IP TRANSFERS  Goal: STG - patient will perform bed
Problem: Nutrition  Goal: Optimal nutrition therapy  Outcome: Ongoing   Nutrition Problem: Moderate malnutrition  Intervention: Food and/or Nutrient Delivery: (TF changed to bolus per Dr Barreto Corporal orders. Pt currently NPO.  SLP following)  Nutritional Goals: TF to provide 75% or more of nutrietn needs while pt is NPO rosmery LAL
hospital acquired infections, Freedom from injury during hospitalization and Complete education with patient/family with understanding demonstrated regarding disease process and resultant impairment     In order to achieve these goals, nursing interventions may include bowel/bladder training, education for medical assistive devices, medication education, O2 saturation management, energy conservation, stress management techniques, fall prevention, alarms protocol, seating and positioning, skin/wound care, pressure relief instruction, dressing changes, infection protection, DVT prophylaxis, assistance with safe transfers , and/or assistance with bathroom activities and hygiene. PHYSICAL THERAPY:  Goals:        Short term goals  Time Frame for Short term goals: 1 week   Short term goal 1: Supine <-->sit with min A x 1 to get in and out of bed   Short term goal 2: Sit <-->stand with min A x 1 in sera steady to get up to go to the bathroom  Short term goal 3: Stand/sit pivot bed to chair t/f with mod A x 1 to safely transfer on and off various surfaces. Short term goal 4: Static sitting balance with min A x 1 attaining midline x 3' x 1 for functional activtieis   Short term goal 5: PT to assess w/c mobility   Long term goals  Time Frame for Long term goals : 2 weeks s  Long term goal 1: Supine <-->Sit with CGA x 1 to get in and out of bed   Long term goal 2: Sit <-->Stand with CGA x 1 in sera steady to get up to go to the bathroom   Long term goal 3: Stand/sit pivot bed to chair t/f with min A x1 to safely t/f on and off various surfaces. Long term goal 4: Pt to tolerate standing 2'x 1 in midline with CGA x 1 to prepare for pregait activiteis. Long term goal 5: PT to assess gait when pt appropriate.      Plan of Care:  Patient to be seen by physical therapy services 90 minutes per day Monday through Friday and 30 minutes on Saturday or Sunday    Anticipated interventions may include therapeutic exercises, gait

## 2020-04-25 NOTE — PROGRESS NOTES
Spoke with Dr. Abebe Echevarria about recent decline with patient around 0930. N.O.  Received to get Blood cultures x 2 STAT from two different sites and a STAT 2 view CXR , consult with hospitalist. Elmyra Barthel MD notified stated will look into case and have plans later this am.

## 2020-04-26 PROBLEM — I69.320 CVA, OLD, APHASIA: Status: ACTIVE | Noted: 2020-01-01

## 2020-04-26 NOTE — PLAN OF CARE
on  Goal: No falls during hospitalization  Description: Patient will not fall during hospitalization. 4/26/2020 1710 by Rachel Frazier RN  Outcome: Ongoing  4/26/2020 0313 by Daniela Young RN  Outcome: Ongoing  Goal: Absence of physical injury  Description: Absence of physical injury     4/26/2020 1710 by Rachel Frazier RN  Outcome: Ongoing  4/26/2020 0313 by Daniela Young RN  Outcome: Ongoing  Note: Bed locked in lowest position, call light in reach. Bed alarm on   Care plan reviewed with patient and daughter Marva Mckeon via telephone. Patient and family verbalize understanding of the plan of care and contribute to goal setting.

## 2020-04-26 NOTE — PROGRESS NOTES
 Hypertension     Kidney stone     years ago 15-20    Moderate malnutrition ([address] 3/5/2020    NICOLAS on CPAP     Osteopenia determined by x-ray     Osteoporosis, senile     Pacemaker 2011    Pinched nerve     slipped disc in back    S/P TURP (status post transurethral resection of prostate)     Stroke due to stenosis of right carotid artery (HCC)     Visual problems     Vitamin D deficiency      Past Surgical History:   Procedure Laterality Date    ABDOMINAL HERNIA REPAIR  04/27/2017    incisional hernia repair--Dr. Adolfo Watts CARDIAC SURGERY      CATARACT REMOVAL WITH IMPLANT      bilateral    COLONOSCOPY  4916,7076    COLONOSCOPY  12/29/2017    COLONOSCOPY CONTROL HEMORRHAGE performed by Angy Maravilla MD at [facility]    COLONOSCOPY  8/16/2018    COLONOSCOPY POLYPECTOMY SNARE/COLD BIOPSY performed by Angy Maravilla MD at [facility]    COLONOSCOPY  8/19/2018    COLONOSCOPY CONTROL HEMORRHAGE performed by Angy Maravilla MD at [facility]    COLONOSCOPY N/A 8/20/2018    COLONOSCOPY CONTROL HEMORRHAGE performed by Angy Maravilla MD at [address]  1960's    EKG 12-LEAD  4/29/2015         ENDOSCOPY, COLON, DIAGNOSTIC      EYE SURGERY      cataracts    HERNIA REPAIR      several    HIP PINNING Left 8/31/2017    LEFT HIP INTERTAN performed by Page Ruiz MD at [address]  12/3/12    left     MYRINGOTOMY AND TYMPANOSTOMY TUBE PLACEMENT  7/23/13    left     NASAL SINUS SURGERY      OTHER SURGICAL HISTORY  04/27/2015    transurethral Incision bladder neck    PACEMAKER INSERTION      PACEMAKER PLACEMENT  2011    MI COLONOSCOPY FLX DX W/COLLJ SPEC WHEN PFRMD Left 8/18/2018    COLONOSCOPY performed by Angy Maravilla MD at [facility]    MI [address] DX/THER SBST INTRLMNR LMBR/SAC W/IMG GDN N/A 6/25/2018    LUMBAR INTER LAMINAR RUBEN LESI @ L3. performed by Varinder Ricci sitting balance 8-10 min EOB    **All goals NOT MET.

## 2020-04-26 NOTE — CONSULTS
[address] Surgery Consultation - Carita Kocher, MD  Pt Name: Marsha Thomas  MRN: [id number]  YOB: 1931  Date of evaluation: 4/26/2020  Primary Care Physician: Chad Powers DO  Patient evaluated at the request of  Jonelle DELCID  Reason for evaluation: pneumoperitoneum  IMPRESSIONS:   1. Pneumoperitoneum post PEG and apparent vigorous emesis  2. Clinically benign abdomen with normal white count afebrile  3. Chronic atrial fibrillation  4. Aspiration pneumonia  5.  has a past medical history of Acute sinusitis, Angina pectoris ([address] Anxiety disorder, BPH with urinary obstruction, CAD (coronary artery disease), Chronic insomnia, CKD (chronic kidney disease) stage 3, GFR 30-59 ml/min ([facility]), COPD (chronic obstructive pulmonary disease) ([address] Gastroenteritis, HCAP (healthcare-associated pneumonia), Heart disease, HLD (hyperlipidemia), [place] (hard of hearing), Hypertension, Kidney stone, Moderate malnutrition ([address] NICOLAS on CPAP, Osteopenia determined by x-ray, Osteoporosis, senile, Pacemaker, Pinched nerve, S/P TURP (status post transurethral resection of prostate), Stroke due to stenosis of right carotid artery ([address] Visual problems, and Vitamin D deficiency. RECOMMENDATIONS:   1. Place PEG tube to low intermittent suction. 2. Recommend Gastrografin study through the tube evaluate for leak. If no leak would hold tube feeds for today and monitor clinically. 3. N.p.o. hold tube feeds and meds per PEG today  4. Primary service to determine from family if surgery becomes indicated how aggressive they want to be with they want to pursue? SUBJECTIVE:   History of Chief Complaint:    Rebekah Martini is a 80 y.o.male whom was readmitted after 1 day stay on the rehab unit. He had a significant stroke with significant right-sided neglect weakness decreased balance and expressive a aphasia as well as aspiration with recent PEG tube placed on 422.   He was on rehab for 1 day had decreased mentation was transferred back. On imaging studies he had some evidence of pneumoperitoneum which prompted CT scans. There is a fair amount of free air in the upper abdomen the PEG tube appears to be in place there is no free fluid in the abdomen to suggest leaking of enteric contents. Patient has difficulty communicating but his abdomen feels relatively benign he is only tender at the PEG tube site. He is afebrile and his white count is normal.  I was contacted by the floor nurse for a stat consult. CT is resulted earlier this morning I was called around 617 this morning. Patient has very limited CODE STATUS he is no intubate no chest compressions no defibrillation. Past Medical History   has a past medical history of Acute sinusitis, Angina pectoris ([address] Anxiety disorder, BPH with urinary obstruction, CAD (coronary artery disease), Chronic insomnia, CKD (chronic kidney disease) stage 3, GFR 30-59 ml/min ([facility]), COPD (chronic obstructive pulmonary disease) ([address] Gastroenteritis, HCAP (healthcare-associated pneumonia), Heart disease, HLD (hyperlipidemia), [place] (hard of hearing), Hypertension, Kidney stone, Moderate malnutrition ([address] NICOLAS on CPAP, Osteopenia determined by x-ray, Osteoporosis, senile, Pacemaker, Pinched nerve, S/P TURP (status post transurethral resection of prostate), Stroke due to stenosis of right carotid artery ([address] Visual problems, and Vitamin D deficiency. Past Surgical History   has a past surgical history that includes Coronary angioplasty with stent; Nasal sinus surgery; Pacemaker insertion; Tympanostomy tube placement; Cataract removal with implant; Myringotomy Tympanostomy Tube Placement (12/3/12); Dental surgery (5923'V); pacemaker placement (2011); Colonoscopy (8775,2834); hernia repair; TURP (4/17/2013); Myringotomy Tympanostomy Tube Placement (7/23/13); other surgical history (04/27/2015); eye surgery; EKG 12 Lead (4/29/2015); TURP (4/27/15);  Abdominal hernia repair (04/27/2017); hip by PEG Tube route daily 4/24/20   Jon Heart MD   lansoprazole 3 MG/ML SUSP 10 mLs by Per G Tube route every morning (before breakfast) 4/24/20   Jon Heart MD   albuterol sulfate  (90 Base) MCG/ACT inhaler INHALE 2 PUFFS BY MOUTH INTO THE LUNGS EVERY 6 HOURS AS NEEDED FOR WHEEZING 3/16/20   CHRISTIN Grant CNP   ipratropium-albuterol (DUONEB) 0.5-2.5 (3) MG/3ML SOLN nebulizer solution Inhale 3 mLs into the lungs every 4 hours as needed for Shortness of Breath 3/16/20   CHRISTIN Higgins CNP   Tiotropium Bromide-Olodaterol (STIOLTO RESPIMAT) 2.5-2.5 MCG/ACT AERS Inhale 2 puffs into the lungs daily  Patient not taking: Reported on 3/11/2020 3/5/20   CHRITSIN Higgins CNP   Spacer/Aero Chamber Mouthpiece MISC Use it with [facility] inhaler 5/11/19   Orlando Brar MD   acetaminophen (TYLENOL) 650 MG extended release tablet Take 650 mg by mouth every 8 hours as needed for Pain    Historical Provider, MD    Scheduled Meds:   aspirin  81 mg Per NG tube Daily    atorvastatin  80 mg PEG Tube Nightly    [START ON 4/20/2021] calcium replacement protocol   Other RX Placeholder    docusate sodium  100 mg PEG Tube Daily    ferrous sulfate  325 mg PEG Tube Lunch    insulin lispro  0-12 Units Subcutaneous Q6H    isosorbide mononitrate  30 mg Oral Daily    lansoprazole  30 mg Per G Tube QAM AC    [START ON 4/20/2021] magnesium replacement protocol   Other RX Placeholder    nystatin  5 mL Oral 4x Daily    piperacillin-tazobactam  3.375 g Intravenous Q8H    [START ON 4/20/2021] potassium replacement protocol   Other RX Placeholder    sertraline  50 mg PEG Tube Daily    sodium chloride flush  10 mL Intravenous 2 times per day    tamsulosin  0.4 mg Oral BID     Continuous Infusions:   dextrose       PRN Meds:.promethazine **OR** ondansetron, dextrose, dextrose, glucagon (rDNA), glucose, acetaminophen, acetaminophen, albuterol, hydrALAZINE, ipratropium-albuterol, polyethylene glycol, sodium chloride flush  Allergies  is allergic to iodine. Family History  family history includes Arthritis in his sister; Cancer (age of onset: 72) in his brother; Diabetes in his brother and brother; Early Death (age of onset: 72) in his brother; Heart Disease in his sister; High Blood Pressure in his sister; High Cholesterol in his sister; Kidney Disease in his child, child, and father; Stroke in his father. Social History   reports that he quit smoking about 54 years ago. His smoking use included cigarettes. He has a 20.00 pack-year smoking history. He has never used smokeless tobacco. He reports current alcohol use of about 7.0 standard drinks of alcohol per week. He reports that he does not use drugs. Review of Systems  Patient unable to communicate secondary significant expressive aphasia  SUBJECTIVE:   CURRENT VITALS:  weight is 149 lb (67.6 kg). His oral temperature is 97.7 °F (36.5 °C). His blood pressure is 108/57 (abnormal) and his pulse is 88. His respiration is 16 and oxygen saturation is 90%. Body mass index is 24.05 kg/m². Temperature Range (24h):Temp: 97.7 °F (36.5 °C) Temp  Av.9 °F (37.2 °C)  Min: 97.7 °F (36.5 °C)  Max: 101 °F (38.3 °C)  BP Range (97B): Systolic (48GAJ), IMELDA:556 , Min:97 , OSC:493     Diastolic (63MVS), AOY:61, Min:52, Max:67    Pulse Range (24h): Pulse  Av.5  Min: 80  Max: 97  Respiration Range (24h): Resp  Av.5  Min: 16  Max: 32  Current Pulse Ox (24h):  SpO2: 90 %  Pulse Ox Range (24h):  SpO2  Av.8 %  Min: 89 %  Max: 96 %  Oxygen Amount and Delivery: O2 Flow Rate (L/min): 2 L/min  CONSTITUTIONAL: Elderly gentleman in no acute distress. Arousable with voice. SKIN: Skin color, texture, turgor normal. No rashes or lesions. ,  Some bruising of extremities  LYMPH: no cervical nodes, no inguinal nodes  HEENT: Pupils equal reactive. Normocephalic.   NECK: Supple no jugular venous distention  CHEST/LUNGS: Clear without any audible wheezing or rhonchi cARDIOVASCULAR: Normal rate chronic atrial fibrillation on telemetry  ABDOMEN: Scaphoid PEG tube mid abdomen no erythema or drainage around the tube. Mildly tender at the site the remainder of the abdomen is soft without tenderness to palpation  RECTAL: Deferred  NEUROLOGIC: Expressive aphasia right sided weakness and neglect  EXTREMITIES: No edema  LABS:     Recent Labs     04/24/20  0400 04/25/20  0440 04/25/20  1130 04/26/20  0406   WBC 11.7* 15.3*  --  8.5   HGB 10.1* 11.1*  --  9.5*   HCT 31.6* 35.1*  --  29.7*    344  --  293    137  --  136   K 4.1 4.1  --  3.9    102  --  99   CO2 27 23  --  26   BUN 27* 26*  --  31*   CREATININE 1.2 1.0  --  1.3*   MG 1.9  --   --   --    PHOS 3.2  --   --   --    CALCIUM 8.6 8.5  --  8.4*   AST  --  16  --   --    ALT  --  10*  --   --    BILITOT  --  0.3  --   --    NITRU  --   --  NEGATIVE  --    COLORU  --   --  YELLOW  --    BACTERIA  --   --  NONE SEEN  --      RADIOLOGY:     CT CHEST WO CONTRAST   Final Result   1.. Diffuse interstitial markings compatible with chronic interstitial disease including fibrosis with regions of bronchiectasis and suspected right lower lobe infiltrates. 2. Free air beneath the diaphragm. **This report has been created using voice recognition software. It may contain minor errors which are inherent in voice recognition technology. **      Final report electronically signed by Dr. Dinora Jordan on 4/26/2020 2:43 AM      CT ABDOMEN PELVIS WO CONTRAST Additional Contrast? None   Final Result   . 1. Significant free air within the peritoneum of the upper and mid abdomen. 2. Visualized gastrostomy tube. Correlation with history recommended. 3. Mild small bowel luminal stasis and fluid retention nonspecific in distribution. Mild enteritis is not excluded. 4. Diffuse colonic diverticulosis. No focal regions of diverticulitis suspected. 5. Chronic interstitial changes of the lungs.  Superimposed right lower lobe infiltrates suspected. **This report has been created using voice recognition software. It may contain minor errors which are inherent in voice recognition technology. **      Final report electronically signed by Dr. Lesly Barber on 4/26/2020 2:49 AM      XR CHEST PORTABLE   Final Result   1. Diffuse interstitial marking changes compatible with chronic lung disease including fibrosis. 2. Superimposed basilar airspace opacities are concerning for pneumonitis particularly in the right lower lobe. 3. Some diaphragmatic lucency concerning for free air. Correlation with history required. Report findings were called to the patient's inpatient brink at time of exam 10:50 PM April 25, 2020 and given to the brink nurse with a call to correlate with the attending physician. **This report has been created using voice recognition software. It may contain minor errors which are inherent in voice recognition technology. **      Final report electronically signed by Dr. Lesly Barber on 4/25/2020 10:51 PM              Electronically signed by Collins Pickard MD on 4/26/2020 at 7:42 AM

## 2020-04-27 NOTE — CARE COORDINATION
the diaphragm. Medications:  Scheduled Meds:   finasteride  5 mg PEG Tube Daily    docusate  100 mg Per G Tube Daily    aspirin  81 mg Per NG tube Daily    atorvastatin  80 mg PEG Tube Nightly    [START ON 4/20/2021] calcium replacement protocol   Other RX Placeholder    ferrous sulfate  325 mg PEG Tube Lunch    insulin lispro  0-12 Units Subcutaneous Q6H    isosorbide mononitrate  30 mg Oral Daily    lansoprazole  30 mg Per G Tube QAM AC    [START ON 4/20/2021] magnesium replacement protocol   Other RX Placeholder    nystatin  5 mL Oral 4x Daily    [START ON 4/20/2021] potassium replacement protocol   Other RX Placeholder    sertraline  50 mg PEG Tube Daily    sodium chloride flush  10 mL Intravenous 2 times per day     Continuous Infusions:   sodium chloride 75 mL/hr at 04/27/20 0602    dextrose        Pertinent Info/Orders/Treatment Plan:  COVID-19 (-), IV fluids, diabetes management. PEG tube maintenance with tube feeds to resume. Physiatry consult. Diet: DIET TUBE FEED CONTINUOUS/CYCLIC NPO; 1.5 Calorie with Fiber (Jevity 1.5); Gastrostomy; Continuous; 30; 50   Smoking status:  reports that he quit smoking about 54 years ago. His smoking use included cigarettes. He has a 20.00 pack-year smoking history. He has never used smokeless tobacco.   PCP: Merline Key, DO  Readmission 30 days or less: yes  Readmission Risk Score: 36%    Discharge Planning Evaluation  Current Residence:  Private Residence  Living Arrangements:  Spouse/Significant Other   Support Systems:  Spouse/Significant Other, Children, Family Members  Current Services PTA:     Potential Assistance Needed:  Skilled Nursing Facility  Potential Assistance Purchasing Medications:  No  Does patient want to participate in local refill/ meds to beds program?  Yes  Type of Home Care Services:  None  Patient expects to be discharged to:  IP Rehab  Expected Discharge date:      Follow Up Appointment: Best Day/ Time: Tuesday

## 2020-04-27 NOTE — PROGRESS NOTES
subcutaneous air in the neck most prominent around the left subclavian vein possibly on the basis of venipuncture. **This report has been created using voice recognition software. It may contain minor errors which are inherent in voice recognition technology. **    Final report electronically signed by Dr. Daniel Vaz MD on 4/13/2020 3:50 PM     Results for orders placed during the hospital encounter of 04/13/20   CTA NECK W WO CONTRAST    Narrative PROCEDURE: CTA HEAD W WO CONTRAST, CTA [address] INFORMATION: cva. Left facial droop today. COMPARISON: CT head from the same date. TECHNIQUE: 1 mm CT axial images were obtained from the aortic arch through the head after the fast bolus 85 mL Isovue-370 injected in the left AC. A noncontrast localizer was obtained. 3-D reconstructions were performed on a dedicated 3-D workstation. These include multiplanar MPR images and multiplanar MIP images. All CT scans at this facility use dose modulation, iterative reconstruction, and/or weight-based dosing when appropriate to reduce radiation dose to as low as reasonably achievable. FINDINGS:   CTA head: There are mild mural calcifications in the cavernous and clinoid segments of the internal carotid arteries without flow-limiting stenosis or visualized aneurysm. The bilateral middle cerebral and anterior cerebral arteries are patent without focal   abnormality identified. There is a diminutive A1 segment on the right likely representing normal anatomic variation. The basilar artery is patent without focal stenosis or visualized aneurysm. The bilateral posterior cerebral arteries are patent without   focal abnormality identified. Superior cerebellar arteries appear patent. The dural venous sinuses appear patent without focal filling defect. No focal areas of abnormal parenchymal enhancement are identified. CTA NECK:  There is a typical 3 vessel arch.  The brachiocephalic and

## 2020-04-27 NOTE — PROGRESS NOTES
Hospitalist Progress Note    Patient:  Berta Villafana      Unit/Bed:4A-17/017-A    YOB: 1931    MRN: [id number]       Acct: [de-identified]     PCP: Dustin Decker DO    Date of Admission: 4/25/2020        Assessment/Plan:    1. Query Worsening mentation in patient with prior recent CVA: Repeat CT show stable. concerns for potential leak. Feeds held until Gastrografin fluoroscopy completed which negative for any leak. Will resume feeds when [id number] w/ Gen Sx. F/u cultures. Will monitor clinical status  4/27: at baseline. Will resume feeds today and monitor closely  2. Hx of recent stroke: not a candidate for tPA; PT/OT to follow, PM&R consulted  3. Hx of Aspiration PNA: on last day of Zosyn, CXR appears slightly worse as expected, otherwise AVSS. No need for Abx at this time. Will monitor clinical course  4. CASSIDY on CKD stage II: Cr 1.3. likely pre-renal. On IVF. Will trend serum Cr.  4/27: resolved. Cr 1.0. at baseline. D/c'ed IVF. 5. Hx of query UGIB: on ASA only, off plavix/AC. Hb stable, No clinically evident bleed; will monitor  6. Severe dysphagia: on bolus tube feeding via PEG tube which was resumed today  7. Leukocytosis: resolved. AVSS. Will monitor  8. Chronic normocytic anemia d/t STELLA on iron replacement; Hb 9.5 stable, no clinically evident bleed, will monitor  4/27: Hb 8.8. stable  9. Hx of COPD: no exacerbation, stable on R/A  10. Hx of HTN: well-controlled on home meds; will monitor  11. Hx of malnutrition: on PEG feeds  12. Hx of CAD  13. Hx of PPM  14. Hx of NICOLAS  15.  Urinary retention: Zamudio placed last night; also started on Tamsulosin but not crushable; will switch to finasteride; consider trial of self-voiding prior to dsicharge      Expected discharge date:  TBD       Disposition:      [] Home                             [] TCU                             [] Rehab                             [] Psych                             [] SNF                             [] [facility] with superimposed right middle and lower lobe airspace opacities correlate with infiltrates. Cardiac chambers are mildly enlarged. Permanent pacing leads are visualized. There is a gastrostomy tube present. Significant free air in the upper abdomen is present correlation with procedural history is required. The noncontrast appearance of the liver is grossly negative. The gallbladder lumen is mildly distended without obvious wall thickening or pericholecystic fluid changes. The pancreas and spleen are grossly unremarkable. There is no obstructive uropathy. Small cortical cystic changes of the right kidney at the lower pole are suspected the detail of which are limited. The small bowel demonstrates regions of mild luminal stasis and minimal fluid retention. The appearance is nonspecific. Mild enteritis cannot be excluded. There are diffuse diverticular changes throughout the colon. The appendix is not clearly identified. The urinary bladder is distended. There are postsurgical changes of the lower anterior abdominal wall and also over the left and right inguinal regions. Correlation with history recommended bilateral surgical changes of the hips are noted. Chronic degenerative skeletal changes are seen with dextroscoliosis of the lumbar spine. There is diffuse scattered atherosclerosis along the abdominal aorta. . 1. Significant free air within the peritoneum of the upper and mid abdomen. 2. Visualized gastrostomy tube. Correlation with history recommended. 3. Mild small bowel luminal stasis and fluid retention nonspecific in distribution. Mild enteritis is not excluded. 4. Diffuse colonic diverticulosis. No focal regions of diverticulitis suspected. 5. Chronic interstitial changes of the lungs. Superimposed right lower lobe infiltrates suspected. **This report has been created using voice recognition software. It may contain minor errors which are inherent in voice recognition technology. ** Final report electronically signed by Dr. Kathern Cogan on 4/26/2020 2:49 AM    Cta Head W Wo Contrast    Result Date: 4/15/2020  PROCEDURE: CTA HEAD W WO CONTRAST, CTA NECK W WO CONTRAST CLINICAL INFORMATION: cva. Left facial droop today. COMPARISON: CT head from the same date. TECHNIQUE: 1 mm CT axial images were obtained from the aortic arch through the head after the fast bolus 85 mL Isovue-370 injected in the left AC. A noncontrast localizer was obtained. 3-D reconstructions were performed on a dedicated 3-D workstation. These include multiplanar MPR images and multiplanar MIP images. All CT scans at this facility use dose modulation, iterative reconstruction, and/or weight-based dosing when appropriate to reduce radiation dose to as low as reasonably achievable. FINDINGS:  CTA head: There are mild mural calcifications in the cavernous and clinoid segments of the internal carotid arteries without flow-limiting stenosis or visualized aneurysm. The bilateral middle cerebral and anterior cerebral arteries are patent without focal abnormality identified. There is a diminutive A1 segment on the right likely representing normal anatomic variation. The basilar artery is patent without focal stenosis or visualized aneurysm. The bilateral posterior cerebral arteries are patent without focal abnormality identified. Superior cerebellar arteries appear patent. The dural venous sinuses appear patent without focal filling defect. No focal areas of abnormal parenchymal enhancement are identified. CTA NECK: There is a typical 3 vessel arch. The brachiocephalic and left subclavian arteries are patent without flow-limiting stenosis. The common carotid arteries are patent without focal stenosis. There is calcified and noncalcified mural plaque at the carotid bifurcation with mild stenosis at the takeoff of the right external carotid artery.  The narrowest luminal diameter in the right carotid bulb is 0.9 mm, with a point more distal, where the walls are parallel, measuring 4.5 mm. There is minimal mural plaque at the left carotid bulb without hemodynamically significant stenosis by NASCET criteria. Cervical segments of internal carotid arteries are otherwise patent without focal stenosis. The vertebral arteries are codominant. They're patent throughout their course without focal stenosis. Mucosal surfaces of the aerodigestive tract are symmetric without focal nodular thickening or visualized mass. No cervical lymphadenopathy is identified. The left parotid gland is atrophied. The right right gland is unremarkable. Submandibular glands are  unremarkable. Thyroid gland is unremarkable. There is subcutaneous air adjacent to the left subclavian vein, internal jugular vein. There is also subcutaneous air at the anterior aspect of the neck along the anterior strap muscles and adjacent to the right submandibular gland. There are fibrotic changes in the superior portions of the lungs. There are moderate degenerative changes of the cervical spine. 1. No flow-limiting stenosis or aneurysm in the anterior or posterior intracranial circulation. 2. Prominent calcified mural plaque at the carotid bulb on the right with 80% stenosis by NASCET criteria. 3. Scattered foci of subcutaneous air in the neck most prominent around the left subclavian vein possibly on the basis of venipuncture. **This report has been created using voice recognition software. It may contain minor errors which are inherent in voice recognition technology. ** Final report electronically signed by Dr. Dawson Tomlinson MD on 4/13/2020 3:50 PM    Xr Chest Standard (2 Vw)    Result Date: 4/25/2020  PROCEDURE: XR CHEST (2 VW) CLINICAL INFORMATION: r/o pneumonia COMPARISON: 4/20/2020 TECHNIQUE:  AP and lateral chest x-ray  FINDINGS: The heart size is normal. Bilateral coarse interstitial opacities are present bilaterally, most pronounced at the right midlung as well as the lung bases thickening or visualized mass. No cervical lymphadenopathy is identified. The left parotid gland is atrophied. The right right gland is unremarkable. Submandibular glands are  unremarkable. Thyroid gland is unremarkable. There is subcutaneous air adjacent to the left subclavian vein, internal jugular vein. There is also subcutaneous air at the anterior aspect of the neck along the anterior strap muscles and adjacent to the right submandibular gland. There are fibrotic changes in the superior portions of the lungs. There are moderate degenerative changes of the cervical spine. 1. No flow-limiting stenosis or aneurysm in the anterior or posterior intracranial circulation. 2. Prominent calcified mural plaque at the carotid bulb on the right with 80% stenosis by NASCET criteria. 3. Scattered foci of subcutaneous air in the neck most prominent around the left subclavian vein possibly on the basis of venipuncture. **This report has been created using voice recognition software. It may contain minor errors which are inherent in voice recognition technology. ** Final report electronically signed by Dr. La Sharma MD on 4/13/2020 3:50 PM    Ct Chest Wo Contrast    Result Date: 4/26/2020  PROCEDURE: CT CHEST WO CONTRAST CLINICAL INFORMATION: free air. COMPARISON: March 2, 2020 TECHNIQUE: 5 mm noncontrast axial images were obtained through the chest. Sagittal and coronal reconstructions were obtained. All CT scans at this facility use dose modulation, iterative reconstruction, and/or weight-based dosing when appropriate to reduce radiation dose to as low as reasonably achievable. FINDINGS: The lower cervical and axillary soft tissues are negative for acute changes. There is a right upper cavity PICC line within the superior vena cava. There is a permanent pacemaker. Diffuse coarse interstitial lung changes are present with superimposed areas of bronchiectasis as well as developing subpleural fibrosis. April 25, 2020 and given to the brink nurse with a call to correlate with the attending physician. **This report has been created using voice recognition software. It may contain minor errors which are inherent in voice recognition technology. ** Final report electronically signed by Dr. Daria Carty on 4/25/2020 10:51 PM    Xr Chest Portable    Result Date: 4/18/2020  PROCEDURE: XR CHEST PORTABLE CLINICAL INFORMATION: SOB. COMPARISON: Chest x-ray dated 4/15/2020 TECHNIQUE: AP Portable chest xray FINDINGS: Lines/tubes/devices: NG tube courses into the mid stomach, tip not imaged. There is a stable left subclavian dual-lead pacemaker, leads in the regions of the right atrium and right ventricle. Lungs/pleura: There are worsening bilateral interstitial infiltrates consistent with pulmonary edema or an atypical pneumonia. There is no focal consolidation. There is a stable small right pleural effusion. There is biapical pleural thickening. There is no pneumothorax. Heart: There is stable mild cardiomegaly. Mediastinum/jerod: No obvious mass or adenopathy. Skeleton: There are multiple chronic appearing thoracic vertebral compression fractures. Bones are osteopenic. There is an old healed fracture of the lateral left third rib. Worsening interstitial pulmonary edema versus atypical pneumonia. Stable small right pleural effusion. **This report has been created using voice recognition software. It may contain minor errors which are inherent in voice recognition technology. ** Final report electronically signed by Dr. Lars English on 4/18/2020 1:54 AM    Xr Chest 1 Vw    Result Date: 4/13/2020  PROCEDURE: XR CHEST 1 VIEW CLINICAL INFORMATION: Stroke TECHNIQUE: AP chest radiograph COMPARISON: PA and lateral chest radiographs 3/2/2020 FINDINGS: A left-sided cardiac device is in stable position. There is mild stable enlargement of the cardiac silhouette. Atherosclerotic calcifications are present in the thoracic aorta. the proximal stomach, side-port at the GE junction. Again consider advancing the tube 5 to 10 cm. Bowel gas pattern: There is a nonobstructive bowel gas pattern, with gas within nondistended small and large bowel. Free air: There is no obvious pneumoperitoneum. Miscellaneous: There are no suspicious abdominal-pelvic calcifications. Skeleton: No acute bone or joint abnormality. Stable findings as previously described. Lower chest: There are bibasilar interstitial infiltrates with a small right pleural effusion. NG tube tip remains in the proximal stomach, side-port in the region of the GE junction. Consider advancing the tube 5 to 10 cm. Nonobstructive bowel gas pattern. Bibasilar interstitial infiltrates and small right pleural effusion as previously noted. **This report has been created using voice recognition software. It may contain minor errors which are inherent in voice recognition technology. ** Final report electronically signed by Dr. Heide Díaz on 4/18/2020 6:05 AM    Xr Abdomen For Ng/og/ne Tube Placement    Result Date: 4/18/2020  PROCEDURE: XR ABDOMEN FOR NG/OG/NE TUBE PLACEMENT CLINICAL INFORMATION: Confirmation of course of NG/OG/NE tube and location of tip of tube. COMPARISON: KUB dated 4/17/2020 TECHNIQUE: A supine AP view of the abdomen was obtained. FINDINGS: Lines/tubes: NG tube tip lies in the proximal stomach, side-port in the region of the GE junction. Consider advancing the tube 5-10 cm. Pacemaker leads project over the regions of the right atrium and right ventricle. Bowel gas pattern: There is a nonobstructive bowel gas pattern, with gas within nondistended small and large bowel. Free air: There is no obvious pneumoperitoneum. Miscellaneous: There are no suspicious abdominal-pelvic calcifications. Skeleton: There is rotary dextroscoliosis of the lower thoracic and lumbar spine with multilevel endplate spondylosis.  Multilevel chronic appearing lower thoracolumbar vertebral

## 2020-04-27 NOTE — PROGRESS NOTES
suspected. **This report has been created using voice recognition software. It may contain minor errors which are inherent in voice recognition technology. **      Final report electronically signed by Dr. Jaylon Carrero on 4/26/2020 2:49 AM      XR CHEST PORTABLE   Final Result   1. Diffuse interstitial marking changes compatible with chronic lung disease including fibrosis. 2. Superimposed basilar airspace opacities are concerning for pneumonitis particularly in the right lower lobe. 3. Some diaphragmatic lucency concerning for free air. Correlation with history required. Report findings were called to the patient's inpatient brink at time of exam 10:50 PM April 25, 2020 and given to the brink nurse with a call to correlate with the attending physician. **This report has been created using voice recognition software. It may contain minor errors which are inherent in voice recognition technology. **      Final report electronically signed by Dr. Jaylon Carrero on 4/25/2020 10:51 PM          Electronically signed by Yara Baldwin MD on 4/27/2020 at 8:41 AM

## 2020-04-27 NOTE — PLAN OF CARE
Problem: Nutrition  Goal: Optimal nutrition therapy  4/27/2020 1329 by Consuella Riedel, RD, LD  Outcome: Ongoing   Nutrition Problem: Inadequate oral intake  Intervention: Food and/or Nutrient Delivery: Continue NPO, Start Tube Feeding  Nutritional Goals: Patient willl tolerate TF to provide 75% or greater of estimated nutritional needs during LOS

## 2020-04-28 NOTE — PLAN OF CARE
Problem: Nutrition  Goal: Optimal nutrition therapy  4/28/2020 1141 by Christine Bella RD, LD  Outcome: Ongoing   Nutrition Problem: Moderate malnutrition, In context of chronic illness  Intervention: Food and/or Nutrient Delivery: Continue NPO(Resume tubefeeding when able per GI.   If extended GI rest needed consider PN initiation)  Nutritional Goals: Patient willl tolerate TF to provide 75% or greater of estimated nutritional needs during LOS

## 2020-04-28 NOTE — PROGRESS NOTES
Nutrition Assessment (Enteral Nutrition)    Type and Reason for Visit: Reassess    Nutrition Recommendations:   -When able recommend resume TF's: recommend begin Jevity 1.5 at 20 ml/hr, increase 10 ml/hr every 4 hours as tolerated to a goal of 50 ml/hr.    -Recommend 225 ml free water 4 times/day when off IVF  -If extended gut rest needed would recommend consider PN: 68 kg to dose, 10 kcals/kg/day to start, 20% lipid kcals, and 1 gram protein/kg/day  -Diet as per SLP recommendations    Nutrition Assessment:   Pt. declining from a nutritional standpoint AEB EN stopped due to concerns of GI bleed. Remains at risk for further nutritional compromise r/t NPO status due to dysphagia and now concerns of GI bleed, recent CVA, dysphagia, s/p PEG placement, concerns of aspiration pneumonia, known torturous esophagus, history of CKD, COPD, and need for nutrition support. Nutrition recommendations/interventions as per above. Malnutrition Assessment:  · Malnutrition Status: Meets the criteria for moderate malnutrition  · Context: Chronic illness  · Findings of the 6 clinical characteristics of malnutrition (Minimum of 2 out of 6 clinical characteristics is required to make the diagnosis of moderate or severe Protein Calorie Malnutrition based on AND/ASPEN Guidelines):  1. Energy Intake-Less than or equal to 75% of estimated energy requirement, Greater than or equal to 5 days    2. Weight Loss-No significant weight loss,    3. Fat Loss-Moderate subcutaneous fat loss, Orbital  4. Muscle Loss-Moderate muscle mass loss, Temples (temporalis muscle)  5. Fluid Accumulation-No significant fluid accumulation,    6.   Strength- not measured    Nutrition Risk Level: High    Nutrition Needs:  · Estimated Daily Total Kcal: 5387-2436 (25-30 kcals/kg current weight of 68 kg on 4/27/20)  · Estimated Daily Protein (g): 68-82 (1.0-1.2 grams/kg current weight of 68 kg on 4/27/20)  · Estimated Daily Fluid (ml/day): 3006-7653 (25-30 ml/kg current weighto f 68 kg on 4/27/20)    Nutrition Diagnosis:   · Problem: Moderate malnutrition, In context of chronic illness  · Etiology: related to Insufficient energy/nutrient consumption     Signs and symptoms:  as evidenced by Moderate loss of subcutaneous fat, Moderate muscle loss    Objective Information:  · Nutrition-Focused Physical Findings: Per RN, Silvia Grant, TF's were resumed yesterday via PEG after no leak revealed on contrast study however placed on hold last night due to concerns of GI bleed, blood clots in stool. Await GI consult/plans. Note Dr. Gela Szymanski wants continuous feedings, no bolus at this time when TF 's resumed. BUN 32, Glucose 129. IVF's at 75 ml/hr, nystatin, humalog, iron. · Wound Type: None  · Current Nutrition Therapies:  · Oral Diet Orders: NPO   · Tube Feeding (TF) Orders:   · Feeding Route: Gastrostomy(PEG placed 4/22/20)  · Formula: 1.5 Calorie with Fiber(Jevity 1.5)  · Rate (ml/hr):goal is 50 ml/hr    · Volume (ml/day): 1200 ml  · Duration: Continuous  · Water Flushes: recommend 225 ml 4 times/day when off IVF  · Goal TF & Flush Orders Provides: 1800 kcals, 77 grams protein, 259 grams CHO, 26 grams fiber, 912 ml free water in TF (1812 ml free water with above flushes)  · Anthropometric Measures:  · Ht: 5' 6\" (167.6 cm)   · Current Body Wt: 149 lb (67.6 kg)(4/27/20, no edema)  · Admission Body Wt: 149 lb (67.6 kg)(4/25/20, bedscale)  · Usual Body Wt: 139 lb (63 kg)(EMR, 3/2/20, standing scale)  · Ideal Body Wt: 142 lb (64.4 kg),   · BMI Classification: BMI 18.5 - 24.9 Normal Weight    Nutrition Interventions:   Continue NPO(Resume tubefeeding when able per GI.   If extended GI rest needed consider PN initiation)  Continued Inpatient Monitoring, Education not appropriate at this time, Coordination of Care    Nutrition Evaluation:   · Evaluation: Progress towards goals declining   · Goals: Patient willl tolerate TF to provide 75% or greater of estimated nutritional needs during

## 2020-04-28 NOTE — PROGRESS NOTES
Impaired(expect decreased prioproception R hemibody)       Activity Tolerance: Treatment limited secondary to decreased cognition, Patient limited by fatigue       Assessment:  Assessment: Pt demo significant R side neglect, weakness, decreased balance, decreased cognition, & expressive aphasia. Continued OT recommended to faciliate improved participation & indep with ADL tasks within discharge environment. Performance deficits / Impairments: Decreased functional mobility , Decreased cognition, Decreased high-level IADLs, Decreased ADL status, Decreased endurance, Decreased ROM, Decreased strength, Decreased balance, Decreased safe awareness, Decreased vision/visual deficit  Prognosis: Fair  REQUIRES OT FOLLOW UP: Yes  Decision Making: High Complexity  Safety Devices in place: Yes  Type of devices: All fall risk precautions in place, Bed alarm in place, Call light within reach, Gait belt    Treatment Initiated: Treatment and education initiated within context of evaluation. Evaluation time included review of current medical information, gathering information related to past medical, social and functional history, completion of standardized testing, formal and informal observation of tasks, assessment of data and development of plan of care and goals. Treatment time included skilled education and facilitation of tasks to increase safety and independence with ADL's for improved functional independence and quality of life. Discharge Recommendations:  Continue to assess pending progress    Patient Education:  OT Education: OT Role, Plan of Care, Transfer Training  Barriers to Learning: aphasia, decreased cognition    Equipment Recommendations:   Other: Monitor pending progress    Plan:  Times per week: 6x  Current Treatment Recommendations: Strengthening, Safety Education & Training, Balance Training, Patient/Caregiver Education & Training, Self-Care / ADL, Cognitive/Perceptual Training, Functional Mobility

## 2020-04-28 NOTE — PROGRESS NOTES
[address] [place] [address]  INPATIENT PHYSICAL THERAPY  EVALUATION  [facility] 4A - 4A-17/017-A    Time In: 0845  Time Out: 4940  Timed Code Treatment Minutes: 23 Minutes  Minutes: 33          Date: 2020  Patient Name: Moni Lopez,  Gender:  male        MRN: [id number]  : 1931  (80 y.o.)      Referring Practitioner: Dr. Gabriele West   Diagnosis: CVA ,old aphasia  Additional Pertinent Hx: 24-year-old Male with past medical history of hyperlipidemia, CAD, CKD, COPD came to [address] [place] [address] ER on 2020 due to slurring of speech and left-sided weakness. Patient initially was seen in [facility] ER on 2020 with left-sided facial droop. He was not considered a TPA candidate. Patient was evaluated by tele-stroke and had CTA of head and neck which showed right ICA 80% stenosis. It was recommended the patient be transferred to [facility] for intervention, however, at that time family declined and patient felt that he was stable enough to go home. Patient was then discharged home. Patient came back to [facility] ER on 2020 due to worsening slurred speech and left-sided facial droop. Per H/P note, history was limited due to patient's speech changes and is hard of hearing and did not have his hearing device. CT of the head on 2020 showed no acute process, stable appearance of the brain. Unable to do MRI due to PPM. Patient was evaluated by tele-stroke the case was again discussed with daughter and they agreed to admit for medical management. Patient was then admitted under hospital medicine service for acute CVA. Patient was seen by neurologist on 4/15/2020, who thinks that this could be acute from both the ischemic stroke, possibly lacunar due to small vessel disease. PT/OT and speech therapy were consulted. Per speech therapy note on 2020, patient has severe oropharyngeal dysphagia. Unable to perform MBS due to severity. NG placed  - discontinued by patient.  Patient was x 1 in sera steady to get up to go to the bathroom  Short term goal 3: Stand/sit pivot bed to chair t/f with mod A x 1 to safely transfer on and off various surfaces. Short term goal 4: Static sitting balance with min A x 1 attaining midline x 3' x 1 for functional activtieis   Short term goal 5: PT to assess w/c mobility   Long term goals  Time Frame for Long term goals : Na due to short ELOS  Long term goal 1:    Long term goal 2:    Long term goal 3:    Long term goal 4:    Long term goal 5:      Following session, patient left in safe position with all fall risk precautions in place.

## 2020-04-28 NOTE — PLAN OF CARE
0210 by Prosper Ruiz RN  Outcome: Ongoing  Note: Patient remains free from falls during this hospitalization. Problem: Risk for Falls  Goal: Absence of physical injury  Description: Absence of physical injury     4/28/2020 0210 by Prosper Ruiz RN  Outcome: Ongoing  Note: Patient remains free from physical injury or harm this shift. Care plan reviewed with patient. Patient unable to verbalize understanding of the plan of care and contribute to goal setting.

## 2020-04-28 NOTE — PLAN OF CARE
Problem: Skin Integrity - Impaired  Goal: Absence of new skin breakdown  Outcome: Ongoing  Note: No signs of new skin breakdown with each assessment. Skin remains warm, dry, intact. Mucous membranes pink & moist. Patient is assisted with repositioning q2hr and PRN. Problem: Risk for Falls  Goal: Will remain free from falls  Description: Will remain free from falls     Outcome: Ongoing  Note: Bed in lowest position and locked. Bed alarm activated. Educated on use of call light when in need of assistance- needs reinforcement. Visual checks performed and charted. No falls during shift at this time. Armband and falling star in place. Call light within reach. Transfers with two max assist and love steady. .       Problem: Nutrition  Goal: Optimal nutrition therapy  4/28/2020 1822 by Mary Gaona RN  Outcome: Ongoing  Note: TF paused at this time for GI bleed. Patient NPO.  4/28/2020 1141 by Bryant Banegas RD, LD  Outcome: Ongoing     Problem: Discharge Planning:  Goal: Discharged to appropriate level of care  Description: Discharged to appropriate level of care   Outcome: Ongoing  Note: Patient plans to return to IP Rehab when stable. Problem: Infection - Central Venous Catheter-Associated Bloodstream Infection:  Goal: Will show no infection signs and symptoms  Description: Will show no infection signs and symptoms   Outcome: Ongoing  Note: PICC present in RUE. Dressing is clean, dry, and intact. No signs of infection noted. Alcohol caps applied. Care plan reviewed with patient and family. Patient and family verbalize understanding of the plan of care and contribute to goal setting.

## 2020-04-29 PROBLEM — R47.1 DYSARTHRIA: Status: ACTIVE | Noted: 2020-01-01

## 2020-04-29 PROBLEM — I65.21 STENOSIS OF RIGHT INTERNAL CAROTID ARTERY: Status: ACTIVE | Noted: 2020-01-01

## 2020-04-29 PROBLEM — G81.91 HEMIPARESIS, RIGHT (HCC): Status: ACTIVE | Noted: 2020-01-01

## 2020-04-29 NOTE — PROGRESS NOTES
bedrail and pushng through left elbow through the bed  Scooting: Moderate Assistance, Maximum Assistance    Transfers:  Sit to Stand:  Maximum Assistance, X 2, with increased time for completion, cues for hand placement  Stand to [address] [place] [address], X 2, with increased time for completion  Pt leans right severely, pt tries to correct to achieve midline, but requires mod to max A to achieve, unable to maintain. Bed to chair transfer this date with love wu for safety +2, again, pt leaning right and requires max to mainatin on the stedy    Ambulation:  Not Tested    Balance:  Static Sitting Balance: Moderate Assistance, X 1, with cues for safety  Static Standing Balance: Maximum Assistance, X 2    Exercise:  Patient was guided in 1 set(s) 10 reps of exercise to both lower extremities. Ankle pumps, Heelslides, Hip abduction/adduction, Clam shells, Bridges, Lower trunk rotations and mod A to perform on the right and SBA on the left. Graded lift offs max to mod A from bed X 5 reps. Exercises were completed for increased independence with functional mobility. Functional Outcome Measures: Completed  -PAC Inpatient Mobility without Stair Climbing Raw Score : 8  -PAC Inpatient without Stair Climbing T-Scale Score : 30.65    ASSESSMENT:  Assessment: Patient progressing toward established goals. Pt followed commands either through vcs or tactile cues for LE there ex and with functional mobility. Pt wanting to get up to chair motioning to chair on my arrival.  Activity Tolerance:  Patient tolerance of  treatment: good.       Equipment Recommendations:Equipment Needed: No  Discharge Recommendations:  Continue to assess pending progress    Plan: Times per week: 5 X N  Specific instructions for Next Treatment: neuro therex, sitting balance and work on  midline, graded lift offs, love stedy vs D/max of 2 squat/sit pivot   Current Treatment Recommendations: Strengthening, Gait Training, Balance Training, Functional Mobility Training, Endurance Training, Transfer Training, Home Exercise Program    Patient Education  Patient Education: Plan of Care, Bed Mobility, Transfers, sitting balance  Use of Gait Belt    Goals:  Patient goals : None stated at this time. Anticipate increase strength   Short term goals  Time Frame for Short term goals: 1 week   Short term goal 1: Supine <-->sit with Mod A x 1 to get in and out of bed   Short term goal 2: Sit <-->stand with min A x 1 in sera steady to get up to go to the bathroom  Short term goal 3: Stand/sit pivot bed to chair t/f with mod A x 1 to safely transfer on and off various surfaces. Short term goal 4: Static sitting balance with min A x 1 attaining midline x 3' x 1 for functional activtieis   Short term goal 5: PT to assess w/c mobility   Long term goals  Time Frame for Long term goals : Na due to short ELOS  Long term goal 1:    Long term goal 2:    Long term goal 3:    Long term goal 4:    Long term goal 5:      Following session, patient left in safe position with all fall risk precautions in place.

## 2020-04-29 NOTE — BRIEF OP NOTE
Amount (mL) 35 4/18/2020  3:43 AM   Tube Feeding Intake (mL) 0 ml 4/18/2020  3:43 AM   Free Water Rate 30ml q4hr 4/18/2020 12:20 AM   Residual Volume (ml) 0 ml 4/18/2020  2:36 AM       [REMOVED] NG/OG/NJ/NE Tube Nasogastric 14 fr Left nostril (Removed)   Surrounding Skin Dry; Intact 4/18/2020  3:43 AM   Securement device Yes 4/18/2020  3:43 AM   Status Clamped 4/18/2020  3:43 AM   Placement Verified by X-Ray (Initial);by Respiratory Status 4/18/2020  5:45 AM   NG/OG/NJ/NE External Measurement (cm) 80 cm 4/18/2020  5:45 AM   Tube Feeding Other Tube Feeding (must specify product in comment) 4/18/2020  3:43 AM   Rate/Schedule 20 mL/hr 4/18/2020  3:43 AM   Free Water Rate 30ml q4hr 4/18/2020  3:43 AM   Residual Volume (ml) 0 ml 4/18/2020  3:43 AM       Findings: Gastritis, trauma opposite PEG site possible from excess suction no active GI bleeding    Electronically signed by Althea Lux MD on 4/29/2020 at 7:19 AM

## 2020-04-29 NOTE — PLAN OF CARE
Currently infusing at 30ml/hr, goal 50ml/hr.  4/29/2020 1415 by Kody Wells RD, LD  Outcome: Ongoing  4/29/2020 0524 by Rachel Chopra RN  Outcome: Ongoing  Note: Tube feed on hold at this time. PT. NPO pending EGD. Problem: Discharge Planning:  Goal: Discharged to appropriate level of care  Description: Discharged to appropriate level of care   4/29/2020 1558 by Ana Moseley RN  Outcome: Ongoing  Note: Patient plans to return to Inpatient Rehab at discharge. 4/29/2020 0524 by Rachel Chopra RN  Outcome: Ongoing  Note: Pt. Planning to discharge back to inpatient rehab when medically stable. Care plan reviewed with patient and family. Patient and family verbalize understanding of the plan of care and contribute to goal setting.

## 2020-04-29 NOTE — PRE SEDATION
[facility]  Sedation/Analgesia History & Physical    Patient: Jef Albert : 1931  Med Rec#: [id number] Acc#: [id number]   Provider Performing Procedure: Dayton Latif  Primary Care Physician: Soraya 39, DO    PRE-PROCEDURE   Full CODE [x]Yes  DNR-CCA/DNR-CC []Yes   Brief History/Pre-Procedure Diagnosis:GI bleeding, melena and drop in H/H           MEDICAL HISTORY  []CAD/Valve  []Liver Disease  []Lung Disease []Diabetes  []Hypertension []Renal Disease  []Additional information:       has a past medical history of Acute sinusitis, Angina pectoris ([address] Anxiety disorder, BPH with urinary obstruction, CAD (coronary artery disease), Chronic insomnia, CKD (chronic kidney disease) stage 3, GFR 30-59 ml/min ([address] COPD (chronic obstructive pulmonary disease) ([address] Gastroenteritis, HCAP (healthcare-associated pneumonia), Heart disease, HLD (hyperlipidemia), [place] (hard of hearing), Hypertension, Kidney stone, Moderate malnutrition ([address] NICOLAS on CPAP, Osteopenia determined by x-ray, Osteoporosis, senile, Pacemaker, Pinched nerve, S/P TURP (status post transurethral resection of prostate), Stroke due to stenosis of right carotid artery ([address] Visual problems, and Vitamin D deficiency. SURGICAL HISTORY   has a past surgical history that includes Coronary angioplasty with stent; Nasal sinus surgery; Pacemaker insertion; Tympanostomy tube placement; Cataract removal with implant; Myringotomy Tympanostomy Tube Placement (12/3/12); Dental surgery (7032'X); pacemaker placement (); Colonoscopy (4123,0962); hernia repair; TURP (2013); Myringotomy Tympanostomy Tube Placement (13); other surgical history (2015); eye surgery; EKG 12 Lead (2015); TURP (4/27/15); Abdominal hernia repair (2017); hip pinning (Left, 2017); Tibia fracture surgery (Right, 10/8/2017); Upper gastrointestinal endoscopy (2017);  Colonoscopy (2017); pr njx dx/ther sbst

## 2020-04-29 NOTE — OP NOTE
[address] [place] [address]                                OPERATIVE REPORT    PATIENT NAME: Patrick De La Cruz                       :        1931  MED REC NO:   [id number]                           ROOM:       0017  ACCOUNT NO:   [de-identified]                           ADMIT DATE: 2020  PROVIDER:     Dayton Latif M.D.    DATE OF PROCEDURE:  2020    INDICATIONS:  The patient with a history of dysphagia, recent CVA; has  recent PEG tube placed; had significant drop in H and H, required blood  transfusion; with melena and hemoglobin down from 11.1 to 7. Today,  stable over 8 after one unit of blood transfusion, currently on PPI  infusion. He was on PPI by PEG tube, which is Prevacid 30 mg daily. Plan today for EGD to evaluate. SURGEON:  Dayton Latif MD    ASA CLASSIFICATION:  IV.    Estimated blood loss in none. DESCRIPTION OF PROCEDURE:  The patient was brought to the OR. Consent  was obtained. Risks involved with the procedure were explained. Informed consent was obtained. Consent was obtained from his daughter  and family. The patient was monitored during the procedure with pulse  oximetry, blood pressure monitoring, and oxygen given by face mask. After adequate sedation, positioned in the left lateral decubitus  position. Upper scope was advanced with difficulty up to the duodenum. Esophagus appeared slightly tortuous. Scope was advanced to the  stomach. Seen mild gastritis in the fundus as well as seen gastritis  which was likely due to trauma from excess suctioning of the PEG tube  opposite to the PEG tube site in the body of the stomach. Not actively  bleeding at this time. Scope was advanced to the duodenum which  appeared normal.  Scope was withdrawn with no immediate complications. IMPRESSION:  1. Gastritis. 2.  Trauma likely from excess suctioning of the PEG tube. PLAN:  1.   Start tube feeding. 2.  Continue PPI IV at this time, change later when H and H  stable  And improve to Prevacid by PEG tube twice a day. 3.  Avoid excess suctioning of the PEG tube site as that is likely going  to traumatize the mucosa leading to bleeding. Kendrick Landa M.D.    D: 04/29/2020 7:38:17       T: 04/29/2020 11:59:25     [email or web address]  Job#: [id number]     Doc#: [id number]    CC:  Marysol Meyer.  Sourav Mc.

## 2020-04-29 NOTE — PROGRESS NOTES
[facility]  INPATIENT SPEECH THERAPY  [facility] 4A  DAILY NOTE    TIME   SLP Individual Minutes  Time In: 9657  Time Out: 1440  Minutes: 11  Timed Code Treatment Minutes: 0 Minutes       Date: 2020  Patient Name: Everardo Jay      CSN: [id number]   : 1931  (80 y.o.)  Gender: male   Referring Physician:  Malachi Pablo DO  Diagnosis: CVA, old, aphasia  Secondary Diagnosis: Dysphagia, dysarthria  Precautions: Fall risk, aspiration precautions, PEG tube placement   Current Diet: NPO; PEG tube  Swallowing Strategies: Not Applicable  Date of Last MBS: Not Applicable    Pain:  No pain reported. Subjective:  Pt resting in bed upon arrival, brighter affect detected in comparison to prior dates. Improved alertness levels additionally noted with active participation in skilled service provision. Shortened therapy session given pt with bowel movement; BRIAN Hoskins Prow notified. Short-Term Goals:  SHORT TERM GOAL #1:  Goal 1: Pt will safely consume PO trials of ice chips,  liquids, and purees (as deemed clinically appropriate) without overt s/s aspiration to determine readiness for potential PO diet level initiation and/or completion of formal instrumentation. INTERVENTIONS: PO trials facilitated to determine readiness for potential formal instrumentation via MBS. Prior to intake, bilateral hearing aids in place with pt successfully able to maintain eyes being open for entirety of therapy session. BRIAN Coronai provision of physical assist in conjunction with ST to improve postural placement in bed chair given \"slumpped\" positioning.  The following trials were administered:  -Trial 1, nectar/mildly thick juice via tsp: weakness noted for engaging in labial rounding on utensil to permit extraction of conservative bolus into cavity; minimal attempts at manipulation with NO evidence for AP transit (anterior spillage at midline); tactile palpitation maintained within completion of trial with no pharyngeal swallow reflex noted   -Trial 2, nectar/mildly thick juice via tsp: ongoing limitations for manipulation of fluid bolus with overt lingual pumping detected; again, NO evidence for AP transit  (anterior spillage at midline) with pt shaking head yes given question of, \"did you swallow? \"; tactile palpitation maintained within completion of trial with NO pharyngeal swallow reflex noted. Suspect overall reduced sensation levels. Further trials deferred.       Pt notes to remain at [address] [place] [address] for aspiration events at this time d/t current medical complexity levels, recent neurological insult, and adverse findings within dysphagia interventions at date. Recommend continuation of strict NPO diet level with continuation for comprehensive oral care procedural analysis. Will continue to prioritize dysphagia interventions as schedule permits and complete formal instrumentation via MBS as it becomes clinically indicated. SHORT TERM GOAL #2:  Goal 2: Staff will exhibit return demonstration for implementation of comprehensive oral care procedural analysis to maximize oral integrity and to reduce potential bacteria colonization. INTERVENTIONS: BRIAN Varghese Prior reporting overall success and positive toleration within completion of oral care. Comprehensive oral care procedural analysis completed prior to provision of PO trial offerings via hydrogen peroxide rinse + toothettes. Removal of moist/brown secretions from hard palate, minimal in amount. Oral cavity appeared pink and moist at date. SHORT TERM GOAL #3:  Goal 3: Complete full ST evaluation to develop goals per POC. INTERVENTIONS: Overall gains at date r/t alertness levels with verbal approximation for \"hi\" produced upon ST arrival. Should continued improvements be noted with wakefulness and speech production, plan to complete further ST evaluation to enhance established POC.      Long-Term Goals:  No LTG established given short ELOS

## 2020-04-29 NOTE — CARE COORDINATION
4/29/20, 11:55 AM EDT    DISCHARGE ON [address] [place] [address] day: 4  Location: -17/017-A Reason for admit: CVA, old, aphasia [I69.320]   Procedure: EGD DIAGNOSTIC ONLY     Treatment Plan of Care: PT/OT/ST. GI, General Surgery, Physiatry following, Tube Feeds to be re-started. IV fluids, diabetes management, Duonebs, Protonix drip. Barriers to Discharge: medical stability. Ability to tolerate therapies and tube feedings. PCP: Ruiz Trotter DO  Readmission Risk Score: 40%  Patient Goals/Plan/Treatment Preferences: Plan is to return to IP Rehab.

## 2020-04-29 NOTE — PROGRESS NOTES
slipper socks. BALANCE:  Sitting Balance:  Minimal Assistance. initally for 4 min then mod-max A (sat EOB 12 min total) with fatigue-tactile & vcs for midline & not leaning posteriorly . vcs for erect posture & bringing head up. Worked on weight shifts forward & to L side for increasing ease of t/fs. BED MOBILITY:  Supine to Sit: Maximum Assistance x 1  Sit to Supine: Maximum Assistance, X 1      ADDITIONAL ACTIVITIES:  Noted R shoulder flexion to 15 degrees on this date & elbow flexion/extension in mid range. ASSESSMENT:     Activity Tolerance:  Patient tolerance of  treatment: fair. Discharge Recommendations: Continue to assess pending progress  Equipment Recommendations: Other: Monitor pending progress  Plan: Times per week: 6x  Current Treatment Recommendations: Strengthening, Safety Education & Training, Balance Training, Patient/Caregiver Education & Training, Self-Care / ADL, Cognitive/Perceptual Training, Functional Mobility Training, Equipment Evaluation, Education, & procurement, Endurance Training    Patient Education  Patient Education: see above    Goals  Short term goals  Time Frame for Short term goals: Until discharge  Short term goal 1: Tolerate sitting EOB x 8 min with 0>mod A & min vcs for midline posture for eventual EOB ADLs  Short term goal 2: Follow 1-step commands 50% of the time with mod encouragement for increased safety & participation & ADL tasks  Short term goal 3: Attend to R hemibody with mod vcs to increase BUE intergration for grooming tasks  Short term goal 4: Complete sit-stand with mod A x 2 for eventual BSC t/fs  Long term goals  Time Frame for Long term goals : No LTG set d/t short ELOS   Long term goal 1:       Following session, patient left in safe position with all fall risk precautions in place.

## 2020-04-29 NOTE — CONSULTS
[address] [place] [address]                                  CONSULTATION    PATIENT NAME: Elisa Dickson                       :        1931  MED REC NO:   [id number]                           ROOM:       0017  ACCOUNT NO:   [de-identified]                           ADMIT DATE: 2020  PROVIDER:     LORENZA Connell DATE:  2020    HISTORY OF PRESENT ILLNESS:  The patient is known to the practice, seen  during the current admission before he moved to transitional care. He  moved again to transitional care because of altered mental status. He  was seen in GI clinic at that time because of GI bleed. He had melenic  stool, large amount, as well as red blood with bowel movement. He is  able to provide the history, not adequate as he is having recent CVA and  aphasia. He recognized me. Nursing staff described any nausea, vomit,  dysphagia, or hematemesis. He is being fed by the PEG tube feeding  which we will hold at this time. He described no abdominal discomfort  at this time. His bowel movement according to the nursing staff is  black as well as reddish, significant amount. They also noticed that he  has change in mental status that is why he moved to transitional care to  neuropsychiatric floor. He is not on anticoagulation at this time. He  is still on PPI at this time. He is supposed to be on Prevacid by the   PEG tube feeding daily. His tube feeding is off at this time. He had  recent upper endoscopy, did not show any major changes. PEG tube was  placed successfully without complications.     PAST MEDICAL HISTORY:  Significant for CVA, PEG tube placement due to  dysphagia due to CVA, history of angina, anxiety, benign prostatic  hyperplasia, insomnia, chronic kidney disease stage III, COPD,  hyperlipidemia, hypertension, history of kidney stone,  obstructive sleep apnea, peripheral vascular disease, carotid artery  Stenosis and vitamin D deficiency. PAST SURGICAL HISTORY:  Significant for hernia repair, PEG tube  placement, cataract removal, colonoscopy to control bleeding in the  past, upper endoscopy with PEG tube placement, hip surgery with hip  pinning. SOCIAL HISTORY:  Quit smoking more than 50 years ago. No alcohol abuse. Good family support. FAMILY HISTORY:  Arthritis, cancer, diabetes, coronary artery disease,  hypertension, CVA. MEDICATIONS:  Prevacid by the PEG tube, Tylenol, Proscar, albuterol  inhaler, insulin, Isorbid, nystatin suspension orally. PHYSICAL EXAMINATION:  GENERAL:  Pleasant, has drooling, severe, obvious. VITAL SIGNS:  His weight is 149. Blood pressure 170/68, respirations  18, pulse 96. HEENT:  Head atraumatic. Sclerae anicteric. Oral cavity:  Dry mucosa. No lesions seen. Drooling on the right side. NECK:  Supple. CHEST:  Poor air entry bilaterally. CARDIOVASCULAR:  S1, S2.  ABDOMEN:  Soft. No tenderness. No rebound. No guarding. CENTRAL NERVOUS SYSTEM:  Weakness on the right side and he is aphasic. DIAGNOSTIC DATA:  His recent EGD showed mild gastritis. PEG tube was in  place. IMPRESSION:  1.  GI bleed, currently determine if upper versus lower GI bleed. 2.  Recent PEG tube placement. PLAN:  1. No oral PPI. 2.  Upper endoscopy to be done tomorrow to evaluate. 3.  Monitor H and H closely. Transfuse as needed. 4.  Avoid NSAIDs and anticoagulation. Thank you for allowing me to participate in this patient's care. Pnoce Ronquillo M.D.    D: 04/28/2020 17:59:37       T: 04/28/2020 19:28:32     [email or web address]  Job#: [id number]     Doc#: [id number]    CC:  Cassie Goldberg.  Amadou Carr

## 2020-04-30 NOTE — PROGRESS NOTES
was kept NPO. NG tube was initially placed, however was pulled by patient. Family meeting was done on 4/21/2020 and family agreed for PEG tube placement on 4/22/2020. GI following. On 4/22/2020, patient underwent PEG tube placement done by Dr. Carisa Bowles. On 4/23/2020, Dr. Carisa Bowles (GI) was okay to start PEG tube feeding and resume asa 81 mg via PEG daily. No bleeding noted since 4/19/2020. Pt was on rehab 1 -2 days and was transferred off 4-27 back to acute care  with medical decline. Pt with Gi bleed 4-27. Prior Level of Function:  Lives With: Spouse  Type of Home: House  Home Layout: One level  Home Access: Stairs to enter with rails  Entrance Stairs - Number of Steps: 1 milton with 2 grab bars   Entrance Stairs - Rails: Both  Home Equipment: Rolling walker, Cane        ADL Assistance: Independent  Ambulation Assistance: Independent  Transfer Assistance: Independent  Additional Comments: The above is per previous eval: Pt unable to give any home info even with head nods on this date. Per  note, Pt was living home & caregiver to wife who has dementia-family A. Restrictions/Precautions:  Restrictions/Precautions: Fall Risk  Required Braces or Orthoses  Other: Abdominal Binder  Position Activity Restriction  Other position/activity restrictions: expressive aphasia,non verbal, NPO-peg, leans  to R side, R neglect    SUBJECTIVE:nursing ok'd therapy, pt was up in chair on arrival he followed most directions w/ extra cues given he did however require many rest breaks due to fatigue he was incont of urine and did have to   PAIN: 0/10  OBJECTIVE:  Bed Mobility:  Sit to Supine: Maximum Assistance, at trunk and LEs and once in bed he needed max assist to reposition hips and shoulders in bed    Scooting:  Moderate Assistance, to max assist     Transfers:  Sit to Stand: Maximum Assistance, of one from chair to walker, and in [address] steady he varried from max assist of one to mod assist x 2 alison as he fatigued he required

## 2020-04-30 NOTE — PROGRESS NOTES
Nutrition Assessment (Enteral Nutrition)    Type and Reason for Visit: Reassess    Nutrition Recommendations:   -When able recommend resume TF's: begin Jevity 1.5 at 20 ml/hr, increase 10 ml/hr every 4 hours as tolerated to a goal of 50 ml/hr.    Recommend 225 ml free water 4 times/day when off IVF. -Further diet recommendations per SLP. Nutrition Assessment:   Pt. declining from a nutritional standpoint AEB continued NPO status and tubefeedings placed on hold for possible colonoscopy today due to previous concerns of GI bleed. Remains at risk for further nutritional compromise r/t NPO status due to dysphagia, old CVA, dysphagia, s/p PEG placement, concerns of aspiration pneumonia, known torturous esophagus, history of CKD, COPD, and need for nutrition support.  Nutrition recommendations/interventions as per above. Malnutrition Assessment:  · Malnutrition Status: Meets the criteria for moderate malnutrition  · Context: Chronic illness  · Findings of the 6 clinical characteristics of malnutrition (Minimum of 2 out of 6 clinical characteristics is required to make the diagnosis of moderate or severe Protein Calorie Malnutrition based on AND/ASPEN Guidelines):  1. Energy Intake-Less than or equal to 75% of estimated energy requirement, Greater than or equal to 5 days    2. Weight Loss-No significant weight loss,    3. Fat Loss-Moderate subcutaneous fat loss, Orbital  4. Muscle Loss-Moderate muscle mass loss, Temples (temporalis muscle)  5. Fluid Accumulation-No significant fluid accumulation,    6.  Strength- not measured    Nutrition Risk Level: High    Nutrition Needs:  · Estimated Daily Total Kcal: 1531-4748 (25-30 kcals/kg current weight of 68 kg on 4/27/20)  · Estimated Daily Protein (g): 68-82 (1.0-1.2 grams/kg current weight of 68 kg on 4/27/20)  · Estimated Daily Fluid (ml/day): 4320-9506 (25-30 ml/kg current weighto f 68 kg on 4/27/20)    Nutrition Diagnosis:   · Problem:  Moderate malnutrition, In context of chronic illness  · Etiology: related to Insufficient energy/nutrient consumption     Signs and symptoms:  as evidenced by Moderate loss of subcutaneous fat, Moderate muscle loss    Objective Information:  · Nutrition-Focused Physical Findings: Pt seen, TF's on hold, there were restarted yesterday, got up to 30 ml/hr but then were placed on hold for possible colonoscopy today (previous concerns of GI bleed, s/p EGD 4/29/20-no active bleeding) per RN. 2 BM's yesterday. Potassium 3.4, BUN 29, Glucose 95. IVF's at 75 ml/hr, protonix, zofran, phenergan, nystatin, humalog, iron.    · Wound Type: None  · Current Nutrition Therapies:  · Oral Diet Orders: NPO   · Tube Feeding (TF) Recommendations:   · Feeding Route: Gastrostomy(PEG placed 4/22/20)  · Formula: 1.5 Calorie with Fiber(Jevity 1.5)  · Rate (ml/hr):goal is 50 ml/hr    · Volume (ml/day): 1200 ml  · Duration: Continuous  · Water Flushes: recommend 225 ml 4 times/day when off IVF  · Goal TF & Flush Orders Provides: 1800 kcals, 77 grams protein, 259 grams CHO, 26 grams fiber, 912 ml free water in TF (1812 ml free water with above flushes)  · Anthropometric Measures:  · Ht: 5' 6\" (167.6 cm)   · Current Body Wt: 155 lb 4.8 oz (70.4 kg)(4/30/20 no edema)  · Admission Body Wt: 149 lb (67.6 kg)(4/25/20, bedscale)  · Usual Body Wt: 139 lb (63 kg)([facility], 3/2/20, standing scale)  · Ideal Body Wt: 142 lb (64.4 kg),   · BMI Classification: BMI 25.0 - 29.9 Overweight    Nutrition Interventions:   Continue NPO(When able recommend resume tubefeedings)  Continued Inpatient Monitoring, Education not appropriate at this time, Coordination of Care    Nutrition Evaluation:   · Evaluation: Progress towards goals declining   · Goals: Patient willl tolerate TF to provide 75% or greater of estimated nutritional needs during LOS   · Monitoring: Nutrition Progression, TF Intake, TF Tolerance, Skin Integrity, I&O, Weight, Pertinent Labs, Chewing/Swallowing, Monitor Bowel Function      Electronically signed by Bill Rush RD, LD on 4/30/20 at 11:31 AM EDT    Contact Number: [phone]

## 2020-04-30 NOTE — CARE COORDINATION
4/30/20, 11:16 AM EDT    DISCHARGE ONGOING EVALUATION:     Criselda 54 day: 5  Location: [facility]17/017-A Reason for admit: CVA, old, aphasia [I69.320]   Treatment Plan of Care: Protonix drip, GI following, PT/OT/ST, Physiatry following, IV fluids, diabetes management. Barriers to Discharge: medical stability. PCP: Lola Rodriguez DO  Readmission Risk Score: 40%  Patient Goals/Plan/Treatment Preferences: from home. Plan is to return to [address] [place] [address] when medically stable.

## 2020-04-30 NOTE — PROGRESS NOTES
Called and updated Astrid Anglin (daughter and son) jointly. Reviewed consent for colonoscopy. Consent obtained with Lee Mcardle RN.

## 2020-04-30 NOTE — PROGRESS NOTES
I spoke with the patient's daughter/POA Gonsalves Gone on the telephone regarding colonoscopy and suspending limited code status for the procedure. We discussed the procedure in detail as well as the risks and benefits of the colonoscopy and time taken to answer her questions. She is agreeable to proceed with colonoscopy. See the orders. We will suspend Limited Code status for 24 hours for colonoscopy procedure and patient will be a FULL CODE for the procedure.

## 2020-05-01 NOTE — BRIEF OP NOTE
Brief Postoperative Note      Patient: Gabriel Rodriguez  YOB: 1931  MRN: [id number]    Date of Procedure: 5/1/2020    Pre-Op Diagnosis: Aphasia, CVA GI bleeding, melena and hematochezia     Post-Op Diagnosis: severe diverticulosis and poor prep        Procedure(s):  COLONOSCOPY DIAGNOSTIC    Surgeon(s):  Tyra Brenner MD    Assistant:  * No surgical staff found *    Anesthesia: General    Estimated Blood Loss (mL): none    Complications: None    Specimens:   * No specimens in log *    Implants:  * No implants in log *      Drains:   Gastrostomy/Enterostomy/Jejunostomy Gastrostomy RUQ (Active)   Site Description Healing 5/1/2020  4:19 AM   Surrounding Skin Dry; Intact 5/1/2020  4:19 AM   Dressing Status Clean;Dry; Intact 4/29/2020 11:30 AM   Dressing Type Split gauze 4/29/2020 11:30 AM   Tube Feeding Other Tube Feeding (must specify product in comment) 4/29/2020 11:30 AM   Rate/Schedule 30 mL/hr 4/29/2020  2:53 PM   Tube Feeding Intake (mL) 188 ml 4/29/2020  2:53 PM   Free Water Flush (mL) 0 mL 4/29/2020  2:53 PM   Free Water Rate 520 4/25/2020  6:30 AM   Tube Placement Verified Yes 4/25/2020 11:47 AM   Residual Volume (ml) 0 ml 4/29/2020  2:53 PM       [REMOVED] NG/OG/NJ/NE Tube Nasogastric 14 fr Right nostril (Removed)   Surrounding Skin Dry; Intact 4/18/2020 12:20 AM   Securement device Yes 4/18/2020 12:20 AM   Status Other (Comment) 4/18/2020 12:20 AM   Placement Verified by Respiratory Status;by External Catheter Length 4/18/2020 12:20 AM   NG/OG/NJ/NE External Measurement (cm) 70 cm 4/18/2020 12:20 AM   Tube Feeding Other Tube Feeding (must specify product in comment) 4/18/2020 12:20 AM   Tube Feeding Status Continuous 4/18/2020 12:20 AM   Rate/Schedule 20 mL/hr 4/18/2020  2:36 AM   Tube Feeding Supplement Amount (mL) 35 4/18/2020  3:43 AM   Tube Feeding Intake (mL) 0 ml 4/18/2020  3:43 AM   Free Water Rate 30ml q4hr 4/18/2020 12:20 AM   Residual Volume (ml) 0 ml 4/18/2020  2:36 AM       [REMOVED] NG/OG/NJ/NE Tube Nasogastric 14 fr Left nostril (Removed)   Surrounding Skin Dry; Intact 4/18/2020  3:43 AM   Securement device Yes 4/18/2020  3:43 AM   Status Clamped 4/18/2020  3:43 AM   Placement Verified by X-Ray (Initial);by Respiratory Status 4/18/2020  5:45 AM   NG/OG/NJ/NE External Measurement (cm) 80 cm 4/18/2020  5:45 AM   Tube Feeding Other Tube Feeding (must specify product in comment) 4/18/2020  3:43 AM   Rate/Schedule 20 mL/hr 4/18/2020  3:43 AM   Free Water Rate 30ml q4hr 4/18/2020  3:43 AM   Residual Volume (ml) 0 ml 4/18/2020  3:43 AM       [REMOVED] Urethral Catheter (Removed)   Site Assessment No urethral drainage;Pink 4/29/2020  2:53 PM   Urine Color Yellow 4/29/2020  2:53 PM   Urine Appearance Clear 4/29/2020  2:53 PM   Output (mL) 800 mL 4/29/2020  2:53 PM       Findings:   diverticulosis and internal hemorrhoids.      Electronically signed by Meme Maldonado MD on 5/1/2020 at 4:31 PM

## 2020-05-01 NOTE — ANESTHESIA PRE PROCEDURE
INTO THE LUNGS EVERY 6 HOURS AS NEEDED FOR WHEEZING 3/16/20   CHRISTIN Banerjee CNP   ipratropium-albuterol (DUONEB) 0.5-2.5 (3) MG/3ML SOLN nebulizer solution Inhale 3 mLs into the lungs every 4 hours as needed for Shortness of Breath 3/16/20   CHRISTIN Duron CNP   Tiotropium Bromide-Olodaterol (STIOLTO RESPIMAT) 2.5-2.5 MCG/ACT AERS Inhale 2 puffs into the lungs daily  Patient not taking: Reported on 3/11/2020 3/5/20   CHRISTIN Duron CNP   Spacer/Aero Chamber Mouthpiece MISC Use it with [facility] inhaler 5/11/19   Antwan Rodríguez MD   acetaminophen (TYLENOL) 650 MG extended release tablet Take 650 mg by mouth every 8 hours as needed for Pain    Historical Provider, MD       Current medications:    Current Facility-Administered Medications   Medication Dose Route Frequency Provider Last Rate Last Dose    calcium replacement protocol   Other RX Placeholder [place] [address] [address]        magnesium sulfate 2 g in 50 mL IVPB premix  2 g Intravenous PRN [place] [address]        potassium chloride (KLOR-CON M) extended release tablet 40 mEq  40 mEq Oral PRN [place]        Or    potassium bicarb-citric acid (EFFER-K) effervescent tablet 40 mEq  40 mEq Oral PRN [place]        Or    potassium chloride 10 mEq/100 mL IVPB (Peripheral Line)  10 mEq Intravenous PRN [place]  mL/hr at 05/01/20 1522 10 mEq at 05/01/20 1522    sodium chloride flush 0.9 % injection 10 mL  10 mL Intravenous 2 times per day CHRISTIN Starr CNP   10 mL at 05/01/20 0856    sodium chloride flush 0.9 % injection 10 mL  10 mL Intravenous PRN CHRISTIN Starr CNP        carvedilol (COREG) tablet 3.125 mg  3.125 mg Oral BID WC Olivia Tavera MD   3.125 mg at 05/01/20 0855    pantoprazole (PROTONIX) 80 mg in sodium chloride 0.9 % 100 mL infusion  8 mg/hr Intravenous Continuous Miracle Shahid MD 10 mL/hr at 05/01/20 1303 8 mg/hr at 05/01/20 1303    finasteride (PROSCAR) tablet 5 mg  5 mg PEG Tube Daily Su Fong MD   5 mg at 05/01/20 0855    docusate (COLACE) 50 MG/5ML liquid 100 mg  100 mg Per G Tube Daily Shannon Kimberly, DO   100 mg at 04/30/20 0902    acetaminophen (TYLENOL) 160 MG/5ML solution 650 mg  650 mg PEG Tube Q4H PRN Shannon Kimberly, DO        hydrALAZINE (APRESOLINE) tablet 25 mg  25 mg PEG Tube Q6H PRN Shannon Kimberly, DO        promethazine (PHENERGAN) tablet 12.5 mg  12.5 mg PEG Tube Q6H PRN Shannon Kimberly, DO        Or    ondansetron [facility]) injection 4 mg  4 mg Intravenous Q6H PRN Shannon Kimberly, DO        0.9 % sodium chloride infusion   Intravenous Continuous [facility] 75 mL/hr at 04/29/20 1119      diatrizoate meglumine-sodium (GASTROGRAFIN) 66-10 % solution 30 mL  30 mL PEG Tube ONCE PRN Marvin Alvarez MD   30 mL at 04/26/20 0905    dextrose 5 % solution  100 mL/hr Intravenous PRN Shannon Kimberly, DO        dextrose 50 % IV solution  12.5 g Intravenous PRN Shannon Kimberly, DO        glucagon (rDNA) injection 1 mg  1 mg Intramuscular PRN Shannon Kimberly, DO        glucose (GLUTOSE) 40 % oral gel 15 g  15 g Oral PRN Shannon Kimberly, DO        acetaminophen (TYLENOL) suppository 650 mg  650 mg Rectal Q4H PRN Shannon Kimberly, DO        albuterol (PROVENTIL) nebulizer solution 2.5 mg  2.5 mg Nebulization Q6H PRN Shannon Kimberly, DO        [Held by provider] aspirin chewable tablet 81 mg  81 mg Per NG tube Daily Shannon Kimberly, DO   81 mg at 04/27/20 1210    atorvastatin (LIPITOR) tablet 80 mg  80 mg PEG Tube Nightly Shannon Kimberly, DO   80 mg at 04/30/20 1949    ferrous sulfate (IRON 325) tablet 325 mg  325 mg PEG Tube Lunch Shannon Kimberly, DO   325 mg at 05/01/20 0856    insulin lispro (HUMALOG) injection vial 0-12 Units  0-12 Units Subcutaneous Q6H Shannon Harris DO        ipratropium-albuterol (DUONEB) nebulizer solution 3 mL  3 mL Inhalation Q4H PRN Shannon Harris DO        isosorbide mononitrate (IMDUR) extended release tablet 30 mg  30 mg Oral Daily Capri Berenice, DO   30 mg at 05/01/20 0855    nystatin (MYCOSTATIN) 396211 UNIT/ML suspension 500,000 Units  5 mL Oral 4x Daily Capri Berenice, DO   500,000 Units at 05/01/20 1304    polyethylene glycol (GLYCOLAX) packet 17 g  17 g Per G Tube Daily PRN Capri Berenice, DO        sertraline (ZOLOFT) tablet 50 mg  50 mg PEG Tube Daily Capri Reading, DO   50 mg at 05/01/20 2080       Allergies:     Allergies   Allergen Reactions    Iodine Swelling and Rash       Problem List:    Patient Active Problem List   Diagnosis Code    Dyspnea R06.00    Bladder neck contracture N32.0    BPH (benign prostatic hyperplasia) N40.0    Suprapubic pain R10.2    CKD (chronic kidney disease) stage 3, GFR 30-59 ml/min ([facility]) N18.3    CAD (coronary artery disease) I25.10    Nausea and vomiting R11.2    Chronic serous otitis media of left ear H65.22    Hearing loss, sensorineural H90.5    Conductive hearing loss, bilateral H90.0    History of tympanoplasty of left ear Z98.890    Anxiety disorder F41.9    Disc disorder of lumbar region M51.9    Pacemaker Z95.0    S/P ventral herniorrhaphy Z98.890, Z87.19    Essential hypertension I10    Head injury, closed, without LOC S09.90XA    At high risk for pain from procedure Z91.89    NICOLAS (obstructive sleep apnea) G47.33    Closed comminuted intertrochanteric fracture of right femur with routine healing S72.141D    Gastrointestinal hemorrhage K92.2    B12 deficiency E53.8    Diverticulosis of large intestine with hemorrhage K57.31    Lumbar radiculitis M54.16    HNP (herniated nucleus pulposus), lumbar M51.26    Severe malnutrition (HCC) E43    Gastritis K29.70    Tachycardia R00.0    COPD (chronic obstructive pulmonary disease) ([facility]) J44.9    Normocytic anemia D64.9    Malnutrition (HCC) E46    Interstitial lung disease ([facility]) J84.9    Elevated troponin R79.89    Leukocytosis D72.829    Acute kidney injury ([facility] N17.9    History of GI bleed Z87.19    Hyponatremia E87.1    Subcutaneous emphysema resulting from a procedure T81.82XA    Chronic anxiety F41.9    Pulmonary fibrosis ([facility]) J84.10    Tobacco abuse Z72.0    Abnormal CT of the chest R93.89    Moderate malnutrition ([facility]) E44.0    Acute hypotension I95.9    Carotid stenosis, right I65.21    Left leg weakness R29.898    Stroke due to stenosis of right carotid artery ([facility]) I63.231    Nihss score 2 R29.702    Slurred speech R47.81    Cerebrovascular accident (CVA) due to thrombosis of carotid artery ([facility]) I63.039    Dysphagia R13.10    Acute CVA (cerebrovascular accident) ([address] I63.9    Aspiration pneumonia ([facility]) J69.0    Azotemia R79.89    History of BPH Z87.438    History of depression Z86.59    Oral thrush B37.0    Hypocalcemia E83.51    Cerebrovascular accident (CVA) determined by clinical assessment ([address] I63.9    CVA, old, aphasia I69.5    Pneumoperitoneum K66.8    Hemiparesis, right ([address] G81.91    Dysarthria R47.1    Stenosis of right internal carotid artery I65.21       Past Medical History:        Diagnosis Date    Acute sinusitis     Angina pectoris ([address]     Anxiety disorder 10/27/2016    BPH with urinary obstruction     CAD (coronary artery disease)     Chronic insomnia     CKD (chronic kidney disease) stage 3, GFR 30-59 ml/min ([facility])     COPD (chronic obstructive pulmonary disease) ([facility])     Gastroenteritis     HCAP (healthcare-associated pneumonia) 4/29/2015    Heart disease     HLD (hyperlipidemia)     [place] (hard of hearing)     wear hearing aids    Hypertension     Kidney stone     years ago 15-20    Moderate malnutrition ([address] 3/5/2020    NICOLAS on CPAP     Osteopenia determined by x-ray     Osteoporosis, senile     Pacemaker 2011    Pinched nerve     slipped disc in back    S/P TURP (status post transurethral resection of prostate)     Stroke due to stenosis of right carotid artery ([facility])     Visual problems     Vitamin D deficiency        Past 04/24/20 142 lb 9.6 oz (64.7 kg)     Body mass index is 25.07 kg/m². CBC:   Lab Results   Component Value Date    WBC 11.5 05/01/2020    RBC 2.95 05/01/2020    HGB 8.6 05/01/2020    HCT 27.2 05/01/2020    MCV 92.2 05/01/2020    RDW 15.7 02/27/2019     05/01/2020       CMP:   Lab Results   Component Value Date     05/01/2020    K 2.9 05/01/2020    K 3.9 04/15/2020     05/01/2020    CO2 23 05/01/2020    BUN 24 05/01/2020    CREATININE 0.9 05/01/2020    LABGLOM 80 05/01/2020    GLUCOSE 95 05/01/2020    GLUCOSE 105 10/09/2018    PROT 6.7 04/25/2020    CALCIUM 8.3 05/01/2020    BILITOT 0.3 04/25/2020    ALKPHOS 75 04/25/2020    AST 16 04/25/2020    ALT 10 04/25/2020       POC Tests:   Recent Labs     05/01/20  1203   POCGLU 100       Coags:   Lab Results   Component Value Date    INR 1.16 04/28/2020    APTT 27.0 04/28/2020       HCG (If Applicable): No results found for: PREGTESTUR, PREGSERUM, HCG, HCGQUANT     ABGs: No results found for: PHART, PO2ART, SDP4VBX, LDD3FVS, BEART, N1PIYDQU     Type & Screen (If Applicable):  Lab Results   Component Value Date    [address] POS 04/28/2020       Anesthesia Evaluation  Patient summary reviewed  Airway: Mallampati: Unable to assess / NA        Dental:          Pulmonary:   (+) COPD:  sleep apnea:                             Cardiovascular:    (+) hypertension:, angina:, pacemaker:, CAD:,       ECG reviewed                        Neuro/Psych:   (+) CVA:, neuromuscular disease:, psychiatric history:             ROS comment: DEMENTIA GI/Hepatic/Renal:             Endo/Other:                     Abdominal:           Vascular:                                        Anesthesia Plan      MAC     ASA 4       Induction: intravenous. Anesthetic plan and risks discussed with patient (from chart). Jonelle Silva.  [facility]   5/1/2020

## 2020-05-01 NOTE — ANESTHESIA POSTPROCEDURE EVALUATION
Department of Anesthesiology  Postprocedure Note    Patient: Archie Osgood  MRN: [id number]  YOB: 1931  Date of evaluation: 5/1/2020  Time:  4:30 PM     Procedure Summary     Date:  05/01/20 Room / Location:  [address]    Anesthesia Start:  1607 Anesthesia Stop:  36    Procedure:  COLONOSCOPY DIAGNOSTIC (N/A ) Diagnosis:  (Aphasia, CVA)    Surgeon:  Lestine Dandy, MD Responsible Provider:  Traci Patrick MD    Anesthesia Type:  MAC ASA Status:  4          Anesthesia Type: MAC    Victoriano Phase I: Victoriano Score: 8    Victoriano Phase II: Victoriano Score: 9    Last vitals: Reviewed and per EMR flowsheets.        Anesthesia Post Evaluation    Patient location during evaluation: bedside  Patient participation: complete - patient participated  Level of consciousness: awake and alert  Airway patency: patent  Nausea & Vomiting: no nausea and no vomiting  Complications: no  Cardiovascular status: hemodynamically stable  Respiratory status: acceptable, room air and spontaneous ventilation  Hydration status: euvolemic

## 2020-05-01 NOTE — PROGRESS NOTES
[facility] THERAPY MISSED TREATMENT NOTE  [facility] NEUROSCIENCES 4A      Date: 2020  Patient Name: Edita Morales        MRN: [id number]    : 1931  (80 y.o.)    REASON FOR MISSED TREATMENT:  Attempted to see pt at [phone] for completion of dysphagia interventions. BRINA Perez requesting to hold service provision at date given plans for colonoscopy; not able to provide PO trials d/t need to maintain NPO diet level. Will plan to f/u on , schedule permitting, to resume per established POC as indicated.     Tootie Hoover M.A., [address] [place] [address]

## 2020-05-01 NOTE — CARE COORDINATION
5/1/20, 10:10 AM EDT    DISCHARGE PLANNING EVALUATION      Spoke with son, Janel Persaud, who called requesting update on his father's condition and what to expect for recovery. He would like to speak with physician, if possible. Discussed with Max Sanchez. Nate Bueno has just updated daughter this morning, and will ask if physician can call family.

## 2020-05-01 NOTE — PRE SEDATION
at 05/01/20 1303, 8 mg/hr at 05/01/20 1303    finasteride (PROSCAR) tablet 5 mg, 5 mg, PEG Tube, Daily, Danuta Masters MD, 5 mg at 05/01/20 0855    docusate (COLACE) 50 MG/5ML liquid 100 mg, 100 mg, Per G Tube, Daily, Katerine Castroat, DO, 100 mg at 04/30/20 0902    acetaminophen (TYLENOL) 160 MG/5ML solution 650 mg, 650 mg, PEG Tube, Q4H PRN, Guerorie Jose R, DO    hydrALAZINE (APRESOLINE) tablet 25 mg, 25 mg, PEG Tube, Q6H PRN, Guerorie Jose R, DO    promethazine (PHENERGAN) tablet 12.5 mg, 12.5 mg, PEG Tube, Q6H PRN **OR** ondansetron (ZOFRAN) injection 4 mg, 4 mg, Intravenous, Q6H PRN, Katerine Castroat, DO    0.9 % sodium chloride infusion, , Intravenous, Continuous, [place], [place], Last Rate: 75 mL/hr at 04/29/20 1119    diatrizoate meglumine-sodium (GASTROGRAFIN) 66-10 % solution 30 mL, 30 mL, PEG Tube, ONCE PRN, Maria Elena Garza MD, 30 mL at 04/26/20 0905    dextrose 5 % solution, 100 mL/hr, Intravenous, PRN, Katerine Castroat, DO    dextrose 50 % IV solution, 12.5 g, Intravenous, PRN, Katerine Jose R, DO    glucagon (rDNA) injection 1 mg, 1 mg, Intramuscular, PRN, Rupae Jose R, DO    glucose (GLUTOSE) 40 % oral gel 15 g, 15 g, Oral, PRN, Rupae Jose R, DO    acetaminophen (TYLENOL) suppository 650 mg, 650 mg, Rectal, Q4H PRN, Katerine Jose R, DO    albuterol (PROVENTIL) nebulizer solution 2.5 mg, 2.5 mg, Nebulization, Q6H PRN, Katerine Castroat, DO    [Held by provider] aspirin chewable tablet 81 mg, 81 mg, Per NG tube, Daily, Katerine Castroat, DO, 81 mg at 04/27/20 1210    atorvastatin (LIPITOR) tablet 80 mg, 80 mg, PEG Tube, Nightly, Katerine Castroat, DO, 80 mg at 04/30/20 1949    ferrous sulfate (IRON 325) tablet 325 mg, 325 mg, PEG Tube, Lunch, Katerine Castroat, DO, 325 mg at 05/01/20 0856    insulin lispro (HUMALOG) injection vial 0-12 Units, 0-12 Units, Subcutaneous, Q6H, Katerine Odell DO    ipratropium-albuterol (DUONEB) nebulizer solution 3 mL, 3 mL, Inhalation, Q4H PRN, Katerine Odell DO   isosorbide mononitrate (IMDUR) extended release tablet 30 mg, 30 mg, Oral, Daily, Scottene Mayrichard, DO, 30 mg at 05/01/20 0855    nystatin (MYCOSTATIN) 312128 UNIT/ML suspension 500,000 Units, 5 mL, Oral, 4x Daily, Durene Mayans, DO, 500,000 Units at 05/01/20 1304    polyethylene glycol (GLYCOLAX) packet 17 g, 17 g, Per G Tube, Daily PRN, Sendy Cleary DO    sertraline (ZOLOFT) tablet 50 mg, 50 mg, PEG Tube, Daily, Sendy Mayrichard, DO, 50 mg at 05/01/20 0766  Prior to Admission medications    Medication Sig Start Date End Date Taking?  Authorizing Provider   aspirin 81 MG chewable tablet 1 tablet by Per NG tube route daily 4/24/20   Jon Presley MD   isosorbide mononitrate (IMDUR) 30 MG extended release tablet Take 1 tablet by mouth daily Via PEG tube 4/24/20   Herminia Corea MD   sertraline (ZOLOFT) 50 MG tablet 1 tablet by PEG Tube route daily 4/25/20   Jon Presley MD   atorvastatin (LIPITOR) 80 MG tablet Take 1 tablet by mouth nightly 4/24/20   Herminia Corea MD   ferrous sulfate (IRON 325) 325 (65 Fe) MG tablet 1 tablet by PEG Tube route Daily with lunch 4/24/20   Herminia Corea MD   cyanocobalamin 1000 MCG tablet 1 tablet by PEG Tube route daily 4/24/20   Herminia Corea MD   docusate sodium (COLACE, DULCOLAX) 100 MG CAPS 100 mg by PEG Tube route 2 times daily as needed for Constipation 4/24/20   Herminia Corea MD   polyethylene glycol Rheta Peyer) packet 17 g by Per G Tube route daily as needed for Constipation 4/24/20 5/24/20  Herminia Corea MD   tamsulosin (FLOMAX) 0.4 MG capsule Take 1 capsule by mouth 2 times daily Via PEG tube 4/24/20   Herminia Corea MD   nystatin (MYCOSTATIN) 255482 UNIT/ML suspension Take 5 mLs by mouth 4 times daily Swab on oral mucosa and spit 4/24/20   Herminia Corea MD   Multiple Vitamins-Minerals (THERAPEUTIC MULTIVITAMIN-MINERALS) tablet 1 tablet by PEG Tube route daily 4/24/20   Herminia Corea MD   lansoprazole 3 MG/ML SUSP 10 mLs by Per Penny Cagle Tube route every morning (before breakfast) 4/24/20   Jon Lawrence MD   albuterol sulfate  (90 Base) MCG/ACT inhaler INHALE 2 PUFFS BY MOUTH INTO THE LUNGS EVERY 6 HOURS AS NEEDED FOR WHEEZING 3/16/20   CHRISTIN Walker CNP   ipratropium-albuterol (DUONEB) 0.5-2.5 (3) MG/3ML SOLN nebulizer solution Inhale 3 mLs into the lungs every 4 hours as needed for Shortness of Breath 3/16/20   CHRISTIN Mehta CNP   Tiotropium Bromide-Olodaterol (STIOLTO RESPIMAT) 2.5-2.5 MCG/ACT AERS Inhale 2 puffs into the lungs daily  Patient not taking: Reported on 3/11/2020 3/5/20   CHRISTIN Mehta CNP   Spacer/Aero Chamber Mouthpiece MISC Use it with Mel Greentop inhaler 5/11/19   Parveen Loyola MD   acetaminophen (TYLENOL) 650 MG extended release tablet Take 650 mg by mouth every 8 hours as needed for Pain    Historical Provider, MD     Additional information:       PHYSICAL:   Heart:  [x]Regular rate and rhythm  []Other:    Lungs:  [x]Clear    []Other:    Abdomen: [x]Soft    []Other:    Mental Status: [x]Alert & Oriented  []Other:      VITAL SIGNS   Patient Vitals for the past 24 hrs:   BP Temp Temp src Pulse Resp SpO2 Weight   05/01/20 1258 -- -- -- -- -- 93 % --   05/01/20 1203 (!) 146/82 98.3 °F (36.8 °C) Oral 61 20 92 % --   05/01/20 0815 (!) 178/81 98.5 °F (36.9 °C) Oral 70 18 90 % --   05/01/20 0425 -- -- -- -- -- -- 155 lb 4.8 oz (70.4 kg)   05/01/20 0330 (!) 169/77 97.7 °F (36.5 °C) Oral 64 16 91 % --   04/30/20 2347 (!) 176/79 98.3 °F (36.8 °C) Oral 64 18 91 % --   04/30/20 1930 (!) 148/71 97.7 °F (36.5 °C) Oral 60 16 94 % --   04/30/20 1642 135/71 97.8 °F (36.6 °C) Oral 71 18 92 % --       PLANNED PROCEDURE   []EGD  [x]Colonoscopy []Flex Sigmoid  []ERCP []EUS   []Cystoscopy  [] CATH [] BRONCH   Consent: I have discussed with the patient and/or the patient representative the indication, alternatives, and the possible risks and/or complications of the planned procedure and the anesthesia methods.  The

## 2020-05-01 NOTE — PROGRESS NOTES
for dysphagia. Family meeting was done on 4/21/2020 and family agreed for PEG tube placement on 4/22/2020. GI following.   On 4/22/2020, patient underwent PEG tube placement done by Dr. Emilia Gutierres. On 4/23/2020, Dr. Emilia Gutierres (GI) was okay to start PEG tube feeding and resume asa 81 mg via PEG daily. No bleeding noted since 4/19/2020. Neurology was also okay to resume aspirin. Aspirin 81 mg via PEG tube was resumed on 4/23/2020.     He was transferred to [facility] on 4/24 after 10 day stay for acute CVA, had been working well with therapy until the patient was noted to be not easily arousable and not speaking on the following day 4/25. CT head done, stable. On 4/28, he was also noted to have an acute painless rectal bleed. GI consulted. Subjective:   Patient seen and examined. He is resting comfortably in bed, alert and awake, was attempting to respond to questions and was following some commands. VS stable. Patient's hgb dropped to 7.1 last night, given 1U PRBC. Rpt hgb 8.4. No bleeding noted today.      Medications:  Reviewed    Infusion Medications    pantoprozole (PROTONIX) infusion 8 mg/hr (04/30/20 1703)    sodium chloride 75 mL/hr at 04/29/20 1119    dextrose       Scheduled Medications    sodium chloride flush  10 mL Intravenous 2 times per day    carvedilol  3.125 mg Oral BID WC    finasteride  5 mg PEG Tube Daily    docusate  100 mg Per G Tube Daily    [Held by provider] aspirin  81 mg Per NG tube Daily    atorvastatin  80 mg PEG Tube Nightly    [START ON 4/20/2021] calcium replacement protocol   Other RX Placeholder    ferrous sulfate  325 mg PEG Tube Lunch    insulin lispro  0-12 Units Subcutaneous Q6H    isosorbide mononitrate  30 mg Oral Daily    [START ON 4/20/2021] magnesium replacement protocol   Other RX Placeholder    nystatin  5 mL Oral 4x Daily    [START ON 4/20/2021] potassium replacement protocol   Other RX Placeholder    sertraline  50 mg PEG Tube Daily     PRN Meds: sodium chloride flush, acetaminophen, hydrALAZINE, promethazine **OR** ondansetron, diatrizoate meglumine-sodium, dextrose, dextrose, glucagon (rDNA), glucose, acetaminophen, albuterol, ipratropium-albuterol, polyethylene glycol      Intake/Output Summary (Last 24 hours) at 4/30/2020 2339  Last data filed at 4/30/2020 1359  Gross per 24 hour   Intake 1629.01 ml   Output --   Net 1629.01 ml       Diet:  DIET CLEAR LIQUID;  Diet NPO, After Midnight    Exam:  BP (!) 148/71   Pulse 60   Temp 97.7 °F (36.5 °C) (Oral)   Resp 16   Ht 5' 6\" (1.676 m)   Wt 155 lb 4.8 oz (70.4 kg)   SpO2 94%   BMI 25.07 kg/m²     General appearance: No apparent distress, appears stated age and cooperative, dysarthric  HEENT: Pupils equal, round, and reactive to light. Conjunctivae/corneas clear. Neck: Supple, with full range of motion. No jugular venous distention. Trachea midline. Respiratory:  Normal respiratory effort. Clear to auscultation, bilaterally without Rales/Wheezes/Rhonchi. Cardiovascular: Regular rate and rhythm with normal S1/S2 without murmurs, rubs or gallops. Abdomen: Soft, non-tender, non-distended with normal bowel sounds. PEG tube noted, clean, no erythema, purulent discharge or bleeding  Musculoskeletal: passive and active ROM x 4 extremities. Skin: Skin color, texture, turgor normal.  No rashes or lesions. Neurologic:  Dysarthric, Right sided hemiparesis, L sided facial droop  Capillary Refill: Brisk,< 3 seconds   Peripheral Pulses: +2 palpable, equal bilaterally       Labs:   Recent Labs     04/28/20  0003  04/29/20  0416  04/30/20  0317 04/30/20  1212 04/30/20  1837   WBC 12.0*  --  10.7  --  10.3  --   --    HGB 9.5*   < > 8.0*   < > 8.4* 8.5* 8.4*   HCT 30.3*  --  25.1*  --  26.4*  --   --      --  254  --  246  --   --     < > = values in this interval not displayed.      Recent Labs     04/28/20  0003 04/29/20  0416 04/30/20  0317    143 143   K 3.7 3.7 3.4*    112* 112*   CO2 27 22* 21*

## 2020-05-01 NOTE — PROGRESS NOTES
[place] on 4/26/2020 2:43 AM      CT ABDOMEN PELVIS WO CONTRAST Additional Contrast? None   Final Result   . 1. Significant free air within the peritoneum of the upper and mid abdomen. 2. Visualized gastrostomy tube. Correlation with history recommended. 3. Mild small bowel luminal stasis and fluid retention nonspecific in distribution. Mild enteritis is not excluded. 4. Diffuse colonic diverticulosis. No focal regions of diverticulitis suspected. 5. Chronic interstitial changes of the lungs. Superimposed right lower lobe infiltrates suspected. **This report has been created using voice recognition software. It may contain minor errors which are inherent in voice recognition technology. **      Final report electronically signed by Dr. John Guillen on 4/26/2020 2:49 AM      XR CHEST PORTABLE   Final Result   1. Diffuse interstitial marking changes compatible with chronic lung disease including fibrosis. 2. Superimposed basilar airspace opacities are concerning for pneumonitis particularly in the right lower lobe. 3. Some diaphragmatic lucency concerning for free air. Correlation with history required. Report findings were called to the patient's inpatient brink at time of exam 10:50 PM April 25, 2020 and given to the brink nurse with a call to correlate with the attending physician. **This report has been created using voice recognition software. It may contain minor errors which are inherent in voice recognition technology. **      Final report electronically signed by Dr. John Guillen on 4/25/2020 10:51 PM            Assessment/Plan:      Acute Confusional state, in patient with recent CVA, improved    Repeat CT Head 4/25, stable. Gastrografin fluoroscopy completed which negative for any leak. Resumed feeds through PEG on 4/28. Held on 4/30 for colonoscopy on 5/1  Will monitor clinical status     Acute GI hemorrhage    on ASA, off plavix/AC  ASA on hold.  On PPI via PEG, switched Encourage ambulation           [] Already on Anticoagulation     Disposition:    [] Home       [] TCU       [] Rehab       [] Psych       [] SNF       [] [facility]       [x] Other- Plan as above       Code Status: Full Code    PT/OT Eval Status: on-board         Electronically signed by Shobha Jones MD on 5/1/2020 at 2:27 PM

## 2020-05-01 NOTE — PROGRESS NOTES
kept NPO. NG tube was initially placed, however was pulled by patient. Family meeting was done on 4/21/2020 and family agreed for PEG tube placement on 4/22/2020. GI following. On 4/22/2020, patient underwent PEG tube placement done by Dr. Nava Mercdees. On 4/23/2020, Dr. Nava Mercedes (GI) was okay to start PEG tube feeding and resume asa 81 mg via PEG daily. No bleeding noted since 4/19/2020. Pt was on rehab 1 -2 days and was transferred off 4-27 back to acute care  with medical decline. Pt with Gi bleed 4-27. Prior Level of Function:  Lives With: Spouse  Type of Home: House  Home Layout: One level  Home Access: Stairs to enter with rails  Entrance Stairs - Number of Steps: 1 milton with 2 grab bars   Entrance Stairs - Rails: Both  Home Equipment: Rolling walker, Cane        ADL Assistance: Independent  Ambulation Assistance: Independent  Transfer Assistance: Independent  Additional Comments: The above is per previous eval: Pt unable to give any home info even with head nods on this date. Per  note, Pt was living home & caregiver to wife who has dementia-family A.      Restrictions/Precautions:  Restrictions/Precautions: Fall Risk  Required Braces or Orthoses  Other: Abdominal Binder  Position Activity Restriction  Other position/activity restrictions: expressive aphasia,non verbal, NPO-peg, leans  to R side, R neglect    SUBJECTIVE: pt up in chair on arrival he remained non verbal but does use hand gestures and nods head he was ready to return to bed, he declined increased activity this date he would shake his hand at me this was mostly w/ standing activity, he did demonstrate labored breathing even w/ rest breaks     PAIN: 0/10:       OBJECTIVE:  Bed Mobility:  Sit to Supine: Dependent, at trunk and LEs and once in bed he needed max assist to reposition hips and shoulders in bed      Transfers:  Sit to Stand: Maximum Assistance, from chair w/ pt pulling upto standing in love steady it took 2 attempts to complete stand he needed max assist to scoot hips to edge of bed and assist to bring shoulders forward and assist to place UEs on love steady bar   Stand to [address] to bed in love steady he needed assist and max cues for midline while sitting on love steady seat as he leans/pushes to the right     Balance:  sitting edge of chair and bed unsupported with max assist he was very tired and ready to return to bed   Standing in [address] [facility] steady with max assist w/ UE at support he leans heavily to the right he only stood long enough to get love steady seat behind him   Exercise:  Patient was guided in 1 set(s) 8-10  reps of exercise to both lower extremities. Ankle pumps, Heelslides, Short arc quads, Hip abduction/adduction, Straight leg raises and and stretched obi heelcords, he needed assist w/ most ex more so at right vs left he was fatigued . Exercises were completed for increased independence with functional mobility. Functional Outcome Measures: Not completed       ASSESSMENT:  Assessment: pt very fatigued this date and cont to demonstrate decreased strength, endurance and balance awareness he would benefit from cont skilled therapy   Activity Tolerance:  Patient tolerance of  treatment: fair. Pt fatigued needed rest breaks and tolerated limited activity      Equipment Recommendations:Equipment Needed: No  Discharge Recommendations:    Continue to assess pending progress    Plan: Times per week: 5 X N  Specific instructions for Next Treatment: neuro therex, sitting balance and work on  midline, graded lift offs, love stedy vs D/max of 2 squat/sit pivot   Current Treatment Recommendations: Strengthening, Gait Training, Balance Training, Functional Mobility Training, Endurance Training, Transfer Training, Home Exercise Program    Patient Education  Patient Education: Plan of Care    Goals:  Patient goals : None stated at this time.  Anticipate increase strength   Short term goals  Time Frame for Short term goals: 1

## 2020-05-01 NOTE — PROGRESS NOTES
Colonoscopy complete, photos taken,, no specimens taken, pt tolerated procedure well. Scope Number [id number] used.

## 2020-05-02 NOTE — PROGRESS NOTES
Patient HIGHLY lethargic after x1 PO trials; PO trials d/c d/t high risk of aspiration with any further PO trials. RN Olivia notified; patient remained in most upright position following session. ST UNABLE to elicit any effective swallow reflex this date despite max cues. Pt notes to remain at [address] [facility] for aspiration events at this time d/t current medical complexity levels, recent neurological insult, and adverse findings within dysphagia interventions at date. Recommend continuation of strict NPO diet level with continuation for comprehensive oral care procedural analysis. Will continue to prioritize dysphagia interventions as schedule permits and complete formal instrumentation via MBS as it becomes clinically indicated. SHORT TERM GOAL #2:  Goal 2: Staff will exhibit return demonstration for implementation of comprehensive oral care procedural analysis to maximize oral integrity and to reduce potential bacteria colonization. INTERVENTIONS:  Comprehensive oral care procedural analysis completed prior to provision of PO trial offerings via hydrogen peroxide rinse + toothettes. Removal of moist/white-like secretions from hard palate, min-mod in amount. Oral cavity appeared pink and moist at date. SHORT TERM GOAL #3:  Goal 3: Complete full ST evaluation to develop goals per POC. INTERVENTIONS: Patient able to follow very basic commands. Poor engagement this date with majority of session completed with eyes closed. Long-Term Goals:  No LTG established given short ELOS       EDUCATION:  Learner: Patient*Will follow up with patient's daughter Edwardo Macias as/if requested by RN   Education:  Reviewed diet and strategies, Reviewed ST goals and Plan of Care and Reviewed recommendations for follow-up  Evaluation of Education: Trace Yarbrough understanding, Demonstrates with assistance and Needs further instruction    ASSESSMENT/PLAN:  Activity Tolerance:  Patient tolerance of  treatment: fair.  Improved alertness for participation in therapy [place]. Assessment/Plan: Patient progressing toward established goals. Continues to require skilled care of licensed speech pathologist to progress toward achievement of established goals and plan of care. .     Plan for Next Session: PO trials, monitor readiness for ST evaluation     Aracelis Hubbard M.S. Deb 23

## 2020-05-02 NOTE — PROGRESS NOTES
4/26/2020 2:43 AM      CT ABDOMEN PELVIS WO CONTRAST Additional Contrast? None   Final Result   . 1. Significant free air within the peritoneum of the upper and mid abdomen. 2. Visualized gastrostomy tube. Correlation with history recommended. 3. Mild small bowel luminal stasis and fluid retention nonspecific in distribution. Mild enteritis is not excluded. 4. Diffuse colonic diverticulosis. No focal regions of diverticulitis suspected. 5. Chronic interstitial changes of the lungs. Superimposed right lower lobe infiltrates suspected. **This report has been created using voice recognition software. It may contain minor errors which are inherent in voice recognition technology. **      Final report electronically signed by Dr. Aby Noble on 4/26/2020 2:49 AM      XR CHEST PORTABLE   Final Result   1. Diffuse interstitial marking changes compatible with chronic lung disease including fibrosis. 2. Superimposed basilar airspace opacities are concerning for pneumonitis particularly in the right lower lobe. 3. Some diaphragmatic lucency concerning for free air. Correlation with history required. Report findings were called to the patient's inpatient brink at time of exam 10:50 PM April 25, 2020 and given to the brink nurse with a call to correlate with the attending physician. **This report has been created using voice recognition software. It may contain minor errors which are inherent in voice recognition technology. **      Final report electronically signed by Dr. Aby Noble on 4/25/2020 10:51 PM      XR CHEST PORTABLE    (Results Pending)         Assessment/Plan:      Acute Confusional state, in patient with recent CVA, improved    Repeat CT Head 4/25, stable. Gastrografin fluoroscopy completed which negative for any leak. Resumed feeds through PEG on 4/28. Held on 4/30 for colonoscopy on 5/1.  Resumed PEG tube feeding after colonoscopy, tolerating so far  Will monitor clinical PNA    patient was on Zosyn, stopped on 4/26 (received 10 days total)  CXR Diffuse interstitial marking changes compatible with chronic lung disease including fibrosis, Superimposed basilar airspace opacities are concerning for pneumonitis particularly in the right lower lobe Some diaphragmatic lucency concerning for free air   Patient afebrile, leukocytosis slightly worsening  Repeat cxr today as pt had hypoxia   Feeding through PEG     Severe dysphagia s/p PEG tube insertion    on bolus tube feeding via PEG tube  On hold prior to colonoscopy, resume after procedure     Pneumoperitoneum post PEG insertion    CT abdomen showed Significant free air within the peritoneum of the upper and mid abdomen. Mild small bowel luminal stasis and fluid retention nonspecific in distribution. Mild enteritis is not excluded. Diffuse colonic diverticulosis. No focal regions of diverticulitis suspected. Gen Surg consulted  Gastric tube Fluoroscopy done on 4/26 demonstrated contrast within the gastric lumen as well as within the duodenum. There is no evidence of extravasation of enteric contrast.  PEG tube feeding resumed     CASSIDY on CKD stage II, prerenal, resolved     Cr 1.3 on readmission, currently 0.9   Resolved with IVF  Avoid nephrotoxic agents     Leukocytosis, likely reactive, worsening     -Initially resolved, now WBC 12.9 from 11.5 yesterday. Patient is afebrile.     -CBC in a.m.     COPD, compensated     Bronchodilators prn     Essential HTN, fairly controlled     Cont carvedilol  Continue to monitor     Sinus tachycardia, resolved    HR noted to have increased to 180 due to exertion, resolved spontaneously  continue carvedilol for now  Continue telemetry monitoring     Moderate malnutrition    on PEG feeds  Dietary consult     Hx of CAD    Continue carvedilol and statin  Resume asa once ok with GI     Hx of PPM     Hx of NICOLAS     Urinary retention, resolved     Zamudio cath removed 4/28, voiding well   also started on Tamsulosin but not crushable; switched to finasteride      Anticipated Discharge in : pending         Diet: Diet NPO, After Midnight    DVT prophylaxis: [] Lovenox                                 [] SCDs                                 [] SQ Heparin                                 [] Encourage ambulation           [] Already on Anticoagulation     Disposition:    [] Home       [] TCU       [] Rehab       [] Psych       [] SNF       [] [facility]       [x] Other- Plan as above. Awaiting IP rehab. Pt's son Kati Sloan wants to have family meeting on Monday 5/4 to discuss goals of care. Code Status: Full Code    PT/OT Eval Status: on-board         Electronically signed by Yumiko Bales MD on 5/2/2020 at 3:43 PM       Addendum:    Noted cxr report. Will check procalcitonin. Electronically signed by Yumiko Bales MD on 5/2/2020 at 7:22 PM     Addendum:    procalcitonin 0.07. Will hold for abx for now and continue to monitor symptoms. Pt received zosyn for 10 days, end date 4/26/2020.      Electronically signed by Yumiko Bales MD on 5/2/2020 at 8:34 PM

## 2020-05-02 NOTE — OP NOTE
[address] [id number]                                OPERATIVE REPORT    PATIENT NAME: Marilyn Driscoll                       :        1931  MED REC NO:   [id number]                           ROOM:       0017  ACCOUNT NO:   [de-identified]                           ADMIT DATE: 2020  PROVIDER:     LORENZA Goldstein OF PROCEDURE:  2020    SURGEON:  Eveline James MD    INDICATIONS:  The patient with history of GI bleed, hematochezia, as  well as melena. Upper endoscopy did not show any active GI bleed. The  patient continued to drop in H and H as well as bright red blood per  rectum. Plan today for colonoscopy to evaluate. ASA CLASSIFICATION:  Please see Anesthesia note. ESTIMATED BLOOD LOSS:  None. PROCEDURE PERFORMED:  Colonoscopy. Digital examination revealed normal rectum. Standard colonoscope 190  was advanced under direct vision from the rectum up to the cecum. Prep  was poor. A lot of washing done with that, exam is acceptable. Cecum  intubation confirmed by appendiceal orifice. Scope was withdrawn. Severe diverticulosis on the left side. No diverticulitis. No colitis. No masses seen. Site of previous tattoo site in the mucosa marker of spot,  likely two large polyps removed with biopsy identifying the sigmoid,  descending. Scope withdrawn. On withdrawal of the scope, retroflex  exam of the rectum revealed moderate sized internal hemorrhoids, not  actively bleeding. Scope was withdrawn with no immediate complication. IMPRESSION:  1. Severe diverticulosis, more on the left side. 2.  Site of previous tattoo of site because of the large polyp seen in the  past in the left side of the large bowel identified. 3.  Internal hemorrhoid, not actively bleeding. 4.  Fiber supplement. 5.  Resume diet. 6.  No plan for recall colonoscopy because of the patient's age group.         Helene Honey Awilda Alvarez M.D.    D: 05/01/2020 16:47:06       T: 05/01/2020 20:55:14     [email or web address]  Job#: [id number]     Doc#: [id number]    CC:    Mariola Wise DO

## 2020-05-02 NOTE — PLAN OF CARE
Problem: Discharge Planning:  Goal: Discharged to appropriate level of care  Description: Discharged to appropriate level of care   Outcome: Ongoing  Note: Discharge planning in process and discussed with patient/family. Social work consulted for any additional needs. Care manager aware of discharge needs. Problem: Nutrition  Goal: Optimal nutrition therapy  Outcome: Ongoing  Note: Patient remains NPO. Jevity 1.5 infusing at 50ml/hr. No stools yet this shift. Problem: Skin Integrity - Impaired  Goal: Absence of new skin breakdown  Outcome: Ongoing  Note: No signs of new skin breakdown with each assessment. Skin remains warm, dry, intact. Mucous membranes pink & moist. Assisting patient with turning and repositioning. Problem: Risk for Falls  Goal: Will remain free from falls  Description: Will remain free from falls     Outcome: Ongoing  Note: Call light in reach, bed in lowest position, and bed alarm activated. Education given on use of call light before ambulation and when in need of assistance. Hourly visual checks performed and charted. Toileting offered to patient. No falls this shift, at any time. Arm band and falling star in place. Will continue to monitor. Problem: Risk for Falls  Goal: Absence of physical injury  Description: Absence of physical injury     Outcome: Ongoing  Note: Patient remains free of injury this shift. Call light within reach and hourly rounds performed. Patient is unable to participate in care planning and goal setting. Family has been updated and voices understanding.

## 2020-05-02 NOTE — PROGRESS NOTES
Gastroenterology  Progress Note    5/2/2020 10:03 AM  Subjective:   Admit Date: 4/25/2020    Interval History:  Labs reviewed, H&H low but stable. Discussed colonoscopy with patient - pt is noted to be slightly agitated in the bed. Raising his left hand and appears to be trying to communicate with this examiner. Pt's speech is incomprehensible. When asked if he wished to proceed with a colonoscopy, he at first shook his head \"no. \"  We talked more and I explained why the colonoscopy is recommended: he is anemic and EGD was non-diagnostic. Asked patient again if he wished to proceed with colonoscopy and he shook his head \"yes. \"  I attempted to call his daughter Trae Mitchell to discuss the colonoscopy with her but had to leave a message on the answering machine. D/w pt's primary RN, Dougie Acuna. H/H  stable tolerating tub feeding ,  Seen by  speech therapy. Diet: DIET TUBE FEED CONTINUOUS/CYCLIC NPO; 1.5 Calorie with Fiber (Jevity 1.5);  Gastrostomy; Continuous; 20; 50    Patient Active Problem List:     Dyspnea     Bladder neck contracture     BPH (benign prostatic hyperplasia)     Suprapubic pain     CKD (chronic kidney disease) stage 3, GFR 30-59 ml/min (HCC)     CAD (coronary artery disease)     Nausea and vomiting     Chronic serous otitis media of left ear     Hearing loss, sensorineural     Conductive hearing loss, bilateral     History of tympanoplasty of left ear     Anxiety disorder     Disc disorder of lumbar region     Pacemaker     S/P ventral herniorrhaphy     Essential hypertension     Head injury, closed, without LOC     At high risk for pain from procedure     NICOLAS (obstructive sleep apnea)     Closed comminuted intertrochanteric fracture of right femur with routine healing     Gastrointestinal hemorrhage     B12 deficiency     Diverticulosis of large intestine with hemorrhage     Lumbar radiculitis     HNP (herniated nucleus pulposus), lumbar     Severe malnutrition (HCC)     Gastritis Tachycardia     COPD (chronic obstructive pulmonary disease) (HCC)     Normocytic anemia     Malnutrition (HCC)     Interstitial lung disease (HCC)     Elevated troponin     Leukocytosis     Acute kidney injury ([address]     History of GI bleed     Hyponatremia     Subcutaneous emphysema resulting from a procedure     Chronic anxiety     Pulmonary fibrosis (HCC)     Tobacco abuse     Abnormal CT of the chest     Moderate malnutrition (HCC)     Acute hypotension     Carotid stenosis, right     Left leg weakness     Stroke due to stenosis of right carotid artery (HCC)     Nihss score 2     Slurred speech     Cerebrovascular accident (CVA) due to thrombosis of carotid artery (HCC)     Dysphagia     Acute CVA (cerebrovascular accident) ([address]     Aspiration pneumonia ([address]     Azotemia     History of BPH     History of depression     Oral thrush     Hypocalcemia     Cerebrovascular accident (CVA) determined by clinical assessment ([address]     CVA, old, aphasia     Pneumoperitoneum     Hemiparesis, right ([address]     Dysarthria     Stenosis of right internal carotid artery    Medications:   Scheduled Meds:   calcium replacement protocol   Other RX Placeholder    sodium chloride flush  10 mL Intravenous 2 times per day    carvedilol  3.125 mg Oral BID WC    finasteride  5 mg PEG Tube Daily    docusate  100 mg Per G Tube Daily    [Held by provider] aspirin  81 mg Per NG tube Daily    atorvastatin  80 mg PEG Tube Nightly    ferrous sulfate  325 mg PEG Tube Lunch    insulin lispro  0-12 Units Subcutaneous Q6H    isosorbide mononitrate  30 mg Oral Daily    nystatin  5 mL Oral 4x Daily    sertraline  50 mg PEG Tube Daily     Continuous Infusions:   sodium chloride      sodium chloride 75 mL/hr at 04/29/20 1119    dextrose       CBC:   Recent Labs     04/30/20  0317  05/01/20  0341  05/01/20  1828 05/02/20  0042 05/02/20  0406   WBC 10.3  --  11.5*  --   --   --  12.9*   HGB 8.4*   < > 8.6*   < > 10.3* 9.3* 9.4*    --  268  --   --   --  284    < > = values in this interval not displayed. BMP:    Recent Labs     04/30/20  0317 05/01/20  0341 05/01/20  1828 05/02/20  0406    145  --  138   K 3.4* 2.9* 3.8 3.5   * 113*  --  108   CO2 21* 23  --  24   BUN 29* 24*  --  19   CREATININE 0.9 0.9  --  0.8   GLUCOSE 95 95  --  163*     Hepatic: No results for input(s): AST, ALT, ALB, BILITOT, ALKPHOS in the last 72 hours. INR:   No results for input(s): INR in the last 72 hours. Results for Kasey Underwood (MRN [id number]) as of 4/30/2020 13:08   Ref. Range 4/27/2020 23:45   Campylobacter PCR Unknown Not Detected   Yersinia Enterocolitica PCR Unknown Not Detected   Cryptosporidium PCR Unknown Not Detected   Clostridium difficile, PCR Unknown Not Detected   Giardia Lamblia PCR Unknown Not Detected   Adenovirus F 40 41 PCR Unknown Not Detected   E Coli O157 PCR Unknown NA   E Coli Enteroaggregative PCR Unknown Not Detected   E Coli Enteropathogenic PCR Unknown Not Detected   E Coli Enterotoxigenic PCR Unknown Not Detected   E Coli Shiga Like Toxin PCR Unknown Not Detected   E Coli Shigella/Enteroinvasive PCR Unknown Not Detected   Norovirus GI GII PCR Unknown Not Detected   Plesiomonas Shigelloides PCR Unknown Not Detected   Rotavirus A PCR Unknown Not Detected   Salmonella PCR Unknown Not Detected   Sapovirus PCR Unknown Not Detected   Vibrio Cholerae PCR Unknown Not Detected   Vibrio PCR Unknown Not Detected   Cyclospora Cayetanensis PCR Unknown Not Detected   E HISTOLYTICA GI FILM ARRAY Unknown Not Detected   Astrovirus PCR Unknown Not Detected   Results for Kasey Underwood (MRN [id number]) as of 4/30/2020 13:08   Ref. Range 4/26/2020 01:12   SARS-CoV-2, NAAT Latest Ref Range: NOT DETECT  NOT DETECTED     Xray:   PROCEDURE: CT ABDOMEN PELVIS WO CONTRAST       CLINICAL INFORMATION: free air .       COMPARISON: None.       TECHNIQUE: Axial 5 mm CT images were obtained through the abdomen and pelvis. No contrast was given. Mild enteritis is not excluded. 4. Diffuse colonic diverticulosis. No focal regions of diverticulitis suspected. 5. Chronic interstitial changes of the lungs. Superimposed right lower lobe infiltrates suspected.       **This report has been created using voice recognition software. It may contain minor errors which are inherent in voice recognition technology. **       Final report electronically signed by Dr. Noe Meléndez on 4/26/2020 2:49 AM       Endoscopy Finding:    DATE OF PROCEDURE:  04/29/2020     INDICATIONS:  The patient with a history of dysphagia, recent CVA; has  recent PEG tube placed; had significant drop in H and H, required blood  transfusion; with melena and hemoglobin down from 11.1 to 7. Today,  stable over 8 after one unit of blood transfusion, currently on PPI  infusion. He was on PPI by PEG tube, which is Prevacid 30 mg daily. Plan today for EGD to evaluate.     SURGEON:  Lestine Dandy, MD     ASA CLASSIFICATION:  IV.     Estimated blood loss in none.      DESCRIPTION OF PROCEDURE:  The patient was brought to the OR. Consent  was obtained. Risks involved with the procedure were explained. Informed consent was obtained. Consent was obtained from his daughter  and family. The patient was monitored during the procedure with pulse  oximetry, blood pressure monitoring, and oxygen given by face mask.     After adequate sedation, positioned in the left lateral decubitus  position. Upper scope was advanced with difficulty up to the duodenum. Esophagus appeared slightly tortuous. Scope was advanced to the  stomach. Seen mild gastritis in the fundus as well as seen gastritis  which was likely due to trauma from excess suctioning of the PEG tube  opposite to the PEG tube site in the body of the stomach. Not actively  bleeding at this time. Scope was advanced to the duodenum which  appeared normal.  Scope was withdrawn with no immediate complications.     IMPRESSION:  1. Gastritis. 2.  Trauma likely from excess suctioning of the PEG tube.     PLAN:  1. Start tube feeding. 2.  Continue PPI IV at this time, change later when H and H  stable  And improve to Prevacid by PEG tube twice a day. 3.  Avoid excess suctioning of the PEG tube site as that is likely going to traumatize the mucosa leading to bleeding. Objective:   Vitals: BP (!) 165/78   Pulse 82   Temp 98.5 °F (36.9 °C) (Oral)   Resp 20   Ht 5' 6\" (1.676 m)   Wt 148 lb 12.8 oz (67.5 kg)   SpO2 91%   BMI 24.02 kg/m²     Intake/Output Summary (Last 24 hours) at 5/2/2020 1003  Last data filed at 5/2/2020 0553  Gross per 24 hour   Intake 824 ml   Output --   Net 824 ml     Weight:  Wt Readings from Last 3 Encounters:   05/02/20 148 lb 12.8 oz (67.5 kg)   04/25/20 137 lb 6.4 oz (62.3 kg)   04/24/20 142 lb 9.6 oz (64.7 kg)     General appearance: alert and cooperative with exam.  Speech incomprehensible - pt able to shake head \"yes\" or \"no\" appropriately  Lungs: diminished breath sounds bilaterally  Heart: regular rate and rhythm, S1, S2 normal, no murmur, click, rub or gallop  Abdomen: soft, non tender, non distended, active BS x 4. PEG in upper abdomen. Extremities: atraumatic, no edema. pt moves left arm and leg; no movement noted in right arm or leg    Assessment and Plan:   1. Anemia - requiring blood transfusion. H&H currently low but stable. EGD noted no evidence of UGI bleed. Recommend colonoscopy to continue GI evaluation - pt shook his head \"yes\" to doing procedure. Awaiting call back from daughter, Tracy Nagy, to discuss and get consent to proceed with procedure. Continue PPI drip for now. 2. Dysphagia secondary to CVA - s/p PEG tube placement. TF currently on hold. 3. Will need to discuss temporary full code status with pt's family for colonoscopy.   4. Tolerating tub feeding, seen by speech therapy,       Follow up in GI Clinic after discharge in  week(s)        Berkley Del Valle MD  5/2/2020  10:03 AM Patient schedule late after noon for colonoscopy

## 2020-05-03 NOTE — CONSULTS
Socioeconomic History    Marital status:      Spouse name: Kieran Castrejon Number of children: 2    Years of education: Not on file    Highest education level: Not on file   Occupational History    Occupation: Retired   Social Needs    Financial resource strain: Not on file    Food insecurity     Worry: Not on file     Inability: Not on file   [employer] needs     Medical: Not on file     Non-medical: Not on file   Tobacco Use    Smoking status: Former Smoker     Packs/day: 1.00     Years: 20.00     Pack years: 20.00     Types: Cigarettes     Last attempt to quit: 1965     Years since quittin.4    Smokeless tobacco: Never Used    Tobacco comment: pt use to smoke cigars and pipe   Substance and Sexual Activity    Alcohol use:  Yes     Alcohol/week: 7.0 standard drinks     Types: 7 Cans of beer per week     Comment: occasional 1 beer     Drug use: No    Sexual activity: Not Currently     Partners: Female   Lifestyle    Physical activity     Days per week: Not on file     Minutes per session: Not on file    Stress: Not on file   Relationships    Social connections     Talks on phone: Not on file     Gets together: Not on file     Attends Mu-ism service: Not on file     Active member of club or organization: Not on file     Attends meetings of clubs or organizations: Not on file     Relationship status: Not on file    Intimate partner violence     Fear of current or ex partner: Not on file     Emotionally abused: Not on file     Physically abused: Not on file     Forced sexual activity: Not on file   Other Topics Concern    Not on file   Social History Narrative    - Never     Family History          Problem Relation Age of Onset    Cancer Brother 72        lung    Diabetes Brother     Kidney Disease Father         stones    Stroke Father     Kidney Disease Child         stones    Kidney Disease Child         stones    Heart Disease Sister     Arthritis Sister    Emilee Aniceto High Blood Pressure Sister     High Cholesterol Sister     Diabetes Brother         multiple siblings had dm    Early Death Brother 72        lung cancer     Sleep History    NICOLAS on APAP  ROS    Not able to obtain  Meds    Current Medications    cefepime  1 g Intravenous Q8H    vancomycin (VANCOCIN) intermittent dosing (placeholder)   Other RX Placeholder    vancomycin  1,000 mg Intravenous Q24H    lansoprazole  30 mg Per G Tube QAM AC    calcium replacement protocol   Other RX Placeholder    sodium chloride flush  10 mL Intravenous 2 times per day    carvedilol  3.125 mg Oral BID WC    finasteride  5 mg PEG Tube Daily    docusate  100 mg Per G Tube Daily    aspirin  81 mg Per NG tube Daily    atorvastatin  80 mg PEG Tube Nightly    ferrous sulfate  325 mg PEG Tube Lunch    insulin lispro  0-12 Units Subcutaneous Q6H    isosorbide mononitrate  30 mg Oral Daily    nystatin  5 mL Oral 4x Daily    sertraline  50 mg PEG Tube Daily     glycopyrrolate, magnesium sulfate, potassium chloride **OR** potassium alternative oral replacement **OR** potassium chloride, sodium chloride flush, acetaminophen, hydrALAZINE, promethazine **OR** ondansetron, diatrizoate meglumine-sodium, dextrose, dextrose, glucagon (rDNA), glucose, acetaminophen, albuterol, ipratropium-albuterol, polyethylene glycol  IV Drips/Infusions   sodium chloride      dextrose       Vitals    Vitals    height is 5' 6\" (1.676 m) and weight is 148 lb 12.8 oz (67.5 kg). His axillary temperature is 98 °F (36.7 °C). His blood pressure is 137/67 and his pulse is 75. His respiration is 18 and oxygen saturation is 96%.      O2 Flow Rate (L/min): 3 L/min  I/O    Intake/Output Summary (Last 24 hours) at 5/3/2020 1441  Last data filed at 5/3/2020 1438  Gross per 24 hour   Intake 3231.8 ml   Output 521 ml   Net 2710.8 ml     Patient Vitals for the past 96 hrs (Last 3 readings):   Weight   05/02/20 0353 148 lb 12.8 oz (67.5 kg)   05/01/20 0425 155 lb 4.8

## 2020-05-03 NOTE — CONSULTS
CONSULTATION NOTE :ID       Patient - Samia Barajas,  Age - 80 y.o.    - 1931      Room Number - 5P-16/629-Q   N -  [id number]   [facility]  Date of Admission -  2020  4:29 PM  Patient's PCP: Ruiz Trotter DO     Requesting Physician: Iza Pal MD    REASON FOR CONSULTATION   Pneumonia. HISTORY OF PRESENT ILLNESS       This is a very pleasant 80 y.o. male who was admitted to the hospital with a stroke he had left CVA with expressive aphasia right side weakness. He was transferred from the rehabiliation due to change in mental state. he had peg tube which is functioning. I was asked to see patient due to worsening right side pneumonia. he has underlying COPD. he has volume loss with scarring on the right lung. He is getting 6 litre of oxygen, has cough and is unable to control his secretions. Started on iv antibiotics. His medical hx was reviewed.  Unable to get hx from patient    PAST MEDICAL  HISTORY       Past Medical History:   Diagnosis Date    Acute sinusitis     Angina pectoris ([address]     Anxiety disorder 10/27/2016    BPH with urinary obstruction     CAD (coronary artery disease)     Chronic insomnia     CKD (chronic kidney disease) stage 3, GFR 30-59 ml/min ([facility]     COPD (chronic obstructive pulmonary disease) ([facility]     Gastroenteritis     HCAP (healthcare-associated pneumonia) 2015    Heart disease     HLD (hyperlipidemia)     Hughes (hard of hearing)     wear hearing aids    Hypertension     Kidney stone     years ago 15-20    Moderate malnutrition ([address] 3/5/2020    NICOLAS on CPAP     Osteopenia determined by x-ray     Osteoporosis, senile     Pacemaker     Pinched nerve     slipped disc in back    S/P TURP (status post transurethral resection of prostate)     Stroke due to stenosis of right carotid artery (HCC)     Visual problems     Vitamin D deficiency        PAST SURGICAL HISTORY     Past Surgical History: Procedure Laterality Date    ABDOMINAL HERNIA REPAIR  04/27/2017    incisional hernia repair--Dr. Eino Rubinstein CARDIAC SURGERY      CATARACT REMOVAL WITH IMPLANT      bilateral    COLONOSCOPY  [id number]    COLONOSCOPY  12/29/2017    COLONOSCOPY CONTROL HEMORRHAGE performed by [facility]olo Rojo MD at [facility]    COLONOSCOPY  8/16/2018    COLONOSCOPY POLYPECTOMY SNARE/COLD BIOPSY performed by Paolo Rojo MD at [facility]    COLONOSCOPY  8/19/2018    COLONOSCOPY CONTROL HEMORRHAGE performed by Paolo Rojo MD at [facility]    COLONOSCOPY N/A 8/20/2018    COLONOSCOPY CONTROL HEMORRHAGE performed by Paolo Rojo MD at [address]  1960's    EKG 12-LEAD  4/29/2015         ENDOSCOPY, COLON, DIAGNOSTIC      EYE SURGERY      cataracts    HERNIA REPAIR      several    HIP PINNING Left 8/31/2017    LEFT HIP INTERTAN performed by Ebenezer Dobson MD at [address]  12/3/12    left     MYRINGOTOMY AND TYMPANOSTOMY TUBE PLACEMENT  7/23/13    left     NASAL SINUS SURGERY      OTHER SURGICAL HISTORY  04/27/2015    transurethral Incision bladder neck    PACEMAKER INSERTION      PACEMAKER PLACEMENT  2011    TX COLONOSCOPY FLX DX W/COLLJ SPEC WHEN PFRMD Left 8/18/2018    COLONOSCOPY performed by Paolo Rojo MD at [facility]    TX [address] DX/THER SBST INTRLMNR LMBR/SAC W/IMG GDN N/A 6/25/2018    LUMBAR INTER LAMINAR RUBEN LESI @ L3. performed by Tura Rinne, MD at [address] [address] Right 10/8/2017    Right hip intertan performed by Sneha Gilbert MD at [address] [place] [address] TURP  4/17/2013    W/CYSTO WITH DIRECT VISION URETHROTOMY & RESECTION BLADDER NECK CONTRACTURE    TURP  4/27/15    re-do TURP    TYMPANOSTOMY TUBE PLACEMENT      multiple surgeries    UPPER GASTROINTESTINAL ENDOSCOPY  12/29/2017    COLONOSCOPY SUBMUCOSAL/BOTOX INJECTION performed by Interstitial lung disease (HCC) J84.9    Elevated troponin R79.89    Leukocytosis D72.829    Acute kidney injury (HCC) N17.9    History of GI bleed Z87.19    Hyponatremia E87.1    Subcutaneous emphysema resulting from a procedure T81.82XA    Chronic anxiety F41.9    Pulmonary fibrosis (HCC) J84.10    Tobacco abuse Z72.0    Abnormal CT of the chest R93.89    Moderate malnutrition (HCC) E44.0    Acute hypotension I95.9    Carotid stenosis, right I65.21    Left leg weakness R29.898    Stroke due to stenosis of right carotid artery ([facility]) I63.231    Nihss score 2 R29.702    Slurred speech R47.81    Cerebrovascular accident (CVA) due to thrombosis of carotid artery ([facility]) I63.039    Dysphagia R13.10    Acute CVA (cerebrovascular accident) ([facility] I63.9    Aspiration pneumonia ([facility]) J69.0    Azotemia R79.89    History of BPH Z87.438    History of depression Z86.59    Oral thrush B37.0    Hypocalcemia E83.51    Cerebrovascular accident (CVA) determined by clinical assessment ([facility] I63.9    CVA, old, aphasia I69.5    Pneumoperitoneum K66.8    Hemiparesis, right ([facility]) G81.91    Dysarthria R47.1    Stenosis of right internal carotid artery I65.21    Acute confusion R41.0    History of recent stroke Z86.73    Hypokalemia E87.6    History of aspiration pneumonia Z87.01    Acute kidney injury superimposed on CKD ([facility]) N17.9, N18.9    Hx of coronary artery disease Z86.79    Urinary retention R33.9    Hypoxia R09.02    Acute respiratory failure with hypoxia ([facility]) J96.01           Impression and Recommendation:   Stroke with aphasia  Aspiration pneumonia:if MRSA screen is negative , stop vancomycin  Chest percussion to assist with pulmonary toilet       Thank you Arnaldo Lei MD for allowing me to participate in this patient's care.     Monserrat Malone MD,FACP 5/3/2020 12:06 PM

## 2020-05-03 NOTE — PLAN OF CARE
[address] THERAPY MISSED TREATMENT NOTE  [facility] 4A  4A-17/017-A      Date: 5/3/2020  Patient Name: Stuart Morrow        CSN: [id number]   : 1931  (80 y.o.)  Gender: male   Referring Practitioner: Dr. Rafiq Huang  Diagnosis: CVA         REASON FOR MISSED TREATMENT: Hold Treatment per Nursing  Pt's breathing is poor today. Will retry tomorrow.

## 2020-05-03 NOTE — PROGRESS NOTES
rubs or gallops. Abdomen: (+) PEG tube, soft, non-tender, non-distended with normal bowel sounds. Musculoskeletal: passive and active ROM x 4 extremities. Neurological exam: looks sleepy, limited neuro exam as pt did not follow all commands, pt did not answer questions, gait not examine, (+) left facial droop, pt squeezed examiner's finger when asked, motor 4/5 on RUE and RLE, otherwise 5/5  Exam of extremities: peripheral pulses normal, no pedal edema, no clubbing or cyanosis. ICD in placed.          Labs:   Recent Labs     05/01/20  0341  05/02/20  0406 05/02/20  1220 05/02/20  1805 05/03/20  0229   WBC 11.5*  --  12.9*  --   --  12.7*   HGB 8.6*   < > 9.4* 9.4* 8.9* 9.8*   HCT 27.2*  --  30.0*  --   --  30.0*     --  284  --   --  298    < > = values in this interval not displayed. Recent Labs     05/01/20  0341 05/01/20  1828 05/02/20  0406 05/03/20  0229     --  138 137   K 2.9* 3.8 3.5 3.6   *  --  108 104   CO2 23  --  24 27   BUN 24*  --  19 19   CREATININE 0.9  --  0.8 0.8   CALCIUM 8.3*  --  8.3* 8.0*     No results for input(s): AST, ALT, BILIDIR, BILITOT, ALKPHOS in the last 72 hours. No results for input(s): INR in the last 72 hours. No results for input(s): Eidta House in the last 72 hours. Urinalysis:      Lab Results   Component Value Date    NITRU NEGATIVE 05/02/2020    WBCUA NONE SEEN 05/02/2020    WBCUA 0-5 02/19/2018    BACTERIA NONE SEEN 05/02/2020    RBCUA 0-2 05/02/2020    BLOODU NEGATIVE 05/02/2020    SPECGRAV 1.015 05/02/2020    GLUCOSEU NEGATIVE 04/25/2020       Radiology:  XR CHEST PORTABLE   Final Result   1. Worsening bilateral pneumonia. 2. Small right-sided pleural effusion. 3. Moderate stable cardiomegaly. **This report has been created using voice recognition software. It may contain minor errors which are inherent in voice recognition technology. **      Final report electronically signed by Dr. Daniele Cheema on 5/2/2020 4:26

## 2020-05-04 PROBLEM — E43 SEVERE MALNUTRITION (HCC): Chronic | Status: ACTIVE | Noted: 2018-08-16

## 2020-05-04 NOTE — PROGRESS NOTES
Endoscopy    COLONOSCOPY N/A 8/20/2018    COLONOSCOPY CONTROL HEMORRHAGE performed by Jing Lewis MD at [address]  1960's    EKG 12-LEAD  4/29/2015         ENDOSCOPY, COLON, DIAGNOSTIC      EYE SURGERY      cataracts    HERNIA REPAIR      several    HIP PINNING Left 8/31/2017    LEFT HIP INTERTAN performed by Candi Vela MD at [address]  12/3/12    left     MYRINGOTOMY AND TYMPANOSTOMY TUBE PLACEMENT  7/23/13    left     NASAL SINUS SURGERY      OTHER SURGICAL HISTORY  04/27/2015    transurethral Incision bladder neck    PACEMAKER INSERTION      PACEMAKER PLACEMENT  2011    OK COLONOSCOPY FLX DX W/COLLJ SPEC WHEN PFRMD Left 8/18/2018    COLONOSCOPY performed by Jing Lewis MD at [facility] DX/THER SBST INTRLMNR LMBR/SAC W/IMG GDN N/A 6/25/2018    LUMBAR INTER LAMINAR RUBEN LESI @ L3. performed by Bessie Puga MD at [facility] Right 10/8/2017    Right hip intertan performed by Qiana Forrester MD at [address] TURP  4/17/2013    W/CYSTO WITH DIRECT VISION URETHROTOMY & RESECTION BLADDER NECK CONTRACTURE    TURP  4/27/15    re-do TURP    TYMPANOSTOMY TUBE PLACEMENT      multiple surgeries    UPPER GASTROINTESTINAL ENDOSCOPY  12/29/2017    COLONOSCOPY SUBMUCOSAL/BOTOX INJECTION performed by Jing Lewis MD at [address] [facility]  8/16/2018    EGD DIAGNOSTIC ONLY performed by Jing Lewis MD at [address] [facility] Left 8/19/2018    EGD DIAGNOSTIC ONLY performed by Jing Lewis MD at [address] [facility] N/A 4/22/2020    EGD ESOPHAGOGASTRODUODENOSCOPY PEG TUBE INSERTION performed by Jing Lewis MD at [address] [facility] Left 4/29/2020    EGD DIAGNOSTIC ONLY performed by Jing Lewis MD at [facility] Diet    DIET TUBE FEED CONTINUOUS/CYCLIC NPO; 1.5 Calorie with Fiber (Jevity 1.5); Gastrostomy; Continuous; 20; 50  Allergies    Iodine  Social History     Social History     Socioeconomic History    Marital status:      Spouse name: Wesley Handler Number of children: 2    Years of education: Not on file    Highest education level: Not on file   Occupational History    Occupation: Retired   Social Needs    Financial resource strain: Not on file    Food insecurity     Worry: Not on file     Inability: Not on file   [employer] needs     Medical: Not on file     Non-medical: Not on file   Tobacco Use    Smoking status: Former Smoker     Packs/day: 1.00     Years: 20.00     Pack years: 20.00     Types: Cigarettes     Last attempt to quit: 1965     Years since quittin.4    Smokeless tobacco: Never Used    Tobacco comment: pt use to smoke cigars and pipe   Substance and Sexual Activity    Alcohol use:  Yes     Alcohol/week: 7.0 standard drinks     Types: 7 Cans of beer per week     Comment: occasional 1 beer     Drug use: No    Sexual activity: Not Currently     Partners: Female   Lifestyle    Physical activity     Days per week: Not on file     Minutes per session: Not on file    Stress: Not on file   Relationships    Social connections     Talks on phone: Not on file     Gets together: Not on file     Attends Latter day service: Not on file     Active member of club or organization: Not on file     Attends meetings of clubs or organizations: Not on file     Relationship status: Not on file    Intimate partner violence     Fear of current or ex partner: Not on file     Emotionally abused: Not on file     Physically abused: Not on file     Forced sexual activity: Not on file   Other Topics Concern    Not on file   Social History Narrative    - Never     Family History          Problem Relation Age of Onset    Cancer Brother 72        lung    Diabetes Brother     Kidney Disease Father         stones    Stroke Father     Kidney Disease Child         stones    Kidney Disease Child         stones    Heart Disease Sister     Arthritis Sister     High Blood Pressure Sister     High Cholesterol Sister     Diabetes Brother         multiple siblings had dm    Early Death Brother 72        lung cancer     Sleep History    NICOLAS on APAP  ROS    Not able to obtain  Meds    Current Medications    cefepime  1 g Intravenous Q8H    albuterol  2.5 mg Nebulization 4x daily    lansoprazole  30 mg Per G Tube QAM AC    calcium replacement protocol   Other RX Placeholder    sodium chloride flush  10 mL Intravenous 2 times per day    carvedilol  3.125 mg Oral BID WC    finasteride  5 mg PEG Tube Daily    docusate  100 mg Per G Tube Daily    aspirin  81 mg Per NG tube Daily    atorvastatin  80 mg PEG Tube Nightly    ferrous sulfate  325 mg PEG Tube Lunch    insulin lispro  0-12 Units Subcutaneous Q6H    isosorbide mononitrate  30 mg Oral Daily    nystatin  5 mL Oral 4x Daily    sertraline  50 mg PEG Tube Daily     glycopyrrolate, magnesium sulfate, potassium chloride **OR** potassium alternative oral replacement **OR** potassium chloride, sodium chloride flush, acetaminophen, hydrALAZINE, promethazine **OR** ondansetron, diatrizoate meglumine-sodium, dextrose, dextrose, glucagon (rDNA), glucose, acetaminophen, ipratropium-albuterol, polyethylene glycol  IV Drips/Infusions   sodium chloride      dextrose       Vitals    Vitals    height is 5' 6\" (1.676 m) and weight is 158 lb 1.1 oz (71.7 kg). His oral temperature is 98.1 °F (36.7 °C). His blood pressure is 120/59 (abnormal) and his pulse is 68. His respiration is 18 and oxygen saturation is 96%.      O2 Flow Rate (L/min): 4 L/min  I/O    Intake/Output Summary (Last 24 hours) at 5/4/2020 0842  Last data filed at 5/4/2020 0422  Gross per 24 hour   Intake 2236.8 ml   Output --   Net 2236.8 ml     Patient Vitals for the past 96 hrs (Last 3 readings):   Weight   05/04/20 0338 158 lb 1.1 oz (71.7 kg)   05/04/20 0329 147 lb 14.4 oz (67.1 kg)   05/02/20 0353 148 lb 12.8 oz (67.5 kg)     Exam   Constitutional:elderly, chronically ill looking  Gentleman, not able to participate due to neuro deficts. Currently on 2LPM via NC. Appears malnourished  Head: Normocephalic and atraumatic. Mouth/Throat: Oropharynx is clear and moist.  No oral thrush. Tongue protruding   Eyes: Conjunctivae are normal. Pupils are equal, round, and reactive to light. No scleral icterus. Neck: Neck supple. No JVD or tracheal deviation present. Cardiovascular: Regular rate, regular rhythm, S1 and S2 with no murmur. No peripheral edema  Pulmonary/Chest: coarse bilateral rales  Abdomen: Soft. Bowel sounds audible. No distension or tenderness to palp. PEG in place  Musculoskeletal: Moves all extremities  Lymphadenopathy:  No cervical adenopathy. Neurological: Patient is alert to name not very responsive at this time. Skin: Skin is warm and dry. Pale  Labs   ABG  Lab Results   Component Value Date    PH 7.47 05/03/2020    PO2 81 05/03/2020    PCO2 40 05/03/2020    HCO3 29 05/03/2020    O2SAT 97 05/03/2020     Lab Results   Component Value Date    IFIO2 3 05/03/2020     CBC  Recent Labs     05/02/20  0406  05/03/20  0229 05/03/20  1049 05/03/20  1813 05/04/20  0214   WBC 12.9*  --  12.7*  --   --  14.7*   RBC 3.24*  --  3.32*  --   --  3.21*   HGB 9.4*   < > 9.8* 9.6* 8.8* 9.2*   HCT 30.0*  --  30.0*  --   --  29.3*   MCV 92.6  --  90.4  --   --  91.3   MCH 29.0  --  29.5  --   --  28.7   MCHC 31.3*  --  32.7  --   --  31.4*     --  298  --   --  296   MPV 9.3*  --  9.3*  --   --  9.4    < > = values in this interval not displayed.       BMP  Recent Labs     05/01/20  1828 05/02/20  0406 05/03/20  0229   NA  --  138 137   K 3.8 3.5 3.6   CL  --  108 104   CO2  --  24 27   BUN  --  19 19   CREATININE  --  0.8 0.8   GLUCOSE  --  163* 99   MG 1.7  --   --    CALCIUM  --

## 2020-05-04 NOTE — PLAN OF CARE
Problem: Discharge Planning:  Goal: Discharged to appropriate level of care  Description: Discharged to appropriate level of care   5/3/2020 2237 by Liban Dawn RN  Outcome: Ongoing  Note: Discharge planning in process and discussed with patient/family. Social work consulted for any additional needs. Care manager aware of discharge needs. Problem: Nutrition  Goal: Optimal nutrition therapy  5/3/2020 2237 by Liban Dawn RN  Outcome: Ongoing  Note: Pt is currently npo at this time. Pt receiving tube feed via peg tube. Tube feeding running at 50 ml an hour. Bowel sounds present in all four quadrants. Will continue to monitor. Problem: Skin Integrity:  Goal: Will show no infection signs and symptoms  Description: Will show no infection signs and symptoms  Outcome: Ongoing  Note: No signs of new skin breakdown noted with each assessment this shift. Skin warm, dry, and intact except where otherwise noted in head-to-toe assessment. Mucous membranes pink and moist. Assistance with turns/ambulation given as needed. Problem: Serum Glucose Level - Abnormal:  Goal: Ability to maintain appropriate glucose levels has stabilized  Description: Ability to maintain appropriate glucose levels has stabilized  Outcome: Ongoing  Note: Pt. Blood glucose being monitored on a Q6H basis. Insulin being administered via sliding scale, see mar for details. Will continue to monitor. Problem: Risk for Falls  Goal: Will remain free from falls  Description: Will remain free from falls     Will remain free from falls  Outcome: Ongoing  Note: Patient remained free from falls this shift. Bed is in low position with alarm on and siderails up x2. Education given on use of call light and patient voiced understanding. Call light and beside table within reach. Arm band and falling star in place. Hourly rounds completed. Will continue to monitor. Plan of care discussed with pt and family.  Pt verbalizes understand.

## 2020-05-04 NOTE — PROGRESS NOTES
Attempted to call both children to update on transfer to [facility].  No answer at either number  Arline Hash Child  No [phone]      Celeste Gloss Child  No [phone]

## 2020-05-04 NOTE — CARE COORDINATION
5/4/20, 2:22 PM EDT    DISCHARGE PLANNING EVALUATION  SW spoke with Cheri Hurley in [facility]. She spoke with family today and they are requesting placement at [facility]. SUZETTE made referral to Joni gutierrez at the facility. She is aware the patient would come to them under [address]. Their second choice is [facility].

## 2020-05-04 NOTE — PROGRESS NOTES
Gastroenterology  Progress Note    5/4/2020 12:00 PM  Subjective:   Admit Date: 4/25/2020    Interval History:  Labs reviewed, H&H low but stable. Discussed colonoscopy with patient - pt is noted to be slightly agitated in the bed. Raising his left hand and appears to be trying to communicate with this examiner. Pt's speech is incomprehensible. When asked if he wished to proceed with a colonoscopy, he at first shook his head \"no. \"  We talked more and I explained why the colonoscopy is recommended: he is anemic and EGD was non-diagnostic. Asked patient again if he wished to proceed with colonoscopy and he shook his head \"yes. \"  I attempted to call his daughter Celeste Flores to discuss the colonoscopy with her but had to leave a message on the answering machine. D/w pt's primary RN, Robert Ramos. H/H  stable tolerating tub feeding ,  Seen by  speech therapy. 5/4 attend a conference with the family today explain the GI point of view the patient currently the PEG tube can be fed with it no GI bleed family to consider hospice care other physician attend the conference to as well as possible administrations check plans and speech therapies and nursing staff    Diet: DIET TUBE FEED CONTINUOUS/CYCLIC NPO; 1.5 Calorie with Fiber (Jevity 1.5);  Gastrostomy; Continuous; 20; 50    Patient Active Problem List:     Dyspnea     Bladder neck contracture     BPH (benign prostatic hyperplasia)     Suprapubic pain     CKD (chronic kidney disease) stage 3, GFR 30-59 ml/min ([facility])     CAD (coronary artery disease)     Nausea and vomiting     Chronic serous otitis media of left ear     Hearing loss, sensorineural     Conductive hearing loss, bilateral     History of tympanoplasty of left ear     Anxiety disorder     Disc disorder of lumbar region     Pacemaker     S/P ventral herniorrhaphy     Essential hypertension     Head injury, closed, without LOC     At high risk for pain from procedure     NICOLAS (obstructive sleep apnea)     Closed 30 mg Oral Daily    nystatin  5 mL Oral 4x Daily    sertraline  50 mg PEG Tube Daily     Continuous Infusions:   sodium chloride      dextrose       CBC:   Recent Labs     05/02/20  0406  05/03/20  0229  05/03/20  1813 05/04/20  0214 05/04/20  0919   WBC 12.9*  --  12.7*  --   --  14.7*  --    HGB 9.4*   < > 9.8*   < > 8.8* 9.2* 10.4*     --  298  --   --  296  --     < > = values in this interval not displayed. BMP:    Recent Labs     05/01/20  1828 05/02/20  0406 05/03/20  0229   NA  --  138 137   K 3.8 3.5 3.6   CL  --  108 104   CO2  --  24 27   BUN  --  19 19   CREATININE  --  0.8 0.8   GLUCOSE  --  163* 99     Hepatic: No results for input(s): AST, ALT, ALB, BILITOT, ALKPHOS in the last 72 hours. INR:   No results for input(s): INR in the last 72 hours. Results for Hugo Sanders (MRN [id number]) as of 4/30/2020 13:08   Ref. Range 4/27/2020 23:45   Campylobacter PCR Unknown Not Detected   Yersinia Enterocolitica PCR Unknown Not Detected   Cryptosporidium PCR Unknown Not Detected   Clostridium difficile, PCR Unknown Not Detected   Giardia Lamblia PCR Unknown Not Detected   Adenovirus F 40 41 PCR Unknown Not Detected   E Coli O157 PCR Unknown NA   E Coli Enteroaggregative PCR Unknown Not Detected   E Coli Enteropathogenic PCR Unknown Not Detected   E Coli Enterotoxigenic PCR Unknown Not Detected   E Coli Shiga Like Toxin PCR Unknown Not Detected   E Coli Shigella/Enteroinvasive PCR Unknown Not Detected   Norovirus GI GII PCR Unknown Not Detected   Plesiomonas Shigelloides PCR Unknown Not Detected   Rotavirus A PCR Unknown Not Detected   Salmonella PCR Unknown Not Detected   Sapovirus PCR Unknown Not Detected   Vibrio Cholerae PCR Unknown Not Detected   Vibrio PCR Unknown Not Detected   Cyclospora Cayetanensis PCR Unknown Not Detected   E HISTOLYTICA GI FILM ARRAY Unknown Not Detected   Astrovirus PCR Unknown Not Detected   Results for Hugo Sanders (MRN [id number]) as of 4/30/2020 13:08   Ref.  Range 4/26/2020 01:12   SARS-CoV-2, NAAT Latest Ref Range: NOT DETECT  NOT DETECTED     Xray:   PROCEDURE: CT ABDOMEN PELVIS WO CONTRAST       CLINICAL INFORMATION: free air .       COMPARISON: None.       TECHNIQUE: Axial 5 mm CT images were obtained through the abdomen and pelvis. No contrast was given. Coronal reconstructions were obtained.       All CT scans at this facility use dose modulation, iterative reconstruction, and/or weight-based dosing when appropriate to reduce radiation dose to as low as reasonably achievable.       FINDINGS:    Diffuse coarse interstitial changes of the lung bases are again noted with superimposed right middle and lower lobe airspace opacities correlate with infiltrates.       Cardiac chambers are mildly enlarged. Permanent pacing leads are visualized.       There is a gastrostomy tube present. Significant free air in the upper abdomen is present correlation with procedural history is required.       The noncontrast appearance of the liver is grossly negative. The gallbladder lumen is mildly distended without obvious wall thickening or pericholecystic fluid changes. The pancreas and spleen are grossly unremarkable.       There is no obstructive uropathy. Small cortical cystic changes of the right kidney at the lower pole are suspected the detail of which are limited.       The small bowel demonstrates regions of mild luminal stasis and minimal fluid retention. The appearance is nonspecific. Mild enteritis cannot be excluded.       There are diffuse diverticular changes throughout the colon. The appendix is not clearly identified. The urinary bladder is distended. There are postsurgical changes of the lower anterior abdominal wall and also over the left and right inguinal regions. Correlation with history recommended bilateral surgical changes of the hips are noted. Chronic degenerative skeletal changes are seen with dextroscoliosis of the lumbar spine.  There is diffuse scattered

## 2020-05-04 NOTE — PROGRESS NOTES
[address] [place] [address]  INPATIENT PHYSICAL THERAPY  DAILY NOTE  [facility] 4A - 4A-17/[id number]      Time In: 5733  Time Out: 1128  Timed Code Treatment Minutes: 23 Minutes  Minutes: 23          Date: 2020  Patient Name: Marsha Thomas,  Gender:  male        MRN: [id number]  : 1931  (80 y.o.)     Referring Practitioner: Dr. Dmitriy Gudino   Diagnosis: CVA ,old aphasia  Additional Pertinent Hx: 59-year-old Male with past medical history of hyperlipidemia, CAD, CKD, COPD came to [address] [place] [address] ER on 2020 due to slurring of speech and left-sided weakness. Patient initially was seen in [facility] ER on 2020 with left-sided facial droop. He was not considered a TPA candidate. Patient was evaluated by tele-stroke and had CTA of head and neck which showed right ICA 80% stenosis. It was recommended the patient be transferred to [facility] for intervention, however, at that time family declined and patient felt that he was stable enough to go home. Patient was then discharged home. Patient came back to [facility] ER on 2020 due to worsening slurred speech and left-sided facial droop. Per H/P note, history was limited due to patient's speech changes and is hard of hearing and did not have his hearing device. CT of the head on 2020 showed no acute process, stable appearance of the brain. Unable to do MRI due to PPM. Patient was evaluated by tele-stroke the case was again discussed with daughter and they agreed to admit for medical management. Patient was then admitted under hospital medicine service for acute CVA. Patient was seen by neurologist on 4/15/2020, who thinks that this could be acute from both the ischemic stroke, possibly lacunar due to small vessel disease. PT/OT and speech therapy were consulted. Per speech therapy note on 2020, patient has severe oropharyngeal dysphagia. Unable to perform MBS due to severity. NG placed  - discontinued by patient.  Patient was Long term goals  Time Frame for Long term goals : Na due to short ELOS  Long term goal 1:    Long term goal 2:    Long term goal 3:    Long term goal 4:    Long term goal 5:      Following session, patient left in safe position with all fall risk precautions in place.

## 2020-05-04 NOTE — PROGRESS NOTES
malnutrition, In context of chronic illness  · Etiology: related to Insufficient energy/nutrient consumption     Signs and symptoms:  as evidenced by Severe muscle loss, Severe loss of subcutaneous fat    Objective Information:  · Nutrition-Focused Physical Findings: pt seen; admit w/CVA; aphasia; NPO per SLP; pt sleeping during visit; Jevity 1.5 TF at goal of 50 mL/hr at present; pt with worsening pneumonia and unable to control secretions of the oral cavity per RN; risk of aspiration? last BM x1 on 5/2; no edema or wounds; POC Glucose 134, Hg 10.4.  Rx includes: Iron, Colace  · Wound Type: None  · Current Nutrition Therapies:  · Oral Diet Orders: NPO   · Tube Feeding (TF) Orders:   · Feeding Route: Gastrostomy(PEG placed 4/22/20)  · Formula: 1.5 Calorie with Fiber(Jevity 1.5)  · Rate (ml/hr):goal is 50 ml/hr    · Volume (ml/day): 1200 ml  · Duration: Continuous  · Water Flushes: recommend 225 ml 4 times/day when off IVF  · Goal TF & Flush Orders Provides: 1800 kcals, 77 grams protein, 259 grams CHO, 26 grams fiber, 912 ml free water in TF (1812 ml free water with above flushes)  · Anthropometric Measures:  · Ht: 5' 6\" (167.6 cm)   · Current Body Wt: 158 lb 1.1 oz (71.7 kg)(5/4; no edema noted)  · Admission Body Wt: 149 lb (67.6 kg)(4/25/20, bedscale)  · Usual Body Wt: 139 lb (63 kg)([facility], 3/2/20, standing scale)  · Weight Change: +6% weight gain in one week since admit; pt had one dose of Bumex on 5/3   · Ideal Body Wt: 142 lb (64.4 kg)   · BMI Classification: BMI 25.0 - 29.9 Overweight(25.6)    Nutrition Interventions:   Continue NPO, Continue current Tube Feeding  Continued Inpatient Monitoring, Education not appropriate at this time, Coordination of Care, Speech Therapy    Nutrition Evaluation:   · Evaluation: Progressing toward goals   · Goals: Patient willl tolerate TF to provide 75% or greater of estimated nutritional needs during LOS   · Monitoring: Nutrition Progression, TF Intake, TF Tolerance, Skin Integrity, I&O, Mental Status/Confusion, Weight, Pertinent Labs, Chewing/Swallowing, Monitor Bowel Function    Electronically signed by Chaparro Jimenez RD, LD on 5/4/20 at 12:03 PM EDT    Contact Number: [phone]

## 2020-05-04 NOTE — PLAN OF CARE
Problem: Nutrition  Goal: Optimal nutrition therapy  5/4/2020 1211 by Marvell Bence, RD, LD  Outcome: Ongoing   Nutrition Problem: Severe malnutrition, In context of chronic illness  Intervention: Food and/or Nutrient Delivery: Continue NPO, Continue current Tube Feeding  Nutritional Goals: Patient willl tolerate TF to provide 75% or greater of estimated nutritional needs during LOS

## 2020-05-04 NOTE — CARE COORDINATION
5/4/20, 11:52 AM EDT    DISCHARGE ON GOING [place] 89 day: 9  Location: -17/017-A Reason for admit: CVA, old, aphasia [I69.320]   Procedure: CT Chest WO Contrast 5/3    Impression:       1. Small bilateral pleural effusions. 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 Pulmonary arterial hypertension. Superimposed acute atelectasis/pneumonia right mid and lower lung fields. Overall appearance of chest has worsened since   prior. 2. Mild mediastinal and right hilar adenopathy. The previously noted pneumoperitoneum is not seen at this time. Treatment Plan of Care: ID consult, Pulmonology consult. Tube feedings per PEG being tolerated, IV fluids, IV antibiotics, diabetes management. Barriers to Discharge: medical stability. PCP: Bindu Kay DO  Readmission Risk Score: 36%  Patient Goals/Plan/Treatment Preferences: Plan is for a family meeting today with physicians and Palliative Care to explore possible comfort care only options. Will follow.

## 2020-05-04 NOTE — PLAN OF CARE
Problem: Impaired respiratory status  Goal: Clear lung sounds  Outcome: Ongoing  Note: Pt has clear/diminished breath sounds. Txs to help improve lung aeration.

## 2020-05-04 NOTE — PROGRESS NOTES
where    COGNITION: Slow Processing and Difficulty Following Commands    ADL:   Grooming: Minimal Assistance. A for thoroughness, able to follow commands to wash/dry face in supine  Incontinent of bladder, dep x2 for clean up and changing depends. BALANCE:  Sitting Balance:  Minimal Assistance. at EOB approx 4 minutes with poor forward flexed posture, able to raise head up when cued       BED MOBILITY:  Rolling to Left: Maximum Assistance HOB flat, used bedrail  Rolling to Right: Maximum Assistance    Supine to Sit: Maximum Assistance, X 1 HOB elevated, used bedrail and increased time, tactile cues to follow directions  Sit to Supine: Dependent, X 2 HOB flat, used bedrail      ASSESSMENT:     Activity Tolerance:  Patient tolerance of  treatment: fair. cooperative      Discharge Recommendations: Continue to assess pending progress  Equipment Recommendations:  Other: Monitor pending progress  Plan: Times per week: 6x  Current Treatment Recommendations: Strengthening, Safety Education & Training, Balance Training, Patient/Caregiver Education & Training, Self-Care / ADL, Cognitive/Perceptual Training, Functional Mobility Training, Equipment Evaluation, Education, & procurement, Endurance Training    Patient Education  Patient Education: Importance of Increasing Activity    Goals  Short term goals  Time Frame for Short term goals: Until discharge  Short term goal 1: Tolerate sitting EOB x 8 min with 0>mod A & min vcs for midline posture for eventual EOB ADLs  Short term goal 2: Follow 1-step commands 50% of the time with mod encouragement for increased safety & participation & ADL tasks  Short term goal 3: Attend to R hemibody with mod vcs to increase BUE intergration for grooming tasks  Short term goal 4: Complete sit-stand with mod A x 2 for eventual BSC t/fs  Long term goals  Time Frame for Long term goals : No LTG set d/t short ELOS   Long term goal 1:       Following session, patient left in safe position with all fall risk precautions in place.

## 2020-05-04 NOTE — FLOWSHEET NOTE
04/28/20 0131   Provider Notification   Reason for Communication Review case   Provider Name [facility]   Provider Notification Physician Assistant   Method of Communication Call   Response Other (Comment)   Notification Time  to update her that from reading the notes, it appears the same episode of bloody, clot, stools happened and GI was aware of the GI bleed. Stated that she was okay with updating GI docs later in the morning, as it is not a new symptom. Also stated to leave tube feed off for now, until can be addressed.
I spoke w pt's daughter/Sara Velasco and pt's son Ursula Johns over the phone and updated them regarding the colonoscopy report and the recommendation of Dr. Ming Ferreira ( neurology) to continue rehab on discharge. Pt's son Ursula Johns wants a family meeting with the neurology, GI, hospitalist, and palliative care to discuss goals of care and prognosis. I told Mr. Tonia Okeefe that palliative care nurses are not in the hospital during weekend, Mr. Tonia Okeefe is okay to do the family meeting on Monday.        Electronically signed by Parish Banegas MD on 5/1/2020 at 7:49 PM
Per chart, patient is aphasic.  asked if patient would like prayer, he made moaning sounds and held his hands as if praying.  offered song and prayer for patient. He moaned and nodded his head. Spiritual support remains available as needed.           04/30/20 2041   Encounter Summary   Services provided to: Patient   Referral/Consult From: Rounding   Continue Visiting Yes  (4/30 p)   Complexity of Encounter Low   Length of Encounter 15 minutes   Spiritual/Anabaptism   Type Spiritual support   Assessment Approachable   Intervention Prayer   Outcome Expressed gratitude
Advance Care Planning Yes   Spiritual/Yazidi   Type Spiritual support

## 2020-05-04 NOTE — PROGRESS NOTES
S1/S2 without murmurs, rubs or gallops. Abdomen: (+) PEG tube, soft, non-tender, non-distended with normal bowel sounds. Neurological exam: looks sleepy, limited neuro exam as pt did not follow all commands, pt did not answer questions, gait not examine, (+) left facial droop, pt squeezed examiner's finger when asked, motor 4/5 on RUE and RLE, otherwise 5/5  Exam of extremities: peripheral pulses normal, no pedal edema, no clubbing or cyanosis. ICD in placed.          Labs:   Recent Labs     05/02/20  0406  05/03/20  0229  05/03/20  1813 05/04/20  0214 05/04/20  0919   WBC 12.9*  --  12.7*  --   --  14.7*  --    HGB 9.4*   < > 9.8*   < > 8.8* 9.2* 10.4*   HCT 30.0*  --  30.0*  --   --  29.3*  --      --  298  --   --  296  --     < > = values in this interval not displayed. Recent Labs     05/01/20  1828 05/02/20  0406 05/03/20  0229   NA  --  138 137   K 3.8 3.5 3.6   CL  --  108 104   CO2  --  24 27   BUN  --  19 19   CREATININE  --  0.8 0.8   CALCIUM  --  8.3* 8.0*     No results for input(s): AST, ALT, BILIDIR, BILITOT, ALKPHOS in the last 72 hours. No results for input(s): INR in the last 72 hours. No results for input(s): Gareld Junk in the last 72 hours. Urinalysis:      Lab Results   Component Value Date    NITRU NEGATIVE 05/02/2020    WBCUA NONE SEEN 05/02/2020    WBCUA 0-5 02/19/2018    BACTERIA NONE SEEN 05/02/2020    RBCUA 0-2 05/02/2020    BLOODU NEGATIVE 05/02/2020    SPECGRAV 1.015 05/02/2020    GLUCOSEU NEGATIVE 04/25/2020       Radiology:  CT CHEST WO CONTRAST   Final Result   1. Small bilateral pleural effusions. 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 Pulmonary arterial hypertension. Superimposed acute atelectasis/pneumonia right mid and lower lung fields. Overall appearance of chest has worsened since    prior. 2. Mild mediastinal and right hilar adenopathy. The previously noted pneumoperitoneum is not seen at this time.             **This report 4/13/2020, during that time, the recommendation is to transfer to [facility] for further management, however, patient and family refused for transfer.  -cont statin  -cont asa, GI okay to resume asa on 5/3    Hypokalemia, likely due to bowel prep, resolved     -Potassium replacement protocol  -Magnesium 1.7  -BMP in a.m. Oral thrush     -cont nystatin      Hx of Aspiration PNA    patient was on Zosyn, stopped on 4/26 (received 10 days total)  CXR Diffuse interstitial marking changes compatible with chronic lung disease including fibrosis, Superimposed basilar airspace opacities are concerning for pneumonitis particularly in the right lower lobe Some diaphragmatic lucency concerning for free air   Patient afebrile, leukocytosis slightly worsening  Repeat cxr today as pt had hypoxia   Feeding through PEG     Severe dysphagia s/p PEG tube insertion    on bolus tube feeding via PEG tube  On hold prior to colonoscopy, resumed after procedure     Pneumoperitoneum post PEG insertion, resolved     CT abdomen showed Significant free air within the peritoneum of the upper and mid abdomen. Mild small bowel luminal stasis and fluid retention nonspecific in distribution. Mild enteritis is not excluded. Diffuse colonic diverticulosis. No focal regions of diverticulitis suspected. Gen Surg consulted  Gastric tube Fluoroscopy done on 4/26 demonstrated contrast within the gastric lumen as well as within the duodenum.  There is no evidence of extravasation of enteric contrast.  PEG tube feeding resumed  -not seen on chest CT on 5/3     CASSIDY on CKD stage II, prerenal, resolved     Cr 1.3 on readmission, creatine now stable at 0.8   Resolved with IVF  Avoid nephrotoxic agents     Leukocytosis, likely reactive, worsening     -Initially resolved, starting to elevate again on 5/1, 11.5>12.9>12.7>14.7  -repeat blood cxs no growth so far  -CBC in a.m.     COPD    No wheezing noted   Bronchodilators prn     Essential HTN, fairly

## 2020-05-05 NOTE — DISCHARGE INSTR - COC
Continuity of Care Form    Patient Name: Kartik Bourgeois   :  1931  MRN:  [id number]    Admit date:  2020  Discharge date:  2020    Code Status Order: [facility]   Advance Directives:   [facility] Documentation     Date/Time Healthcare Directive Type of Healthcare Directive Copy in [address] Agent's Name Healthcare Agent's Phone Number    20 1540  Yes, patient has an advance directive for healthcare treatment  --  --  --  --  --    20 2752  Yes, patient has an advance directive for healthcare treatment  Living will  Yes, copy in chart  Healthcare power of   --  --          Admitting Physician:  Candice Mota MD  PCP: Xavier Doshi DO    Discharging Nurse: [facility] Unit/Room#: 5K-02/002-A  Discharging Unit Phone Number: [phone]    Emergency Contact:   Extended Emergency Contact Information  Primary Emergency Contact: Luly Yancey  Address: [address] [place] [address] Phone: [phone]  Relation: Spouse  Hearing or visual needs: None  Other needs: None  Preferred language: English   needed? No  Secondary Emergency Contact: Zahida Sanders [address] Phone: [phone]  Mobile Phone: [phone]  Relation: Child  Hearing or visual needs: None  Other needs: None  Preferred language: English   needed?  No    Past Surgical History:  Past Surgical History:   Procedure Laterality Date    ABDOMINAL HERNIA REPAIR  2017    incisional hernia repair--Dr. Antionette Dias CARDIAC SURGERY      CATARACT REMOVAL WITH IMPLANT      bilateral    COLONOSCOPY  [id number]    COLONOSCOPY  2017    COLONOSCOPY CONTROL HEMORRHAGE performed [facility]uro Ramirez [facility]    COLONOSCOPY  2018    COLONOSCOPY POLYPECTOMY SNARE/COLD BIOPSY performed by Lauro Ramirez MD at [facility]    COLONOSCOPY  2018    COLONOSCOPY CONTROL HEMORRHAGE performed by Meme Maldonado MD at [facility]    COLONOSCOPY N/A 8/20/2018    COLONOSCOPY CONTROL HEMORRHAGE performed by Meme Maldonado MD at [facility]    COLONOSCOPY N/A 5/1/2020    COLONOSCOPY DIAGNOSTIC performed by Meme Maldonado MD at [address]  1960's    EKG 12-LEAD  4/29/2015         ENDOSCOPY, COLON, DIAGNOSTIC      EYE SURGERY      cataracts    HERNIA REPAIR      several    HIP PINNING Left 8/31/2017    LEFT HIP INTERTAN performed by Estefanía Payton MD at [address]  12/3/12    left     MYRINGOTOMY AND TYMPANOSTOMY TUBE PLACEMENT  7/23/13    left     NASAL SINUS SURGERY      OTHER SURGICAL HISTORY  04/27/2015    transurethral Incision bladder neck    PACEMAKER INSERTION      PACEMAKER PLACEMENT  2011    KS COLONOSCOPY FLX DX W/COLLJ SPEC WHEN PFRMD Left 8/18/2018    [facility]    KS [address] DX/THER SBST INTRLMNR LMBR/SAC W/IMG GDN N/A 6/25/2018    LUMBAR INTER LAMINAR RUBEN LESI @ L3. performed by Skye Mcgrath MD at [facility] Right 10/8/2017    Right hip intertan performed by Jerman Coker MD at [address] [place] [address] TURP  4/17/2013    W/CYSTO WITH DIRECT VISION URETHROTOMY & RESECTION BLADDER NECK CONTRACTURE    TURP  4/27/15    re-do TURP    TYMPANOSTOMY TUBE PLACEMENT      multiple surgeries    UPPER GASTROINTESTINAL ENDOSCOPY  12/29/2017    COLONOSCOPY SUBMUCOSAL/BOTOX INJECTION [facility]  8/16/2018    EGD DIAGNOSTIC ONLY [facility] Left 8/19/2018    EGD DIAGNOSTIC ONLY performed by Meme Maldonado MD at [address] [place] [address] [facility] N[facility] 4/22/2020    EGD ESOPHAGOGASTRODUODENOSCOPY PEG TUBE INSERTION performed by Katherine Culp MD at [facility] Left 4/29/2020    EGD DIAGNOSTIC ONLY performed by Katherine Culp MD at [facility]       Immunization History:   Immunization History   Administered Date(s) Administered    Influenza A ([id number]) Vaccine PF IM 02/08/2010    Influenza Vaccine, unspecified formulation 10/13/2011, 11/13/2013, 10/28/2014    Influenza Virus Vaccine 09/01/2012    Influenza, High Dose (Fluzone 65 yrs and older) 10/27/2016, 09/26/2017, 10/15/2019    Influenza, Quadv, IM, PF (6 mo and older Fluzone, Flulaval, Fluarix, and 3 yrs and older Afluria) 09/27/2018    Pneumococcal Conjugate 13-valent ([id number]) 08/28/2018    Pneumococcal Polysaccharide ([id number]) 10/27/2016    Tdap (Boostrix, Adacel) 06/16/2017    Zoster Live (Zostavax) 04/22/2014       Active Problems:  Patient Active Problem List   Diagnosis Code    Dyspnea R06.00    Bladder neck contracture N32.0    BPH (benign prostatic hyperplasia) N40.0    Suprapubic pain R10.2    CKD (chronic kidney disease) stage 3, GFR 30-59 ml/min ([facility]) N18.3    CAD (coronary artery disease) I25.10    Nausea and vomiting R11.2    Chronic serous otitis media of left ear H65.22    Hearing loss, sensorineural H90.5    Conductive hearing loss, bilateral H90.0    History of tympanoplasty of left ear Z98.890    Anxiety disorder F41.9    Disc disorder of lumbar region M51.9    Pacemaker Z95.0    S/P ventral herniorrhaphy Z98.890, Z87.19    Essential hypertension I10    Head injury, closed, without LOC S09.90XA    At high risk for pain from procedure Z91.89    NICOLAS (obstructive sleep apnea) G47.33    Closed comminuted intertrochanteric fracture of right femur with routine healing S72.141D    Acute on chronic blood loss anemia D62    Acute GI hemorrhage K92.2    B12 deficiency E53.8    Diverticulosis of large intestine with hemorrhage K57.31    Lumbar radiculitis M54.16    HNP (herniated nucleus pulposus), COVID-19 (Ordered)        Resolved    C-diff Rule Out 04/28/20 04/28/20 04/27/20 Gastrointestinal Panel, Molecular (Ordered)   04/28/20 Rule-Out Test Resulted    COVID-19 Rule Out 04/25/20 04/25/20 04/26/20 COVID-19 (Ordered)   04/26/20 Rule-Out Test Resulted    C-diff Rule Out 04/25/20 04/25/20 04/25/20 Clostridium difficile EIA (Ordered)   04/27/20 Gabi Orta RN    C-diff Rule Out 04/25/20 04/25/20 04/25/20 Clostridium Difficile Toxin/Antigen (Ordered)   04/25/20 Rule-Out Test Resulted          Nurse Assessment:  Last Vital Signs: BP (!) 146/63   Pulse 78   Temp 98.1 °F (36.7 °C) (Oral)   Resp 20   Ht 5' 6\" (1.676 m)   Wt 158 lb 1.1 oz (71.7 kg)   SpO2 95%   BMI 25.51 kg/m²     Last documented pain score (0-10 scale): Pain Level: 0  Last Weight:   Wt Readings from Last 1 Encounters:   05/04/20 158 lb 1.1 oz (71.7 kg)     Mental Status:  BROOK    IV Access:  - None    Nursing Mobility/ADLs:  Walking   Dependent  Transfer  Dependent  Bathing  Dependent  Dressing  Dependent  Toileting  Dependent  Feeding  Dependent  Med Admin  Dependent  Med Delivery   PEG    Wound Care Documentation and Therapy:        Elimination:  Continence:   · Bowel: No  · Bladder: No  Urinary Catheter: None   Colostomy/Ileostomy/Ileal Conduit: No       Date of Last BM: 5/2/2020    Intake/Output Summary (Last 24 hours) at 5/5/2020 1313  Last data filed at 5/4/2020 2013  Gross per 24 hour   Intake 154.37 ml   Output --   Net 154.37 ml     I/O last 3 completed shifts: In: 154.4 [I.V.:154.4]  Out: -     Safety Concerns:      At Risk for Falls    Impairments/Disabilities:      Speech    Nutrition Therapy:  Current Nutrition Therapy:   - Tube Feedings:  Jevity 1.5 TF as tolerated at goal rate of 50 mL/hr, flushing 225 mL free water 4 times per day    Routes of Feeding: Gastrostomy Tube  Liquids: No Liquids  Daily Fluid Restriction: no  Last Modified Barium Swallow with Video (Video Swallowing Test): not done    Treatments at the Time of Hospital Discharge:   Respiratory Treatments: See mar  Oxygen Therapy:  is on oxygen at 2 L/min per nasal cannula. Ventilator:    - No ventilator support    Rehab Therapies: Physical Therapy, Occupational Therapy and Speech/Language Therapy  Weight Bearing Status/Restrictions: No weight bearing restirctions  Other Medical Equipment (for information only, NOT a DME order): wheelchair  Other Treatments: ***    Patient's personal belongings (please select all that are sent with patient):  Glasses, Hearing Aides right, Dentures upper and lower    RN SIGNATURE:  Electronically signed by Javed Prater RN on 5/5/20 at 4:25 PM EDT    CASE MANAGEMENT/SOCIAL WORK SECTION    Inpatient Status Date: 4/25/2020    Readmission Risk Assessment Score:  Readmission Risk              Risk of Unplanned Readmission:        31           Discharging to Facility/ Agency   · Name: [facility]  · Address: [address] [place] [address]    · Phone: [phone]  · Fax: [phone]    Dialysis Facility (if applicable)   · Name:  · Address:  · Dialysis Schedule:  · Phone:  · Fax:    / signature: Electronically signed by ROSS Turner on 5/5/20 at 1:16 PM EDT    PHYSICIAN SECTION    Prognosis: Poor    Condition at Discharge: Terminal    Rehab Potential (if transferring to Rehab): Poor    Recommended Labs or Other Treatments After Discharge:     Physician Certification: I certify the above information and transfer of Radha Reza  is necessary for the continuing treatment of the diagnosis listed and that he requires [facility] for greater 30 days.      Update Admission H&P: Changes in H&P as follows - now hospice    PHYSICIAN SIGNATURE:  Electronically signed by Mar Meredith MD on 5/5/20 at 1:39 PM EDT

## 2020-05-05 NOTE — DISCHARGE SUMMARY
Hospital Medicine Discharge Summary      Patient Identification:   Ghazal Reza   : 1931  MRN: [id number]   Account: [de-identified]      Patient's PCP: Cecily Riedel, DO    Admit Date: 2020     Discharge Date:   2020      Admitting Physician: Krzysztof Ni MD     Discharge Physician: Natalia Dalal MD     Discharge Diagnoses: Active Hospital Problems    Diagnosis Date Noted    Acute respiratory failure with hypoxia (HCC) [J96.01]     Hypoxia [R09.02]     Acute confusion [R41.0]     History of recent stroke [Z86.73]     Hypokalemia [E87.6]     History of aspiration pneumonia [Z87.01]     Acute kidney injury superimposed on CKD ([facility] [N17.9, N18.9]     Hx of coronary artery disease [Z86.79]     Urinary retention [R33.9]     Hemiparesis, right ([facility] [G81.91] 2020    Dysarthria [R47.1] 2020    Stenosis of right internal carotid artery [I65.21] 2020    Pneumoperitoneum [K66.8]     CVA, old, 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 Hospital Course:   Ghazal Reza is a 80 y.o. male admitted to [address] [place] [address] on 2020 for worsening mentation; transfer from rehab. ( I am assuming care on his 10th hospital day so summary is synthesized from numerous notes). The pt was originally admitted on  with sxs of a cva, not a TPA candidate. He had mainly right facial droop and speech changes at that time. Family did not wish transfer to [facility]. He was noted to have an 80% right ICA stenosis.   Apparently the family took him home brought back due to worsening extravasation of enteric contrast.            **This report has been created using voice recognition software. It may contain minor errors which are inherent in voice recognition technology. **      Final report electronically signed by Dr. Desi Cortes on 4/26/2020 11:46 AM      CT CHEST WO CONTRAST   Final Result   1.. Diffuse interstitial markings compatible with chronic interstitial disease including fibrosis with regions of bronchiectasis and suspected right lower lobe infiltrates. 2. Free air beneath the diaphragm. **This report has been created using voice recognition software. It may contain minor errors which are inherent in voice recognition technology. **      Final report electronically signed by Dr. Rachna Goodman on 4/26/2020 2:43 AM      CT ABDOMEN PELVIS WO CONTRAST Additional Contrast? None   Final Result   . 1. Significant free air within the peritoneum of the upper and mid abdomen. 2. Visualized gastrostomy tube. Correlation with history recommended. 3. Mild small bowel luminal stasis and fluid retention nonspecific in distribution. Mild enteritis is not excluded. 4. Diffuse colonic diverticulosis. No focal regions of diverticulitis suspected. 5. Chronic interstitial changes of the lungs. Superimposed right lower lobe infiltrates suspected. **This report has been created using voice recognition software. It may contain minor errors which are inherent in voice recognition technology. **      Final report electronically signed by Dr. Rachna Goomdan on 4/26/2020 2:49 AM      XR CHEST PORTABLE   Final Result   1. Diffuse interstitial marking changes compatible with chronic lung disease including fibrosis. 2. Superimposed basilar airspace opacities are concerning for pneumonitis particularly in the right lower lobe. 3. Some diaphragmatic lucency concerning for free air. Correlation with history required.       Report findings were called to the patient's inpatient brink at

## 2020-05-05 NOTE — CARE COORDINATION
5/5/20, 7:23 AM EDT    DISCHARGE BARRIERS        Patient transferred to 5K-02. Report given to unit Luis Barcenas, regarding discharge plan for this patient.

## 2020-05-05 NOTE — CARE COORDINATION
5/5/20, 9:42 AM EDT    DISCHARGE PLANNING EVALUATION    Received update from BRIAN Oneal with [facility]-patient had been spouse's caretaker-family would like for her to go to facility with patient and they be in the same room together. Garrick Oneal stated that daughter was planning on contacting facility as well. SW will follow up with Mattie at [facility]. Spoke with Mattie at [facility] states that facility should be able to accept patient but, will need to verify with staff. SW advised her of family's wishes re: spouse and shared room. She stated that family did call about spouse-she is a candidate for higher level AL but, family is wanting them together. Spouse's admission will take longer. She will follow up with staff and get back with SW-advised her of planned discharge for today. 11:24 am-spoke with Mattie at [facility]. They will be able to accept patient. He will need a new COVID test before admission. They will be able to admit spouse however, this will take a little longer. She will contact daughter and speak with her about it. 12:46 PM Call to daughter Christina-she has spoken with Phuong at [facility]. SUZETTE advised her that plan is to discharge today after COVID results are back. She, her brother and their mother plan to visit patient at hospital before he is discharged. She will let SW know time so that transport can be scheduled after visit. 12:55 PM Received call from daughter Christina-they plan to be here around 2 pm to see patient. BRIAN Macias will arrange transport for patient to facility when appropriate. Call to Phuong at [facility] and updated her on the above. Hospice scripts faxed to facility. Directions left for staff for completion of discharge. BRIAN Oneal with [facility] updated. No other needs at this time. Patient will be hospice/private pay at facility.      5/5/20, 1:54 PM EDT    Patient goals/plan/ treatment preferences discussed by  and Social Worker. Patient goals/plan/ treatment preferences reviewed with patient/ family. Patient/ family verbalize understanding of discharge plan and are in agreement with goal/plan/treatment preferences. Understanding was demonstrated using the teach back method. AVS provided by RN at time of discharge, which includes all necessary medical information pertaining to the patients current course of illness, treatment, post-discharge goals of care, and treatment preferences.     Services After Discharge  Services At/After Discharge: Nursing Services, Hospice, Aide Services, In ambulance, Transport([facility])   IMM Letter  IMM Letter given to Patient/Family/Significant other/Guardian/POA/by[de-identified] CM  IMM Letter date given[de-identified] 05/01/20  IMM Letter time given[de-identified] 0901

## 2020-05-05 NOTE — PROGRESS NOTES
difficulty following commands     ADL:   Grooming: Moderate Assistance. For hair care seated EOB. Patient had difficulty maintaining midline. Toileting: Dependent. For hygiene and clothing management (incontinent of urine). BALANCE:  Sitting Balance:  Maximum Assistance. - mod A seated EOB with posterior lean towards R side. Difficulty maintaining midline. Tolerated sitting EOB for approx 1 minute 10 seconds before increased fatigue noted    BED MOBILITY:  Rolling to Left: Maximum Assistance +1  Rolling to Right: Maximum Assistance +1  Supine to Sit: Maximum Assistance +1, HOB elevated   Sit to Supine: Maximum Assistance +1 at BLEs and modA +1 at shoulders    ASSESSMENT:     Activity Tolerance:  Patient tolerance of  treatment: fair. Patient demonstrated with increased fatigue during activity. Discharge Recommendations: Continue to assess pending progress  Equipment Recommendations:  Other: Monitor pending progress  Plan: Times per week: 6x  Current Treatment Recommendations: Strengthening, Safety Education & Training, Balance Training, Patient/Caregiver Education & Training, Self-Care / ADL, Cognitive/Perceptual Training, Functional Mobility Training, Equipment Evaluation, Education, & procurement, Endurance Training    Patient Education  Patient Education: bed mobility technique    Goals  Short term goals  Time Frame for Short term goals: Until discharge  Short term goal 1: Tolerate sitting EOB x 8 min with 0>mod A & min vcs for midline posture for eventual EOB ADLs  Short term goal 2: Follow 1-step commands 50% of the time with mod encouragement for increased safety & participation & ADL tasks  Short term goal 3: Attend to R hemibody with mod vcs to increase BUE intergration for grooming tasks  Short term goal 4: Complete sit-stand with mod A x 2 for eventual BSC t/fs  Long term goals  Time Frame for Long term goals : No LTG set d/t short ELOS   Long term goal 1:       Following session, patient left

## 2020-05-06 PROBLEM — G93.41 METABOLIC ENCEPHALOPATHY: Status: ACTIVE | Noted: 2020-01-01

## 2020-05-06 NOTE — PROGRESS NOTES
Patient discharged to [facility] via [facility]. Report called to Pema Fortune RN. López Leidy with hospice called and notified of discharge. Daughter Wandy Stephen notified of discharge. Paperwork faxed to hospice and Dee and originals sent in blue folder. Chart contents placed in yellow bin.

## 2020-05-06 NOTE — TELEPHONE ENCOUNTER
[facility] Transitions Initial Follow Up Call    Outreach made within 2 business days of discharge: Yes    Patient: Samia Barajas Patient : [id number]   MRN: [id number]  Reason for Admission: There are no discharge diagnoses documented for the most recent discharge. Discharge Date: 20       Spoke with:       Discharge department/facility: HOSPICE    TCM Interactive Patient Contact:  Was patient able to fill all prescriptions:   Was patient instructed to bring all medications to the follow-up visit:   Is patient taking all medications as directed in the discharge summary?    Does patient understand their discharge instructions:   Does patient have questions or concerns that need addressed prior to 7-14 day follow up office visit: no    Scheduled appointment with PCP within 7-14 days    Follow Up  Future Appointments   Date Time Provider Miguel Romo   7/15/2020 11:15 AM Jamey Lamas [address], APRN - CNP ENT [facility] - [address] [facility]   2020  1:00 PM MD CHARLENE Rausch [facility] P - [address]

## 2020-08-26 ENCOUNTER — TELEPHONE (OUTPATIENT)
Dept: FAMILY MEDICINE CLINIC | Age: 85
End: 2020-08-26

## 2022-10-20 NOTE — PROGRESS NOTES
(past 24 hours):   pt aphasic, but able to communicate, denies CP/SOB/fever/chills. C/o pain around PEG site. Medications:  Reviewed    Infusion Medications    dextrose       Scheduled Medications    aspirin  81 mg Per NG tube Daily    atorvastatin  80 mg PEG Tube Nightly    [START ON 4/20/2021] calcium replacement protocol   Other RX Placeholder    docusate sodium  100 mg PEG Tube Daily    ferrous sulfate  325 mg PEG Tube Lunch    insulin lispro  0-12 Units Subcutaneous Q6H    isosorbide mononitrate  30 mg Oral Daily    lansoprazole  30 mg Per G Tube QAM AC    [START ON 4/20/2021] magnesium replacement protocol   Other RX Placeholder    nystatin  5 mL Oral 4x Daily    piperacillin-tazobactam  3.375 g Intravenous Q8H    [START ON 4/20/2021] potassium replacement protocol   Other RX Placeholder    sertraline  50 mg PEG Tube Daily    sodium chloride flush  10 mL Intravenous 2 times per day    tamsulosin  0.4 mg Oral BID     PRN Meds: diatrizoate meglumine-sodium, promethazine **OR** ondansetron, dextrose, dextrose, glucagon (rDNA), glucose, acetaminophen, acetaminophen, albuterol, hydrALAZINE, ipratropium-albuterol, polyethylene glycol, sodium chloride flush      Intake/Output Summary (Last 24 hours) at 4/26/2020 0943  Last data filed at 4/26/2020 0306  Gross per 24 hour   Intake 467.23 ml   Output --   Net 467.23 ml       Diet:  No diet orders on file    Exam:  /63   Pulse 89   Temp 98.3 °F (36.8 °C) (Oral)   Resp 18   Wt 149 lb (67.6 kg)   SpO2 96%   BMI 24.05 kg/m²     General appearance: aphasic, Not ill or toxic, in no apparent distress  HEENT:  SHAUN  EOM intact. Neck: Supple, with full range of motion. No jugular venous distention. Trachea midline.   Respiratory:   NL A/E bilat with no adventitious sounds   Cardiovascular:  normal S1/S2 with no murmurs/gallops  Abdomen: Soft, non-tender, non-distended, no rigidity or peritoneal signs  Musculoskeletal: NL symmetrical A/PROM definite pneumothorax is seen although evaluation of the apices is limited. There is diffuse osteoporosis. Accentuated thoracic kyphosis is demonstrated. 1. Bilateral coarse interstitial opacities are present bilaterally, most pronounced at the right midlung as well as the lung bases bilaterally. This likely represents underlying pulmonary fibrosis. 2. There is a small right-sided pleural effusion. 3. There is more focal confluent opacities noted at the lung bases as well as the right midlung. Although this may be related to underlying fibrosis, cannot exclude pneumonia at these locations. No definite pneumothorax is seen although evaluation of the  apices is limited. Of note, aeration of the lung bases appears improved from prior examination dated 4/20/2020. **This report has been created using voice recognition software. It may contain minor errors which are inherent in voice recognition technology. ** Final report electronically signed by Dr. Eloisa Sharma on 4/25/2020 10:56 AM    Xr Chest Standard (2 Vw)    Result Date: 4/20/2020  PROCEDURE: XR CHEST (2 VW) CLINICAL INFORMATION: serial imaging COMPARISON: 4/18/2020 TECHNIQUE:  AP and lateral chest x-ray  FINDINGS: The heart size is borderline enlarged. There is a right upper terminate PICC present with the tip overlying the SVC. There is a left-sided pacemaker. There are coarse linear interstitial lung markings demonstrated bilaterally predominantly in a perihilar distribution with more focal consolidative bibasilar right greater than left opacities which may represent edema versus pneumonia versus sequela from  pulmonary fibrosis. Overall aeration of the lower lung bases appears slightly worsened from the prior examination suggesting a component of underlying bibasilar dependent atelectasis or pneumonia. No acute osseous findings are seen. However there is diffuse osteoporosis.      1. There are coarse linear interstitial lung markings demonstrated bilaterally predominantly in a perihilar distribution with more focal consolidative bibasilar right greater than left opacities which may represent edema versus pneumonia versus sequela from pulmonary fibrosis. Overall aeration of the lower lung bases appears slightly worsened from the prior examination suggesting a component of underlying bibasilar dependent atelectasis or pneumonia. **This report has been created using voice recognition software. It may contain minor errors which are inherent in voice recognition technology. ** Final report electronically signed by Dr. Hayden Engel on 4/20/2020 7:42 AM    Xr Chest Standard (2 Vw)    Result Date: 4/15/2020  PROCEDURE: XR CHEST (2 VW) CLINICAL INFORMATION: Cough, dysphasia TECHNIQUE: AP and lateral chest radiographs COMPARISON: AP chest radiograph 4/13/2020 at FINDINGS: A left-sided cardiac device is in stable position. There is mild stable enlargement of the cardiac silhouette. Atherosclerotic calcifications are present in the thoracic aorta. Lungs are hyperinflated. Fibrotic changes are again noted in the bilateral lungs. Prominent reticular opacities in the right upper and midlung are slightly more extensive. There is blunting of the right costophrenic angle. Bones are osteopenic. Degenerative and scoliotic changes are in the thoracal lumbar spine are poorly visualized. There are poorly visualized thoracic vertebral body compression deformities. 1. Persistent right upper and midlung atelectasis/infiltrate. 2. Chronic lung disease. 3. Mild stable cardiomegaly. **This report has been created using voice recognition software. It may contain minor errors which are inherent in voice recognition technology. ** Final report electronically signed by Dr. Kathleen Jackson on 4/15/2020 7:06 PM    Ct Head Wo Contrast    Result Date: 4/25/2020  PROCEDURE: CT HEAD WO CONTRAST CLINICAL INFORMATION: r/o new stroke .  COMPARISON: 4/19/2020 TECHNIQUE: 2-D multiplanar noncontrast images of the brain All CT scans at this facility use dose modulation, iterative reconstruction, and/or weight-based dosing when appropriate to reduce radiation dose to as low as reasonably achievable. FINDINGS: Generalized cerebral volume loss. Subacute infarct left basal ganglia stable. Remote lacunar infarct right thalamus. Stable. Stable white matter changes of chronic small vessel ischemic disease. No evidence of acute hemorrhage. No extra-axial fluid collection. No midline shift or mass effect. Ventricles are normal. Calvarium is intact. Visualized. Sinuses are clear mastoid air cells are nonpneumatized. Stable CT brain **This report has been created using voice recognition software. It may contain minor errors which are inherent in voice recognition technology. ** Final report electronically signed by Dr. Chelsea Tolbert on 4/25/2020 3:36 PM    Ct Head Wo Contrast    Result Date: 4/19/2020  PROCEDURE: CT HEAD WO CONTRAST CLINICAL INFORMATION: increased right hemiparesis. COMPARISON: None TECHNIQUE: Noncontrast 5 mm axial images were obtained through the brain. Sagittal and coronal reconstructions were obtained and reviewed. All CT scans at this facility use dose modulation, iterative reconstruction, and/or weight-based dosing when appropriate to reduce radiation dose to as low as reasonably achievable. FINDINGS: Brain: There is an ovoid focus of fairly well-defined diminished hypodensity in the left corona radiata deep white matter, more well-defined and more hypodense than on the prior study consistent with an acute ischemic infarct. There is no hemorrhage. There is a subacute to chronic ischemic lacunar infarct in the right thalamus which has become more well-defined and more hypodense when compared to prior studies. . The previously noted hypodensity in the posterior right temporal lobe is not seen and was  likely artifactual. There are moderate deep white matter hypodensities, nonspecific in nature but probably representing small vessel chronic ischemic changes. There is age appropriate cortical atrophy. Ventricles: The ventricles, cisterns and cortical sulci are enlarged concordant with the moderate degree of age-appropriate atrophy. No obstructive hydrocephalus. Skull base/calvarium/osseous structures: Unremarkable Soft tissues: Unremarkable Intraorbital contents: Unremarkable Sinuses: Unremarkable; the imaged sinuses are clear without evidence of mucosal thickening or fluid levels. Mastoids: Unremarkable; the mastoid air cells are clear. Acute ischemic infarct in the left corona radiata deep white matter. Subacute to chronic lacunar infarct in the right thalamus as discussed above. No hemorrhage Senescent changes as discussed above. Results discussed with Dr. Kristi Bowles on 4/19/2020 at 10:29 PM. **This report has been created using voice recognition software. It may contain minor errors which are inherent in voice recognition technology. ** Final report electronically signed by Dr. Milli Olson on 4/19/2020 10:31 PM    Ct Head Wo Contrast    Result Date: 4/15/2020  PROCEDURE: CT HEAD WO CONTRAST CLINICAL INFORMATION: Stroke follow up study, left facial asymmetry. COMPARISON: 4/14/2020. TECHNIQUE: Noncontrast 5 mm axial images were obtained through the brain. Sagittal and coronal reconstructions were obtained. All CT scans at this facility use dose modulation, iterative reconstruction, and/or weight-based dosing when appropriate to reduce radiation dose to as low as reasonably achievable. FINDINGS: There is stable moderate temporoparietal prominent volume loss.  There are moderate confluent and patchy areas of hypodensity in the periventricular, subcortical deep white matter as evidence for chronic microvascular angiopathy, stable compared to prior exam. There is a moderate-sized area of hypodensity in the lateral aspect of the right temporal lobe which has appeared/worsened in the interval. There appears to be some linear CLINICAL INFORMATION: stroke, lkw 12:50. Left facial droop noted on a tele visit with his family. COMPARISON: Head CT 4/17/2018. TECHNIQUE: Noncontrast 5 mm axial images were obtained through the brain. Sagittal and coronal reconstructions were obtained. All CT scans at this facility use dose modulation, iterative reconstruction, and/or weight-based dosing when appropriate to reduce radiation dose to as low as reasonably achievable. FINDINGS: There is global volume loss. There is no hemorrhage. There are no intra-or extra-axial collections. There is no hydrocephalus, midline shift or mass effect. There is a moderate amount of abnormal low attenuation in the white matter of the brain suggesting chronic small vessel ischemic changes. There is a stable old infarct in the right thalamus. There are vascular calcifications. The paranasal sinuses and mastoid air cells are normally aerated. There is no suspicious calvarial abnormality. 1. No acute findings. 2. Moderate severity chronic small vessel ischemic changes. **This report has been created using voice recognition software. It may contain minor errors which are inherent in voice recognition technology. ** Final report electronically signed by Dr. Florin Orellana on 4/13/2020 2:31 PM    Cta Neck W Wo Contrast    Result Date: 4/15/2020  PROCEDURE: CTA HEAD W WO CONTRAST, CTA NECK W WO CONTRAST CLINICAL INFORMATION: cva. Left facial droop today. COMPARISON: CT head from the same date. TECHNIQUE: 1 mm CT axial images were obtained from the aortic arch through the head after the fast bolus 85 mL [id number]-[id number] injected in the left AC. A noncontrast localizer was obtained. 3-D reconstructions were performed on a dedicated 3-D workstation. These include multiplanar MPR images and multiplanar MIP images.  All CT scans at this facility use dose modulation, iterative reconstruction, and/or weight-based dosing when appropriate to reduce radiation dose to as low as reasonably Chest Portable    Result Date: 4/18/2020  PROCEDURE: XR CHEST PORTABLE CLINICAL INFORMATION: SOB. COMPARISON: Chest x-ray dated 4/15/2020 TECHNIQUE: AP Portable chest xray FINDINGS: Lines/tubes/devices: NG tube courses into the mid stomach, tip not imaged. There is a stable left subclavian dual-lead pacemaker, leads in the regions of the right atrium and right ventricle. Lungs/pleura: There are worsening bilateral interstitial infiltrates consistent with pulmonary edema or an atypical pneumonia. There is no focal consolidation. There is a stable small right pleural effusion. There is biapical pleural thickening. There is no pneumothorax. Heart: There is stable mild cardiomegaly. Mediastinum/jerod: No obvious mass or adenopathy. Skeleton: There are multiple chronic appearing thoracic vertebral compression fractures. Bones are osteopenic. There is an old healed fracture of the lateral left third rib. Worsening interstitial pulmonary edema versus atypical pneumonia. Stable small right pleural effusion. **This report has been created using voice recognition software. It may contain minor errors which are inherent in voice recognition technology. ** Final report electronically signed by Dr. Alyssa Le on 4/18/2020 1:54 AM    Xr Chest 1 Vw    Result Date: 4/13/2020  PROCEDURE: XR CHEST 1 VIEW CLINICAL INFORMATION: Stroke TECHNIQUE: AP chest radiograph COMPARISON: PA and lateral chest radiographs 3/2/2020 FINDINGS: A left-sided cardiac device is in stable position. There is mild stable enlargement of the cardiac silhouette. Atherosclerotic calcifications are present in the thoracic aorta. Lungs are hyperinflated. Fibrotic changes are again noted in the bilateral lungs. In addition, there are slightly more prominent  reticular opacities in the right upper and mid lung. Bones are osteopenic. Degenerative and scoliotic changes in the thoracolumbar spine are poorly visualized.  There are poorly visualized thoracic calcifications. Skeleton: No acute bone or joint abnormality. Stable findings as previously described. Lower chest: There are bibasilar interstitial infiltrates with a small right pleural effusion. NG tube tip remains in the proximal stomach, side-port in the region of the GE junction. Consider advancing the tube 5 to 10 cm. Nonobstructive bowel gas pattern. Bibasilar interstitial infiltrates and small right pleural effusion as previously noted. **This report has been created using voice recognition software. It may contain minor errors which are inherent in voice recognition technology. ** Final report electronically signed by Dr. Lara Wise on 4/18/2020 6:05 AM    Xr Abdomen For Ng/og/ne Tube Placement    Result Date: 4/18/2020  PROCEDURE: XR ABDOMEN FOR NG/OG/NE TUBE PLACEMENT CLINICAL INFORMATION: Confirmation of course of NG/OG/NE tube and location of tip of tube. COMPARISON: KUB dated 4/17/2020 TECHNIQUE: A supine AP view of the abdomen was obtained. FINDINGS: Lines/tubes: NG tube tip lies in the proximal stomach, side-port in the region of the GE junction. Consider advancing the tube 5-10 cm. Pacemaker leads project over the regions of the right atrium and right ventricle. Bowel gas pattern: There is a nonobstructive bowel gas pattern, with gas within nondistended small and large bowel. Free air: There is no obvious pneumoperitoneum. Miscellaneous: There are no suspicious abdominal-pelvic calcifications. Skeleton: There is rotary dextroscoliosis of the lower thoracic and lumbar spine with multilevel endplate spondylosis. Multilevel chronic appearing lower thoracolumbar vertebral compression fractures are noted. Patient is status post right hip pinning. Lung bases: There are bibasilar infiltrates with a small right pleural effusion. NG tube tip in the region of the proximal stomach, side-port in the region of the GE junction. Consider advancing the tube 5 to 10 cm.  Non-obstructive bowel gas proximal stomach. The sidehole is slightly distal to the gastroesophageal junction. Advancement of 4 to 6 cm is recommended. Bowel gas pattern is nonobstructive. There are surgical fasteners overlying the pelvis. Severe degenerative and scoliotic changes in the thoracolumbar spine are poorly visualized. Bones are osteopenic. There are incompletely visualized surgical changes at the proximal femurs. Nasogastric tube as above. Final report electronically signed by Dr. Lakhwinder Valentine on 4/17/2020 5:49 PM    Xr Abdomen For Ng/og/ne Tube Placement    Result Date: 4/17/2020  PROCEDURE: XR ABDOMEN FOR NG/OG/NE TUBE PLACEMENT CLINICAL INFORMATION: Confirmation of course of NG/OG/NE tube and location of tip of tube . COMPARISON: Chest radiograph dated 4/15/2020. TECHNIQUE: A portable AP supine view of the abdomen was obtained. FINDINGS:  There has been interval placement of an NG tube with tip below the diaphragm projecting over the proximal stomach. There is a nonspecific bowel gas pattern. There are degenerative changes of the lumbar spine. NG tube tip projecting over the proximal stomach. Consider advancing a few centimeters. **This report has been created using voice recognition software. It may contain minor errors which are inherent in voice recognition technology. ** Final report electronically signed by Dr. Amy Bruce MD on 4/17/2020 4:55 PM    Ir Fluoroscopy Less Than 1 Hour    Result Date: 4/20/2020  PROCEDURE: PICC LINE REPOSITIONING PERFORMED BY: Dr. Manolo Gonzalez: Malpositioned PICC line. Needs central PICC line for total parental nutrition. PICC LINE: 6 [place] triple-lumen BioFlo picc line. ORIGINAL PICC POSITION: Coiled in right axillary vein FINAL PICC POSITION: Cavoatrial junction FLUOROSCOPY TIME: 1 minute 21 seconds FLUOROSCOPIC IMAGES: 2 ESTIMATED BLOOD LOSS: Minimal PROCEDURE:  Signed informed consent was obtained prior to performing this procedure.   The patient was not sedated during this procedure. The patient came to the department with a PICC line originally inserted by the PICC line nurses. The PICC line tip is not in the desired position, as indicated above. The exposed portion of the PICC line as well as the surrounding skin were prepped and draped in a sterile fashion, utilizing [address]. An 018 guidewire was then passed through the PICC line with fluoroscopic guidance and the PICC line was successfully repositioned with the final PICC line position as indicated above. Status post successful PICC line repositioning. .. **This report has been created using voice recognition software. It may contain minor errors which are inherent in voice recognition technology. ** Final report electronically signed by Dr. Kenya Gee on 4/20/2020 8:00 AM      Diet: No diet orders on file    DVT prophylaxis: [] Lovenox                                 [x] SCDs                                 [] SQ Heparin                                 [] Encourage ambulation           [] Already on Anticoagulation       Code Status: Limited      There are no active hospital problems to display for this patient. With RN in room, patient was updated about the treatment plan, all the questions and concerns were addressed.         Electronically signed by Penelope Schaffer MD on 4/26/2020 at 9:43 AM no

## 2022-12-22 NOTE — PROGRESS NOTES
Hospitalist Progress Note    Patient:  Kartik Bourgeois      Unit/Bed:4A-17/017-A    YOB: 1931    MRN: [id number]       Acct: [de-identified]     PCP: Xavier Doshi DO    Date of Admission: 4/25/2020        Assessment/Plan:    1. Query Worsening mentation in patient with prior recent CVA: Repeat CT show stable. concerns for potential leak. Feeds held until Gastrografin fluoroscopy completed which negative for any leak. Will resume feeds when Tony Bo w/  Sx. F/u cultures. Will monitor clinical status  4/28: at baseline. Will resume feeds today and monitor closely  2. Hx of recent stroke: not a candidate for tPA; PT/OT to follow, PM&R consulted  3. Hx of Aspiration PNA: on last day of Zosyn, CXR appears slightly worse as expected, otherwise AVSS. No need for Abx at this time. Will monitor clinical course  4. CASSIDY on CKD stage II: Cr 1.3. likely pre-renal. On IVF. Will trend serum Cr.  4/27: resolved. Cr 1.0. at baseline. D/c'ed IVF. 4/28: Cr 1.0  5. Hx of query UGIB: on ASA only, off plavix/AC. Hb stable, No clinically evident bleed; will monitor  4/28: +++ painless gush of BRB/clot per rectum; HD stable, no N/V/hematemesis; DDx: diverticular hemorrhage  Vs other pathology, GI consulted for consideration of EGD/C-scope; Hb down to 8.0 from 8.8 yesterday, UGI source unlikely in absence of HD instability and modest drop in Hb. ASA on hold. On PPI via PEG    Addendum: repeat Hb down to 7.0. Given concurrent CAD and large amount of BBRPR will transfuse one unit now; and monitor H/H closely. Pt already seen by GI with planned EGD tomorrow    6. Severe dysphagia: on bolus tube feeding via PEG tube which was resumed today  7. Leukocytosis: resolved. AVSS. Will monitor  8. Chronic normocytic anemia d/t STELLA on iron replacement; Hb 9.5 stable, no clinically evident bleed, will monitor  4/27: Hb 8.8. stable  9. Hx of COPD: no exacerbation, stable on R/A  10.  Hx of HTN: well-controlled on home meds; will dextrose, glucagon (rDNA), glucose, acetaminophen, albuterol, ipratropium-albuterol, polyethylene glycol, sodium chloride flush      Intake/Output Summary (Last 24 hours) at 4/28/2020 0803  Last data filed at 4/28/2020 2476  Gross per 24 hour   Intake 2177.39 ml   Output 750 ml   Net 1427.39 ml       Diet:  Diet NPO Effective Now    Exam:  /74   Pulse 103   Temp 98.4 °F (36.9 °C) (Oral)   Resp 20   Wt 149 lb (67.6 kg)   SpO2 90%   BMI 24.05 kg/m²     General appearance: aphasic, Not ill or toxic, in no apparent distress  HEENT:  SHAUN  EOM intact. Neck: Supple, with full range of motion. No jugular venous distention. Trachea midline. Respiratory:   NL A/E bilat with no adventitious sounds   Cardiovascular:  normal S1/S2 with no murmurs/gallops  Abdomen: Soft, non-tender, non-distended, no rigidity or peritoneal signs  Musculoskeletal: NL symmetrical A/PROM bilat U/L extremities   Skin: No rashes. No edema  Neurologic:  CN II-XII intact. R-sided hemiparesis and L-sided facial droop  Capillary Refill: Brisk,< 3 seconds   Peripheral Pulses: +2 palpable, equal bilaterally        Labs:   Recent Labs     04/26/20  0406 04/27/20  0600 04/28/20  0003 04/28/20  0515   WBC 8.5 10.2 12.0*  --    HGB 9.5* 8.8* 9.5* 8.7*   HCT 29.7* 27.9* 30.3*  --     268 324  --      Recent Labs     04/26/20  0406 04/27/20  0600 04/28/20  0003    144 141   K 3.9 3.9 3.7   CL 99 108 105   CO2 26 24 27   BUN 31* 27* 32*   CREATININE 1.3* 1.0 1.0   CALCIUM 8.4* 7.8* 8.4*     No results for input(s): AST, ALT, BILIDIR, BILITOT, ALKPHOS in the last 72 hours. Recent Labs     04/28/20  0003   INR 1.16*     No results for input(s): Gaurang Avila in the last 72 hours.     Urinalysis:      Lab Results   Component Value Date    NITRU NEGATIVE 04/25/2020    WBCUA 0-2 04/25/2020    WBCUA 0-5 02/19/2018    BACTERIA NONE SEEN 04/25/2020    RBCUA 3-5 04/25/2020    BLOODU NEGATIVE 04/25/2020    GLUCOSEU NEGATIVE 04/25/2020 Radiology:  Ct Abdomen Pelvis Wo Contrast Additional Contrast? None    Result Date: 4/26/2020  PROCEDURE: CT ABDOMEN PELVIS WO CONTRAST CLINICAL INFORMATION: free air . COMPARISON: None. TECHNIQUE: Axial 5 mm CT images were obtained through the abdomen and pelvis. No contrast was given. Coronal reconstructions were obtained. All CT scans at this facility use dose modulation, iterative reconstruction, and/or weight-based dosing when appropriate to reduce radiation dose to as low as reasonably achievable. FINDINGS:  Diffuse coarse interstitial changes of the lung bases are again noted with superimposed right middle and lower lobe airspace opacities correlate with infiltrates. Cardiac chambers are mildly enlarged. Permanent pacing leads are visualized. There is a gastrostomy tube present. Significant free air in the upper abdomen is present correlation with procedural history is required. The noncontrast appearance of the liver is grossly negative. The gallbladder lumen is mildly distended without obvious wall thickening or pericholecystic fluid changes. The pancreas and spleen are grossly unremarkable. There is no obstructive uropathy. Small cortical cystic changes of the right kidney at the lower pole are suspected the detail of which are limited. The small bowel demonstrates regions of mild luminal stasis and minimal fluid retention. The appearance is nonspecific. Mild enteritis cannot be excluded. There are diffuse diverticular changes throughout the colon. The appendix is not clearly identified. The urinary bladder is distended. There are postsurgical changes of the lower anterior abdominal wall and also over the left and right inguinal regions. Correlation with history recommended bilateral surgical changes of the hips are noted. Chronic degenerative skeletal changes are seen with dextroscoliosis of the lumbar spine. There is diffuse scattered atherosclerosis along the abdominal aorta. . 1.  Significant patent. The dural venous sinuses appear patent without focal filling defect. No focal areas of abnormal parenchymal enhancement are identified. CTA NECK: There is a typical 3 vessel arch. The brachiocephalic and left subclavian arteries are patent without flow-limiting stenosis. The common carotid arteries are patent without focal stenosis. There is calcified and noncalcified mural plaque at the carotid bifurcation with mild stenosis at the takeoff of the right external carotid artery. The narrowest luminal diameter in the right carotid bulb is 0.9 mm, with a point more distal, where the walls are parallel, measuring 4.5 mm. There is minimal mural plaque at the left carotid bulb without hemodynamically significant stenosis by NASCET criteria. Cervical segments of internal carotid arteries are otherwise patent without focal stenosis. The vertebral arteries are codominant. They're patent throughout their course without focal stenosis. Mucosal surfaces of the aerodigestive tract are symmetric without focal nodular thickening or visualized mass. No cervical lymphadenopathy is identified. The left parotid gland is atrophied. The right right gland is unremarkable. Submandibular glands are  unremarkable. Thyroid gland is unremarkable. There is subcutaneous air adjacent to the left subclavian vein, internal jugular vein. There is also subcutaneous air at the anterior aspect of the neck along the anterior strap muscles and adjacent to the right submandibular gland. There are fibrotic changes in the superior portions of the lungs. There are moderate degenerative changes of the cervical spine. 1. No flow-limiting stenosis or aneurysm in the anterior or posterior intracranial circulation. 2. Prominent calcified mural plaque at the carotid bulb on the right with 80% stenosis by NASCET criteria.  3. Scattered foci of subcutaneous air in the neck most prominent around the left subclavian vein possibly on the basis of [FreeTextEntry5] : PLAN: #) pursue voluntary hospitalization for stabilization on medication #) prescribed lorazepam 0.5mg PO QD #) continue seroquel ER 800mg PO QHS #) continue lamictal 200mg PO QD #) continue buspirone 5mg Po TID #) f/u with PCP re: tachycardia -visit scheduled on 11/17 #) RN visits Q 3 months for VS, AIMS etc \par  4/20/2020  PROCEDURE: XR CHEST (2 VW) CLINICAL INFORMATION: serial imaging COMPARISON: 4/18/2020 TECHNIQUE:  AP and lateral chest x-ray  FINDINGS: The heart size is borderline enlarged. There is a right upper terminate PICC present with the tip overlying the SVC. There is a left-sided pacemaker. There are coarse linear interstitial lung markings demonstrated bilaterally predominantly in a perihilar distribution with more focal consolidative bibasilar right greater than left opacities which may represent edema versus pneumonia versus sequela from  pulmonary fibrosis. Overall aeration of the lower lung bases appears slightly worsened from the prior examination suggesting a component of underlying bibasilar dependent atelectasis or pneumonia. No acute osseous findings are seen. However there is diffuse osteoporosis. 1. There are coarse linear interstitial lung markings demonstrated bilaterally predominantly in a perihilar distribution with more focal consolidative bibasilar right greater than left opacities which may represent edema versus pneumonia versus sequela from pulmonary fibrosis. Overall aeration of the lower lung bases appears slightly worsened from the prior examination suggesting a component of underlying bibasilar dependent atelectasis or pneumonia. **This report has been created using voice recognition software. It may contain minor errors which are inherent in voice recognition technology. ** Final report electronically signed by Dr. Marleen Vincent on 4/20/2020 7:42 AM    Xr Chest Standard (2 Vw)    Result Date: 4/15/2020  PROCEDURE: XR CHEST (2 VW) CLINICAL INFORMATION: Cough, dysphasia TECHNIQUE: AP and lateral chest radiographs COMPARISON: AP chest radiograph 4/13/2020 at FINDINGS: A left-sided cardiac device is in stable position. There is mild stable enlargement of the cardiac silhouette. Atherosclerotic calcifications are present in the thoracic aorta. Lungs are hyperinflated.  Fibrotic changes are again noted in the bilateral lungs. Prominent reticular opacities in the right upper and midlung are slightly more extensive. There is blunting of the right costophrenic angle. Bones are osteopenic. Degenerative and scoliotic changes are in the thoracal lumbar spine are poorly visualized. There are poorly visualized thoracic vertebral body compression deformities. 1. Persistent right upper and midlung atelectasis/infiltrate. 2. Chronic lung disease. 3. Mild stable cardiomegaly. **This report has been created using voice recognition software. It may contain minor errors which are inherent in voice recognition technology. ** Final report electronically signed by Dr. John Hernandez on 4/15/2020 7:06 PM    Ct Head Wo Contrast    Result Date: 4/25/2020  PROCEDURE: CT HEAD WO CONTRAST CLINICAL INFORMATION: r/o new stroke . COMPARISON: 4/19/2020 TECHNIQUE: 2-D multiplanar noncontrast images of the brain All CT scans at this facility use dose modulation, iterative reconstruction, and/or weight-based dosing when appropriate to reduce radiation dose to as low as reasonably achievable. FINDINGS: Generalized cerebral volume loss. Subacute infarct left basal ganglia stable. Remote lacunar infarct right thalamus. Stable. Stable white matter changes of chronic small vessel ischemic disease. No evidence of acute hemorrhage. No extra-axial fluid collection. No midline shift or mass effect. Ventricles are normal. Calvarium is intact. Visualized. Sinuses are clear mastoid air cells are nonpneumatized. Stable CT brain **This report has been created using voice recognition software. It may contain minor errors which are inherent in voice recognition technology. ** Final report electronically signed by Dr. Sara Reyes on 4/25/2020 3:36 PM    Ct Head Wo Contrast    Result Date: 4/19/2020  PROCEDURE: CT HEAD WO CONTRAST CLINICAL INFORMATION: increased right hemiparesis.  COMPARISON: None TECHNIQUE: Noncontrast 5 mm axial images were obtained through the brain. Sagittal and coronal reconstructions were obtained and reviewed. All CT scans at this facility use dose modulation, iterative reconstruction, and/or weight-based dosing when appropriate to reduce radiation dose to as low as reasonably achievable. FINDINGS: Brain: There is an ovoid focus of fairly well-defined diminished hypodensity in the left corona radiata deep white matter, more well-defined and more hypodense than on the prior study consistent with an acute ischemic infarct. There is no hemorrhage. There is a subacute to chronic ischemic lacunar infarct in the right thalamus which has become more well-defined and more hypodense when compared to prior studies. . The previously noted hypodensity in the posterior right temporal lobe is not seen and was  likely artifactual. There are moderate deep white matter hypodensities, nonspecific in nature but probably representing small vessel chronic ischemic changes. There is age appropriate cortical atrophy. Ventricles: The ventricles, cisterns and cortical sulci are enlarged concordant with the moderate degree of age-appropriate atrophy. No obstructive hydrocephalus. Skull base/calvarium/osseous structures: Unremarkable Soft tissues: Unremarkable Intraorbital contents: Unremarkable Sinuses: Unremarkable; the imaged sinuses are clear without evidence of mucosal thickening or fluid levels. Mastoids: Unremarkable; the mastoid air cells are clear. Acute ischemic infarct in the left corona radiata deep white matter. Subacute to chronic lacunar infarct in the right thalamus as discussed above. No hemorrhage Senescent changes as discussed above. Results discussed with Dr. Jose Juan Zamorano on 4/19/2020 at 10:29 PM. **This report has been created using voice recognition software. It may contain minor errors which are inherent in voice recognition technology. ** Final report electronically signed by Dr. Quentin Solomon on 4/19/2020 10:31 PM    Ct Head Wo brain. All CT scans at this facility use dose modulation, iterative reconstruction, and/or weight-based dosing when appropriate to reduce radiation dose to as low as reasonably achievable. FINDINGS: No acute infarction or hemorrhage is identified. The severity crosstable vessel ischemic disease changes as previously seen stable. No extra-axial fluid collection. Ventricles are normal. There is generalized cerebral volume loss. Mastoid air cells are underpneumatized. Visualized paranasal sinuses are  clear. Calvarium is intact. No acute process. Stable appearance of the brain. **This report has been created using voice recognition software. It may contain minor errors which are inherent in voice recognition technology. ** Final report electronically signed by Dr. Nathalie Salas on 4/14/2020 9:37 AM    Ct Head Wo Contrast (code Stroke)    Result Date: 4/13/2020  PROCEDURE: CT HEAD WO CONTRAST CLINICAL INFORMATION: stroke, lkw 12:50. Left facial droop noted on a tele visit with his family. COMPARISON: Head CT 4/17/2018. TECHNIQUE: Noncontrast 5 mm axial images were obtained through the brain. Sagittal and coronal reconstructions were obtained. All CT scans at this facility use dose modulation, iterative reconstruction, and/or weight-based dosing when appropriate to reduce radiation dose to as low as reasonably achievable. FINDINGS: There is global volume loss. There is no hemorrhage. There are no intra-or extra-axial collections. There is no hydrocephalus, midline shift or mass effect. There is a moderate amount of abnormal low attenuation in the white matter of the brain suggesting chronic small vessel ischemic changes. There is a stable old infarct in the right thalamus. There are vascular calcifications. The paranasal sinuses and mastoid air cells are normally aerated. There is no suspicious calvarial abnormality. 1. No acute findings. 2. Moderate severity chronic small vessel ischemic changes.  **This lower pelvis consistent previous abdominal wall or inguinal hernia repair with mesh. Skeleton: There is mild lower thoracic and lumbar dextroscoliosis. Patient is status post bilateral hip pinning. NG tube tip in the region of the distal stomach in satisfactory position. Non-obstructive bowel gas pattern. **This report has been created using voice recognition software. It may contain minor errors which are inherent in voice recognition technology. ** Final report electronically signed by Dr. Karla Taylor on 4/17/2020 9:11 PM    Xr Abdomen For Ng/og/ne Tube Placement    Result Date: 4/17/2020  PROCEDURE: XR ABDOMEN FOR NG/OG/NE TUBE PLACEMENT CLINICAL INFORMATION: Nasogastric tube placement TECHNIQUE: 2 AP supine abdominal radiograph COMPARISON: Abdomen 4/17/2020 at 4:41 PM FINDINGS: The tip of a nasogastric tube again overlies the left upper quadrant of the abdomen, likely in the proximal stomach. The sidehole is slightly distal to the gastroesophageal junction. Advancement of 4 to 6 cm is recommended. Bowel gas pattern is nonobstructive. There are surgical fasteners overlying the pelvis. Severe degenerative and scoliotic changes in the thoracolumbar spine are poorly visualized. Bones are osteopenic. There are incompletely visualized surgical changes at the proximal femurs. Nasogastric tube as above. Final report electronically signed by Dr. Robert iLzarraga on 4/17/2020 5:49 PM    Xr Abdomen For Ng/og/ne Tube Placement    Result Date: 4/17/2020  PROCEDURE: XR ABDOMEN FOR NG/OG/NE TUBE PLACEMENT CLINICAL INFORMATION: Confirmation of course of NG/OG/NE tube and location of tip of tube . COMPARISON: Chest radiograph dated 4/15/2020. TECHNIQUE: A portable AP supine view of the abdomen was obtained. FINDINGS:  There has been interval placement of an NG tube with tip below the diaphragm projecting over the proximal stomach. There is a nonspecific bowel gas pattern.  There are degenerative changes of the lumbar spine.      NG tube tip projecting over the proximal stomach. Consider advancing a few centimeters. **This report has been created using voice recognition software. It may contain minor errors which are inherent in voice recognition technology. ** Final report electronically signed by Dr. Rogers Jauregui MD on 4/17/2020 4:55 PM    Ir Fluoroscopy Less Than 1 Hour    Result Date: 4/20/2020  PROCEDURE: PICC LINE REPOSITIONING PERFORMED BY: Dr. Juan David Dickerson: Malpositioned PICC line. Needs central PICC line for total parental nutrition. PICC LINE: 6 Khmer triple-lumen BioFlo picc line. ORIGINAL PICC POSITION: Coiled in right axillary vein FINAL PICC POSITION: Cavoatrial junction FLUOROSCOPY TIME: 1 minute 21 seconds FLUOROSCOPIC IMAGES: 2 ESTIMATED BLOOD LOSS: Minimal PROCEDURE:  Signed informed consent was obtained prior to performing this procedure. The patient was not sedated during this procedure. The patient came to the department with a PICC line originally inserted by the PICC line nurses. The PICC line tip is not in the desired position, as indicated above. The exposed portion of the PICC line as well as the surrounding skin were prepped and draped in a sterile fashion, utilizing [address]. An 018 guidewire was then passed through the PICC line with fluoroscopic guidance and the PICC line was successfully repositioned with the final PICC line position as indicated above. Status post successful PICC line repositioning. .. **This report has been created using voice recognition software. It may contain minor errors which are inherent in voice recognition technology. ** Final report electronically signed by Dr. Nicholas Duron on 4/20/2020 8:00 AM      Diet: Diet NPO Effective Now    DVT prophylaxis: [] Lovenox                                 [x] SCDs                                 [] SQ Heparin                                 [] Encourage ambulation           []

## 2023-07-13 NOTE — PROGRESS NOTES
Reactions    Iodine Swelling and Rash     Past Medical History:   Diagnosis Date    Acute sinusitis     Angina pectoris (HCC)     Anxiety disorder 10/27/2016    Arthritis     osteoporosis    BPH with urinary obstruction     CAD (coronary artery disease)     Chronic insomnia     CKD (chronic kidney disease) stage 3, GFR 30-59 ml/min     Gastroenteritis     HCAP (healthcare-associated pneumonia) 4/29/2015    Heart disease     HLD (hyperlipidemia)     [place] (hard of hearing)     wear hearing aids    Hypertension     Kidney disease     40% kdiney function    Kidney stone     years ago 15-20    NICOLAS on CPAP     Osteopenia determined by x-ray     Pacemaker 2011    Pinched nerve     slipped disc in back    Visual problems     Vitamin D deficiency      Past Surgical History:   Procedure Laterality Date    ABDOMINAL HERNIA REPAIR  04/27/2017    incisional hernia repair--Dr. Ej Chaparro    CATARACT REMOVAL WITH IMPLANT      bilateral    COLONOSCOPY  [id number]    COLONOSCOPY  12/29/2017    COLONOSCOPY CONTROL HEMORRHAGE performed by Cassie Dallas MD at [address] [place] [address]    EKG 12-LEAD  4/29/2015         EYE SURGERY      cataracts    HERNIA REPAIR      several    HIP PINNING Left 8/31/2017    LEFT HIP INTERTAN performed by Nancy Christianson MD at [address] [address]  12/3/12    left     MYRINGOTOMY AND TYMPANOSTOMY TUBE PLACEMENT  7/23/13    left     NASAL SINUS SURGERY      OTHER SURGICAL HISTORY  04/27/2015    transurethral Incision bladder neck    PACEMAKER INSERTION      PACEMAKER PLACEMENT  2011    TIBIA FRACTURE SURGERY Right 10/8/2017    Right hip intertan performed by Yulia Rogers MD at [address] [facility] TURP  4/17/2013    W/CYSTO WITH DIRECT VISION URETHROTOMY & RESECTION BLADDER NECK CONTRACTURE    TURP  4/27/15    re-do TURP    TYMPANOSTOMY TUBE PLACEMENT      multiple surgeries    PAST SURGICAL HISTORY:  No significant past surgical history

## 2024-12-10 NOTE — PLAN OF CARE
Problem: Nutrition  Goal: Optimal nutrition therapy  Outcome: Ongoing   Nutrition Problem: Moderate malnutrition  Intervention: Food and/or Nutrient Delivery: Continue NPO, Modify current Parenteral Nutrition  Nutritional Goals: TPN to provide 75% or more of estimated nutritional needs during LOS. (0) no hurt

## (undated) DEVICE — GAUZE,SPONGE,4"X4",12PLY,STERILE,LF,2'S: Brand: MEDLINE

## (undated) DEVICE — KIT INF CTRL 2OZ LUB TBNG L12FT DBL END BRSH SYR OP4

## (undated) DEVICE — 6 ML SYRINGE LUER-LOCK TIP: Brand: MONOJECT

## (undated) DEVICE — NEEDLE SYR 18GA L1.5IN RED PLAS HUB S STL BLNT FILL W/O

## (undated) DEVICE — SKIN AFFIX SURG ADHESIVE 72/CS 0.55ML: Brand: MEDLINE

## (undated) DEVICE — Device

## (undated) DEVICE — GLOVE ORANGE PI 7 1/2   MSG9075

## (undated) DEVICE — 3 ML SYRINGE LUER-LOCK TIP: Brand: MONOJECT

## (undated) DEVICE — CONMED SCOPE SAVER BITE BLOCK, 20X27 MM: Brand: SCOPE SAVER

## (undated) DEVICE — IV START KIT: Brand: MEDLINE INDUSTRIES, INC.

## (undated) DEVICE — DRAPE,U/SHT,SPLIT,FILM,60X84,STERILE: Brand: MEDLINE

## (undated) DEVICE — MEDI-VAC NON-CONDUCTIVE SUCTION TUBING 6MM X 6.1M (20 FT.) L: Brand: CARDINAL HEALTH

## (undated) DEVICE — ENDO KIT: Brand: MEDLINE INDUSTRIES, INC.

## (undated) DEVICE — CATHETER ETER IV 22GA L1IN POLYUR STR RADPQ INTROCAN SFTY

## (undated) DEVICE — SOLUTION IV 1000ML LAC RINGERS PH 6.5 INJ USP VIAFLX PLAS

## (undated) DEVICE — DEFENDO AIR WATER SUCTION AND BIOPSY VALVE KIT FOR  OLYMPUS: Brand: DEFENDO AIR/WATER/SUCTION AND BIOPSY VALVE

## (undated) DEVICE — LINER SUCT CANSTR 1500CC SEMI RIG W/ POR HYDROPHOBIC SHUT

## (undated) DEVICE — SYRINGE BULB 50/CS 48/PLT: Brand: MEDEGEN MEDICAL PRODUCTS, LLC

## (undated) DEVICE — SNARE POLYP SM W13MMXL240CM SHTH DIA2.4MM OVL FLX DISP

## (undated) DEVICE — 3M™ STERI-STRIP™ BLEND TONE SKIN CLOSURES, B1557, TAN, 1/2 IN X 4 IN (12MM X 100MM), 6 STRIPS/ENVELOPE: Brand: 3M™ STERI-STRIP™

## (undated) DEVICE — 2000CC GUARDIAN II: Brand: GUARDIAN

## (undated) DEVICE — SET EXTN L6IN W/ S STL CLMP

## (undated) DEVICE — NEEDLE SCLERO 23GA L4MM CATH L240CM CNTRST SHTH DIA1.8MM

## (undated) DEVICE — GUIDE PIN 3.2MM X 343MM: Brand: TRIGEN

## (undated) DEVICE — PADDING CAST W4INXL4YD ST COT COHESIVE HND TEARABLE SPEC

## (undated) DEVICE — CATHETER ETER IV 20GA L1IN POLYUR STR RADPQ INTROCAN SFTY

## (undated) DEVICE — SET ADMIN 25ML L117IN PMP MOD CK VLV RLER CLMP 2 SMRTSITE

## (undated) DEVICE — SUREFIT, DUAL DISPERSIVE ELECTRODE, CONTACT QUALITY MONITOR: Brand: SUREFIT

## (undated) DEVICE — TUBING, SUCTION, 1/4" X 20', STRAIGHT: Brand: MEDLINE INDUSTRIES, INC.

## (undated) DEVICE — DRESSING,GAUZE,XEROFORM,CURAD,5"X9",ST: Brand: CURAD

## (undated) DEVICE — SUTURE ABSORBABLE BRAIDED 2-0 CT-1 27 IN UD VICRYL J259H

## (undated) DEVICE — SOLUTION IV IRRIG POUR BRL 0.9% SODIUM CHL 2F7124

## (undated) DEVICE — 3M™ WARMING BLANKET, UPPER BODY, 10 PER CASE, 42268: Brand: BAIR HUGGER™

## (undated) DEVICE — TUBING IV STOPCOCK 48 CM 3 W

## (undated) DEVICE — SOLUTION IV 1000ML 0.9% SOD CHL PH 5 INJ USP VIAFLX PLAS

## (undated) DEVICE — GLOVE ORANGE PI 8   MSG9080

## (undated) DEVICE — ABDOMINAL PAD: Brand: DERMACEA

## (undated) DEVICE — CHLORAPREP 26ML CLEAR

## (undated) DEVICE — 3M™ TEGADERM™ TRANSPARENT FILM DRESSING FRAME STYLE, 1624W, 2-3/8 IN X 2-3/4 IN (6 CM X 7 CM), 100/CT 4CT/CASE: Brand: 3M™ TEGADERM™

## (undated) DEVICE — BLADE LARYNSCP SZ 3 ENH DIR INTUB GLIDESCOPE MCGRATH MAC

## (undated) DEVICE — CHLORAPREP 26ML ORANGE

## (undated) DEVICE — 3M™ COBAN™ NL STERILE NON-LATEX SELF-ADHERENT WRAP, 2084S, 4 IN X 5 YD (10 CM X 4,5 M), 18 ROLLS/CASE: Brand: 3M™ COBAN™

## (undated) DEVICE — Device: Brand: DEFENDO VALVE AND CONNECTOR KIT

## (undated) DEVICE — 3M™ IOBAN™ 2 ANTIMICROBIAL INCISE DRAPE 6651EZ: Brand: IOBAN™ 2

## (undated) DEVICE — FLEXIBLE ADHESIVE BANDAGE: Brand: CURITY

## (undated) DEVICE — 3M™ TEGADERM™ TRANSPARENT FILM DRESSING FRAME STYLE, 1626W, 4 IN X 4-3/4 IN (10 CM X 12 CM), 50/CT 4CT/CASE: Brand: 3M™ TEGADERM™

## (undated) DEVICE — YANKAUER,BULB TIP,W/O VENT,RIGID,STERILE: Brand: MEDLINE

## (undated) DEVICE — APPLICATOR MEDICATED 26 CC SOLUTION CLR STRL CHLORAPREP

## (undated) DEVICE — 4-PORT MANIFOLD: Brand: NEPTUNE 2

## (undated) DEVICE — SET LNR RED GRN W/ BASE CLEANASCOPE

## (undated) DEVICE — SPONGE GZ W4XL4IN COT 12 PLY TYP VII WVN C FLD DSGN

## (undated) DEVICE — GLOVE SURG SZ 65 THK91MIL LTX FREE SYN POLYISOPRENE

## (undated) DEVICE — SYRINGE 10ML FLUSH ST PREFILLED W/SALINE

## (undated) DEVICE — AIRLIFE™ ADULT OXYGEN MASK VINYL, UNDER THE CHIN STYLE, 3 IN 1 MASK WITH SAFETY VENT, WITH 7 FEET (2.1 M) CRUSH RESISTANT OXYGEN TUBING: Brand: AIRLIFE™

## (undated) DEVICE — VELCLOSE LATEX FREE ELASTIC BANDAGE D/L 6INX10YD

## (undated) DEVICE — CONVERTED USE 338908 SPONGES LAP 18X18 ST

## (undated) DEVICE — TRAP POLYP ETRAP

## (undated) DEVICE — BANDAGE ADH W1XL3IN NAT FAB WVN FLX DURABLE N ADH PD SEAL

## (undated) DEVICE — SUTURE VCRL SZ 0 L27IN ABSRB UD L36MM CT-1 1/2 CIR J260H

## (undated) DEVICE — 4.0MM SHORT PILOT DRILL WITH AO CONNECTOR: Brand: TRIGEN

## (undated) DEVICE — GAUZE,SPONGE,2"X2",8PLY,STERILE,LF,2'S: Brand: MEDLINE

## (undated) DEVICE — BIOVAC DIRECT SUCTION DEVICE 00711512

## (undated) DEVICE — ARM CRADLE: Brand: DEVON

## (undated) DEVICE — Device: Brand: SPOT ENDOSOPIC MARKER

## (undated) DEVICE — MEDI-VAC YANKAUER SUCTION HANDLE W/BULBOUS TIP: Brand: CARDINAL HEALTH

## (undated) DEVICE — SOLUTION IV IRRIG WATER 1000ML POUR BRL 2F7114

## (undated) DEVICE — TOWEL,OR,DSP,ST,BLUE,DLX,4/PK,20PK/CS: Brand: MEDLINE

## (undated) DEVICE — BIOGUARD A/W CLEANING ADAPTER

## (undated) DEVICE — GAUZE,SPONGE,3"X3",12PLY,STERILE,LF,2'S: Brand: MEDLINE